# Patient Record
Sex: MALE | Race: BLACK OR AFRICAN AMERICAN | NOT HISPANIC OR LATINO | Employment: OTHER | URBAN - METROPOLITAN AREA
[De-identification: names, ages, dates, MRNs, and addresses within clinical notes are randomized per-mention and may not be internally consistent; named-entity substitution may affect disease eponyms.]

---

## 2018-01-23 ENCOUNTER — OFFICE VISIT CONVERTED (OUTPATIENT)
Dept: INTERNAL MEDICINE | Facility: CLINIC | Age: 63
End: 2018-01-23
Attending: NURSE PRACTITIONER

## 2018-01-23 ENCOUNTER — CONVERSION ENCOUNTER (OUTPATIENT)
Dept: INTERNAL MEDICINE | Facility: CLINIC | Age: 63
End: 2018-01-23

## 2018-02-05 ENCOUNTER — OFFICE VISIT CONVERTED (OUTPATIENT)
Dept: INTERNAL MEDICINE | Facility: CLINIC | Age: 63
End: 2018-02-05
Attending: NURSE PRACTITIONER

## 2018-02-05 ENCOUNTER — CONVERSION ENCOUNTER (OUTPATIENT)
Dept: OTHER | Facility: HOSPITAL | Age: 63
End: 2018-02-05

## 2018-02-13 ENCOUNTER — OFFICE VISIT CONVERTED (OUTPATIENT)
Dept: NEUROSURGERY | Facility: CLINIC | Age: 63
End: 2018-02-13
Attending: PHYSICIAN ASSISTANT

## 2018-02-13 ENCOUNTER — CONVERSION ENCOUNTER (OUTPATIENT)
Dept: NEUROLOGY | Facility: CLINIC | Age: 63
End: 2018-02-13

## 2018-03-06 ENCOUNTER — CONVERSION ENCOUNTER (OUTPATIENT)
Dept: CARDIOLOGY | Facility: CLINIC | Age: 63
End: 2018-03-06

## 2018-03-06 ENCOUNTER — OFFICE VISIT CONVERTED (OUTPATIENT)
Dept: CARDIOLOGY | Facility: CLINIC | Age: 63
End: 2018-03-06
Attending: INTERNAL MEDICINE

## 2018-03-09 ENCOUNTER — OFFICE VISIT CONVERTED (OUTPATIENT)
Dept: ORTHOPEDIC SURGERY | Facility: CLINIC | Age: 63
End: 2018-03-09
Attending: ORTHOPAEDIC SURGERY

## 2018-03-20 ENCOUNTER — OFFICE VISIT CONVERTED (OUTPATIENT)
Dept: INTERNAL MEDICINE | Facility: CLINIC | Age: 63
End: 2018-03-20
Attending: NURSE PRACTITIONER

## 2018-03-20 ENCOUNTER — CONVERSION ENCOUNTER (OUTPATIENT)
Dept: INTERNAL MEDICINE | Facility: CLINIC | Age: 63
End: 2018-03-20

## 2018-03-23 ENCOUNTER — CONVERSION ENCOUNTER (OUTPATIENT)
Dept: CARDIOLOGY | Facility: CLINIC | Age: 63
End: 2018-03-23
Attending: INTERNAL MEDICINE

## 2018-04-19 ENCOUNTER — OFFICE VISIT CONVERTED (OUTPATIENT)
Dept: CARDIOLOGY | Facility: CLINIC | Age: 63
End: 2018-04-19
Attending: INTERNAL MEDICINE

## 2018-04-19 ENCOUNTER — CONVERSION ENCOUNTER (OUTPATIENT)
Dept: CARDIOLOGY | Facility: CLINIC | Age: 63
End: 2018-04-19

## 2018-05-21 ENCOUNTER — OFFICE VISIT CONVERTED (OUTPATIENT)
Dept: INTERNAL MEDICINE | Facility: CLINIC | Age: 63
End: 2018-05-21
Attending: NURSE PRACTITIONER

## 2018-06-15 ENCOUNTER — OFFICE VISIT CONVERTED (OUTPATIENT)
Dept: CARDIOLOGY | Facility: CLINIC | Age: 63
End: 2018-06-15
Attending: INTERNAL MEDICINE

## 2018-06-27 ENCOUNTER — OFFICE VISIT CONVERTED (OUTPATIENT)
Dept: INTERNAL MEDICINE | Facility: CLINIC | Age: 63
End: 2018-06-27
Attending: INTERNAL MEDICINE

## 2018-07-02 ENCOUNTER — OFFICE VISIT CONVERTED (OUTPATIENT)
Dept: INTERNAL MEDICINE | Facility: CLINIC | Age: 63
End: 2018-07-02
Attending: NURSE PRACTITIONER

## 2018-08-27 ENCOUNTER — OFFICE VISIT CONVERTED (OUTPATIENT)
Dept: INTERNAL MEDICINE | Facility: CLINIC | Age: 63
End: 2018-08-27
Attending: NURSE PRACTITIONER

## 2018-09-04 ENCOUNTER — CONVERSION ENCOUNTER (OUTPATIENT)
Dept: OTHER | Facility: HOSPITAL | Age: 63
End: 2018-09-04

## 2018-09-04 ENCOUNTER — OFFICE VISIT CONVERTED (OUTPATIENT)
Dept: CARDIOLOGY | Facility: CLINIC | Age: 63
End: 2018-09-04
Attending: INTERNAL MEDICINE

## 2018-10-08 ENCOUNTER — OFFICE VISIT CONVERTED (OUTPATIENT)
Dept: INTERNAL MEDICINE | Facility: CLINIC | Age: 63
End: 2018-10-08
Attending: NURSE PRACTITIONER

## 2018-12-10 ENCOUNTER — OFFICE VISIT CONVERTED (OUTPATIENT)
Dept: INTERNAL MEDICINE | Facility: CLINIC | Age: 63
End: 2018-12-10
Attending: NURSE PRACTITIONER

## 2018-12-10 ENCOUNTER — CONVERSION ENCOUNTER (OUTPATIENT)
Dept: ORTHOPEDIC SURGERY | Facility: CLINIC | Age: 63
End: 2018-12-10

## 2018-12-10 ENCOUNTER — OFFICE VISIT CONVERTED (OUTPATIENT)
Dept: ORTHOPEDIC SURGERY | Facility: CLINIC | Age: 63
End: 2018-12-10
Attending: ORTHOPAEDIC SURGERY

## 2019-01-10 ENCOUNTER — OFFICE VISIT CONVERTED (OUTPATIENT)
Dept: ORTHOPEDIC SURGERY | Facility: CLINIC | Age: 64
End: 2019-01-10
Attending: ORTHOPAEDIC SURGERY

## 2019-01-15 ENCOUNTER — HOSPITAL ENCOUNTER (OUTPATIENT)
Dept: OTHER | Facility: HOSPITAL | Age: 64
Discharge: HOME | End: 2019-01-15
Attending: NURSE PRACTITIONER

## 2019-01-15 LAB
ANION GAP SERPL CALC-SCNC: 17 MMOL/L (ref 8–19)
BUN SERPL-MCNC: 43 MG/DL (ref 5–25)
BUN/CREAT SERPL: 22 {RATIO} (ref 6–20)
CALCIUM SERPL-MCNC: 9.1 MG/DL (ref 8.7–10.4)
CHLORIDE SERPL-SCNC: 98 MMOL/L (ref 99–111)
CONV CO2: 26 MMOL/L (ref 22–32)
CREAT UR-MCNC: 1.92 MG/DL (ref 0.7–1.2)
GFR SERPLBLD BASED ON 1.73 SQ M-ARVRAT: 42 ML/MIN/{1.73_M2}
GLUCOSE SERPL-MCNC: 158 MG/DL (ref 70–99)
OSMOLALITY SERPL CALC.SUM OF ELEC: 296 MOSM/KG (ref 273–304)
POTASSIUM SERPL-SCNC: 4.5 MMOL/L (ref 3.5–5.3)
SODIUM SERPL-SCNC: 136 MMOL/L (ref 135–147)

## 2019-01-17 ENCOUNTER — HOSPITAL ENCOUNTER (OUTPATIENT)
Dept: GENERAL RADIOLOGY | Facility: HOSPITAL | Age: 64
Discharge: HOME | End: 2019-01-17
Attending: ORTHOPAEDIC SURGERY

## 2019-01-21 ENCOUNTER — CONVERSION ENCOUNTER (OUTPATIENT)
Dept: ORTHOPEDIC SURGERY | Facility: CLINIC | Age: 64
End: 2019-01-21

## 2019-01-21 ENCOUNTER — OFFICE VISIT CONVERTED (OUTPATIENT)
Dept: ORTHOPEDIC SURGERY | Facility: CLINIC | Age: 64
End: 2019-01-21
Attending: ORTHOPAEDIC SURGERY

## 2019-01-22 ENCOUNTER — OFFICE VISIT CONVERTED (OUTPATIENT)
Dept: INTERNAL MEDICINE | Facility: CLINIC | Age: 64
End: 2019-01-22
Attending: NURSE PRACTITIONER

## 2019-03-19 ENCOUNTER — HOSPITAL ENCOUNTER (OUTPATIENT)
Dept: OTHER | Facility: HOSPITAL | Age: 64
Discharge: HOME | End: 2019-03-19
Attending: NURSE PRACTITIONER

## 2019-03-19 ENCOUNTER — OFFICE VISIT CONVERTED (OUTPATIENT)
Dept: INTERNAL MEDICINE | Facility: CLINIC | Age: 64
End: 2019-03-19
Attending: NURSE PRACTITIONER

## 2019-03-19 LAB
ALBUMIN SERPL-MCNC: 3.5 G/DL (ref 3.5–5)
ALBUMIN/GLOB SERPL: 0.9 {RATIO} (ref 1.4–2.6)
ALP SERPL-CCNC: 75 U/L (ref 56–155)
ALT SERPL-CCNC: 17 U/L (ref 10–40)
ANION GAP SERPL CALC-SCNC: 19 MMOL/L (ref 8–19)
AST SERPL-CCNC: 23 U/L (ref 15–50)
BASOPHILS # BLD AUTO: 0.03 10*3/UL (ref 0–0.2)
BASOPHILS NFR BLD AUTO: 0.3 % (ref 0–3)
BILIRUB SERPL-MCNC: 0.44 MG/DL (ref 0.2–1.3)
BUN SERPL-MCNC: 52 MG/DL (ref 5–25)
BUN/CREAT SERPL: 23 {RATIO} (ref 6–20)
CALCIUM SERPL-MCNC: 9 MG/DL (ref 8.7–10.4)
CHLORIDE SERPL-SCNC: 96 MMOL/L (ref 99–111)
CHOLEST SERPL-MCNC: 118 MG/DL (ref 107–200)
CHOLEST/HDLC SERPL: 2.8 {RATIO} (ref 3–6)
CONV ABS IMM GRAN: 0.04 10*3/UL (ref 0–0.2)
CONV CO2: 24 MMOL/L (ref 22–32)
CONV IMMATURE GRAN: 0.4 % (ref 0–1.8)
CONV TOTAL PROTEIN: 7.3 G/DL (ref 6.3–8.2)
CREAT UR-MCNC: 2.28 MG/DL (ref 0.7–1.2)
DEPRECATED RDW RBC AUTO: 46.7 FL (ref 35.1–43.9)
EOSINOPHIL # BLD AUTO: 0.08 10*3/UL (ref 0–0.7)
EOSINOPHIL # BLD AUTO: 0.8 % (ref 0–7)
ERYTHROCYTE [DISTWIDTH] IN BLOOD BY AUTOMATED COUNT: 14 % (ref 11.6–14.4)
EST. AVERAGE GLUCOSE BLD GHB EST-MCNC: 154 MG/DL
GFR SERPLBLD BASED ON 1.73 SQ M-ARVRAT: 34 ML/MIN/{1.73_M2}
GLOBULIN UR ELPH-MCNC: 3.8 G/DL (ref 2–3.5)
GLUCOSE SERPL-MCNC: 166 MG/DL (ref 70–99)
HBA1C MFR BLD: 11.6 G/DL (ref 14–18)
HBA1C MFR BLD: 7 % (ref 3.5–5.7)
HCT VFR BLD AUTO: 36.5 % (ref 42–52)
HDLC SERPL-MCNC: 42 MG/DL (ref 40–60)
LDLC SERPL CALC-MCNC: 43 MG/DL (ref 70–100)
LYMPHOCYTES # BLD AUTO: 1.71 10*3/UL (ref 1–5)
MCH RBC QN AUTO: 28.9 PG (ref 27–31)
MCHC RBC AUTO-ENTMCNC: 31.8 G/DL (ref 33–37)
MCV RBC AUTO: 90.8 FL (ref 80–96)
MONOCYTES # BLD AUTO: 0.78 10*3/UL (ref 0.2–1.2)
MONOCYTES NFR BLD AUTO: 7.6 % (ref 3–10)
NEUTROPHILS # BLD AUTO: 7.56 10*3/UL (ref 2–8)
NEUTROPHILS NFR BLD AUTO: 74.1 % (ref 30–85)
NRBC CBCN: 0 % (ref 0–0.7)
OSMOLALITY SERPL CALC.SUM OF ELEC: 296 MOSM/KG (ref 273–304)
PLATELET # BLD AUTO: 265 10*3/UL (ref 130–400)
PMV BLD AUTO: 10.5 FL (ref 9.4–12.4)
POTASSIUM SERPL-SCNC: 4.8 MMOL/L (ref 3.5–5.3)
RBC # BLD AUTO: 4.02 10*6/UL (ref 4.7–6.1)
SODIUM SERPL-SCNC: 134 MMOL/L (ref 135–147)
T4 FREE SERPL-MCNC: 1.1 NG/DL (ref 0.9–1.8)
TRIGL SERPL-MCNC: 167 MG/DL (ref 40–150)
TSH SERPL-ACNC: 1.19 M[IU]/L (ref 0.27–4.2)
VARIANT LYMPHS NFR BLD MANUAL: 16.8 % (ref 20–45)
VLDLC SERPL-MCNC: 33 MG/DL (ref 5–37)
WBC # BLD AUTO: 10.2 10*3/UL (ref 4.8–10.8)

## 2019-03-22 ENCOUNTER — CONVERSION ENCOUNTER (OUTPATIENT)
Dept: OTHER | Facility: HOSPITAL | Age: 64
End: 2019-03-22

## 2019-03-25 ENCOUNTER — HOSPITAL ENCOUNTER (OUTPATIENT)
Dept: CARDIOLOGY | Facility: HOSPITAL | Age: 64
Discharge: HOME | End: 2019-03-25
Attending: NURSE PRACTITIONER

## 2019-03-28 ENCOUNTER — OFFICE VISIT CONVERTED (OUTPATIENT)
Dept: INTERNAL MEDICINE | Facility: CLINIC | Age: 64
End: 2019-03-28
Attending: NURSE PRACTITIONER

## 2019-03-29 ENCOUNTER — OFFICE VISIT (OUTPATIENT)
Dept: CARDIOLOGY | Facility: CLINIC | Age: 64
End: 2019-03-29

## 2019-03-29 VITALS
DIASTOLIC BLOOD PRESSURE: 72 MMHG | HEART RATE: 85 BPM | BODY MASS INDEX: 30.13 KG/M2 | HEIGHT: 67 IN | SYSTOLIC BLOOD PRESSURE: 124 MMHG | WEIGHT: 192 LBS

## 2019-03-29 DIAGNOSIS — I25.10 CORONARY ARTERY DISEASE INVOLVING NATIVE CORONARY ARTERY WITHOUT ANGINA PECTORIS, UNSPECIFIED WHETHER NATIVE OR TRANSPLANTED HEART: Primary | ICD-10-CM

## 2019-03-29 PROCEDURE — 93000 ELECTROCARDIOGRAM COMPLETE: CPT | Performed by: INTERNAL MEDICINE

## 2019-03-29 PROCEDURE — 99204 OFFICE O/P NEW MOD 45 MIN: CPT | Performed by: INTERNAL MEDICINE

## 2019-03-29 RX ORDER — POTASSIUM CHLORIDE 1500 MG/1
TABLET, FILM COATED, EXTENDED RELEASE ORAL DAILY
Refills: 1 | COMMUNITY
Start: 2019-03-07 | End: 2021-09-23

## 2019-03-29 RX ORDER — ATORVASTATIN CALCIUM 40 MG/1
TABLET, FILM COATED ORAL
Refills: 1 | COMMUNITY
Start: 2019-03-13 | End: 2021-08-21

## 2019-03-29 RX ORDER — SPIRONOLACTONE 25 MG/1
TABLET ORAL DAILY
Refills: 2 | COMMUNITY
Start: 2019-03-13 | End: 2022-01-11 | Stop reason: SDUPTHER

## 2019-03-29 RX ORDER — DULAGLUTIDE 1.5 MG/.5ML
INJECTION, SOLUTION SUBCUTANEOUS
Refills: 5 | COMMUNITY
Start: 2019-03-17 | End: 2021-07-10 | Stop reason: ALTCHOICE

## 2019-03-29 RX ORDER — PREDNISONE 10 MG/1
TABLET ORAL TAKE AS DIRECTED
Refills: 0 | COMMUNITY
Start: 2019-03-25 | End: 2021-07-10 | Stop reason: ALTCHOICE

## 2019-03-29 RX ORDER — SACUBITRIL AND VALSARTAN 97; 103 MG/1; MG/1
1 TABLET, FILM COATED ORAL 2 TIMES DAILY
Refills: 1 | COMMUNITY
Start: 2019-01-14 | End: 2021-09-23 | Stop reason: SDUPTHER

## 2019-03-29 RX ORDER — TRAMADOL HYDROCHLORIDE 50 MG/1
TABLET ORAL 2 TIMES DAILY
Refills: 0 | COMMUNITY
Start: 2019-02-19 | End: 2021-07-10 | Stop reason: ALTCHOICE

## 2019-03-29 RX ORDER — FUROSEMIDE 40 MG/1
TABLET ORAL DAILY
Refills: 1 | COMMUNITY
Start: 2019-03-13 | End: 2021-08-16 | Stop reason: SDUPTHER

## 2019-03-29 NOTE — PROGRESS NOTES
Subjective:     Encounter Date:03/29/2019      Patient ID: Lyle Loznao is a 63 y.o. male.    Chief Complaint:  This is a 63-year-old man with a past medical history of cardiomyopathy, presumed ischemic etiology, depression, diabetes, hypertension, hyperlipidemia, gout/osteoarthritis.  He presents for evaluation of lower extremity edema.  He was in the [facility] ER 2 days ago with isolated left knee and lower extremity swelling and pain.  He apparently had an echocardiogram at that time.  He was started on steroids for gout.  Since starting the steroids he says his knee and swelling has improved substantially.  The pain is also improved substantially.  He was told that there was nothing wrong with his heart during that emergency room visit.  We are provided with the most recent note from Dr. Mcdonnell his primary cardiologist.  He has had a nuclear stress test which showed a moderate size inferior fixed defect.  For this reason has been presumed that this is an ischemic cardiomyopathy.  Dr. Mcdonnell has talked to the patient about having a left heart catheterization to evaluate for revascularizable or reversible causes of heart failure.  The patient has not chosen to do this up until this time.  He has had a diagnosis of heart failure for about the last year and his most recent EF that I know of is about 40%.  He quit smoking in 2017.  He is never been much of a drinker.  There is no family history of cardiomyopathy.  His blood pressure and his diabetes have been intermittently well controlled.  He walks with a walker due to neuropathy and gout.  He is not really limited by chest pain but does have occasional shortness of breath        The following portions of the patient's history were reviewed and updated as appropriate: allergies, current medications, past family history, past medical history, past social history, past surgical history and problem list.    Past Medical History:   Diagnosis Date   • Abnormal  kidney function    • Arthritis    • Bursitis    • CHF (congestive heart failure) (CMS/HCC)    • Depression    • Diabetes (CMS/HCC)    • Edema    • Forgetfulness    • Head injury    • Hernia cerebri (CMS/HCC)    • Hyperlipidemia    • Hypertension    • IFG (impaired fasting glucose)    • Low back pain    • Lumbago     `   • Pain of left hip    • Right shoulder pain    • SOB (shortness of breath)        Family History   Problem Relation Age of Onset   • Cancer Mother    • Diabetes Mother    • Diabetes Sister    • Stroke Sister        Social History     Socioeconomic History   • Marital status: Single     Spouse name: Not on file   • Number of children: Not on file   • Years of education: Not on file   • Highest education level: Not on file   Tobacco Use   • Smoking status: Former Smoker   • Smokeless tobacco: Never Used   Substance and Sexual Activity   • Alcohol use: Yes     Comment: rare       No Known Allergies    No past surgical history on file.    Review of Systems   Cardiovascular: Positive for dyspnea on exertion and leg swelling. Negative for chest pain, claudication, irregular heartbeat, near-syncope, orthopnea, palpitations, paroxysmal nocturnal dyspnea and syncope.   Respiratory: Positive for shortness of breath, sleep disturbances due to breathing and snoring.    Musculoskeletal: Positive for joint pain and joint swelling.         ECG 12 Lead  Date/Time: 3/29/2019 11:44 AM  Performed by: Chacha Santacruz MD  Authorized by: Chacha Santacruz MD   Previous ECG: no previous ECG available  Rhythm: sinus rhythm  Rate: normal  T inversion: I, aVL, V1, V2, V3, V4, V5 and V6  QRS axis: normal    Clinical impression: abnormal EKG               Objective:     Physical Exam  GEN: no distress, alert and oriented  Eyes: normal sclera, normal lids and lashes  HENT: moist mucus membranes,   Lungs CTAB, no rales or wheezes  Chest: no abnormalities  Neck: no JVD or carotid bruits  CV: RRR, no murmurs, , +2 DP and 2+ carotid  pulses b/l  Abdo: soft,  Nontender, nondistended  Extr: +1 LLE edema, RLE is normal. L knee is warm, swollen, tender to palp  Skin: no rash, warm, dry  Heme/Lymph: no bruising  Psych: organized thought, normal behavior and affect    Lab Review:         Assessment:          Diagnosis Plan   1. Coronary artery disease involving native coronary artery without angina pectoris, unspecified whether native or transplanted heart            Plan:         This is a 63-year-old man who is a history of cardiomyopathy, presumed to be ischemic in etiology based on nuclear stress test.  He presents for evaluation of unilateral lower extremity edema.  I believe his lower extremity edema is most likely related to a gout flareup in his left knee.  It is warm and swollen and tender to the touch.  His right lower extremity is normal and he has no JVD or rales on physical exam.  He is on excellent medical therapy including a beta-blocker, spironolactone 25 and Entresto maximum dose.  He has very mild, class II heart failure symptoms.  I do agree with Dr. Mcdonnell that he should have a ischemic evaluation with a left heart catheterization to look for reversible causes of heart failure.  The patient said he would consider this.  Of note, he did have an echocardiogram earlier this week at [facility].  We have called and asked for the results of this test.  I will give the patient a call with the results and let him know if any changes need to be made to his medical regimen based on that.  He says that he will think about pursuing left heart catheterization will get back with me on that.               Chacha Santacruz MD  03/29/19

## 2019-04-15 ENCOUNTER — CONVERSION ENCOUNTER (OUTPATIENT)
Dept: INTERNAL MEDICINE | Facility: CLINIC | Age: 64
End: 2019-04-15

## 2019-04-15 ENCOUNTER — OFFICE VISIT CONVERTED (OUTPATIENT)
Dept: INTERNAL MEDICINE | Facility: CLINIC | Age: 64
End: 2019-04-15
Attending: NURSE PRACTITIONER

## 2019-04-26 ENCOUNTER — CONVERSION ENCOUNTER (OUTPATIENT)
Dept: CARDIOLOGY | Facility: CLINIC | Age: 64
End: 2019-04-26

## 2019-04-26 ENCOUNTER — OFFICE VISIT CONVERTED (OUTPATIENT)
Dept: CARDIOLOGY | Facility: CLINIC | Age: 64
End: 2019-04-26
Attending: INTERNAL MEDICINE

## 2019-05-07 ENCOUNTER — HOSPITAL ENCOUNTER (OUTPATIENT)
Dept: OTHER | Facility: HOSPITAL | Age: 64
Discharge: HOME | End: 2019-05-07
Attending: INTERNAL MEDICINE

## 2019-05-07 LAB
25(OH)D3 SERPL-MCNC: 9.3 NG/ML (ref 30–100)
ALBUMIN SERPL-MCNC: 3.4 G/DL (ref 3.5–5)
ANION GAP SERPL CALC-SCNC: 16 MMOL/L (ref 8–19)
APPEARANCE UR: CLEAR
BASOPHILS # BLD AUTO: 0.05 10*3/UL (ref 0–0.2)
BASOPHILS NFR BLD AUTO: 0.8 % (ref 0–3)
BILIRUB UR QL: NEGATIVE
BUN SERPL-MCNC: 47 MG/DL (ref 5–25)
BUN/CREAT SERPL: 23 {RATIO} (ref 6–20)
CALCIUM SERPL-MCNC: 9.4 MG/DL (ref 8.7–10.4)
CHLORIDE SERPL-SCNC: 101 MMOL/L (ref 99–111)
COLOR UR: YELLOW
CONV ABS IMM GRAN: 0.03 10*3/UL (ref 0–0.2)
CONV BACTERIA: NEGATIVE
CONV CO2: 25 MMOL/L (ref 22–32)
CONV COLLECTION SOURCE (UA): ABNORMAL
CONV CREATININE URINE, RANDOM: 111.3 MG/DL (ref 10–300)
CONV IMMATURE GRAN: 0.5 % (ref 0–1.8)
CONV UROBILINOGEN IN URINE BY AUTOMATED TEST STRIP: 0.2 {EHRLICHU}/DL (ref 0.1–1)
CREAT UR-MCNC: 2.01 MG/DL (ref 0.7–1.2)
DEPRECATED RDW RBC AUTO: 43.5 FL (ref 35.1–43.9)
EOSINOPHIL # BLD AUTO: 0.26 10*3/UL (ref 0–0.7)
EOSINOPHIL # BLD AUTO: 4.1 % (ref 0–7)
ERYTHROCYTE [DISTWIDTH] IN BLOOD BY AUTOMATED COUNT: 14.2 % (ref 11.6–14.4)
GFR SERPLBLD BASED ON 1.73 SQ M-ARVRAT: 40 ML/MIN/{1.73_M2}
GLUCOSE SERPL-MCNC: 230 MG/DL (ref 70–99)
GLUCOSE UR QL: 100 MG/DL
HBA1C MFR BLD: 10.6 G/DL (ref 14–18)
HCT VFR BLD AUTO: 33.6 % (ref 42–52)
HGB UR QL STRIP: ABNORMAL
KETONES UR QL STRIP: NEGATIVE MG/DL
LEUKOCYTE ESTERASE UR QL STRIP: NEGATIVE
LYMPHOCYTES # BLD AUTO: 2.22 10*3/UL (ref 1–5)
MCH RBC QN AUTO: 26.8 PG (ref 27–31)
MCHC RBC AUTO-ENTMCNC: 31.5 G/DL (ref 33–37)
MCV RBC AUTO: 84.8 FL (ref 80–96)
MONOCYTES # BLD AUTO: 0.4 10*3/UL (ref 0.2–1.2)
MONOCYTES NFR BLD AUTO: 6.3 % (ref 3–10)
NEUTROPHILS # BLD AUTO: 3.39 10*3/UL (ref 2–8)
NEUTROPHILS NFR BLD AUTO: 53.3 % (ref 30–85)
NITRITE UR QL STRIP: NEGATIVE
NRBC CBCN: 0 % (ref 0–0.7)
PH UR STRIP.AUTO: 5.5 [PH] (ref 5–8)
PHOSPHATE SERPL-MCNC: 3 MG/DL (ref 2.4–4.5)
PLATELET # BLD AUTO: 382 10*3/UL (ref 130–400)
PMV BLD AUTO: 9.3 FL (ref 9.4–12.4)
POTASSIUM SERPL-SCNC: 4.6 MMOL/L (ref 3.5–5.3)
PROT UR QL: 100 MG/DL
PROT UR-MCNC: 229.9 MG/DL
PTH-INTACT SERPL-MCNC: 106.3 PG/ML (ref 11.1–79.5)
RBC # BLD AUTO: 3.96 10*6/UL (ref 4.7–6.1)
RBC #/AREA URNS HPF: ABNORMAL /[HPF]
SODIUM SERPL-SCNC: 137 MMOL/L (ref 135–147)
SP GR UR: 1.02 (ref 1–1.03)
VARIANT LYMPHS NFR BLD MANUAL: 35 % (ref 20–45)
WBC # BLD AUTO: 6.35 10*3/UL (ref 4.8–10.8)
WBC #/AREA URNS HPF: ABNORMAL /[HPF]

## 2019-05-21 ENCOUNTER — HOSPITAL ENCOUNTER (OUTPATIENT)
Dept: CT IMAGING | Facility: HOSPITAL | Age: 64
Discharge: HOME | End: 2019-05-21
Attending: INTERNAL MEDICINE

## 2019-05-23 ENCOUNTER — OFFICE VISIT CONVERTED (OUTPATIENT)
Dept: CARDIOLOGY | Facility: CLINIC | Age: 64
End: 2019-05-23
Attending: INTERNAL MEDICINE

## 2019-06-21 ENCOUNTER — OFFICE VISIT CONVERTED (OUTPATIENT)
Dept: INTERNAL MEDICINE | Facility: CLINIC | Age: 64
End: 2019-06-21
Attending: NURSE PRACTITIONER

## 2019-06-21 ENCOUNTER — HOSPITAL ENCOUNTER (OUTPATIENT)
Dept: OTHER | Facility: HOSPITAL | Age: 64
Discharge: HOME | End: 2019-06-21
Attending: NURSE PRACTITIONER

## 2019-06-21 LAB
ALBUMIN SERPL-MCNC: 4.2 G/DL (ref 3.5–5)
ALBUMIN/GLOB SERPL: 1.4 {RATIO} (ref 1.4–2.6)
ALP SERPL-CCNC: 86 U/L (ref 56–155)
ALT SERPL-CCNC: 20 U/L (ref 10–40)
ANION GAP SERPL CALC-SCNC: 17 MMOL/L (ref 8–19)
AST SERPL-CCNC: 17 U/L (ref 15–50)
BASOPHILS # BLD AUTO: 0.03 10*3/UL (ref 0–0.2)
BASOPHILS NFR BLD AUTO: 0.4 % (ref 0–3)
BILIRUB SERPL-MCNC: 0.3 MG/DL (ref 0.2–1.3)
BUN SERPL-MCNC: 42 MG/DL (ref 5–25)
BUN/CREAT SERPL: 27 {RATIO} (ref 6–20)
CALCIUM SERPL-MCNC: 9.2 MG/DL (ref 8.7–10.4)
CHLORIDE SERPL-SCNC: 100 MMOL/L (ref 99–111)
CHOLEST SERPL-MCNC: 198 MG/DL (ref 107–200)
CHOLEST/HDLC SERPL: 4.2 {RATIO} (ref 3–6)
CONV ABS IMM GRAN: 0.01 10*3/UL (ref 0–0.2)
CONV CO2: 22 MMOL/L (ref 22–32)
CONV IMMATURE GRAN: 0.1 % (ref 0–1.8)
CONV TOTAL PROTEIN: 7.2 G/DL (ref 6.3–8.2)
CREAT UR-MCNC: 1.57 MG/DL (ref 0.7–1.2)
DEPRECATED RDW RBC AUTO: 54 FL (ref 35.1–43.9)
EOSINOPHIL # BLD AUTO: 0.32 10*3/UL (ref 0–0.7)
EOSINOPHIL # BLD AUTO: 4.4 % (ref 0–7)
ERYTHROCYTE [DISTWIDTH] IN BLOOD BY AUTOMATED COUNT: 16.5 % (ref 11.6–14.4)
EST. AVERAGE GLUCOSE BLD GHB EST-MCNC: 186 MG/DL
GFR SERPLBLD BASED ON 1.73 SQ M-ARVRAT: 54 ML/MIN/{1.73_M2}
GLOBULIN UR ELPH-MCNC: 3 G/DL (ref 2–3.5)
GLUCOSE SERPL-MCNC: 264 MG/DL (ref 70–99)
HBA1C MFR BLD: 13 G/DL (ref 14–18)
HBA1C MFR BLD: 8.1 % (ref 3.5–5.7)
HCT VFR BLD AUTO: 41.8 % (ref 42–52)
HDLC SERPL-MCNC: 47 MG/DL (ref 40–60)
LDLC SERPL CALC-MCNC: 92 MG/DL (ref 70–100)
LYMPHOCYTES # BLD AUTO: 2.47 10*3/UL (ref 1–5)
MCH RBC QN AUTO: 27.7 PG (ref 27–31)
MCHC RBC AUTO-ENTMCNC: 31.1 G/DL (ref 33–37)
MCV RBC AUTO: 89.1 FL (ref 80–96)
MONOCYTES # BLD AUTO: 0.43 10*3/UL (ref 0.2–1.2)
MONOCYTES NFR BLD AUTO: 5.9 % (ref 3–10)
NEUTROPHILS # BLD AUTO: 4.06 10*3/UL (ref 2–8)
NEUTROPHILS NFR BLD AUTO: 55.5 % (ref 30–85)
NRBC CBCN: 0 % (ref 0–0.7)
OSMOLALITY SERPL CALC.SUM OF ELEC: 298 MOSM/KG (ref 273–304)
PLATELET # BLD AUTO: 218 10*3/UL (ref 130–400)
PMV BLD AUTO: 11.4 FL (ref 9.4–12.4)
POTASSIUM SERPL-SCNC: 5.3 MMOL/L (ref 3.5–5.3)
RBC # BLD AUTO: 4.69 10*6/UL (ref 4.7–6.1)
SODIUM SERPL-SCNC: 134 MMOL/L (ref 135–147)
T4 FREE SERPL-MCNC: 1.1 NG/DL (ref 0.9–1.8)
TRIGL SERPL-MCNC: 294 MG/DL (ref 40–150)
TSH SERPL-ACNC: 2.83 M[IU]/L (ref 0.27–4.2)
VARIANT LYMPHS NFR BLD MANUAL: 33.7 % (ref 20–45)
VLDLC SERPL-MCNC: 59 MG/DL (ref 5–37)
WBC # BLD AUTO: 7.32 10*3/UL (ref 4.8–10.8)

## 2019-07-03 ENCOUNTER — OFFICE VISIT CONVERTED (OUTPATIENT)
Dept: INTERNAL MEDICINE | Facility: CLINIC | Age: 64
End: 2019-07-03
Attending: NURSE PRACTITIONER

## 2019-09-25 ENCOUNTER — OFFICE VISIT CONVERTED (OUTPATIENT)
Dept: CARDIOLOGY | Facility: CLINIC | Age: 64
End: 2019-09-25
Attending: INTERNAL MEDICINE

## 2019-09-25 ENCOUNTER — CONVERSION ENCOUNTER (OUTPATIENT)
Dept: CARDIOLOGY | Facility: CLINIC | Age: 64
End: 2019-09-25

## 2019-11-13 ENCOUNTER — OFFICE VISIT CONVERTED (OUTPATIENT)
Dept: INTERNAL MEDICINE | Facility: CLINIC | Age: 64
End: 2019-11-13
Attending: PHYSICIAN ASSISTANT

## 2020-02-11 ENCOUNTER — HOSPITAL ENCOUNTER (OUTPATIENT)
Dept: OTHER | Facility: HOSPITAL | Age: 65
Discharge: HOME | End: 2020-02-11
Attending: NURSE PRACTITIONER

## 2020-02-11 ENCOUNTER — OFFICE VISIT CONVERTED (OUTPATIENT)
Dept: INTERNAL MEDICINE | Facility: CLINIC | Age: 65
End: 2020-02-11
Attending: NURSE PRACTITIONER

## 2020-02-11 LAB
ALBUMIN SERPL-MCNC: 4.2 G/DL (ref 3.5–5)
ALBUMIN/GLOB SERPL: 1.4 {RATIO} (ref 1.4–2.6)
ALP SERPL-CCNC: 71 U/L (ref 56–155)
ALT SERPL-CCNC: 16 U/L (ref 10–40)
ANION GAP SERPL CALC-SCNC: 18 MMOL/L (ref 8–19)
AST SERPL-CCNC: 18 U/L (ref 15–50)
BASOPHILS # BLD AUTO: 0.03 10*3/UL (ref 0–0.2)
BASOPHILS NFR BLD AUTO: 0.5 % (ref 0–3)
BILIRUB SERPL-MCNC: 0.27 MG/DL (ref 0.2–1.3)
BUN SERPL-MCNC: 49 MG/DL (ref 5–25)
BUN/CREAT SERPL: 25 {RATIO} (ref 6–20)
CALCIUM SERPL-MCNC: 9.9 MG/DL (ref 8.7–10.4)
CHLORIDE SERPL-SCNC: 99 MMOL/L (ref 99–111)
CHOLEST SERPL-MCNC: 166 MG/DL (ref 107–200)
CHOLEST/HDLC SERPL: 4 {RATIO} (ref 3–6)
CONV ABS IMM GRAN: 0.02 10*3/UL (ref 0–0.2)
CONV CO2: 22 MMOL/L (ref 22–32)
CONV IMMATURE GRAN: 0.3 % (ref 0–1.8)
CONV TOTAL PROTEIN: 7.3 G/DL (ref 6.3–8.2)
CREAT UR-MCNC: 1.93 MG/DL (ref 0.7–1.2)
DEPRECATED RDW RBC AUTO: 50.7 FL (ref 35.1–43.9)
EOSINOPHIL # BLD AUTO: 0.26 10*3/UL (ref 0–0.7)
EOSINOPHIL # BLD AUTO: 4.1 % (ref 0–7)
ERYTHROCYTE [DISTWIDTH] IN BLOOD BY AUTOMATED COUNT: 16.1 % (ref 11.6–14.4)
EST. AVERAGE GLUCOSE BLD GHB EST-MCNC: 186 MG/DL
GFR SERPLBLD BASED ON 1.73 SQ M-ARVRAT: 42 ML/MIN/{1.73_M2}
GLOBULIN UR ELPH-MCNC: 3.1 G/DL (ref 2–3.5)
GLUCOSE SERPL-MCNC: 196 MG/DL (ref 70–99)
HBA1C MFR BLD: 8.1 % (ref 3.5–5.7)
HCT VFR BLD AUTO: 40.6 % (ref 42–52)
HDLC SERPL-MCNC: 42 MG/DL (ref 40–60)
HGB BLD-MCNC: 13 G/DL (ref 14–18)
LDLC SERPL CALC-MCNC: 82 MG/DL (ref 70–100)
LYMPHOCYTES # BLD AUTO: 2.39 10*3/UL (ref 1–5)
LYMPHOCYTES NFR BLD AUTO: 37.4 % (ref 20–45)
MCH RBC QN AUTO: 27.8 PG (ref 27–31)
MCHC RBC AUTO-ENTMCNC: 32 G/DL (ref 33–37)
MCV RBC AUTO: 86.8 FL (ref 80–96)
MONOCYTES # BLD AUTO: 0.47 10*3/UL (ref 0.2–1.2)
MONOCYTES NFR BLD AUTO: 7.4 % (ref 3–10)
NEUTROPHILS # BLD AUTO: 3.22 10*3/UL (ref 2–8)
NEUTROPHILS NFR BLD AUTO: 50.3 % (ref 30–85)
NRBC CBCN: 0 % (ref 0–0.7)
OSMOLALITY SERPL CALC.SUM OF ELEC: 294 MOSM/KG (ref 273–304)
PLATELET # BLD AUTO: 199 10*3/UL (ref 130–400)
PMV BLD AUTO: 10.7 FL (ref 9.4–12.4)
POTASSIUM SERPL-SCNC: 5.5 MMOL/L (ref 3.5–5.3)
RBC # BLD AUTO: 4.68 10*6/UL (ref 4.7–6.1)
SODIUM SERPL-SCNC: 133 MMOL/L (ref 135–147)
T4 FREE SERPL-MCNC: 1.1 NG/DL (ref 0.9–1.8)
TRIGL SERPL-MCNC: 212 MG/DL (ref 40–150)
TSH SERPL-ACNC: 2.76 M[IU]/L (ref 0.27–4.2)
VLDLC SERPL-MCNC: 42 MG/DL (ref 5–37)
WBC # BLD AUTO: 6.39 10*3/UL (ref 4.8–10.8)

## 2020-04-15 ENCOUNTER — TELEMEDICINE CONVERTED (OUTPATIENT)
Dept: CARDIOLOGY | Facility: CLINIC | Age: 65
End: 2020-04-15
Attending: INTERNAL MEDICINE

## 2020-05-11 ENCOUNTER — TELEPHONE CONVERTED (OUTPATIENT)
Dept: INTERNAL MEDICINE | Facility: CLINIC | Age: 65
End: 2020-05-11
Attending: NURSE PRACTITIONER

## 2020-05-19 ENCOUNTER — HOSPITAL ENCOUNTER (OUTPATIENT)
Dept: URGENT CARE | Facility: CLINIC | Age: 65
Discharge: HOME | End: 2020-05-19

## 2020-06-01 ENCOUNTER — HOSPITAL ENCOUNTER (OUTPATIENT)
Dept: URGENT CARE | Facility: CLINIC | Age: 65
Discharge: HOME | End: 2020-06-01
Attending: NURSE PRACTITIONER

## 2020-06-09 ENCOUNTER — OFFICE VISIT CONVERTED (OUTPATIENT)
Dept: INTERNAL MEDICINE | Facility: CLINIC | Age: 65
End: 2020-06-09
Attending: PHYSICIAN ASSISTANT

## 2020-06-09 ENCOUNTER — HOSPITAL ENCOUNTER (OUTPATIENT)
Dept: OTHER | Facility: HOSPITAL | Age: 65
Discharge: HOME | End: 2020-06-09
Attending: PHYSICIAN ASSISTANT

## 2020-06-09 LAB
ALBUMIN SERPL-MCNC: 3.9 G/DL (ref 3.5–5)
ALBUMIN/GLOB SERPL: 1.2 {RATIO} (ref 1.4–2.6)
ALP SERPL-CCNC: 78 U/L (ref 56–155)
ALT SERPL-CCNC: 13 U/L (ref 10–40)
ANION GAP SERPL CALC-SCNC: 19 MMOL/L (ref 8–19)
AST SERPL-CCNC: 16 U/L (ref 15–50)
BASOPHILS # BLD AUTO: 0.03 10*3/UL (ref 0–0.2)
BASOPHILS NFR BLD AUTO: 0.4 % (ref 0–3)
BILIRUB SERPL-MCNC: 0.25 MG/DL (ref 0.2–1.3)
BUN SERPL-MCNC: 52 MG/DL (ref 5–25)
BUN/CREAT SERPL: 22 {RATIO} (ref 6–20)
CALCIUM SERPL-MCNC: 9 MG/DL (ref 8.7–10.4)
CHLORIDE SERPL-SCNC: 101 MMOL/L (ref 99–111)
CONV ABS IMM GRAN: 0.02 10*3/UL (ref 0–0.2)
CONV CO2: 20 MMOL/L (ref 22–32)
CONV IMMATURE GRAN: 0.3 % (ref 0–1.8)
CONV TOTAL PROTEIN: 7.1 G/DL (ref 6.3–8.2)
CREAT UR-MCNC: 2.33 MG/DL (ref 0.7–1.2)
DEPRECATED RDW RBC AUTO: 44.9 FL (ref 35.1–43.9)
EOSINOPHIL # BLD AUTO: 0.23 10*3/UL (ref 0–0.7)
EOSINOPHIL # BLD AUTO: 2.9 % (ref 0–7)
ERYTHROCYTE [DISTWIDTH] IN BLOOD BY AUTOMATED COUNT: 13.2 % (ref 11.6–14.4)
GFR SERPLBLD BASED ON 1.73 SQ M-ARVRAT: 33 ML/MIN/{1.73_M2}
GLOBULIN UR ELPH-MCNC: 3.2 G/DL (ref 2–3.5)
GLUCOSE SERPL-MCNC: 193 MG/DL (ref 70–99)
HCT VFR BLD AUTO: 36.1 % (ref 42–52)
HGB BLD-MCNC: 11.3 G/DL (ref 14–18)
LYMPHOCYTES # BLD AUTO: 2.24 10*3/UL (ref 1–5)
LYMPHOCYTES NFR BLD AUTO: 28.5 % (ref 20–45)
MCH RBC QN AUTO: 29.3 PG (ref 27–31)
MCHC RBC AUTO-ENTMCNC: 31.3 G/DL (ref 33–37)
MCV RBC AUTO: 93.5 FL (ref 80–96)
MONOCYTES # BLD AUTO: 0.48 10*3/UL (ref 0.2–1.2)
MONOCYTES NFR BLD AUTO: 6.1 % (ref 3–10)
NEUTROPHILS # BLD AUTO: 4.85 10*3/UL (ref 2–8)
NEUTROPHILS NFR BLD AUTO: 61.8 % (ref 30–85)
NRBC CBCN: 0 % (ref 0–0.7)
OSMOLALITY SERPL CALC.SUM OF ELEC: 299 MOSM/KG (ref 273–304)
PLATELET # BLD AUTO: 242 10*3/UL (ref 130–400)
PMV BLD AUTO: 10.1 FL (ref 9.4–12.4)
POTASSIUM SERPL-SCNC: 5.1 MMOL/L (ref 3.5–5.3)
RBC # BLD AUTO: 3.86 10*6/UL (ref 4.7–6.1)
SODIUM SERPL-SCNC: 135 MMOL/L (ref 135–147)
WBC # BLD AUTO: 7.85 10*3/UL (ref 4.8–10.8)

## 2020-06-10 LAB
ASO AB SERPL-ACNC: 98 [IU]/ML (ref 0–200)
CONV RHEUMATOID FACTOR IGM: 12.9 [IU]/ML (ref 0–14)
CRP SERPL-MCNC: 60.2 MG/L (ref 0–5)
DSDNA AB SER-ACNC: NEGATIVE [IU]/ML
ENA AB SER IA-ACNC: NEGATIVE {RATIO}
URATE SERPL-MCNC: 11.6 MG/DL (ref 3.5–8.5)

## 2020-06-26 ENCOUNTER — HOSPITAL ENCOUNTER (OUTPATIENT)
Dept: OTHER | Facility: HOSPITAL | Age: 65
Discharge: HOME | End: 2020-06-26
Attending: NURSE PRACTITIONER

## 2020-06-26 ENCOUNTER — OFFICE VISIT CONVERTED (OUTPATIENT)
Dept: INTERNAL MEDICINE | Facility: CLINIC | Age: 65
End: 2020-06-26
Attending: NURSE PRACTITIONER

## 2020-06-26 LAB
ALBUMIN SERPL-MCNC: 4.3 G/DL (ref 3.5–5)
ALBUMIN/GLOB SERPL: 1.4 {RATIO} (ref 1.4–2.6)
ALP SERPL-CCNC: 78 U/L (ref 56–155)
ALT SERPL-CCNC: 15 U/L (ref 10–40)
ANION GAP SERPL CALC-SCNC: 17 MMOL/L (ref 8–19)
AST SERPL-CCNC: 18 U/L (ref 15–50)
BASOPHILS # BLD AUTO: 0.05 10*3/UL (ref 0–0.2)
BASOPHILS NFR BLD AUTO: 0.7 % (ref 0–3)
BILIRUB SERPL-MCNC: 0.33 MG/DL (ref 0.2–1.3)
BUN SERPL-MCNC: 46 MG/DL (ref 5–25)
BUN/CREAT SERPL: 21 {RATIO} (ref 6–20)
CALCIUM SERPL-MCNC: 9.6 MG/DL (ref 8.7–10.4)
CHLORIDE SERPL-SCNC: 99 MMOL/L (ref 99–111)
CHOLEST SERPL-MCNC: 136 MG/DL (ref 107–200)
CHOLEST/HDLC SERPL: 3.7 {RATIO} (ref 3–6)
CONV ABS IMM GRAN: 0.02 10*3/UL (ref 0–0.2)
CONV CO2: 22 MMOL/L (ref 22–32)
CONV IMMATURE GRAN: 0.3 % (ref 0–1.8)
CONV TOTAL PROTEIN: 7.4 G/DL (ref 6.3–8.2)
CREAT UR-MCNC: 2.23 MG/DL (ref 0.7–1.2)
CRP SERPL-MCNC: 8 MG/L (ref 0–5)
DEPRECATED RDW RBC AUTO: 46.5 FL (ref 35.1–43.9)
EOSINOPHIL # BLD AUTO: 0.19 10*3/UL (ref 0–0.7)
EOSINOPHIL # BLD AUTO: 2.7 % (ref 0–7)
ERYTHROCYTE [DISTWIDTH] IN BLOOD BY AUTOMATED COUNT: 13.6 % (ref 11.6–14.4)
EST. AVERAGE GLUCOSE BLD GHB EST-MCNC: 192 MG/DL
GFR SERPLBLD BASED ON 1.73 SQ M-ARVRAT: 35 ML/MIN/{1.73_M2}
GLOBULIN UR ELPH-MCNC: 3.1 G/DL (ref 2–3.5)
GLUCOSE SERPL-MCNC: 129 MG/DL (ref 70–99)
HBA1C MFR BLD: 8.3 % (ref 3.5–5.7)
HCT VFR BLD AUTO: 37.3 % (ref 42–52)
HDLC SERPL-MCNC: 37 MG/DL (ref 40–60)
HGB BLD-MCNC: 11.6 G/DL (ref 14–18)
LDLC SERPL CALC-MCNC: 30 MG/DL (ref 70–100)
LYMPHOCYTES # BLD AUTO: 2.17 10*3/UL (ref 1–5)
LYMPHOCYTES NFR BLD AUTO: 30.3 % (ref 20–45)
MCH RBC QN AUTO: 29.2 PG (ref 27–31)
MCHC RBC AUTO-ENTMCNC: 31.1 G/DL (ref 33–37)
MCV RBC AUTO: 94 FL (ref 80–96)
MONOCYTES # BLD AUTO: 0.49 10*3/UL (ref 0.2–1.2)
MONOCYTES NFR BLD AUTO: 6.9 % (ref 3–10)
NEUTROPHILS # BLD AUTO: 4.23 10*3/UL (ref 2–8)
NEUTROPHILS NFR BLD AUTO: 59.1 % (ref 30–85)
NRBC CBCN: 0 % (ref 0–0.7)
OSMOLALITY SERPL CALC.SUM OF ELEC: 290 MOSM/KG (ref 273–304)
PLATELET # BLD AUTO: 240 10*3/UL (ref 130–400)
PMV BLD AUTO: 10.8 FL (ref 9.4–12.4)
POTASSIUM SERPL-SCNC: 4.8 MMOL/L (ref 3.5–5.3)
RBC # BLD AUTO: 3.97 10*6/UL (ref 4.7–6.1)
SODIUM SERPL-SCNC: 133 MMOL/L (ref 135–147)
T4 FREE SERPL-MCNC: 1.1 NG/DL (ref 0.9–1.8)
TRIGL SERPL-MCNC: 347 MG/DL (ref 40–150)
TSH SERPL-ACNC: 2.36 M[IU]/L (ref 0.27–4.2)
URATE SERPL-MCNC: 12.8 MG/DL (ref 3.5–8.5)
VLDLC SERPL-MCNC: 69 MG/DL (ref 5–37)
WBC # BLD AUTO: 7.15 10*3/UL (ref 4.8–10.8)

## 2020-06-27 LAB
IRON SATN MFR SERPL: 27 % (ref 20–55)
IRON SERPL-MCNC: 79 UG/DL (ref 70–180)
TIBC SERPL-MCNC: 292 UG/DL (ref 245–450)
TRANSFERRIN SERPL-MCNC: 204 MG/DL (ref 215–365)

## 2020-06-28 LAB
DSDNA AB SER-ACNC: NEGATIVE [IU]/ML
ENA AB SER IA-ACNC: NEGATIVE {RATIO}

## 2020-07-06 ENCOUNTER — HOSPITAL ENCOUNTER (OUTPATIENT)
Dept: URGENT CARE | Facility: CLINIC | Age: 65
Discharge: HOME | End: 2020-07-06
Attending: EMERGENCY MEDICINE

## 2020-07-15 ENCOUNTER — CONVERSION ENCOUNTER (OUTPATIENT)
Dept: CARDIOLOGY | Facility: CLINIC | Age: 65
End: 2020-07-15

## 2020-07-15 ENCOUNTER — OFFICE VISIT CONVERTED (OUTPATIENT)
Dept: CARDIOLOGY | Facility: CLINIC | Age: 65
End: 2020-07-15
Attending: INTERNAL MEDICINE

## 2020-07-24 ENCOUNTER — HOSPITAL ENCOUNTER (OUTPATIENT)
Dept: URGENT CARE | Facility: CLINIC | Age: 65
Discharge: HOME | End: 2020-07-24

## 2020-08-17 ENCOUNTER — HOSPITAL ENCOUNTER (OUTPATIENT)
Dept: URGENT CARE | Facility: CLINIC | Age: 65
Discharge: HOME | End: 2020-08-17
Attending: PHYSICIAN ASSISTANT

## 2021-01-01 ENCOUNTER — HOSPITAL ENCOUNTER (OUTPATIENT)
Dept: URGENT CARE | Facility: CLINIC | Age: 66
Discharge: HOME | End: 2021-01-01
Attending: EMERGENCY MEDICINE

## 2021-02-03 ENCOUNTER — OFFICE VISIT CONVERTED (OUTPATIENT)
Dept: CARDIOLOGY | Facility: CLINIC | Age: 66
End: 2021-02-03
Attending: INTERNAL MEDICINE

## 2021-02-21 ENCOUNTER — HOSPITAL ENCOUNTER (OUTPATIENT)
Dept: URGENT CARE | Facility: CLINIC | Age: 66
Discharge: HOME | End: 2021-02-21
Attending: PHYSICIAN ASSISTANT

## 2021-03-01 ENCOUNTER — HOSPITAL ENCOUNTER (OUTPATIENT)
Dept: URGENT CARE | Facility: CLINIC | Age: 66
Discharge: HOME | End: 2021-03-01
Attending: FAMILY MEDICINE

## 2021-03-09 ENCOUNTER — HOSPITAL ENCOUNTER (OUTPATIENT)
Dept: URGENT CARE | Facility: CLINIC | Age: 66
Discharge: HOME | End: 2021-03-09
Attending: EMERGENCY MEDICINE

## 2021-04-02 ENCOUNTER — OFFICE VISIT CONVERTED (OUTPATIENT)
Dept: INTERNAL MEDICINE | Facility: CLINIC | Age: 66
End: 2021-04-02
Attending: NURSE PRACTITIONER

## 2021-04-02 ENCOUNTER — HOSPITAL ENCOUNTER (OUTPATIENT)
Dept: OTHER | Facility: HOSPITAL | Age: 66
Discharge: HOME | End: 2021-04-02
Attending: NURSE PRACTITIONER

## 2021-04-02 LAB
ALBUMIN SERPL-MCNC: 3.1 G/DL (ref 3.5–5)
ALBUMIN/GLOB SERPL: 1.1 {RATIO} (ref 1.4–2.6)
ALP SERPL-CCNC: 96 U/L (ref 56–155)
ALT SERPL-CCNC: 16 U/L (ref 10–40)
ANION GAP SERPL CALC-SCNC: 15 MMOL/L (ref 8–19)
AST SERPL-CCNC: 22 U/L (ref 15–50)
BASOPHILS # BLD AUTO: 0.03 10*3/UL (ref 0–0.2)
BASOPHILS NFR BLD AUTO: 0.5 % (ref 0–3)
BILIRUB SERPL-MCNC: 0.27 MG/DL (ref 0.2–1.3)
BNP SERPL-MCNC: 2893 PG/ML (ref 0–900)
BUN SERPL-MCNC: 25 MG/DL (ref 5–25)
BUN/CREAT SERPL: 15 {RATIO} (ref 6–20)
CALCIUM SERPL-MCNC: 8.9 MG/DL (ref 8.7–10.4)
CHLORIDE SERPL-SCNC: 104 MMOL/L (ref 99–111)
CHOLEST SERPL-MCNC: 118 MG/DL (ref 107–200)
CHOLEST/HDLC SERPL: 2.6 {RATIO} (ref 3–6)
CONV ABS IMM GRAN: 0.02 10*3/UL (ref 0–0.2)
CONV CO2: 25 MMOL/L (ref 22–32)
CONV CREATININE URINE, RANDOM: 85.9 MG/DL (ref 10–300)
CONV IMMATURE GRAN: 0.3 % (ref 0–1.8)
CONV MICROALBUM.,U,RANDOM: 1598.7 MG/L (ref 0–20)
CONV TOTAL PROTEIN: 6 G/DL (ref 6.3–8.2)
CREAT UR-MCNC: 1.67 MG/DL (ref 0.7–1.2)
DEPRECATED RDW RBC AUTO: 48.3 FL (ref 35.1–43.9)
EOSINOPHIL # BLD AUTO: 0.24 10*3/UL (ref 0–0.7)
EOSINOPHIL # BLD AUTO: 4 % (ref 0–7)
ERYTHROCYTE [DISTWIDTH] IN BLOOD BY AUTOMATED COUNT: 15 % (ref 11.6–14.4)
EST. AVERAGE GLUCOSE BLD GHB EST-MCNC: 209 MG/DL
GFR SERPLBLD BASED ON 1.73 SQ M-ARVRAT: 49 ML/MIN/{1.73_M2}
GLOBULIN UR ELPH-MCNC: 2.9 G/DL (ref 2–3.5)
GLUCOSE SERPL-MCNC: 182 MG/DL (ref 70–99)
HBA1C MFR BLD: 8.9 % (ref 3.5–5.7)
HCT VFR BLD AUTO: 35.3 % (ref 42–52)
HDLC SERPL-MCNC: 45 MG/DL (ref 40–60)
HGB BLD-MCNC: 11 G/DL (ref 14–18)
LDLC SERPL CALC-MCNC: 45 MG/DL (ref 70–100)
LYMPHOCYTES # BLD AUTO: 1.35 10*3/UL (ref 1–5)
LYMPHOCYTES NFR BLD AUTO: 22.6 % (ref 20–45)
MCH RBC QN AUTO: 28.2 PG (ref 27–31)
MCHC RBC AUTO-ENTMCNC: 31.2 G/DL (ref 33–37)
MCV RBC AUTO: 90.5 FL (ref 80–96)
MICROALBUMIN/CREAT UR: 1861.1 MG/G{CRE} (ref 0–25)
MONOCYTES # BLD AUTO: 0.49 10*3/UL (ref 0.2–1.2)
MONOCYTES NFR BLD AUTO: 8.2 % (ref 3–10)
NEUTROPHILS # BLD AUTO: 3.85 10*3/UL (ref 2–8)
NEUTROPHILS NFR BLD AUTO: 64.4 % (ref 30–85)
NRBC CBCN: 0 % (ref 0–0.7)
OSMOLALITY SERPL CALC.SUM OF ELEC: 299 MOSM/KG (ref 273–304)
PLATELET # BLD AUTO: 256 10*3/UL (ref 130–400)
PMV BLD AUTO: 10.5 FL (ref 9.4–12.4)
POTASSIUM SERPL-SCNC: 3.8 MMOL/L (ref 3.5–5.3)
RBC # BLD AUTO: 3.9 10*6/UL (ref 4.7–6.1)
SODIUM SERPL-SCNC: 140 MMOL/L (ref 135–147)
T4 FREE SERPL-MCNC: 1.2 NG/DL (ref 0.9–1.8)
TRIGL SERPL-MCNC: 141 MG/DL (ref 40–150)
TSH SERPL-ACNC: 2.07 M[IU]/L (ref 0.27–4.2)
VLDLC SERPL-MCNC: 28 MG/DL (ref 5–37)
WBC # BLD AUTO: 5.98 10*3/UL (ref 4.8–10.8)

## 2021-04-05 LAB
FERRITIN SERPL-MCNC: 252 NG/ML (ref 30–300)
IRON SATN MFR SERPL: 22 % (ref 20–55)
IRON SERPL-MCNC: 58 UG/DL (ref 70–180)
TIBC SERPL-MCNC: 259 UG/DL (ref 245–450)
TRANSFERRIN SERPL-MCNC: 181 MG/DL (ref 215–365)

## 2021-04-13 ENCOUNTER — HOSPITAL ENCOUNTER (OUTPATIENT)
Dept: URGENT CARE | Facility: CLINIC | Age: 66
Discharge: HOME | End: 2021-04-13
Attending: FAMILY MEDICINE

## 2021-05-10 NOTE — PROCEDURES
"   Procedure Note      Patient Name: Lyle Lozano   Patient ID: [id number]   Sex: Male   YOB: 1955    Primary Care Provider: Heidi FINN   Referring Provider: Heidi FINN    Visit Date: July 15, 2020    Provider: Brigido Mcdonnell MD   Location: [facility]   Location Address: [address] [place] [address], [address]   [place], [place]  [id number]   Location Phone: [phone]          FINAL REPORT   TRANSTHORACIC ECHOCARDIOGRAM REPORT    Diagnosis: CHF (Congestive Heart Failure)   Height: 5'7\" Weight: 197 B/P: BLOOD PRESSURE BSA: 2.0   Tech: BNS   MEASUREMENTS:  RVID (Diastole) : RVID. (NORMAL: 0.7 to 2.4 cm max)   LVID (Systole): 2.8 cm (Diastole): 4.7 cm. (NORMAL: 3.7 - 5.4 cm)   Posterior Wall Thickness (Diastole): 1.1 cm. (NORMAL: 0.8 - 1.1 cm)   Septal Thickness (Diastole): 1.2 cm. (NORMAL: 0.7 - 1.2 cm)   LAID (Systole): 3.8 cm. (NORMAL: 1.9 - 3.8 cm)   Aortic Root Diameter (Diastole): 3.4 cm. (NORMAL: 2.0 - 3.7 cm)   MITRL.VLV.GARY.D.D.R: 80 mm/sec-150 mm/sec   DOPPLER: Continuous-wave, pulse-wave, and color-flow examination of the mitral, aortic, and tricuspid valves was performed. No significant stenosis or regurgitation was identified. Doppler flow velocities were normal. DT= 116 msec. IVRT is 106 msec. E/E' is 11.   COMMENTS:  The patient underwent 2-D, M-Mode, and Doppler examination, including pulse-wave, continuous-wave, and color-flow Doppler analysis; the study is technically difficult. The following was observed:   FINDINGS:  AORTIC VALVE: Appeared to be normal. Trileaflet with central closure point. No evidence of any obstruction. No regurgitation.   MITRAL VALVE: Appeared to be normal. No evidence of any obstruction. No regurgitation.   TRICUSPID VALVE: Appeared to be normal.   PULMONIC VALVE: Not well seen.   LEFT ATRIUM: Appeared to be normal. No intracavity masses or clots seen. LA volume index is 25 mL/m2.   AORTIC ROOT: Appeared to be normal in size.   LEFT " VENTRICLE: Appeared to have an overall normal left ventricular systolic function with a grossly normal EF of 55%. There was mild left ventricular hypertrophy. There were no focal wall motion abnormalities.   RIGHT ATRIUM: Appeared to be normal; no obvious evidence of intracavity masses or clots.   RIGHT VENTRICLE: Normal size and function.   PERICARDIUM: Unremarkable. No evidence of effusion.   INFERIOR VENA CAVA: Diameter is 1.4 cm.   Fax: 07/15/2020      CONCLUSION:  1.  Technically difficult images.   2.  Grossly normal ejection fraction of 55%.   3.  Mild left ventricular hypertrophy.       MD BELINDA Garrison/julito    This note was transcribed by Eunice Adan.  julito/belinda  The above service was transcribed by Eunice Adan, and I attest to the accuracy of the note.                   Electronically Signed by: Mariposa Adan-, Other -Author on July 15, 2020 01:38:09 PM  Electronically Co-signed by: Brigido Mcdonnell MD -Reviewer on July 27, 2020 11:04:37 AM

## 2021-05-12 NOTE — PROGRESS NOTES
Quick Note      Patient Name: Lyle Lozano   Patient ID: [id number]   Sex: Male   YOB: 1955    Primary Care Provider: Heidi FINN   Referring Provider: Heidi FINN    Visit Date: April 15, 2020    Provider: Brigido Mcdonnell MD   Location: [facility]   Location Address: [address] [place] [address], [place], [place]  [id number]   Location Phone: [phone]          History Of Present Illness  TELEHEALTH TELEPHONE VISIT  Chief Complaint: Congestive heart failure   Lyle Lozano is a 64 year old /Black male who is presenting for evaluation via telehealth telephone visit due to Covid-19. Verbal consent obtained before beginning visit. The patient has a history of systolic congestive heart failure with chronic renal failure, diabetes, and hypertension. No com plaints or problems. No new issues.   Provider spent 5 minutes with the patient during telehealth visit.   The following staff were present during this visit: Provider only.   Past Medical History/Overview of Patient Symptoms  PAST MEDICAL HISTORY: Systolic congestive heart failure; chronic renal failure; diabetes mellitus; hypertension.   FAMILY HISTORY: Positive for diabetes, hypertension, and heart disease.   PSYCHOSOCIAL HISTORY: No history of mood change or depression. He rarely drinks alcohol. He does not use tobacco.   CURRENT MEDICATIONS: include Atorvastatin 40 mg daily; Lasix 40 mg b.i.d.; Entresto 97/103 mg b.i.d.; Trulicity; Coreg 6.25 mg b.i.d.; Janumet 50/1000 mg b.i.d.; Spironolactone 25 mg daily; potassium 20 mEq daily. The dosage and frequency of the medications were reviewed with the patient.       Vitals     Per patient, at-home vitals:  Unable to obtain blood pressure readings at home.  Weight is 180.           Assessment     ASSESSMENT AND PLAN:  1.  Systolic congestive heart failure, probably ischemic in nature.  Patient previously had been set up for heart  catheterization, but had to cancel due to health problems with a family member and has been reluctant to proceed due to concerns over possible risks of the procedure.  The patient is doing well symptomatically.  Will plan on repeating his echocardiogram next visit to decide if possible prophylactic ICD is needed.  The patient had previously been given a handout as well.    2.  Coronary artery disease, presumptively from abnormal stress test, but no definitive heart catheterization done.  Patient with no anginal discomfort.  Recommend chronic aspirin 81 mg as preventative in nature.    3.  Hyperlipidemia, on statin.       MD BELINDA Garrison/jluito    This note was transcribed by Eunice Adan.  julito/belinda  The above service was transcribed by Eunice Adan, and I attest to the accuracy of the note.  BELINDA             Electronically Signed by: Mariposa Adan-, Other -Author on April 22, 2020 03:34:31 PM  Electronically Co-signed by: Brigido Mcdonnell MD -Reviewer on April 23, 2020 12:53:58 PM

## 2021-05-13 NOTE — PROGRESS NOTES
"   Progress Note      Patient Name: Lyle Lozano   Patient ID: [id number]   Sex: Male   YOB: 1955    Primary Care Provider: Heidi FINN   Referring Provider: Heidi FINN    Visit Date: June 9, 2020    Provider: Aysha Lacey PA-C   Location: [facility]   Location Address: [address] [place] [address], [address]  [id number]   Location Phone: [phone]          Chief Complaint  · \"left index finger hurts\"      History Of Present Illness  Lyle Lozano is a 64 year old /Black male who presents for evaluation and treatment of:      Left hand and finger swelling, no hx of known arthritis, nki.  Patient says its started a few days ago, been taking aleve with no relief, now unable to close left hand, painful.  Patient does admit to working with his hands daily as a .       Avoid baclofen due to adverse reaction in the past.       Past Medical History  Disease Name Date Onset Notes   Arthritis --  --    Bursitis: Right Knee 12/12/2018 --    Bursitis: Right Shoulder 12/12/2018 --    CAD (coronary artery disease) --  --    Congestive Heart Failure --  --    Depression --  --    Diabetes --  --    Diabetes mellitus type 2, uncontrolled --  --    Forgetfulness --  --    Head injury --  --    Hernia --  --    HTN (hypertension) --  --    Hyperlipidemia --  --    Impaired fasting glucose --  --    Limb Swelling --  --    Low back pain 03/09/2018 --    Lumbago/low back pain --  --    Migraine --  --    Neuropathy --  --    Pain of left hip joint 03/09/2018 --    Right Shoulder: Rotator Cuff Tear 12/12/2018 --    Shortness of Breath --  --          Past Surgical History  Procedure Name Date Notes   *No Past Surgical History --  --          Medication List  Name Date Started Instructions   atorvastatin 80 mg oral tablet 05/11/2020 take 1 tablet (80 mg) by oral route once daily for 90 days   Basaglar KwikPen U-100 Insulin 100 unit/mL (3 mL) " "subcutaneous insulin pen 06/21/2019 inject 15 units subcutaneously qday   BD Ultra-Fine Farnaz Pen Needle 32 gauge x 5/32\" miscellaneous needle 07/09/2019 use as directed   Blood Glucose Monitoring miscellaneous kit 03/27/2019 use as directed to test blood sugar daily. For Dx: E11.9   Blood Glucose Test miscellaneous strip 03/19/2019 use as directed to test blood sugar daily for dx:E11.9   carvedilol 6.25 mg oral tablet 05/28/2020 TAKE 1 TABLET(6.25 MG) BY MOUTH TWICE DAILY WITH FOOD   Cymbalta 30 mg oral capsule,delayed release(DR/EC) 06/21/2019 take 1 capsule (30 mg) by oral route once daily for 90 days   Entresto  mg oral tablet 05/19/2020 TAKE 1 TABLET BY MOUTH TWICE DAILY   furosemide 40 mg oral tablet 05/19/2020 TAKE 1 TABLET(40 MG) BY MOUTH TWICE DAILY   spironolactone 25 mg oral tablet 06/08/2020 TAKE 1 TABLET(25 MG) BY MOUTH EVERY DAY   Trulicity 1.5 mg/0.5 mL subcutaneous pen injector 06/08/2020 inject 0.5 milliliter (1.5 mg) by subcutaneous route once weekly in the abdomen, thigh,upper arm rotating sites.         Allergy List  Allergen Name Date Reaction Notes   NO KNOWN DRUG ALLERGIES --  --  --        Allergies Reconciled  Family Medical History  Disease Name Relative/Age Notes   Stroke Sister/   Sister   Cancer, Unspecified Mother/   Mother   Diabetes, unspecified type Mother/  Sister/   Mother; Sister   Diabetes mellitus, type II Mother/   --          Social History  Finding Status Start/Stop Quantity Notes   Alcohol Current some day --/-- --  --    Alcohol Use Current some day --/-- --  rarely drinks, less than 1 drink per day   . --  --/-- --  --    lives with other --  --/-- --  --    Recreational Drug Use Never --/-- --  no   Tobacco Former --/-- --  former smoker   Working --  --/-- --  --          Immunizations  NameDate Admin Mfg Trade Name Lot Number Route Inj VIS Given VIS Publication   [id number]/13/2019 [facility] Fluzone Quadrivalent [id number] IM  11/13/2019    Comments: Pt tolerated " "well. Left office in stable condition.   Etuphyjtt74/13/2017 [facility] Fluarix, quadrivalent, preservative free [id number] IM  10/13/2017 08/19/2014   Comments: Pt tolerated well. Left office in stable condition.         Review of Systems  · Constitutional  o Denies  o : fever, fatigue, weight loss, weight gain  · Cardiovascular  o Denies  o : lower extremity edema, claudication, chest pressure, palpitations  · Respiratory  o Denies  o : shortness of breath, wheezing, cough, hemoptysis, dyspnea on exertion  · Gastrointestinal  o Denies  o : nausea, vomiting, diarrhea, constipation, abdominal pain      Vitals  Date Time BP Position Site L\R Cuff Size HR RR TEMP (F) WT  HT  BMI kg/m2 BSA m2 O2 Sat HC       11/13/2019 10:55 /78 Sitting    98 - R 16 98.6 192lbs 7oz 5'  6\" 31.06 2.02 99 %    02/11/2020 08:56 /78 Sitting    86 - R 14 98.7 193lbs 16oz 5'  6\" 31.31 2.02 98 %    06/09/2020 09:59 /80 Sitting    80 - R 18 96.9 193lbs 8oz 5'  6\" 31.23 2.02 98 %          Physical Examination  · Constitutional  o Appearance  o : no acute distress, well-nourished  · Head and Face  o Head  o :   § Inspection  § : atraumatic, normocephalic  · Eyes  o Eyes  o : extraocular movements intact, no scleral icterus, no conjunctival injection  · Ears, Nose, Mouth and Throat  o Ears  o :   § External Ears  § : normal  o Nose  o :   § Intranasal Exam  § : nares patent  o Oral Cavity  o :   § Oral Mucosa  § : moist mucous membranes  · Respiratory  o Respiratory Effort  o : breathing comfortably, symmetric chest rise  o Auscultation of Lungs  o : clear to asculatation bilaterally, no wheezes, rales, or rhonchii  · Cardiovascular  o Heart  o :   § Auscultation of Heart  § : regular rate and rhythm, no murmurs, rubs, or gallops  o Peripheral Vascular System  o :   § Extremities  § : no edema  · Skin and Subcutaneous Tissue  o General Inspection  o : no lesions present, no areas of discoloration, skin turgor " normal  · Neurologic  o Mental Status Examination  o :   § Orientation  § : grossly oriented to person, place and time  o Gait and Station  o :   § Gait Screening  § : normal gait  · Psychiatric  o General  o : normal mood and affect     MSK- L index finger with swelling of PIP joint without warmth, erythema, pain with ROM. 5th DIP joint with medial curvature deformity               Assessment  · Hand swelling     729.81/M79.89  Most likely secondary to arthritis, with some suspicion of RA. Will get xray and labs and treat with a short course of oral steroids and voltaren gel. Discussed importance of monitoring blood sugar and blood pressure while on steroid, call for any questions or concerns.    Problems Reconciled  Plan  · Orders  o CBC with Auto Diff [facility] ([id number]) - 729.81/M79.89 - 06/09/2020  o CMP [facility] ([id number]) - 729.81/M79.89 - 06/09/2020  o ACO-39: Current medications updated and reviewed () - - 06/09/2020  o RA Profile 1 (Uric Acid, ASO, RF IgM, RANDELL, CRP) [facility] ([id number], [id number], [id number], [id number]) - 729.81/M79.89 - 06/09/2020  o Hand (Left) [facility] ([id number]) - 729.81/M79.89 - 06/09/2020   Done in office  · Medications  o Medrol (Zackary) 4 mg oral tablets,dose pack   SIG: take by oral route as directed per package instructions   DISP: (1) 21 ct dose-pack with 0 refills  Prescribed on 06/09/2020     o Voltaren 1 % topical gel   SIG: apply 2 grams to the affected area(s) by topical route 4 times per day for 7 days   DISP: (1) 100 gm tube with 0 refills  Prescribed on 06/09/2020     o Medications have been Reconciled  o Transition of Care or Provider Policy  · Instructions  o Take all medications as prescribed/directed.  o Patient was educated/instructed on their diagnosis, treatment and medications prior to discharge from the clinic today.  o Patient instructed to seek medical attention urgently for new or worsening symptoms.  o Call the office with any concerns or questions.  · Disposition  o Call or  Return if symptoms worsen or persist.  o follow up as needed  o Medications sent electronically to pharmacy  o Labs drawn in clinic            Electronically Signed by: Aysha Lacey PA-C -Author on June 9, 2020 10:44:54 AM

## 2021-05-13 NOTE — PROGRESS NOTES
Progress Note      Patient Name: Lyle Lozano   Patient ID: [id number]   Sex: Male   YOB: 1955    Primary Care Provider: Heidi FINN   Referring Provider: Heidi FINN    Visit Date: July 15, 2020    Provider: Brigido Mcdonnell MD   Location: [facility]   Location Address: [address] [place] [address], [facility]   [place], [place]  [id number]   Location Phone: [phone]          Chief Complaint  · Congestive heart failure       History Of Present Illness  REFERRING CARE PROVIDER: Heidi FINN   Lyle Lozano is a 64-year-old gentleman with a history of systolic congestive heart failure, chronic renal failure, diabetes, and hypertension who follows up today. He has been doing well. He does report some mild edema in his hands and feet, but no increased shortness of breath, no PND symptoms, and no chest pain.   PAST MEDICAL HISTORY: Systolic congestive heart failure; chronic renal failure; diabetes mellitus; hypertension.   FAMILY HISTORY: Positive for diabetes. Negative for hypertension and heart disease.   PSYCHOSOCIAL HISTORY: Negative for mood change or depression. He drinks a moderate amount of alcohol. He previously smoked but quit.   CURRENT MEDICATIONS: include Trulicity; Atorvastatin 80 mg daily; Lasix 40 mg b.i.d.; Janumet 50/1000 mg b.i.d. ; Coreg 6.25 mg b.i.d.; Entresto 97/103 mg b.i.d.; Spironolactone 25 mg daily. The dosage and frequency of the medications were reviewed with the patient.       Review of Systems  · Cardiovascular  o Admits  o : swelling (feet, ankles, hands)  o Denies  o : palpitations (fast, fluttering, or skipping beats), shortness of breath while walking or lying flat, chest pain or angina pectoris   · Respiratory  o Denies  o : chronic or frequent cough, asthma or wheezing      Vitals  Date Time BP Position Site L\R Cuff Size HR RR TEMP (F) WT  HT  BMI kg/m2 BSA m2 O2 Sat HC       07/15/2020 10:01 /86 Sitting    94 - R    "197lbs 0oz 5'  7\" 30.85 2.06           Physical Examination  · Constitutional  o Appearance  o : Awake, alert, in no acute distress.   · Eyes  o Conjunctivae  o : Normal.  · Ears, Nose, Mouth and Throat  o Oral Cavity  o :   § Oral Mucosa  § : Normal.  · Neck  o Inspection/Palpation  o : No JVD. Good carotid upstroke. No thyromegaly.  · Respiratory  o Respiratory  o : Good respiratory effort. Clear to percussion and auscultation.  · Cardiovascular  o Heart  o :   § Auscultation of Heart  § : S1, S2 normal. Regular rate and rhythm without murmurs, gallops, or rubs.  o Peripheral Vascular System  o :   § Extremities  § : Good femoral and pedal pulses. No pedal edema.  · Gastrointestinal  o Abdominal Examination  o : Soft. No tenderness or masses felt. No hepatosplenomegaly. Abdominal aorta is not palpable.  · EKG  o Indications  o : Congestive heart failure.  o Results  o : Normal sinus rhythm with nonspecific ST-T wave abnormalities as well as ventricular trigeminy.  o Comparison  o : No significant changes compared to prior EKGs.     Echocardiogram today showed a grossly ejection fraction of 55% with mild left ventricular hypertrophy.           Assessment     ASSESSMENT AND PLAN:  1.  Congestive heart failure, systolic, with normalization of his ejection fraction.  Suspect it may be nonischemic        in nature although patient did have a defect on his stress test showing  possible fixed prior infarct pattern.        Given patient's normalization of his ejection fraction and lack of any anginal symptoms and no ischemia,        would continue with just medical management for his congestive heart failure.  Do not feel heart        catheterization is indicated at this point.   2.  Coronary artery disease, presumptively by his fixed defect seen on stress test.  Did encourage patient to        resume his chronic aspirin 81 mg once a day.   3.  Hypertension, controlled but with ventricular ectopy seen on his EKG and " echocardiogram.  Will increase his        Coreg to 12.5 mg b.i.d.      MD BELINDA Garrison/julito    This note was transcribed by Eunice Adan.  julito/belinda  The above service was transcribed by Eunice Adan, and I attest to the accuracy of the note.  BELINDA             Electronically Signed by: Mariposa Adan-, Other -Author on July 15, 2020 01:51:44 PM  Electronically Co-signed by: Brigido Mcdonnell MD -Reviewer on July 27, 2020 11:03:29 AM

## 2021-05-13 NOTE — PROGRESS NOTES
Quick Note      Patient Name: Lyle Lozano   Patient ID: [id number]   Sex: Male   YOB: 1955    Primary Care Provider: Heidi FINN   Referring Provider: Heidi FINN    Visit Date: May 11, 2020    Provider: HUMA Arshad   Location: [facility]   Location Address: [address] [place] [address]  [id number]   Location Phone: [phone]          History Of Present Illness  TELEHEALTH TELEPHONE VISIT  Chief Complaint: 3mo f/u CHF, DM2, HTN, HLD   Lyle Lozano is a 64 year old /Black male who is presenting for evaluation via telehealth telephone visit. Verbal consent obtained before beginning visit.   Provider spent 7 minutes with the patient during telehealth visit.   The following staff were present during this visit: Heidi Villa NP   Past Medical History/Overview of Patient Symptoms     CHF-no leg swelling or breathing issues, denies weight gain    DM2- no lows, BS <200 when he has checked which he states is occasional    HTN- denies cp, dizziness, headache, not checking bp    HLD- no leg cramps  >           Assessment  · CHF (congestive heart failure)     428.0/I50.9  reportedly doing well  · Diabetes mellitus, type 2     250.00/E11.9  he will come into the office in the next few wks for labs  · Essential hypertension     401.9/I10  encouraged checking bp  · Hyperlipidemia     272.4/E78.5  labs      Plan  · Orders  o Diabetes 1 Panel (CMP, Lipid, A1c) [facility] (77232, 18866, 82262) - 250.00/E11.9 - 05/11/2020  o CBC with Auto Diff [facility] (61692) - 250.00/E11.9 - 05/11/2020  o Thyroid Profile (TSH, FT4) (50710, 49105, THYII) - 250.00/E11.9 - 05/11/2020  o Physican Telephone evaluation, 5-10 min (92449) - - 05/11/2020  · Medications  o atorvastatin 80 mg oral tablet   SIG: take 1 tablet (80 mg) by oral route once daily for 90 days   DISP: (90) tablets with 1 refills  Adjusted on 05/11/2020     o Janumet 50-1,000 mg oral tablet    SIG: TAKE 1 TABLET BY MOUTH TWICE DAILY WITH MEALS   DISP: (180) Tablet with 1 refills  Adjusted on 05/11/2020     o Medications have been Reconciled  o Transition of Care or Provider Policy  · Instructions  o Continue blood sugar monitoring daily and record. Bring your log to office visits. Call the office for readings below 70 and above 250 or any complications.  o Daily foot care. Avoid walking barefoot. Annual Dilated Eye Exam.  o Discussed with patient blood pressure monitoring, hemoglobin A1C levels need to be below 7.0, and LDL (Lipid) goals below 70.  o Patient advised to monitor blood pressure (B/P) at home and journal readings. Patient informed that a B/P reading at home of more than 130/80 is considered hypertension. For readings greater comj334/90 or higher patient is advised to follow up in the office with readings for management. Patient advised to limit sodium intake.  o Advised that cheeses and other sources of dairy fats, animal fats, fast food, and the extras (candy, pastries, pies, doughnuts and cookies) all contain LDL raising nutrients. Advised to increase fruits, vegetables, whole grains, and to monitor portion sizes.   o Plan Of Care:   o Call the office with any concerns or questions.  · Disposition  o Call or Return if symptoms worsen or persist.  o Follow up in 3 months  o Prescriptions sent electronically            Electronically Signed by: HUMA Arshad -Author on May 11, 2020 08:51:15 AM

## 2021-05-13 NOTE — PROGRESS NOTES
Progress Note      Patient Name: Lyle Lozano   Patient ID: [id number]   Sex: Male   YOB: 1955    Primary Care Provider: Heidi FINN   Referring Provider: Heidi FINN    Visit Date: June 26, 2020    Provider: HUMA Arshad   Location: [facility]   Location Address: [address] [place] [address], [address]  [id number]   Location Phone: [phone]          History Of Present Illness  Lyle Lozano is a 64 year old /Black male who presents for evaluation and treatment of:      Right hand inflammation.  Medrol dose pack completed, left hand pain significantly improved  right hand pain started about 3 days ago  swelling, pain in joints, wrist and thumb  labs reviewed with the patient  denies high purine diet  has had flares similar previously after eating seafood    HTN-not checking bp  DM-not checking bs         Past Medical History  Disease Name Date Onset Notes   Arthritis --  --    Bursitis: Right Knee 12/12/2018 --    Bursitis: Right Shoulder 12/12/2018 --    CAD (coronary artery disease) --  --    Congestive Heart Failure --  --    Depression --  --    Diabetes --  --    Diabetes mellitus type 2, uncontrolled --  --    Forgetfulness --  --    Head injury --  --    Hernia --  --    HTN (hypertension) --  --    Hyperlipidemia --  --    Impaired fasting glucose --  --    Limb Swelling --  --    Low back pain 03/09/2018 --    Lumbago/low back pain --  --    Migraine --  --    Neuropathy --  --    Pain of left hip joint 03/09/2018 --    Right Shoulder: Rotator Cuff Tear 12/12/2018 --    Shortness of Breath --  --          Past Surgical History  Procedure Name Date Notes   *No Past Surgical History --  --          Medication List  Name Date Started Instructions   atorvastatin 80 mg oral tablet 05/11/2020 take 1 tablet (80 mg) by oral route once daily for 90 days   Basaglar KwikPen U-100 Insulin 100 unit/mL (3 mL) subcutaneous insulin  "pen 06/21/2019 inject 15 units subcutaneously qday   BD Ultra-Fine Farnaz Pen Needle 32 gauge x 5/32\" miscellaneous needle 07/09/2019 use as directed   Blood Glucose Monitoring miscellaneous kit 03/27/2019 use as directed to test blood sugar daily. For Dx: E11.9   Blood Glucose Test miscellaneous strip 03/19/2019 use as directed to test blood sugar daily for dx:E11.9   carvedilol 6.25 mg oral tablet 05/28/2020 TAKE 1 TABLET(6.25 MG) BY MOUTH TWICE DAILY WITH FOOD   Cymbalta 30 mg oral capsule,delayed release(DR/EC) 06/21/2019 take 1 capsule (30 mg) by oral route once daily for 90 days   duloxetine 30 mg oral capsule,delayed release(DR/EC) 06/10/2020 TAKE 1 CAPSULE(30 MG) BY MOUTH EVERY DAY   Entresto  mg oral tablet 05/19/2020 TAKE 1 TABLET BY MOUTH TWICE DAILY   furosemide 40 mg oral tablet 05/19/2020 TAKE 1 TABLET(40 MG) BY MOUTH TWICE DAILY   Jardiance 10 mg oral tablet 06/17/2020 TAKE 1 TABLET(10 MG) BY MOUTH EVERY DAY IN THE MORNING   prednisone 5 mg oral tablet 06/26/2020 take 1 tablet (5 mg) by oral route 2 times per day   spironolactone 25 mg oral tablet 06/08/2020 TAKE 1 TABLET(25 MG) BY MOUTH EVERY DAY   Trulicity 1.5 mg/0.5 mL subcutaneous pen injector 06/08/2020 inject 0.5 milliliter (1.5 mg) by subcutaneous route once weekly in the abdomen, thigh,upper arm rotating sites.   Voltaren 1 % topical gel 06/09/2020 apply 2 grams to the affected area(s) by topical route 4 times per day for 7 days         Allergy List  Allergen Name Date Reaction Notes   NO KNOWN DRUG ALLERGIES --  --  --        Allergies Reconciled  Family Medical History  Disease Name Relative/Age Notes   Stroke Sister/   Sister   Cancer, Unspecified Mother/   Mother   Diabetes, unspecified type Mother/  Sister/   Mother; Sister   Diabetes Mellitus, Type II Mother/   --          Social History  Finding Status Start/Stop Quantity Notes   Alcohol Current some day --/-- --  --    Alcohol Use Current some day --/-- --  rarely drinks, less " "than 1 drink per day   . --  --/-- --  --    lives with other --  --/-- --  --    Recreational Drug Use Never --/-- --  no   Tobacco Former --/-- --  former smoker   Working --  --/-- --  --          Immunizations  NameDate Admin Mfg Trade Name Lot Number Route Inj VIS Given VIS Publication   Vluqhajkg00/13/2019 [facility] Fluzone Quadrivalent [id number] IM  11/13/2019    Comments: Pt tolerated well. Left office in stable condition.   Atxguajnm31/13/2017 [facility] Fluarix, quadrivalent, preservative free [id number] IM  10/13/2017 08/19/2014   Comments: Pt tolerated well. Left office in stable condition.         Review of Systems  · Constitutional  o Denies  o : fever, fatigue, weight loss, weight gain  · Cardiovascular  o Denies  o : lower extremity edema, claudication, chest pressure, palpitations  · Respiratory  o Denies  o : shortness of breath, wheezing, cough, hemoptysis, dyspnea on exertion  · Gastrointestinal  o Denies  o : nausea, vomiting, diarrhea, constipation, abdominal pain      Vitals  Date Time BP Position Site L\R Cuff Size HR RR TEMP (F) WT  HT  BMI kg/m2 BSA m2 O2 Sat HC       02/11/2020 08:56 /78 Sitting    86 - R 14 98.7 193lbs 16oz 5'  6\" 31.31 2.02 98 %    06/09/2020 09:59 /80 Sitting    80 - R 18 96.9 193lbs 8oz 5'  6\" 31.23 2.02 98 %    06/26/2020 08:04 /88 Sitting    88 - R 18 98 188lbs 4oz 5'  6\" 30.38 1.99 99 %          Physical Examination  · Constitutional  o Appearance  o : no acute distress, well-nourished  · Head and Face  o Head  o :   § Inspection  § : atraumatic, normocephalic  · Eyes  o Eyes  o : extraocular movements intact, no scleral icterus, no conjunctival injection  · Ears, Nose, Mouth and Throat  o Ears  o :   § External Ears  § : normal  o Nose  o :   § Intranasal Exam  § : nares patent  o Oral Cavity  o :   § Oral Mucosa  § : moist mucous membranes  · Respiratory  o Respiratory Effort  o : breathing comfortably, symmetric chest rise  o Auscultation of Lungs  o : " clear to asculatation bilaterally, no wheezes, rales, or rhonchii  · Cardiovascular  o Heart  o :   § Auscultation of Heart  § : regular rate and rhythm, no murmurs, rubs, or gallops  o Peripheral Vascular System  o :   § Extremities  § : no edema  · Neurologic  o Mental Status Examination  o :   § Orientation  § : grossly oriented to person, place and time  o Gait and Station  o :   § Gait Screening  § : normal gait  · Psychiatric  o General  o : normal mood and affect     MSK-right ankle, swelling of thumb at MCP and 2nd digit at MCP, normal ROM of hand, NV distally intact, no increased warmth or redness           Assessment  · Diabetes mellitus, type 2     250.00/E11.9  labs today, encouraged checking BS  · Essential hypertension     401.9/I10  encouraged checking BP  · Right hand pain     729.5/M79.641  concern for gout flare, repeating labs today for trending. will treat with colchicine and start on allopurinol in 1-2 wks, will need to trend uric acid levels 2-4 wks after starting allopurinol    Problems Reconciled  Plan  · Orders  o ACO-39: Current medications updated and reviewed () - - 06/26/2020  o Uric Acid Serum [facility] (99095) - 401.9/I10, 250.00/E11.9, 729.5/M79.641 - 06/26/2020  o RANDELL (antinuclear antibody profile) by enzyme immunoassay (10934) - 401.9/I10, 250.00/E11.9, 729.5/M79.641 - 06/26/2020  o CRP (Inflammation) [facility] (40067) - 401.9/I10, 250.00/E11.9, 729.5/M79.641 - 06/26/2020  o Xray hand right [facility] ([id number]) - 729.5/M79.641 - 06/26/2020   done in office  o Uric Acid Serum [facility] (93610) - 729.5/M79.641 - 07/26/2020  · Medications  o allopurinol 100 mg oral tablet   SIG: take 1 tablet (100 mg) by oral route once daily, start in 1 wk, after pain improves   DISP: (30) tablets with 1 refills  Prescribed on 06/26/2020     o colchicine 0.6 mg oral tablet   SIG: take 1 tablet (0.6 mg) by oral route tid x1 day, then 1 tab bid until 48 hrs after pain resolves   DISP: (20) tablets with 0  refills  Prescribed on 06/26/2020     o Medications have been Reconciled  o Transition of Care or Provider Policy  · Instructions  o Daily foot care. Avoid walking barefoot. Annual Dilated Eye Exam.  o Discussed with patient blood pressure monitoring, hemoglobin A1C levels need to be below 7.0, and LDL (Lipid) goals below 70.  o Patient advised to monitor blood pressure (B/P) at home and journal readings. Patient informed that a B/P reading at home of more than 130/80 is considered hypertension. For readings greater mttt579/90 or higher patient is advised to follow up in the office with readings for management. Patient advised to limit sodium intake.  o Patient was educated/instructed on their diagnosis, treatment and medications prior to discharge from the clinic today.  o Call the office with any concerns or questions.  o Chronic conditions reviewed and taken into consideration for today's treatment plan.  o Discussed Covid-19 precautions including, but not limited to, social distancing, avoid touching your face, and hand washing.   · Disposition  o Call or Return if symptoms worsen or persist.  o Follow up in 1 month  o labs drawn in house today  o Prescriptions sent electronically            Electronically Signed by: Heidi Villa APRN -Author on June 26, 2020 01:29:56 PM

## 2021-05-14 VITALS
HEART RATE: 98 BPM | SYSTOLIC BLOOD PRESSURE: 138 MMHG | DIASTOLIC BLOOD PRESSURE: 78 MMHG | HEIGHT: 67 IN | TEMPERATURE: 97.7 F | WEIGHT: 208.19 LBS | BODY MASS INDEX: 32.67 KG/M2 | OXYGEN SATURATION: 95 %

## 2021-05-14 VITALS
DIASTOLIC BLOOD PRESSURE: 88 MMHG | HEART RATE: 92 BPM | WEIGHT: 202 LBS | HEIGHT: 67 IN | BODY MASS INDEX: 31.71 KG/M2 | SYSTOLIC BLOOD PRESSURE: 146 MMHG

## 2021-05-14 NOTE — PROGRESS NOTES
Progress Note      Patient Name: Lyle Lozano   Patient ID: [id number]   Sex: Male   YOB: 1955    Primary Care Provider: Heidi FINN   Referring Provider: Heidi FINN    Visit Date: April 2, 2021    Provider: HUMA Arshad   Location: [facility]   Location Address: [address] [place] [address], [address]  [place], [place]  [id number]   Location Phone: [phone]          Chief Complaint  · leg swelling      History Of Present Illness  Lyle Lozano is a 65 year old /Black male who presents for evaluation and treatment of:      Patient comes in today for acute complaint of leg swelling in the last 2 weeks.  He reports that he is not having any pain but has noticed more warmth swelling in this time.  He states he is also had some shortness of breath with activity.  Denies orthopnea.  He reports that shortness of breath is not slowed him down and he is still working.  He reports that he does often miss doses of Lasix.  He had been taking this just once daily rather than twice daily    Eye exam--needs referral   HTN- no cp, headaches.   DM2--denies lexi  wants flu shot   covid vaccine- plans to get once available.            Past Medical History  Disease Name Date Onset Notes   Arthritis --  --    Bursitis: Right Knee 12/12/2018 --    Bursitis: Right Shoulder 12/12/2018 --    CAD (coronary artery disease) --  --    Congestive Heart Failure --  --    Depression --  --    Diabetes --  --    Diabetes mellitus type 2, uncontrolled --  --    Forgetfulness --  --    Head injury --  --    Hernia --  --    HTN (hypertension) --  --    Hyperlipidemia --  --    Impaired fasting glucose --  --    Limb Swelling --  --    Low back pain 03/09/2018 --    Lumbago/low back pain --  --    Migraine --  --    Neuropathy --  --    Pain of left hip joint 03/09/2018 --    Right Shoulder: Rotator Cuff Tear 12/12/2018 --    Shortness of Breath --  --          Past Surgical  "History  Procedure Name Date Notes   *No Past Surgical History --  --          Medication List  Name Date Started Instructions   allopurinol 100 mg oral tablet 09/22/2020 TAKE 1 TABLET(100 MG) BY MOUTH EVERY DAY. START IN 1 WEEK, AFTER PAIN IMPROVES   atorvastatin 80 mg oral tablet 11/20/2020 take 1 tablet (80 mg) by oral route once daily for 30 days. Please schedule appointment.   Basaglar KwikPen U-100 Insulin 100 unit/mL (3 mL) subcutaneous insulin pen 06/21/2019 inject 15 units subcutaneously qday   BD Ultra-Fine Farnaz Pen Needle 32 gauge x 5/32\" miscellaneous needle 07/09/2019 use as directed   Blood Glucose Monitoring miscellaneous kit 03/27/2019 use as directed to test blood sugar daily. For Dx: E11.9   Blood Glucose Test miscellaneous strip 03/19/2019 use as directed to test blood sugar daily for dx:E11.9   carvedilol 12.5 mg oral tablet 02/03/2021 Take 1 tablet by mouth twice daily   duloxetine 30 mg oral capsule,delayed release(DR/EC) 08/27/2020 TAKE 1 CAPSULE(30 MG) BY MOUTH EVERY DAY   Entresto  mg oral tablet 05/19/2020 TAKE 1 TABLET BY MOUTH TWICE DAILY   Farxiga 10 mg oral tablet 07/02/2020 take 1 tablet (10 mg) by oral route once daily in the morning   furosemide 40 mg oral tablet 07/31/2020 TAKE 1 TABLET(40 MG) BY MOUTH TWICE DAILY   Janumet 50-1,000 mg oral tablet 11/20/2020 take 1 tablet by oral route 2 times per day with meals   spironolactone 25 mg oral tablet 06/08/2020 TAKE 1 TABLET(25 MG) BY MOUTH EVERY DAY   Trulicity 1.5 mg/0.5 mL subcutaneous pen injector 06/08/2020 inject 0.5 milliliter (1.5 mg) by subcutaneous route once weekly in the abdomen, thigh,upper arm rotating sites.         Allergy List  Allergen Name Date Reaction Notes   NO KNOWN DRUG ALLERGIES --  --  --        Allergies Reconciled  Family Medical History  Disease Name Relative/Age Notes   Stroke Sister/   Sister   Cancer, Unspecified Mother/   Mother   Diabetes, unspecified type Mother/  Sister/   Mother; Sister " "  Diabetes Mellitus, Type II Mother/   --          Social History  Finding Status Start/Stop Quantity Notes   Alcohol Current some day --/-- --  --    Alcohol Use Current some day --/-- --  rarely drinks, less than 1 drink per day   . --  --/-- --  --    lives with other --  --/-- --  --    Recreational Drug Use Never --/-- --  no   Tobacco Former --/-- --  former smoker   Working --  --/-- --  --          Immunizations  NameDate Admin Mfg Trade Name Lot Number Route Inj VIS Given VIS Publication   Puwdhxyva17/13/2019 [facility] Fluzone Quadrivalent [id number] IM  11/13/2019    Comments: Pt tolerated well. Left office in stable condition.         Review of Systems  · Constitutional  o Denies  o : fever, fatigue, weight loss, weight gain  · Cardiovascular  o Admits  o : lower extremity edema  o Denies  o : claudication, chest pressure, palpitations  · Respiratory  o Admits  o : dyspnea on exertion  o Denies  o : shortness of breath, wheezing, cough, hemoptysis  · Gastrointestinal  o Denies  o : nausea, vomiting, diarrhea, constipation, abdominal pain      Vitals  Date Time BP Position Site L\R Cuff Size HR RR TEMP (F) WT  HT  BMI kg/m2 BSA m2 O2 Sat FR L/min FiO2 HC       07/15/2020 10:01 /86 Sitting    94 - R   197lbs 0oz 5'  7\" 30.85 2.06       02/03/2021 11:39 /88 Sitting    92 - R   202lbs 0oz 5'  7\" 31.64 2.08       04/02/2021 08:40 /78 Sitting    98 - R  97.7 208lbs 3oz 5'  7\" 32.61 2.11 95 %  21%          Physical Examination  · Constitutional  o Appearance  o : no acute distress, well-nourished  · Head and Face  o Head  o :   § Inspection  § : atraumatic, normocephalic  · Eyes  o Eyes  o : extraocular movements intact, no scleral icterus, no conjunctival injection  · Ears, Nose, Mouth and Throat  o Ears  o :   § External Ears  § : normal  § Otoscopic Examination  § : tympanic membrane appearance within normal limits bilaterally  o Nose  o :   § Intranasal Exam  § : nares patent  o Oral " Cavity  o :   § Oral Mucosa  § : moist mucous membranes  · Respiratory  o Respiratory Effort  o : breathing comfortably, symmetric chest rise  o Auscultation of Lungs  o : rales of bases bilaterally  · Cardiovascular  o Heart  o :   § Auscultation of Heart  § : regular rate and rhythm, no murmurs, rubs, or gallops  o Peripheral Vascular System  o :   § Extremities  § : 2+ pitting edema bilaterally   · Lymphatic  o Neck  o : no lymphadenopathy present  o Supraclavicular Nodes  o : no supraclavicular nodes  · Neurologic  o Mental Status Examination  o :   § Orientation  § : grossly oriented to person, place and time  o Gait and Station  o :   § Gait Screening  § : normal gait  · Psychiatric  o General  o : normal mood and affect          Assessment  · Hyperlipidemia     272.4/E78.5  We will check labs in the office today. Will review and adjust medications as needed  · Congestive Heart Failure     428.0/I50.9  Concern for CHF exacerbation given increasing shortness of breath and pedal edema. Chest x-ray in office good today. You were able to get instructed patient to take Lasix twice daily as directed rather than once daily as he had been doing. We will check labs in the office today. Will review and adjust medications as needed  · Diabetes mellitus, type 2     250.00/E11.9  We will check labs in the office today. Will review and adjust medications as needed  · Essential hypertension     401.9/I10  Improved breathing in the office today visit with Dr. Mcdonnell 2 months ago. Patient states that his blood pressures have been good but is unable to recall values.  · Screening for depression     V79.0/Z13.89  · Screening for colon cancer     V76.51/Z12.11  · Shortness of breath     786.05/R06.02  · Pedal edema     782.3/R60.0    Problems Reconciled  Plan  · Orders  o Diabetes 2 Panel (Urine Microalbumin, CMP, Lipid, A1c, ) [facility] (50150, 71847, 06359, 65071) - 250.00/E11.9 - 04/02/2021  o CBC with Auto Diff [facility] (50847) -  250.00/E11.9 - 04/02/2021  o Thyroid Profile (16606, THYII, 63660) - 250.00/E11.9 - 04/02/2021  o OPHTHALMOLOGY CONSULTATION (OPHTH) - 401.9/I10 - 04/02/2021   Jason Vaughn please. To est. care   o ACO-18: Negative screen for clinical depression using a standardized tool () - - 04/02/2021  o ACO-39: Current medications updated and reviewed (1159F, ) - - 04/02/2021  o NT-proBNP (98348) - 786.05/R06.02, 428.0/I50.9, 782.3/R60.0 - 04/02/2021  o Chest xray 2 views [facility] (72445) - 786.05/R06.02, 428.0/I50.9 - 04/02/2021  · Medications  o Medications have been Reconciled  o Transition of Care or Provider Policy  · Instructions  o Advised that cheeses and other sources of dairy fats, animal fats, fast food, and the extras (candy, pastries, pies, doughnuts and cookies) all contain LDL raising nutrients. Advised to increase fruits, vegetables, whole grains, and to monitor portion sizes.   o Depression Screen completed and scanned into the EMR under the designated folder within the patient's documents.  o Today's PHQ-9 result is 6  o Patient was educated/instructed on their diagnosis, treatment and medications prior to discharge from the clinic today.  o Call the office with any concerns or questions.  · Disposition  o Call or Return if symptoms worsen or persist.  o Follow up in 6 weeks  o labs drawn in house today  o Prescriptions sent electronically  o Xray performed in clinic            Electronically Signed by: HUMA Arshad -Author on April 2, 2021 11:57:02 AM

## 2021-05-14 NOTE — PROGRESS NOTES
"   Progress Note      Patient Name: Lyle Lozano   Patient ID: [id number]   Sex: Male   YOB: 1955    Primary Care Provider: Heidi FINN   Referring Provider: Heidi FINN    Visit Date: February 3, 2021    Provider: Brigido Mcdonnell MD   Location: [facility]   Location Address: [address] [place] [address], [facility]   [place], [place]  [id number]   Location Phone: [phone]          Chief Complaint     CHF.       History Of Present Illness  REFERRING CARE PROVIDER: Heidi FINN   Lyle Lozano is a 65 year old /Black male with previous systolic congestive heart failure with chronic renal insufficiency, diabetes, and hypertension. Symptom wise the patient reports feeling good with mild chronic lower extremity edema. He has had some left arm numbness intermittently, did last for 3-4 minutes but no associated pain or shortness of breath and was nonexertional in nature.   PAST MEDICAL HISTORY: Systolic congestive heart failure; chronic renal failure; diabetes mellitus; hypertension.   PSYCHOSOCIAL HISTORY: Rarely uses alcohol. Previous use of tobacco, but quit.   CURRENT MEDICATIONS: Medications have been reviewed and are as stated.      ALLERGIES: No known drug allergies.       Review of Systems  · Cardiovascular  o Admits  o : swelling (feet, ankles, hands)  o Denies  o : palpitations (fast, fluttering, or skipping beats), shortness of breath while walking or lying flat, chest pain or angina pectoris   · Respiratory  o Denies  o : chronic or frequent cough      Vitals  Date Time BP Position Site L\R Cuff Size HR RR TEMP (F) WT  HT  BMI kg/m2 BSA m2 O2 Sat FR L/min FiO2 HC       02/03/2021 11:39 /88 Sitting    92 - R   202lbs 0oz 5'  7\" 31.64 2.08             Physical Examination  · Constitutional  o Appearance  o : Awake, alert, in no acute distress.   · Eyes  o Conjunctivae  o : Normal.  · Ears, Nose, Mouth and Throat  o Oral Cavity  o :   § Oral Mucosa  § : " Normal.  · Neck  o Inspection/Palpation  o : No JVD. Good carotid upstroke. No thyromegaly.  · Respiratory  o Respiratory  o : Good respiratory effort. Clear to percussion and auscultation.  · Cardiovascular  o Heart  o :   § Auscultation of Heart  § : S1, S2 normal. Regular rate and rhythm without murmurs, gallops, or rubs.  o Peripheral Vascular System  o :   § Extremities  § : Good femoral and pedal pulses. Trace lower extremity edema.  · Gastrointestinal  o Abdominal Examination  o : Soft. No tenderness or masses felt. No hepatosplenomegaly. Abdominal aorta is not palpable.          Assessment     1.  Nonischemic cardiomyopathy with normalization of ejection fraction and stable symptoms. Blood pressure is mildly elevated today. I recommended that he keep a home blood pressure log at home and decide if further treatment is needed.   2.  Left arm tingling. Does not sound anginal in nature. More likely neurologic. Recommended that he discuss further with his primary care provider.   3.  Chronic renal failure, stage 3.  4.  Diabetes.  5.  Hypertension. In addition to keeping a blood pressure log I counseled the patient on exercise and weight loss and low sodium diet.       Brigido Mcdonnell MD  /               Electronically Signed by: Delilah Bowden-, OT -Author on February 18, 2021 06:29:39 AM  Electronically Co-signed by: Brigido Mcdonnell MD -Reviewer on February 18, 2021 04:54:00 PM

## 2021-05-15 ENCOUNTER — HOSPITAL ENCOUNTER (OUTPATIENT)
Dept: URGENT CARE | Facility: CLINIC | Age: 66
Discharge: HOME | End: 2021-05-15
Attending: FAMILY MEDICINE

## 2021-05-15 VITALS
DIASTOLIC BLOOD PRESSURE: 70 MMHG | WEIGHT: 184 LBS | BODY MASS INDEX: 29.57 KG/M2 | RESPIRATION RATE: 15 BRPM | TEMPERATURE: 97.6 F | HEART RATE: 102 BPM | OXYGEN SATURATION: 98 % | HEIGHT: 66 IN | SYSTOLIC BLOOD PRESSURE: 108 MMHG

## 2021-05-15 VITALS
HEIGHT: 67 IN | DIASTOLIC BLOOD PRESSURE: 86 MMHG | WEIGHT: 197 LBS | BODY MASS INDEX: 30.92 KG/M2 | SYSTOLIC BLOOD PRESSURE: 146 MMHG | HEART RATE: 94 BPM

## 2021-05-15 VITALS — WEIGHT: 199.12 LBS | HEART RATE: 101 BPM | BODY MASS INDEX: 32 KG/M2 | HEIGHT: 66 IN | OXYGEN SATURATION: 95 %

## 2021-05-15 VITALS
HEIGHT: 66 IN | DIASTOLIC BLOOD PRESSURE: 80 MMHG | TEMPERATURE: 96.9 F | OXYGEN SATURATION: 98 % | HEART RATE: 80 BPM | SYSTOLIC BLOOD PRESSURE: 122 MMHG | RESPIRATION RATE: 18 BRPM | WEIGHT: 193.5 LBS | BODY MASS INDEX: 31.1 KG/M2

## 2021-05-15 VITALS
TEMPERATURE: 98 F | SYSTOLIC BLOOD PRESSURE: 134 MMHG | OXYGEN SATURATION: 99 % | BODY MASS INDEX: 30.25 KG/M2 | HEIGHT: 66 IN | RESPIRATION RATE: 18 BRPM | HEART RATE: 88 BPM | WEIGHT: 188.25 LBS | DIASTOLIC BLOOD PRESSURE: 88 MMHG

## 2021-05-15 VITALS
TEMPERATURE: 98.6 F | WEIGHT: 192.44 LBS | DIASTOLIC BLOOD PRESSURE: 78 MMHG | HEIGHT: 66 IN | OXYGEN SATURATION: 99 % | RESPIRATION RATE: 16 BRPM | SYSTOLIC BLOOD PRESSURE: 128 MMHG | BODY MASS INDEX: 30.93 KG/M2 | HEART RATE: 98 BPM

## 2021-05-15 VITALS
DIASTOLIC BLOOD PRESSURE: 98 MMHG | OXYGEN SATURATION: 98 % | RESPIRATION RATE: 15 BRPM | HEART RATE: 99 BPM | TEMPERATURE: 97.2 F | BODY MASS INDEX: 31.42 KG/M2 | WEIGHT: 195.5 LBS | SYSTOLIC BLOOD PRESSURE: 164 MMHG | HEIGHT: 66 IN

## 2021-05-15 VITALS
SYSTOLIC BLOOD PRESSURE: 120 MMHG | RESPIRATION RATE: 12 BRPM | HEIGHT: 66 IN | HEART RATE: 111 BPM | TEMPERATURE: 100.8 F | WEIGHT: 187.5 LBS | OXYGEN SATURATION: 97 % | BODY MASS INDEX: 30.13 KG/M2 | DIASTOLIC BLOOD PRESSURE: 74 MMHG

## 2021-05-15 VITALS
BODY MASS INDEX: 31.18 KG/M2 | WEIGHT: 194 LBS | RESPIRATION RATE: 14 BRPM | DIASTOLIC BLOOD PRESSURE: 78 MMHG | HEART RATE: 86 BPM | HEIGHT: 66 IN | TEMPERATURE: 98.7 F | OXYGEN SATURATION: 98 % | SYSTOLIC BLOOD PRESSURE: 122 MMHG

## 2021-05-15 VITALS
TEMPERATURE: 96.8 F | SYSTOLIC BLOOD PRESSURE: 130 MMHG | WEIGHT: 190.25 LBS | HEART RATE: 75 BPM | OXYGEN SATURATION: 99 % | DIASTOLIC BLOOD PRESSURE: 90 MMHG | HEIGHT: 67 IN | BODY MASS INDEX: 29.86 KG/M2

## 2021-05-15 VITALS
HEART RATE: 88 BPM | WEIGHT: 198 LBS | SYSTOLIC BLOOD PRESSURE: 142 MMHG | DIASTOLIC BLOOD PRESSURE: 88 MMHG | BODY MASS INDEX: 31.08 KG/M2 | HEIGHT: 67 IN

## 2021-05-15 VITALS — HEART RATE: 105 BPM | HEIGHT: 66 IN | WEIGHT: 200.37 LBS | BODY MASS INDEX: 32.2 KG/M2 | OXYGEN SATURATION: 99 %

## 2021-05-15 VITALS
HEIGHT: 67 IN | RESPIRATION RATE: 15 BRPM | TEMPERATURE: 97.2 F | OXYGEN SATURATION: 98 % | HEART RATE: 80 BPM | BODY MASS INDEX: 30.37 KG/M2 | SYSTOLIC BLOOD PRESSURE: 138 MMHG | DIASTOLIC BLOOD PRESSURE: 82 MMHG | WEIGHT: 193.5 LBS

## 2021-05-15 VITALS
DIASTOLIC BLOOD PRESSURE: 68 MMHG | HEART RATE: 88 BPM | SYSTOLIC BLOOD PRESSURE: 122 MMHG | WEIGHT: 188 LBS | BODY MASS INDEX: 30.22 KG/M2 | HEIGHT: 66 IN

## 2021-05-15 VITALS — HEART RATE: 93 BPM | BODY MASS INDEX: 32.54 KG/M2 | WEIGHT: 202.5 LBS | HEIGHT: 66 IN | OXYGEN SATURATION: 98 %

## 2021-05-15 VITALS
WEIGHT: 186 LBS | SYSTOLIC BLOOD PRESSURE: 156 MMHG | DIASTOLIC BLOOD PRESSURE: 98 MMHG | HEART RATE: 100 BPM | BODY MASS INDEX: 29.89 KG/M2 | HEIGHT: 66 IN

## 2021-05-15 VITALS
WEIGHT: 193.25 LBS | HEART RATE: 83 BPM | DIASTOLIC BLOOD PRESSURE: 86 MMHG | TEMPERATURE: 97.2 F | OXYGEN SATURATION: 100 % | HEIGHT: 66 IN | SYSTOLIC BLOOD PRESSURE: 140 MMHG | RESPIRATION RATE: 16 BRPM | BODY MASS INDEX: 31.06 KG/M2

## 2021-05-15 VITALS
HEIGHT: 68 IN | RESPIRATION RATE: 16 BRPM | HEART RATE: 85 BPM | TEMPERATURE: 98.1 F | OXYGEN SATURATION: 99 % | SYSTOLIC BLOOD PRESSURE: 121 MMHG | DIASTOLIC BLOOD PRESSURE: 83 MMHG | WEIGHT: 200.37 LBS | BODY MASS INDEX: 30.37 KG/M2

## 2021-05-15 VITALS
SYSTOLIC BLOOD PRESSURE: 118 MMHG | HEART RATE: 84 BPM | DIASTOLIC BLOOD PRESSURE: 74 MMHG | RESPIRATION RATE: 18 BRPM | TEMPERATURE: 98 F | WEIGHT: 189.25 LBS

## 2021-05-16 VITALS
HEART RATE: 92 BPM | SYSTOLIC BLOOD PRESSURE: 130 MMHG | DIASTOLIC BLOOD PRESSURE: 98 MMHG | WEIGHT: 192 LBS | BODY MASS INDEX: 30.86 KG/M2 | HEIGHT: 66 IN

## 2021-05-16 VITALS
HEART RATE: 101 BPM | TEMPERATURE: 97.7 F | WEIGHT: 197 LBS | SYSTOLIC BLOOD PRESSURE: 132 MMHG | OXYGEN SATURATION: 99 % | HEIGHT: 66 IN | BODY MASS INDEX: 31.66 KG/M2 | RESPIRATION RATE: 15 BRPM | DIASTOLIC BLOOD PRESSURE: 80 MMHG

## 2021-05-16 VITALS
HEART RATE: 94 BPM | SYSTOLIC BLOOD PRESSURE: 134 MMHG | DIASTOLIC BLOOD PRESSURE: 92 MMHG | HEIGHT: 66 IN | WEIGHT: 193 LBS | BODY MASS INDEX: 31.02 KG/M2

## 2021-05-16 VITALS
SYSTOLIC BLOOD PRESSURE: 136 MMHG | TEMPERATURE: 97.8 F | DIASTOLIC BLOOD PRESSURE: 82 MMHG | HEART RATE: 101 BPM | BODY MASS INDEX: 31.2 KG/M2 | RESPIRATION RATE: 15 BRPM | HEIGHT: 66 IN | OXYGEN SATURATION: 96 % | WEIGHT: 194.12 LBS

## 2021-05-16 VITALS
BODY MASS INDEX: 30.7 KG/M2 | HEART RATE: 71 BPM | WEIGHT: 191 LBS | TEMPERATURE: 97.6 F | DIASTOLIC BLOOD PRESSURE: 60 MMHG | RESPIRATION RATE: 15 BRPM | SYSTOLIC BLOOD PRESSURE: 102 MMHG | OXYGEN SATURATION: 96 % | HEIGHT: 66 IN

## 2021-05-16 VITALS
OXYGEN SATURATION: 98 % | TEMPERATURE: 97.7 F | HEART RATE: 92 BPM | DIASTOLIC BLOOD PRESSURE: 78 MMHG | SYSTOLIC BLOOD PRESSURE: 122 MMHG | WEIGHT: 196.37 LBS

## 2021-05-16 VITALS
SYSTOLIC BLOOD PRESSURE: 134 MMHG | HEART RATE: 102 BPM | HEIGHT: 66 IN | TEMPERATURE: 97.8 F | DIASTOLIC BLOOD PRESSURE: 88 MMHG | RESPIRATION RATE: 16 BRPM | OXYGEN SATURATION: 97 % | BODY MASS INDEX: 31.9 KG/M2 | WEIGHT: 198.5 LBS

## 2021-05-16 VITALS
TEMPERATURE: 97.8 F | HEART RATE: 113 BPM | RESPIRATION RATE: 18 BRPM | SYSTOLIC BLOOD PRESSURE: 114 MMHG | WEIGHT: 193.25 LBS | OXYGEN SATURATION: 98 % | HEIGHT: 66 IN | DIASTOLIC BLOOD PRESSURE: 82 MMHG | BODY MASS INDEX: 31.06 KG/M2

## 2021-05-16 VITALS
SYSTOLIC BLOOD PRESSURE: 122 MMHG | HEIGHT: 66 IN | DIASTOLIC BLOOD PRESSURE: 76 MMHG | BODY MASS INDEX: 31.34 KG/M2 | WEIGHT: 195 LBS | HEART RATE: 90 BPM

## 2021-05-16 VITALS
DIASTOLIC BLOOD PRESSURE: 102 MMHG | SYSTOLIC BLOOD PRESSURE: 146 MMHG | WEIGHT: 199 LBS | BODY MASS INDEX: 31.98 KG/M2 | HEIGHT: 66 IN | HEART RATE: 88 BPM

## 2021-05-16 VITALS
OXYGEN SATURATION: 98 % | WEIGHT: 194.5 LBS | BODY MASS INDEX: 31.26 KG/M2 | SYSTOLIC BLOOD PRESSURE: 128 MMHG | RESPIRATION RATE: 15 BRPM | HEIGHT: 66 IN | DIASTOLIC BLOOD PRESSURE: 90 MMHG | HEART RATE: 78 BPM | TEMPERATURE: 97.4 F

## 2021-05-16 VITALS
WEIGHT: 198 LBS | DIASTOLIC BLOOD PRESSURE: 81 MMHG | SYSTOLIC BLOOD PRESSURE: 143 MMHG | HEIGHT: 66 IN | BODY MASS INDEX: 31.82 KG/M2 | HEART RATE: 82 BPM

## 2021-05-16 VITALS — BODY MASS INDEX: 31.22 KG/M2 | OXYGEN SATURATION: 99 % | WEIGHT: 194.25 LBS | HEIGHT: 66 IN | HEART RATE: 98 BPM

## 2021-05-16 VITALS
DIASTOLIC BLOOD PRESSURE: 62 MMHG | TEMPERATURE: 97.2 F | SYSTOLIC BLOOD PRESSURE: 114 MMHG | OXYGEN SATURATION: 94 % | HEART RATE: 96 BPM | WEIGHT: 196 LBS | RESPIRATION RATE: 12 BRPM

## 2021-05-17 LAB — BACTERIA SPEC AEROBE CULT: ABNORMAL

## 2021-08-16 RX ORDER — FUROSEMIDE 40 MG/1
40 TABLET ORAL DAILY
Qty: 90 TABLET | Refills: 0 | Status: SHIPPED | OUTPATIENT
Start: 2021-08-16 | End: 2021-12-21 | Stop reason: SDUPTHER

## 2021-08-19 RX ORDER — FUROSEMIDE 40 MG/1
40 TABLET ORAL DAILY
Qty: 90 TABLET | Refills: 0 | OUTPATIENT
Start: 2021-08-19

## 2021-08-19 NOTE — TELEPHONE ENCOUNTER
Caller: Lyle Lozano    Relationship: Self    Best call back number: [phone]    Medication needed:   Requested Prescriptions     Pending Prescriptions Disp Refills   • furosemide (LASIX) 40 MG tablet 90 tablet 0     Sig: Take 1 tablet by mouth Daily.       When do you need the refill by: ASAP- OUT FOR 5 DAYS    What additional details did the patient provide when requesting the medication: PHARMACY STATES REFILL REQUEST NOT RECIEVED    Does the patient have less than a 3 day supply:  [x] Yes  [] No    What is the patient's preferred pharmacy: [id number] - [place], [place] - [address] [facility] AT [facility] OF [address]/[facility] & [place] - [phone] [facility] [phone] FX

## 2021-09-23 ENCOUNTER — OFFICE VISIT (OUTPATIENT)
Dept: CARDIOLOGY | Facility: CLINIC | Age: 66
End: 2021-09-23

## 2021-09-23 VITALS
SYSTOLIC BLOOD PRESSURE: 166 MMHG | DIASTOLIC BLOOD PRESSURE: 91 MMHG | BODY MASS INDEX: 31.71 KG/M2 | WEIGHT: 202 LBS | HEIGHT: 67 IN | HEART RATE: 96 BPM

## 2021-09-23 DIAGNOSIS — I42.8 NICM (NONISCHEMIC CARDIOMYOPATHY) (HCC): Primary | ICD-10-CM

## 2021-09-23 DIAGNOSIS — I10 ESSENTIAL HYPERTENSION: ICD-10-CM

## 2021-09-23 PROBLEM — E78.5 DYSLIPIDEMIA: Status: ACTIVE | Noted: 2021-09-23

## 2021-09-23 PROCEDURE — 99214 OFFICE O/P EST MOD 30 MIN: CPT | Performed by: INTERNAL MEDICINE

## 2021-09-23 RX ORDER — CARVEDILOL 12.5 MG/1
12.5 TABLET ORAL 2 TIMES DAILY WITH MEALS
Qty: 180 TABLET | Refills: 3 | Status: SHIPPED | OUTPATIENT
Start: 2021-09-23 | End: 2022-01-11 | Stop reason: SDUPTHER

## 2021-09-23 RX ORDER — AMLODIPINE BESYLATE 5 MG/1
5 TABLET ORAL DAILY
Qty: 90 TABLET | Refills: 3 | Status: SHIPPED | OUTPATIENT
Start: 2021-09-23 | End: 2022-01-11 | Stop reason: SDUPTHER

## 2021-09-23 RX ORDER — SITAGLIPTIN AND METFORMIN HYDROCHLORIDE 1000; 50 MG/1; MG/1
1 TABLET, FILM COATED ORAL 2 TIMES DAILY WITH MEALS
COMMUNITY
End: 2022-03-07 | Stop reason: SDUPTHER

## 2021-09-23 RX ORDER — SACUBITRIL AND VALSARTAN 97; 103 MG/1; MG/1
1 TABLET, FILM COATED ORAL 2 TIMES DAILY
Qty: 180 TABLET | Refills: 3 | Status: SHIPPED | OUTPATIENT
Start: 2021-09-23 | End: 2022-01-11 | Stop reason: SDUPTHER

## 2021-09-23 NOTE — PATIENT INSTRUCTIONS
"Low-Sodium Eating Plan  Sodium, which is an element that makes up salt, helps you maintain a healthy balance of fluids in your body. Too much sodium can increase your blood pressure and cause fluid and waste to be held in your body.  Your health care provider or dietitian may recommend following this plan if you have high blood pressure (hypertension), kidney disease, liver disease, or heart failure. Eating less sodium can help lower your blood pressure, reduce swelling, and protect your heart, liver, and kidneys.  What are tips for following this plan?  Reading food labels  · The Nutrition Facts label lists the amount of sodium in one serving of the food. If you eat more than one serving, you must multiply the listed amount of sodium by the number of servings.  · Choose foods with less than 140 mg of sodium per serving.  · Avoid foods with 300 mg of sodium or more per serving.  Shopping    · Look for lower-sodium products, often labeled as \"low-sodium\" or \"no salt added.\"  · Always check the sodium content, even if foods are labeled as \"unsalted\" or \"no salt added.\"  · Buy fresh foods.  ? Avoid canned foods and pre-made or frozen meals.  ? Avoid canned, cured, or processed meats.  · Buy breads that have less than 80 mg of sodium per slice.    Cooking    · Eat more home-cooked food and less restaurant, buffet, and fast food.  · Avoid adding salt when cooking. Use salt-free seasonings or herbs instead of table salt or sea salt. Check with your health care provider or pharmacist before using salt substitutes.  · Cook with plant-based oils, such as canola, sunflower, or olive oil.    Meal planning  · When eating at a restaurant, ask that your food be prepared with less salt or no salt, if possible. Avoid dishes labeled as brined, pickled, cured, smoked, or made with soy sauce, miso, or teriyaki sauce.  · Avoid foods that contain MSG (monosodium glutamate). MSG is sometimes added to Chinese food, bouillon, and some " "canned foods.  · Make meals that can be grilled, baked, poached, roasted, or steamed. These are generally made with less sodium.  General information  Most people on this plan should limit their sodium intake to 1,500-2,000 mg (milligrams) of sodium each day.  What foods should I eat?  Fruits  Fresh, frozen, or canned fruit. Fruit juice.  Vegetables  Fresh or frozen vegetables. \"No salt added\" canned vegetables. \"No salt added\" tomato sauce and paste. Low-sodium or reduced-sodium tomato and vegetable juice.  Grains  Low-sodium cereals, including oats, puffed wheat and rice, and shredded wheat. Low-sodium crackers. Unsalted rice. Unsalted pasta. Low-sodium bread. Whole-grain breads and whole-grain pasta.  Meats and other proteins  Fresh or frozen (no salt added) meat, poultry, seafood, and fish. Low-sodium canned tuna and salmon. Unsalted nuts. Dried peas, beans, and lentils without added salt. Unsalted canned beans. Eggs. Unsalted nut butters.  Dairy  Milk. Soy milk. Cheese that is naturally low in sodium, such as ricotta cheese, fresh mozzarella, or Swiss cheese. Low-sodium or reduced-sodium cheese. Cream cheese. Yogurt.  Seasonings and condiments  Fresh and dried herbs and spices. Salt-free seasonings. Low-sodium mustard and ketchup. Sodium-free salad dressing. Sodium-free light mayonnaise. Fresh or refrigerated horseradish. Lemon juice. Vinegar.  Other foods  Homemade, reduced-sodium, or low-sodium soups. Unsalted popcorn and pretzels. Low-salt or salt-free chips.  The items listed above may not be a complete list of foods and beverages you can eat. Contact a dietitian for more information.  What foods should I avoid?  Vegetables  Sauerkraut, pickled vegetables, and relishes. Olives. French fries. Onion rings. Regular canned vegetables (not low-sodium or reduced-sodium). Regular canned tomato sauce and paste (not low-sodium or reduced-sodium). Regular tomato and vegetable juice (not low-sodium or reduced-sodium). " Frozen vegetables in sauces.  Grains  Instant hot cereals. Bread stuffing, pancake, and biscuit mixes. Croutons. Seasoned rice or pasta mixes. Noodle soup cups. Boxed or frozen macaroni and cheese. Regular salted crackers. Self-rising flour.  Meats and other proteins  Meat or fish that is salted, canned, smoked, spiced, or pickled. Precooked or cured meat, such as sausages or meat loaves. Song. Ham. Pepperoni. Hot dogs. Corned beef. Chipped beef. Salt pork. Jerky. Pickled herring. Anchovies and sardines. Regular canned tuna. Salted nuts.  Dairy  Processed cheese and cheese spreads. Hard cheeses. Cheese curds. Blue cheese. Feta cheese. String cheese. Regular cottage cheese. Buttermilk. Canned milk.  Fats and oils  Salted butter. Regular margarine. Ghee. Song fat.  Seasonings and condiments  Onion salt, garlic salt, seasoned salt, table salt, and sea salt. Canned and packaged gravies. Worcestershire sauce. Tartar sauce. Barbecue sauce. Teriyaki sauce. Soy sauce, including reduced-sodium. Steak sauce. Fish sauce. Oyster sauce. Cocktail sauce. Horseradish that you find on the shelf. Regular ketchup and mustard. Meat flavorings and tenderizers. Bouillon cubes. Hot sauce. Pre-made or packaged marinades. Pre-made or packaged taco seasonings. Relishes. Regular salad dressings. Salsa.  Other foods  Salted popcorn and pretzels. Corn chips and puffs. Potato and tortilla chips. Canned or dried soups. Pizza. Frozen entrees and pot pies.  The items listed above may not be a complete list of foods and beverages you should avoid. Contact a dietitian for more information.  Summary  · Eating less sodium can help lower your blood pressure, reduce swelling, and protect your heart, liver, and kidneys.  · Most people on this plan should limit their sodium intake to 1,500-2,000 mg (milligrams) of sodium each day.  · Canned, boxed, and frozen foods are high in sodium. Restaurant foods, fast foods, and pizza are also very high in sodium.  You also get sodium by adding salt to food.  · Try to cook at home, eat more fresh fruits and vegetables, and eat less fast food and canned, processed, or prepared foods.  This information is not intended to replace advice given to you by your health care provider. Make sure you discuss any questions you have with your health care provider.  Document Revised: 01/22/2021 Document Reviewed: 11/18/2020  [employer] Patient Education © 2021 [employer]

## 2021-09-23 NOTE — PROGRESS NOTES
Chief Complaint  Cardiomyopathy and Hypertension    Subjective    Patient has been doing well denies any ongoing chest pain or shortness of breath issues he has noticed that his home slight blood pressure has been elevated.  He also has mild chronic lower extremity edema  Past Medical History:   Diagnosis Date   • Abnormal kidney function    • Arthritis    • Bursitis    • CHF (congestive heart failure) (CMS/[facility])    • Depression    • Diabetes (CMS/[facility])    • Edema    • Essential hypertension 9/23/2021   • Forgetfulness    • Head injury    • Hernia cerebri (CMS/[facility])    • Hyperlipidemia    • Hypertension    • IFG (impaired fasting glucose)    • Low back pain    • Lumbago     `   • Pain of left hip    • Right shoulder pain    • Sleep apnea    • SOB (shortness of breath)          Current Outpatient Medications:   •  atorvastatin (LIPITOR) 80 MG tablet, Take 80 mg by mouth Every Night., Disp: , Rfl:   •  carBAMazepine XR (TEGretol  XR) 200 MG 12 hr tablet, carbamazepine 200 mg oral tablet extended release 12 hr take 1 tablet (200 mg) by oral route every 12 hours   Suspended, Disp: , Rfl:   •  carvedilol (COREG) 12.5 MG tablet, Take 1 tablet by mouth 2 (Two) Times a Day With Meals., Disp: 180 tablet, Rfl: 3  •  cholecalciferol (VITAMIN D3) 25 MCG (1000 UT) tablet, Daily., Disp: , Rfl:   •  furosemide (LASIX) 40 MG tablet, Take 1 tablet by mouth Daily., Disp: 90 tablet, Rfl: 0  •  sacubitril-valsartan (Entresto)  MG tablet, Take 1 tablet by mouth 2 (Two) Times a Day., Disp: 180 tablet, Rfl: 3  •  sitaGLIPtin-metFORMIN (Janumet)  MG per tablet, Take 1 tablet by mouth 2 (Two) Times a Day With Meals., Disp: , Rfl:   •  spironolactone (ALDACTONE) 25 MG tablet, Take  by mouth Daily., Disp: , Rfl: 2  •  amLODIPine (NORVASC) 5 MG tablet, Take 1 tablet by mouth Daily., Disp: 90 tablet, Rfl: 3    Medications Discontinued During This Encounter   Medication Reason   • methylPREDNISolone (MEDROL) 4 MG dose pack *Therapy  "completed   • ferrous sulfate 325 (65 FE) MG tablet    • potassium chloride ER (K-TAB) 20 MEQ tablet controlled-release ER tablet    • ENTRESTO  MG tablet Reorder   • carvedilol (COREG) 12.5 MG tablet Reorder     No Known Allergies     Social History     Tobacco Use   • Smoking status: Former Smoker     Packs/day: 0.25     Types: Cigarettes     Start date:      Quit date: 2012     Years since quittin.1   • Smokeless tobacco: Never Used   Vaping Use   • Vaping Use: Never used   Substance Use Topics   • Alcohol use: Yes     Comment: rare   • Drug use: Never       Family History   Problem Relation Age of Onset   • Cancer Mother    • Diabetes Mother    • Diabetes Sister    • Stroke Sister    • No Known Problems Father         Objective     /91   Pulse 96   Ht 170.2 cm (67\")   Wt 91.6 kg (202 lb)   BMI 31.64 kg/m²       Physical Exam    General Appearance:   · no acute distress  · Alert and oriented x3  HENT:   · lips not cyanotic  · Atraumatic  Neck:  · No jvd   · supple  Respiratory:  · no respiratory distress  · normal breath sounds  · no rales  Cardiovascular:  · Regular rate and rhythm  · no S3, no S4   · no murmur  · no rub  Extremities  · No cyanosis  · lower extremity edema: +1  Skin:   · warm, dry  · No rashes      Result Review :     No results found for: PROBNP  CMP    CMP 21   Glucose 182 (A)   BUN 25   Creatinine 1.67 (A)   Sodium 140   Potassium 3.8   Chloride 104   Calcium 8.9   Albumin 3.1 (A)   Total Bilirubin 0.27   Alkaline Phosphatase 96   AST (SGOT) 22   ALT (SGPT) 16   (A) Abnormal value       Comments are available for some flowsheets but are not being displayed.           CBC w/diff    CBC w/Diff 21   WBC 5.98   RBC 3.90 (A)   Hemoglobin 11.0 (A)   Hematocrit 35.3 (A)   MCV 90.5   MCH 28.2   MCHC 31.2 (A)   RDW 15.0 (A)   Platelets 256   Neutrophil Rel % 64.4   Lymphocyte Rel % 22.6   Monocyte Rel % 8.2   Eosinophil Rel % 4.0   Basophil Rel % 0.5   (A) Abnormal " value             Lab Results   Component Value Date    TSH 2.070 04/02/2021      Lab Results   Component Value Date    FREET4 1.2 04/02/2021      No results found for: DDIMERQUANT  No results found for: MG   No results found for: DIGOXIN   Lab Results   Component Value Date    TROPONINT 0.04 01/28/2019           Lipid Panel    Lipid Panel 4/2/21   Total Cholesterol 118   Triglycerides 141   HDL Cholesterol 45   VLDL Cholesterol 28   LDL Cholesterol  45 (A)   (A) Abnormal value       Comments are available for some flowsheets but are not being displayed.           No results found for: POCTROP    Results for orders placed in visit on 03/25/19    SCANNED - ECHOCARDIOGRAM                 Diagnoses and all orders for this visit:    1. NICM (nonischemic cardiomyopathy) (HCC) (Primary)  Assessment & Plan:  Stable breathing wise no weight gain will repeat EKG on next visit    Orders:  -     BNP; Future  -     Basic Metabolic Panel; Future    2. Essential hypertension  Assessment & Plan:  Blood pressure not ideally controlled will add Norvasc 5 mg once a day.  Amended that he keep a log after a month after increasing Norvasc  Counseled patient on  • low-sodium diet of less than 2 g  • Aerobic activity 30 minutes a day 5 times a week  • Weight loss          Other orders  -     carvedilol (COREG) 12.5 MG tablet; Take 1 tablet by mouth 2 (Two) Times a Day With Meals.  Dispense: 180 tablet; Refill: 3  -     sacubitril-valsartan (Entresto)  MG tablet; Take 1 tablet by mouth 2 (Two) Times a Day.  Dispense: 180 tablet; Refill: 3  -     amLODIPine (NORVASC) 5 MG tablet; Take 1 tablet by mouth Daily.  Dispense: 90 tablet; Refill: 3          Follow Up     Return in about 3 months (around 12/23/2021) for EKG with F/U.          Patient was given instructions and counseling regarding his condition or for health maintenance advice. Please see specific information pulled into the AVS if appropriate.

## 2021-09-23 NOTE — ASSESSMENT & PLAN NOTE
Blood pressure not ideally controlled will add Norvasc 5 mg once a day.  Amended that he keep a log after a month after increasing Norvasc  Counseled patient on  • low-sodium diet of less than 2 g  • Aerobic activity 30 minutes a day 5 times a week  • Weight loss

## 2021-10-04 ENCOUNTER — TELEPHONE (OUTPATIENT)
Dept: CARDIOLOGY | Facility: CLINIC | Age: 66
End: 2021-10-04

## 2021-10-11 ENCOUNTER — OFFICE VISIT (OUTPATIENT)
Dept: INTERNAL MEDICINE | Facility: CLINIC | Age: 66
End: 2021-10-11

## 2021-10-11 VITALS
RESPIRATION RATE: 18 BRPM | OXYGEN SATURATION: 98 % | TEMPERATURE: 96.7 F | SYSTOLIC BLOOD PRESSURE: 108 MMHG | BODY MASS INDEX: 32.33 KG/M2 | HEIGHT: 67 IN | DIASTOLIC BLOOD PRESSURE: 64 MMHG | WEIGHT: 206 LBS | HEART RATE: 61 BPM

## 2021-10-11 DIAGNOSIS — E78.2 MIXED HYPERLIPIDEMIA: ICD-10-CM

## 2021-10-11 DIAGNOSIS — Z23 NEED FOR INFLUENZA VACCINATION: ICD-10-CM

## 2021-10-11 DIAGNOSIS — I50.9 CONGESTIVE HEART FAILURE, UNSPECIFIED HF CHRONICITY, UNSPECIFIED HEART FAILURE TYPE (HCC): ICD-10-CM

## 2021-10-11 DIAGNOSIS — E11.65 UNCONTROLLED TYPE 2 DIABETES MELLITUS WITH HYPERGLYCEMIA (HCC): Primary | ICD-10-CM

## 2021-10-11 DIAGNOSIS — I10 ESSENTIAL HYPERTENSION: ICD-10-CM

## 2021-10-11 PROBLEM — IMO0002 DIABETES MELLITUS TYPE 2, UNCONTROLLED: Chronic | Status: ACTIVE | Noted: 2021-10-11

## 2021-10-11 PROBLEM — E78.5 HYPERLIPIDEMIA: Chronic | Status: ACTIVE | Noted: 2021-10-11

## 2021-10-11 PROBLEM — E78.5 HYPERLIPIDEMIA: Status: ACTIVE | Noted: 2021-10-11

## 2021-10-11 PROBLEM — I25.10 ATHEROSCLEROSIS OF CORONARY ARTERY: Status: ACTIVE | Noted: 2021-10-11

## 2021-10-11 PROBLEM — IMO0002 DIABETES MELLITUS TYPE 2, UNCONTROLLED: Status: ACTIVE | Noted: 2021-10-11

## 2021-10-11 PROBLEM — G62.9 NEUROPATHY: Status: ACTIVE | Noted: 2021-10-11

## 2021-10-11 LAB
ALBUMIN SERPL-MCNC: 3.5 G/DL (ref 3.5–5.2)
ALBUMIN/GLOB SERPL: 1.2 G/DL
ALP SERPL-CCNC: 90 U/L (ref 39–117)
ALT SERPL W P-5'-P-CCNC: 7 U/L (ref 1–41)
ANION GAP SERPL CALCULATED.3IONS-SCNC: 8.4 MMOL/L (ref 5–15)
AST SERPL-CCNC: 14 U/L (ref 1–40)
BASOPHILS # BLD AUTO: 0.05 10*3/MM3 (ref 0–0.2)
BASOPHILS NFR BLD AUTO: 0.7 % (ref 0–1.5)
BILIRUB SERPL-MCNC: 0.3 MG/DL (ref 0–1.2)
BUN SERPL-MCNC: 29 MG/DL (ref 8–23)
BUN/CREAT SERPL: 18.5 (ref 7–25)
CALCIUM SPEC-SCNC: 8.7 MG/DL (ref 8.6–10.5)
CHLORIDE SERPL-SCNC: 104 MMOL/L (ref 98–107)
CHOLEST SERPL-MCNC: 131 MG/DL (ref 0–200)
CO2 SERPL-SCNC: 23.6 MMOL/L (ref 22–29)
CREAT SERPL-MCNC: 1.57 MG/DL (ref 0.76–1.27)
DEPRECATED RDW RBC AUTO: 55.2 FL (ref 37–54)
EOSINOPHIL # BLD AUTO: 0.32 10*3/MM3 (ref 0–0.4)
EOSINOPHIL NFR BLD AUTO: 4.2 % (ref 0.3–6.2)
ERYTHROCYTE [DISTWIDTH] IN BLOOD BY AUTOMATED COUNT: 16.3 % (ref 12.3–15.4)
GFR SERPL CREATININE-BSD FRML MDRD: 54 ML/MIN/1.73
GLOBULIN UR ELPH-MCNC: 2.9 GM/DL
GLUCOSE SERPL-MCNC: 109 MG/DL (ref 65–99)
HBA1C MFR BLD: 5.9 % (ref 4.8–5.6)
HCT VFR BLD AUTO: 39.6 % (ref 37.5–51)
HDLC SERPL-MCNC: 48 MG/DL (ref 40–60)
HGB BLD-MCNC: 12.9 G/DL (ref 13–17.7)
IMM GRANULOCYTES # BLD AUTO: 0.01 10*3/MM3 (ref 0–0.05)
IMM GRANULOCYTES NFR BLD AUTO: 0.1 % (ref 0–0.5)
LDLC SERPL CALC-MCNC: 61 MG/DL (ref 0–100)
LDLC/HDLC SERPL: 1.21 {RATIO}
LYMPHOCYTES # BLD AUTO: 2 10*3/MM3 (ref 0.7–3.1)
LYMPHOCYTES NFR BLD AUTO: 26.6 % (ref 19.6–45.3)
MCH RBC QN AUTO: 30.4 PG (ref 26.6–33)
MCHC RBC AUTO-ENTMCNC: 32.6 G/DL (ref 31.5–35.7)
MCV RBC AUTO: 93.2 FL (ref 79–97)
MONOCYTES # BLD AUTO: 0.56 10*3/MM3 (ref 0.1–0.9)
MONOCYTES NFR BLD AUTO: 7.4 % (ref 5–12)
NEUTROPHILS NFR BLD AUTO: 4.59 10*3/MM3 (ref 1.7–7)
NEUTROPHILS NFR BLD AUTO: 61 % (ref 42.7–76)
NRBC BLD AUTO-RTO: 0 /100 WBC (ref 0–0.2)
PLATELET # BLD AUTO: 276 10*3/MM3 (ref 140–450)
PMV BLD AUTO: 10.6 FL (ref 6–12)
POTASSIUM SERPL-SCNC: 4.5 MMOL/L (ref 3.5–5.2)
PROT SERPL-MCNC: 6.4 G/DL (ref 6–8.5)
RBC # BLD AUTO: 4.25 10*6/MM3 (ref 4.14–5.8)
SODIUM SERPL-SCNC: 136 MMOL/L (ref 136–145)
TRIGL SERPL-MCNC: 124 MG/DL (ref 0–150)
TSH SERPL DL<=0.05 MIU/L-ACNC: 2.47 UIU/ML (ref 0.27–4.2)
VLDLC SERPL-MCNC: 22 MG/DL (ref 5–40)
WBC # BLD AUTO: 7.53 10*3/MM3 (ref 3.4–10.8)

## 2021-10-11 PROCEDURE — 84443 ASSAY THYROID STIM HORMONE: CPT | Performed by: NURSE PRACTITIONER

## 2021-10-11 PROCEDURE — 80061 LIPID PANEL: CPT | Performed by: NURSE PRACTITIONER

## 2021-10-11 PROCEDURE — 99214 OFFICE O/P EST MOD 30 MIN: CPT | Performed by: NURSE PRACTITIONER

## 2021-10-11 PROCEDURE — 83036 HEMOGLOBIN GLYCOSYLATED A1C: CPT | Performed by: NURSE PRACTITIONER

## 2021-10-11 PROCEDURE — 90471 IMMUNIZATION ADMIN: CPT | Performed by: NURSE PRACTITIONER

## 2021-10-11 PROCEDURE — 85025 COMPLETE CBC W/AUTO DIFF WBC: CPT | Performed by: NURSE PRACTITIONER

## 2021-10-11 PROCEDURE — 82043 UR ALBUMIN QUANTITATIVE: CPT | Performed by: NURSE PRACTITIONER

## 2021-10-11 PROCEDURE — 90686 IIV4 VACC NO PRSV 0.5 ML IM: CPT | Performed by: NURSE PRACTITIONER

## 2021-10-11 PROCEDURE — 80053 COMPREHEN METABOLIC PANEL: CPT | Performed by: NURSE PRACTITIONER

## 2021-10-11 RX ORDER — ASPIRIN 81 MG/1
81 TABLET ORAL DAILY
Qty: 90 TABLET | Refills: 1 | Status: SHIPPED | OUTPATIENT
Start: 2021-10-11 | End: 2022-02-03 | Stop reason: SDUPTHER

## 2021-10-11 RX ORDER — DULAGLUTIDE 1.5 MG/.5ML
1.5 INJECTION, SOLUTION SUBCUTANEOUS WEEKLY
Qty: 13 PEN | Refills: 1 | Status: SHIPPED | OUTPATIENT
Start: 2021-10-11 | End: 2022-01-12 | Stop reason: SDUPTHER

## 2021-10-11 NOTE — PROGRESS NOTES
"Chief Complaint  Shortness of Breath (Onhly at night has been feeling fine and no other issues) and Med Refill (Trulicity)    Subjective     {Problem List  Visit Diagnosis   Encounters  Notes  Medications  Labs  Result Review Imaging  Media :23}     Lyle Lozano presents to [facility]  History of Present Illness  HTN-well controlled  DM-no low bs, he reports that he was not able to get trulicity filled.   Joints aches--hands and knees, improved with [facility] given by [facility] several months ago.  HLD-no leg cramps  Objective   Vital Signs:   /64 (BP Location: Left arm, Patient Position: Sitting, Cuff Size: Adult)   Pulse 61   Temp 96.7 °F (35.9 °C) (Temporal)   Resp 18   Ht 170.2 cm (67\")   Wt 93.4 kg (206 lb)   SpO2 98%   BMI 32.26 kg/m²     Physical Exam  Vitals and nursing note reviewed.   Constitutional:       General: He is not in acute distress.     Appearance: Normal appearance.   HENT:      Head: Normocephalic and atraumatic.      Right Ear: External ear normal.      Left Ear: External ear normal.      Nose: Nose normal.      Mouth/Throat:      Mouth: Mucous membranes are moist.   Eyes:      Conjunctiva/sclera: Conjunctivae normal.   Cardiovascular:      Rate and Rhythm: Normal rate and regular rhythm.      Pulses: Normal pulses.      Heart sounds: Normal heart sounds. No murmur heard.  No friction rub. No gallop.    Pulmonary:      Effort: Pulmonary effort is normal. No respiratory distress.      Breath sounds: No wheezing, rhonchi or rales.   Musculoskeletal:      Cervical back: Neck supple.   Skin:     General: Skin is warm and dry.   Neurological:      General: No focal deficit present.      Mental Status: He is alert and oriented to person, place, and time.   Psychiatric:         Mood and Affect: Mood normal.         Behavior: Behavior normal.        Result Review :          Procedures      Assessment and Plan    Diagnoses and all " orders for this visit:    1. Uncontrolled type 2 diabetes mellitus with hyperglycemia (HCC) (Primary)  Assessment & Plan:  Likely uncontrolled, last A1c near 9 and he has been off Trulicity for 2 to 3 months.  Checking A1c today.  Sending both Trulicity and Jardiance to pharmacy.  Reviewed side effects and risks associated with both medications.    Orders:  -     CBC & Differential  -     Comprehensive Metabolic Panel  -     Lipid Panel  -     TSH  -     MicroAlbumin, Urine, Random - Urine, Clean Catch  -     Hemoglobin A1c    2. Congestive heart failure, unspecified HF chronicity, unspecified heart failure type (HCC)  Assessment & Plan:  No sign of acute exacerbation    Orders:  -     CBC & Differential  -     Comprehensive Metabolic Panel  -     Lipid Panel  -     TSH  -     MicroAlbumin, Urine, Random - Urine, Clean Catch  -     Hemoglobin A1c    3. Mixed hyperlipidemia  Assessment & Plan:  We will check labs in the office today.  Will review and adjust medications as needed    Orders:  -     CBC & Differential  -     Comprehensive Metabolic Panel  -     Lipid Panel  -     TSH  -     MicroAlbumin, Urine, Random - Urine, Clean Catch  -     Hemoglobin A1c    4. Essential hypertension  Assessment & Plan:  Well controlled    Orders:  -     CBC & Differential  -     Comprehensive Metabolic Panel  -     Lipid Panel  -     TSH  -     MicroAlbumin, Urine, Random - Urine, Clean Catch  -     Hemoglobin A1c    5. Need for influenza vaccination  -     FluLaval/Fluarix (VFC) >6 Months    Other orders  -     Dulaglutide (Trulicity) 1.5 MG/0.5ML solution pen-injector; Inject 1.5 mg under the skin into the appropriate area as directed 1 (One) Time Per Week.  Dispense: 13 pen; Refill: 1  -     empagliflozin (Jardiance) 25 MG tablet tablet; Take 1 tablet by mouth Daily.  Dispense: 90 tablet; Refill: 1  -     aspirin (aspirin) 81 MG EC tablet; Take 1 tablet by mouth Daily.  Dispense: 90 tablet; Refill: 1            Follow Up  {Instructions Charge Capture  Follow-up Communications :23}  Return in about 3 months (around 1/11/2022).  Patient was given instructions and counseling regarding his condition or for health maintenance advice. Please see specific information pulled into the AVS if appropriate.

## 2021-10-11 NOTE — ASSESSMENT & PLAN NOTE
Likely uncontrolled, last A1c near 9 and he has been off Trulicity for 2 to 3 months.  Checking A1c today.  Sending both Trulicity and Jardiance to pharmacy.  Reviewed side effects and risks associated with both medications.

## 2021-10-12 LAB — ALBUMIN UR-MCNC: 64.1 MG/DL

## 2021-10-15 ENCOUNTER — TELEPHONE (OUTPATIENT)
Dept: CARDIOLOGY | Facility: CLINIC | Age: 66
End: 2021-10-15

## 2021-10-15 NOTE — TELEPHONE ENCOUNTER
Spoke with patient regarding overdue BNP.  We ordered a BMP and BNP.  He had a CMP drawn and that was sent to [facility]. He will have BNP done this week

## 2021-12-21 RX ORDER — FUROSEMIDE 40 MG/1
40 TABLET ORAL DAILY
Qty: 90 TABLET | Refills: 0 | Status: SHIPPED | OUTPATIENT
Start: 2021-12-21 | End: 2022-01-11 | Stop reason: SDUPTHER

## 2021-12-21 NOTE — TELEPHONE ENCOUNTER
Caller: Lyle Lozano    Relationship: Self    Best call back number: [phone]     Requested Prescriptions:   Requested Prescriptions     Pending Prescriptions Disp Refills   • furosemide (LASIX) 40 MG tablet 90 tablet 0     Sig: Take 1 tablet by mouth Daily.        Pharmacy where request should be sent: [id number] - [place], [place] - [address] [facility] AT [facility] OF [address] & [place] - [phone] [facility] [phone] FX     Additional details provided by patient: PATIENT    Does the patient have less than a 3 day supply:  [x] Yes  [] No    Amparo Alexandre Rep   12/21/21 09:19 EST

## 2022-01-03 ENCOUNTER — APPOINTMENT (OUTPATIENT)
Dept: GENERAL RADIOLOGY | Facility: HOSPITAL | Age: 67
End: 2022-01-03

## 2022-01-03 ENCOUNTER — HOSPITAL ENCOUNTER (EMERGENCY)
Facility: HOSPITAL | Age: 67
Discharge: HOME | End: 2022-01-03
Attending: EMERGENCY MEDICINE | Admitting: EMERGENCY MEDICINE

## 2022-01-03 VITALS
HEIGHT: 67 IN | RESPIRATION RATE: 12 BRPM | TEMPERATURE: 98.4 F | HEART RATE: 84 BPM | BODY MASS INDEX: 31.18 KG/M2 | OXYGEN SATURATION: 100 % | DIASTOLIC BLOOD PRESSURE: 87 MMHG | SYSTOLIC BLOOD PRESSURE: 146 MMHG | WEIGHT: 198.63 LBS

## 2022-01-03 DIAGNOSIS — S62.630A CLOSED DISPLACED FRACTURE OF DISTAL PHALANX OF RIGHT INDEX FINGER, INITIAL ENCOUNTER: ICD-10-CM

## 2022-01-03 DIAGNOSIS — M19.071 OSTEOARTHRITIS OF RIGHT ANKLE, UNSPECIFIED OSTEOARTHRITIS TYPE: ICD-10-CM

## 2022-01-03 DIAGNOSIS — M19.041 PRIMARY OSTEOARTHRITIS OF RIGHT HAND: Primary | ICD-10-CM

## 2022-01-03 PROCEDURE — 73130 X-RAY EXAM OF HAND: CPT

## 2022-01-03 PROCEDURE — 99283 EMERGENCY DEPT VISIT LOW MDM: CPT

## 2022-01-03 RX ORDER — DICLOFENAC SODIUM 75 MG/1
75 TABLET, DELAYED RELEASE ORAL 2 TIMES DAILY PRN
Qty: 20 TABLET | Refills: 0 | OUTPATIENT
Start: 2022-01-03 | End: 2022-12-20

## 2022-01-03 NOTE — ED PROVIDER NOTES
Subjective   Patient complaining of right hand swelling and pain starting today after he woke up.  Patient denies any known injury.  Patient is he does have a long history with osteoarthritis.  Patient states he is an  and does strenuous work with his hand quite often.  Patient denies any additional symptoms and/or concerns at this time.      History provided by:  Patient   used: No    Hand Injury  Location:  Hand  Hand location:  R hand  Injury: no    Pain details:     Quality:  Aching    Radiates to:  Does not radiate    Severity:  Mild    Onset quality:  Sudden    Duration: Patient states woke up with hand pain and swelling today.    Timing:  Constant    Progression:  Waxing and waning  Handedness:  Right-handed  Dislocation: no    Foreign body present:  No foreign bodies  Relieved by:  Nothing  Worsened by:  Nothing  Associated symptoms: stiffness and swelling    Associated symptoms: no back pain, no decreased range of motion, no fatigue, no fever, no muscle weakness and no neck pain        Review of Systems   Constitutional: Negative for chills, fatigue and fever.   HENT: Negative for congestion, ear pain and sore throat.    Eyes: Negative for pain.   Respiratory: Negative for cough, chest tightness and shortness of breath.    Cardiovascular: Negative for chest pain.   Gastrointestinal: Negative for abdominal pain, diarrhea, nausea and vomiting.   Genitourinary: Negative for flank pain and hematuria.   Musculoskeletal: Positive for stiffness. Negative for back pain, joint swelling and neck pain.        Patient complaining of right hand pain and swelling starting today.  Patient she does have a history of osteoarthritis in his hand.  Patient states he is unaware of any known injury.   Skin: Negative for pallor.   Neurological: Negative for seizures and headaches.   All other systems reviewed and are negative.      Past Medical History:   Diagnosis Date   • Abnormal kidney  function    • Arthritis    • Bursitis    • CHF (congestive heart failure) ([facility])    • Depression    • Diabetes ([facility])    • Edema    • Essential hypertension 2021   • Forgetfulness    • Head injury    • Hernia cerebri (HCC)    • Hyperlipidemia    • Hypertension    • IFG (impaired fasting glucose)    • Low back pain    • Lumbago     `   • Pain of left hip    • Right shoulder pain    • Sleep apnea    • SOB (shortness of breath)        No Known Allergies    Past Surgical History:   Procedure Laterality Date   • CYST REMOVAL Right     leg       Family History   Problem Relation Age of Onset   • Cancer Mother    • Diabetes Mother    • Diabetes Sister    • Stroke Sister    • No Known Problems Father        Social History     Socioeconomic History   • Marital status: Single   Tobacco Use   • Smoking status: Former Smoker     Packs/day: 0.25     Types: Cigarettes     Start date:      Quit date: 2012     Years since quittin.3   • Smokeless tobacco: Never Used   Vaping Use   • Vaping Use: Never used   Substance and Sexual Activity   • Alcohol use: Yes     Comment: rare   • Drug use: Never   • Sexual activity: Defer           Objective   Physical Exam  Vitals and nursing note reviewed.   Constitutional:       General: He is not in acute distress.     Appearance: Normal appearance. He is not toxic-appearing.   HENT:      Head: Normocephalic and atraumatic.      Mouth/Throat:      Mouth: Mucous membranes are moist.   Eyes:      General: No scleral icterus.  Cardiovascular:      Rate and Rhythm: Normal rate and regular rhythm.      Pulses: Normal pulses.      Heart sounds: Normal heart sounds.   Pulmonary:      Effort: Pulmonary effort is normal. No respiratory distress.      Breath sounds: Normal breath sounds.   Abdominal:      General: Abdomen is flat.      Palpations: Abdomen is soft.      Tenderness: There is no abdominal tenderness.   Musculoskeletal:         General: Normal range of motion.        Arms:       " Cervical back: Normal range of motion and neck supple.   Skin:     General: Skin is warm and dry.   Neurological:      General: No focal deficit present.      Mental Status: He is alert and oriented to person, place, and time. Mental status is at baseline.   Psychiatric:         Mood and Affect: Mood normal.         Behavior: Behavior normal.         Thought Content: Thought content normal.         Judgment: Judgment normal.         Procedures           ED Course                                             Discussed with patient x-ray findings to include comminuted fracture of the distal aspect of the second phalanges in his right index finger and also the erosive osteoarthritis.  Patient states that he did injure that finger \"a long time ago\" [facility].  Patient denies any acute injury or pain and his right index finger.  Discussed treatment plan with patient and follow-up with hand specialist.  Patient is agreeable with treatment plan.  Patient states he is no longer on meloxicam pain medicine at home.  Will return the ER if conditions worsen.    MDM  Number of Diagnoses or Management Options  Closed displaced fracture of distal phalanx of right index finger, initial encounter  Primary osteoarthritis of right hand  Diagnosis management comments: I have spoken with patient. I have explained the patient´s condition, diagnoses and treatment plan based on the information available to me at this time. I have answered the patient's questions and addressed any concerns. The patient has a good  understanding of the patient´s diagnosis, condition, and treatment plan as can be expected at this point. The vital signs have been stable. The patient´s condition is stable and appropriate for discharge from the emergency department.      The patient will pursue further outpatient evaluation with the primary care physician or other designated or consulting physician as outlined in the discharge " instructions. They are agreeable to this plan of care and follow-up instructions have been explained in detail. The patient has received these instructions in written format and have expressed an understanding of the discharge instructions. The patient is aware that any significant change in condition or worsening of symptoms should prompt an immediate return to this or the closest emergency department or call to 911.       Amount and/or Complexity of Data Reviewed  Tests in the radiology section of CPT®: reviewed and ordered    Risk of Complications, Morbidity, and/or Mortality  Presenting problems: moderate  Diagnostic procedures: low  Management options: low    Patient Progress  Patient progress: stable      Final diagnoses:   Primary osteoarthritis of right hand   Closed displaced fracture of distal phalanx of right index finger, initial encounter       ED Disposition  ED Disposition     ED Disposition Condition Comment    Discharge Stable           Heidi Villa, APRN [address]  [address]  [facility] [address] [phone]    In 3 days      [facility]  [address]  [address]  [facility] [address] [phone]               Medication List      New Prescriptions    diclofenac 75 MG EC tablet  Commonly known as: VOLTAREN  Take 1 tablet by mouth 2 (Two) Times a Day As Needed (Pain).        Stop    meloxicam 15 MG tablet  Commonly known as: MOBIC           Where to Get Your Medications      These medications were sent to [id number] - [place], [place] - [address] AT [facility] OF [address] & [place] - [phone]  - [phone] FX  [address] [place] [address]    Phone: [phone]   · diclofenac 75 MG EC tablet          Mitchell Wisdom APRPAWAN  01/03/22 1536       Mitchell Wisdom, APRPAWAN  01/03/22 1542       Mitchell Wisdom APRPAWAN  01/03/22 1540

## 2022-01-11 ENCOUNTER — RESEARCH ENCOUNTER (OUTPATIENT)
Dept: CARDIOLOGY | Facility: CLINIC | Age: 67
End: 2022-01-11

## 2022-01-11 ENCOUNTER — OFFICE VISIT (OUTPATIENT)
Dept: CARDIOLOGY | Facility: CLINIC | Age: 67
End: 2022-01-11

## 2022-01-11 VITALS
DIASTOLIC BLOOD PRESSURE: 94 MMHG | WEIGHT: 207 LBS | BODY MASS INDEX: 32.49 KG/M2 | HEIGHT: 67 IN | HEART RATE: 87 BPM | SYSTOLIC BLOOD PRESSURE: 144 MMHG

## 2022-01-11 DIAGNOSIS — I25.10 CORONARY ARTERY DISEASE INVOLVING NATIVE CORONARY ARTERY OF NATIVE HEART WITHOUT ANGINA PECTORIS: ICD-10-CM

## 2022-01-11 DIAGNOSIS — I10 ESSENTIAL HYPERTENSION: Chronic | ICD-10-CM

## 2022-01-11 DIAGNOSIS — E78.5 DYSLIPIDEMIA: ICD-10-CM

## 2022-01-11 DIAGNOSIS — I42.8 NICM (NONISCHEMIC CARDIOMYOPATHY): Primary | ICD-10-CM

## 2022-01-11 PROCEDURE — 93000 ELECTROCARDIOGRAM COMPLETE: CPT | Performed by: INTERNAL MEDICINE

## 2022-01-11 PROCEDURE — 99214 OFFICE O/P EST MOD 30 MIN: CPT | Performed by: INTERNAL MEDICINE

## 2022-01-11 RX ORDER — SPIRONOLACTONE 25 MG/1
25 TABLET ORAL DAILY
Qty: 90 TABLET | Refills: 3 | Status: SHIPPED | OUTPATIENT
Start: 2022-01-11 | End: 2022-03-04 | Stop reason: SDUPTHER

## 2022-01-11 RX ORDER — CARVEDILOL 12.5 MG/1
12.5 TABLET ORAL 2 TIMES DAILY WITH MEALS
Qty: 180 TABLET | Refills: 3 | Status: SHIPPED | OUTPATIENT
Start: 2022-01-11

## 2022-01-11 RX ORDER — MELOXICAM 15 MG/1
15 TABLET ORAL DAILY
COMMUNITY
End: 2022-08-26 | Stop reason: SDUPTHER

## 2022-01-11 RX ORDER — SACUBITRIL AND VALSARTAN 97; 103 MG/1; MG/1
1 TABLET, FILM COATED ORAL 2 TIMES DAILY
Qty: 180 TABLET | Refills: 3 | Status: SHIPPED | OUTPATIENT
Start: 2022-01-11

## 2022-01-11 RX ORDER — AMLODIPINE BESYLATE 5 MG/1
5 TABLET ORAL DAILY
Qty: 90 TABLET | Refills: 3 | Status: SHIPPED | OUTPATIENT
Start: 2022-01-11 | End: 2023-02-01 | Stop reason: HOSPADM

## 2022-01-11 RX ORDER — FUROSEMIDE 40 MG/1
40 TABLET ORAL DAILY
Qty: 90 TABLET | Refills: 0 | Status: SHIPPED | OUTPATIENT
Start: 2022-01-11 | End: 2022-03-07 | Stop reason: SDUPTHER

## 2022-01-11 NOTE — ASSESSMENT & PLAN NOTE
Mild blood pressure elevation today has been out of Norvasc will refill prescription of 5 mg once a day.  Continue also with his other intake consists of Entresto, amlodipine and Coreg  Counseled patient on  • low-sodium diet of less than 2 g  • Aerobic activity 30 minutes a day 5 times a week  • Weight loss

## 2022-01-11 NOTE — ASSESSMENT & PLAN NOTE
Change in symptoms recent echo with normalization of EF continue with Coreg, Entresto, antiviral at a dose of

## 2022-01-11 NOTE — PROGRESS NOTES
Chief Complaint  NICM, Hypertension, and Coronary Artery Disease    Subjective    Patient is doing well symptomatically no change in shortness of breath or exertional capacity    Past Medical History:   Diagnosis Date   • Abnormal kidney function    • Arthritis    • Bursitis    • CHF (congestive heart failure) (HCC)    • Depression    • Diabetes (HCC)    • Edema    • Essential hypertension 9/23/2021   • Forgetfulness    • Head injury    • Hernia cerebri (HCC)    • Hyperlipidemia    • Hypertension    • IFG (impaired fasting glucose)    • Low back pain    • Lumbago     `   • Pain of left hip    • Right shoulder pain    • Sleep apnea    • SOB (shortness of breath)          Current Outpatient Medications:   •  amLODIPine (NORVASC) 5 MG tablet, Take 1 tablet by mouth Daily., Disp: 90 tablet, Rfl: 3  •  aspirin (aspirin) 81 MG EC tablet, Take 1 tablet by mouth Daily., Disp: 90 tablet, Rfl: 1  •  atorvastatin (LIPITOR) 80 MG tablet, Take 80 mg by mouth Every Night., Disp: , Rfl:   •  carvedilol (COREG) 12.5 MG tablet, Take 1 tablet by mouth 2 (Two) Times a Day With Meals., Disp: 180 tablet, Rfl: 3  •  cholecalciferol (VITAMIN D3) 25 MCG (1000 UT) tablet, Daily., Disp: , Rfl:   •  diclofenac (VOLTAREN) 75 MG EC tablet, Take 1 tablet by mouth 2 (Two) Times a Day As Needed (Pain)., Disp: 20 tablet, Rfl: 0  •  Dulaglutide (Trulicity) 1.5 MG/0.5ML solution pen-injector, Inject 1.5 mg under the skin into the appropriate area as directed 1 (One) Time Per Week., Disp: 13 pen, Rfl: 1  •  empagliflozin (Jardiance) 25 MG tablet tablet, Take 1 tablet by mouth Daily., Disp: 90 tablet, Rfl: 1  •  furosemide (LASIX) 40 MG tablet, Take 1 tablet by mouth Daily., Disp: 90 tablet, Rfl: 0  •  meloxicam (MOBIC) 15 MG tablet, Take 15 mg by mouth Daily., Disp: , Rfl:   •  methylPREDNISolone (MEDROL) 4 MG dose pack, Take as directed on package instructions., Disp: 21 tablet, Rfl: 0  •  sacubitril-valsartan (Entresto)  MG tablet, Take 1  "tablet by mouth 2 (Two) Times a Day., Disp: 180 tablet, Rfl: 3  •  sitaGLIPtin-metFORMIN (Janumet)  MG per tablet, Take 1 tablet by mouth 2 (Two) Times a Day With Meals., Disp: , Rfl:   •  spironolactone (ALDACTONE) 25 MG tablet, Take 1 tablet by mouth Daily., Disp: 90 tablet, Rfl: 3    Medications Discontinued During This Encounter   Medication Reason   • spironolactone (ALDACTONE) 25 MG tablet Reorder   • carvedilol (COREG) 12.5 MG tablet Reorder   • sacubitril-valsartan (Entresto)  MG tablet Reorder   • amLODIPine (NORVASC) 5 MG tablet Reorder   • furosemide (LASIX) 40 MG tablet Reorder   • methylPREDNISolone (MEDROL) 4 MG dose pack *Therapy completed   • carBAMazepine XR (TEGretol  XR) 200 MG 12 hr tablet *Therapy completed     No Known Allergies     Social History     Tobacco Use   • Smoking status: Former Smoker     Packs/day: 0.25     Types: Cigarettes     Start date:      Quit date: 2012     Years since quittin.4   • Smokeless tobacco: Never Used   Vaping Use   • Vaping Use: Never used   Substance Use Topics   • Alcohol use: Yes     Comment: rare   • Drug use: Never       Family History   Problem Relation Age of Onset   • Cancer Mother    • Diabetes Mother    • Diabetes Sister    • Stroke Sister    • No Known Problems Father         Objective     /94   Pulse 87   Ht 170.2 cm (67\")   Wt 93.9 kg (207 lb)   BMI 32.42 kg/m²       Physical Exam    General Appearance:   · no acute distress  · Alert and oriented x3  HENT:   · lips not cyanotic  · Atraumatic  Neck:  · No jvd   · supple  Respiratory:  · no respiratory distress  · normal breath sounds  · no rales  Cardiovascular:  · Rate and rhythm  · no S3, no S4   · no murmur  · no rub  Extremities  · No cyanosis  · lower extremity edema: none    Skin:   · warm, dry  · No rashes      Result Review :     No results found for: PROBNP  CMP    CMP 4/2/21 10/11/21   Glucose 182 (A) 109 (A)   BUN 25 29 (A)   Creatinine 1.67 (A) 1.57 (A) "   eGFR African Am  54 (A)   Sodium 140 136   Potassium 3.8 4.5   Chloride 104 104   Calcium 8.9 8.7   Albumin 3.1 (A) 3.50   Total Bilirubin 0.27 0.3   Alkaline Phosphatase 96 90   AST (SGOT) 22 14   ALT (SGPT) 16 7   (A) Abnormal value       Comments are available for some flowsheets but are not being displayed.           CBC w/diff    CBC w/Diff 4/2/21 10/11/21   WBC 5.98 7.53   RBC 3.90 (A) 4.25   Hemoglobin 11.0 (A) 12.9 (A)   Hematocrit 35.3 (A) 39.6   MCV 90.5 93.2   MCH 28.2 30.4   MCHC 31.2 (A) 32.6   RDW 15.0 (A) 16.3 (A)   Platelets 256 276   Neutrophil Rel % 64.4 61.0   Immature Granulocyte Rel %  0.1   Lymphocyte Rel % 22.6 26.6   Monocyte Rel % 8.2 7.4   Eosinophil Rel % 4.0 4.2   Basophil Rel % 0.5 0.7   (A) Abnormal value             Lab Results   Component Value Date    TSH 2.470 10/11/2021      Lab Results   Component Value Date    FREET4 1.2 04/02/2021      No results found for: DDIMERQUANT  No results found for: MG   No results found for: DIGOXIN   Lab Results   Component Value Date    TROPONINT 0.04 01/28/2019           Lipid Panel    Lipid Panel 4/2/21 10/11/21   Total Cholesterol  131   Total Cholesterol 118    Triglycerides 141 124   HDL Cholesterol 45 48   VLDL Cholesterol 28 22   LDL Cholesterol  45 (A) 61   LDL/HDL Ratio  1.21   (A) Abnormal value       Comments are available for some flowsheets but are not being displayed.           No results found for: POCTROP    Results for orders placed in visit on 03/25/19    SCANNED - ECHOCARDIOGRAM       ECG 12 Lead    Date/Time: 1/11/2022 4:40 PM  Performed by: Brigido Mcdonnell MD  Authorized by: Brigido Mcdonnell MD   Comparison: compared with previous ECG   Similar to previous ECG  Rhythm: sinus rhythm  Comments: Abnormal R wave progression  T wave inversions in anterior leads concerning for ischemia no change from prior                   Diagnoses and all orders for this visit:    1. NICM (nonischemic cardiomyopathy) (HCC) (Primary)  Assessment &  Plan:  Change in symptoms recent echo with normalization of EF continue with Coreg, Entresto, antiviral at a dose of    Orders:  -     Basic Metabolic Panel; Future  -     Lipid Panel; Future  -     Hepatic Function Panel; Future    2. Essential hypertension  Assessment & Plan:  Mild blood pressure elevation today has been out of Norvasc will refill prescription of 5 mg once a day.  Continue also with his other intake consists of Entresto, amlodipine and Coreg  Counseled patient on  • low-sodium diet of less than 2 g  • Aerobic activity 30 minutes a day 5 times a week  • Weight loss          3. Coronary artery disease involving native coronary artery of native heart without angina pectoris  Assessment & Plan:  Patient is doing well no ongoing angina continue with chronic aspirin 81 mg daily        4. Dyslipidemia    Other orders  -     amLODIPine (NORVASC) 5 MG tablet; Take 1 tablet by mouth Daily.  Dispense: 90 tablet; Refill: 3  -     carvedilol (COREG) 12.5 MG tablet; Take 1 tablet by mouth 2 (Two) Times a Day With Meals.  Dispense: 180 tablet; Refill: 3  -     furosemide (LASIX) 40 MG tablet; Take 1 tablet by mouth Daily.  Dispense: 90 tablet; Refill: 0  -     sacubitril-valsartan (Entresto)  MG tablet; Take 1 tablet by mouth 2 (Two) Times a Day.  Dispense: 180 tablet; Refill: 3  -     spironolactone (ALDACTONE) 25 MG tablet; Take 1 tablet by mouth Daily.  Dispense: 90 tablet; Refill: 3          Follow Up     Return in about 6 months (around 7/11/2022).          Patient was given instructions and counseling regarding his condition or for health maintenance advice. Please see specific information pulled into the AVS if appropriate.

## 2022-01-11 NOTE — RESEARCH
Informed consent worksheet for the protocol: [facility]    Consent version 1 with the approval dated 12/04/2020    Subject       The patient was seen in the office today by Dr Brigido Mcdonnell and identified as a candidate for the [facility] Study.     The following people were present at the consent conference:   Patient: Lyle Lozano   : Mariel Rubio   Other: na      The following was discussed with the patient prior to signing the informed consent.    • The study purposes   • Procedures   • Duration of study participation  • New findings   • Risks   • Discomforts  • Any known side effects when applicable    • The alternative treatments   • Benefits   • Patient and insurance costs   • Payments if applicable     • Patient's accountability and responsibilities    • The voluntary nature of research participants     • Right to withdraw consent    • The withdrawal process    • Confidentiality   • Authorization to use and disclose information for research purposes - Including who can view information and the types of information shared.    The patient was informed of what to do in case of an illness or injury resulting from the study and who to contact for more trial information, or information on rights and welfare as a research volunteer. The patient was given the  contact Information and clinical trial contact information.     The patient was given opportunity for questions and opportunity to discuss this with family and friends. The  and or sub investigators were also available for any questions. The patient verbalizes understanding and is willing to sign the informed consent.     After all questions were satisfied the patient signed the informed consent and a copy of the signed main informed consent form & any sub-study informed consent forms were given to the patient.    No study-specific procedures were completed prior to patient signing study  informed consent form(s)    The  and or sub investigator is aware of the subject's participation status.

## 2022-01-12 RX ORDER — DULAGLUTIDE 1.5 MG/.5ML
1.5 INJECTION, SOLUTION SUBCUTANEOUS WEEKLY
Qty: 13 PEN | Refills: 1 | Status: SHIPPED | OUTPATIENT
Start: 2022-01-12 | End: 2022-05-23 | Stop reason: SDUPTHER

## 2022-02-03 ENCOUNTER — OFFICE VISIT (OUTPATIENT)
Dept: INTERNAL MEDICINE | Facility: CLINIC | Age: 67
End: 2022-02-03

## 2022-02-03 VITALS
HEART RATE: 89 BPM | TEMPERATURE: 96.7 F | OXYGEN SATURATION: 95 % | WEIGHT: 198.8 LBS | SYSTOLIC BLOOD PRESSURE: 124 MMHG | BODY MASS INDEX: 31.2 KG/M2 | RESPIRATION RATE: 18 BRPM | DIASTOLIC BLOOD PRESSURE: 78 MMHG | HEIGHT: 67 IN

## 2022-02-03 DIAGNOSIS — R60.0 PEDAL EDEMA: Primary | ICD-10-CM

## 2022-02-03 DIAGNOSIS — E78.2 MIXED HYPERLIPIDEMIA: ICD-10-CM

## 2022-02-03 DIAGNOSIS — E11.65 UNCONTROLLED TYPE 2 DIABETES MELLITUS WITH HYPERGLYCEMIA: Chronic | ICD-10-CM

## 2022-02-03 DIAGNOSIS — I25.10 CORONARY ARTERY DISEASE, UNSPECIFIED VESSEL OR LESION TYPE, UNSPECIFIED WHETHER ANGINA PRESENT, UNSPECIFIED WHETHER NATIVE OR TRANSPLANTED HEART: ICD-10-CM

## 2022-02-03 DIAGNOSIS — R06.02 SHORTNESS OF BREATH: ICD-10-CM

## 2022-02-03 LAB
ALBUMIN SERPL-MCNC: 3.5 G/DL (ref 3.5–5.2)
ALBUMIN/GLOB SERPL: 1.1 G/DL
ALP SERPL-CCNC: 75 U/L (ref 39–117)
ALT SERPL W P-5'-P-CCNC: 10 U/L (ref 1–41)
ANION GAP SERPL CALCULATED.3IONS-SCNC: 9.5 MMOL/L (ref 5–15)
AST SERPL-CCNC: 17 U/L (ref 1–40)
BASOPHILS # BLD AUTO: 0.05 10*3/MM3 (ref 0–0.2)
BASOPHILS NFR BLD AUTO: 0.8 % (ref 0–1.5)
BILIRUB SERPL-MCNC: 0.4 MG/DL (ref 0–1.2)
BUN SERPL-MCNC: 21 MG/DL (ref 8–23)
BUN/CREAT SERPL: 13 (ref 7–25)
CALCIUM SPEC-SCNC: 8.7 MG/DL (ref 8.6–10.5)
CHLORIDE SERPL-SCNC: 104 MMOL/L (ref 98–107)
CHOLEST SERPL-MCNC: 99 MG/DL (ref 0–200)
CO2 SERPL-SCNC: 24.5 MMOL/L (ref 22–29)
CREAT SERPL-MCNC: 1.61 MG/DL (ref 0.76–1.27)
DEPRECATED RDW RBC AUTO: 46.3 FL (ref 37–54)
EOSINOPHIL # BLD AUTO: 0.38 10*3/MM3 (ref 0–0.4)
EOSINOPHIL NFR BLD AUTO: 5.7 % (ref 0.3–6.2)
ERYTHROCYTE [DISTWIDTH] IN BLOOD BY AUTOMATED COUNT: 14 % (ref 12.3–15.4)
GFR SERPL CREATININE-BSD FRML MDRD: 53 ML/MIN/1.73
GLOBULIN UR ELPH-MCNC: 3.1 GM/DL
GLUCOSE SERPL-MCNC: 115 MG/DL (ref 65–99)
HCT VFR BLD AUTO: 34.3 % (ref 37.5–51)
HDLC SERPL-MCNC: 31 MG/DL (ref 40–60)
HGB BLD-MCNC: 11.5 G/DL (ref 13–17.7)
IMM GRANULOCYTES # BLD AUTO: 0.01 10*3/MM3 (ref 0–0.05)
IMM GRANULOCYTES NFR BLD AUTO: 0.2 % (ref 0–0.5)
LDLC SERPL CALC-MCNC: 46 MG/DL (ref 0–100)
LDLC/HDLC SERPL: 1.39 {RATIO}
LYMPHOCYTES # BLD AUTO: 1.45 10*3/MM3 (ref 0.7–3.1)
LYMPHOCYTES NFR BLD AUTO: 21.9 % (ref 19.6–45.3)
MCH RBC QN AUTO: 30.3 PG (ref 26.6–33)
MCHC RBC AUTO-ENTMCNC: 33.5 G/DL (ref 31.5–35.7)
MCV RBC AUTO: 90.5 FL (ref 79–97)
MONOCYTES # BLD AUTO: 0.5 10*3/MM3 (ref 0.1–0.9)
MONOCYTES NFR BLD AUTO: 7.6 % (ref 5–12)
NEUTROPHILS NFR BLD AUTO: 4.22 10*3/MM3 (ref 1.7–7)
NEUTROPHILS NFR BLD AUTO: 63.8 % (ref 42.7–76)
NRBC BLD AUTO-RTO: 0 /100 WBC (ref 0–0.2)
NT-PROBNP SERPL-MCNC: 4617 PG/ML (ref 0–900)
PLATELET # BLD AUTO: 282 10*3/MM3 (ref 140–450)
PMV BLD AUTO: 9.7 FL (ref 6–12)
POTASSIUM SERPL-SCNC: 3.7 MMOL/L (ref 3.5–5.2)
PROT SERPL-MCNC: 6.6 G/DL (ref 6–8.5)
RBC # BLD AUTO: 3.79 10*6/MM3 (ref 4.14–5.8)
SODIUM SERPL-SCNC: 138 MMOL/L (ref 136–145)
TRIGL SERPL-MCNC: 124 MG/DL (ref 0–150)
TSH SERPL DL<=0.05 MIU/L-ACNC: 2.85 UIU/ML (ref 0.27–4.2)
VLDLC SERPL-MCNC: 22 MG/DL (ref 5–40)
WBC NRBC COR # BLD: 6.61 10*3/MM3 (ref 3.4–10.8)

## 2022-02-03 PROCEDURE — 83880 ASSAY OF NATRIURETIC PEPTIDE: CPT | Performed by: NURSE PRACTITIONER

## 2022-02-03 PROCEDURE — 85025 COMPLETE CBC W/AUTO DIFF WBC: CPT | Performed by: NURSE PRACTITIONER

## 2022-02-03 PROCEDURE — 84443 ASSAY THYROID STIM HORMONE: CPT | Performed by: NURSE PRACTITIONER

## 2022-02-03 PROCEDURE — 80061 LIPID PANEL: CPT | Performed by: NURSE PRACTITIONER

## 2022-02-03 PROCEDURE — 83036 HEMOGLOBIN GLYCOSYLATED A1C: CPT | Performed by: NURSE PRACTITIONER

## 2022-02-03 PROCEDURE — 80053 COMPREHEN METABOLIC PANEL: CPT | Performed by: NURSE PRACTITIONER

## 2022-02-03 PROCEDURE — 99214 OFFICE O/P EST MOD 30 MIN: CPT | Performed by: NURSE PRACTITIONER

## 2022-02-03 RX ORDER — ASPIRIN 81 MG/1
81 TABLET ORAL DAILY
Qty: 90 TABLET | Refills: 1 | Status: SHIPPED | OUTPATIENT
Start: 2022-02-03 | End: 2022-06-02 | Stop reason: SDUPTHER

## 2022-02-03 RX ORDER — MELATONIN
1000 DAILY
Qty: 90 TABLET | Refills: 1 | Status: SHIPPED | OUTPATIENT
Start: 2022-02-03 | End: 2022-08-26 | Stop reason: SDUPTHER

## 2022-02-03 NOTE — PROGRESS NOTES
"Chief Complaint  Foot Swelling (Ankle- 1-2 weeks), Foot Pain (Bilateral - 1-2 weeks), and Shortness of Breath (COPD - 1 Week)    Subjective          Lyle Lozano presents to [facility]  History of Present Illness     Mr. Lozano presents to the office for evaluation of feet and ankle swelling and pain for 1 to 2 weeks. He has also been experiencing some shortness of breath.    Bilateral ankle swelling and pain  Mr. Lozano reports that he has been experiencing bilateral ankle swelling and pain for 1 to 2 weeks. Left ankle worse than the right ankle. He states that his feet normally swell up every night.     Shortness of breath  The patient presented that when he is working on anything, it feels like he is out of breath. That this has been going on for a couple of weeks. He states that he feels like he is having a lot of congestion, whether it is sinus drainage or upper respiratory symptoms. The patient denies any fever or chills. No known exposure to COVID-19 or other viral illnesses. He states that his shortness of breath started with the swelling of his ankles, but it has worsened.  He affirms to some difficulty breathing when lying down and has to sit up to catch his breath.     Denies weight gain   Objective   Vital Signs:   /78 (BP Location: Left arm, Patient Position: Sitting, Cuff Size: Adult)   Pulse 89   Temp 96.7 °F (35.9 °C)   Resp 18   Ht 170.2 cm (67\")   Wt 90.2 kg (198 lb 12.8 oz)   SpO2 95%   BMI 31.14 kg/m²     Physical Exam  Vitals and nursing note reviewed.   Constitutional:       General: He is not in acute distress.     Appearance: Normal appearance. He is well-developed and well-groomed.   HENT:      Head: Normocephalic and atraumatic.      Right Ear: External ear normal.      Left Ear: External ear normal.      Nose: Nose normal.      Mouth/Throat:      Mouth: Mucous membranes are moist.   Eyes:      Conjunctiva/sclera: " Conjunctivae normal.      Pupils: Pupils are equal, round, and reactive to light.      Visual Fields: Right eye visual fields normal and left eye visual fields normal.   Cardiovascular:      Rate and Rhythm: Normal rate and regular rhythm.      Pulses: Normal pulses.      Heart sounds: Normal heart sounds. No murmur heard.  No friction rub. No gallop.    Pulmonary:      Effort: Pulmonary effort is normal. No respiratory distress.      Breath sounds: No wheezing, rhonchi or rales.   Abdominal:      General: Bowel sounds are normal.      Palpations: Abdomen is soft.   Genitourinary:     Comments: Normal genitalia, anus patent.   Musculoskeletal:         General: Normal range of motion.      Cervical back: Neck supple.      Right lower leg: 3+ Pitting Edema present.      Left lower leg: 3+ Pitting Edema present.      Comments: Normal leg alignment.    Skin:     General: Skin is warm and dry.   Neurological:      General: No focal deficit present.      Mental Status: He is alert and oriented to person, place, and time.   Psychiatric:         Mood and Affect: Mood normal.         Behavior: Behavior normal.          Result Review :            Procedures      Assessment and Plan    Diagnoses and all orders for this visit:    1. Pedal edema (Primary)  -     XR Chest PA & Lateral  -     Comprehensive Metabolic Panel  -     CBC Auto Differential  -     TSH  -     proBNP    2. Shortness of breath  Assessment & Plan:  And concerned that this may be secondary to CHF.  Chest x-ray in the office was normal although physical exam findings consistent with some presence of fluid in the lungs.  Instructed the patient to take Lasix twice daily for the next 3 days.  Patient will follow up in the office Monday for reevaluation    Orders:  -     XR Chest PA & Lateral  -     Comprehensive Metabolic Panel  -     CBC Auto Differential  -     TSH  -     proBNP    3. Coronary artery disease, unspecified vessel or lesion type, unspecified  whether angina present, unspecified whether native or transplanted heart    4. Mixed hyperlipidemia  -     Cancel: Lipid panel; Future  -     Lipid panel    5. Uncontrolled type 2 diabetes mellitus with hyperglycemia (HCC)  -     Hemoglobin A1c    Other orders  -     aspirin (aspirin) 81 MG EC tablet; Take 1 tablet by mouth Daily.  Dispense: 90 tablet; Refill: 1  -     cholecalciferol (VITAMIN D3) 25 MCG (1000 UT) tablet; Take 1 tablet by mouth Daily.  Dispense: 90 tablet; Refill: 1            Follow Up   No follow-ups on file.  Patient was given instructions and counseling regarding his condition or for health maintenance advice. Please see specific information pulled into the AVS if appropriate.       Transcribed from ambient dictation for HUMA Dixon by Emanuel De Luna.   02/03/22   16:38 EST    Patient verbalized consent to the visit recording.'

## 2022-02-04 LAB — HBA1C MFR BLD: 5.8 % (ref 4.8–5.6)

## 2022-02-07 PROBLEM — R06.02 SHORTNESS OF BREATH: Chronic | Status: ACTIVE | Noted: 2022-02-07

## 2022-02-07 PROBLEM — R06.02 SHORTNESS OF BREATH: Status: ACTIVE | Noted: 2022-02-07

## 2022-02-07 PROBLEM — R60.0 PEDAL EDEMA: Status: ACTIVE | Noted: 2022-02-07

## 2022-02-07 NOTE — ASSESSMENT & PLAN NOTE
And concerned that this may be secondary to CHF.  Chest x-ray in the office was normal although physical exam findings consistent with some presence of fluid in the lungs.  Instructed the patient to take Lasix twice daily for the next 3 days.  Patient will follow up in the office Monday for reevaluation

## 2022-02-08 ENCOUNTER — OFFICE VISIT (OUTPATIENT)
Dept: INTERNAL MEDICINE | Facility: CLINIC | Age: 67
End: 2022-02-08

## 2022-02-08 VITALS
RESPIRATION RATE: 18 BRPM | SYSTOLIC BLOOD PRESSURE: 132 MMHG | TEMPERATURE: 96.3 F | OXYGEN SATURATION: 96 % | WEIGHT: 204 LBS | DIASTOLIC BLOOD PRESSURE: 72 MMHG | HEART RATE: 90 BPM | BODY MASS INDEX: 32.02 KG/M2 | HEIGHT: 67 IN

## 2022-02-08 DIAGNOSIS — E78.2 MIXED HYPERLIPIDEMIA: Primary | Chronic | ICD-10-CM

## 2022-02-08 DIAGNOSIS — I10 ESSENTIAL HYPERTENSION: Chronic | ICD-10-CM

## 2022-02-08 DIAGNOSIS — E11.65 UNCONTROLLED TYPE 2 DIABETES MELLITUS WITH HYPERGLYCEMIA: Chronic | ICD-10-CM

## 2022-02-08 DIAGNOSIS — M10.9 ACUTE GOUT OF LEFT FOOT, UNSPECIFIED CAUSE: ICD-10-CM

## 2022-02-08 DIAGNOSIS — R60.0 PEDAL EDEMA: ICD-10-CM

## 2022-02-08 PROCEDURE — 99214 OFFICE O/P EST MOD 30 MIN: CPT | Performed by: NURSE PRACTITIONER

## 2022-02-08 RX ORDER — PREDNISONE 20 MG/1
40 TABLET ORAL DAILY
Qty: 10 TABLET | Refills: 0 | Status: SHIPPED | OUTPATIENT
Start: 2022-02-08 | End: 2022-02-13

## 2022-02-08 NOTE — PROGRESS NOTES
"Chief Complaint  Follow-up (3 Month), Diabetes, Hypertension, and Hyperlipidemia    Subjective          Lyle Lozano presents to [facility]  History of Present Illness    Mr. Lozano presents to the office for a follow-up on pedal edema and shortness of breath. He also had recent labs for diabetes and hyperlipidemia.    Mr. Lozano reports that his breathing has remained the same. He states he has been using Mucinex at night due to chest congestion, which has been the same since last week. He continues to take Lasix 40 mg once daily for ankle swelling, Entresto 2 times daily, spironolactone daily, and carvedilol 2 times daily. He denies any fever, chills, but reports experiencing body aches all the time. The patient continues to experience shortness of breath, which has remained unchanged since his last visit.  He denies any worsening of his symptoms since his last visit. The patient also complains of foot pain flare-up gout/arthritis. He did not take the lasix bid x3 days after the last visit as instructed. He was somewhat confused about which medications he takes when.    Objective   Vital Signs:   /72 (BP Location: Right arm, Patient Position: Sitting, Cuff Size: Adult)   Pulse 90   Temp 96.3 °F (35.7 °C)   Resp 18   Ht 170.2 cm (67\")   Wt 92.5 kg (204 lb)   SpO2 96%   BMI 31.95 kg/m²     Physical Exam  Vitals and nursing note reviewed.   Constitutional:       General: He is not in acute distress.     Appearance: Normal appearance. He is well-developed. He is not ill-appearing, toxic-appearing or diaphoretic.   HENT:      Head: Normocephalic and atraumatic.      Right Ear: External ear normal.      Left Ear: External ear normal.   Eyes:      Conjunctiva/sclera: Conjunctivae normal.      Pupils: Pupils are equal, round, and reactive to light.   Neck:      Thyroid: No thyromegaly.      Vascular: No carotid bruit.   Cardiovascular:      Rate and " Rhythm: Normal rate and regular rhythm.      Pulses: Normal pulses.      Heart sounds: Normal heart sounds. No murmur heard.      Pulmonary:      Effort: Pulmonary effort is normal. No respiratory distress.      Breath sounds: Normal breath sounds.   Abdominal:      General: Bowel sounds are normal.      Palpations: Abdomen is soft. There is no mass.      Tenderness: There is no abdominal tenderness.   Musculoskeletal:         General: No swelling. Normal range of motion.      Cervical back: Normal range of motion and neck supple.   Lymphadenopathy:      Cervical: No cervical adenopathy.   Skin:     General: Skin is warm and dry.      Findings: No lesion or rash.   Neurological:      Mental Status: He is alert and oriented to person, place, and time.      Cranial Nerves: No cranial nerve deficit.      Sensory: No sensory deficit.      Motor: No weakness.      Coordination: Coordination normal.      Gait: Gait normal.      Deep Tendon Reflexes: Reflexes are normal and symmetric.   Psychiatric:         Mood and Affect: Mood normal.         Behavior: Behavior normal.         Thought Content: Thought content normal.         Judgment: Judgment normal.          Result Review :          Labs and cxr reviewed from last visit  Procedures      Assessment and Plan    Diagnoses and all orders for this visit:    1. Mixed hyperlipidemia (Primary)    2. Essential hypertension    3. Uncontrolled type 2 diabetes mellitus with hyperglycemia (HCC)    4. Pedal edema  The patient's prescribed Lasix dosage will be increased from once a day to 2 times daily for the next 3 days. We will follow-up in 3 days for further assessment and to recheck lab work.     5. Acute gout of left foot, unspecified cause  The patient will be prescribed prednisone, to be taken for 5 days.    Other orders  -     predniSONE (DELTASONE) 20 MG tablet; Take 2 tablets by mouth Daily for 5 days.  Dispense: 10 tablet; Refill: 0              Follow Up   Return if  symptoms worsen or fail to improve, for follow up Friday.  Patient was given instructions and counseling regarding his condition or for health maintenance advice. Please see specific information pulled into the AVS if appropriate.       Patient verbalized consent to the visit recording. I have personally performed the services described in this document as transcribed by the above individual, and it is both accurate and complete.  Transcribed from ambient dictation for HUMA Dixon by Suni Kennedy, [employer] Rep.  02/08/22   17:14 EST

## 2022-02-11 ENCOUNTER — OFFICE VISIT (OUTPATIENT)
Dept: INTERNAL MEDICINE | Facility: CLINIC | Age: 67
End: 2022-02-11

## 2022-02-11 VITALS
DIASTOLIC BLOOD PRESSURE: 72 MMHG | BODY MASS INDEX: 32.33 KG/M2 | TEMPERATURE: 96.5 F | WEIGHT: 206 LBS | HEART RATE: 90 BPM | RESPIRATION RATE: 18 BRPM | SYSTOLIC BLOOD PRESSURE: 124 MMHG | OXYGEN SATURATION: 96 % | HEIGHT: 67 IN

## 2022-02-11 DIAGNOSIS — R60.0 PEDAL EDEMA: ICD-10-CM

## 2022-02-11 DIAGNOSIS — R06.02 SHORTNESS OF BREATH: Primary | Chronic | ICD-10-CM

## 2022-02-11 LAB
ANION GAP SERPL CALCULATED.3IONS-SCNC: 10 MMOL/L (ref 5–15)
BUN SERPL-MCNC: 31 MG/DL (ref 8–23)
BUN/CREAT SERPL: 17.3 (ref 7–25)
CALCIUM SPEC-SCNC: 8.3 MG/DL (ref 8.6–10.5)
CHLORIDE SERPL-SCNC: 104 MMOL/L (ref 98–107)
CO2 SERPL-SCNC: 25 MMOL/L (ref 22–29)
CREAT SERPL-MCNC: 1.79 MG/DL (ref 0.76–1.27)
GFR SERPL CREATININE-BSD FRML MDRD: 47 ML/MIN/1.73
GLUCOSE SERPL-MCNC: 151 MG/DL (ref 65–99)
NT-PROBNP SERPL-MCNC: 8065 PG/ML (ref 0–900)
POTASSIUM SERPL-SCNC: 3.7 MMOL/L (ref 3.5–5.2)
SODIUM SERPL-SCNC: 139 MMOL/L (ref 136–145)

## 2022-02-11 PROCEDURE — 80048 BASIC METABOLIC PNL TOTAL CA: CPT | Performed by: NURSE PRACTITIONER

## 2022-02-11 PROCEDURE — 83880 ASSAY OF NATRIURETIC PEPTIDE: CPT | Performed by: NURSE PRACTITIONER

## 2022-02-11 PROCEDURE — 99213 OFFICE O/P EST LOW 20 MIN: CPT | Performed by: NURSE PRACTITIONER

## 2022-02-11 NOTE — PROGRESS NOTES
"Chief Complaint  Follow-up, Hypertension, and Edema    Subjective          Lyle oLzano presents to [facility]  History of Present Illness  Mr. Lozano presents to the office for a follow-up on swelling and shortness of breath.    Swelling and shortness of breath  Mr. Lozano reports that he has been taking Lasix twice daily for 3 days and he is feeling better. He mentions being out in the cold doing his work on the first day he took the medication and he feels like doing that \"kind of toughened him up\". He reports that he has been taking Tylenol Sinus and has since been feeling \"pretty good\". The patient states that he has been urinating more in the last few days with the second dose of Lasix. He states that he does not weigh himself daily. The patient states he is inconsistent with his meals.  He mentions that he is headed into work today.     Objective   Vital Signs:   /72 (BP Location: Left arm, Patient Position: Sitting, Cuff Size: Adult)   Pulse 90   Temp 96.5 °F (35.8 °C)   Resp 18   Ht 170.2 cm (67\")   Wt 93.4 kg (206 lb)   SpO2 96%   BMI 32.26 kg/m²     Physical Exam  Vitals and nursing note reviewed.   Constitutional:       General: He is not in acute distress.     Appearance: Normal appearance.   HENT:      Head: Normocephalic and atraumatic.      Right Ear: External ear normal.      Left Ear: External ear normal.      Nose: Nose normal.      Mouth/Throat:      Mouth: Mucous membranes are moist.   Eyes:      Conjunctiva/sclera: Conjunctivae normal.   Cardiovascular:      Rate and Rhythm: Normal rate and regular rhythm.      Pulses: Normal pulses.      Heart sounds: Normal heart sounds. No murmur heard.  No friction rub. No gallop.    Pulmonary:      Effort: Pulmonary effort is normal. No respiratory distress.      Breath sounds: No wheezing, rhonchi or rales.   Musculoskeletal:      Cervical back: Neck supple.      Left lower le+ " Edema present.   Skin:     General: Skin is warm and dry.   Neurological:      General: No focal deficit present.      Mental Status: He is alert and oriented to person, place, and time.   Psychiatric:         Mood and Affect: Mood normal.         Behavior: Behavior normal.          Result Review :            Procedures      Assessment and Plan    Diagnoses and all orders for this visit:    1. Shortness of breath (Primary)  -     Basic Metabolic Panel  -     proBNP    2. Pedal edema  -     Basic Metabolic Panel  -     proBNP    The patient is to continue Lasix once a day. Also advised him to keep track of his weight and if he is gaining more than a few pounds in a couple of days, he is to let us know.   He will get labs done today as well.           Follow Up   Return in about 3 months (around 5/11/2022).  Patient was given instructions and counseling regarding his condition or for health maintenance advice. Please see specific information pulled into the AVS if appropriate.       Transcribed from ambient dictation for HUMA Dixon by Emanuel De Luna..  02/11/22   16:53 EST    Patient verbalized consent to the visit recording.

## 2022-02-21 RX ORDER — ATORVASTATIN CALCIUM 80 MG/1
TABLET, FILM COATED ORAL
Qty: 90 TABLET | Refills: 3 | Status: SHIPPED | OUTPATIENT
Start: 2022-02-21 | End: 2023-01-06

## 2022-02-24 ENCOUNTER — HOSPITAL ENCOUNTER (OUTPATIENT)
Dept: GENERAL RADIOLOGY | Facility: HOSPITAL | Age: 67
Discharge: HOME | End: 2022-02-24
Admitting: NURSE PRACTITIONER

## 2022-02-24 ENCOUNTER — OFFICE VISIT (OUTPATIENT)
Dept: INTERNAL MEDICINE | Facility: CLINIC | Age: 67
End: 2022-02-24

## 2022-02-24 VITALS
BODY MASS INDEX: 32.26 KG/M2 | SYSTOLIC BLOOD PRESSURE: 138 MMHG | DIASTOLIC BLOOD PRESSURE: 83 MMHG | HEART RATE: 92 BPM | TEMPERATURE: 97.6 F | OXYGEN SATURATION: 95 % | HEIGHT: 67 IN | RESPIRATION RATE: 14 BRPM

## 2022-02-24 DIAGNOSIS — R06.02 SHORTNESS OF BREATH: ICD-10-CM

## 2022-02-24 DIAGNOSIS — J18.9 ATYPICAL PNEUMONIA: Primary | ICD-10-CM

## 2022-02-24 PROCEDURE — 99213 OFFICE O/P EST LOW 20 MIN: CPT | Performed by: NURSE PRACTITIONER

## 2022-02-24 PROCEDURE — 71046 X-RAY EXAM CHEST 2 VIEWS: CPT

## 2022-02-24 RX ORDER — AZITHROMYCIN 250 MG/1
TABLET, FILM COATED ORAL
Qty: 6 TABLET | Refills: 0 | Status: SHIPPED | OUTPATIENT
Start: 2022-02-24 | End: 2022-03-01

## 2022-02-24 NOTE — PROGRESS NOTES
"Chief Complaint  Shortness of Breath    Subjective         Lyle Lozano presents to [facility] for Follow-up for shortness of breath.  Patient was originally seen on 2/8/2022 by Heidi Villa form swelling and shortness of breath.  Lab work revealed a BNP of over 4000, chest x-ray was performed, it was negative for any acute findings.  Patient's Lasix dose was increased to twice a day for 3 days, patient returned on 2/8/2022 for follow-up, Lasix increased again for 3 days and follow back up on 2/11/2022 when he was doing somewhat better.  His lab work was rechecked, his BNP was over 8000 at that time.  He was advised patient return to his normal once daily dosing on his Lasix.  He was advised to monitor his weight, if he is gaining more than a few pounds he was advised to follow-up.  Patient reports today that his weight has slowly been declining, he admits he is continuing to take his Lasix twice daily.  He has had worsening shortness of breath, productive cough.  He states he gets quite fatigued very easily.  He denies any other significant symptoms at this time         Review of Systems   Constitutional: Positive for fatigue. Negative for chills and fever.   HENT: Positive for congestion. Negative for rhinorrhea, sinus pressure, sneezing and sore throat.    Respiratory: Positive for cough, shortness of breath and wheezing.    Cardiovascular: Positive for leg swelling. Negative for chest pain and palpitations.   Gastrointestinal: Negative for diarrhea, nausea and vomiting.   Musculoskeletal: Negative for myalgias.   Skin: Negative for rash.   Neurological: Negative for headaches.       Objective   Vital Signs:   /83   Pulse 92   Temp 97.6 °F (36.4 °C) (Temporal)   Resp 14   Ht 170.2 cm (67\")   SpO2 95%   BMI 32.26 kg/m²     Physical Exam  Vitals and nursing note reviewed.   Constitutional:       Appearance: Normal appearance. He is obese.   HENT:      Head: " Normocephalic and atraumatic.      Nose: Nose normal.      Mouth/Throat:      Mouth: Mucous membranes are moist.      Pharynx: Oropharynx is clear.   Eyes:      Conjunctiva/sclera: Conjunctivae normal.      Pupils: Pupils are equal, round, and reactive to light.   Cardiovascular:      Rate and Rhythm: Normal rate and regular rhythm.      Heart sounds: Normal heart sounds.   Pulmonary:      Effort: Pulmonary effort is normal.      Breath sounds: Wheezing and rales present.   Chest:      Chest wall: No tenderness.   Musculoskeletal:      Right lower le+ Edema present.      Left lower le+ Edema present.   Neurological:      Mental Status: He is alert.   Psychiatric:         Mood and Affect: Mood normal.         Thought Content: Thought content normal.        Result Review :                   Diagnoses and all orders for this visit:    1. Atypical pneumonia (Primary)  Assessment & Plan:  We will go ahead and start patient on azithromycin. Patient advised to follow-up if symptoms worsen. Did advise to go ahead and start taking his Lasix as normal. Keep regular follow-up appointment.      2. Shortness of breath  -     XR Chest PA & Lateral        Follow Up   Return if symptoms worsen or fail to improve.  Patient was given instructions and counseling regarding his condition or for health maintenance advice. Please see specific information pulled into the AVS if appropriate.     HUMA Reddy  2022  This note was electronically signed.

## 2022-02-24 NOTE — ASSESSMENT & PLAN NOTE
We will go ahead and start patient on azithromycin. Patient advised to follow-up if symptoms worsen. Did advise to go ahead and start taking his Lasix as normal. Keep regular follow-up appointment.

## 2022-03-02 ENCOUNTER — OFFICE VISIT (OUTPATIENT)
Dept: INTERNAL MEDICINE | Facility: CLINIC | Age: 67
End: 2022-03-02

## 2022-03-02 VITALS
HEIGHT: 67 IN | OXYGEN SATURATION: 94 % | HEART RATE: 81 BPM | SYSTOLIC BLOOD PRESSURE: 116 MMHG | DIASTOLIC BLOOD PRESSURE: 73 MMHG | BODY MASS INDEX: 31.48 KG/M2 | RESPIRATION RATE: 16 BRPM | TEMPERATURE: 97.1 F | WEIGHT: 200.6 LBS

## 2022-03-02 DIAGNOSIS — R06.02 SHORTNESS OF BREATH: Chronic | ICD-10-CM

## 2022-03-02 DIAGNOSIS — R05.9 COUGH: Primary | ICD-10-CM

## 2022-03-02 PROCEDURE — 99213 OFFICE O/P EST LOW 20 MIN: CPT | Performed by: NURSE PRACTITIONER

## 2022-03-02 NOTE — PROGRESS NOTES
"Chief Complaint  Shortness of Breath (Says there's fluid on lungs.)    Subjective         Lyle Lozano [facility] to [facility] for Shortness of breath.  Patient was seen by me 1 week ago, he was having same symptoms at that time.  He had been seen about 2 weeks prior to that for similar symptoms, his BNP was significantly elevated.  His Lasix was adjusted over a couple of weeks and then patient came here with shortness of breath and cough.  X-ray was performed, it did show atypical pneumonia, we treated with azithromycin at that time.  Patient has completed that, he states that he was feeling better the day he completed it but last night he was outside working and states he started having cough again and shortness of breath.  He states his swelling in his lower extremities is about the same if not a little bit better.  I think he is taking his Lasix more than once a day, but he is telling me at this time that he is taking it once daily.         Review of Systems   Constitutional: Negative for chills, fatigue and fever.   HENT: Negative for rhinorrhea, sinus pain, sneezing and sore throat.    Respiratory: Positive for cough and shortness of breath. Negative for wheezing.    Cardiovascular: Positive for leg swelling. Negative for chest pain and palpitations.   Gastrointestinal: Negative for diarrhea, nausea and vomiting.   Skin: Negative for rash.   Neurological: Negative for headaches.       Objective   Vital Signs:   /73   Pulse 81   Temp 97.1 °F (36.2 °C) (Temporal)   Resp 16   Ht 170.2 cm (67\")   Wt 91 kg (200 lb 9.6 oz)   SpO2 94%   BMI 31.42 kg/m²     Physical Exam  Vitals and nursing note reviewed.   Constitutional:       Appearance: Normal appearance. He is normal weight.   HENT:      Head: Normocephalic and atraumatic.      Nose: Nose normal.      Mouth/Throat:      Mouth: Mucous membranes are moist.      Pharynx: Oropharynx is clear.   Eyes:      " Conjunctiva/sclera: Conjunctivae normal.      Pupils: Pupils are equal, round, and reactive to light.   Cardiovascular:      Rate and Rhythm: Normal rate and regular rhythm.   Pulmonary:      Breath sounds: Examination of the left-lower field reveals rales. Rales present. No decreased breath sounds, wheezing or rhonchi.   Neurological:      Mental Status: He is alert.   Psychiatric:         Mood and Affect: Mood normal.        Result Review :                   Diagnoses and all orders for this visit:    1. Cough (Primary)  Assessment & Plan:  Patient with cough and shortness of breath, he does have some abnormal sounds in the left lower lobe otherwise lungs are clear.  I advised patient we needed to obtain chest x-ray, we were behind at the time and patient was not willing to wait 30 minutes to have this test performed here.  We offered for him to go to [facility] in [place], he declined to do that right now.  He states he will come back in the morning and have chest x-ray completed at that time.  I did not recommend he do this however patient states that he will be okay.  Did advise him to go to ER for worsening shortness of breath.    Orders:  -     Cancel: XR Chest PA & Lateral  -     XR Chest PA & Lateral    2. Shortness of breath        Follow Up   Return if symptoms worsen or fail to improve.  Patient was given instructions and counseling regarding his condition or for health maintenance advice. Please see specific information pulled into the AVS if appropriate.     HUMA Reddy  3/2/2022  This note was electronically signed.

## 2022-03-02 NOTE — ASSESSMENT & PLAN NOTE
Patient with cough and shortness of breath, he does have some abnormal sounds in the left lower lobe otherwise lungs are clear.  I advised patient we needed to obtain chest x-ray, we were behind at the time and patient was not willing to wait 30 minutes to have this test performed here.  We offered for him to go to [facility] in [place], he declined to do that right now.  He states he will come back in the morning and have chest x-ray completed at that time.  I did not recommend he do this however patient states that he will be okay.  Did advise him to go to ER for worsening shortness of breath.

## 2022-03-03 ENCOUNTER — HOSPITAL ENCOUNTER (OUTPATIENT)
Dept: GENERAL RADIOLOGY | Facility: HOSPITAL | Age: 67
Discharge: HOME | End: 2022-03-03
Admitting: NURSE PRACTITIONER

## 2022-03-03 PROCEDURE — 71046 X-RAY EXAM CHEST 2 VIEWS: CPT

## 2022-03-04 ENCOUNTER — OFFICE VISIT (OUTPATIENT)
Dept: CARDIOLOGY | Facility: CLINIC | Age: 67
End: 2022-03-04

## 2022-03-04 VITALS
SYSTOLIC BLOOD PRESSURE: 144 MMHG | DIASTOLIC BLOOD PRESSURE: 82 MMHG | WEIGHT: 204 LBS | HEIGHT: 67 IN | HEART RATE: 90 BPM | BODY MASS INDEX: 32.02 KG/M2

## 2022-03-04 DIAGNOSIS — R06.02 SHORTNESS OF BREATH: Primary | Chronic | ICD-10-CM

## 2022-03-04 DIAGNOSIS — I42.8 NICM (NONISCHEMIC CARDIOMYOPATHY): ICD-10-CM

## 2022-03-04 PROBLEM — R05.9 COUGH: Status: RESOLVED | Noted: 2022-03-02 | Resolved: 2022-03-04

## 2022-03-04 PROBLEM — R60.0 PEDAL EDEMA: Status: RESOLVED | Noted: 2022-02-07 | Resolved: 2022-03-04

## 2022-03-04 PROBLEM — F32.A DEPRESSION: Status: ACTIVE | Noted: 2022-03-04

## 2022-03-04 PROBLEM — G43.909 MIGRAINE: Status: ACTIVE | Noted: 2022-03-04

## 2022-03-04 PROCEDURE — 99213 OFFICE O/P EST LOW 20 MIN: CPT | Performed by: NURSE PRACTITIONER

## 2022-03-04 RX ORDER — SPIRONOLACTONE 50 MG/1
50 TABLET, FILM COATED ORAL DAILY
Qty: 90 TABLET | Refills: 1 | Status: SHIPPED | OUTPATIENT
Start: 2022-03-04 | End: 2022-10-26

## 2022-03-04 NOTE — PROGRESS NOTES
Chief Complaint  Shortness of Breath and Leg Swelling    Subjective            History of Present Illness  Lyle Lozano is a 66-year-old -American male patient who presents to the office today with complaints of worsening shortness of breath and bilateral lower extremity edema over the last couple of weeks.  He reports compliance with taking his Lasix 40 mg daily and spironolactone 25 mg daily.  His PCP ordered serial chest x-ray, BMP and proBNP over the last 2 weeks.  First chest x-ray on 2022 showed pneumonia, he was prescribed antibiotic for this.  Repeat chest x-ray on 3/2/2022 shows improving pneumonia.  On 2022 his proBNP was significantly elevated at 8065.  He was advised to follow-up with cardiology at that time.  He reports compliance with all of his medications.  He denies any chest pain, lightheadedness, dizziness, or palpitations.    PMH  Past Medical History:   Diagnosis Date   • Abnormal kidney function    • Arthritis    • Bursitis    • CHF (congestive heart failure) ([facility])    • Depression    • Diabetes (HCC)    • Edema    • Essential hypertension 2021   • Forgetfulness    • Head injury    • Hernia cerebri (HCC)    • Hyperlipidemia    • Hypertension    • IFG (impaired fasting glucose)    • Low back pain    • Lumbago     `   • Pain of left hip    • Right shoulder pain    • Sleep apnea    • SOB (shortness of breath)          ALLERGY  No Known Allergies       SURGICALHX  Past Surgical History:   Procedure Laterality Date   • CYST REMOVAL Right     leg          SOC  Social History     Socioeconomic History   • Marital status: Single   Tobacco Use   • Smoking status: Former Smoker     Packs/day: 0.25     Types: Cigarettes     Start date:      Quit date: 2012     Years since quittin.5   • Smokeless tobacco: Never Used   Vaping Use   • Vaping Use: Never used   Substance and Sexual Activity   • Alcohol use: Yes     Comment: rare   • Drug use: Never   • Sexual  "activity: Defer         FAMHX  Family History   Problem Relation Age of Onset   • Cancer Mother    • Diabetes Mother    • Diabetes Sister    • Stroke Sister    • No Known Problems Father           MEDSIGONLY  Current Outpatient Medications on File Prior to Visit   Medication Sig   • amLODIPine (NORVASC) 5 MG tablet Take 1 tablet by mouth Daily.   • aspirin (aspirin) 81 MG EC tablet Take 1 tablet by mouth Daily.   • atorvastatin (LIPITOR) 80 MG tablet TAKE 1 TABLET BY MOUTH EVERY DAY   • carvedilol (COREG) 12.5 MG tablet Take 1 tablet by mouth 2 (Two) Times a Day With Meals.   • cholecalciferol (VITAMIN D3) 25 MCG (1000 UT) tablet Take 1 tablet by mouth Daily.   • diclofenac (VOLTAREN) 75 MG EC tablet Take 1 tablet by mouth 2 (Two) Times a Day As Needed (Pain).   • Dulaglutide (Trulicity) 1.5 MG/0.5ML solution pen-injector Inject 1.5 mg under the skin into the appropriate area as directed 1 (One) Time Per Week.   • empagliflozin (Jardiance) 25 MG tablet tablet Take 1 tablet by mouth Daily.   • furosemide (LASIX) 40 MG tablet Take 1 tablet by mouth Daily.   • meloxicam (MOBIC) 15 MG tablet Take 15 mg by mouth Daily.   • sacubitril-valsartan (Entresto)  MG tablet Take 1 tablet by mouth 2 (Two) Times a Day.   • sitaGLIPtin-metFORMIN (Janumet)  MG per tablet Take 1 tablet by mouth 2 (Two) Times a Day With Meals.   • spironolactone (ALDACTONE) 25 MG tablet Take 1 tablet by mouth Daily.     No current facility-administered medications on file prior to visit.         Objective   /82   Pulse 90   Ht 170.2 cm (67\")   Wt 92.5 kg (204 lb)   BMI 31.95 kg/m²       Physical Exam  Constitutional:       Appearance: He is obese.   HENT:      Head: Normocephalic.   Neck:      Vascular: No carotid bruit.   Cardiovascular:      Rate and Rhythm: Normal rate and regular rhythm.      Pulses: Normal pulses.      Heart sounds: Normal heart sounds. No murmur heard.      Pulmonary:      Effort: Pulmonary effort is " normal.      Breath sounds: Normal breath sounds.   Musculoskeletal:      Cervical back: Neck supple.      Right lower le+ Pitting Edema present.      Left lower le+ Pitting Edema present.   Skin:     General: Skin is dry.      Capillary Refill: Capillary refill takes less than 2 seconds.   Neurological:      Mental Status: He is alert and oriented to person, place, and time.   Psychiatric:         Behavior: Behavior normal.       Result Review :   The following data was reviewed by: HUMA Davenport on 2022:  proBNP   Date Value Ref Range Status   2022 8,065.0 (H) 0.0 - 900.0 pg/mL Final     CMP    CMP 22   Glucose 151 (A)   BUN 31 (A)   Creatinine 1.79 (A)   eGFR African Am 47 (A)   Sodium 139   Potassium 3.7   Chloride 104   Calcium 8.3 (A)   Albumin    Total Bilirubin    Alkaline Phosphatase    AST (SGOT)    ALT (SGPT)    (A) Abnormal value            CBC w/diff    CBC w/Diff 2/3/22   WBC 6.61   RBC 3.79 (A)   Hemoglobin 11.5 (A)   Hematocrit 34.3 (A)   MCV 90.5   MCH 30.3   MCHC 33.5   RDW 14.0   Platelets 282   Neutrophil Rel % 63.8   Immature Granulocyte Rel % 0.2   Lymphocyte Rel % 21.9   Monocyte Rel % 7.6   Eosinophil Rel % 5.7   Basophil Rel % 0.8   (A) Abnormal value             Lab Results   Component Value Date    TSH 2.850 2022      Lab Results   Component Value Date    FREET4 1.2 2021      No results found for: DDIMERQUANT  No results found for: MG   No results found for: DIGOXIN   Lab Results   Component Value Date    TROPONINT 0.04 2019           Lipid Panel    Lipid Panel 2/3/22   Total Cholesterol 99   Triglycerides 124   HDL Cholesterol 31 (A)   VLDL Cholesterol 22   LDL Cholesterol  46   LDL/HDL Ratio 1.39   (A) Abnormal value       Comments are available for some flowsheets but are not being displayed.           7/15/2020 echo  1.  Technically difficult images.   2.  Grossly normal ejection fraction of 55%.   3.  Mild left ventricular  hypertrophy.        Assessment and Plan    Diagnoses and all orders for this visit:    1. Shortness of breath (Primary)  Increase spironolactone dose to 50 mg daily.  Take 80 mg of Lasix for the next 5 days and then go back to 40 mg a day.  Check BMP and proBNP in 1 week.    -     Basic Metabolic Panel; Future  -     proBNP; Future    2. NICM (nonischemic cardiomyopathy) (HCC)  Patient with progressively worsening symptoms, check echocardiogram to evaluate left ventricular systolic function.  Compression socks advised.  Low-sodium diet discussed.  Fluid restriction no more than 2 L a day discussed.  -     Adult Transthoracic Echo Complete W/ Cont if Necessary Per Protocol; Future    Other orders  -     spironolactone (ALDACTONE) 50 MG tablet; Take 1 tablet by mouth Daily.  Dispense: 90 tablet; Refill: 1        Follow Up   Return for Keep July follow up with Dr Mcdonnell.    Patient was given instructions and counseling regarding his condition or for health maintenance advice. Please see specific information pulled into the AVS if appropriate.     Lyle Cisneroswell  reports that he quit smoking about 9 years ago. His smoking use included cigarettes. He started smoking about 40 years ago. He smoked 0.25 packs per day. He has never used smokeless tobacco.         Mana Giang, APRN  03/04/22  10:24 EST    Dictated Utilizing Dragon Dictation

## 2022-03-04 NOTE — PATIENT INSTRUCTIONS
"Low-Sodium Eating Plan  Sodium, which is an element that makes up salt, helps you maintain a healthy balance of fluids in your body. Too much sodium can increase your blood pressure and cause fluid and waste to be held in your body.  Your health care provider or dietitian may recommend following this plan if you have high blood pressure (hypertension), kidney disease, liver disease, or heart failure. Eating less sodium can help lower your blood pressure, reduce swelling, and protect your heart, liver, and kidneys.  What are tips for following this plan?  Reading food labels  · The Nutrition Facts label lists the amount of sodium in one serving of the food. If you eat more than one serving, you must multiply the listed amount of sodium by the number of servings.  · Choose foods with less than 140 mg of sodium per serving.  · Avoid foods with 300 mg of sodium or more per serving.  Shopping    · Look for lower-sodium products, often labeled as \"low-sodium\" or \"no salt added.\"  · Always check the sodium content, even if foods are labeled as \"unsalted\" or \"no salt added.\"  · Buy fresh foods.  ? Avoid canned foods and pre-made or frozen meals.  ? Avoid canned, cured, or processed meats.  · Buy breads that have less than 80 mg of sodium per slice.    Cooking    · Eat more home-cooked food and less restaurant, buffet, and fast food.  · Avoid adding salt when cooking. Use salt-free seasonings or herbs instead of table salt or sea salt. Check with your health care provider or pharmacist before using salt substitutes.  · Cook with plant-based oils, such as canola, sunflower, or olive oil.    Meal planning  · When eating at a restaurant, ask that your food be prepared with less salt or no salt, if possible. Avoid dishes labeled as brined, pickled, cured, smoked, or made with soy sauce, miso, or teriyaki sauce.  · Avoid foods that contain MSG (monosodium glutamate). MSG is sometimes added to Chinese food, bouillon, and some " "canned foods.  · Make meals that can be grilled, baked, poached, roasted, or steamed. These are generally made with less sodium.  General information  Most people on this plan should limit their sodium intake to 1,500-2,000 mg (milligrams) of sodium each day.  What foods should I eat?  Fruits  Fresh, frozen, or canned fruit. Fruit juice.  Vegetables  Fresh or frozen vegetables. \"No salt added\" canned vegetables. \"No salt added\" tomato sauce and paste. Low-sodium or reduced-sodium tomato and vegetable juice.  Grains  Low-sodium cereals, including oats, puffed wheat and rice, and shredded wheat. Low-sodium crackers. Unsalted rice. Unsalted pasta. Low-sodium bread. Whole-grain breads and whole-grain pasta.  Meats and other proteins  Fresh or frozen (no salt added) meat, poultry, seafood, and fish. Low-sodium canned tuna and salmon. Unsalted nuts. Dried peas, beans, and lentils without added salt. Unsalted canned beans. Eggs. Unsalted nut butters.  Dairy  Milk. Soy milk. Cheese that is naturally low in sodium, such as ricotta cheese, fresh mozzarella, or Swiss cheese. Low-sodium or reduced-sodium cheese. Cream cheese. Yogurt.  Seasonings and condiments  Fresh and dried herbs and spices. Salt-free seasonings. Low-sodium mustard and ketchup. Sodium-free salad dressing. Sodium-free light mayonnaise. Fresh or refrigerated horseradish. Lemon juice. Vinegar.  Other foods  Homemade, reduced-sodium, or low-sodium soups. Unsalted popcorn and pretzels. Low-salt or salt-free chips.  The items listed above may not be a complete list of foods and beverages you can eat. Contact a dietitian for more information.  What foods should I avoid?  Vegetables  Sauerkraut, pickled vegetables, and relishes. Olives. French fries. Onion rings. Regular canned vegetables (not low-sodium or reduced-sodium). Regular canned tomato sauce and paste (not low-sodium or reduced-sodium). Regular tomato and vegetable juice (not low-sodium or reduced-sodium). " Frozen vegetables in sauces.  Grains  Instant hot cereals. Bread stuffing, pancake, and biscuit mixes. Croutons. Seasoned rice or pasta mixes. Noodle soup cups. Boxed or frozen macaroni and cheese. Regular salted crackers. Self-rising flour.  Meats and other proteins  Meat or fish that is salted, canned, smoked, spiced, or pickled. Precooked or cured meat, such as sausages or meat loaves. Song. Ham. Pepperoni. Hot dogs. Corned beef. Chipped beef. Salt pork. Jerky. Pickled herring. Anchovies and sardines. Regular canned tuna. Salted nuts.  Dairy  Processed cheese and cheese spreads. Hard cheeses. Cheese curds. Blue cheese. Feta cheese. String cheese. Regular cottage cheese. Buttermilk. Canned milk.  Fats and oils  Salted butter. Regular margarine. Ghee. Song fat.  Seasonings and condiments  Onion salt, garlic salt, seasoned salt, table salt, and sea salt. Canned and packaged gravies. Worcestershire sauce. Tartar sauce. Barbecue sauce. Teriyaki sauce. Soy sauce, including reduced-sodium. Steak sauce. Fish sauce. Oyster sauce. Cocktail sauce. Horseradish that you find on the shelf. Regular ketchup and mustard. Meat flavorings and tenderizers. Bouillon cubes. Hot sauce. Pre-made or packaged marinades. Pre-made or packaged taco seasonings. Relishes. Regular salad dressings. Salsa.  Other foods  Salted popcorn and pretzels. Corn chips and puffs. Potato and tortilla chips. Canned or dried soups. Pizza. Frozen entrees and pot pies.  The items listed above may not be a complete list of foods and beverages you should avoid. Contact a dietitian for more information.  Summary  · Eating less sodium can help lower your blood pressure, reduce swelling, and protect your heart, liver, and kidneys.  · Most people on this plan should limit their sodium intake to 1,500-2,000 mg (milligrams) of sodium each day.  · Canned, boxed, and frozen foods are high in sodium. Restaurant foods, fast foods, and pizza are also very high in sodium.  You also get sodium by adding salt to food.  · Try to cook at home, eat more fresh fruits and vegetables, and eat less fast food and canned, processed, or prepared foods.  This information is not intended to replace advice given to you by your health care provider. Make sure you discuss any questions you have with your health care provider.  Document Revised: 01/22/2021 Document Reviewed: 11/18/2020  [employer] Patient Education © 2021 [employer]

## 2022-03-07 NOTE — TELEPHONE ENCOUNTER
Caller: Lyle Lozano Abdelrahman    Relationship: Self    Best call back number:[phone]     Requested Prescriptions:   Requested Prescriptions     Pending Prescriptions Disp Refills   • furosemide (LASIX) 40 MG tablet 90 tablet 0     Sig: Take 1 tablet by mouth Daily.   • sitaGLIPtin-metFORMIN (Janumet)  MG per tablet       Sig: Take 1 tablet by mouth 2 (Two) Times a Day With Meals.        Pharmacy where request should be sent: [id number] - [place], [place] - [address] [address] [facility] AT [facility] OF [address] & [place] - [phone] [facility] [phone] FX     Additional details provided by patient: PATIENT STATES HE IS TOTALLY OUT OF THESE MEDICATIONS    Does the patient have less than a 3 day supply:  [x] Yes  [] No    Amparo MELÉNDEZ Rep   03/07/22 09:06 EST

## 2022-03-08 RX ORDER — SITAGLIPTIN AND METFORMIN HYDROCHLORIDE 1000; 50 MG/1; MG/1
1 TABLET, FILM COATED ORAL 2 TIMES DAILY WITH MEALS
Qty: 120 TABLET | Refills: 0 | Status: SHIPPED | OUTPATIENT
Start: 2022-03-08 | End: 2022-09-27 | Stop reason: SDUPTHER

## 2022-03-08 RX ORDER — FUROSEMIDE 40 MG/1
40 TABLET ORAL DAILY
Qty: 90 TABLET | Refills: 1 | Status: SHIPPED | OUTPATIENT
Start: 2022-03-08 | End: 2022-05-23 | Stop reason: SDUPTHER

## 2022-03-08 NOTE — TELEPHONE ENCOUNTER
I sent the Janumet and for a 2-month supply.  At her next appointment I would like to discuss changing him from Janumet to Metformin since he is doing well with diabetes control.

## 2022-03-15 ENCOUNTER — LAB (OUTPATIENT)
Dept: LAB | Facility: HOSPITAL | Age: 67
End: 2022-03-15

## 2022-03-15 DIAGNOSIS — R06.02 SHORTNESS OF BREATH: Chronic | ICD-10-CM

## 2022-03-15 LAB
ANION GAP SERPL CALCULATED.3IONS-SCNC: 11 MMOL/L (ref 5–15)
BUN SERPL-MCNC: 27 MG/DL (ref 8–23)
BUN/CREAT SERPL: 14.5 (ref 7–25)
CALCIUM SPEC-SCNC: 8.7 MG/DL (ref 8.6–10.5)
CHLORIDE SERPL-SCNC: 102 MMOL/L (ref 98–107)
CO2 SERPL-SCNC: 26 MMOL/L (ref 22–29)
CREAT SERPL-MCNC: 1.86 MG/DL (ref 0.76–1.27)
EGFRCR SERPLBLD CKD-EPI 2021: 39.9 ML/MIN/1.73
GLUCOSE SERPL-MCNC: 94 MG/DL (ref 65–99)
NT-PROBNP SERPL-MCNC: 2625 PG/ML (ref 0–900)
POTASSIUM SERPL-SCNC: 3.8 MMOL/L (ref 3.5–5.2)
SODIUM SERPL-SCNC: 139 MMOL/L (ref 136–145)

## 2022-03-15 PROCEDURE — 80048 BASIC METABOLIC PNL TOTAL CA: CPT

## 2022-03-15 PROCEDURE — 36415 COLL VENOUS BLD VENIPUNCTURE: CPT

## 2022-03-15 PROCEDURE — 83880 ASSAY OF NATRIURETIC PEPTIDE: CPT

## 2022-03-17 ENCOUNTER — TELEPHONE (OUTPATIENT)
Dept: CARDIOLOGY | Facility: CLINIC | Age: 67
End: 2022-03-17

## 2022-03-17 NOTE — TELEPHONE ENCOUNTER
----- Message from HUMA Christianson sent at 3/15/2022  7:52 PM EDT -----  BNP improved, renal function stable, continue current meds

## 2022-04-12 ENCOUNTER — DOCUMENTATION (OUTPATIENT)
Dept: CARDIOLOGY | Facility: CLINIC | Age: 67
End: 2022-04-12

## 2022-04-12 NOTE — PROGRESS NOTES
"I have made several phone calls to the patient regarding whether or not he has made a decision regarding cardiac catheterization procedure due to the fact that his ejection fraction is 21-25%. I have explained the importance of the procedure each time. The patient refuses to make a decision regarding the procedure and keeps telling me \"to call back next week\". I have made 6 attempts to talk to him for him to make a decision. He told me that he would call me once his \"mind is made up\". He verbalizes understanding as to why the procedure is being recommended. All questions answered at this time.   "

## 2022-05-13 ENCOUNTER — TELEPHONE (OUTPATIENT)
Dept: INTERNAL MEDICINE | Facility: CLINIC | Age: 67
End: 2022-05-13

## 2022-05-23 RX ORDER — FUROSEMIDE 40 MG/1
40 TABLET ORAL DAILY
Qty: 90 TABLET | Refills: 1 | Status: SHIPPED | OUTPATIENT
Start: 2022-05-23 | End: 2023-01-06

## 2022-05-23 RX ORDER — DULAGLUTIDE 1.5 MG/.5ML
1.5 INJECTION, SOLUTION SUBCUTANEOUS WEEKLY
Qty: 13 PEN | Refills: 1 | Status: SHIPPED | OUTPATIENT
Start: 2022-05-23 | End: 2022-05-26

## 2022-05-23 NOTE — TELEPHONE ENCOUNTER
Caller: Lyle Lozano Abdelrahman    Relationship: Self    Best call back number: [phone]    Requested Prescriptions:   Requested Prescriptions     Pending Prescriptions Disp Refills   • Dulaglutide (Trulicity) 1.5 MG/0.5ML solution pen-injector 13 pen 1     Sig: Inject 1.5 mg under the skin into the appropriate area as directed 1 (One) Time Per Week.   • furosemide (LASIX) 40 MG tablet 90 tablet 1     Sig: Take 1 tablet by mouth Daily.        Pharmacy where request should be sent: [id number] - [place], [place] - [address] AT [facility] [address] & [place] - [phone] [facility] [phone] FX     Additional details provided by patient: PATIENT IS CURRENTLY OUT OF BOTH MEDICATIONS. HE IS SCHEDULED TO SEE GHADA LEONARD ON 07/22/2022.     Does the patient have less than a 3 day supply:  [x] Yes  [] No    Amparo Peña Rep   05/23/22 13:07 EDT

## 2022-05-24 ENCOUNTER — PRIOR AUTHORIZATION (OUTPATIENT)
Dept: INTERNAL MEDICINE | Facility: CLINIC | Age: 67
End: 2022-05-24

## 2022-05-25 NOTE — TELEPHONE ENCOUNTER
Pa denied    The following rules must be med for approval:  A. The member has had at least a 3 month trial and failure, allergy, contraindication, or intolerance to a preferred GLP-1 agonist:  bydureon pen  byetta  ozempic  victoza      Member must try ONE preferred agent for at least 3 months and fail that agent before trulicity will be covered

## 2022-05-26 ENCOUNTER — TELEPHONE (OUTPATIENT)
Dept: INTERNAL MEDICINE | Facility: CLINIC | Age: 67
End: 2022-05-26

## 2022-05-26 RX ORDER — SEMAGLUTIDE 1.34 MG/ML
0.25 INJECTION, SOLUTION SUBCUTANEOUS WEEKLY
Qty: 1.5 ML | Refills: 0 | Status: SHIPPED | OUTPATIENT
Start: 2022-05-26 | End: 2022-08-01

## 2022-05-26 NOTE — TELEPHONE ENCOUNTER
Caller: Lyle Lozano    Relationship: Self    Best call back number: [phone]     What was the call regarding: PATIENT WOULD LIKE AN UPDATE ON HIS PA FOR HIS TRULICITY REFILL    Do you require a callback: YES

## 2022-05-27 NOTE — TELEPHONE ENCOUNTER
Red rule verified and correct.    Relayed message from Stacy Duff MA     Patient verbalized understanding.    Pt asked if there were SE to Ozempic; advised all medications have potential SE; the pharmacist could go over it with him.    Patient verbalized understanding.

## 2022-06-02 RX ORDER — ASPIRIN 81 MG/1
81 TABLET ORAL DAILY
Qty: 90 TABLET | Refills: 1 | Status: SHIPPED | OUTPATIENT
Start: 2022-06-02

## 2022-06-02 NOTE — TELEPHONE ENCOUNTER
Caller: Lyle Lozano     Relationship: Self    Best call back number: [phone]    Requested Prescriptions:   Requested Prescriptions     Pending Prescriptions Disp Refills   • aspirin (aspirin) 81 MG EC tablet 90 tablet 1     Sig: Take 1 tablet by mouth Daily.      - VITAMIN MEDICATION (PATIENT UNSURE WHICH ONE IT IS)    Pharmacy where request should be sent: [facility] #[id number] - [place], [place] - [address] [facility] AT [facility] OF [address] & [place] - [phone] [facility] [phone] FX     Additional details provided by patient: PATIENT IS CURRENTLY OUT OF BOTH MEDICATIONS.    Does the patient have less than a 3 day supply:  [x] Yes  [] No    Amparo Peña Rep   06/02/22 13:31 EDT

## 2022-07-11 ENCOUNTER — RESEARCH ENCOUNTER (OUTPATIENT)
Dept: CARDIOLOGY | Facility: CLINIC | Age: 67
End: 2022-07-11

## 2022-08-01 RX ORDER — SEMAGLUTIDE 1.34 MG/ML
0.5 INJECTION, SOLUTION SUBCUTANEOUS WEEKLY
Qty: 5 PEN | Refills: 1 | Status: SHIPPED | OUTPATIENT
Start: 2022-08-01 | End: 2022-08-26 | Stop reason: SDUPTHER

## 2022-08-04 ENCOUNTER — TELEPHONE (OUTPATIENT)
Dept: CARDIOLOGY | Facility: CLINIC | Age: 67
End: 2022-08-04

## 2022-08-04 NOTE — TELEPHONE ENCOUNTER
I tried to call patient regarding overdue labs but he was unavailable. I left a message with call back number.

## 2022-08-21 ENCOUNTER — APPOINTMENT (OUTPATIENT)
Dept: GENERAL RADIOLOGY | Facility: HOSPITAL | Age: 67
End: 2022-08-21

## 2022-08-21 ENCOUNTER — HOSPITAL ENCOUNTER (EMERGENCY)
Facility: HOSPITAL | Age: 67
Discharge: HOME | End: 2022-08-21
Attending: EMERGENCY MEDICINE | Admitting: EMERGENCY MEDICINE

## 2022-08-21 VITALS
RESPIRATION RATE: 16 BRPM | HEART RATE: 77 BPM | BODY MASS INDEX: 30.75 KG/M2 | TEMPERATURE: 98 F | SYSTOLIC BLOOD PRESSURE: 140 MMHG | OXYGEN SATURATION: 97 % | WEIGHT: 191.36 LBS | DIASTOLIC BLOOD PRESSURE: 67 MMHG | HEIGHT: 66 IN

## 2022-08-21 DIAGNOSIS — M19.90 ARTHRITIS: Primary | ICD-10-CM

## 2022-08-21 PROCEDURE — 73130 X-RAY EXAM OF HAND: CPT

## 2022-08-21 PROCEDURE — 99282 EMERGENCY DEPT VISIT SF MDM: CPT

## 2022-08-21 RX ORDER — METHYLPREDNISOLONE 4 MG/1
TABLET ORAL
Qty: 21 TABLET | Refills: 0 | Status: SHIPPED | OUTPATIENT
Start: 2022-08-21 | End: 2022-08-26

## 2022-08-21 NOTE — ED PROVIDER NOTES
Time: 9:37 AM EDT  Arrived by: private car  Chief Complaint: Hand pain  History provided by: Patient  History is limited by: N/A     History of Present Illness:  Patient is a 67 y.o.  male that presents to the emergency department with hand pain.    Pt has h/o arthritis.  Pt reports he has pain in both hands, but his R hand is worse than his L.  Pt notes his symptoms started about 1 week ago, and he normally goes to  to get a steroid shot for flair ups, but it is not open early enough today. Pt mentions his whole hand hurts, but primarily in the joints.  Pt notes his PCP is HUMA Wan.  Pt denies taking prescribed medication for his arthritis.  Pt mentions he hasn't been to a PCP for a while.      History provided by:  Patient   used: No        Patient Care Team  Primary Care Provider: Heidi Villa APRN    Past Medical History:     No Known Allergies  Past Medical History:   Diagnosis Date   • Abnormal kidney function    • Arthritis    • Bursitis    • CHF (congestive heart failure) ([facility])    • Depression    • Diabetes (HCC)    • Edema    • Essential hypertension 9/23/2021   • Forgetfulness    • Head injury    • Hernia cerebri (HCC)    • Hyperlipidemia    • Hypertension    • IFG (impaired fasting glucose)    • Low back pain    • Lumbago     `   • Pain of left hip    • Right shoulder pain    • Sleep apnea    • SOB (shortness of breath)      Past Surgical History:   Procedure Laterality Date   • CYST REMOVAL Right     leg     Family History   Problem Relation Age of Onset   • Cancer Mother    • Diabetes Mother    • Diabetes Sister    • Stroke Sister    • No Known Problems Father        Home Medications:  Prior to Admission medications    Medication Sig Start Date End Date Taking? Authorizing Provider   amLODIPine (NORVASC) 5 MG tablet Take 1 tablet by mouth Daily. 1/11/22   Brigido Mcdonnell MD   aspirin (aspirin) 81 MG EC tablet Take 1 tablet by mouth Daily. 6/2/22   Heidi Villa APRN    atorvastatin (LIPITOR) 80 MG tablet TAKE 1 TABLET BY MOUTH EVERY DAY 2/21/22   Brigido Mcdonnell MD   carvedilol (COREG) 12.5 MG tablet Take 1 tablet by mouth 2 (Two) Times a Day With Meals. 1/11/22   Brigido Mcdonnell MD   cholecalciferol (VITAMIN D3) 25 MCG (1000 UT) tablet Take 1 tablet by mouth Daily. 2/3/22   Heidi Villa APRN   diclofenac (VOLTAREN) 75 MG EC tablet Take 1 tablet by mouth 2 (Two) Times a Day As Needed (Pain). 1/3/22   Mitchell Wisdom APRN   furosemide (LASIX) 40 MG tablet Take 1 tablet by mouth Daily. 5/23/22   eHidi Villa APRN   meloxicam (MOBIC) 15 MG tablet Take 15 mg by mouth Daily.    Provider, MD Quentin   sacubitril-valsartan (Entresto)  MG tablet Take 1 tablet by mouth 2 (Two) Times a Day. 1/11/22   Brigido Mcdonnell MD   Semaglutide,0.25 or 0.5MG/DOS, (Ozempic, 0.25 or 0.5 MG/DOSE,) 2 MG/1.5ML solution pen-injector Inject 0.5 mg under the skin into the appropriate area as directed 1 (One) Time Per Week. 8/1/22   Heidi Villa APRN   sitaGLIPtin-metFORMIN (Janumet)  MG per tablet Take 1 tablet by mouth 2 (Two) Times a Day With Meals. 3/8/22   Heidi Villa APRN   spironolactone (ALDACTONE) 50 MG tablet Take 1 tablet by mouth Daily. 3/4/22   Mana Giang APRN        Social History:   Social History     Tobacco Use   • Smoking status: Former Smoker     Packs/day: 0.25     Types: Cigarettes     Start date: 1982     Quit date: 8/14/2012     Years since quitting: 10.0   • Smokeless tobacco: Never Used   Vaping Use   • Vaping Use: Never used   Substance Use Topics   • Alcohol use: Yes     Comment: rare   • Drug use: Never       Review of Systems:  Review of Systems   Constitutional: Negative for chills and fever.   HENT: Negative for congestion, ear pain and sore throat.    Eyes: Negative for pain.   Respiratory: Negative for cough, chest tightness and shortness of breath.    Cardiovascular: Negative for chest pain.   Gastrointestinal: Negative for abdominal pain,  "diarrhea, nausea and vomiting.   Genitourinary: Negative for flank pain and hematuria.   Musculoskeletal: Positive for myalgias (Hand pain). Negative for joint swelling.   Skin: Negative for pallor.   Neurological: Negative for seizures and headaches.   All other systems reviewed and are negative.         Physical Exam:  /67 (BP Location: Left arm, Patient Position: Sitting)   Pulse 77   Temp 98 °F (36.7 °C) (Oral)   Resp 16   Ht 167.6 cm (66\")   Wt 86.8 kg (191 lb 5.8 oz)   SpO2 97%   BMI 30.89 kg/m²     Physical Exam  Vitals and nursing note reviewed.   Constitutional:       General: He is not in acute distress.     Appearance: Normal appearance. He is not toxic-appearing.   HENT:      Head: Normocephalic and atraumatic.      Mouth/Throat:      Mouth: Mucous membranes are moist.   Eyes:      General: No scleral icterus.  Cardiovascular:      Rate and Rhythm: Normal rate and regular rhythm.      Pulses: Normal pulses.      Heart sounds: Normal heart sounds.   Pulmonary:      Effort: Pulmonary effort is normal. No respiratory distress.      Breath sounds: Normal breath sounds.   Abdominal:      General: Abdomen is flat.      Palpations: Abdomen is soft.      Tenderness: There is no abdominal tenderness.   Musculoskeletal:         General: Normal range of motion.      Right hand: Swelling (Diffuse, over joints) and deformity (Arthritic, to DIP joints) present.      Left hand: Swelling (Diffuse, over joints) and deformity (Arthritic, to DIP joints) present.      Cervical back: Normal range of motion and neck supple.   Skin:     General: Skin is warm and dry.      Comments: No sign of cellulitis.     Neurological:      Mental Status: He is alert and oriented to person, place, and time. Mental status is at baseline.                Medications in the Emergency Department:  Medications - No data to display     Labs  Lab Results (last 24 hours)     ** No results found for the last 24 hours. **       "     Imaging:  XR Hand 3+ View Right    Result Date: 8/21/2022  PROCEDURE: XR HAND 3+ VW RIGHT  COMPARISON: [facility], CR, HAND >OR= 3V LT, 7/06/2020, 12:33.  [facility], CR, HAND >OR= 3V RT, 6/26/2020, 8:46.  [facility], CR, XR HAND 3+ VW RIGHT, 1/03/2022, 15:11.  INDICATIONS: swelling/RIGHT HAND PAIN  FINDINGS:   There are worsening degenerative changes in the IP and MCP joints.  Erosive changes are present.  Ventral hook osteophytes are present in the 2nd and 3rd metacarpal heads.  Soft tissue swelling is present.  No fractures are identified.  IMPRESSION: Soft tissue swelling.  Worsening degenerative change in the IP and MCP joints with erosions suspicious for an inflammatory arthritis.  Ventral hook osteophytes can be seen with CPPD arthropathy and hemochromatosis.   SHIRLEY RESENDEZ MD       Electronically Signed and Approved By: SHIRLEY RESENDEZ MD on 8/21/2022 at 9:20                EKG:      Procedures:  Procedures    Progress                      The patient was seen and evaluated the ED by me.  The above history and physical examination was performed as document.  The diagnostic data was obtained peer results reviewed.  Patient's clinical findings are consistent with that of advanced arthritis of the hands.  Patient be placed on a round of steroids.  Patient is currently on Voltaren at home.  I did advise him to follow-up with his PCP to discuss any other outpatient treatment for his arthritis or even a referral to a rheumatologist.  Patient verbalizes understanding of this.      Medical Decision Making:  [facility]     Final diagnoses:   Arthritis        Disposition:  ED Disposition     ED Disposition   Discharge    Condition   Stable    Comment   --           Documentation assistance provided by Coni Collier acting as scribe for Jose Nolasco DO. Information recorded by the scribe was done at my direction and has been verified and validated by me.           Coni Collier  08/21/22 0956       Jose Nolasco DO  08/21/22 1022

## 2022-08-21 NOTE — ED TRIAGE NOTES
Patient reports he has a history of arthritis and that his hand has been swollen for a week. Patient states he normally goes to [facility] for a prednisone shot

## 2022-08-21 NOTE — DISCHARGE INSTRUCTIONS
Follow-up with your primary care provider for long-term treatment options of your arthritis.  You may also benefit from a referral to a rheumatologist for definitive diagnosis of the type of arthritis that she have and thus more directed treatment.  Return to the ER for any concerns issues that may arise.

## 2022-08-26 ENCOUNTER — OFFICE VISIT (OUTPATIENT)
Dept: INTERNAL MEDICINE | Facility: CLINIC | Age: 67
End: 2022-08-26

## 2022-08-26 VITALS
WEIGHT: 194.8 LBS | SYSTOLIC BLOOD PRESSURE: 130 MMHG | OXYGEN SATURATION: 98 % | TEMPERATURE: 97.4 F | HEIGHT: 67 IN | HEART RATE: 73 BPM | DIASTOLIC BLOOD PRESSURE: 72 MMHG | BODY MASS INDEX: 30.57 KG/M2

## 2022-08-26 DIAGNOSIS — M19.049 HAND ARTHRITIS: Primary | ICD-10-CM

## 2022-08-26 PROBLEM — M10.9 GOUT: Status: ACTIVE | Noted: 2022-03-05

## 2022-08-26 PROBLEM — N18.30 STAGE 3 CHRONIC KIDNEY DISEASE: Status: ACTIVE | Noted: 2022-03-05

## 2022-08-26 PROBLEM — M1A.0790: Status: ACTIVE | Noted: 2022-03-05

## 2022-08-26 PROBLEM — I11.0 HYPERTENSIVE HEART DISEASE WITH HEART FAILURE: Status: ACTIVE | Noted: 2022-03-05

## 2022-08-26 PROCEDURE — 99214 OFFICE O/P EST MOD 30 MIN: CPT | Performed by: INTERNAL MEDICINE

## 2022-08-26 RX ORDER — MELOXICAM 15 MG/1
15 TABLET ORAL DAILY
Qty: 30 TABLET | Refills: 0 | Status: SHIPPED | OUTPATIENT
Start: 2022-08-26 | End: 2022-10-26

## 2022-08-26 RX ORDER — METHYLPREDNISOLONE 4 MG/1
TABLET ORAL
Qty: 21 TABLET | Refills: 0 | Status: SHIPPED | OUTPATIENT
Start: 2022-08-26 | End: 2022-09-27

## 2022-08-26 RX ORDER — MELATONIN
1000 DAILY
Qty: 90 TABLET | Refills: 1 | Status: SHIPPED | OUTPATIENT
Start: 2022-08-26

## 2022-08-26 RX ORDER — SEMAGLUTIDE 1.34 MG/ML
0.5 INJECTION, SOLUTION SUBCUTANEOUS WEEKLY
Qty: 5 PEN | Refills: 1 | Status: SHIPPED | OUTPATIENT
Start: 2022-08-26 | End: 2022-09-27 | Stop reason: SDUPTHER

## 2022-08-26 NOTE — PROGRESS NOTES
"Chief Complaint  Hand Pain (X 1 week.  Pain and swelling from wrists down.  Right worse than left. )    Subjective       Lyle Lozano presents to [facility]    HPI   Presenting for follow-up of bilateral hand swelling.  Patient was seen in the ER on 8/21/2022 for the same.  Was treated with Medrol Dosepak.  Patient states that the swelling improved while he was taking the steroids but once he stopped the swelling resumed.  He states that he periodically has flares like this of his significant hand arthritis.  He is not currently taking any medications regularly for arthritis symptoms.    Objective     Vitals:    08/26/22 0833   BP: 130/72   BP Location: Right arm   Patient Position: Sitting   Cuff Size: Adult   Pulse: 73   Temp: 97.4 °F (36.3 °C)   TempSrc: Temporal   SpO2: 98%   Weight: 88.4 kg (194 lb 12.8 oz)   Height: 168.9 cm (66.5\")      Wt Readings from Last 3 Encounters:   08/26/22 88.4 kg (194 lb 12.8 oz)   08/21/22 86.8 kg (191 lb 5.8 oz)   03/18/22 92.5 kg (204 lb)      BP Readings from Last 3 Encounters:   08/26/22 130/72   08/21/22 140/67   03/04/22 144/82        Body mass index is 30.97 kg/m².           Physical Exam  Vitals reviewed.   Constitutional:       Appearance: Normal appearance. He is well-developed.   HENT:      Head: Normocephalic and atraumatic.      Mouth/Throat:      Pharynx: No oropharyngeal exudate.   Eyes:      Conjunctiva/sclera: Conjunctivae normal.      Pupils: Pupils are equal, round, and reactive to light.   Cardiovascular:      Rate and Rhythm: Normal rate and regular rhythm.      Heart sounds: No murmur heard.    No friction rub. No gallop.   Pulmonary:      Effort: Pulmonary effort is normal.      Breath sounds: Normal breath sounds. No wheezing or rhonchi.   Musculoskeletal:      Right hand: Swelling present. Decreased range of motion.      Left hand: Swelling present. Decreased range of motion.   Skin:     General: " Skin is warm and dry.   Neurological:      Mental Status: He is alert and oriented to person, place, and time.   Psychiatric:         Mood and Affect: Affect normal.          Result Review :   The following data was reviewed by: Nehal Daniels MD on 08/26/2022:    ER records from 8/21/2022    Procedures    Assessment and Plan   Diagnoses and all orders for this visit:    1. Hand arthritis (Primary)  Assessment & Plan:  Currently having a flare with significant swelling of entire hand bilaterally.  Will repeat Medrol Dosepak.  Will refill his meloxicam.  Patient encouraged to take this daily to help with inflammation.  Patient is to schedule follow-up with his PCP, HUMA Jett to discuss further management of his chronic arthritis.    Orders:  -     methylPREDNISolone (MEDROL) 4 MG dose pack; Take as directed on package instructions.  Dispense: 21 tablet; Refill: 0  -     meloxicam (MOBIC) 15 MG tablet; Take 1 tablet by mouth Daily.  Dispense: 30 tablet; Refill: 0    Other orders  -     cholecalciferol (VITAMIN D3) 25 MCG (1000 UT) tablet; Take 1 tablet by mouth Daily.  Dispense: 90 tablet; Refill: 1  -     Semaglutide,0.25 or 0.5MG/DOS, (Ozempic, 0.25 or 0.5 MG/DOSE,) 2 MG/1.5ML solution pen-injector; Inject 0.5 mg under the skin into the appropriate area as directed 1 (One) Time Per Week.  Dispense: 5 pen; Refill: 1        Follow Up   Return in about 1 week (around 9/2/2022) for with Heidi Villa.  Patient was given instructions and counseling regarding his condition or for health maintenance advice. Please see specific information pulled into the AVS if appropriate.

## 2022-08-26 NOTE — ASSESSMENT & PLAN NOTE
Currently having a flare with significant swelling of entire hand bilaterally.  Will repeat Medrol Dosepak.  Will refill his meloxicam.  Patient encouraged to take this daily to help with inflammation.  Patient is to schedule follow-up with his PCP, HUMA Jett to discuss further management of his chronic arthritis.

## 2022-08-29 RX ORDER — FUROSEMIDE 40 MG/1
40 TABLET ORAL DAILY
Qty: 90 TABLET | Refills: 1 | OUTPATIENT
Start: 2022-08-29

## 2022-09-27 ENCOUNTER — OFFICE VISIT (OUTPATIENT)
Dept: INTERNAL MEDICINE | Facility: CLINIC | Age: 67
End: 2022-09-27

## 2022-09-27 VITALS
TEMPERATURE: 96.5 F | SYSTOLIC BLOOD PRESSURE: 110 MMHG | HEIGHT: 67 IN | BODY MASS INDEX: 30.2 KG/M2 | DIASTOLIC BLOOD PRESSURE: 62 MMHG | OXYGEN SATURATION: 98 % | WEIGHT: 192.4 LBS | HEART RATE: 87 BPM

## 2022-09-27 DIAGNOSIS — E11.65 TYPE 2 DIABETES MELLITUS WITH HYPERGLYCEMIA, WITHOUT LONG-TERM CURRENT USE OF INSULIN: ICD-10-CM

## 2022-09-27 DIAGNOSIS — I10 ESSENTIAL HYPERTENSION: ICD-10-CM

## 2022-09-27 DIAGNOSIS — M25.562 ACUTE PAIN OF LEFT KNEE: Primary | ICD-10-CM

## 2022-09-27 LAB
ALBUMIN SERPL-MCNC: 3.1 G/DL (ref 3.5–5.2)
ALBUMIN/GLOB SERPL: 0.9 G/DL
ALP SERPL-CCNC: 76 U/L (ref 39–117)
ALT SERPL W P-5'-P-CCNC: 9 U/L (ref 1–41)
ANION GAP SERPL CALCULATED.3IONS-SCNC: 9.9 MMOL/L (ref 5–15)
AST SERPL-CCNC: 17 U/L (ref 1–40)
BASOPHILS # BLD AUTO: 0.03 10*3/MM3 (ref 0–0.2)
BASOPHILS NFR BLD AUTO: 0.4 % (ref 0–1.5)
BILIRUB SERPL-MCNC: 0.2 MG/DL (ref 0–1.2)
BUN SERPL-MCNC: 28 MG/DL (ref 8–23)
BUN/CREAT SERPL: 12.8 (ref 7–25)
CALCIUM SPEC-SCNC: 8.5 MG/DL (ref 8.6–10.5)
CHLORIDE SERPL-SCNC: 103 MMOL/L (ref 98–107)
CHOLEST SERPL-MCNC: 184 MG/DL (ref 0–200)
CO2 SERPL-SCNC: 23.1 MMOL/L (ref 22–29)
CREAT SERPL-MCNC: 2.19 MG/DL (ref 0.76–1.27)
DEPRECATED RDW RBC AUTO: 42.1 FL (ref 37–54)
EGFRCR SERPLBLD CKD-EPI 2021: 32.6 ML/MIN/1.73
EOSINOPHIL # BLD AUTO: 0.23 10*3/MM3 (ref 0–0.4)
EOSINOPHIL NFR BLD AUTO: 3.1 % (ref 0.3–6.2)
ERYTHROCYTE [DISTWIDTH] IN BLOOD BY AUTOMATED COUNT: 14 % (ref 12.3–15.4)
GLOBULIN UR ELPH-MCNC: 3.4 GM/DL
GLUCOSE SERPL-MCNC: 234 MG/DL (ref 65–99)
HBA1C MFR BLD: 6.9 % (ref 4.8–5.6)
HCT VFR BLD AUTO: 33.7 % (ref 37.5–51)
HDLC SERPL-MCNC: 31 MG/DL (ref 40–60)
HGB BLD-MCNC: 11.1 G/DL (ref 13–17.7)
IMM GRANULOCYTES # BLD AUTO: 0.03 10*3/MM3 (ref 0–0.05)
IMM GRANULOCYTES NFR BLD AUTO: 0.4 % (ref 0–0.5)
LDLC SERPL CALC-MCNC: 114 MG/DL (ref 0–100)
LDLC/HDLC SERPL: 3.51 {RATIO}
LYMPHOCYTES # BLD AUTO: 1.31 10*3/MM3 (ref 0.7–3.1)
LYMPHOCYTES NFR BLD AUTO: 17.5 % (ref 19.6–45.3)
MCH RBC QN AUTO: 27.3 PG (ref 26.6–33)
MCHC RBC AUTO-ENTMCNC: 32.9 G/DL (ref 31.5–35.7)
MCV RBC AUTO: 83 FL (ref 79–97)
MONOCYTES # BLD AUTO: 0.41 10*3/MM3 (ref 0.1–0.9)
MONOCYTES NFR BLD AUTO: 5.5 % (ref 5–12)
NEUTROPHILS NFR BLD AUTO: 5.46 10*3/MM3 (ref 1.7–7)
NEUTROPHILS NFR BLD AUTO: 73.1 % (ref 42.7–76)
NRBC BLD AUTO-RTO: 0 /100 WBC (ref 0–0.2)
PLATELET # BLD AUTO: 363 10*3/MM3 (ref 140–450)
PMV BLD AUTO: 9.6 FL (ref 6–12)
POTASSIUM SERPL-SCNC: 4.5 MMOL/L (ref 3.5–5.2)
PROT SERPL-MCNC: 6.5 G/DL (ref 6–8.5)
RBC # BLD AUTO: 4.06 10*6/MM3 (ref 4.14–5.8)
SODIUM SERPL-SCNC: 136 MMOL/L (ref 136–145)
TRIGL SERPL-MCNC: 221 MG/DL (ref 0–150)
VLDLC SERPL-MCNC: 39 MG/DL (ref 5–40)
WBC NRBC COR # BLD: 7.47 10*3/MM3 (ref 3.4–10.8)

## 2022-09-27 PROCEDURE — 85025 COMPLETE CBC W/AUTO DIFF WBC: CPT | Performed by: PHYSICIAN ASSISTANT

## 2022-09-27 PROCEDURE — 80053 COMPREHEN METABOLIC PANEL: CPT | Performed by: PHYSICIAN ASSISTANT

## 2022-09-27 PROCEDURE — 83036 HEMOGLOBIN GLYCOSYLATED A1C: CPT | Performed by: PHYSICIAN ASSISTANT

## 2022-09-27 PROCEDURE — 99214 OFFICE O/P EST MOD 30 MIN: CPT | Performed by: PHYSICIAN ASSISTANT

## 2022-09-27 PROCEDURE — 80061 LIPID PANEL: CPT | Performed by: PHYSICIAN ASSISTANT

## 2022-09-27 RX ORDER — SITAGLIPTIN AND METFORMIN HYDROCHLORIDE 1000; 50 MG/1; MG/1
1 TABLET, FILM COATED ORAL 2 TIMES DAILY WITH MEALS
Qty: 120 TABLET | Refills: 0 | Status: SHIPPED | OUTPATIENT
Start: 2022-09-27

## 2022-09-27 RX ORDER — SEMAGLUTIDE 1.34 MG/ML
0.5 INJECTION, SOLUTION SUBCUTANEOUS WEEKLY
Qty: 7.5 ML | Refills: 1 | Status: ON HOLD | OUTPATIENT
Start: 2022-09-27 | End: 2023-01-25

## 2022-09-27 RX ORDER — METHYLPREDNISOLONE 4 MG/1
TABLET ORAL
Qty: 21 TABLET | Refills: 0 | Status: SHIPPED | OUTPATIENT
Start: 2022-09-27 | End: 2022-10-26

## 2022-09-27 NOTE — ASSESSMENT & PLAN NOTE
We will check basic labs today and have patient follow-up with Heidi for his annual wellness.  Continue to monitor blood pressure.

## 2022-09-27 NOTE — PROGRESS NOTES
"Chief Complaint  Knee Pain (X 1 week.  Tried hot/cold therapy.  Happens about once a year.  )    Subjective          Lyle Lozano presents to [facility]  History of Present Illness  Left knee pain- symptoms started about a week ago.  Patient states that it has worsened since the weather is changing to cooler temperatures.  This happens typically once a year.  He admits to pain but no injury or trauma.  Patient does have a history of gout.  Patient states that steroids typically help the pain.  It is more painful when he walks.  He has noticed some swelling as well.       Objective   Vital Signs:   /62 (BP Location: Right arm, Patient Position: Sitting, Cuff Size: Large Adult)   Pulse 87   Temp 96.5 °F (35.8 °C) (Temporal)   Ht 168.9 cm (66.5\")   Wt 87.3 kg (192 lb 6.4 oz)   SpO2 98%   BMI 30.59 kg/m²     Physical Exam  Constitutional:       Appearance: Normal appearance.   HENT:      Head: Normocephalic and atraumatic.   Eyes:      Extraocular Movements: Extraocular movements intact.      Conjunctiva/sclera: Conjunctivae normal.      Pupils: Pupils are equal, round, and reactive to light.   Cardiovascular:      Rate and Rhythm: Normal rate and regular rhythm.      Pulses: Normal pulses.      Heart sounds: Normal heart sounds.   Pulmonary:      Effort: Pulmonary effort is normal. No respiratory distress.      Breath sounds: Normal breath sounds.   Musculoskeletal:         General: No swelling or tenderness. Normal range of motion.      Cervical back: Normal range of motion and neck supple. No rigidity.      Comments: TTP along L jointline, edema of joint without erythema or warmth   Skin:     General: Skin is warm and dry.      Coloration: Skin is not pale.   Neurological:      General: No focal deficit present.      Mental Status: He is alert and oriented to person, place, and time. Mental status is at baseline.      Gait: Gait normal. "   Psychiatric:         Mood and Affect: Mood normal.         Behavior: Behavior normal.        Result Review :          Procedures      Assessment and Plan    Diagnoses and all orders for this visit:    1. Acute pain of left knee (Primary)  Assessment & Plan:  Acute on chronic left knee pain likely due to arthritic changes.  Will do a short course of medrol louisa.  We will go ahead and send patient to Ortho for further evaluation and alternative treatments.    Orders:  -     methylPREDNISolone (MEDROL) 4 MG dose pack; Take as directed on package instructions.  Dispense: 21 tablet; Refill: 0  -     Ambulatory Referral to Orthopedic Surgery    2. Essential hypertension  Assessment & Plan:  We will check basic labs today and have patient follow-up with Heidi for his annual wellness.  Continue to monitor blood pressure.      3. Type 2 diabetes mellitus with hyperglycemia, without long-term current use of insulin (HCC)  -     Semaglutide,0.25 or 0.5MG/DOS, (Ozempic, 0.25 or 0.5 MG/DOSE,) 2 MG/1.5ML solution pen-injector; Inject 0.5 mg under the skin into the appropriate area as directed 1 (One) Time Per Week.  Dispense: 7.5 mL; Refill: 1  -     sitaGLIPtin-metFORMIN (Janumet)  MG per tablet; Take 1 tablet by mouth 2 (Two) Times a Day With Meals.  Dispense: 120 tablet; Refill: 0  -     CBC & Differential  -     Comprehensive Metabolic Panel  -     Lipid Panel  -     Hemoglobin A1c            Follow Up   No follow-ups on file.  Patient was given instructions and counseling regarding his condition or for health maintenance advice. Please see specific information pulled into the AVS if appropriate.

## 2022-09-27 NOTE — ASSESSMENT & PLAN NOTE
Acute on chronic left knee pain likely due to arthritic changes.  Will do a short course of medrol louisa.  We will go ahead and send patient to Ortho for further evaluation and alternative treatments.

## 2022-10-06 ENCOUNTER — TELEPHONE (OUTPATIENT)
Dept: INTERNAL MEDICINE | Facility: CLINIC | Age: 67
End: 2022-10-06

## 2022-10-06 NOTE — TELEPHONE ENCOUNTER
Called the patient and informed him of Aysha Retana message concerning his lab results. He understood and said he would be at his appointment on 10/19/22 with Heidi Villa.

## 2022-10-06 NOTE — TELEPHONE ENCOUNTER
----- Message from Aysha Lacey PA-C sent at 10/3/2022  3:02 PM EDT -----  Labs have worsened.  Glucose elevated as well as A1c.  Creatinine which shows kidney function has also worsened.  Anemia slightly worse as well.  Cholesterol levels have worsened.  Please make sure patient keeps his appointment with PCP Heidi Villa in 2 weeks.  If he has any other issues or concerns before then he needs to be seen in the office prior to that visit.

## 2022-12-20 ENCOUNTER — HOSPITAL ENCOUNTER (EMERGENCY)
Facility: HOSPITAL | Age: 67
Discharge: HOME | End: 2022-12-20
Attending: EMERGENCY MEDICINE | Admitting: EMERGENCY MEDICINE

## 2022-12-20 ENCOUNTER — APPOINTMENT (OUTPATIENT)
Dept: GENERAL RADIOLOGY | Facility: HOSPITAL | Age: 67
End: 2022-12-20

## 2022-12-20 VITALS
TEMPERATURE: 97.3 F | HEIGHT: 66 IN | WEIGHT: 205.91 LBS | OXYGEN SATURATION: 97 % | BODY MASS INDEX: 33.09 KG/M2 | SYSTOLIC BLOOD PRESSURE: 156 MMHG | RESPIRATION RATE: 16 BRPM | HEART RATE: 93 BPM | DIASTOLIC BLOOD PRESSURE: 78 MMHG

## 2022-12-20 DIAGNOSIS — M79.641 RIGHT HAND PAIN: Primary | ICD-10-CM

## 2022-12-20 LAB
ANION GAP SERPL CALCULATED.3IONS-SCNC: 11 MMOL/L (ref 5–15)
BASOPHILS # BLD AUTO: 0.03 10*3/MM3 (ref 0–0.2)
BASOPHILS NFR BLD AUTO: 0.4 % (ref 0–1.5)
BUN SERPL-MCNC: 28 MG/DL (ref 8–23)
BUN/CREAT SERPL: 15.2 (ref 7–25)
CALCIUM SPEC-SCNC: 7.9 MG/DL (ref 8.6–10.5)
CHLORIDE SERPL-SCNC: 104 MMOL/L (ref 98–107)
CO2 SERPL-SCNC: 21 MMOL/L (ref 22–29)
CREAT SERPL-MCNC: 1.84 MG/DL (ref 0.76–1.27)
DEPRECATED RDW RBC AUTO: 49.3 FL (ref 37–54)
EGFRCR SERPLBLD CKD-EPI 2021: 40.2 ML/MIN/1.73
EOSINOPHIL # BLD AUTO: 0.16 10*3/MM3 (ref 0–0.4)
EOSINOPHIL NFR BLD AUTO: 2 % (ref 0.3–6.2)
ERYTHROCYTE [DISTWIDTH] IN BLOOD BY AUTOMATED COUNT: 15.7 % (ref 12.3–15.4)
GLUCOSE SERPL-MCNC: 211 MG/DL (ref 65–99)
HCT VFR BLD AUTO: 29.9 % (ref 37.5–51)
HGB BLD-MCNC: 9.6 G/DL (ref 13–17.7)
IMM GRANULOCYTES # BLD AUTO: 0.02 10*3/MM3 (ref 0–0.05)
IMM GRANULOCYTES NFR BLD AUTO: 0.3 % (ref 0–0.5)
LYMPHOCYTES # BLD AUTO: 1.28 10*3/MM3 (ref 0.7–3.1)
LYMPHOCYTES NFR BLD AUTO: 16.3 % (ref 19.6–45.3)
MCH RBC QN AUTO: 27.4 PG (ref 26.6–33)
MCHC RBC AUTO-ENTMCNC: 32.1 G/DL (ref 31.5–35.7)
MCV RBC AUTO: 85.2 FL (ref 79–97)
MONOCYTES # BLD AUTO: 0.4 10*3/MM3 (ref 0.1–0.9)
MONOCYTES NFR BLD AUTO: 5.1 % (ref 5–12)
NEUTROPHILS NFR BLD AUTO: 5.94 10*3/MM3 (ref 1.7–7)
NEUTROPHILS NFR BLD AUTO: 75.9 % (ref 42.7–76)
NRBC BLD AUTO-RTO: 0 /100 WBC (ref 0–0.2)
PLATELET # BLD AUTO: 309 10*3/MM3 (ref 140–450)
PMV BLD AUTO: 8.9 FL (ref 6–12)
POTASSIUM SERPL-SCNC: 3.8 MMOL/L (ref 3.5–5.2)
RBC # BLD AUTO: 3.51 10*6/MM3 (ref 4.14–5.8)
SODIUM SERPL-SCNC: 136 MMOL/L (ref 136–145)
WBC NRBC COR # BLD: 7.83 10*3/MM3 (ref 3.4–10.8)

## 2022-12-20 PROCEDURE — 85025 COMPLETE CBC W/AUTO DIFF WBC: CPT | Performed by: EMERGENCY MEDICINE

## 2022-12-20 PROCEDURE — 97161 PT EVAL LOW COMPLEX 20 MIN: CPT | Performed by: PHYSICAL THERAPIST

## 2022-12-20 PROCEDURE — 36415 COLL VENOUS BLD VENIPUNCTURE: CPT | Performed by: EMERGENCY MEDICINE

## 2022-12-20 PROCEDURE — 73130 X-RAY EXAM OF HAND: CPT

## 2022-12-20 PROCEDURE — 80048 BASIC METABOLIC PNL TOTAL CA: CPT | Performed by: EMERGENCY MEDICINE

## 2022-12-20 PROCEDURE — 99282 EMERGENCY DEPT VISIT SF MDM: CPT

## 2022-12-20 PROCEDURE — 97110 THERAPEUTIC EXERCISES: CPT | Performed by: PHYSICAL THERAPIST

## 2022-12-20 RX ORDER — NAPROXEN 500 MG/1
500 TABLET ORAL 2 TIMES DAILY WITH MEALS
Qty: 60 TABLET | Refills: 0 | Status: SHIPPED | OUTPATIENT
Start: 2022-12-20 | End: 2023-01-06

## 2022-12-20 NOTE — ED PROVIDER NOTES
Subjective   History of Present Illness  Pt is 66 yo right handed male  presenting to ED with complaint of right hand pain and swelling x 2 days. Denies any injuries, but does use his hands a lot for work.         Review of Systems   Constitutional: Negative for chills and fever.   Musculoskeletal: Positive for arthralgias (right hand).   Skin: Negative for color change, pallor, rash and wound.   Neurological: Negative for weakness and numbness.   Hematological: Negative for adenopathy. Does not bruise/bleed easily.       Past Medical History:   Diagnosis Date   • Abnormal kidney function    • Arthritis    • Bursitis    • CHF (congestive heart failure) ([facility])    • Depression    • Diabetes ([facility])    • Edema    • Essential hypertension 9/23/2021   • Forgetfulness    • Head injury    • Hernia cerebri ([facility])    • Hyperlipidemia    • Hypertension    • IFG (impaired fasting glucose)    • Low back pain    • Lumbago     `   • Pain of left hip    • Right shoulder pain    • Sleep apnea    • SOB (shortness of breath)        No Known Allergies    Past Surgical History:   Procedure Laterality Date   • CYST REMOVAL Right     leg       Family History   Problem Relation Age of Onset   • No Known Problems Father    • Diabetes Sister    • Stroke Sister        Social History     Socioeconomic History   • Marital status: Single   Tobacco Use   • Smoking status: Former     Packs/day: 0.25     Types: Cigarettes     Start date: 1982     Quit date: 8/14/2012     Years since quitting: 10.3   • Smokeless tobacco: Never   Vaping Use   • Vaping Use: Never used   Substance and Sexual Activity   • Alcohol use: Yes     Comment: rare   • Drug use: Never   • Sexual activity: Defer           Objective   Physical Exam  Vitals and nursing note reviewed.   Constitutional:       Appearance: Normal appearance.   HENT:      Head: Normocephalic and atraumatic.   Eyes:      Extraocular Movements: Extraocular movements intact.       Conjunctiva/sclera: Conjunctivae normal.      Pupils: Pupils are equal, round, and reactive to light.   Pulmonary:      Effort: Pulmonary effort is normal.   Musculoskeletal:      Right hand: Swelling present. No deformity, lacerations, tenderness or bony tenderness. Decreased range of motion. Normal capillary refill. Normal pulse.      Cervical back: Normal range of motion and neck supple.   Skin:     General: Skin is warm and dry.   Neurological:      General: No focal deficit present.      Mental Status: He is alert and oriented to person, place, and time.         Procedures           ED Course      1140: Discussed ED findings with pt and plan for discharge. Pt verbalized understanding and agrees with plan.                                      MDM  Number of Diagnoses or Management Options  Right hand pain: minor     Amount and/or Complexity of Data Reviewed  Clinical lab tests: reviewed  Tests in the radiology section of CPT®: reviewed  Decide to obtain previous medical records or to obtain history from someone other than the patient: yes    Risk of Complications, Morbidity, and/or Mortality  Presenting problems: low  Diagnostic procedures: low  Management options: low    Patient Progress  Patient progress: stable        Final diagnoses:   Right hand pain       ED Disposition  ED Disposition     ED Disposition   Discharge    Condition   Stable    Comment   --             Heidi Villa, APRN  [address] [address]  [phone]          [facility]  [address] [facility] [address]  Psychiatric [id number] [phone]             Medication List      New Prescriptions    naproxen 500 MG EC tablet  Commonly known as: EC NAPROSYN  Take 1 tablet by mouth 2 (Two) Times a Day With Meals.        Stop    diclofenac 75 MG EC tablet  Commonly known as: VOLTAREN           Where to Get Your Medications      These medications were sent to [id number] - [place],  [place] - [address] AT [facility] OF [address] [facility] & [place] - [phone] PH - [phone] FX  [address], [facility] [address]    Phone: [phone]   · naproxen 500 MG EC tablet          Mariel Baldwin, APRN  12/20/22 0917

## 2022-12-20 NOTE — ED PROVIDER NOTES
"Patient is 67 y.o. year old male that presents to the ED for evaluation of left hand pain and swelling    Physical Exam     Left hand tenderness and edema.  No signs of erythema  ED Course:    /78 (BP Location: Left arm, Patient Position: Sitting)   Pulse 93   Temp 97.3 °F (36.3 °C) (Oral)   Resp 16   Ht 167.6 cm (66\")   Wt 93.4 kg (205 lb 14.6 oz)   SpO2 97%   BMI 33.23 kg/m²   Results for orders placed or performed during the hospital encounter of 12/20/22   Basic Metabolic Panel    Specimen: Blood   Result Value Ref Range    Glucose 211 (H) 65 - 99 mg/dL    BUN 28 (H) 8 - 23 mg/dL    Creatinine 1.84 (H) 0.76 - 1.27 mg/dL    Sodium 136 136 - 145 mmol/L    Potassium 3.8 3.5 - 5.2 mmol/L    Chloride 104 98 - 107 mmol/L    CO2 21.0 (L) 22.0 - 29.0 mmol/L    Calcium 7.9 (L) 8.6 - 10.5 mg/dL    BUN/Creatinine Ratio 15.2 7.0 - 25.0    Anion Gap 11.0 5.0 - 15.0 mmol/L    eGFR 40.2 (L) >60.0 mL/min/1.73   CBC Auto Differential    Specimen: Blood   Result Value Ref Range    WBC 7.83 3.40 - 10.80 10*3/mm3    RBC 3.51 (L) 4.14 - 5.80 10*6/mm3    Hemoglobin 9.6 (L) 13.0 - 17.7 g/dL    Hematocrit 29.9 (L) 37.5 - 51.0 %    MCV 85.2 79.0 - 97.0 fL    MCH 27.4 26.6 - 33.0 pg    MCHC 32.1 31.5 - 35.7 g/dL    RDW 15.7 (H) 12.3 - 15.4 %    RDW-SD 49.3 37.0 - 54.0 fl    MPV 8.9 6.0 - 12.0 fL    Platelets 309 140 - 450 10*3/mm3    Neutrophil % 75.9 42.7 - 76.0 %    Lymphocyte % 16.3 (L) 19.6 - 45.3 %    Monocyte % 5.1 5.0 - 12.0 %    Eosinophil % 2.0 0.3 - 6.2 %    Basophil % 0.4 0.0 - 1.5 %    Immature Grans % 0.3 0.0 - 0.5 %    Neutrophils, Absolute 5.94 1.70 - 7.00 10*3/mm3    Lymphocytes, Absolute 1.28 0.70 - 3.10 10*3/mm3    Monocytes, Absolute 0.40 0.10 - 0.90 10*3/mm3    Eosinophils, Absolute 0.16 0.00 - 0.40 10*3/mm3    Basophils, Absolute 0.03 0.00 - 0.20 10*3/mm3    Immature Grans, Absolute 0.02 0.00 - 0.05 10*3/mm3    nRBC 0.0 0.0 - 0.2 /100 WBC     Medications - No data to display  XR Hand 3+ View " Right    Result Date: 12/20/2022  Narrative: PROCEDURE: XR HAND 3+ VW RIGHT  COMPARISON: [facility], CR, XR HAND 3+ VW RIGHT, 8/21/2022, 8:56.  INDICATIONS: RIGHT HAND PAIN/SWELLING/NO KNOWN INJURY  FINDINGS:  There is no significant interval change from the recent study.  The digits are flexed which makes evaluation of the joint spaces somewhat difficult.  There is joint space loss with marginal osteophytic spurs and erosive changes involving the interphalangeal joints of the digits and the MCP joints.  This appears to represent a combination of degenerative arthritis and underlying inflammatory arthritis.  There is no acute fracture or dislocation.  There is soft tissue swelling involving the right hand.      Impression:   1. No interval change in what appears to be a combination of degenerative arthritis and inflammatory arthritis. 2. There is persistent soft tissue swelling. 3. There is no acute fracture or dislocation.      TAJ JAFFE MD       Electronically Signed and Approved By: TAJ JAFFE MD on 12/20/2022 at 11:45               MDM:    Procedures      The case was discussed between the DANIEL and myself. Patient  care including, but not limited to ordered imaging, medications, and lab results were reviewed. I then performed the substantive portion of the visit including all aspects of the medical decision making.        Niall Valdez DO  22:32 EST  12/20/22       Niall Valdez DO  12/20/22 2232

## 2022-12-20 NOTE — THERAPY EVALUATION
Patient Name: Lyle Lozano  : 1955    MRN: [id number]                              Today's Date: 2022       Admit Date: 2022    Visit Dx:     ICD-10-CM ICD-9-CM   1. 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   • Hypertensive heart disease with heart failure ([facility])   • Gout   • Stage 3 chronic kidney disease ([facility])   • Hand arthritis   • Acute pain of left knee     Past Medical History:   Diagnosis Date   • Abnormal kidney function    • Arthritis    • Bursitis    • CHF (congestive heart failure) ([facility])    • Depression    • Diabetes ([facility])    • Edema    • Essential hypertension 2021   • Forgetfulness    • Head injury    • Hernia cerebri ([facility])    • Hyperlipidemia    • Hypertension    • IFG (impaired fasting glucose)    • Low back pain    • Lumbago     `   • Pain of left hip    • Right shoulder pain    • Sleep apnea    • SOB (shortness of breath)      Past Surgical History:   Procedure Laterality Date   • CYST REMOVAL Right     leg      General Information     Row Name 22 1141          Physical Therapy Time and Intention    Document Type evaluation  -LR     Mode of Treatment individual therapy  -LR     Row Name 22 1141          General Information    Patient Profile Reviewed yes  -LR     Prior Level of Function independent:  -LR           User Key  (r) = Recorded By, (t) = Taken By, (c) = Cosigned By    Initials Name Provider Type    LR Vania Crocker, GILSON Physical Therapist              Subjective: Pt reports his R  hand started hurting a few days ago.  He reports no JAKE.  He states a previous history of R hand pain and arthritis.  He states pain is worse with use and keeping it still and resting relieves pain.  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    Objective:  Posture: R wrist and fingers held in slight flexion    Palpation: Tender to palpation at R metacarpals and digits and at wrist joint line    ROM:  Active Wrist ROM:  L Shoulder ROM:  R Wrist ROM:  Flexion: NT   Flexion: 15°  Abduction: NT   Extension: 20°  External Rotation: NT  Radial deviation: 5°  Internal Rotation: NT  Ulnar deviation: 6°    Decreased R wrist PROM  R finger and thumb flexion and extension are limited; R thumb opposition is also limited  R hand MCP, PIP, and DIP ROM are limited     Strength:  R wrist strength: 3-/5  Decreased R  strength    Sensation: R UE sensation intct    Assessment/Plan:   Pt presents with a diagnosis of R hand pain and has significantly limited wrist and hand ROM, wrist and hand weakness, and hand swelling that are limiting his ability to grasp and lift objects.  Gentle PROM to wrist and hand was performed.  Pt was educated in and performed ROM exercises for his wrist and hand and was provided with a handout.     Goals:   LTG 1: The patient will be independent in HEP in order to decrease pain and improve tolerance to functional activities.  STATUS: Met    Interventions:   Manual Therapy: PROM to R wrist (flexion/extension/ulnar and radial deviation), R hand finger extension    Therapeutic Exercises: wrist flexion/extension/radial deviation/ulnar deviation (10x each), thumb opposition (5x each), finger abduction, MCP flexion (10x), PIP flexion (10x), DIP flexion (10x)    Modalities: not performed   Outcome Measures     Row Name 12/20/22 1141          Optimal Instrument    Optimal Instrument Optimal - 3  -LR     Pushing 4  -LR     Grasping 4  -LR     Carrying 4  -LR     From the list, choose the 3 activities you  would most like to be able to do without any difficulty Grasping;Pushing;Carrying  -LR     Total Score Optimal - 3 12  -LR     Row Name 12/20/22 1141          Functional Assessment    Outcome Measure Options Optimal Instrument  -LR           User Key  (r) = Recorded By, (t) = Taken By, (c) = Cosigned By    Initials Name Provider Type    LR Vania Crocker, PT Physical Therapist               12/20/22 1142   PT Evaluation Complexity   History, PT Evaluation Complexity 1-2 personal factors and/or comorbidities   Examination of Body Systems (PT Eval Complexity) 1-2 elements   Clinical Presentation (PT Evaluation Complexity) stable   Clinical Decision Making (PT Evaluation Complexity) low complexity   Overall Complexity (PT Evaluation Complexity) low complexity      Time Calculation:    PT Charges     Row Name 12/20/22 1142             Time Calculation    PT Received On 12/20/22  -LR         Time Calculation- PT    Total Timed Code Minutes- PT 32 minute(s)  -LR         Timed Charges    96030 - PT Therapeutic Exercise Minutes 13  -LR      15518 - PT Manual Therapy Minutes 8  -LR         Untimed Charges    PT Eval/Re-eval Minutes 11  -LR         Total Minutes    Timed Charges Total Minutes 21  -LR      Untimed Charges Total Minutes 11  -LR       Total Minutes 32  -LR            User Key  (r) = Recorded By, (t) = Taken By, (c) = Cosigned By    Initials Name Provider Type    LR Vania Crocker, PT Physical Therapist              Therapy Charges for Today     Code Description Service Date Service Provider Modifiers Qty    81384493046 HC PT THER PROC EA 15 MIN 12/20/2022 Vania Crocker, PT GP 1    05493728166 HC PT EVAL LOW COMPLEXITY 1 12/20/2022 Vania Crocker, PT GP 1          PT G-Codes  Outcome Measure Options: Optimal Instrument       Vania Crocker, PT  12/20/2022

## 2023-01-06 ENCOUNTER — RESEARCH ENCOUNTER (OUTPATIENT)
Dept: OTHER | Facility: OTHER | Age: 68
End: 2023-01-06
Payer: MEDICARE

## 2023-01-25 ENCOUNTER — APPOINTMENT (OUTPATIENT)
Dept: GENERAL RADIOLOGY | Facility: HOSPITAL | Age: 68
DRG: 281 | End: 2023-01-25
Payer: MEDICARE

## 2023-01-25 ENCOUNTER — APPOINTMENT (OUTPATIENT)
Dept: CARDIOLOGY | Facility: HOSPITAL | Age: 68
DRG: 281 | End: 2023-01-25
Payer: MEDICARE

## 2023-01-25 ENCOUNTER — HOSPITAL ENCOUNTER (INPATIENT)
Facility: HOSPITAL | Age: 68
LOS: 7 days | Discharge: HOME | DRG: 281 | End: 2023-02-01
Attending: EMERGENCY MEDICINE | Admitting: STUDENT IN AN ORGANIZED HEALTH CARE EDUCATION/TRAINING PROGRAM
Payer: MEDICARE

## 2023-01-25 DIAGNOSIS — I50.23 ACUTE ON CHRONIC SYSTOLIC HEART FAILURE: ICD-10-CM

## 2023-01-25 DIAGNOSIS — J81.0 ACUTE PULMONARY EDEMA: ICD-10-CM

## 2023-01-25 DIAGNOSIS — I50.33 ACUTE ON CHRONIC HEART FAILURE WITH PRESERVED EJECTION FRACTION (HFPEF): Primary | ICD-10-CM

## 2023-01-25 LAB
ALBUMIN SERPL-MCNC: 2.7 G/DL (ref 3.5–5.2)
ALBUMIN/GLOB SERPL: 0.8 G/DL
ALP SERPL-CCNC: 182 U/L (ref 39–117)
ALT SERPL W P-5'-P-CCNC: 19 U/L (ref 1–41)
ANION GAP SERPL CALCULATED.3IONS-SCNC: 9.4 MMOL/L (ref 5–15)
AST SERPL-CCNC: 18 U/L (ref 1–40)
BASOPHILS # BLD AUTO: 0.04 10*3/MM3 (ref 0–0.2)
BASOPHILS NFR BLD AUTO: 0.5 % (ref 0–1.5)
BILIRUB SERPL-MCNC: 0.4 MG/DL (ref 0–1.2)
BUN SERPL-MCNC: 34 MG/DL (ref 8–23)
BUN/CREAT SERPL: 16.8 (ref 7–25)
CALCIUM SPEC-SCNC: 8.4 MG/DL (ref 8.6–10.5)
CHLORIDE SERPL-SCNC: 102 MMOL/L (ref 98–107)
CO2 SERPL-SCNC: 24.6 MMOL/L (ref 22–29)
CREAT SERPL-MCNC: 2.02 MG/DL (ref 0.76–1.27)
DEPRECATED RDW RBC AUTO: 52.6 FL (ref 37–54)
EGFRCR SERPLBLD CKD-EPI 2021: 35.9 ML/MIN/1.73
EOSINOPHIL # BLD AUTO: 0.18 10*3/MM3 (ref 0–0.4)
EOSINOPHIL NFR BLD AUTO: 2.3 % (ref 0.3–6.2)
ERYTHROCYTE [DISTWIDTH] IN BLOOD BY AUTOMATED COUNT: 17.2 % (ref 12.3–15.4)
GLOBULIN UR ELPH-MCNC: 3.6 GM/DL
GLUCOSE BLDC GLUCOMTR-MCNC: 128 MG/DL (ref 70–99)
GLUCOSE SERPL-MCNC: 189 MG/DL (ref 65–99)
HCT VFR BLD AUTO: 31.1 % (ref 37.5–51)
HGB BLD-MCNC: 9.6 G/DL (ref 13–17.7)
HOLD SPECIMEN: NORMAL
HOLD SPECIMEN: NORMAL
IMM GRANULOCYTES # BLD AUTO: 0.01 10*3/MM3 (ref 0–0.05)
IMM GRANULOCYTES NFR BLD AUTO: 0.1 % (ref 0–0.5)
LYMPHOCYTES # BLD AUTO: 1.54 10*3/MM3 (ref 0.7–3.1)
LYMPHOCYTES NFR BLD AUTO: 19.4 % (ref 19.6–45.3)
MAGNESIUM SERPL-MCNC: 1.8 MG/DL (ref 1.6–2.4)
MCH RBC QN AUTO: 25.9 PG (ref 26.6–33)
MCHC RBC AUTO-ENTMCNC: 30.9 G/DL (ref 31.5–35.7)
MCV RBC AUTO: 84.1 FL (ref 79–97)
MONOCYTES # BLD AUTO: 0.49 10*3/MM3 (ref 0.1–0.9)
MONOCYTES NFR BLD AUTO: 6.2 % (ref 5–12)
NEUTROPHILS NFR BLD AUTO: 5.68 10*3/MM3 (ref 1.7–7)
NEUTROPHILS NFR BLD AUTO: 71.5 % (ref 42.7–76)
NRBC BLD AUTO-RTO: 0 /100 WBC (ref 0–0.2)
NT-PROBNP SERPL-MCNC: ABNORMAL PG/ML (ref 0–900)
PHOSPHATE SERPL-MCNC: 3.1 MG/DL (ref 2.5–4.5)
PLATELET # BLD AUTO: 331 10*3/MM3 (ref 140–450)
PMV BLD AUTO: 9.3 FL (ref 6–12)
POTASSIUM SERPL-SCNC: 4.3 MMOL/L (ref 3.5–5.2)
PROT SERPL-MCNC: 6.3 G/DL (ref 6–8.5)
RBC # BLD AUTO: 3.7 10*6/MM3 (ref 4.14–5.8)
SODIUM SERPL-SCNC: 136 MMOL/L (ref 136–145)
TROPONIN T SERPL-MCNC: 0.08 NG/ML (ref 0–0.03)
TROPONIN T SERPL-MCNC: 0.09 NG/ML (ref 0–0.03)
WBC NRBC COR # BLD: 7.94 10*3/MM3 (ref 3.4–10.8)
WHOLE BLOOD HOLD COAG: NORMAL
WHOLE BLOOD HOLD SPECIMEN: NORMAL

## 2023-01-25 PROCEDURE — 93971 EXTREMITY STUDY: CPT

## 2023-01-25 PROCEDURE — 84484 ASSAY OF TROPONIN QUANT: CPT | Performed by: STUDENT IN AN ORGANIZED HEALTH CARE EDUCATION/TRAINING PROGRAM

## 2023-01-25 PROCEDURE — 93971 EXTREMITY STUDY: CPT | Performed by: SURGERY

## 2023-01-25 PROCEDURE — 82962 GLUCOSE BLOOD TEST: CPT

## 2023-01-25 PROCEDURE — 25010000002 FUROSEMIDE PER 20 MG: Performed by: EMERGENCY MEDICINE

## 2023-01-25 PROCEDURE — 71045 X-RAY EXAM CHEST 1 VIEW: CPT

## 2023-01-25 PROCEDURE — 93005 ELECTROCARDIOGRAM TRACING: CPT

## 2023-01-25 PROCEDURE — 84484 ASSAY OF TROPONIN QUANT: CPT

## 2023-01-25 PROCEDURE — 83735 ASSAY OF MAGNESIUM: CPT | Performed by: STUDENT IN AN ORGANIZED HEALTH CARE EDUCATION/TRAINING PROGRAM

## 2023-01-25 PROCEDURE — 80053 COMPREHEN METABOLIC PANEL: CPT

## 2023-01-25 PROCEDURE — 83880 ASSAY OF NATRIURETIC PEPTIDE: CPT

## 2023-01-25 PROCEDURE — 36415 COLL VENOUS BLD VENIPUNCTURE: CPT

## 2023-01-25 PROCEDURE — 85025 COMPLETE CBC W/AUTO DIFF WBC: CPT

## 2023-01-25 PROCEDURE — 93010 ELECTROCARDIOGRAM REPORT: CPT | Performed by: SPECIALIST

## 2023-01-25 PROCEDURE — 93005 ELECTROCARDIOGRAM TRACING: CPT | Performed by: EMERGENCY MEDICINE

## 2023-01-25 PROCEDURE — 99223 1ST HOSP IP/OBS HIGH 75: CPT | Performed by: STUDENT IN AN ORGANIZED HEALTH CARE EDUCATION/TRAINING PROGRAM

## 2023-01-25 PROCEDURE — 99284 EMERGENCY DEPT VISIT MOD MDM: CPT

## 2023-01-25 PROCEDURE — 84100 ASSAY OF PHOSPHORUS: CPT | Performed by: STUDENT IN AN ORGANIZED HEALTH CARE EDUCATION/TRAINING PROGRAM

## 2023-01-25 RX ORDER — HEPARIN SODIUM 5000 [USP'U]/ML
5000 INJECTION, SOLUTION INTRAVENOUS; SUBCUTANEOUS EVERY 8 HOURS SCHEDULED
Status: DISCONTINUED | OUTPATIENT
Start: 2023-01-25 | End: 2023-02-01 | Stop reason: HOSPADM

## 2023-01-25 RX ORDER — DEXTROSE MONOHYDRATE 25 G/50ML
25 INJECTION, SOLUTION INTRAVENOUS
Status: DISCONTINUED | OUTPATIENT
Start: 2023-01-25 | End: 2023-02-01 | Stop reason: HOSPADM

## 2023-01-25 RX ORDER — SODIUM CHLORIDE 0.9 % (FLUSH) 0.9 %
10 SYRINGE (ML) INJECTION AS NEEDED
Status: DISCONTINUED | OUTPATIENT
Start: 2023-01-25 | End: 2023-02-01 | Stop reason: HOSPADM

## 2023-01-25 RX ORDER — FUROSEMIDE 10 MG/ML
80 INJECTION INTRAMUSCULAR; INTRAVENOUS ONCE
Status: COMPLETED | OUTPATIENT
Start: 2023-01-26 | End: 2023-01-26

## 2023-01-25 RX ORDER — INSULIN LISPRO 100 [IU]/ML
2-7 INJECTION, SOLUTION INTRAVENOUS; SUBCUTANEOUS
Status: DISCONTINUED | OUTPATIENT
Start: 2023-01-26 | End: 2023-02-01 | Stop reason: HOSPADM

## 2023-01-25 RX ORDER — CARVEDILOL 12.5 MG/1
12.5 TABLET ORAL 2 TIMES DAILY WITH MEALS
Status: DISCONTINUED | OUTPATIENT
Start: 2023-01-25 | End: 2023-02-01 | Stop reason: HOSPADM

## 2023-01-25 RX ORDER — FUROSEMIDE 10 MG/ML
80 INJECTION INTRAMUSCULAR; INTRAVENOUS ONCE
Status: COMPLETED | OUTPATIENT
Start: 2023-01-25 | End: 2023-01-25

## 2023-01-25 RX ORDER — NICOTINE POLACRILEX 4 MG
15 LOZENGE BUCCAL
Status: DISCONTINUED | OUTPATIENT
Start: 2023-01-25 | End: 2023-02-01 | Stop reason: HOSPADM

## 2023-01-25 RX ADMIN — FUROSEMIDE 80 MG: 10 INJECTION, SOLUTION INTRAMUSCULAR; INTRAVENOUS at 20:04

## 2023-01-26 ENCOUNTER — APPOINTMENT (OUTPATIENT)
Dept: CARDIOLOGY | Facility: HOSPITAL | Age: 68
DRG: 281 | End: 2023-01-26
Payer: MEDICARE

## 2023-01-26 PROBLEM — R77.8 ELEVATED TROPONIN LEVEL NOT DUE MYOCARDIAL INFARCTION: Status: ACTIVE | Noted: 2023-01-26

## 2023-01-26 PROBLEM — R79.89 ELEVATED TROPONIN LEVEL NOT DUE MYOCARDIAL INFARCTION: Status: ACTIVE | Noted: 2023-01-26

## 2023-01-26 PROBLEM — I50.33 ACUTE ON CHRONIC HEART FAILURE WITH PRESERVED EJECTION FRACTION (HFPEF): Status: ACTIVE | Noted: 2023-01-26

## 2023-01-26 LAB
ANION GAP SERPL CALCULATED.3IONS-SCNC: 6.3 MMOL/L (ref 5–15)
BH CV ECHO MEAS - AO ROOT DIAM: 2.5 CM
BH CV ECHO MEAS - EF(MOD-BP): 25 %
BH CV ECHO MEAS - IVSD: 1.2 CM
BH CV ECHO MEAS - LA DIMENSION: 4.2 CM
BH CV ECHO MEAS - LAT PEAK E' VEL: 4 CM/SEC
BH CV ECHO MEAS - LVIDD: 5.4 CM
BH CV ECHO MEAS - LVIDS: 4.8 CM
BH CV ECHO MEAS - LVPWD: 1.3 CM
BH CV ECHO MEAS - MED PEAK E' VEL: 5 CM/SEC
BH CV ECHO MEAS - MV A MAX VEL: 59 CM/SEC
BH CV ECHO MEAS - MV DEC TIME: 126 MSEC
BH CV ECHO MEAS - MV E MAX VEL: 105 CM/SEC
BH CV ECHO MEAS - MV E/A: 1.8
BH CV ECHO MEASUREMENTS AVERAGE E/E' RATIO: 23.33
BH CV LOWER VASCULAR LEFT COMMON FEMORAL AUGMENT: NORMAL
BH CV LOWER VASCULAR LEFT COMMON FEMORAL COMPETENT: NORMAL
BH CV LOWER VASCULAR LEFT COMMON FEMORAL COMPRESS: NORMAL
BH CV LOWER VASCULAR LEFT COMMON FEMORAL PHASIC: NORMAL
BH CV LOWER VASCULAR LEFT COMMON FEMORAL SPONT: NORMAL
BH CV LOWER VASCULAR RIGHT ANTERIOR SAPH COMPRESS: NORMAL
BH CV LOWER VASCULAR RIGHT COMMON FEMORAL AUGMENT: NORMAL
BH CV LOWER VASCULAR RIGHT COMMON FEMORAL COMPETENT: NORMAL
BH CV LOWER VASCULAR RIGHT COMMON FEMORAL COMPRESS: NORMAL
BH CV LOWER VASCULAR RIGHT COMMON FEMORAL PHASIC: NORMAL
BH CV LOWER VASCULAR RIGHT COMMON FEMORAL SPONT: NORMAL
BH CV LOWER VASCULAR RIGHT DISTAL FEMORAL AUGMENT: NORMAL
BH CV LOWER VASCULAR RIGHT DISTAL FEMORAL COMPETENT: NORMAL
BH CV LOWER VASCULAR RIGHT DISTAL FEMORAL COMPRESS: NORMAL
BH CV LOWER VASCULAR RIGHT DISTAL FEMORAL PHASIC: NORMAL
BH CV LOWER VASCULAR RIGHT DISTAL FEMORAL SPONT: NORMAL
BH CV LOWER VASCULAR RIGHT GASTRONEMIUS COMPRESS: NORMAL
BH CV LOWER VASCULAR RIGHT GREATER SAPH AK COMPRESS: NORMAL
BH CV LOWER VASCULAR RIGHT GREATER SAPH BK COMPRESS: NORMAL
BH CV LOWER VASCULAR RIGHT LESSER SAPH COMPRESS: NORMAL
BH CV LOWER VASCULAR RIGHT MID FEMORAL COMPRESS: NORMAL
BH CV LOWER VASCULAR RIGHT PERONEAL COMPRESS: NORMAL
BH CV LOWER VASCULAR RIGHT POPLITEAL AUGMENT: NORMAL
BH CV LOWER VASCULAR RIGHT POPLITEAL COMPETENT: NORMAL
BH CV LOWER VASCULAR RIGHT POPLITEAL COMPRESS: NORMAL
BH CV LOWER VASCULAR RIGHT POPLITEAL PHASIC: NORMAL
BH CV LOWER VASCULAR RIGHT POPLITEAL SPONT: NORMAL
BH CV LOWER VASCULAR RIGHT POSTERIOR TIBIAL AUGMENT: NORMAL
BH CV LOWER VASCULAR RIGHT POSTERIOR TIBIAL COMPRESS: NORMAL
BH CV LOWER VASCULAR RIGHT PROXIMAL FEMORAL COMPRESS: NORMAL
BUN SERPL-MCNC: 32 MG/DL (ref 8–23)
BUN/CREAT SERPL: 15.2 (ref 7–25)
CALCIUM SPEC-SCNC: 8.2 MG/DL (ref 8.6–10.5)
CHLORIDE SERPL-SCNC: 107 MMOL/L (ref 98–107)
CO2 SERPL-SCNC: 24.7 MMOL/L (ref 22–29)
CREAT SERPL-MCNC: 2.1 MG/DL (ref 0.76–1.27)
DEPRECATED RDW RBC AUTO: 51.1 FL (ref 37–54)
EGFRCR SERPLBLD CKD-EPI 2021: 34.3 ML/MIN/1.73
ERYTHROCYTE [DISTWIDTH] IN BLOOD BY AUTOMATED COUNT: 17.2 % (ref 12.3–15.4)
GLUCOSE BLDC GLUCOMTR-MCNC: 128 MG/DL (ref 70–99)
GLUCOSE BLDC GLUCOMTR-MCNC: 133 MG/DL (ref 70–99)
GLUCOSE BLDC GLUCOMTR-MCNC: 175 MG/DL (ref 70–99)
GLUCOSE SERPL-MCNC: 134 MG/DL (ref 65–99)
HCT VFR BLD AUTO: 27.4 % (ref 37.5–51)
HGB BLD-MCNC: 8.8 G/DL (ref 13–17.7)
IVRT: 28 MSEC
LEFT ATRIUM VOLUME INDEX: 40 ML/M2
MAGNESIUM SERPL-MCNC: 1.8 MG/DL (ref 1.6–2.4)
MAXIMAL PREDICTED HEART RATE: 155 BPM
MAXIMAL PREDICTED HEART RATE: 155 BPM
MCH RBC QN AUTO: 26.6 PG (ref 26.6–33)
MCHC RBC AUTO-ENTMCNC: 32.1 G/DL (ref 31.5–35.7)
MCV RBC AUTO: 82.8 FL (ref 79–97)
PHOSPHATE SERPL-MCNC: 3.1 MG/DL (ref 2.5–4.5)
PLATELET # BLD AUTO: 273 10*3/MM3 (ref 140–450)
PMV BLD AUTO: 9.1 FL (ref 6–12)
POTASSIUM SERPL-SCNC: 4.1 MMOL/L (ref 3.5–5.2)
RBC # BLD AUTO: 3.31 10*6/MM3 (ref 4.14–5.8)
SODIUM SERPL-SCNC: 138 MMOL/L (ref 136–145)
STRESS TARGET HR: 132 BPM
STRESS TARGET HR: 132 BPM
TROPONIN T SERPL-MCNC: 0.07 NG/ML (ref 0–0.03)
WBC NRBC COR # BLD: 5.37 10*3/MM3 (ref 3.4–10.8)

## 2023-01-26 PROCEDURE — 63710000001 INSULIN LISPRO (HUMAN) PER 5 UNITS: Performed by: STUDENT IN AN ORGANIZED HEALTH CARE EDUCATION/TRAINING PROGRAM

## 2023-01-26 PROCEDURE — 83735 ASSAY OF MAGNESIUM: CPT | Performed by: STUDENT IN AN ORGANIZED HEALTH CARE EDUCATION/TRAINING PROGRAM

## 2023-01-26 PROCEDURE — 99222 1ST HOSP IP/OBS MODERATE 55: CPT | Performed by: INTERNAL MEDICINE

## 2023-01-26 PROCEDURE — 84484 ASSAY OF TROPONIN QUANT: CPT | Performed by: STUDENT IN AN ORGANIZED HEALTH CARE EDUCATION/TRAINING PROGRAM

## 2023-01-26 PROCEDURE — 93306 TTE W/DOPPLER COMPLETE: CPT

## 2023-01-26 PROCEDURE — 99233 SBSQ HOSP IP/OBS HIGH 50: CPT | Performed by: INTERNAL MEDICINE

## 2023-01-26 PROCEDURE — 82962 GLUCOSE BLOOD TEST: CPT

## 2023-01-26 PROCEDURE — 25010000002 FUROSEMIDE PER 20 MG: Performed by: STUDENT IN AN ORGANIZED HEALTH CARE EDUCATION/TRAINING PROGRAM

## 2023-01-26 PROCEDURE — 84100 ASSAY OF PHOSPHORUS: CPT | Performed by: STUDENT IN AN ORGANIZED HEALTH CARE EDUCATION/TRAINING PROGRAM

## 2023-01-26 PROCEDURE — 93306 TTE W/DOPPLER COMPLETE: CPT | Performed by: INTERNAL MEDICINE

## 2023-01-26 PROCEDURE — 85027 COMPLETE CBC AUTOMATED: CPT | Performed by: STUDENT IN AN ORGANIZED HEALTH CARE EDUCATION/TRAINING PROGRAM

## 2023-01-26 PROCEDURE — 80048 BASIC METABOLIC PNL TOTAL CA: CPT | Performed by: STUDENT IN AN ORGANIZED HEALTH CARE EDUCATION/TRAINING PROGRAM

## 2023-01-26 PROCEDURE — 25010000002 HEPARIN (PORCINE) PER 1000 UNITS: Performed by: STUDENT IN AN ORGANIZED HEALTH CARE EDUCATION/TRAINING PROGRAM

## 2023-01-26 RX ORDER — METOLAZONE 5 MG/1
5 TABLET ORAL ONCE
Status: COMPLETED | OUTPATIENT
Start: 2023-01-26 | End: 2023-01-26

## 2023-01-26 RX ORDER — SPIRONOLACTONE 25 MG/1
25 TABLET ORAL DAILY
Status: DISCONTINUED | OUTPATIENT
Start: 2023-01-26 | End: 2023-02-01 | Stop reason: HOSPADM

## 2023-01-26 RX ORDER — ACETAMINOPHEN 325 MG/1
650 TABLET ORAL EVERY 6 HOURS PRN
Status: DISCONTINUED | OUTPATIENT
Start: 2023-01-26 | End: 2023-02-01 | Stop reason: HOSPADM

## 2023-01-26 RX ORDER — TRAMADOL HYDROCHLORIDE 50 MG/1
25 TABLET ORAL ONCE AS NEEDED
Status: COMPLETED | OUTPATIENT
Start: 2023-01-26 | End: 2023-01-26

## 2023-01-26 RX ADMIN — SPIRONOLACTONE 25 MG: 25 TABLET ORAL at 16:38

## 2023-01-26 RX ADMIN — HEPARIN SODIUM 5000 UNITS: 5000 INJECTION INTRAVENOUS; SUBCUTANEOUS at 13:24

## 2023-01-26 RX ADMIN — NITROGLYCERIN 1 INCH: 20 OINTMENT TOPICAL at 12:16

## 2023-01-26 RX ADMIN — NITROGLYCERIN 1 INCH: 20 OINTMENT TOPICAL at 23:03

## 2023-01-26 RX ADMIN — SACUBITRIL AND VALSARTAN 1 TABLET: 49; 51 TABLET, FILM COATED ORAL at 10:13

## 2023-01-26 RX ADMIN — Medication 10 ML: at 08:09

## 2023-01-26 RX ADMIN — NITROGLYCERIN 1 INCH: 20 OINTMENT TOPICAL at 17:11

## 2023-01-26 RX ADMIN — TRAMADOL HYDROCHLORIDE 25 MG: 50 TABLET ORAL at 21:08

## 2023-01-26 RX ADMIN — HEPARIN SODIUM 5000 UNITS: 5000 INJECTION INTRAVENOUS; SUBCUTANEOUS at 02:22

## 2023-01-26 RX ADMIN — CARVEDILOL 12.5 MG: 12.5 TABLET, FILM COATED ORAL at 17:11

## 2023-01-26 RX ADMIN — METOLAZONE 5 MG: 5 TABLET ORAL at 10:13

## 2023-01-26 RX ADMIN — MAGNESIUM OXIDE TAB 400 MG (241.3 MG ELEMENTAL MG) 400 MG: 400 (241.3 MG) TAB at 02:22

## 2023-01-26 RX ADMIN — CARVEDILOL 12.5 MG: 12.5 TABLET, FILM COATED ORAL at 02:22

## 2023-01-26 RX ADMIN — CARVEDILOL 12.5 MG: 12.5 TABLET, FILM COATED ORAL at 08:08

## 2023-01-26 RX ADMIN — SACUBITRIL AND VALSARTAN 1 TABLET: 49; 51 TABLET, FILM COATED ORAL at 21:09

## 2023-01-26 RX ADMIN — HEPARIN SODIUM 5000 UNITS: 5000 INJECTION INTRAVENOUS; SUBCUTANEOUS at 21:08

## 2023-01-26 RX ADMIN — INSULIN LISPRO 2 UNITS: 100 INJECTION, SOLUTION INTRAVENOUS; SUBCUTANEOUS at 17:11

## 2023-01-26 RX ADMIN — FUROSEMIDE 80 MG: 10 INJECTION, SOLUTION INTRAMUSCULAR; INTRAVENOUS at 08:08

## 2023-01-26 RX ADMIN — ACETAMINOPHEN 650 MG: 325 TABLET ORAL at 17:11

## 2023-01-27 LAB
ALBUMIN SERPL-MCNC: 2.3 G/DL (ref 3.5–5.2)
ALBUMIN/GLOB SERPL: 0.6 G/DL
ALP SERPL-CCNC: 141 U/L (ref 39–117)
ALT SERPL W P-5'-P-CCNC: 14 U/L (ref 1–41)
ANION GAP SERPL CALCULATED.3IONS-SCNC: 8.7 MMOL/L (ref 5–15)
AST SERPL-CCNC: 13 U/L (ref 1–40)
BASOPHILS # BLD AUTO: 0.03 10*3/MM3 (ref 0–0.2)
BASOPHILS NFR BLD AUTO: 0.4 % (ref 0–1.5)
BILIRUB SERPL-MCNC: 0.2 MG/DL (ref 0–1.2)
BUN SERPL-MCNC: 35 MG/DL (ref 8–23)
BUN/CREAT SERPL: 17.1 (ref 7–25)
CALCIUM SPEC-SCNC: 8.1 MG/DL (ref 8.6–10.5)
CHLORIDE SERPL-SCNC: 105 MMOL/L (ref 98–107)
CO2 SERPL-SCNC: 23.3 MMOL/L (ref 22–29)
CREAT SERPL-MCNC: 2.05 MG/DL (ref 0.76–1.27)
DEPRECATED RDW RBC AUTO: 52.8 FL (ref 37–54)
EGFRCR SERPLBLD CKD-EPI 2021: 35.3 ML/MIN/1.73
EOSINOPHIL # BLD AUTO: 0.19 10*3/MM3 (ref 0–0.4)
EOSINOPHIL NFR BLD AUTO: 2.7 % (ref 0.3–6.2)
ERYTHROCYTE [DISTWIDTH] IN BLOOD BY AUTOMATED COUNT: 17.2 % (ref 12.3–15.4)
GLOBULIN UR ELPH-MCNC: 3.6 GM/DL
GLUCOSE BLDC GLUCOMTR-MCNC: 123 MG/DL (ref 70–99)
GLUCOSE BLDC GLUCOMTR-MCNC: 151 MG/DL (ref 70–99)
GLUCOSE BLDC GLUCOMTR-MCNC: 174 MG/DL (ref 70–99)
GLUCOSE SERPL-MCNC: 153 MG/DL (ref 65–99)
HCT VFR BLD AUTO: 27.8 % (ref 37.5–51)
HGB BLD-MCNC: 8.7 G/DL (ref 13–17.7)
IMM GRANULOCYTES # BLD AUTO: 0.03 10*3/MM3 (ref 0–0.05)
IMM GRANULOCYTES NFR BLD AUTO: 0.4 % (ref 0–0.5)
LYMPHOCYTES # BLD AUTO: 1.33 10*3/MM3 (ref 0.7–3.1)
LYMPHOCYTES NFR BLD AUTO: 18.6 % (ref 19.6–45.3)
MAGNESIUM SERPL-MCNC: 1.7 MG/DL (ref 1.6–2.4)
MCH RBC QN AUTO: 26.1 PG (ref 26.6–33)
MCHC RBC AUTO-ENTMCNC: 31.3 G/DL (ref 31.5–35.7)
MCV RBC AUTO: 83.5 FL (ref 79–97)
MONOCYTES # BLD AUTO: 0.45 10*3/MM3 (ref 0.1–0.9)
MONOCYTES NFR BLD AUTO: 6.3 % (ref 5–12)
NEUTROPHILS NFR BLD AUTO: 5.13 10*3/MM3 (ref 1.7–7)
NEUTROPHILS NFR BLD AUTO: 71.6 % (ref 42.7–76)
NRBC BLD AUTO-RTO: 0 /100 WBC (ref 0–0.2)
PHOSPHATE SERPL-MCNC: 3.2 MG/DL (ref 2.5–4.5)
PLATELET # BLD AUTO: 278 10*3/MM3 (ref 140–450)
PMV BLD AUTO: 9 FL (ref 6–12)
POTASSIUM SERPL-SCNC: 3.8 MMOL/L (ref 3.5–5.2)
PROT SERPL-MCNC: 5.9 G/DL (ref 6–8.5)
RBC # BLD AUTO: 3.33 10*6/MM3 (ref 4.14–5.8)
SODIUM SERPL-SCNC: 137 MMOL/L (ref 136–145)
WBC NRBC COR # BLD: 7.16 10*3/MM3 (ref 3.4–10.8)

## 2023-01-27 PROCEDURE — 25010000002 MAGNESIUM SULFATE IN D5W 1G/100ML (PREMIX) 1-5 GM/100ML-% SOLUTION: Performed by: INTERNAL MEDICINE

## 2023-01-27 PROCEDURE — 99232 SBSQ HOSP IP/OBS MODERATE 35: CPT | Performed by: INTERNAL MEDICINE

## 2023-01-27 PROCEDURE — 63710000001 PREDNISONE PER 1 MG: Performed by: INTERNAL MEDICINE

## 2023-01-27 PROCEDURE — 82962 GLUCOSE BLOOD TEST: CPT

## 2023-01-27 PROCEDURE — 99233 SBSQ HOSP IP/OBS HIGH 50: CPT | Performed by: INTERNAL MEDICINE

## 2023-01-27 PROCEDURE — 84100 ASSAY OF PHOSPHORUS: CPT | Performed by: INTERNAL MEDICINE

## 2023-01-27 PROCEDURE — 80053 COMPREHEN METABOLIC PANEL: CPT | Performed by: INTERNAL MEDICINE

## 2023-01-27 PROCEDURE — 85025 COMPLETE CBC W/AUTO DIFF WBC: CPT | Performed by: INTERNAL MEDICINE

## 2023-01-27 PROCEDURE — 63710000001 INSULIN LISPRO (HUMAN) PER 5 UNITS: Performed by: STUDENT IN AN ORGANIZED HEALTH CARE EDUCATION/TRAINING PROGRAM

## 2023-01-27 PROCEDURE — 25010000002 HEPARIN (PORCINE) PER 1000 UNITS: Performed by: STUDENT IN AN ORGANIZED HEALTH CARE EDUCATION/TRAINING PROGRAM

## 2023-01-27 PROCEDURE — 83735 ASSAY OF MAGNESIUM: CPT | Performed by: INTERNAL MEDICINE

## 2023-01-27 RX ORDER — MAGNESIUM SULFATE 1 G/100ML
1 INJECTION INTRAVENOUS ONCE
Status: COMPLETED | OUTPATIENT
Start: 2023-01-27 | End: 2023-01-27

## 2023-01-27 RX ORDER — POTASSIUM CHLORIDE 750 MG/1
40 CAPSULE, EXTENDED RELEASE ORAL ONCE
Status: COMPLETED | OUTPATIENT
Start: 2023-01-27 | End: 2023-01-27

## 2023-01-27 RX ORDER — METOLAZONE 5 MG/1
5 TABLET ORAL ONCE
Status: COMPLETED | OUTPATIENT
Start: 2023-01-27 | End: 2023-01-27

## 2023-01-27 RX ORDER — PANTOPRAZOLE SODIUM 40 MG/1
40 TABLET, DELAYED RELEASE ORAL
Status: DISCONTINUED | OUTPATIENT
Start: 2023-01-27 | End: 2023-02-01 | Stop reason: HOSPADM

## 2023-01-27 RX ORDER — ASPIRIN 81 MG/1
81 TABLET ORAL DAILY
Status: DISCONTINUED | OUTPATIENT
Start: 2023-01-27 | End: 2023-02-01 | Stop reason: HOSPADM

## 2023-01-27 RX ORDER — HYDROCODONE BITARTRATE AND ACETAMINOPHEN 5; 325 MG/1; MG/1
1 TABLET ORAL ONCE AS NEEDED
Status: COMPLETED | OUTPATIENT
Start: 2023-01-27 | End: 2023-01-27

## 2023-01-27 RX ORDER — BUMETANIDE 0.25 MG/ML
2 INJECTION INTRAMUSCULAR; INTRAVENOUS
Status: DISCONTINUED | OUTPATIENT
Start: 2023-01-27 | End: 2023-02-01 | Stop reason: HOSPADM

## 2023-01-27 RX ORDER — ALLOPURINOL 100 MG/1
100 TABLET ORAL DAILY
Status: DISCONTINUED | OUTPATIENT
Start: 2023-01-27 | End: 2023-02-01 | Stop reason: HOSPADM

## 2023-01-27 RX ORDER — HYDROCODONE BITARTRATE AND ACETAMINOPHEN 5; 325 MG/1; MG/1
1 TABLET ORAL EVERY 6 HOURS PRN
Status: DISCONTINUED | OUTPATIENT
Start: 2023-01-27 | End: 2023-02-01 | Stop reason: HOSPADM

## 2023-01-27 RX ORDER — PREDNISONE 20 MG/1
20 TABLET ORAL
Status: COMPLETED | OUTPATIENT
Start: 2023-01-27 | End: 2023-01-31

## 2023-01-27 RX ADMIN — NITROGLYCERIN 1 INCH: 20 OINTMENT TOPICAL at 05:19

## 2023-01-27 RX ADMIN — METOLAZONE 5 MG: 5 TABLET ORAL at 11:08

## 2023-01-27 RX ADMIN — ACETAMINOPHEN 650 MG: 325 TABLET ORAL at 13:49

## 2023-01-27 RX ADMIN — BUMETANIDE 2 MG: 0.25 INJECTION INTRAMUSCULAR; INTRAVENOUS at 17:33

## 2023-01-27 RX ADMIN — PREDNISONE 20 MG: 20 TABLET ORAL at 11:08

## 2023-01-27 RX ADMIN — POTASSIUM CHLORIDE 40 MEQ: 10 CAPSULE, COATED, EXTENDED RELEASE ORAL at 11:07

## 2023-01-27 RX ADMIN — HYDROCODONE BITARTRATE AND ACETAMINOPHEN 1 TABLET: 5; 325 TABLET ORAL at 05:19

## 2023-01-27 RX ADMIN — SACUBITRIL AND VALSARTAN 1 TABLET: 49; 51 TABLET, FILM COATED ORAL at 08:02

## 2023-01-27 RX ADMIN — MAGNESIUM SULFATE 1 G: 1 INJECTION INTRAVENOUS at 11:07

## 2023-01-27 RX ADMIN — HEPARIN SODIUM 5000 UNITS: 5000 INJECTION INTRAVENOUS; SUBCUTANEOUS at 13:46

## 2023-01-27 RX ADMIN — BUMETANIDE 2 MG: 0.25 INJECTION INTRAMUSCULAR; INTRAVENOUS at 11:06

## 2023-01-27 RX ADMIN — HYDROCODONE BITARTRATE AND ACETAMINOPHEN 1 TABLET: 5; 325 TABLET ORAL at 18:25

## 2023-01-27 RX ADMIN — SACUBITRIL AND VALSARTAN 1 TABLET: 97; 103 TABLET, FILM COATED ORAL at 11:08

## 2023-01-27 RX ADMIN — INSULIN LISPRO 2 UNITS: 100 INJECTION, SOLUTION INTRAVENOUS; SUBCUTANEOUS at 17:34

## 2023-01-27 RX ADMIN — ALLOPURINOL 100 MG: 100 TABLET ORAL at 11:09

## 2023-01-27 RX ADMIN — SPIRONOLACTONE 25 MG: 25 TABLET ORAL at 08:02

## 2023-01-27 RX ADMIN — ASPIRIN 81 MG: 81 TABLET, COATED ORAL at 11:09

## 2023-01-27 RX ADMIN — CARVEDILOL 12.5 MG: 12.5 TABLET, FILM COATED ORAL at 17:34

## 2023-01-27 RX ADMIN — HEPARIN SODIUM 5000 UNITS: 5000 INJECTION INTRAVENOUS; SUBCUTANEOUS at 05:19

## 2023-01-27 RX ADMIN — INSULIN LISPRO 2 UNITS: 100 INJECTION, SOLUTION INTRAVENOUS; SUBCUTANEOUS at 08:02

## 2023-01-27 RX ADMIN — PANTOPRAZOLE SODIUM 40 MG: 40 TABLET, DELAYED RELEASE ORAL at 11:09

## 2023-01-27 RX ADMIN — HEPARIN SODIUM 5000 UNITS: 5000 INJECTION INTRAVENOUS; SUBCUTANEOUS at 21:53

## 2023-01-27 RX ADMIN — CARVEDILOL 12.5 MG: 12.5 TABLET, FILM COATED ORAL at 08:02

## 2023-01-28 LAB
ANION GAP SERPL CALCULATED.3IONS-SCNC: 7.7 MMOL/L (ref 5–15)
BUN SERPL-MCNC: 36 MG/DL (ref 8–23)
BUN/CREAT SERPL: 15.9 (ref 7–25)
CALCIUM SPEC-SCNC: 8.2 MG/DL (ref 8.6–10.5)
CHLORIDE SERPL-SCNC: 100 MMOL/L (ref 98–107)
CO2 SERPL-SCNC: 25.3 MMOL/L (ref 22–29)
CREAT SERPL-MCNC: 2.26 MG/DL (ref 0.76–1.27)
EGFRCR SERPLBLD CKD-EPI 2021: 31.4 ML/MIN/1.73
GLUCOSE BLDC GLUCOMTR-MCNC: 128 MG/DL (ref 70–99)
GLUCOSE BLDC GLUCOMTR-MCNC: 192 MG/DL (ref 70–99)
GLUCOSE BLDC GLUCOMTR-MCNC: 293 MG/DL (ref 70–99)
GLUCOSE SERPL-MCNC: 152 MG/DL (ref 65–99)
POTASSIUM SERPL-SCNC: 4 MMOL/L (ref 3.5–5.2)
SODIUM SERPL-SCNC: 133 MMOL/L (ref 136–145)

## 2023-01-28 PROCEDURE — 63710000001 INSULIN LISPRO (HUMAN) PER 5 UNITS: Performed by: STUDENT IN AN ORGANIZED HEALTH CARE EDUCATION/TRAINING PROGRAM

## 2023-01-28 PROCEDURE — 99232 SBSQ HOSP IP/OBS MODERATE 35: CPT | Performed by: INTERNAL MEDICINE

## 2023-01-28 PROCEDURE — 82962 GLUCOSE BLOOD TEST: CPT

## 2023-01-28 PROCEDURE — 25010000002 HEPARIN (PORCINE) PER 1000 UNITS: Performed by: STUDENT IN AN ORGANIZED HEALTH CARE EDUCATION/TRAINING PROGRAM

## 2023-01-28 PROCEDURE — 99233 SBSQ HOSP IP/OBS HIGH 50: CPT | Performed by: INTERNAL MEDICINE

## 2023-01-28 PROCEDURE — 80048 BASIC METABOLIC PNL TOTAL CA: CPT | Performed by: INTERNAL MEDICINE

## 2023-01-28 PROCEDURE — 63710000001 PREDNISONE PER 1 MG: Performed by: INTERNAL MEDICINE

## 2023-01-28 RX ADMIN — SACUBITRIL AND VALSARTAN 1 TABLET: 97; 103 TABLET, FILM COATED ORAL at 08:44

## 2023-01-28 RX ADMIN — BUMETANIDE 2 MG: 0.25 INJECTION INTRAMUSCULAR; INTRAVENOUS at 17:20

## 2023-01-28 RX ADMIN — ALLOPURINOL 100 MG: 100 TABLET ORAL at 08:44

## 2023-01-28 RX ADMIN — CARVEDILOL 12.5 MG: 12.5 TABLET, FILM COATED ORAL at 08:44

## 2023-01-28 RX ADMIN — SPIRONOLACTONE 25 MG: 25 TABLET ORAL at 08:45

## 2023-01-28 RX ADMIN — INSULIN LISPRO 2 UNITS: 100 INJECTION, SOLUTION INTRAVENOUS; SUBCUTANEOUS at 12:02

## 2023-01-28 RX ADMIN — INSULIN LISPRO 3 UNITS: 100 INJECTION, SOLUTION INTRAVENOUS; SUBCUTANEOUS at 17:20

## 2023-01-28 RX ADMIN — HYDROCODONE BITARTRATE AND ACETAMINOPHEN 1 TABLET: 5; 325 TABLET ORAL at 21:12

## 2023-01-28 RX ADMIN — SACUBITRIL AND VALSARTAN 1 TABLET: 97; 103 TABLET, FILM COATED ORAL at 21:09

## 2023-01-28 RX ADMIN — PANTOPRAZOLE SODIUM 40 MG: 40 TABLET, DELAYED RELEASE ORAL at 06:18

## 2023-01-28 RX ADMIN — ASPIRIN 81 MG: 81 TABLET, COATED ORAL at 08:44

## 2023-01-28 RX ADMIN — CARVEDILOL 12.5 MG: 12.5 TABLET, FILM COATED ORAL at 17:20

## 2023-01-28 RX ADMIN — HEPARIN SODIUM 5000 UNITS: 5000 INJECTION INTRAVENOUS; SUBCUTANEOUS at 14:52

## 2023-01-28 RX ADMIN — HYDROCODONE BITARTRATE AND ACETAMINOPHEN 1 TABLET: 5; 325 TABLET ORAL at 00:27

## 2023-01-28 RX ADMIN — HYDROCODONE BITARTRATE AND ACETAMINOPHEN 1 TABLET: 5; 325 TABLET ORAL at 07:31

## 2023-01-28 RX ADMIN — PREDNISONE 20 MG: 20 TABLET ORAL at 08:44

## 2023-01-28 RX ADMIN — HYDROCODONE BITARTRATE AND ACETAMINOPHEN 1 TABLET: 5; 325 TABLET ORAL at 14:53

## 2023-01-28 RX ADMIN — BUMETANIDE 2 MG: 0.25 INJECTION INTRAMUSCULAR; INTRAVENOUS at 08:47

## 2023-01-29 LAB
ALBUMIN SERPL-MCNC: 2.4 G/DL (ref 3.5–5.2)
ALBUMIN/GLOB SERPL: 0.7 G/DL
ALP SERPL-CCNC: 126 U/L (ref 39–117)
ALT SERPL W P-5'-P-CCNC: 13 U/L (ref 1–41)
ANION GAP SERPL CALCULATED.3IONS-SCNC: 6.3 MMOL/L (ref 5–15)
AST SERPL-CCNC: 16 U/L (ref 1–40)
BASOPHILS # BLD AUTO: 0.03 10*3/MM3 (ref 0–0.2)
BASOPHILS NFR BLD AUTO: 0.5 % (ref 0–1.5)
BILIRUB SERPL-MCNC: 0.2 MG/DL (ref 0–1.2)
BUN SERPL-MCNC: 45 MG/DL (ref 8–23)
BUN/CREAT SERPL: 21.1 (ref 7–25)
CALCIUM SPEC-SCNC: 8.1 MG/DL (ref 8.6–10.5)
CHLORIDE SERPL-SCNC: 100 MMOL/L (ref 98–107)
CO2 SERPL-SCNC: 25.7 MMOL/L (ref 22–29)
CREAT SERPL-MCNC: 2.13 MG/DL (ref 0.76–1.27)
DEPRECATED RDW RBC AUTO: 52.3 FL (ref 37–54)
EGFRCR SERPLBLD CKD-EPI 2021: 33.7 ML/MIN/1.73
EOSINOPHIL # BLD AUTO: 0.23 10*3/MM3 (ref 0–0.4)
EOSINOPHIL NFR BLD AUTO: 3.8 % (ref 0.3–6.2)
ERYTHROCYTE [DISTWIDTH] IN BLOOD BY AUTOMATED COUNT: 17.2 % (ref 12.3–15.4)
GLOBULIN UR ELPH-MCNC: 3.5 GM/DL
GLUCOSE BLDC GLUCOMTR-MCNC: 187 MG/DL (ref 70–99)
GLUCOSE BLDC GLUCOMTR-MCNC: 220 MG/DL (ref 70–99)
GLUCOSE BLDC GLUCOMTR-MCNC: 302 MG/DL (ref 70–99)
GLUCOSE SERPL-MCNC: 235 MG/DL (ref 65–99)
HCT VFR BLD AUTO: 27.8 % (ref 37.5–51)
HGB BLD-MCNC: 8.9 G/DL (ref 13–17.7)
IMM GRANULOCYTES # BLD AUTO: 0.01 10*3/MM3 (ref 0–0.05)
IMM GRANULOCYTES NFR BLD AUTO: 0.2 % (ref 0–0.5)
LYMPHOCYTES # BLD AUTO: 1.6 10*3/MM3 (ref 0.7–3.1)
LYMPHOCYTES NFR BLD AUTO: 26.1 % (ref 19.6–45.3)
MAGNESIUM SERPL-MCNC: 1.8 MG/DL (ref 1.6–2.4)
MCH RBC QN AUTO: 26.6 PG (ref 26.6–33)
MCHC RBC AUTO-ENTMCNC: 32 G/DL (ref 31.5–35.7)
MCV RBC AUTO: 83.2 FL (ref 79–97)
MONOCYTES # BLD AUTO: 0.4 10*3/MM3 (ref 0.1–0.9)
MONOCYTES NFR BLD AUTO: 6.5 % (ref 5–12)
NEUTROPHILS NFR BLD AUTO: 3.85 10*3/MM3 (ref 1.7–7)
NEUTROPHILS NFR BLD AUTO: 62.9 % (ref 42.7–76)
NRBC BLD AUTO-RTO: 0 /100 WBC (ref 0–0.2)
PHOSPHATE SERPL-MCNC: 3 MG/DL (ref 2.5–4.5)
PLATELET # BLD AUTO: 275 10*3/MM3 (ref 140–450)
PMV BLD AUTO: 9.1 FL (ref 6–12)
POTASSIUM SERPL-SCNC: 4 MMOL/L (ref 3.5–5.2)
PROT SERPL-MCNC: 5.9 G/DL (ref 6–8.5)
RBC # BLD AUTO: 3.34 10*6/MM3 (ref 4.14–5.8)
SODIUM SERPL-SCNC: 132 MMOL/L (ref 136–145)
WBC NRBC COR # BLD: 6.12 10*3/MM3 (ref 3.4–10.8)

## 2023-01-29 PROCEDURE — 83735 ASSAY OF MAGNESIUM: CPT | Performed by: INTERNAL MEDICINE

## 2023-01-29 PROCEDURE — 80053 COMPREHEN METABOLIC PANEL: CPT | Performed by: INTERNAL MEDICINE

## 2023-01-29 PROCEDURE — 63710000001 PREDNISONE PER 1 MG: Performed by: INTERNAL MEDICINE

## 2023-01-29 PROCEDURE — 85025 COMPLETE CBC W/AUTO DIFF WBC: CPT | Performed by: INTERNAL MEDICINE

## 2023-01-29 PROCEDURE — 63710000001 INSULIN LISPRO (HUMAN) PER 5 UNITS: Performed by: STUDENT IN AN ORGANIZED HEALTH CARE EDUCATION/TRAINING PROGRAM

## 2023-01-29 PROCEDURE — 99233 SBSQ HOSP IP/OBS HIGH 50: CPT | Performed by: INTERNAL MEDICINE

## 2023-01-29 PROCEDURE — 82962 GLUCOSE BLOOD TEST: CPT

## 2023-01-29 PROCEDURE — 84100 ASSAY OF PHOSPHORUS: CPT | Performed by: INTERNAL MEDICINE

## 2023-01-29 PROCEDURE — 99232 SBSQ HOSP IP/OBS MODERATE 35: CPT | Performed by: INTERNAL MEDICINE

## 2023-01-29 RX ORDER — ISOSORBIDE DINITRATE 10 MG/1
10 TABLET ORAL
Status: DISCONTINUED | OUTPATIENT
Start: 2023-01-29 | End: 2023-02-01

## 2023-01-29 RX ORDER — HYDRALAZINE HYDROCHLORIDE 25 MG/1
25 TABLET, FILM COATED ORAL EVERY 12 HOURS SCHEDULED
Status: DISCONTINUED | OUTPATIENT
Start: 2023-01-29 | End: 2023-01-30

## 2023-01-29 RX ADMIN — ISOSORBIDE DINITRATE 10 MG: 10 TABLET ORAL at 17:28

## 2023-01-29 RX ADMIN — ISOSORBIDE DINITRATE 10 MG: 10 TABLET ORAL at 12:13

## 2023-01-29 RX ADMIN — BUMETANIDE 2 MG: 0.25 INJECTION INTRAMUSCULAR; INTRAVENOUS at 08:32

## 2023-01-29 RX ADMIN — INSULIN LISPRO 3 UNITS: 100 INJECTION, SOLUTION INTRAVENOUS; SUBCUTANEOUS at 12:13

## 2023-01-29 RX ADMIN — Medication 10 ML: at 08:33

## 2023-01-29 RX ADMIN — INSULIN LISPRO 3 UNITS: 100 INJECTION, SOLUTION INTRAVENOUS; SUBCUTANEOUS at 08:32

## 2023-01-29 RX ADMIN — SPIRONOLACTONE 25 MG: 25 TABLET ORAL at 08:32

## 2023-01-29 RX ADMIN — BUMETANIDE 2 MG: 0.25 INJECTION INTRAMUSCULAR; INTRAVENOUS at 17:28

## 2023-01-29 RX ADMIN — SACUBITRIL AND VALSARTAN 1 TABLET: 97; 103 TABLET, FILM COATED ORAL at 20:54

## 2023-01-29 RX ADMIN — HYDRALAZINE HYDROCHLORIDE 25 MG: 25 TABLET, FILM COATED ORAL at 12:13

## 2023-01-29 RX ADMIN — PREDNISONE 20 MG: 20 TABLET ORAL at 08:32

## 2023-01-29 RX ADMIN — CARVEDILOL 12.5 MG: 12.5 TABLET, FILM COATED ORAL at 17:28

## 2023-01-29 RX ADMIN — ALLOPURINOL 100 MG: 100 TABLET ORAL at 08:32

## 2023-01-29 RX ADMIN — ASPIRIN 81 MG: 81 TABLET, COATED ORAL at 08:32

## 2023-01-29 RX ADMIN — INSULIN LISPRO 5 UNITS: 100 INJECTION, SOLUTION INTRAVENOUS; SUBCUTANEOUS at 17:28

## 2023-01-29 RX ADMIN — HYDROCODONE BITARTRATE AND ACETAMINOPHEN 1 TABLET: 5; 325 TABLET ORAL at 18:04

## 2023-01-29 RX ADMIN — PANTOPRAZOLE SODIUM 40 MG: 40 TABLET, DELAYED RELEASE ORAL at 05:43

## 2023-01-29 RX ADMIN — ACETAMINOPHEN 650 MG: 325 TABLET ORAL at 20:55

## 2023-01-29 RX ADMIN — SACUBITRIL AND VALSARTAN 1 TABLET: 97; 103 TABLET, FILM COATED ORAL at 08:32

## 2023-01-29 RX ADMIN — HYDROCODONE BITARTRATE AND ACETAMINOPHEN 1 TABLET: 5; 325 TABLET ORAL at 08:32

## 2023-01-29 RX ADMIN — CARVEDILOL 12.5 MG: 12.5 TABLET, FILM COATED ORAL at 08:32

## 2023-01-29 RX ADMIN — HYDRALAZINE HYDROCHLORIDE 25 MG: 25 TABLET, FILM COATED ORAL at 20:55

## 2023-01-30 LAB
ALBUMIN SERPL-MCNC: 2.5 G/DL (ref 3.5–5.2)
ALBUMIN/GLOB SERPL: 0.7 G/DL
ALP SERPL-CCNC: 118 U/L (ref 39–117)
ALT SERPL W P-5'-P-CCNC: 14 U/L (ref 1–41)
ANION GAP SERPL CALCULATED.3IONS-SCNC: 7.2 MMOL/L (ref 5–15)
AST SERPL-CCNC: 14 U/L (ref 1–40)
BASOPHILS # BLD AUTO: 0.03 10*3/MM3 (ref 0–0.2)
BASOPHILS NFR BLD AUTO: 0.5 % (ref 0–1.5)
BILIRUB SERPL-MCNC: 0.2 MG/DL (ref 0–1.2)
BUN SERPL-MCNC: 43 MG/DL (ref 8–23)
BUN/CREAT SERPL: 23 (ref 7–25)
CALCIUM SPEC-SCNC: 8.4 MG/DL (ref 8.6–10.5)
CHLORIDE SERPL-SCNC: 99 MMOL/L (ref 98–107)
CO2 SERPL-SCNC: 27.8 MMOL/L (ref 22–29)
CREAT SERPL-MCNC: 1.87 MG/DL (ref 0.76–1.27)
DEPRECATED RDW RBC AUTO: 51.8 FL (ref 37–54)
EGFRCR SERPLBLD CKD-EPI 2021: 39.4 ML/MIN/1.73
EOSINOPHIL # BLD AUTO: 0.28 10*3/MM3 (ref 0–0.4)
EOSINOPHIL NFR BLD AUTO: 4.3 % (ref 0.3–6.2)
ERYTHROCYTE [DISTWIDTH] IN BLOOD BY AUTOMATED COUNT: 17.1 % (ref 12.3–15.4)
GLOBULIN UR ELPH-MCNC: 3.6 GM/DL
GLUCOSE BLDC GLUCOMTR-MCNC: 162 MG/DL (ref 70–99)
GLUCOSE BLDC GLUCOMTR-MCNC: 217 MG/DL (ref 70–99)
GLUCOSE BLDC GLUCOMTR-MCNC: 290 MG/DL (ref 70–99)
GLUCOSE SERPL-MCNC: 160 MG/DL (ref 65–99)
HCT VFR BLD AUTO: 29.8 % (ref 37.5–51)
HGB BLD-MCNC: 9.4 G/DL (ref 13–17.7)
IMM GRANULOCYTES # BLD AUTO: 0.02 10*3/MM3 (ref 0–0.05)
IMM GRANULOCYTES NFR BLD AUTO: 0.3 % (ref 0–0.5)
LYMPHOCYTES # BLD AUTO: 1.74 10*3/MM3 (ref 0.7–3.1)
LYMPHOCYTES NFR BLD AUTO: 26.6 % (ref 19.6–45.3)
MAGNESIUM SERPL-MCNC: 1.7 MG/DL (ref 1.6–2.4)
MCH RBC QN AUTO: 26.2 PG (ref 26.6–33)
MCHC RBC AUTO-ENTMCNC: 31.5 G/DL (ref 31.5–35.7)
MCV RBC AUTO: 83 FL (ref 79–97)
MONOCYTES # BLD AUTO: 0.54 10*3/MM3 (ref 0.1–0.9)
MONOCYTES NFR BLD AUTO: 8.3 % (ref 5–12)
NEUTROPHILS NFR BLD AUTO: 3.92 10*3/MM3 (ref 1.7–7)
NEUTROPHILS NFR BLD AUTO: 60 % (ref 42.7–76)
NRBC BLD AUTO-RTO: 0 /100 WBC (ref 0–0.2)
NT-PROBNP SERPL-MCNC: ABNORMAL PG/ML (ref 0–900)
PHOSPHATE SERPL-MCNC: 2.6 MG/DL (ref 2.5–4.5)
PLATELET # BLD AUTO: 331 10*3/MM3 (ref 140–450)
PMV BLD AUTO: 9.2 FL (ref 6–12)
POTASSIUM SERPL-SCNC: 3.6 MMOL/L (ref 3.5–5.2)
PROT SERPL-MCNC: 6.1 G/DL (ref 6–8.5)
RBC # BLD AUTO: 3.59 10*6/MM3 (ref 4.14–5.8)
SODIUM SERPL-SCNC: 134 MMOL/L (ref 136–145)
WBC NRBC COR # BLD: 6.53 10*3/MM3 (ref 3.4–10.8)

## 2023-01-30 PROCEDURE — 83880 ASSAY OF NATRIURETIC PEPTIDE: CPT | Performed by: INTERNAL MEDICINE

## 2023-01-30 PROCEDURE — 85025 COMPLETE CBC W/AUTO DIFF WBC: CPT | Performed by: INTERNAL MEDICINE

## 2023-01-30 PROCEDURE — 25010000002 HEPARIN (PORCINE) PER 1000 UNITS: Performed by: STUDENT IN AN ORGANIZED HEALTH CARE EDUCATION/TRAINING PROGRAM

## 2023-01-30 PROCEDURE — 82962 GLUCOSE BLOOD TEST: CPT

## 2023-01-30 PROCEDURE — 63710000001 INSULIN LISPRO (HUMAN) PER 5 UNITS: Performed by: STUDENT IN AN ORGANIZED HEALTH CARE EDUCATION/TRAINING PROGRAM

## 2023-01-30 PROCEDURE — 80053 COMPREHEN METABOLIC PANEL: CPT | Performed by: INTERNAL MEDICINE

## 2023-01-30 PROCEDURE — 83735 ASSAY OF MAGNESIUM: CPT | Performed by: INTERNAL MEDICINE

## 2023-01-30 PROCEDURE — 99232 SBSQ HOSP IP/OBS MODERATE 35: CPT | Performed by: INTERNAL MEDICINE

## 2023-01-30 PROCEDURE — 63710000001 PREDNISONE PER 1 MG: Performed by: INTERNAL MEDICINE

## 2023-01-30 PROCEDURE — 99233 SBSQ HOSP IP/OBS HIGH 50: CPT | Performed by: INTERNAL MEDICINE

## 2023-01-30 PROCEDURE — 84100 ASSAY OF PHOSPHORUS: CPT | Performed by: INTERNAL MEDICINE

## 2023-01-30 RX ORDER — BUMETANIDE 0.25 MG/ML
2 INJECTION INTRAMUSCULAR; INTRAVENOUS ONCE
Status: COMPLETED | OUTPATIENT
Start: 2023-01-30 | End: 2023-01-30

## 2023-01-30 RX ORDER — HYDRALAZINE HYDROCHLORIDE 50 MG/1
50 TABLET, FILM COATED ORAL EVERY 12 HOURS SCHEDULED
Status: DISCONTINUED | OUTPATIENT
Start: 2023-01-30 | End: 2023-01-31

## 2023-01-30 RX ORDER — METOLAZONE 5 MG/1
5 TABLET ORAL ONCE
Status: COMPLETED | OUTPATIENT
Start: 2023-01-30 | End: 2023-01-30

## 2023-01-30 RX ADMIN — PANTOPRAZOLE SODIUM 40 MG: 40 TABLET, DELAYED RELEASE ORAL at 06:26

## 2023-01-30 RX ADMIN — BUMETANIDE 2 MG: 0.25 INJECTION INTRAMUSCULAR; INTRAVENOUS at 08:37

## 2023-01-30 RX ADMIN — CARVEDILOL 12.5 MG: 12.5 TABLET, FILM COATED ORAL at 17:42

## 2023-01-30 RX ADMIN — HEPARIN SODIUM 5000 UNITS: 5000 INJECTION INTRAVENOUS; SUBCUTANEOUS at 22:17

## 2023-01-30 RX ADMIN — SPIRONOLACTONE 25 MG: 25 TABLET ORAL at 08:35

## 2023-01-30 RX ADMIN — BUMETANIDE 2 MG: 0.25 INJECTION INTRAMUSCULAR; INTRAVENOUS at 17:42

## 2023-01-30 RX ADMIN — ASPIRIN 81 MG: 81 TABLET, COATED ORAL at 08:35

## 2023-01-30 RX ADMIN — ISOSORBIDE DINITRATE 10 MG: 10 TABLET ORAL at 17:42

## 2023-01-30 RX ADMIN — HYDROCODONE BITARTRATE AND ACETAMINOPHEN 1 TABLET: 5; 325 TABLET ORAL at 01:56

## 2023-01-30 RX ADMIN — INSULIN LISPRO 2 UNITS: 100 INJECTION, SOLUTION INTRAVENOUS; SUBCUTANEOUS at 08:35

## 2023-01-30 RX ADMIN — SACUBITRIL AND VALSARTAN 1 TABLET: 97; 103 TABLET, FILM COATED ORAL at 20:48

## 2023-01-30 RX ADMIN — INSULIN LISPRO 3 UNITS: 100 INJECTION, SOLUTION INTRAVENOUS; SUBCUTANEOUS at 12:03

## 2023-01-30 RX ADMIN — HYDRALAZINE HYDROCHLORIDE 25 MG: 25 TABLET, FILM COATED ORAL at 08:37

## 2023-01-30 RX ADMIN — BUMETANIDE 2 MG: 0.25 INJECTION INTRAMUSCULAR; INTRAVENOUS at 14:29

## 2023-01-30 RX ADMIN — SACUBITRIL AND VALSARTAN 1 TABLET: 97; 103 TABLET, FILM COATED ORAL at 08:37

## 2023-01-30 RX ADMIN — INSULIN LISPRO 4 UNITS: 100 INJECTION, SOLUTION INTRAVENOUS; SUBCUTANEOUS at 17:44

## 2023-01-30 RX ADMIN — HYDROCODONE BITARTRATE AND ACETAMINOPHEN 1 TABLET: 5; 325 TABLET ORAL at 20:53

## 2023-01-30 RX ADMIN — ISOSORBIDE DINITRATE 10 MG: 10 TABLET ORAL at 08:37

## 2023-01-30 RX ADMIN — METOLAZONE 5 MG: 5 TABLET ORAL at 14:30

## 2023-01-30 RX ADMIN — HYDRALAZINE HYDROCHLORIDE 50 MG: 50 TABLET, FILM COATED ORAL at 20:48

## 2023-01-30 RX ADMIN — PREDNISONE 20 MG: 20 TABLET ORAL at 08:42

## 2023-01-30 RX ADMIN — ALLOPURINOL 100 MG: 100 TABLET ORAL at 08:36

## 2023-01-30 RX ADMIN — CARVEDILOL 12.5 MG: 12.5 TABLET, FILM COATED ORAL at 08:35

## 2023-01-31 LAB
ANION GAP SERPL CALCULATED.3IONS-SCNC: 7.3 MMOL/L (ref 5–15)
BUN SERPL-MCNC: 44 MG/DL (ref 8–23)
BUN/CREAT SERPL: 20.8 (ref 7–25)
CALCIUM SPEC-SCNC: 8.3 MG/DL (ref 8.6–10.5)
CHLORIDE SERPL-SCNC: 97 MMOL/L (ref 98–107)
CO2 SERPL-SCNC: 31.7 MMOL/L (ref 22–29)
CREAT SERPL-MCNC: 2.12 MG/DL (ref 0.76–1.27)
EGFRCR SERPLBLD CKD-EPI 2021: 33.9 ML/MIN/1.73
GLUCOSE BLDC GLUCOMTR-MCNC: 169 MG/DL (ref 70–99)
GLUCOSE BLDC GLUCOMTR-MCNC: 193 MG/DL (ref 70–99)
GLUCOSE BLDC GLUCOMTR-MCNC: 231 MG/DL (ref 70–99)
GLUCOSE BLDC GLUCOMTR-MCNC: 246 MG/DL (ref 70–99)
GLUCOSE SERPL-MCNC: 177 MG/DL (ref 65–99)
POTASSIUM SERPL-SCNC: 3.5 MMOL/L (ref 3.5–5.2)
SODIUM SERPL-SCNC: 136 MMOL/L (ref 136–145)

## 2023-01-31 PROCEDURE — 63710000001 INSULIN LISPRO (HUMAN) PER 5 UNITS: Performed by: STUDENT IN AN ORGANIZED HEALTH CARE EDUCATION/TRAINING PROGRAM

## 2023-01-31 PROCEDURE — 82962 GLUCOSE BLOOD TEST: CPT

## 2023-01-31 PROCEDURE — 80048 BASIC METABOLIC PNL TOTAL CA: CPT | Performed by: INTERNAL MEDICINE

## 2023-01-31 PROCEDURE — 63710000001 PREDNISONE PER 1 MG: Performed by: INTERNAL MEDICINE

## 2023-01-31 PROCEDURE — 25010000002 HEPARIN (PORCINE) PER 1000 UNITS: Performed by: STUDENT IN AN ORGANIZED HEALTH CARE EDUCATION/TRAINING PROGRAM

## 2023-01-31 PROCEDURE — 99233 SBSQ HOSP IP/OBS HIGH 50: CPT | Performed by: INTERNAL MEDICINE

## 2023-01-31 PROCEDURE — 99232 SBSQ HOSP IP/OBS MODERATE 35: CPT | Performed by: INTERNAL MEDICINE

## 2023-01-31 RX ORDER — METOLAZONE 5 MG/1
5 TABLET ORAL ONCE
Status: COMPLETED | OUTPATIENT
Start: 2023-01-31 | End: 2023-01-31

## 2023-01-31 RX ORDER — POTASSIUM CHLORIDE 750 MG/1
40 CAPSULE, EXTENDED RELEASE ORAL ONCE
Status: COMPLETED | OUTPATIENT
Start: 2023-01-31 | End: 2023-01-31

## 2023-01-31 RX ORDER — HYDRALAZINE HYDROCHLORIDE 50 MG/1
100 TABLET, FILM COATED ORAL EVERY 12 HOURS SCHEDULED
Status: DISCONTINUED | OUTPATIENT
Start: 2023-01-31 | End: 2023-02-01

## 2023-01-31 RX ADMIN — Medication 10 ML: at 08:24

## 2023-01-31 RX ADMIN — INSULIN LISPRO 2 UNITS: 100 INJECTION, SOLUTION INTRAVENOUS; SUBCUTANEOUS at 08:23

## 2023-01-31 RX ADMIN — ACETAMINOPHEN 650 MG: 325 TABLET ORAL at 21:09

## 2023-01-31 RX ADMIN — SPIRONOLACTONE 25 MG: 25 TABLET ORAL at 08:23

## 2023-01-31 RX ADMIN — BUMETANIDE 2 MG: 0.25 INJECTION INTRAMUSCULAR; INTRAVENOUS at 08:22

## 2023-01-31 RX ADMIN — ASPIRIN 81 MG: 81 TABLET, COATED ORAL at 08:23

## 2023-01-31 RX ADMIN — METOLAZONE 5 MG: 5 TABLET ORAL at 17:35

## 2023-01-31 RX ADMIN — HYDRALAZINE HYDROCHLORIDE 100 MG: 50 TABLET, FILM COATED ORAL at 21:09

## 2023-01-31 RX ADMIN — ISOSORBIDE DINITRATE 10 MG: 10 TABLET ORAL at 17:35

## 2023-01-31 RX ADMIN — PREDNISONE 20 MG: 20 TABLET ORAL at 08:22

## 2023-01-31 RX ADMIN — INSULIN LISPRO 3 UNITS: 100 INJECTION, SOLUTION INTRAVENOUS; SUBCUTANEOUS at 11:15

## 2023-01-31 RX ADMIN — ALLOPURINOL 100 MG: 100 TABLET ORAL at 08:23

## 2023-01-31 RX ADMIN — HYDROCODONE BITARTRATE AND ACETAMINOPHEN 1 TABLET: 5; 325 TABLET ORAL at 08:22

## 2023-01-31 RX ADMIN — SACUBITRIL AND VALSARTAN 1 TABLET: 97; 103 TABLET, FILM COATED ORAL at 08:22

## 2023-01-31 RX ADMIN — PANTOPRAZOLE SODIUM 40 MG: 40 TABLET, DELAYED RELEASE ORAL at 06:36

## 2023-01-31 RX ADMIN — INSULIN LISPRO 2 UNITS: 100 INJECTION, SOLUTION INTRAVENOUS; SUBCUTANEOUS at 17:36

## 2023-01-31 RX ADMIN — POTASSIUM CHLORIDE 40 MEQ: 10 CAPSULE, COATED, EXTENDED RELEASE ORAL at 11:13

## 2023-01-31 RX ADMIN — ISOSORBIDE DINITRATE 10 MG: 10 TABLET ORAL at 08:23

## 2023-01-31 RX ADMIN — HEPARIN SODIUM 5000 UNITS: 5000 INJECTION INTRAVENOUS; SUBCUTANEOUS at 17:35

## 2023-01-31 RX ADMIN — HEPARIN SODIUM 5000 UNITS: 5000 INJECTION INTRAVENOUS; SUBCUTANEOUS at 21:10

## 2023-01-31 RX ADMIN — CARVEDILOL 12.5 MG: 12.5 TABLET, FILM COATED ORAL at 17:35

## 2023-01-31 RX ADMIN — EMPAGLIFLOZIN 10 MG: 10 TABLET, FILM COATED ORAL at 11:13

## 2023-01-31 RX ADMIN — HYDRALAZINE HYDROCHLORIDE 100 MG: 50 TABLET, FILM COATED ORAL at 11:13

## 2023-01-31 RX ADMIN — HEPARIN SODIUM 5000 UNITS: 5000 INJECTION INTRAVENOUS; SUBCUTANEOUS at 06:36

## 2023-01-31 RX ADMIN — CARVEDILOL 12.5 MG: 12.5 TABLET, FILM COATED ORAL at 08:23

## 2023-01-31 RX ADMIN — SACUBITRIL AND VALSARTAN 1 TABLET: 97; 103 TABLET, FILM COATED ORAL at 21:09

## 2023-01-31 RX ADMIN — POTASSIUM CHLORIDE 40 MEQ: 10 CAPSULE, COATED, EXTENDED RELEASE ORAL at 17:35

## 2023-01-31 RX ADMIN — BUMETANIDE 2 MG: 0.25 INJECTION INTRAMUSCULAR; INTRAVENOUS at 17:35

## 2023-02-01 ENCOUNTER — READMISSION MANAGEMENT (OUTPATIENT)
Dept: CALL CENTER | Facility: HOSPITAL | Age: 68
End: 2023-02-01
Payer: MEDICARE

## 2023-02-01 VITALS
TEMPERATURE: 97.5 F | HEART RATE: 82 BPM | RESPIRATION RATE: 18 BRPM | DIASTOLIC BLOOD PRESSURE: 68 MMHG | SYSTOLIC BLOOD PRESSURE: 111 MMHG | WEIGHT: 214.51 LBS | OXYGEN SATURATION: 94 % | HEIGHT: 66 IN | BODY MASS INDEX: 34.47 KG/M2

## 2023-02-01 PROBLEM — I50.33 ACUTE ON CHRONIC HEART FAILURE WITH PRESERVED EJECTION FRACTION (HFPEF) (HCC): Status: RESOLVED | Noted: 2023-01-26 | Resolved: 2023-02-01

## 2023-02-01 PROBLEM — I50.23 ACUTE ON CHRONIC SYSTOLIC HEART FAILURE: Status: ACTIVE | Noted: 2023-02-01

## 2023-02-01 PROBLEM — I50.23 ACUTE ON CHRONIC SYSTOLIC HEART FAILURE: Status: RESOLVED | Noted: 2023-02-01 | Resolved: 2023-02-01

## 2023-02-01 PROBLEM — J81.0 ACUTE PULMONARY EDEMA: Status: RESOLVED | Noted: 2023-01-25 | Resolved: 2023-02-01

## 2023-02-01 LAB
ALBUMIN SERPL-MCNC: 2.6 G/DL (ref 3.5–5.2)
ANION GAP SERPL CALCULATED.3IONS-SCNC: 9.6 MMOL/L (ref 5–15)
BUN SERPL-MCNC: 43 MG/DL (ref 8–23)
BUN/CREAT SERPL: 19.7 (ref 7–25)
CALCIUM SPEC-SCNC: 8.7 MG/DL (ref 8.6–10.5)
CHLORIDE SERPL-SCNC: 97 MMOL/L (ref 98–107)
CO2 SERPL-SCNC: 29.4 MMOL/L (ref 22–29)
CREAT SERPL-MCNC: 2.18 MG/DL (ref 0.76–1.27)
EGFRCR SERPLBLD CKD-EPI 2021: 32.8 ML/MIN/1.73
GLUCOSE BLDC GLUCOMTR-MCNC: 156 MG/DL (ref 70–99)
GLUCOSE BLDC GLUCOMTR-MCNC: 179 MG/DL (ref 70–99)
GLUCOSE SERPL-MCNC: 209 MG/DL (ref 65–99)
MAGNESIUM SERPL-MCNC: 1.6 MG/DL (ref 1.6–2.4)
PHOSPHATE SERPL-MCNC: 2.9 MG/DL (ref 2.5–4.5)
POTASSIUM SERPL-SCNC: 3.8 MMOL/L (ref 3.5–5.2)
SODIUM SERPL-SCNC: 136 MMOL/L (ref 136–145)
WHOLE BLOOD HOLD SPECIMEN: NORMAL

## 2023-02-01 PROCEDURE — 80069 RENAL FUNCTION PANEL: CPT | Performed by: INTERNAL MEDICINE

## 2023-02-01 PROCEDURE — 25010000002 HEPARIN (PORCINE) PER 1000 UNITS: Performed by: STUDENT IN AN ORGANIZED HEALTH CARE EDUCATION/TRAINING PROGRAM

## 2023-02-01 PROCEDURE — 63710000001 INSULIN LISPRO (HUMAN) PER 5 UNITS: Performed by: STUDENT IN AN ORGANIZED HEALTH CARE EDUCATION/TRAINING PROGRAM

## 2023-02-01 PROCEDURE — 83735 ASSAY OF MAGNESIUM: CPT | Performed by: INTERNAL MEDICINE

## 2023-02-01 PROCEDURE — 99232 SBSQ HOSP IP/OBS MODERATE 35: CPT | Performed by: INTERNAL MEDICINE

## 2023-02-01 PROCEDURE — 82962 GLUCOSE BLOOD TEST: CPT

## 2023-02-01 PROCEDURE — 25010000002 MAGNESIUM SULFATE IN D5W 1G/100ML (PREMIX) 1-5 GM/100ML-% SOLUTION: Performed by: INTERNAL MEDICINE

## 2023-02-01 PROCEDURE — 99239 HOSP IP/OBS DSCHRG MGMT >30: CPT | Performed by: INTERNAL MEDICINE

## 2023-02-01 RX ORDER — METOLAZONE 2.5 MG/1
2.5 TABLET ORAL 3 TIMES WEEKLY
Qty: 12 TABLET | Refills: 0 | Status: SHIPPED | OUTPATIENT
Start: 2023-02-01 | End: 2023-03-09

## 2023-02-01 RX ORDER — HYDRALAZINE HYDROCHLORIDE 50 MG/1
100 TABLET, FILM COATED ORAL EVERY 8 HOURS SCHEDULED
Status: DISCONTINUED | OUTPATIENT
Start: 2023-02-01 | End: 2023-02-01 | Stop reason: HOSPADM

## 2023-02-01 RX ORDER — POTASSIUM CHLORIDE 750 MG/1
40 CAPSULE, EXTENDED RELEASE ORAL ONCE
Status: COMPLETED | OUTPATIENT
Start: 2023-02-01 | End: 2023-02-01

## 2023-02-01 RX ORDER — ISOSORBIDE DINITRATE 10 MG/1
10 TABLET ORAL
Status: DISCONTINUED | OUTPATIENT
Start: 2023-02-01 | End: 2023-02-01 | Stop reason: HOSPADM

## 2023-02-01 RX ORDER — HYDRALAZINE HYDROCHLORIDE 100 MG/1
100 TABLET, FILM COATED ORAL 3 TIMES DAILY
Qty: 90 TABLET | Refills: 0 | Status: SHIPPED | OUTPATIENT
Start: 2023-02-01 | End: 2023-03-09

## 2023-02-01 RX ORDER — BUMETANIDE 0.5 MG/1
2 TABLET ORAL DAILY
Qty: 120 TABLET | Refills: 0 | Status: SHIPPED | OUTPATIENT
Start: 2023-02-01 | End: 2023-03-09

## 2023-02-01 RX ORDER — MAGNESIUM SULFATE 1 G/100ML
1 INJECTION INTRAVENOUS ONCE
Status: COMPLETED | OUTPATIENT
Start: 2023-02-01 | End: 2023-02-01

## 2023-02-01 RX ORDER — ISOSORBIDE DINITRATE 10 MG/1
10 TABLET ORAL
Qty: 90 TABLET | Refills: 0 | Status: SHIPPED | OUTPATIENT
Start: 2023-02-01 | End: 2023-03-09

## 2023-02-01 RX ADMIN — CARVEDILOL 12.5 MG: 12.5 TABLET, FILM COATED ORAL at 08:06

## 2023-02-01 RX ADMIN — HEPARIN SODIUM 5000 UNITS: 5000 INJECTION INTRAVENOUS; SUBCUTANEOUS at 06:04

## 2023-02-01 RX ADMIN — SACUBITRIL AND VALSARTAN 1 TABLET: 97; 103 TABLET, FILM COATED ORAL at 08:06

## 2023-02-01 RX ADMIN — ASPIRIN 81 MG: 81 TABLET, COATED ORAL at 08:06

## 2023-02-01 RX ADMIN — BUMETANIDE 2 MG: 0.25 INJECTION INTRAMUSCULAR; INTRAVENOUS at 08:06

## 2023-02-01 RX ADMIN — ISOSORBIDE DINITRATE 10 MG: 10 TABLET ORAL at 08:06

## 2023-02-01 RX ADMIN — ALLOPURINOL 100 MG: 100 TABLET ORAL at 08:06

## 2023-02-01 RX ADMIN — INSULIN LISPRO 2 UNITS: 100 INJECTION, SOLUTION INTRAVENOUS; SUBCUTANEOUS at 11:58

## 2023-02-01 RX ADMIN — PANTOPRAZOLE SODIUM 40 MG: 40 TABLET, DELAYED RELEASE ORAL at 06:04

## 2023-02-01 RX ADMIN — MAGNESIUM SULFATE 1 G: 1 INJECTION INTRAVENOUS at 13:00

## 2023-02-01 RX ADMIN — ISOSORBIDE DINITRATE 10 MG: 10 TABLET ORAL at 13:00

## 2023-02-01 RX ADMIN — HYDRALAZINE HYDROCHLORIDE 100 MG: 50 TABLET, FILM COATED ORAL at 08:06

## 2023-02-01 RX ADMIN — INSULIN LISPRO 2 UNITS: 100 INJECTION, SOLUTION INTRAVENOUS; SUBCUTANEOUS at 08:06

## 2023-02-01 RX ADMIN — SPIRONOLACTONE 25 MG: 25 TABLET ORAL at 08:06

## 2023-02-01 RX ADMIN — POTASSIUM CHLORIDE 40 MEQ: 10 CAPSULE, COATED, EXTENDED RELEASE ORAL at 11:58

## 2023-02-01 NOTE — CASE MANAGEMENT/SOCIAL WORK
Hf cm reviewed chart.     Pt discharging home today.     New discharge medications include bumex, apresoline, isordil. Norvasc discontinued. Jardiance not continue due to renal function.     Pt referred to the heart failure clinic to follow up with Dr Herrmann after dc. Attempted to make appt. Left message for office to call pt to schedule appt.     Appt made with HUMA Haines for one month follow up per order on 3/23 @ 10:00.

## 2023-02-01 NOTE — PLAN OF CARE
Goal Outcome Evaluation:  Plan of Care Reviewed With: patient           Outcome Evaluation: Pt instructed in importance of accurate intake and output. reports decrease in edema to legs. medicated for c/o headache with relief

## 2023-02-01 NOTE — CONSULTS
"Nutrition Services    Patient Name: Lyle Lozano  YOB: 1955  MRN: [id number]  Admission date: 1/25/2023      CLINICAL NUTRITION ASSESSMENT      Reason for Assessment  LOS     H&P:    Past Medical History:   Diagnosis Date   • Abnormal kidney function    • Arthritis    • Bursitis    • CHF (congestive heart failure) (HCC)    • Depression    • Diabetes (HCC)    • Edema    • Essential hypertension 9/23/2021   • Forgetfulness    • Head injury    • Hernia cerebri (HCC)    • Hyperlipidemia    • Hypertension    • IFG (impaired fasting glucose)    • Low back pain    • Lumbago     `   • Pain of left hip    • Right shoulder pain    • Sleep apnea    • SOB (shortness of breath)         Current Problems:   Active Hospital Problems    Diagnosis    • **Acute pulmonary edema (HCC)    • Acute on chronic systolic heart failure (CMS/HCC)    • Acute on chronic heart failure with preserved ejection fraction (HFpEF) (HCC)    • Elevated troponin level not due myocardial infarction    • Stage 3 chronic kidney disease (HCC)         Nutrition/Diet History         Narrative     Patient followed by RD for LOS. Patient is at low risk per nutrition risk screening. No acute nutrition concerns or nutrition intervention at this time. RD will continue to follow and monitor per protocol.      Anthropometrics        Current Height, Weight Height: 167.6 cm (66\")  Weight: 97.3 kg (214 lb 8.1 oz)   Current BMI Body mass index is 34.62 kg/m².       Weight Hx  Wt Readings from Last 30 Encounters:   02/01/23 0551 97.3 kg (214 lb 8.1 oz)   01/29/23 0624 97.1 kg (214 lb 1.1 oz)   01/27/23 0500 96.5 kg (212 lb 11.9 oz)   01/26/23 0000 96.5 kg (212 lb 11.9 oz)   01/25/23 1602 96.5 kg (212 lb 11.9 oz)   01/25/23 1309 97.1 kg (214 lb)   01/06/23 1100 93 kg (205 lb)   12/20/22 1046 93.4 kg (205 lb 14.6 oz)   10/26/22 1022 87.1 kg (192 lb)   09/27/22 1050 87.3 kg (192 lb 6.4 oz)   08/26/22 0833 88.4 kg (194 lb 12.8 oz)   08/21/22 0839 86.8 " kg (191 lb 5.8 oz)   03/18/22 0938 92.5 kg (204 lb)   03/04/22 0952 92.5 kg (204 lb)   03/02/22 1413 91 kg (200 lb 9.6 oz)   02/11/22 0907 93.4 kg (206 lb)   02/08/22 0903 92.5 kg (204 lb)   02/03/22 0806 90.2 kg (198 lb 12.8 oz)   01/11/22 1501 93.9 kg (207 lb)   01/04/22 1548 91.6 kg (202 lb)   01/03/22 1438 90.1 kg (198 lb 10.2 oz)   10/28/21 1218 89.8 kg (198 lb)   10/11/21 0941 93.4 kg (206 lb)   09/23/21 1023 91.6 kg (202 lb)   08/14/21 1537 82.6 kg (182 lb)   07/10/21 1456 83.9 kg (185 lb)   04/02/21 0000 94.4 kg (208 lb 3 oz)   02/03/21 0000 91.6 kg (202 lb)   07/15/20 0000 89.4 kg (197 lb)   06/26/20 0000 85.4 kg (188 lb 4 oz)   06/09/20 0000 87.8 kg (193 lb 8 oz)   02/11/20 0000 88 kg (194 lb)   11/13/19 0000 87.3 kg (192 lb 7 oz)   09/25/19 0000 85.3 kg (188 lb)            Wt Change Observation Trending up     Estimated/Assessed Needs       Energy Requirements 30 kcal/kg IBW   EST Needs (kcal/day) 1914       Protein Requirements 1 g/kg IBW   EST Daily Needs (g/day) 64       Fluid Requirements 30 ml/kg IBW    Estimated Needs (mL/day) 1914 ml      Labs/Medications         Pertinent Labs Reviewed.   Results from last 7 days   Lab Units 02/01/23  0440 01/31/23  0429 01/30/23  0504 01/29/23  0615 01/28/23  0540 01/27/23  0635   SODIUM mmol/L 136 136 134* 132*   < > 137   POTASSIUM mmol/L 3.8 3.5 3.6 4.0   < > 3.8   CHLORIDE mmol/L 97* 97* 99 100   < > 105   CO2 mmol/L 29.4* 31.7* 27.8 25.7   < > 23.3   BUN mg/dL 43* 44* 43* 45*   < > 35*   CREATININE mg/dL 2.18* 2.12* 1.87* 2.13*   < > 2.05*   CALCIUM mg/dL 8.7 8.3* 8.4* 8.1*   < > 8.1*   BILIRUBIN mg/dL  --   --  0.2 0.2  --  0.2   ALK PHOS U/L  --   --  118* 126*  --  141*   ALT (SGPT) U/L  --   --  14 13  --  14   AST (SGOT) U/L  --   --  14 16  --  13   GLUCOSE mg/dL 209* 177* 160* 235*   < > 153*    < > = values in this interval not displayed.     Results from last 7 days   Lab Units 02/01/23  0440 01/30/23  0504 01/29/23  0615   MAGNESIUM mg/dL 1.6 1.7  1.8   PHOSPHORUS mg/dL 2.9 2.6 3.0   HEMOGLOBIN g/dL  --  9.4* 8.9*   HEMATOCRIT %  --  29.8* 27.8*     No results found for: COVID19  Lab Results   Component Value Date    HGBA1C 6.90 (H) 09/27/2022         Pertinent Medications Reviewed.     Current Nutrition Orders & Evaluation of Intake       Oral Nutrition     Current PO Diet Diet: Cardiac Diets, Diabetic Diets, Renal Diets, Fluid Restriction (240 mL/tray) Diets; Healthy Heart (2-3 Na+); Consistent Carbohydrate; Low Sodium (2-3g); 1500 mL/day; Texture: Regular Texture (IDDSI 7); Fluid Consistency: Thin (IDDSI 0)   Supplement No active supplement orders       Malnutrition Severity Assessment                Nutrition Diagnosis         Nutrition Dx Problem 1 No nutrition diagnosis at this time      Nutrition Intervention         No nutrition intervention indicated.       Medical Nutrition Therapy/Nutrition Education          Learner     Readiness N/A  N/A     Method     Response N/A  N/A     Monitor/Evaluation        Monitor Per protocol, PO intake, Pertinent labs, Weight       Nutrition Discharge Plan         No nutrition discharge needs identified at this time       Electronically signed by:  Liv Butts RD  02/01/23 13:11 EST

## 2023-02-01 NOTE — DISCHARGE SUMMARY
[facility]        HOSPITALIST  DISCHARGE SUMMARY    Patient Name: Lyle Lozano  : 1955  MRN: [id number]    Date of Admission: 2023  Date of Discharge:  2023  Primary Care Physician: Heidi Villa APRN    Consults     Date and Time Order Name Status Description    2023  9:08 PM Inpatient Cardiology Consult      2023  7:31 PM Hospitalist (on-call MD unless specified)            Active and Resolved Hospital Problems:  Active Hospital Problems    Diagnosis POA   • Acute on chronic systolic heart failure (CMS/HCC) [I50.23] Unknown   • Elevated troponin level not due myocardial infarction [R77.8] Yes   • Stage 3 chronic kidney disease (HCC) [N18.30] Yes      Resolved Hospital Problems    Diagnosis POA   • **Acute pulmonary edema (HCC) [J81.0] Yes   • Acute on chronic systolic heart failure (CMS/HCC) [I50.23] Yes   • Acute on chronic heart failure with preserved ejection fraction (HFpEF) (HCC) [I50.33] Yes       Hospital Course     Hospital Course:  Lyle Lozano is a 67 y.o. male with a past medical history of nonischemic cardiomyopathy, HFrEF, diabetes, essential hypertension, hyperlipidemia presented to the ED with complaints of worsening bilateral lower extremity edema and associated with worsening dyspnea on exertion for the last 1 week.  Denies any orthopnea, shortness of breath at rest.  Denies any chest pain, nausea, vomiting, recent travel, abdominal pain, focal weakness, numbness, tingling.  Patient states that he has been taking all his medications as prescribed.  Follows Dr. Mcdonnell for outpatient cardiology.  Last cardiac echo on 3/4/2022 showed left ventricular ejection fraction 21 to 25% moderate to severely reduced, left ventricular cavity mildly dilated, left ventricular wall thickness consistent with mild concentric hypertrophy.  Denies smoking, occasionally uses alcohol.     In the ED, vital signs temperature 99.4, pulse 88, respiratory 20,  blood pressure 164/112, on room air saturating around 100%.  Labs show troponin 0.087, proBNP 04202, creatinine 2.02, unclear baseline, creatinine was around 1.8 in the last 1 year, magnesium 1.8, WBC 7.94, hemoglobin 9.6, EKG showed ST depression in the leads V2 V3 with T wave inversions in leads V2 to V6.  Similar EKG findings were noted in the previous EKG taken on 1/2022 but the ST depression V3 is more pronounced in the current EKG. chest x-ray showed mild cardiomegaly with central bronchovascular crowding.  Received IV Lasix 80 mg in the ED.  Patient has been admitted to inpatient for further evaluation and management of acute CHF exacerbation.  Patient was given prednisone for right knee gouty arthritis     Interval Followup:   No acute events overnight, breathing is improved but still short of breath.  Still he has dyspnea exertion with PND and orthopnea but better.  Swelling in legs going down.  Right knee hurts  -2.5 L fluid balance but no drop in weight since in the hospital.  SGLT DC'd because of renal insufficiency as per cardiology  Patient cleared by cardiology.  Patient does not want ICD placement at this time        DISCHARGE Follow Up Recommendations for labs and diagnostics: PCP, heart failure clinic and cardiologist.  Have PCP check BMP in 3 days.      Day of Discharge     Vital Signs:  Temp:  [97.5 °F (36.4 °C)-98.1 °F (36.7 °C)] 97.5 °F (36.4 °C)  Heart Rate:  [71-95] 82  Resp:  [18-20] 18  BP: (111-173)/() 111/68    Physical Exam:     General appearance: NAD, conversant   Eyes: anicteric sclerae, moist conjunctivae; no lid-lag; PERRLA  HENT: Atraumatic; oropharynx clear with moist mucous membranes and no mucosal ulcerations; normal hard and soft palate  Neck: Trachea midline; FROM, supple, no thyromegaly or lymphadenopathy  Lungs: Bilateral crackles, with normal respiratory effort and no intercostal retractions  CV: Regular Rate and Rhythm, no Murmurs, Rubs, or Gallops   Abdomen: Soft,  non-tender; no masses or hepatosplenomegaly  Extremities: 2+ pitting edema right more than left leg, no lymphadenopathy  Skin: Normal temperature, turgor and texture; no rash, ulcers or subcutaneous nodules  Psych: Appropriate affect, alert and oriented to person, place and time  Neuro: CN II - XII intact no motor deficits, no sensory deficits     Discharge Details        Discharge Medications      New Medications      Instructions Start Date   bumetanide 0.5 MG tablet  Commonly known as: BUMEX   2 mg, Oral, Daily      hydrALAZINE 100 MG tablet  Commonly known as: APRESOLINE   100 mg, Oral, 3 Times Daily      isosorbide dinitrate 10 MG tablet  Commonly known as: ISORDIL   10 mg, Oral, 3 Times Daily (Nitrates)      metOLazone 2.5 MG tablet  Commonly known as: ZAROXOLYN   2.5 mg, Oral, 3 Times Weekly         Continue These Medications      Instructions Start Date   aspirin 81 MG EC tablet   81 mg, Oral, Daily      carvedilol 12.5 MG tablet  Commonly known as: COREG   12.5 mg, Oral, 2 Times Daily With Meals      celecoxib 50 MG capsule  Commonly known as: CeleBREX   50 mg, Oral, 2 Times Daily      cholecalciferol 25 MCG (1000 UT) tablet  Commonly known as: VITAMIN D3   1,000 Units, Oral, Daily      Entresto  MG tablet  Generic drug: sacubitril-valsartan   1 tablet, Oral, 2 Times Daily      Janumet  MG per tablet  Generic drug: sitaGLIPtin-metFORMIN   1 tablet, Oral, 2 Times Daily With Meals      spironolactone 25 MG tablet  Commonly known as: ALDACTONE   25 mg, Oral, Daily         Stop These Medications    amLODIPine 5 MG tablet  Commonly known as: NORVASC            No Known Allergies    Discharge Disposition:  Home or Self Care    Diet:  Diet Instructions     Diet: Consistent Carbohydrate, Cardiac, Specialty Diet; Thin Liquids, No Restrictions; Low Sodium; 2,000 mg Na      Discharge Diet:  Consistent Carbohydrate  Cardiac  Specialty Diet       Fluid Consistency: Thin Liquids, No Restrictions    Specialty  Diets: Low Sodium    Low Sodium Restriction: 2,000 mg Na    Fluid Restriction per day: 1500 mL Fluid          Discharge Activity:   Activity Instructions     Activity as Tolerated            CODE STATUS:  Code Status and Medical Interventions:   Ordered at: 01/25/23 2228     Level Of Support Discussed With:    Patient     Code Status (Patient has no pulse and is not breathing):    CPR (Attempt to Resuscitate)     Medical Interventions (Patient has pulse or is breathing):    Full Support         Future Appointments   Date Time Provider Department Center   2/7/2023  1:00 PM Heidi Villa APRN [facility]   3/23/2023 10:00 AM Mana Giang APRN [facility]       Additional Instructions for the Follow-ups that You Need to Schedule     Discharge Follow-up with PCP   As directed       Currently Documented PCP:    Heidi Vlila APRN    PCP Phone Number:    [phone]     Follow Up Details: 10 days         Discharge Follow-up with Specified Provider: Dr. Mcdonnell; 1 Month   As directed      To: Dr. Mcdonnell    Follow Up: 1 Month         Discharge Follow-up with Specified Provider: Dr. Herrmann; 1 Week   As directed      To: Dr. Herrmann    Follow Up: 1 Week    Follow Up Details: CHF               Pertinent  and/or Most Recent Results     PROCEDURES:   * Cannot find OR case *     LAB RESULTS:      Lab 01/30/23  0504 01/29/23  0615 01/27/23  0635 01/26/23  0509 01/25/23  1607   WBC 6.53 6.12 7.16 5.37 7.94   HEMOGLOBIN 9.4* 8.9* 8.7* 8.8* 9.6*   HEMATOCRIT 29.8* 27.8* 27.8* 27.4* 31.1*   PLATELETS 331 275 278 273 331   NEUTROS ABS 3.92 3.85 5.13  --  5.68   IMMATURE GRANS (ABS) 0.02 0.01 0.03  --  0.01   LYMPHS ABS 1.74 1.60 1.33  --  1.54   MONOS ABS 0.54 0.40 0.45  --  0.49   EOS ABS 0.28 0.23 0.19  --  0.18   MCV 83.0 83.2 83.5 82.8 84.1         Lab 02/01/23  0440 01/31/23  0429 01/30/23  0504 01/29/23  0615 01/28/23  0540 01/27/23  0635 01/26/23  0509   SODIUM 136 136 134* 132* 133* 137 138   POTASSIUM 3.8 3.5  3.6 4.0 4.0 3.8 4.1   CHLORIDE 97* 97* 99 100 100 105 107   CO2 29.4* 31.7* 27.8 25.7 25.3 23.3 24.7   ANION GAP 9.6 7.3 7.2 6.3 7.7 8.7 6.3   BUN 43* 44* 43* 45* 36* 35* 32*   CREATININE 2.18* 2.12* 1.87* 2.13* 2.26* 2.05* 2.10*   EGFR 32.8* 33.9* 39.4* 33.7* 31.4* 35.3* 34.3*   GLUCOSE 209* 177* 160* 235* 152* 153* 134*   CALCIUM 8.7 8.3* 8.4* 8.1* 8.2* 8.1* 8.2*   MAGNESIUM 1.6  --  1.7 1.8  --  1.7 1.8   PHOSPHORUS 2.9  --  2.6 3.0  --  3.2 3.1         Lab 02/01/23  0440 01/30/23  0504 01/29/23  0615 01/27/23  0635 01/25/23  1607   TOTAL PROTEIN  --  6.1 5.9* 5.9* 6.3   ALBUMIN 2.6* 2.5* 2.4* 2.3* 2.7*   GLOBULIN  --  3.6 3.5 3.6 3.6   ALT (SGPT)  --  14 13 14 19   AST (SGOT)  --  14 16 13 18   BILIRUBIN  --  0.2 0.2 0.2 0.4   ALK PHOS  --  118* 126* 141* 182*         Lab 01/30/23  0504 01/26/23  0124 01/25/23  2236 01/25/23  1607   PROBNP 24,168.0*  --   --  21,655.0*   TROPONIN T  --  0.073* 0.080* 0.087*                 Brief Urine Lab Results     None        Microbiology Results (last 10 days)     ** No results found for the last 240 hours. **          XR Chest 1 View    Result Date: 1/25/2023  Impression:   1. Mild cardiomegaly with central bronchovascular crowding.  No acute consolidation.        NICOLE DUBON MD       Electronically Signed and Approved By: NICOLE DUBON MD on 1/25/2023 at 16:29               Results for orders placed during the hospital encounter of 01/25/23    Duplex Venous Lower Extremity - Right CV-READ    Interpretation Summary  •  Normal right lower extremity venous duplex scan.      Results for orders placed during the hospital encounter of 01/25/23    Duplex Venous Lower Extremity - Right CV-READ    Interpretation Summary  •  Normal right lower extremity venous duplex scan.      Results for orders placed during the hospital encounter of 01/25/23    Adult Transthoracic Echo Complete With Contrast if Necessary Per Protocol    Interpretation Summary  •  Left ventricular ejection  fraction appears to be 36 - 40% cannot exclude wall motion abnormalities  •  Left ventricular diastolic function is consistent with (grade II w/high LAP) pseudonormalization.  •  The right ventricular cavity is mildly dilated.  •  The right atrial cavity is mildly  dilated.  •  The left atrial cavity is mild to moderately dilated  •  Elevated left atrial pressure.      Imaging Results (All)     Procedure Component Value Units Date/Time    XR Chest 1 View [[id number]] Collected: 01/25/23 1630     Updated: 01/25/23 1633    Narrative:      PROCEDURE: XR CHEST 1 VW     COMPARISON: [facility], CR, XR CHEST PA AND LATERAL, 3/03/2022, 13:21.     INDICATIONS: SOA Triage Protocol     FINDINGS:   There is mild cardiomegaly.  There is central bronchovascular crowding.  There is no pleural   effusion or pneumothorax.  No evidence of overt pulmonary edema.  There are degenerative changes of   the thoracic spine.       Impression:         1. Mild cardiomegaly with central bronchovascular crowding.  No acute consolidation.                    NICOLE DUBON MD         Electronically Signed and Approved By: NICOLE DUBON MD on 1/25/2023 at 16:29                            Labs Pending at Discharge:          Time spent on Discharge including face to face service: More than 30 minutes  Part of this note may be an electronic transcription/translation of spoken language to printed text using the Dragon Dictation System.     TElectronically signed by Sekou Garcia MD, 02/01/23, 3:34 PM EST.

## 2023-02-01 NOTE — CASE MANAGEMENT/SOCIAL WORK
HF cm reviewed chart.     Jardiance stopped due to renal function.     Will make appt for the heart failure clinic at [facility].     ICD recommend but pt denies at this time.     Will continue to follow for heart failure needs.

## 2023-02-01 NOTE — OUTREACH NOTE
Prep Survey    Flowsheet Row Responses   [facility] patient discharged from? [facility]   Is LACE score < 7 ? No   Eligibility [facility]   Date of Admission 01/25/23   Date of Discharge 02/01/23   Discharge Disposition Home or Self Care   Discharge diagnosis Acute pulmonary edema,   Acute on chronic systolic heart failure    Does the patient have one of the following disease processes/diagnoses(primary or secondary)? CHF   Does the patient have Home health ordered? No   Is there a DME ordered? No   Prep survey completed? Yes          MATY KENDRICK - Registered Nurse

## 2023-02-01 NOTE — PROGRESS NOTES
CARDIOLOGY  INPATIENT PROGRESS NOTE                [address] AMBULATORY SERVICES    2/1/2023      PATIENT IDENTIFICATION:   Name:  Lyle Lozano      MRN:  [id number]     67 y.o.  male                 SUBJECTIVE:   Patient is doing better symptomatically this morning less short of breath    OBJECTIVE:  Vitals:    01/31/23 2308 02/01/23 0336 02/01/23 0551 02/01/23 0700   BP: 125/68 141/78  138/78   BP Location: Right arm Left arm  Right arm   Patient Position: Lying Lying  Lying   Pulse: 74 71  95   Resp: 18 18  18   Temp: 97.9 °F (36.6 °C) 97.5 °F (36.4 °C)     TempSrc: Oral Oral     SpO2: 98% 97%  99%   Weight:   97.3 kg (214 lb 8.1 oz)    Height:               Body mass index is 34.62 kg/m².    Intake/Output Summary (Last 24 hours) at 2/1/2023 0917  Last data filed at 2/1/2023 0800  Gross per 24 hour   Intake 960 ml   Output 3550 ml   Net -2590 ml           Physical Exam  General Appearance:   · no acute distress  · Alert and oriented x3  HENT:   · lips not cyanotic  · Atraumatic  Neck:  · No jvd   · supple  Respiratory:  · no respiratory distress  · normal breath sounds  · no rales  Cardiovascular:    · regular rhythm  · no S3, no S4   · no murmur  · no rub  lower extremity edema: +1 skin:   · warm, dry  · No rashes      No Known Allergies  Scheduled meds:  allopurinol, 100 mg, Oral, Daily  aspirin, 81 mg, Oral, Daily  bumetanide, 2 mg, Intravenous, BID  carvedilol, 12.5 mg, Oral, BID With Meals  empagliflozin, 10 mg, Oral, Daily  heparin (porcine), 5,000 Units, Subcutaneous, Q8H  hydrALAZINE, 100 mg, Oral, Q12H  insulin lispro, 2-7 Units, Subcutaneous, TID With Meals  isosorbide dinitrate, 10 mg, Oral, BID - Nitrates  pantoprazole, 40 mg, Oral, Q AM  sacubitril-valsartan, 1 tablet, Oral, Q12H  spironolactone, 25 mg, Oral, Daily      IV meds:                         Data Review:  CBC    CBC 1/27/23 1/29/23 1/30/23   WBC 7.16 6.12 6.53   RBC 3.33 (A) 3.34 (A) 3.59 (A)   Hemoglobin 8.7  (A) 8.9 (A) 9.4 (A)   Hematocrit 27.8 (A) 27.8 (A) 29.8 (A)   MCV 83.5 83.2 83.0   MCH 26.1 (A) 26.6 26.2 (A)   MCHC 31.3 (A) 32.0 31.5   RDW 17.2 (A) 17.2 (A) 17.1 (A)   Platelets 278 275 331   (A) Abnormal value            CMP    CMP 1/30/23 1/31/23 2/1/23   Glucose 160 (A) 177 (A) 209 (A)   BUN 43 (A) 44 (A) 43 (A)   Creatinine 1.87 (A) 2.12 (A) 2.18 (A)   eGFR 39.4 (A) 33.9 (A) 32.8 (A)   Sodium 134 (A) 136 136   Potassium 3.6 3.5 3.8   Chloride 99 97 (A) 97 (A)   Calcium 8.4 (A) 8.3 (A) 8.7   Total Protein 6.1     Albumin 2.5 (A)  2.6 (A)   Globulin 3.6     Total Bilirubin 0.2     Alkaline Phosphatase 118 (A)     AST (SGOT) 14     ALT (SGPT) 14     Albumin/Globulin Ratio 0.7     BUN/Creatinine Ratio 23.0 20.8 19.7   Anion Gap 7.2 7.3 9.6   (A) Abnormal value             CARDIAC LABS:      Lab 01/30/23  0504 01/26/23  0124 01/25/23  2236 01/25/23  1607   PROBNP 24,168.0*  --   --  21,655.0*   TROPONIN T  --  0.073* 0.080* 0.087*        No results found for: DIGOXIN   Lab Results   Component Value Date    TSH 2.850 02/03/2022           Invalid input(s): LDLCALC  No results found for: POCTROP  Lab Results   Component Value Date    TROPONINT 0.073 (C) 01/26/2023   (  Lab Results   Component Value Date    MG 1.6 02/01/2023     Results for orders placed during the hospital encounter of 01/25/23    Adult Transthoracic Echo Complete With Contrast if Necessary Per Protocol    Interpretation Summary  •  Left ventricular ejection fraction appears to be 36 - 40% cannot exclude wall motion abnormalities  •  Left ventricular diastolic function is consistent with (grade II w/high LAP) pseudonormalization.  •  The right ventricular cavity is mildly dilated.  •  The right atrial cavity is mildly  dilated.  •  The left atrial cavity is mild to moderately dilated  •  Elevated left atrial pressure.           ASSESSMENT:    Acute pulmonary edema (HCC)    Stage 3 chronic kidney disease (HCC)    Acute on chronic heart failure with  preserved ejection fraction (HFpEF) (HCC)    Elevated troponin level not due myocardial infarction        PLAN:  1.  Continue with Coreg 12.5 mg twice daily  2.  Bumex 2 mg twice daily  3.  Change Zaroxolyn to 2.5 mg 3 times a week  4.Hydralazine 100 3 times daily with isosorbide dinitrate 10 3 times daily  5.  Entresto 97/103 twice daily  6.  Spironolactone 25 mg once a day  7.  Refer an outpatient to heart failure clinic with Dr. Herrmann for repeat assessment and renal function testing can see me in a month.    8.  We will set up for repeat stress testing given decrease in ejection fraction now and current renal function  9.  Given his current renal function situation would recommend holding off on Jardiance for the time being  10.  Also discussed patient ICD possible in the future he had previously been directed on this and wishes to hold off and consider further          Brigido Mcdonnell MD  2/1/2023    09:17 EST

## 2023-02-02 ENCOUNTER — TRANSITIONAL CARE MANAGEMENT TELEPHONE ENCOUNTER (OUTPATIENT)
Dept: CALL CENTER | Facility: HOSPITAL | Age: 68
End: 2023-02-02
Payer: MEDICARE

## 2023-02-02 LAB — QT INTERVAL: 348 MS

## 2023-02-02 NOTE — OUTREACH NOTE
Call Center TCM Note    Flowsheet Row Responses   [facility] patient discharged from? [facility]   Does the patient have one of the following disease processes/diagnoses(primary or secondary)? CHF   TCM attempt successful? Yes  [Fabiana-SO]   Call start time 0904   Call end time 0906   Discharge diagnosis Acute pulmonary edema,   Acute on chronic systolic heart failure    Meds reviewed with patient/caregiver? Yes   Is the patient having any side effects they believe may be caused by any medication additions or changes? No   Does the patient have all medications ordered at discharge? Yes   Is the patient taking all medications as directed (includes completed medication regime)? No   What is preventing the patient from taking all medications as directed? Other  [Not picked up yet]   Nursing Interventions Nurse provided patient education   Comments Hospital d/c f/u appt is on 2/7/23 at 1:00 PM    Does the patient have an appointment with their PCP within 7 days of discharge? Yes   Pulse Ox monitoring None   Psychosocial issues? No   Did the patient receive a copy of their discharge instructions? Yes   Nursing interventions Reviewed instructions with patient   What is the patient's perception of their health status since discharge? Improving   Is the patient able to teach back signs and symptoms of worsening condition? (i.e. weight gain, shortness of air, etc.) Yes   If the patient is a current smoker, are they able to teach back resources for cessation? Not a smoker   Is the patient/caregiver able to teach back the hierarchy of who to call/visit for symptoms/problems? PCP, Specialist, Home health nurse, Urgent Care, ED, 911 Yes   TCM call completed? Yes   Call end time 0906   Would this patient benefit from a Referral to Amb Social Work? No   Is the patient interested in additional calls from an ambulatory ?  NOTE:  applies to high risk patients requiring additional follow-up. Sri ADRIAN  Shannan RN    2/2/2023, 09:06 EST

## 2023-02-09 ENCOUNTER — READMISSION MANAGEMENT (OUTPATIENT)
Dept: CALL CENTER | Facility: HOSPITAL | Age: 68
End: 2023-02-09
Payer: MEDICARE

## 2023-02-09 NOTE — OUTREACH NOTE
CHF Week 2 Survey    Flowsheet Row Responses   [facility] patient discharged from? [facility]   Does the patient have one of the following disease processes/diagnoses(primary or secondary)? CHF   Week 2 attempt successful? Yes   Call start time 1209   Call end time 1215   Discharge diagnosis Acute pulmonary edema,   Acute on chronic systolic heart failure    Person spoke with today (if not patient) and relationship pt   Meds reviewed with patient/caregiver? Yes   Is the patient having any side effects they believe may be caused by any medication additions or changes? No   Does the patient have all medications ordered at discharge? Yes   Is the patient taking all medications as directed (includes completed medication regime)? Yes   Does the patient have a primary care provider?  Yes   Does the patient have an appointment with their PCP within 7 days of discharge? No   Comments regarding PCP No show,  advised to make a PCP fu appt   Nursing Interventions Educated patient on importance of making appointment, Advised patient to make appointment   Has the patient kept scheduled appointments due by today? N/A   Comments Cardiology fu appt on 2/14/23   Pulse Ox monitoring None   Psychosocial issues? No   What is the patient's perception of their health status since discharge? Improving   Nursing interventions Nurse provided patient education   Is the patient able to teach back signs and symptoms of worsening condition? (i.e. weight gain, shortness of air, etc.) Yes   If the patient is a current smoker, are they able to teach back resources for cessation? Not a smoker   Is the patient/caregiver able to teach back the hierarchy of who to call/visit for symptoms/problems? PCP, Specialist, Home health nurse, Urgent Care, ED, 911 Yes   Is the patient able to teach back Heart Failure Zones? Yes   CHF Nursing Interventions Education provided on various zones   CHF Zone this Call Green Zone   Green Zone Patient reports doing  well, No new or worsening shortness of breath, Physical activity level is normal for you, No new swelling -  feet, ankles and legs look normal for you, Weight check stable, No chest pain  [Pt reports swelling in knee]   Green Zone Interventions Low sodium diet, Follow up visits planned, Meds as directed, Daily weight check   CHF Week 2 call completed? Yes   Is the patient interested in additional calls from an ambulatory ?  NOTE:  applies to high risk patients requiring additional follow-up. No   Would this patient benefit from a Referral to [facility] Social Work? No   Wrap up additional comments Pt states he is doing ok, and denies SOB, chest pain, and new swelling in LEs. Pt does report swelling in knee. Pt shares he ran out of entresto today, and advised to get refilled. Pt verbalized understanding. Pt had missed PCP fu appt on 2/7/23, and verified cardiology fu appt on 2/14/23.   Call end time 1215          Rox ALDRIDGE - Registered Nurse

## 2023-02-13 ENCOUNTER — TELEPHONE (OUTPATIENT)
Dept: CARDIOLOGY | Facility: CLINIC | Age: 68
End: 2023-02-13
Payer: MEDICARE

## 2023-02-13 NOTE — TELEPHONE ENCOUNTER
Called patient to remind him of his appointment tomorrow also reminded patient to bring all medication bottles with him to his appointment.

## 2023-02-14 NOTE — PROGRESS NOTES
"Enter Query Response Below      Query Response:   acute on chronic systolic congestive heart failure           If applicable, please update the problem list.       Patient: Lyle Lozano        : 1955  Account: [id number]           Admit Date: 2023        How to Respond to this query:       a. Click New Note     b. Answer query within the yellow box.                c. Update the Problem List, if applicable.      If you have any questions about this query contact me at:  [email or web address]    Dr. Mcdonnell,     There is documentation of both acute on chronic heart failure with preserved ejection fraction and acute on chronic systolic heart failure exacerbation.  Treatment included aldactone, entresto, Isordil, hydralazine, Jardiance, Coreg, Bumex IV, aspirin, Zaroxolyn, and IV Lasix.    23 Echo:  \"Left ventricular ejection fraction appears to be 36 - 40% cannot exclude wall motion abnormalities.   Left ventricular diastolic function is consistent with (grade II w/high LAP) pseudonormalization.\"    Please clarify the type of congestive heart failure exacerbation such as:    -acute on chronic systolic congestive heart failure  -acute on chronic combined diastolic and systolic congestive heart failure  -Other ______________________  -Unable to further clarify     By submitting this query, we are merely seeking further clarification of documentation to accurately reflect all conditions that you are monitoring, evaluating, treating or that extend the hospitalization or utilize additional resources of care. Please utilize your independent clinical judgment when addressing the question(s) above.     This query and your response, once completed, will be entered into the legal medical record.    Sincerely,  Pilar KINGSLEY, RN, CDIS  Clinical Documentation Integrity Program   [email or web address]  "

## 2023-02-24 ENCOUNTER — READMISSION MANAGEMENT (OUTPATIENT)
Dept: CALL CENTER | Facility: HOSPITAL | Age: 68
End: 2023-02-24
Payer: MEDICARE

## 2023-02-24 NOTE — OUTREACH NOTE
CHF Week 4 Survey    Flowsheet Row Responses   [facility] patient discharged from? [facility]   Does the patient have one of the following disease processes/diagnoses(primary or secondary)? CHF   Week 4 attempt successful? No          Karen SMITH - Registered Nurse

## 2023-03-16 ENCOUNTER — APPOINTMENT (OUTPATIENT)
Dept: GENERAL RADIOLOGY | Facility: HOSPITAL | Age: 68
DRG: 291 | End: 2023-03-16
Payer: MEDICARE

## 2023-03-16 ENCOUNTER — APPOINTMENT (OUTPATIENT)
Dept: CARDIOLOGY | Facility: HOSPITAL | Age: 68
DRG: 291 | End: 2023-03-16
Payer: MEDICARE

## 2023-03-16 ENCOUNTER — HOSPITAL ENCOUNTER (INPATIENT)
Facility: HOSPITAL | Age: 68
LOS: 5 days | Discharge: HOME | DRG: 291 | End: 2023-03-23
Attending: EMERGENCY MEDICINE | Admitting: FAMILY MEDICINE
Payer: MEDICARE

## 2023-03-16 DIAGNOSIS — I50.9 ACUTE ON CHRONIC CONGESTIVE HEART FAILURE, UNSPECIFIED HEART FAILURE TYPE: Primary | ICD-10-CM

## 2023-03-16 DIAGNOSIS — I50.23 ACUTE ON CHRONIC SYSTOLIC HEART FAILURE: ICD-10-CM

## 2023-03-16 DIAGNOSIS — Z78.9 DECREASED ACTIVITIES OF DAILY LIVING (ADL): ICD-10-CM

## 2023-03-16 DIAGNOSIS — I82.462 ACUTE DEEP VEIN THROMBOSIS (DVT) OF CALF MUSCLE VEIN OF LEFT LOWER EXTREMITY: ICD-10-CM

## 2023-03-16 DIAGNOSIS — R77.8 ELEVATED TROPONIN LEVEL NOT DUE MYOCARDIAL INFARCTION: ICD-10-CM

## 2023-03-16 DIAGNOSIS — R06.09 DYSPNEA ON EXERTION: ICD-10-CM

## 2023-03-16 DIAGNOSIS — Z91.14 NONCOMPLIANCE WITH MEDICATION REGIMEN: ICD-10-CM

## 2023-03-16 DIAGNOSIS — R26.2 DIFFICULTY WALKING: ICD-10-CM

## 2023-03-16 DIAGNOSIS — M10.032 ACUTE IDIOPATHIC GOUT OF LEFT WRIST: ICD-10-CM

## 2023-03-16 LAB
ALBUMIN SERPL-MCNC: 2.6 G/DL (ref 3.5–5.2)
ALBUMIN/GLOB SERPL: 0.8 G/DL
ALP SERPL-CCNC: 164 U/L (ref 39–117)
ALT SERPL W P-5'-P-CCNC: 22 U/L (ref 1–41)
ANION GAP SERPL CALCULATED.3IONS-SCNC: 7.7 MMOL/L (ref 5–15)
AST SERPL-CCNC: 10 U/L (ref 1–40)
BASOPHILS # BLD AUTO: 0.04 10*3/MM3 (ref 0–0.2)
BASOPHILS NFR BLD AUTO: 0.4 % (ref 0–1.5)
BH CV LOW VAS LEFT GASTRONEMIUS VESSEL: 1
BH CV LOWER VASCULAR LEFT COMMON FEMORAL AUGMENT: NORMAL
BH CV LOWER VASCULAR LEFT COMMON FEMORAL COMPETENT: NORMAL
BH CV LOWER VASCULAR LEFT COMMON FEMORAL COMPRESS: NORMAL
BH CV LOWER VASCULAR LEFT COMMON FEMORAL PHASIC: NORMAL
BH CV LOWER VASCULAR LEFT COMMON FEMORAL SPONT: NORMAL
BH CV LOWER VASCULAR LEFT DISTAL FEMORAL AUGMENT: NORMAL
BH CV LOWER VASCULAR LEFT DISTAL FEMORAL COMPETENT: NORMAL
BH CV LOWER VASCULAR LEFT DISTAL FEMORAL COMPRESS: NORMAL
BH CV LOWER VASCULAR LEFT DISTAL FEMORAL PHASIC: NORMAL
BH CV LOWER VASCULAR LEFT DISTAL FEMORAL SPONT: NORMAL
BH CV LOWER VASCULAR LEFT GASTRONEMIUS COMPRESS: NORMAL
BH CV LOWER VASCULAR LEFT GASTRONEMIUS THROMBUS: NORMAL
BH CV LOWER VASCULAR LEFT GREATER SAPH AK COMPRESS: NORMAL
BH CV LOWER VASCULAR LEFT GREATER SAPH BK COMPRESS: NORMAL
BH CV LOWER VASCULAR LEFT LESSER SAPH COMPRESS: NORMAL
BH CV LOWER VASCULAR LEFT MID FEMORAL COMPRESS: NORMAL
BH CV LOWER VASCULAR LEFT PERONEAL COMPRESS: NORMAL
BH CV LOWER VASCULAR LEFT POPLITEAL AUGMENT: NORMAL
BH CV LOWER VASCULAR LEFT POPLITEAL COMPETENT: NORMAL
BH CV LOWER VASCULAR LEFT POPLITEAL COMPRESS: NORMAL
BH CV LOWER VASCULAR LEFT POPLITEAL PHASIC: NORMAL
BH CV LOWER VASCULAR LEFT POPLITEAL SPONT: NORMAL
BH CV LOWER VASCULAR LEFT POSTERIOR TIBIAL AUGMENT: NORMAL
BH CV LOWER VASCULAR LEFT POSTERIOR TIBIAL COMPRESS: NORMAL
BH CV LOWER VASCULAR LEFT PROXIMAL FEMORAL COMPRESS: NORMAL
BH CV LOWER VASCULAR RIGHT COMMON FEMORAL AUGMENT: NORMAL
BH CV LOWER VASCULAR RIGHT COMMON FEMORAL COMPETENT: NORMAL
BH CV LOWER VASCULAR RIGHT COMMON FEMORAL COMPRESS: NORMAL
BH CV LOWER VASCULAR RIGHT COMMON FEMORAL PHASIC: NORMAL
BH CV LOWER VASCULAR RIGHT COMMON FEMORAL SPONT: NORMAL
BH CV UPPER VENOUS LEFT AXILLARY AUGMENT: NORMAL
BH CV UPPER VENOUS LEFT AXILLARY COMPRESS: NORMAL
BH CV UPPER VENOUS LEFT AXILLARY PHASIC: NORMAL
BH CV UPPER VENOUS LEFT AXILLARY SPONT: NORMAL
BH CV UPPER VENOUS LEFT BASILIC FOREARM COMPRESS: NORMAL
BH CV UPPER VENOUS LEFT BASILIC UPPER COMPRESS: NORMAL
BH CV UPPER VENOUS LEFT BRACHIAL AUGMENT: NORMAL
BH CV UPPER VENOUS LEFT BRACHIAL COMPRESS: NORMAL
BH CV UPPER VENOUS LEFT CEPHALIC FOREARM COMPRESS: NORMAL
BH CV UPPER VENOUS LEFT CEPHALIC UPPER COMPRESS: NORMAL
BH CV UPPER VENOUS LEFT INTERNAL JUGULAR COMPRESS: NORMAL
BH CV UPPER VENOUS LEFT INTERNAL JUGULAR PHASIC: NORMAL
BH CV UPPER VENOUS LEFT INTERNAL JUGULAR SPONT: NORMAL
BH CV UPPER VENOUS LEFT RADIAL COMPRESS: NORMAL
BH CV UPPER VENOUS LEFT SUBCLAVIAN AUGMENT: NORMAL
BH CV UPPER VENOUS LEFT SUBCLAVIAN COMPRESS: NORMAL
BH CV UPPER VENOUS LEFT SUBCLAVIAN PHASIC: NORMAL
BH CV UPPER VENOUS LEFT SUBCLAVIAN SPONT: NORMAL
BH CV UPPER VENOUS LEFT ULNAR COMPRESS: NORMAL
BH CV VAS PRELIMINARY FINDINGS SCRIPTING: 1
BH CV VAS PRELIMINARY FINDINGS SCRIPTING: 1
BILIRUB SERPL-MCNC: 0.6 MG/DL (ref 0–1.2)
BUN SERPL-MCNC: 20 MG/DL (ref 8–23)
BUN/CREAT SERPL: 12.5 (ref 7–25)
CALCIUM SPEC-SCNC: 8.3 MG/DL (ref 8.6–10.5)
CHLORIDE SERPL-SCNC: 98 MMOL/L (ref 98–107)
CO2 SERPL-SCNC: 24.3 MMOL/L (ref 22–29)
CREAT SERPL-MCNC: 1.6 MG/DL (ref 0.76–1.27)
DEPRECATED RDW RBC AUTO: 53.9 FL (ref 37–54)
EGFRCR SERPLBLD CKD-EPI 2021: 47.5 ML/MIN/1.73
EOSINOPHIL # BLD AUTO: 0.21 10*3/MM3 (ref 0–0.4)
EOSINOPHIL NFR BLD AUTO: 1.9 % (ref 0.3–6.2)
ERYTHROCYTE [DISTWIDTH] IN BLOOD BY AUTOMATED COUNT: 18.9 % (ref 12.3–15.4)
GLOBULIN UR ELPH-MCNC: 3.4 GM/DL
GLUCOSE SERPL-MCNC: 256 MG/DL (ref 65–99)
HCT VFR BLD AUTO: 33 % (ref 37.5–51)
HGB BLD-MCNC: 10.4 G/DL (ref 13–17.7)
HOLD SPECIMEN: NORMAL
HOLD SPECIMEN: NORMAL
IMM GRANULOCYTES # BLD AUTO: 0.06 10*3/MM3 (ref 0–0.05)
IMM GRANULOCYTES NFR BLD AUTO: 0.6 % (ref 0–0.5)
LYMPHOCYTES # BLD AUTO: 1.21 10*3/MM3 (ref 0.7–3.1)
LYMPHOCYTES NFR BLD AUTO: 11.1 % (ref 19.6–45.3)
MAXIMAL PREDICTED HEART RATE: 155 BPM
MAXIMAL PREDICTED HEART RATE: 155 BPM
MCH RBC QN AUTO: 25.4 PG (ref 26.6–33)
MCHC RBC AUTO-ENTMCNC: 31.5 G/DL (ref 31.5–35.7)
MCV RBC AUTO: 80.5 FL (ref 79–97)
MONOCYTES # BLD AUTO: 0.59 10*3/MM3 (ref 0.1–0.9)
MONOCYTES NFR BLD AUTO: 5.4 % (ref 5–12)
NEUTROPHILS NFR BLD AUTO: 8.79 10*3/MM3 (ref 1.7–7)
NEUTROPHILS NFR BLD AUTO: 80.6 % (ref 42.7–76)
NRBC BLD AUTO-RTO: 0 /100 WBC (ref 0–0.2)
NT-PROBNP SERPL-MCNC: ABNORMAL PG/ML (ref 0–900)
PLATELET # BLD AUTO: 394 10*3/MM3 (ref 140–450)
PMV BLD AUTO: 8.7 FL (ref 6–12)
POTASSIUM SERPL-SCNC: 4 MMOL/L (ref 3.5–5.2)
PROT SERPL-MCNC: 6 G/DL (ref 6–8.5)
RBC # BLD AUTO: 4.1 10*6/MM3 (ref 4.14–5.8)
SODIUM SERPL-SCNC: 130 MMOL/L (ref 136–145)
STRESS TARGET HR: 132 BPM
STRESS TARGET HR: 132 BPM
TROPONIN T SERPL HS-MCNC: 101 NG/L
TROPONIN T SERPL HS-MCNC: 89 NG/L
WBC NRBC COR # BLD: 10.9 10*3/MM3 (ref 3.4–10.8)
WHOLE BLOOD HOLD COAG: NORMAL
WHOLE BLOOD HOLD SPECIMEN: NORMAL

## 2023-03-16 PROCEDURE — G0378 HOSPITAL OBSERVATION PER HR: HCPCS

## 2023-03-16 PROCEDURE — 85025 COMPLETE CBC W/AUTO DIFF WBC: CPT

## 2023-03-16 PROCEDURE — 71045 X-RAY EXAM CHEST 1 VIEW: CPT

## 2023-03-16 PROCEDURE — 84484 ASSAY OF TROPONIN QUANT: CPT | Performed by: EMERGENCY MEDICINE

## 2023-03-16 PROCEDURE — 93005 ELECTROCARDIOGRAM TRACING: CPT | Performed by: FAMILY MEDICINE

## 2023-03-16 PROCEDURE — 93971 EXTREMITY STUDY: CPT

## 2023-03-16 PROCEDURE — 93005 ELECTROCARDIOGRAM TRACING: CPT

## 2023-03-16 PROCEDURE — 25010000002 FUROSEMIDE PER 20 MG: Performed by: FAMILY MEDICINE

## 2023-03-16 PROCEDURE — 84484 ASSAY OF TROPONIN QUANT: CPT

## 2023-03-16 PROCEDURE — 83880 ASSAY OF NATRIURETIC PEPTIDE: CPT

## 2023-03-16 PROCEDURE — 36415 COLL VENOUS BLD VENIPUNCTURE: CPT

## 2023-03-16 PROCEDURE — 93005 ELECTROCARDIOGRAM TRACING: CPT | Performed by: EMERGENCY MEDICINE

## 2023-03-16 PROCEDURE — 93971 EXTREMITY STUDY: CPT | Performed by: SURGERY

## 2023-03-16 PROCEDURE — 25010000002 FUROSEMIDE PER 20 MG: Performed by: EMERGENCY MEDICINE

## 2023-03-16 PROCEDURE — 80053 COMPREHEN METABOLIC PANEL: CPT

## 2023-03-16 PROCEDURE — 99285 EMERGENCY DEPT VISIT HI MDM: CPT

## 2023-03-16 PROCEDURE — 99223 1ST HOSP IP/OBS HIGH 75: CPT | Performed by: FAMILY MEDICINE

## 2023-03-16 RX ORDER — SPIRONOLACTONE 25 MG/1
25 TABLET ORAL DAILY
Status: DISCONTINUED | OUTPATIENT
Start: 2023-03-17 | End: 2023-03-23 | Stop reason: HOSPADM

## 2023-03-16 RX ORDER — FUROSEMIDE 10 MG/ML
80 INJECTION INTRAMUSCULAR; INTRAVENOUS ONCE
Status: COMPLETED | OUTPATIENT
Start: 2023-03-16 | End: 2023-03-16

## 2023-03-16 RX ORDER — SODIUM CHLORIDE 0.9 % (FLUSH) 0.9 %
10 SYRINGE (ML) INJECTION AS NEEDED
Status: DISCONTINUED | OUTPATIENT
Start: 2023-03-16 | End: 2023-03-23 | Stop reason: HOSPADM

## 2023-03-16 RX ORDER — ATORVASTATIN CALCIUM 40 MG/1
80 TABLET, FILM COATED ORAL NIGHTLY
Status: DISCONTINUED | OUTPATIENT
Start: 2023-03-16 | End: 2023-03-23 | Stop reason: HOSPADM

## 2023-03-16 RX ORDER — HYDROCODONE BITARTRATE AND ACETAMINOPHEN 7.5; 325 MG/1; MG/1
1 TABLET ORAL ONCE
Status: COMPLETED | OUTPATIENT
Start: 2023-03-16 | End: 2023-03-16

## 2023-03-16 RX ORDER — MELATONIN
1000 DAILY
Status: DISCONTINUED | OUTPATIENT
Start: 2023-03-17 | End: 2023-03-23 | Stop reason: HOSPADM

## 2023-03-16 RX ORDER — CARVEDILOL 12.5 MG/1
12.5 TABLET ORAL 2 TIMES DAILY WITH MEALS
Status: DISCONTINUED | OUTPATIENT
Start: 2023-03-16 | End: 2023-03-23 | Stop reason: HOSPADM

## 2023-03-16 RX ORDER — ASPIRIN 81 MG/1
81 TABLET ORAL DAILY
Status: DISCONTINUED | OUTPATIENT
Start: 2023-03-17 | End: 2023-03-23 | Stop reason: HOSPADM

## 2023-03-16 RX ORDER — HEPARIN SODIUM 5000 [USP'U]/ML
5000 INJECTION, SOLUTION INTRAVENOUS; SUBCUTANEOUS EVERY 12 HOURS SCHEDULED
Status: DISCONTINUED | OUTPATIENT
Start: 2023-03-16 | End: 2023-03-16

## 2023-03-16 RX ORDER — FUROSEMIDE 10 MG/ML
80 INJECTION INTRAMUSCULAR; INTRAVENOUS 2 TIMES DAILY
Status: DISCONTINUED | OUTPATIENT
Start: 2023-03-16 | End: 2023-03-18

## 2023-03-16 RX ADMIN — FUROSEMIDE 80 MG: 10 INJECTION, SOLUTION INTRAMUSCULAR; INTRAVENOUS at 22:38

## 2023-03-16 RX ADMIN — FUROSEMIDE 80 MG: 10 INJECTION, SOLUTION INTRAMUSCULAR; INTRAVENOUS at 16:09

## 2023-03-16 RX ADMIN — ATORVASTATIN CALCIUM 80 MG: 40 TABLET, FILM COATED ORAL at 22:37

## 2023-03-16 RX ADMIN — SACUBITRIL AND VALSARTAN 1 TABLET: 97; 103 TABLET, FILM COATED ORAL at 22:41

## 2023-03-16 RX ADMIN — APIXABAN 10 MG: 5 TABLET, FILM COATED ORAL at 22:37

## 2023-03-16 RX ADMIN — HYDROCODONE BITARTRATE AND ACETAMINOPHEN 1 TABLET: 7.5; 325 TABLET ORAL at 16:08

## 2023-03-16 RX ADMIN — CARVEDILOL 12.5 MG: 12.5 TABLET, FILM COATED ORAL at 22:38

## 2023-03-16 NOTE — H&P
"[facility]   HISTORY AND PHYSICAL    Patient Name: Lyle Lozano  : 1955  MRN: [id number]  Primary Care Physician:  Heidi Villa, APRN  Date of admission: 3/16/2023    Subjective   Subjective     Chief Complaint:  Shortness of breath    History of Present Illness  Patient is a pleasant 67-year-old male with past medical history of congestive heart failure, hyperlipidemia, hypertension, sleep apnea and chronic kidney disease.  Patient presents to the emergency department with increasing shortness of breath.  Patient says that shortness of breath worsens with exertion or when he tries to lay flat.  Patient was recently admitted for congestive heart failure and placed on 3 separate diuretics.  He seems to be at baseline when discharged home.  Unfortunately the patient has very poor support system at home and has not been able to obtain his medications.  As such he has been without his medicines for greater than a week.  Patient denies chest pain, fever, chills, cough or chest congestion.  He says that his urine output has been \"way down\" since being without his oral medications.  Here in the emergency department the patient's BNP remains greatly elevated.  He also has some elevation in his troponin levels.  Because of this combination plus the very poor social situation at home and the ER doctor asked that we admit at least overnight for diuresis and to devise a good discharge plan.  Review of Systems   Constitutional: Positive for fatigue.   Respiratory: Positive for shortness of breath.    Cardiovascular: Positive for leg swelling.   All other systems reviewed and are negative.       Personal History     Past Medical History:   Diagnosis Date   • Abnormal kidney function    • Arthritis    • Bursitis    • CHF (congestive heart failure) (HCC)    • Depression    • Diabetes ([facility])    • Edema    • Essential hypertension 2021   • Forgetfulness    • Head injury    • Hernia cerebri ([facility])    • " Hyperlipidemia    • Hypertension    • IFG (impaired fasting glucose)    • Low back pain    • Lumbago     `   • Pain of left hip    • Right shoulder pain    • Sleep apnea    • SOB (shortness of breath)        Past Surgical History:   Procedure Laterality Date   • CYST REMOVAL Right     leg       Family History: family history includes Diabetes in his sister; No Known Problems in his father; Stroke in his sister. Otherwise pertinent FHx was reviewed and not pertinent to current issue.    Social History:  reports that he quit smoking about 10 years ago. His smoking use included cigarettes. He started smoking about 41 years ago. He smoked an average of .25 packs per day. He has never used smokeless tobacco. He reports current alcohol use. He reports that he does not use drugs.    Home Medications:  aspirin, atorvastatin, carvedilol, cholecalciferol, sacubitril-valsartan, sitaGLIPtin-metFORMIN, and spironolactone    Allergies:  No Known Allergies    Objective    Objective     Vitals:   Temp:  [98.4 °F (36.9 °C)] 98.4 °F (36.9 °C)  Heart Rate:  [100] 100  Resp:  [18] 18  BP: (154)/(84) 154/84    Physical Exam  Vitals and nursing note reviewed.   Constitutional:       Appearance: Normal appearance.   HENT:      Nose: Nose normal.      Mouth/Throat:      Mouth: Mucous membranes are moist.   Eyes:      Extraocular Movements: Extraocular movements intact.      Pupils: Pupils are equal, round, and reactive to light.   Cardiovascular:      Rate and Rhythm: Normal rate and regular rhythm.      Pulses: Normal pulses.      Heart sounds: Normal heart sounds.   Pulmonary:      Effort: Pulmonary effort is normal.      Breath sounds: Rales present.   Abdominal:      General: Abdomen is flat.      Palpations: Abdomen is soft.   Skin:     General: Skin is warm and dry.   Neurological:      General: No focal deficit present.      Mental Status: He is alert and oriented to person, place, and time.         Result Review    Result  Review:  I have personally reviewed the results from the time of this admission to 3/16/2023 17:54 EDT and agree with these findings:  [x]  Laboratory list / accordion  []  Microbiology  [x]  Radiology  []  EKG/Telemetry   []  Cardiology/Vascular   []  Pathology  []  Old records  []  Other:  Most notable findings include:   Elevated BNP    Assessment & Plan   Assessment / Plan     Brief Patient Summary:  Lyle Lozano is a 67 y.o. male who presented to the emergency department with increasing shortness of breath.  The patient has a history of congestive heart failure and had not been taking his medications.  Here in the ER he was found to have an acute exacerbation of CHF and will be admitted for further evaluation and treatment.  Active Hospital Problems:  1.  Acute on chronic exacerbation of systolic congestive heart failure  2.  Elevated troponin most likely troponin leak secondary to congestive heart failure less likely secondary to NSTEMI  3.  Hypertension  2.  Diabetes mellitus  3.  Hyperlipidemia  Plan:   Admit the patient to the hospitalist service  Patient has been started on the congestive heart failure pathway  We will continue to diurese with IV medications  I have consulted  to try and help with a better discharge plan as the patient does not have good social support at home  Keep the patient on telemetry for closer monitoring  I will resume the patient's home medications including Entresto and spironolactone  Patient has had a recent echocardiogram within the last 2 months.  Therefore I do not feel that the patient will need another echo during this hospitalization.    DVT prophylaxis:  No DVT prophylaxis order currently exists.    CODE STATUS:       Admission Status:  I believe this patient meets observation status.    Brigido Avila,

## 2023-03-17 ENCOUNTER — APPOINTMENT (OUTPATIENT)
Dept: GENERAL RADIOLOGY | Facility: HOSPITAL | Age: 68
DRG: 291 | End: 2023-03-17
Payer: MEDICARE

## 2023-03-17 LAB
GLUCOSE BLDC GLUCOMTR-MCNC: 112 MG/DL (ref 70–99)
GLUCOSE BLDC GLUCOMTR-MCNC: 209 MG/DL (ref 70–99)
GLUCOSE BLDC GLUCOMTR-MCNC: 263 MG/DL (ref 70–99)
QT INTERVAL: 355 MS
QT INTERVAL: 378 MS
QT INTERVAL: 383 MS
URATE SERPL-MCNC: 9.6 MG/DL (ref 3.4–7)

## 2023-03-17 PROCEDURE — 93010 ELECTROCARDIOGRAM REPORT: CPT | Performed by: INTERNAL MEDICINE

## 2023-03-17 PROCEDURE — 25010000002 FUROSEMIDE PER 20 MG: Performed by: FAMILY MEDICINE

## 2023-03-17 PROCEDURE — 73100 X-RAY EXAM OF WRIST: CPT

## 2023-03-17 PROCEDURE — 84550 ASSAY OF BLOOD/URIC ACID: CPT | Performed by: FAMILY MEDICINE

## 2023-03-17 PROCEDURE — 93005 ELECTROCARDIOGRAM TRACING: CPT | Performed by: FAMILY MEDICINE

## 2023-03-17 PROCEDURE — 94799 UNLISTED PULMONARY SVC/PX: CPT

## 2023-03-17 PROCEDURE — G0378 HOSPITAL OBSERVATION PER HR: HCPCS

## 2023-03-17 PROCEDURE — 63710000001 PREDNISONE PER 1 MG: Performed by: FAMILY MEDICINE

## 2023-03-17 PROCEDURE — 99233 SBSQ HOSP IP/OBS HIGH 50: CPT | Performed by: FAMILY MEDICINE

## 2023-03-17 PROCEDURE — 63710000001 INSULIN LISPRO (HUMAN) PER 5 UNITS: Performed by: FAMILY MEDICINE

## 2023-03-17 PROCEDURE — 82962 GLUCOSE BLOOD TEST: CPT

## 2023-03-17 PROCEDURE — 63710000001 INSULIN DETEMIR PER 5 UNITS: Performed by: FAMILY MEDICINE

## 2023-03-17 RX ORDER — INSULIN LISPRO 100 [IU]/ML
1-200 INJECTION, SOLUTION INTRAVENOUS; SUBCUTANEOUS AS NEEDED
Status: DISCONTINUED | OUTPATIENT
Start: 2023-03-17 | End: 2023-03-23 | Stop reason: HOSPADM

## 2023-03-17 RX ORDER — INSULIN LISPRO 100 [IU]/ML
1-200 INJECTION, SOLUTION INTRAVENOUS; SUBCUTANEOUS
Status: DISCONTINUED | OUTPATIENT
Start: 2023-03-17 | End: 2023-03-23 | Stop reason: HOSPADM

## 2023-03-17 RX ORDER — PREDNISONE 20 MG/1
40 TABLET ORAL DAILY
Status: DISCONTINUED | OUTPATIENT
Start: 2023-03-17 | End: 2023-03-23 | Stop reason: HOSPADM

## 2023-03-17 RX ORDER — DEXTROSE MONOHYDRATE 25 G/50ML
10-50 INJECTION, SOLUTION INTRAVENOUS
Status: DISCONTINUED | OUTPATIENT
Start: 2023-03-17 | End: 2023-03-23 | Stop reason: HOSPADM

## 2023-03-17 RX ORDER — NICOTINE POLACRILEX 4 MG
15 LOZENGE BUCCAL
Status: DISCONTINUED | OUTPATIENT
Start: 2023-03-17 | End: 2023-03-23 | Stop reason: HOSPADM

## 2023-03-17 RX ADMIN — INSULIN LISPRO 7 UNITS: 100 INJECTION, SOLUTION INTRAVENOUS; SUBCUTANEOUS at 18:04

## 2023-03-17 RX ADMIN — CARVEDILOL 12.5 MG: 12.5 TABLET, FILM COATED ORAL at 18:03

## 2023-03-17 RX ADMIN — APIXABAN 10 MG: 5 TABLET, FILM COATED ORAL at 08:15

## 2023-03-17 RX ADMIN — INSULIN LISPRO 3 UNITS: 100 INJECTION, SOLUTION INTRAVENOUS; SUBCUTANEOUS at 21:11

## 2023-03-17 RX ADMIN — FUROSEMIDE 80 MG: 10 INJECTION, SOLUTION INTRAMUSCULAR; INTRAVENOUS at 21:00

## 2023-03-17 RX ADMIN — PREDNISONE 40 MG: 20 TABLET ORAL at 13:21

## 2023-03-17 RX ADMIN — ASPIRIN 81 MG: 81 TABLET, COATED ORAL at 08:14

## 2023-03-17 RX ADMIN — INSULIN LISPRO 11 UNITS: 100 INJECTION, SOLUTION INTRAVENOUS; SUBCUTANEOUS at 13:20

## 2023-03-17 RX ADMIN — Medication 1000 UNITS: at 08:15

## 2023-03-17 RX ADMIN — Medication 10 ML: at 08:15

## 2023-03-17 RX ADMIN — CARVEDILOL 12.5 MG: 12.5 TABLET, FILM COATED ORAL at 08:17

## 2023-03-17 RX ADMIN — INSULIN DETEMIR 24 UNITS: 100 INJECTION, SOLUTION SUBCUTANEOUS at 21:12

## 2023-03-17 RX ADMIN — SACUBITRIL AND VALSARTAN 1 TABLET: 97; 103 TABLET, FILM COATED ORAL at 11:36

## 2023-03-17 RX ADMIN — SACUBITRIL AND VALSARTAN 1 TABLET: 97; 103 TABLET, FILM COATED ORAL at 21:00

## 2023-03-17 RX ADMIN — ATORVASTATIN CALCIUM 80 MG: 40 TABLET, FILM COATED ORAL at 20:59

## 2023-03-17 RX ADMIN — APIXABAN 10 MG: 5 TABLET, FILM COATED ORAL at 20:59

## 2023-03-17 RX ADMIN — FUROSEMIDE 80 MG: 10 INJECTION, SOLUTION INTRAMUSCULAR; INTRAVENOUS at 08:15

## 2023-03-17 RX ADMIN — SPIRONOLACTONE 25 MG: 25 TABLET ORAL at 08:15

## 2023-03-17 NOTE — SIGNIFICANT NOTE
03/17/23 1045   Coping/Psychosocial   Additional Documentation Spiritual Care (Group)   Spiritual Care   Spiritual Care Source  initiative   Response to Spiritual Care receptive of support   Spiritual Care Interventions other (see comments)  (introductions made and my role explained. pt educated on how to contact  if needed)   Spiritual Care Visit Type initial   Spiritual Care Request coping/stress of illness support;spiritual/moral support   Receptivity to Spiritual Care visit welcomed     At time of visit pt is on 3NT

## 2023-03-17 NOTE — PLAN OF CARE
Goal Outcome Evaluation:  Plan of Care Reviewed With: patient        Progress: improving  Outcome Evaluation: Patient has c/o of his left wrist and hand hurting. Patient's wrist and hand is swollen and warm to the touch. X-ray suggested inflammatory arthritis. VSS, patient started on Glucommander.

## 2023-03-17 NOTE — PLAN OF CARE
"Goal Outcome Evaluation:  Plan of Care Reviewed With: patient        Progress: no change  Outcome Evaluation: pt arrived from ED, c/o pain, edema to extremities, and generally feeling \"sick\". Patient was previously admitted 1/25/23 and discharged 2/1/23. Acute on chronic CHF. BNP and troponin elevated on admission. Patient has slept well, good appetite, AAOx4, VSS but mild tachycardia and hypertension at times. DVT found in vascular ultrasound, currently receiving eliquis.  "

## 2023-03-18 LAB
ALBUMIN SERPL-MCNC: 2.2 G/DL (ref 3.5–5.2)
ALP SERPL-CCNC: 122 U/L (ref 39–117)
ALT SERPL W P-5'-P-CCNC: 14 U/L (ref 1–41)
ANION GAP SERPL CALCULATED.3IONS-SCNC: 11.1 MMOL/L (ref 5–15)
AST SERPL-CCNC: 9 U/L (ref 1–40)
BASOPHILS # BLD AUTO: 0.01 10*3/MM3 (ref 0–0.2)
BASOPHILS NFR BLD AUTO: 0.1 % (ref 0–1.5)
BILIRUB CONJ SERPL-MCNC: <0.2 MG/DL (ref 0–0.3)
BILIRUB INDIRECT SERPL-MCNC: ABNORMAL MG/DL
BILIRUB SERPL-MCNC: 0.3 MG/DL (ref 0–1.2)
BUN SERPL-MCNC: 32 MG/DL (ref 8–23)
BUN/CREAT SERPL: 15.7 (ref 7–25)
CALCIUM SPEC-SCNC: 8.1 MG/DL (ref 8.6–10.5)
CHLORIDE SERPL-SCNC: 98 MMOL/L (ref 98–107)
CO2 SERPL-SCNC: 23.9 MMOL/L (ref 22–29)
CREAT SERPL-MCNC: 2.04 MG/DL (ref 0.76–1.27)
DEPRECATED RDW RBC AUTO: 53.2 FL (ref 37–54)
EGFRCR SERPLBLD CKD-EPI 2021: 35.5 ML/MIN/1.73
EOSINOPHIL # BLD AUTO: 0 10*3/MM3 (ref 0–0.4)
EOSINOPHIL NFR BLD AUTO: 0 % (ref 0.3–6.2)
ERYTHROCYTE [DISTWIDTH] IN BLOOD BY AUTOMATED COUNT: 18.5 % (ref 12.3–15.4)
GLUCOSE BLDC GLUCOMTR-MCNC: 177 MG/DL (ref 70–99)
GLUCOSE BLDC GLUCOMTR-MCNC: 235 MG/DL (ref 70–99)
GLUCOSE BLDC GLUCOMTR-MCNC: 249 MG/DL (ref 70–99)
GLUCOSE BLDC GLUCOMTR-MCNC: 307 MG/DL (ref 70–99)
GLUCOSE SERPL-MCNC: 297 MG/DL (ref 65–99)
HCT VFR BLD AUTO: 28.7 % (ref 37.5–51)
HGB BLD-MCNC: 9.3 G/DL (ref 13–17.7)
IMM GRANULOCYTES # BLD AUTO: 0.04 10*3/MM3 (ref 0–0.05)
IMM GRANULOCYTES NFR BLD AUTO: 0.4 % (ref 0–0.5)
LYMPHOCYTES # BLD AUTO: 1.07 10*3/MM3 (ref 0.7–3.1)
LYMPHOCYTES NFR BLD AUTO: 10.6 % (ref 19.6–45.3)
MAGNESIUM SERPL-MCNC: 1.7 MG/DL (ref 1.6–2.4)
MCH RBC QN AUTO: 25.6 PG (ref 26.6–33)
MCHC RBC AUTO-ENTMCNC: 32.4 G/DL (ref 31.5–35.7)
MCV RBC AUTO: 79.1 FL (ref 79–97)
MONOCYTES # BLD AUTO: 0.55 10*3/MM3 (ref 0.1–0.9)
MONOCYTES NFR BLD AUTO: 5.4 % (ref 5–12)
NEUTROPHILS NFR BLD AUTO: 8.43 10*3/MM3 (ref 1.7–7)
NEUTROPHILS NFR BLD AUTO: 83.5 % (ref 42.7–76)
NRBC BLD AUTO-RTO: 0 /100 WBC (ref 0–0.2)
PHOSPHATE SERPL-MCNC: 3.8 MG/DL (ref 2.5–4.5)
PLATELET # BLD AUTO: 331 10*3/MM3 (ref 140–450)
PMV BLD AUTO: 9.2 FL (ref 6–12)
POTASSIUM SERPL-SCNC: 3.7 MMOL/L (ref 3.5–5.2)
PROT SERPL-MCNC: 5.4 G/DL (ref 6–8.5)
RBC # BLD AUTO: 3.63 10*6/MM3 (ref 4.14–5.8)
SODIUM SERPL-SCNC: 133 MMOL/L (ref 136–145)
WBC NRBC COR # BLD: 10.1 10*3/MM3 (ref 3.4–10.8)

## 2023-03-18 PROCEDURE — 80048 BASIC METABOLIC PNL TOTAL CA: CPT | Performed by: FAMILY MEDICINE

## 2023-03-18 PROCEDURE — 83735 ASSAY OF MAGNESIUM: CPT | Performed by: FAMILY MEDICINE

## 2023-03-18 PROCEDURE — 82962 GLUCOSE BLOOD TEST: CPT

## 2023-03-18 PROCEDURE — 80076 HEPATIC FUNCTION PANEL: CPT | Performed by: FAMILY MEDICINE

## 2023-03-18 PROCEDURE — 63710000001 PREDNISONE PER 1 MG: Performed by: FAMILY MEDICINE

## 2023-03-18 PROCEDURE — 85025 COMPLETE CBC W/AUTO DIFF WBC: CPT | Performed by: FAMILY MEDICINE

## 2023-03-18 PROCEDURE — 99232 SBSQ HOSP IP/OBS MODERATE 35: CPT | Performed by: FAMILY MEDICINE

## 2023-03-18 PROCEDURE — 63710000001 INSULIN LISPRO (HUMAN) PER 5 UNITS: Performed by: FAMILY MEDICINE

## 2023-03-18 PROCEDURE — 94799 UNLISTED PULMONARY SVC/PX: CPT

## 2023-03-18 PROCEDURE — 63710000001 INSULIN DETEMIR PER 5 UNITS: Performed by: FAMILY MEDICINE

## 2023-03-18 PROCEDURE — 84100 ASSAY OF PHOSPHORUS: CPT | Performed by: FAMILY MEDICINE

## 2023-03-18 RX ORDER — FUROSEMIDE 10 MG/ML
60 INJECTION INTRAMUSCULAR; INTRAVENOUS DAILY
Status: DISCONTINUED | OUTPATIENT
Start: 2023-03-19 | End: 2023-03-22

## 2023-03-18 RX ADMIN — ATORVASTATIN CALCIUM 80 MG: 40 TABLET, FILM COATED ORAL at 22:01

## 2023-03-18 RX ADMIN — APIXABAN 10 MG: 5 TABLET, FILM COATED ORAL at 22:01

## 2023-03-18 RX ADMIN — INSULIN DETEMIR 29 UNITS: 100 INJECTION, SOLUTION SUBCUTANEOUS at 21:59

## 2023-03-18 RX ADMIN — PREDNISONE 40 MG: 20 TABLET ORAL at 08:57

## 2023-03-18 RX ADMIN — SPIRONOLACTONE 25 MG: 25 TABLET ORAL at 08:56

## 2023-03-18 RX ADMIN — INSULIN LISPRO 14 UNITS: 100 INJECTION, SOLUTION INTRAVENOUS; SUBCUTANEOUS at 17:44

## 2023-03-18 RX ADMIN — ASPIRIN 81 MG: 81 TABLET, COATED ORAL at 08:56

## 2023-03-18 RX ADMIN — SACUBITRIL AND VALSARTAN 1 TABLET: 97; 103 TABLET, FILM COATED ORAL at 22:01

## 2023-03-18 RX ADMIN — CARVEDILOL 12.5 MG: 12.5 TABLET, FILM COATED ORAL at 08:57

## 2023-03-18 RX ADMIN — INSULIN LISPRO 8 UNITS: 100 INJECTION, SOLUTION INTRAVENOUS; SUBCUTANEOUS at 08:55

## 2023-03-18 RX ADMIN — Medication 10 ML: at 08:56

## 2023-03-18 RX ADMIN — INSULIN LISPRO 5 UNITS: 100 INJECTION, SOLUTION INTRAVENOUS; SUBCUTANEOUS at 13:43

## 2023-03-18 RX ADMIN — CARVEDILOL 12.5 MG: 12.5 TABLET, FILM COATED ORAL at 17:18

## 2023-03-18 RX ADMIN — APIXABAN 10 MG: 5 TABLET, FILM COATED ORAL at 08:56

## 2023-03-18 RX ADMIN — Medication 1000 UNITS: at 08:56

## 2023-03-18 RX ADMIN — SACUBITRIL AND VALSARTAN 1 TABLET: 97; 103 TABLET, FILM COATED ORAL at 08:56

## 2023-03-18 RX ADMIN — INSULIN LISPRO 6 UNITS: 100 INJECTION, SOLUTION INTRAVENOUS; SUBCUTANEOUS at 21:57

## 2023-03-18 NOTE — PLAN OF CARE
Goal Outcome Evaluation:  Plan of Care Reviewed With: patient        Progress: no change  Outcome Evaluation: patient resting in bed, no c/o pain or discomfort, patient alert and oriented, call light within reach.

## 2023-03-18 NOTE — PLAN OF CARE
Goal Outcome Evaluation:  Plan of Care Reviewed With: patient   Patient is alert and orientated x4. Denies any pain or discomfort at this time. Vital signs within normal limits at this time.      Progress: no change

## 2023-03-18 NOTE — PROGRESS NOTES
[facility]   Hospitalist Progress Note  Date: 3/18/2023  Patient Name: Lyle Lozano  : 1955  MRN: [id number]  Date of admission: 3/16/2023      Subjective   Subjective     Chief complaint: Shortness of breath     Summary:  67-year-old male with history of heart failure with reduced ejection fraction with previous echo 2023 showing EF 36 to 40% with diastolic dysfunction, on Entresto, diuretics, with underlying CKD stage IIIa, dyslipidemia, essential hypertension, obstructive sleep apnea, diabetes mellitus, medical noncompliance, chronic back pain, depression, hospitalized on 3/16/2023 with shortness of breath symptoms, with acute on chronic exacerbation of combined CHF, with elevated troponin likely troponin leak secondary to CHF, started on IV diuretics at more than 2 times his home dosing, in the setting of not having compliance with home medications, with left wrist pain, started on prednisone, imaging pending, left lower extremity with acute DVT in gastrocnemius, started on full dose anticoagulation     Interval follow-up: Patient seen and examined this morning, no acute distress, no acute major overnight events, patient's left wrist swelling and tenderness and pain has significantly improved over the past 24 hours, he still has restricted range of motion and tenderness with flexion and extension of the left wrist, there is some mild swelling, there is no warmth.  He does have a history of gout he confirms, his breathing continues to improve, he is exertional dyspnea and intermittent cough.  His creatinine is up to 2.04, diuretics have been reduced.  His potassium is 3.7, sodium 133, blood sugars are in the 200 range.  He is on Glucomander subcu.  Telemetry reviewed, short bursts of V. tach, nonsustained, baseline sinus in the 60s, asymptomatic during those occurrences, blood pressures are trending in the 100s over 60s, heart rates in the 60s to 70s.  He is on Coreg 12.5 mg twice  daily.  Edema of the extremities remains    Review of systems:  All systems reviewed and negative except generalized fatigue, generalized weakness, left wrist pain, shortness of breath with exertion, intermittent cough    Objective   Objective     Vitals:   Temp:  [97.4 °F (36.3 °C)-99.1 °F (37.3 °C)] 97.4 °F (36.3 °C)  Heart Rate:  [66-84] 66  Resp:  [16-20] 20  BP: (104-137)/(57-72) 104/67  Flow (L/min):  [1] 1  Physical Exam    Constitutional: Awake, alert, no acute distress sitting upright at the bed              Eyes: Pupils equal, sclerae anicteric, no conjunctival injection              HENT: NCAT, mucous membranes moist              Neck: Supple, full range of motion              Respiratory: Air entry bilaterally, coarse breath sounds throughout, mostly crackles at the bases              Cardiovascular: RRR, no murmurs, rubs, or gallops, palpable pedal pulses bilaterally              Gastrointestinal: Positive bowel sounds, soft, nontender, nondistended              Musculoskeletal: Bilateral lower extremity edema 1+, left upper extremity in particular swelling around the wrist joint with mild warmth, no erythema, tenderness particularly at the left wrist with difficulty flexing and extending              Psychiatric: Appropriate affect, cooperative              Neurologic: Oriented x 3, strength symmetric in all extremities, Cranial Nerves grossly intact to confrontation, speech clear              Skin: No rashes visible on exposed skin     Result Review    Result Review:  I have personally reviewed the pertinent results from the past 24 hours to 3/18/2023 11:08 EDT and agree with these findings:  [x]  Laboratory   CBC    CBC 1/30/23 3/16/23 3/18/23   WBC 6.53 10.90 (A) 10.10   RBC 3.59 (A) 4.10 (A) 3.63 (A)   Hemoglobin 9.4 (A) 10.4 (A) 9.3 (A)   Hematocrit 29.8 (A) 33.0 (A) 28.7 (A)   MCV 83.0 80.5 79.1   MCH 26.2 (A) 25.4 (A) 25.6 (A)   MCHC 31.5 31.5 32.4   RDW 17.1 (A) 18.9 (A) 18.5 (A)   Platelets  331 394 331   (A) Abnormal value            BMP    BMP 2/1/23 3/16/23 3/18/23   BUN 43 (A) 20 32 (A)   Creatinine 2.18 (A) 1.60 (A) 2.04 (A)   Sodium 136 130 (A) 133 (A)   Potassium 3.8 4.0 3.7   Chloride 97 (A) 98 98   CO2 29.4 (A) 24.3 23.9   Calcium 8.7 8.3 (A) 8.1 (A)   (A) Abnormal value            LIVER FUNCTION TESTS:      Lab 03/18/23  0545 03/16/23  1249   TOTAL PROTEIN 5.4* 6.0   ALBUMIN 2.2* 2.6*   GLOBULIN  --  3.4   ALT (SGPT) 14 22   AST (SGOT) 9 10   BILIRUBIN 0.3 0.6   BILIRUBIN DIRECT <0.2  --    ALK PHOS 122* 164*       [x]  Microbiology No results found for: ACANTHNAEG, AFBCX, BPERTUSSISCX, BLOODCX  No results found for: BCIDPCR, CXREFLEX, CSFCX, CULTURETIS  No results found for: CULTURES, HSVCX, URCX  No results found for: EYECULTURE, GCCX, HSVCULTURE, LABHSV  No results found for: LEGIONELLA, MRSACX, MUMPSCX, MYCOPLASCX  No results found for: NOCARDIACX, STOOLCX  No results found for: THROATCX, UNSTIMCULT, URINECX, CULTURE, VZVCULTUR  No results found for: VIRALCULTU, WOUNDCX    [x]  Radiology XR Wrist 2 View Left    Result Date: 3/17/2023  PROCEDURE: XR WRIST 2 VW LEFT  COMPARISON: [facility], CR, XR HAND 3+ VW RIGHT, 12/20/2022, 11:03.  INDICATIONS: swelling and tenderness to left wrist, limited range of motion due to pain  FINDINGS:   No fractures or dislocations are identified.  Mild erosive changes are present.  Mineralization appears normal.  There appear to be ventral hook osteophytes on the heads of the 2nd and 3rd metacarpals.  There is mild generalized soft tissue swelling.  IMPRESSION: Degenerative change as above suggesting an inflammatory arthritis.  Ventral hook osteophytes can also be seen with hemochromatosis.   SHIRLEY RESENDEZ MD       Electronically Signed and Approved By: SHIRLEY RESENDEZ MD on 3/17/2023 at 14:15             XR Chest 1 View    Result Date: 3/16/2023  PROCEDURE: XR CHEST 1 VW  COMPARISON: [facility], GILDA, XR CHEST 1 VW, 1/25/2023,  16:20.  INDICATIONS: SOB  FINDINGS:  No new consolidations or pleural effusions are observed. The cardiac silhouette and mediastinum are unchanged. No definitive acute osseous abnormalities are seen on this single view.        1. No change from the previous study with no evidence for acute cardiopulmonary process.         RAY AVILA MD       Electronically Signed and Approved By: RAY AVILA MD on 3/16/2023 at 13:24             Duplex Venous Upper Extremity - Left CAR    Result Date: 3/16/2023  •  Normal left upper extremity venous duplex scan.     Duplex Venous Lower Extremity - Left CAR    Result Date: 3/16/2023  •  Acute left lower extremity deep vein thrombosis noted in the gastrocnemius. •  All other left sided veins appeared normal.       [x]  EKG/Telemetry   []  Cardiology/Vascular   []  Pathology  [x]  Old records  []  Other:    Assessment & Plan   Assessment / Plan     Assessment/Plan:  Assessment:  Acute decompensation of chronic combined CHF  Volume overload  Acute DVT of left gastrocnemius   Troponin leak secondary to CHF  Acute gout flare involving the left wrist  Hyperuricemia  Inflammatory arthritis of the left wrist  CKD stage IIIa  Dyslipidemia  Essential hypertension  Obstructive sleep apnea  Diabetes mellitus  Medical noncompliance     Plan:  Laboratory review reviewed  Reduce Lasix to 60 mg IV daily  Continue prednisone 40 mg daily for gout  Repeat uric acid tomorrow  Continue watching creatinine while aggressive diuresis is being pursued  Start subcutaneous insulin management with Glucomander weight-based dosing given being on steroids which is causing rise in blood sugars  Continue Coreg 12.5 mg twice daily  Continue aspirin 81 mg daily  Continue apixaban 10 mg twice a day for 7 days then transition down to 5 mg p.o. twice daily  Follow-up CT chest  Continue Entresto  Continue Aldactone 25 mg daily with plan to uptitrate  Cardiac diet  Telemetry monitoring  Following electrolytes while  being diuresed  A.m. labs  Full code  DVT prophylaxis with Eliquis  Clinical course dictate further management  Discussed with nurse at the bedside.      DVT prophylaxis:  Medical DVT prophylaxis orders are present.    CODE STATUS:   Level Of Support Discussed With: Patient  Code Status (Patient has no pulse and is not breathing): CPR (Attempt to Resuscitate)  Medical Interventions (Patient has pulse or is breathing): Full Support  Release to patient: Routine Release        Electronically signed by Aylin Keita MD, 03/18/23, 11:08 AM EDT.     Portions of this documentation were transcribed electronically from a voice recognition software.  I confirm all data accurately represents the service(s) I performed at today's visit.

## 2023-03-18 NOTE — PAYOR COMM NOTE
"Lyle Begum (67 y.o. Male)     Date of Birth   1955    Social Security Number       Address   [address] [place] [address]    Home Phone   [phone]    MRN   [id number]       [employer]   [facility]    Marital Status   Single                            Admission Date   3/16/23    Admission Type   Elective    Admitting Provider   Brigido Avila DO    Attending Provider   Aylin Keita MD    Department, Room/Bed   [address], 3019/1       Discharge Date       Discharge Disposition       Discharge Destination                               Attending Provider: Aylin Keita MD    Allergies: No Known Allergies    Isolation: None   Infection: None   Code Status: CPR    Ht: 167.6 cm (65.98\")   Wt: 95.8 kg (211 lb 3.2 oz)    Admission Cmt: None   Principal Problem: Acute on chronic congestive heart failure, unspecified heart failure type (HCC) [I50.9]                 Active Insurance as of 3/16/2023     Primary Coverage     Payor Plan Insurance Group Employer/Plan Group    HUMANA MEDICARE REPLACEMENT HUMANA MEDICARE REPLACEMENT [id number]     Payor Plan Address Payor Plan Phone Number Payor Plan Fax Number Effective Dates    [address] [address] [phone]  10/1/2022 - None Entered    [facility] [address]       Subscriber Name Subscriber Birth Date Member ID       LYLE BEGUM 1955 [id number]           Secondary Coverage     Payor Plan Insurance Group Employer/Plan Group    [facility] BY RIKY [facility] BY RIKY [id number]     Payor Plan Address Payor Plan Phone Number Payor Plan Fax Number Effective Dates    [address] [address]   1/1/2021 - None Entered    [facility] [id number]       Subscriber Name Subscriber Birth Date Member ID       LYLE BEGUM 1955 [id number]                 Emergency Contacts          No emergency contacts on file.               History & Physical      Brigido Avila DO at 03/16/23 1754      " "[facility] [place]   HISTORY AND PHYSICAL    Patient Name: Lyle Lozano  : 1955  MRN: [id number]  Primary Care Physician:  Heidi Villa, APRN  Date of admission: 3/16/2023    Subjective   Subjective     Chief Complaint:  Shortness of breath    History of Present Illness  Patient is a pleasant 67-year-old male with past medical history of congestive heart failure, hyperlipidemia, hypertension, sleep apnea and chronic kidney disease.  Patient presents to the emergency department with increasing shortness of breath.  Patient says that shortness of breath worsens with exertion or when he tries to lay flat.  Patient was recently admitted for congestive heart failure and placed on 3 separate diuretics.  He seems to be at baseline when discharged home.  Unfortunately the patient has very poor support system at home and has not been able to obtain his medications.  As such he has been without his medicines for greater than a week.  Patient denies chest pain, fever, chills, cough or chest congestion.  He says that his urine output has been \"way down\" since being without his oral medications.  Here in the emergency department the patient's BNP remains greatly elevated.  He also has some elevation in his troponin levels.  Because of this combination plus the very poor social situation at home and the ER doctor asked that we admit at least overnight for diuresis and to devise a good discharge plan.  Review of Systems   Constitutional: Positive for fatigue.   Respiratory: Positive for shortness of breath.    Cardiovascular: Positive for leg swelling.   All other systems reviewed and are negative.       Personal History     Past Medical History:   Diagnosis Date   • Abnormal kidney function    • Arthritis    • Bursitis    • CHF (congestive heart failure) (HCC)    • Depression    • Diabetes ([facility])    • Edema    • Essential hypertension 2021   • Forgetfulness    • Head injury    • Hernia cerebri ([facility])    • " Hyperlipidemia    • Hypertension    • IFG (impaired fasting glucose)    • Low back pain    • Lumbago     `   • Pain of left hip    • Right shoulder pain    • Sleep apnea    • SOB (shortness of breath)        Past Surgical History:   Procedure Laterality Date   • CYST REMOVAL Right     leg       Family History: family history includes Diabetes in his sister; No Known Problems in his father; Stroke in his sister. Otherwise pertinent FHx was reviewed and not pertinent to current issue.    Social History:  reports that he quit smoking about 10 years ago. His smoking use included cigarettes. He started smoking about 41 years ago. He smoked an average of .25 packs per day. He has never used smokeless tobacco. He reports current alcohol use. He reports that he does not use drugs.    Home Medications:  aspirin, atorvastatin, carvedilol, cholecalciferol, sacubitril-valsartan, sitaGLIPtin-metFORMIN, and spironolactone    Allergies:  No Known Allergies    Objective    Objective     Vitals:   Temp:  [98.4 °F (36.9 °C)] 98.4 °F (36.9 °C)  Heart Rate:  [100] 100  Resp:  [18] 18  BP: (154)/(84) 154/84    Physical Exam  Vitals and nursing note reviewed.   Constitutional:       Appearance: Normal appearance.   HENT:      Nose: Nose normal.      Mouth/Throat:      Mouth: Mucous membranes are moist.   Eyes:      Extraocular Movements: Extraocular movements intact.      Pupils: Pupils are equal, round, and reactive to light.   Cardiovascular:      Rate and Rhythm: Normal rate and regular rhythm.      Pulses: Normal pulses.      Heart sounds: Normal heart sounds.   Pulmonary:      Effort: Pulmonary effort is normal.      Breath sounds: Rales present.   Abdominal:      General: Abdomen is flat.      Palpations: Abdomen is soft.   Skin:     General: Skin is warm and dry.   Neurological:      General: No focal deficit present.      Mental Status: He is alert and oriented to person, place, and time.         Result Review    Result  Review:  I have personally reviewed the results from the time of this admission to 3/16/2023 17:54 EDT and agree with these findings:  [x]  Laboratory list / accordion  []  Microbiology  [x]  Radiology  []  EKG/Telemetry   []  Cardiology/Vascular   []  Pathology  []  Old records  []  Other:  Most notable findings include:   Elevated BNP    Assessment & Plan   Assessment / Plan     Brief Patient Summary:  Lyle Lozano is a 67 y.o. male who presented to the emergency department with increasing shortness of breath.  The patient has a history of congestive heart failure and had not been taking his medications.  Here in the ER he was found to have an acute exacerbation of CHF and will be admitted for further evaluation and treatment.  Active Hospital Problems:  1.  Acute on chronic exacerbation of systolic congestive heart failure  2.  Elevated troponin most likely troponin leak secondary to congestive heart failure less likely secondary to NSTEMI  3.  Hypertension  2.  Diabetes mellitus  3.  Hyperlipidemia  Plan:   Admit the patient to the hospitalist service  Patient has been started on the congestive heart failure pathway  We will continue to diurese with IV medications  I have consulted  to try and help with a better discharge plan as the patient does not have good social support at home  Keep the patient on telemetry for closer monitoring  I will resume the patient's home medications including Entresto and spironolactone  Patient has had a recent echocardiogram within the last 2 months.  Therefore I do not feel that the patient will need another echo during this hospitalization.    DVT prophylaxis:  No DVT prophylaxis order currently exists.    CODE STATUS:       Admission Status:  I believe this patient meets observation status.    Brigido Avila DO    Electronically signed by Brigido Avila DO at 03/17/23 0400          Emergency Department Notes      Pete Cruz MD at 03/16/23 1983           Time: 3:40 PM EDT  Date of encounter:  3/16/2023  Independent Historian/Clinical History and Information was obtained by:   Patient  Chief Complaint: dyspnea    History is limited by: N/A    History of Present Illness:  Patient is a 67 y.o. year old male who presents to the emergency department for evaluation of dyspnea. Patient reports of left arm and left leg swelling. Patient reports of mild cough. Patient states that his symptoms have been present for about a week. Patient states that the left leg pain is 10/10 at rest and with activity. Patient states that he is noticing more swelling than usual. Patient states that he takes Lasix and is compliant. Patient reports of left hand and left wrist pain. Patient states that he has run out of bumetanide and metolazone.     HPI    Patient Care Team  Primary Care Provider: Heidi Villa APRN    Past Medical History:     No Known Allergies  Past Medical History:   Diagnosis Date   • Abnormal kidney function    • Arthritis    • Bursitis    • CHF (congestive heart failure) (HCC)    • Depression    • Diabetes (HCC)    • Edema    • Essential hypertension 9/23/2021   • Forgetfulness    • Head injury    • Hernia cerebri (HCC)    • Hyperlipidemia    • Hypertension    • IFG (impaired fasting glucose)    • Low back pain    • Lumbago     `   • Pain of left hip    • Right shoulder pain    • Sleep apnea    • SOB (shortness of breath)      Past Surgical History:   Procedure Laterality Date   • CYST REMOVAL Right     leg     Family History   Problem Relation Age of Onset   • No Known Problems Father    • Diabetes Sister    • Stroke Sister        Home Medications:  Prior to Admission medications    Medication Sig Start Date End Date Taking? Authorizing Provider   aspirin (aspirin) 81 MG EC tablet Take 1 tablet by mouth Daily. 6/2/22   Heidi Villa APRN   atorvastatin (LIPITOR) 80 MG tablet  3/5/23   Provider, MD Quentin   carvedilol (COREG) 12.5 MG tablet Take 1 tablet  "by mouth 2 (Two) Times a Day With Meals. 1/11/22   Brigido Mcdonnell MD   cholecalciferol (VITAMIN D3) 25 MCG (1000 UT) tablet Take 1 tablet by mouth Daily. 8/26/22   Nehal Daniels MD   sacubitril-valsartan (Entresto)  MG tablet Take 1 tablet by mouth 2 (Two) Times a Day. 1/11/22   Brigido Mcdonnell MD   sitaGLIPtin-metFORMIN (Janumet)  MG per tablet Take 1 tablet by mouth 2 (Two) Times a Day With Meals. 9/27/22   Aysha Lacey, JIM   spironolactone (ALDACTONE) 25 MG tablet Take 25 mg by mouth Daily. 10/12/22   Provider, MD Quentin        Social History:   Social History     Tobacco Use   • Smoking status: Former     Packs/day: 0.25     Types: Cigarettes     Start date: 1982     Quit date: 8/14/2012     Years since quitting: 10.5   • Smokeless tobacco: Never   Vaping Use   • Vaping Use: Never used   Substance Use Topics   • Alcohol use: Yes     Comment: rare   • Drug use: Never         Review of Systems:  Review of Systems   Constitutional: Negative for chills and fever.   HENT: Negative for congestion, rhinorrhea and sore throat.    Eyes: Negative for pain and visual disturbance.   Respiratory: Positive for cough and shortness of breath. Negative for apnea and chest tightness.    Cardiovascular: Positive for leg swelling. Negative for chest pain and palpitations.   Gastrointestinal: Negative for abdominal pain, diarrhea, nausea and vomiting.   Genitourinary: Negative for difficulty urinating and dysuria.   Musculoskeletal: Negative for joint swelling and myalgias.   Skin: Negative for color change.   Neurological: Negative for seizures and headaches.   Psychiatric/Behavioral: Negative.    All other systems reviewed and are negative.       Physical Exam:  /84 (BP Location: Right arm, Patient Position: Sitting)   Pulse 100   Temp 98.4 °F (36.9 °C) (Oral)   Resp 18   Ht 167.6 cm (65.98\")   SpO2 100%   BMI 35.53 kg/m²     Physical Exam  Vitals and nursing note reviewed. "   Constitutional:       General: He is not in acute distress.     Appearance: Normal appearance. He is not toxic-appearing.   HENT:      Head: Normocephalic and atraumatic.      Mouth/Throat:      Mouth: Mucous membranes are moist.   Eyes:      General: No scleral icterus.  Cardiovascular:      Rate and Rhythm: Normal rate and regular rhythm.      Pulses: Normal pulses.           Radial pulses are 2+ on the right side and 2+ on the left side.      Heart sounds: Normal heart sounds. No murmur heard.    No friction rub.   Pulmonary:      Effort: Pulmonary effort is normal. No respiratory distress.      Breath sounds: Normal breath sounds. No decreased breath sounds, wheezing, rhonchi or rales.      Comments: Mild crackles at the lung bases   Abdominal:      General: Abdomen is flat. Bowel sounds are normal. There is no distension.      Palpations: Abdomen is soft.      Tenderness: There is no abdominal tenderness. There is no guarding or rebound.   Musculoskeletal:         General: Normal range of motion.      Left wrist: Swelling present.      Left hand: Swelling present.      Cervical back: Normal range of motion and neck supple.      Right lower le+ Edema present.      Left lower le+ Edema present.      Comments: 2+ edema on left hand and left wrist  No calf tenderness      Skin:     General: Skin is warm and dry.      Comments: Normal capillary refill    Neurological:      Mental Status: He is alert and oriented to person, place, and time. Mental status is at baseline.                 Procedures:  Procedures      Medical Decision Making:      Comorbidities that affect care:    Congestive Heart Failure, Diabetes, Hypertension    External Notes reviewed:    Previous Clinic Note: patient seen at Urgent Care on 3/9/23 for swelling of left hand and Hospital Discharge summary: patient discharged on 23 and diagnosed with stage 3 chronic kidney disease and acute on chronic systolic heart failure      The  following orders were placed and all results were independently analyzed by me:  Orders Placed This Encounter   Procedures   • XR Chest 1 View   • Parker Draw   • Comprehensive Metabolic Panel   • BNP   • Single High Sensitivity Troponin T   • CBC Auto Differential   • Single High Sensitivity Troponin T   • NPO Diet NPO Type: Strict NPO   • Undress & Gown   • Cardiac Monitoring   • Continuous Pulse Oximetry   • Vital Signs   • Hospitalist (on-call MD unless specified)   • Oxygen Therapy- Nasal Cannula; 2 LPM; Titrate for SPO2: equal to or greater than, 92%   • ECG 12 Lead ED Triage Standing Order; SOA   • Insert Peripheral IV   • CBC & Differential   • Green Top (Gel)   • Lavender Top   • Gold Top - SST   • Light Blue Top       Medications Given in the Emergency Department:  Medications   sodium chloride 0.9 % flush 10 mL (has no administration in time range)   furosemide (LASIX) injection 80 mg (80 mg Intravenous Given 3/16/23 1609)   HYDROcodone-acetaminophen (NORCO) 7.5-325 MG per tablet 1 tablet (1 tablet Oral Given 3/16/23 1608)        ED Course:    ED Course as of 03/16/23 1742   Thu Mar 16, 2023   1508 [facility](!): 25.4 [AJ]   1542 EKG: I reviewed and interpreted his twelve-lead EKG is sinus rhythm at 98 bpm, normal P waves, normal QRS, there are mild ST depressions and T wave inversions diffusely concerning for possible ischemia. [VS]   1542 Today's EKG is unchanged from his old EKG on January 25, 2023. [VS]      ED Course User Index  [AJ] Maira López PA-C  [VS] Pete Cruz MD       Labs:    Lab Results (last 24 hours)     Procedure Component Value Units Date/Time    CBC & Differential [[id number]]  (Abnormal) Collected: 03/16/23 1249    Specimen: Blood Updated: 03/16/23 1305    Narrative:      The following orders were created for panel order CBC & Differential.  Procedure                               Abnormality         Status                     ---------                               -----------          ------                     CBC Auto Differential[[id number]]        Abnormal            Final result                 Please view results for these tests on the individual orders.    Comprehensive Metabolic Panel [[id number]]  (Abnormal) Collected: 03/16/23 1249    Specimen: Blood Updated: 03/16/23 1326     Glucose 256 mg/dL      BUN 20 mg/dL      Creatinine 1.60 mg/dL      Sodium 130 mmol/L      Potassium 4.0 mmol/L      Chloride 98 mmol/L      CO2 24.3 mmol/L      Calcium 8.3 mg/dL      Total Protein 6.0 g/dL      Albumin 2.6 g/dL      ALT (SGPT) 22 U/L      AST (SGOT) 10 U/L      Alkaline Phosphatase 164 U/L      Total Bilirubin 0.6 mg/dL      Globulin 3.4 gm/dL      A/G Ratio 0.8 g/dL      BUN/Creatinine Ratio 12.5     Anion Gap 7.7 mmol/L      eGFR 47.5 mL/min/1.73     Narrative:      GFR Normal >60  Chronic Kidney Disease <60  Kidney Failure <15      BNP [[id number]]  (Abnormal) Collected: 03/16/23 1249    Specimen: Blood Updated: 03/16/23 1324     proBNP 20,275.0 pg/mL     Narrative:      Among patients with dyspnea, NT-proBNP is highly sensitive for the detection of acute congestive heart failure. In addition NT-proBNP of <300 pg/ml effectively rules out acute congestive heart failure with 99% negative predictive value.      Single High Sensitivity Troponin T [[id number]]  (Abnormal) Collected: 03/16/23 1249    Specimen: Blood Updated: 03/16/23 1337     HS Troponin T 101 ng/L     Narrative:      High Sensitive Troponin T Reference Range:  <10.0 ng/L- Negative Female for AMI  <15.0 ng/L- Negative Male for AMI  >=10 - Abnormal Female indicating possible myocardial injury.  >=15 - Abnormal Male indicating possible myocardial injury.   Clinicians would have to utilize clinical acumen, EKG, Troponin, and serial changes to determine if it is an Acute Myocardial Infarction or myocardial injury due to an underlying chronic condition.         CBC Auto Differential [[id number]]  (Abnormal) Collected: 03/16/23 1249     Specimen: Blood Updated: 03/16/23 1305     WBC 10.90 10*3/mm3      RBC 4.10 10*6/mm3      Hemoglobin 10.4 g/dL      Hematocrit 33.0 %      MCV 80.5 fL      MCH 25.4 pg      MCHC 31.5 g/dL      RDW 18.9 %      RDW-SD 53.9 fl      MPV 8.7 fL      Platelets 394 10*3/mm3      Neutrophil % 80.6 %      Lymphocyte % 11.1 %      Monocyte % 5.4 %      Eosinophil % 1.9 %      Basophil % 0.4 %      Immature Grans % 0.6 %      Neutrophils, Absolute 8.79 10*3/mm3      Lymphocytes, Absolute 1.21 10*3/mm3      Monocytes, Absolute 0.59 10*3/mm3      Eosinophils, Absolute 0.21 10*3/mm3      Basophils, Absolute 0.04 10*3/mm3      Immature Grans, Absolute 0.06 10*3/mm3      nRBC 0.0 /100 WBC     Single High Sensitivity Troponin T [[id number]]  (Abnormal) Collected: 03/16/23 1555    Specimen: Blood Updated: 03/16/23 1632     HS Troponin T 89 ng/L     Narrative:      High Sensitive Troponin T Reference Range:  <10.0 ng/L- Negative Female for AMI  <15.0 ng/L- Negative Male for AMI  >=10 - Abnormal Female indicating possible myocardial injury.  >=15 - Abnormal Male indicating possible myocardial injury.   Clinicians would have to utilize clinical acumen, EKG, Troponin, and serial changes to determine if it is an Acute Myocardial Infarction or myocardial injury due to an underlying chronic condition.                Imaging:    XR Chest 1 View    Result Date: 3/16/2023  PROCEDURE: XR CHEST 1 VW  COMPARISON: [facility], , XR CHEST 1 VW, 1/25/2023, 16:20.  INDICATIONS: SOB  FINDINGS:  No new consolidations or pleural effusions are observed. The cardiac silhouette and mediastinum are unchanged. No definitive acute osseous abnormalities are seen on this single view.        1. No change from the previous study with no evidence for acute cardiopulmonary process.         RAY AVILA MD       Electronically Signed and Approved By: RAY AVILA MD on 3/16/2023 at 13:24                 Differential Diagnosis and  Discussion:    Dyspnea: Differential diagnosis includes but is not limited to metabolic acidosis, neurological disorders, psychogenic, asthma, pneumothorax, upper airway obstruction, COPD, pneumonia, noncardiogenic pulmonary edema, interstitial lung disease, anemia, congestive heart failure, and pulmonary embolism    All labs were reviewed and interpreted by me.  All X-rays were independently reviewed by me.  EKG was interpreted by me.    MDM   I confirmed with ED pharmacy tech that patient uses [facility] for prescriptions, and 16 days ago he ran out of all of his water pills.                  This patient is a pleasant but somewhat chronically debilitated 67-year-old male with history of CHF who was recently discharged from the hospital about 6 weeks ago on Bumex and metolazone and spironolactone diuretics and essentially ran out of all of his meds a few weeks ago and now presents with CHF exacerbation causing significant dyspnea around the house with minimal exertion and also essentially pitting lower extremity edema and also some edema of the left hand and wrist.    I did get Doppler ultrasound of the left upper extremity which was negative and Doppler ultrasound of the left leg where he was complaining of pain, came back positive only for isolated calf vein DVT, so I did not start him on anticoagulation at this time.    I started diuresing him in the ED with IV Lasix 80 mg.    I given some Norco for pain.    I had the ED  also speak with him and it sounds like he has significant issues with transportation and finances and unable to get meds refilled.    Given his acute CHF exacerbation and currently noncompliance due to financial and transportation issues I think he may benefit from inpatient observation admission for some IV diuretics and to help arrange further management or outpatient resources.                  Patient Care Considerations:          Consultants/Shared Management  Plan:    Hospitalist: I have discussed the case with The admitting hospitalist who agrees to accept the patient for admission.    Social Determinants of Health:    Patient is independent, reliable, and has access to care.       Disposition and Care Coordination:    Admit:   Through independent evaluation of the patient's history, physical, and imperical data, the patient meets criteria for observation/admission to the hospital.        Final diagnoses:   Acute on chronic congestive heart failure, unspecified heart failure type (HCC)   Dyspnea on exertion   Acute deep vein thrombosis (DVT) of calf muscle vein of left lower extremity (HCC)   Elevated troponin level not due myocardial infarction   Noncompliance with medication regimen        ED Disposition     ED Disposition   Decision to Admit    Condition   --    Comment   --             This medical record created using voice recognition software.        Documentation assistance provided by Tere Jimenez acting as scribe for  Pete Huntley MD . Information recorded by the scribe was done at my direction and has been verified and validated by me.       Tere Jimenez  23 1552       Tere Jimenez  23 1605       Pete Cruz MD  23      Electronically signed by Pete Cruz MD at 23           Aylin Keita MD   Physician  Hospitalist  Progress Notes      Signed  Date of Service:  23  Creation Time:  23     Signed        [facility]   Hospitalist Progress Note  Date: 3/18/2023  Patient Name: Lyle Lozano  : 1955  MRN: [id number]  Date of admission: 3/16/2023           Subjective      Subjective      Chief complaint: Shortness of breath     Summary:  67-year-old male with history of heart failure with reduced ejection fraction with previous echo 2023 showing EF 36 to 40% with diastolic dysfunction, on Entresto, diuretics, with underlying CKD  stage IIIa, dyslipidemia, essential hypertension, obstructive sleep apnea, diabetes mellitus, medical noncompliance, chronic back pain, depression, hospitalized on 3/16/2023 with shortness of breath symptoms, with acute on chronic exacerbation of combined CHF, with elevated troponin likely troponin leak secondary to CHF, started on IV diuretics at more than 2 times his home dosing, in the setting of not having compliance with home medications, with left wrist pain, started on prednisone, imaging pending, left lower extremity with acute DVT in gastrocnemius, started on full dose anticoagulation     Interval follow-up: Patient seen and examined this morning, no acute distress, no acute major overnight events, patient's left wrist swelling and tenderness and pain has significantly improved over the past 24 hours, he still has restricted range of motion and tenderness with flexion and extension of the left wrist, there is some mild swelling, there is no warmth.  He does have a history of gout he confirms, his breathing continues to improve, he is exertional dyspnea and intermittent cough.  His creatinine is up to 2.04, diuretics have been reduced.  His potassium is 3.7, sodium 133, blood sugars are in the 200 range.  He is on Glucomander subcu.  Telemetry reviewed, short bursts of V. tach, nonsustained, baseline sinus in the 60s, asymptomatic during those occurrences, blood pressures are trending in the 100s over 60s, heart rates in the 60s to 70s.  He is on Coreg 12.5 mg twice daily.  Edema of the extremities remains     Review of systems:  All systems reviewed and negative except generalized fatigue, generalized weakness, left wrist pain, shortness of breath with exertion, intermittent cough           Objective      Objective      Vitals:   Temp:  [97.4 °F (36.3 °C)-99.1 °F (37.3 °C)] 97.4 °F (36.3 °C)  Heart Rate:  [66-84] 66  Resp:  [16-20] 20  BP: (104-137)/(57-72) 104/67  Flow (L/min):  [1] 1  Physical Exam                          Constitutional: Awake, alert, no acute distress sitting upright at the bed              Eyes: Pupils equal, sclerae anicteric, no conjunctival injection              HENT: NCAT, mucous membranes moist              Neck: Supple, full range of motion              Respiratory: Air entry bilaterally, coarse breath sounds throughout, mostly crackles at the bases              Cardiovascular: RRR, no murmurs, rubs, or gallops, palpable pedal pulses bilaterally              Gastrointestinal: Positive bowel sounds, soft, nontender, nondistended              Musculoskeletal: Bilateral lower extremity edema 1+, left upper extremity in particular swelling around the wrist joint with mild warmth, no erythema, tenderness particularly at the left wrist with difficulty flexing and extending              Psychiatric: Appropriate affect, cooperative              Neurologic: Oriented x 3, strength symmetric in all extremities, Cranial Nerves grossly intact to confrontation, speech clear              Skin: No rashes visible on exposed skin            Result Review       Result Review:  I have personally reviewed the pertinent results from the past 24 hours to 3/18/2023 11:08 EDT and agree with these findings:  [x]?  Laboratory   CBC Results         CBC    CBC 1/30/23 3/16/23 3/18/23   WBC 6.53 10.90 (A) 10.10   RBC 3.59 (A) 4.10 (A) 3.63 (A)   Hemoglobin 9.4 (A) 10.4 (A) 9.3 (A)   Hematocrit 29.8 (A) 33.0 (A) 28.7 (A)   MCV 83.0 80.5 79.1   MCH 26.2 (A) 25.4 (A) 25.6 (A)   MCHC 31.5 31.5 32.4   RDW 17.1 (A) 18.9 (A) 18.5 (A)   Platelets 331 394 331   (A) Abnormal value                  Basic Metabolic Profile Reults:         BMP    BMP 2/1/23 3/16/23 3/18/23   BUN 43 (A) 20 32 (A)   Creatinine 2.18 (A) 1.60 (A) 2.04 (A)   Sodium 136 130 (A) 133 (A)   Potassium 3.8 4.0 3.7   Chloride 97 (A) 98 98   CO2 29.4 (A) 24.3 23.9   Calcium 8.7 8.3 (A) 8.1 (A)   (A) Abnormal value                  LIVER FUNCTION TESTS:            Lab 03/18/23  0545 03/16/23  1249   TOTAL PROTEIN 5.4* 6.0   ALBUMIN 2.2* 2.6*   GLOBULIN  --  3.4   ALT (SGPT) 14 22   AST (SGOT) 9 10   BILIRUBIN 0.3 0.6   BILIRUBIN DIRECT <0.2  --    ALK PHOS 122* 164*         [x]?  Microbiology No results found for: ACANTHNAEG, AFBCX, BPERTUSSISCX, BLOODCX  No results found for: BCIDPCR, CXREFLEX, CSFCX, CULTURETIS  No results found for: CULTURES, HSVCX, URCX  No results found for: EYECULTURE, GCCX, HSVCULTURE, LABHSV  No results found for: LEGIONELLA, MRSACX, MUMPSCX, MYCOPLASCX  No results found for: NOCARDIACX, STOOLCX  No results found for: THROATCX, UNSTIMCULT, URINECX, CULTURE, VZVCULTUR  No results found for: VIRALCULTU, WOUNDCX     [x]?  Radiology XR Wrist 2 View Left     Result Date: 3/17/2023  PROCEDURE:            XR WRIST 2 VW LEFT  COMPARISON:      [facility], CR, XR HAND 3+ VW RIGHT, 12/20/2022, 11:03.  INDICATIONS:    swelling and tenderness to left wrist, limited range of motion due to pain  FINDINGS:           No fractures or dislocations are identified.  Mild erosive changes are present.  Mineralization appears normal.  There appear to be ventral hook osteophytes on the heads of the 2nd and 3rd metacarpals.  There is mild generalized soft tissue swelling.  IMPRESSION: Degenerative change as above suggesting an inflammatory arthritis.  Ventral hook osteophytes can also be seen with hemochromatosis.   SHIRLEY RESENDEZ MD       Electronically Signed and Approved By: SHIRLEY RESENDEZ MD on 3/17/2023 at 14:15              XR Chest 1 View     Result Date: 3/16/2023  PROCEDURE:            XR CHEST 1 VW  COMPARISON:    [facility], CR, XR CHEST 1 VW, 1/25/2023, 16:20.  INDICATIONS:      SOB  FINDINGS:      No new consolidations or pleural effusions are observed. The cardiac silhouette and mediastinum are unchanged. No definitive acute osseous abnormalities are seen on this single view.                    1. No change from the previous  study with no evidence for acute cardiopulmonary process.         RAY AVILA MD       Electronically Signed and Approved By: RAY AVILA MD on 3/16/2023 at 13:24              Duplex Venous Upper Extremity - Left CAR     Result Date: 3/16/2023  •  Normal left upper extremity venous duplex scan.      Duplex Venous Lower Extremity - Left CAR     Result Date: 3/16/2023  •  Acute left lower extremity deep vein thrombosis noted in the gastrocnemius. •  All other left sided veins appeared normal.         [x]?  EKG/Telemetry   []?  Cardiology/Vascular   []?  Pathology  [x]?  Old records  []?  Other:           Assessment & Plan     Assessment / Plan      Assessment/Plan:  Assessment:  Acute decompensation of chronic combined CHF  Volume overload  Acute DVT of left gastrocnemius   Troponin leak secondary to CHF  Acute gout flare involving the left wrist  Hyperuricemia  Inflammatory arthritis of the left wrist  CKD stage IIIa  Dyslipidemia  Essential hypertension  Obstructive sleep apnea  Diabetes mellitus  Medical noncompliance     Plan:  Laboratory review reviewed  Reduce Lasix to 60 mg IV daily  Continue prednisone 40 mg daily for gout  Repeat uric acid tomorrow  Continue watching creatinine while aggressive diuresis is being pursued  Start subcutaneous insulin management with Glucomander weight-based dosing given being on steroids which is causing rise in blood sugars  Continue Coreg 12.5 mg twice daily  Continue aspirin 81 mg daily  Continue apixaban 10 mg twice a day for 7 days then transition down to 5 mg p.o. twice daily  Follow-up CT chest  Continue Entresto  Continue Aldactone 25 mg daily with plan to uptitrate  Cardiac diet  Telemetry monitoring  Following electrolytes while being diuresed  A.m. labs  Full code  DVT prophylaxis with Eliquis  Clinical course dictate further management  Discussed with nurse at the bedside.        DVT prophylaxis:  Medical DVT prophylaxis orders are present.     CODE STATUS:    Level Of Support Discussed With: Patient  Code Status (Patient has no pulse and is not breathing): CPR (Attempt to Resuscitate)  Medical Interventions (Patient has pulse or is breathing): Full Support  Release to patient: Routine Release           Electronically signed by Aylin Keita MD, 03/18/23, 11:08 AM EDT.     Portions of this documentation were transcribed electronically from a voice recognition software.  I confirm all data accurately represents the service(s) I performed at today's visit.

## 2023-03-19 LAB
ALBUMIN SERPL-MCNC: 2.1 G/DL (ref 3.5–5.2)
ALP SERPL-CCNC: 118 U/L (ref 39–117)
ALT SERPL W P-5'-P-CCNC: 26 U/L (ref 1–41)
ANION GAP SERPL CALCULATED.3IONS-SCNC: 7.9 MMOL/L (ref 5–15)
AST SERPL-CCNC: 38 U/L (ref 1–40)
BASOPHILS # BLD AUTO: 0.01 10*3/MM3 (ref 0–0.2)
BASOPHILS NFR BLD AUTO: 0.1 % (ref 0–1.5)
BILIRUB CONJ SERPL-MCNC: <0.2 MG/DL (ref 0–0.3)
BILIRUB INDIRECT SERPL-MCNC: ABNORMAL MG/DL
BILIRUB SERPL-MCNC: 0.2 MG/DL (ref 0–1.2)
BUN SERPL-MCNC: 36 MG/DL (ref 8–23)
BUN/CREAT SERPL: 20.5 (ref 7–25)
CALCIUM SPEC-SCNC: 7.8 MG/DL (ref 8.6–10.5)
CHLORIDE SERPL-SCNC: 101 MMOL/L (ref 98–107)
CO2 SERPL-SCNC: 25.1 MMOL/L (ref 22–29)
CREAT SERPL-MCNC: 1.76 MG/DL (ref 0.76–1.27)
DEPRECATED RDW RBC AUTO: 52.7 FL (ref 37–54)
EGFRCR SERPLBLD CKD-EPI 2021: 42.4 ML/MIN/1.73
EOSINOPHIL # BLD AUTO: 0.04 10*3/MM3 (ref 0–0.4)
EOSINOPHIL NFR BLD AUTO: 0.4 % (ref 0.3–6.2)
ERYTHROCYTE [DISTWIDTH] IN BLOOD BY AUTOMATED COUNT: 18.2 % (ref 12.3–15.4)
GLUCOSE BLDC GLUCOMTR-MCNC: 119 MG/DL (ref 70–99)
GLUCOSE BLDC GLUCOMTR-MCNC: 134 MG/DL (ref 70–99)
GLUCOSE BLDC GLUCOMTR-MCNC: 140 MG/DL (ref 70–99)
GLUCOSE BLDC GLUCOMTR-MCNC: 204 MG/DL (ref 70–99)
GLUCOSE SERPL-MCNC: 219 MG/DL (ref 65–99)
HCT VFR BLD AUTO: 29.3 % (ref 37.5–51)
HGB BLD-MCNC: 9.6 G/DL (ref 13–17.7)
IMM GRANULOCYTES # BLD AUTO: 0.06 10*3/MM3 (ref 0–0.05)
IMM GRANULOCYTES NFR BLD AUTO: 0.5 % (ref 0–0.5)
LYMPHOCYTES # BLD AUTO: 1.31 10*3/MM3 (ref 0.7–3.1)
LYMPHOCYTES NFR BLD AUTO: 12 % (ref 19.6–45.3)
MAGNESIUM SERPL-MCNC: 1.9 MG/DL (ref 1.6–2.4)
MCH RBC QN AUTO: 25.8 PG (ref 26.6–33)
MCHC RBC AUTO-ENTMCNC: 32.8 G/DL (ref 31.5–35.7)
MCV RBC AUTO: 78.8 FL (ref 79–97)
MONOCYTES # BLD AUTO: 0.65 10*3/MM3 (ref 0.1–0.9)
MONOCYTES NFR BLD AUTO: 6 % (ref 5–12)
NEUTROPHILS NFR BLD AUTO: 8.84 10*3/MM3 (ref 1.7–7)
NEUTROPHILS NFR BLD AUTO: 81 % (ref 42.7–76)
NRBC BLD AUTO-RTO: 0 /100 WBC (ref 0–0.2)
PHOSPHATE SERPL-MCNC: 2.9 MG/DL (ref 2.5–4.5)
PLATELET # BLD AUTO: 348 10*3/MM3 (ref 140–450)
PMV BLD AUTO: 9.1 FL (ref 6–12)
POTASSIUM SERPL-SCNC: 3.7 MMOL/L (ref 3.5–5.2)
PROT SERPL-MCNC: 5.4 G/DL (ref 6–8.5)
RBC # BLD AUTO: 3.72 10*6/MM3 (ref 4.14–5.8)
SODIUM SERPL-SCNC: 134 MMOL/L (ref 136–145)
URATE SERPL-MCNC: 9.7 MG/DL (ref 3.4–7)
WBC NRBC COR # BLD: 10.91 10*3/MM3 (ref 3.4–10.8)

## 2023-03-19 PROCEDURE — 80076 HEPATIC FUNCTION PANEL: CPT | Performed by: FAMILY MEDICINE

## 2023-03-19 PROCEDURE — 84100 ASSAY OF PHOSPHORUS: CPT | Performed by: FAMILY MEDICINE

## 2023-03-19 PROCEDURE — 63710000001 INSULIN DETEMIR PER 5 UNITS: Performed by: FAMILY MEDICINE

## 2023-03-19 PROCEDURE — 63710000001 INSULIN LISPRO (HUMAN) PER 5 UNITS: Performed by: FAMILY MEDICINE

## 2023-03-19 PROCEDURE — 83735 ASSAY OF MAGNESIUM: CPT | Performed by: FAMILY MEDICINE

## 2023-03-19 PROCEDURE — 25010000002 MORPHINE PER 10 MG: Performed by: FAMILY MEDICINE

## 2023-03-19 PROCEDURE — 84550 ASSAY OF BLOOD/URIC ACID: CPT | Performed by: FAMILY MEDICINE

## 2023-03-19 PROCEDURE — 25010000002 FUROSEMIDE PER 20 MG: Performed by: FAMILY MEDICINE

## 2023-03-19 PROCEDURE — 82962 GLUCOSE BLOOD TEST: CPT

## 2023-03-19 PROCEDURE — 80048 BASIC METABOLIC PNL TOTAL CA: CPT | Performed by: FAMILY MEDICINE

## 2023-03-19 PROCEDURE — 63710000001 PREDNISONE PER 1 MG: Performed by: FAMILY MEDICINE

## 2023-03-19 PROCEDURE — 94799 UNLISTED PULMONARY SVC/PX: CPT

## 2023-03-19 PROCEDURE — 85025 COMPLETE CBC W/AUTO DIFF WBC: CPT | Performed by: FAMILY MEDICINE

## 2023-03-19 PROCEDURE — 99232 SBSQ HOSP IP/OBS MODERATE 35: CPT | Performed by: FAMILY MEDICINE

## 2023-03-19 RX ORDER — MORPHINE SULFATE 2 MG/ML
2 INJECTION, SOLUTION INTRAMUSCULAR; INTRAVENOUS EVERY 6 HOURS PRN
Status: DISCONTINUED | OUTPATIENT
Start: 2023-03-19 | End: 2023-03-23 | Stop reason: HOSPADM

## 2023-03-19 RX ORDER — HYDROCODONE BITARTRATE AND ACETAMINOPHEN 5; 325 MG/1; MG/1
1 TABLET ORAL EVERY 6 HOURS PRN
Status: DISCONTINUED | OUTPATIENT
Start: 2023-03-19 | End: 2023-03-23 | Stop reason: HOSPADM

## 2023-03-19 RX ORDER — HYDROCODONE BITARTRATE AND ACETAMINOPHEN 10; 325 MG/1; MG/1
1 TABLET ORAL EVERY 6 HOURS PRN
Status: DISCONTINUED | OUTPATIENT
Start: 2023-03-19 | End: 2023-03-23 | Stop reason: HOSPADM

## 2023-03-19 RX ADMIN — ASPIRIN 81 MG: 81 TABLET, COATED ORAL at 08:51

## 2023-03-19 RX ADMIN — CARVEDILOL 12.5 MG: 12.5 TABLET, FILM COATED ORAL at 17:09

## 2023-03-19 RX ADMIN — HYDROCODONE BITARTRATE AND ACETAMINOPHEN 1 TABLET: 10; 325 TABLET ORAL at 11:15

## 2023-03-19 RX ADMIN — APIXABAN 10 MG: 5 TABLET, FILM COATED ORAL at 22:04

## 2023-03-19 RX ADMIN — ATORVASTATIN CALCIUM 80 MG: 40 TABLET, FILM COATED ORAL at 22:04

## 2023-03-19 RX ADMIN — SACUBITRIL AND VALSARTAN 1 TABLET: 97; 103 TABLET, FILM COATED ORAL at 08:51

## 2023-03-19 RX ADMIN — CARVEDILOL 12.5 MG: 12.5 TABLET, FILM COATED ORAL at 08:50

## 2023-03-19 RX ADMIN — Medication 10 ML: at 08:51

## 2023-03-19 RX ADMIN — INSULIN LISPRO 7 UNITS: 100 INJECTION, SOLUTION INTRAVENOUS; SUBCUTANEOUS at 08:50

## 2023-03-19 RX ADMIN — SACUBITRIL AND VALSARTAN 1 TABLET: 97; 103 TABLET, FILM COATED ORAL at 22:04

## 2023-03-19 RX ADMIN — FUROSEMIDE 60 MG: 10 INJECTION, SOLUTION INTRAMUSCULAR; INTRAVENOUS at 08:50

## 2023-03-19 RX ADMIN — INSULIN LISPRO 7 UNITS: 100 INJECTION, SOLUTION INTRAVENOUS; SUBCUTANEOUS at 17:32

## 2023-03-19 RX ADMIN — SPIRONOLACTONE 25 MG: 25 TABLET ORAL at 08:50

## 2023-03-19 RX ADMIN — HYDROCODONE BITARTRATE AND ACETAMINOPHEN 1 TABLET: 10; 325 TABLET ORAL at 17:09

## 2023-03-19 RX ADMIN — PREDNISONE 40 MG: 20 TABLET ORAL at 08:51

## 2023-03-19 RX ADMIN — INSULIN LISPRO 6 UNITS: 100 INJECTION, SOLUTION INTRAVENOUS; SUBCUTANEOUS at 22:04

## 2023-03-19 RX ADMIN — HYDROCODONE BITARTRATE AND ACETAMINOPHEN 1 TABLET: 10; 325 TABLET ORAL at 23:07

## 2023-03-19 RX ADMIN — INSULIN DETEMIR 26 UNITS: 100 INJECTION, SOLUTION SUBCUTANEOUS at 22:05

## 2023-03-19 RX ADMIN — Medication 1000 UNITS: at 08:51

## 2023-03-19 RX ADMIN — APIXABAN 10 MG: 5 TABLET, FILM COATED ORAL at 08:51

## 2023-03-19 RX ADMIN — INSULIN LISPRO 7 UNITS: 100 INJECTION, SOLUTION INTRAVENOUS; SUBCUTANEOUS at 12:48

## 2023-03-19 RX ADMIN — MORPHINE SULFATE 2 MG: 2 INJECTION, SOLUTION INTRAMUSCULAR; INTRAVENOUS at 12:43

## 2023-03-19 NOTE — PROGRESS NOTES
[facility]   Hospitalist Progress Note  Date: 3/19/2023  Patient Name: Lyle Lozano  : 1955  MRN: [id number]  Date of admission: 3/16/2023      Subjective   Subjective     Chief complaint: Shortness of breath     Summary:  67-year-old male with history of heart failure with reduced ejection fraction with previous echo 2023 showing EF 36 to 40% with diastolic dysfunction, on Entresto, diuretics, with underlying CKD stage IIIa, dyslipidemia, essential hypertension, obstructive sleep apnea, diabetes mellitus, medical noncompliance, chronic back pain, depression, hospitalized on 3/16/2023 with shortness of breath symptoms, with acute on chronic exacerbation of combined CHF, with elevated troponin likely troponin leak secondary to CHF, started on IV diuretics at more than 2 times his home dosing, in the setting of not having compliance with home medications, with left wrist pain, started on prednisone, imaging pending, left lower extremity with acute DVT in gastrocnemius, started on full dose anticoagulation     Interval follow-up: Patient seen and examined this morning, no acute distress, no acute major overnight events, left wrist swelling is improving but still painful with range of motion, asking for further pain medication to help subside pain, denied fevers, chills, sweats.  No new local joint swelling.  Still has orthopnea symptoms difficulty laying flat, though his sats are stable on room air.  Continues to diurese he reports, not logged by nursing staff, creatinine down to 1.76, BUN 36, sodium 134, potassium 3.7.  Telemetry reviewed, frequency of runs of NSVT have subsided, ventricular trigeminy short burst, baseline sinus rhythm in the 60s.  Still has cough and shortness of breath symptoms with exertion.    Review of systems:  All systems reviewed and negative except generalized fatigue, generalized weakness, left wrist pain, shortness of breath with exertion, intermittent cough,  orthopnea      Objective   Objective     Vitals:   Temp:  [97.2 °F (36.2 °C)-97.8 °F (36.6 °C)] 97.6 °F (36.4 °C)  Heart Rate:  [66-76] 66  Resp:  [18] 18  BP: (125-160)/(63-91) 142/84  Physical Exam    Constitutional: Awake, alert, no acute distress sitting upright at the edge of the bed              Eyes: Pupils equal, sclerae anicteric, no conjunctival injection              HENT: NCAT, mucous membranes moist              Neck: Supple, full range of motion              Respiratory: Air entry bilaterally, coarse breath sounds throughout, mostly crackles at the bases              Cardiovascular: RRR, no murmurs, rubs, or gallops, palpable pedal pulses bilaterally              Gastrointestinal: Positive bowel sounds, soft, nontender, nondistended              Musculoskeletal: Bilateral lower extremity trace to +1, left upper extremity in particular swelling around the wrist joint with no warmth, no erythema, tenderness particularly at the left wrist with difficulty flexing and extending              Psychiatric: Appropriate affect, cooperative              Neurologic: Oriented x 3, strength symmetric in all extremities, Cranial Nerves grossly intact to confrontation, speech clear              Skin: No rashes visible on exposed skin         Result Review    Result Review:  I have personally reviewed the pertinent results from the past 24 hours to 3/19/2023 12:51 EDT and agree with these findings:  [x]  Laboratory   CBC    CBC 3/16/23 3/18/23 3/19/23   WBC 10.90 (A) 10.10 10.91 (A)   RBC 4.10 (A) 3.63 (A) 3.72 (A)   Hemoglobin 10.4 (A) 9.3 (A) 9.6 (A)   Hematocrit 33.0 (A) 28.7 (A) 29.3 (A)   MCV 80.5 79.1 78.8 (A)   MCH 25.4 (A) 25.6 (A) 25.8 (A)   MCHC 31.5 32.4 32.8   RDW 18.9 (A) 18.5 (A) 18.2 (A)   Platelets 394 331 348   (A) Abnormal value            BMP    BMP 3/16/23 3/18/23 3/19/23   BUN 20 32 (A) 36 (A)   Creatinine 1.60 (A) 2.04 (A) 1.76 (A)   Sodium 130 (A) 133 (A) 134 (A)   Potassium 4.0 3.7 3.7    Chloride 98 98 101   CO2 24.3 23.9 25.1   Calcium 8.3 (A) 8.1 (A) 7.8 (A)   (A) Abnormal value            LIVER FUNCTION TESTS:      Lab 03/19/23  0535 03/18/23  0545 03/16/23  1249   TOTAL PROTEIN 5.4* 5.4* 6.0   ALBUMIN 2.1* 2.2* 2.6*   GLOBULIN  --   --  3.4   ALT (SGPT) 26 14 22   AST (SGOT) 38 9 10   BILIRUBIN 0.2 0.3 0.6   BILIRUBIN DIRECT <0.2 <0.2  --    ALK PHOS 118* 122* 164*       [x]  Microbiology No results found for: ACANTHNAEG, AFBCX, BPERTUSSISCX, BLOODCX  No results found for: BCIDPCR, CXREFLEX, CSFCX, CULTURETIS  No results found for: CULTURES, HSVCX, URCX  No results found for: EYECULTURE, GCCX, HSVCULTURE, LABHSV  No results found for: LEGIONELLA, MRSACX, MUMPSCX, MYCOPLASCX  No results found for: NOCARDIACX, STOOLCX  No results found for: THROATCX, UNSTIMCULT, URINECX, CULTURE, VZVCULTUR  No results found for: VIRALCULTU, WOUNDCX    [x]  Radiology XR Wrist 2 View Left    Result Date: 3/17/2023  PROCEDURE: XR WRIST 2 VW LEFT  COMPARISON: [facility], CR, XR HAND 3+ VW RIGHT, 12/20/2022, 11:03.  INDICATIONS: swelling and tenderness to left wrist, limited range of motion due to pain  FINDINGS:   No fractures or dislocations are identified.  Mild erosive changes are present.  Mineralization appears normal.  There appear to be ventral hook osteophytes on the heads of the 2nd and 3rd metacarpals.  There is mild generalized soft tissue swelling.  IMPRESSION: Degenerative change as above suggesting an inflammatory arthritis.  Ventral hook osteophytes can also be seen with hemochromatosis.   SHIRLEY RESENDEZ MD       Electronically Signed and Approved By: SHIRLEY RESENDEZ MD on 3/17/2023 at 14:15             XR Chest 1 View    Result Date: 3/16/2023  PROCEDURE: XR CHEST 1 VW  COMPARISON: [facility], CR, XR CHEST 1 VW, 1/25/2023, 16:20.  INDICATIONS: SOB  FINDINGS:  No new consolidations or pleural effusions are observed. The cardiac silhouette and mediastinum are unchanged.  No definitive acute osseous abnormalities are seen on this single view.        1. No change from the previous study with no evidence for acute cardiopulmonary process.         RAY AVILA MD       Electronically Signed and Approved By: RAY AVILA MD on 3/16/2023 at 13:24             Duplex Venous Upper Extremity - Left CAR    Result Date: 3/16/2023  •  Normal left upper extremity venous duplex scan.     Duplex Venous Lower Extremity - Left CAR    Result Date: 3/16/2023  •  Acute left lower extremity deep vein thrombosis noted in the gastrocnemius. •  All other left sided veins appeared normal.       [x]  EKG/Telemetry   []  Cardiology/Vascular   []  Pathology  [x]  Old records  []  Other:    Assessment & Plan   Assessment / Plan     Assessment/Plan:  Assessment:  Acute decompensation of chronic combined CHF  Volume overload  Acute DVT of left gastrocnemius   Troponin leak secondary to CHF  Acute gout flare involving the left wrist  Hyperuricemia  Inflammatory arthritis of the left wrist  CKD stage IIIa  Dyslipidemia  Essential hypertension  Obstructive sleep apnea  Diabetes mellitus  Medical noncompliance     Plan:  Laboratory review reviewed  Start pain control medication Norco 5/325 and 10/25 mg every 6 hours pain for moderate-severe pain respectively  Start morphine 2 mg IV every 6 hours pain for severe breakthrough pain  When administering IV morphine, watch for respiratory depression or adverse reaction which would allude to intolerance  Continue reduced Lasix 60 mg IV daily  Continue prednisone 40 mg daily for gout we will complete 7 days  Uric acid remains elevated we will continue to follow  Continue watching creatinine while aggressive diuresis is being pursued  Continue subcutaneous insulin management with Glucomander weight-based dosing given being on steroids which is causing rise in blood sugars  Continue Coreg 12.5 mg twice daily  Continue aspirin 81 mg daily  Continue apixaban 10 mg twice a day  for 7 days then transition down to 5 mg p.o. twice daily  Continue Entresto  Continue Aldactone 25 mg daily with plan to uptitrate  Cardiac diet  Telemetry monitoring  Following electrolytes while being diuresed  A.m. labs  Full code  DVT prophylaxis with Eliquis  Clinical course dictate further management  Discussed with nurse at the bedside.          DVT prophylaxis:  Medical DVT prophylaxis orders are present.    CODE STATUS:   Level Of Support Discussed With: Patient  Code Status (Patient has no pulse and is not breathing): CPR (Attempt to Resuscitate)  Medical Interventions (Patient has pulse or is breathing): Full Support  Release to patient: Routine Release    Electronically signed by Aylin Keita MD, 03/19/23, 12:53 PM EDT.  Portions of this documentation were transcribed electronically from a voice recognition software.  I confirm all data accurately represents the service(s) I performed at today's visit.

## 2023-03-19 NOTE — PLAN OF CARE
Goal Outcome Evaluation:  Plan of Care Reviewed With: patient        Progress: improving  Outcome Evaluation: patient resting in bed, no c/o pain or discomfort, left hand edema has decreased, patient is alert and oriented call light within reach.

## 2023-03-19 NOTE — PLAN OF CARE
Goal Outcome Evaluation:  Plan of Care Reviewed With: patient   Patient is alert and orientated x4. Denies any pain or discomfort at this time, patient had pain earlier in shift, PRN pain medications effective at pain relief. Vital signs within normal limits at this time.      Progress: improving

## 2023-03-20 LAB
ALBUMIN SERPL-MCNC: 2.1 G/DL (ref 3.5–5.2)
ALP SERPL-CCNC: 113 U/L (ref 39–117)
ALT SERPL W P-5'-P-CCNC: 28 U/L (ref 1–41)
ANION GAP SERPL CALCULATED.3IONS-SCNC: 7.6 MMOL/L (ref 5–15)
AST SERPL-CCNC: 27 U/L (ref 1–40)
BASOPHILS # BLD AUTO: 0.01 10*3/MM3 (ref 0–0.2)
BASOPHILS NFR BLD AUTO: 0.1 % (ref 0–1.5)
BILIRUB CONJ SERPL-MCNC: <0.2 MG/DL (ref 0–0.3)
BILIRUB INDIRECT SERPL-MCNC: ABNORMAL MG/DL
BILIRUB SERPL-MCNC: 0.2 MG/DL (ref 0–1.2)
BUN SERPL-MCNC: 33 MG/DL (ref 8–23)
BUN/CREAT SERPL: 20.1 (ref 7–25)
CALCIUM SPEC-SCNC: 8.1 MG/DL (ref 8.6–10.5)
CHLORIDE SERPL-SCNC: 101 MMOL/L (ref 98–107)
CO2 SERPL-SCNC: 25.4 MMOL/L (ref 22–29)
CREAT SERPL-MCNC: 1.64 MG/DL (ref 0.76–1.27)
DEPRECATED RDW RBC AUTO: 52.3 FL (ref 37–54)
EGFRCR SERPLBLD CKD-EPI 2021: 46.1 ML/MIN/1.73
EOSINOPHIL # BLD AUTO: 0.04 10*3/MM3 (ref 0–0.4)
EOSINOPHIL NFR BLD AUTO: 0.4 % (ref 0.3–6.2)
ERYTHROCYTE [DISTWIDTH] IN BLOOD BY AUTOMATED COUNT: 18.2 % (ref 12.3–15.4)
GLUCOSE BLDC GLUCOMTR-MCNC: 119 MG/DL (ref 70–99)
GLUCOSE BLDC GLUCOMTR-MCNC: 211 MG/DL (ref 70–99)
GLUCOSE BLDC GLUCOMTR-MCNC: 249 MG/DL (ref 70–99)
GLUCOSE BLDC GLUCOMTR-MCNC: 85 MG/DL (ref 70–99)
GLUCOSE SERPL-MCNC: 128 MG/DL (ref 65–99)
HCT VFR BLD AUTO: 31.2 % (ref 37.5–51)
HGB BLD-MCNC: 9.9 G/DL (ref 13–17.7)
IMM GRANULOCYTES # BLD AUTO: 0.04 10*3/MM3 (ref 0–0.05)
IMM GRANULOCYTES NFR BLD AUTO: 0.4 % (ref 0–0.5)
LYMPHOCYTES # BLD AUTO: 1.74 10*3/MM3 (ref 0.7–3.1)
LYMPHOCYTES NFR BLD AUTO: 17.2 % (ref 19.6–45.3)
MAGNESIUM SERPL-MCNC: 1.9 MG/DL (ref 1.6–2.4)
MCH RBC QN AUTO: 25.3 PG (ref 26.6–33)
MCHC RBC AUTO-ENTMCNC: 31.7 G/DL (ref 31.5–35.7)
MCV RBC AUTO: 79.6 FL (ref 79–97)
MONOCYTES # BLD AUTO: 0.61 10*3/MM3 (ref 0.1–0.9)
MONOCYTES NFR BLD AUTO: 6 % (ref 5–12)
NEUTROPHILS NFR BLD AUTO: 7.68 10*3/MM3 (ref 1.7–7)
NEUTROPHILS NFR BLD AUTO: 75.9 % (ref 42.7–76)
NRBC BLD AUTO-RTO: 0 /100 WBC (ref 0–0.2)
PHOSPHATE SERPL-MCNC: 2.8 MG/DL (ref 2.5–4.5)
PLATELET # BLD AUTO: 374 10*3/MM3 (ref 140–450)
PMV BLD AUTO: 9.4 FL (ref 6–12)
POTASSIUM SERPL-SCNC: 3.6 MMOL/L (ref 3.5–5.2)
PROT SERPL-MCNC: 5.4 G/DL (ref 6–8.5)
RBC # BLD AUTO: 3.92 10*6/MM3 (ref 4.14–5.8)
SODIUM SERPL-SCNC: 134 MMOL/L (ref 136–145)
WBC NRBC COR # BLD: 10.12 10*3/MM3 (ref 3.4–10.8)

## 2023-03-20 PROCEDURE — 85025 COMPLETE CBC W/AUTO DIFF WBC: CPT | Performed by: FAMILY MEDICINE

## 2023-03-20 PROCEDURE — 63710000001 INSULIN DETEMIR PER 5 UNITS: Performed by: FAMILY MEDICINE

## 2023-03-20 PROCEDURE — 63710000001 PREDNISONE PER 1 MG: Performed by: FAMILY MEDICINE

## 2023-03-20 PROCEDURE — 80076 HEPATIC FUNCTION PANEL: CPT | Performed by: FAMILY MEDICINE

## 2023-03-20 PROCEDURE — 63710000001 INSULIN LISPRO (HUMAN) PER 5 UNITS: Performed by: FAMILY MEDICINE

## 2023-03-20 PROCEDURE — 25010000002 FUROSEMIDE PER 20 MG: Performed by: FAMILY MEDICINE

## 2023-03-20 PROCEDURE — 97161 PT EVAL LOW COMPLEX 20 MIN: CPT

## 2023-03-20 PROCEDURE — 82962 GLUCOSE BLOOD TEST: CPT

## 2023-03-20 PROCEDURE — 80048 BASIC METABOLIC PNL TOTAL CA: CPT | Performed by: FAMILY MEDICINE

## 2023-03-20 PROCEDURE — 84100 ASSAY OF PHOSPHORUS: CPT | Performed by: FAMILY MEDICINE

## 2023-03-20 PROCEDURE — 83735 ASSAY OF MAGNESIUM: CPT | Performed by: FAMILY MEDICINE

## 2023-03-20 PROCEDURE — 99232 SBSQ HOSP IP/OBS MODERATE 35: CPT | Performed by: FAMILY MEDICINE

## 2023-03-20 PROCEDURE — 94799 UNLISTED PULMONARY SVC/PX: CPT

## 2023-03-20 RX ADMIN — HYDROCODONE BITARTRATE AND ACETAMINOPHEN 1 TABLET: 10; 325 TABLET ORAL at 05:11

## 2023-03-20 RX ADMIN — APIXABAN 10 MG: 5 TABLET, FILM COATED ORAL at 08:46

## 2023-03-20 RX ADMIN — Medication 1000 UNITS: at 08:46

## 2023-03-20 RX ADMIN — HYDROCODONE BITARTRATE AND ACETAMINOPHEN 1 TABLET: 10; 325 TABLET ORAL at 11:13

## 2023-03-20 RX ADMIN — INSULIN LISPRO 6 UNITS: 100 INJECTION, SOLUTION INTRAVENOUS; SUBCUTANEOUS at 08:45

## 2023-03-20 RX ADMIN — CARVEDILOL 12.5 MG: 12.5 TABLET, FILM COATED ORAL at 08:46

## 2023-03-20 RX ADMIN — APIXABAN 10 MG: 5 TABLET, FILM COATED ORAL at 21:39

## 2023-03-20 RX ADMIN — FUROSEMIDE 60 MG: 10 INJECTION, SOLUTION INTRAMUSCULAR; INTRAVENOUS at 08:46

## 2023-03-20 RX ADMIN — INSULIN LISPRO 16 UNITS: 100 INJECTION, SOLUTION INTRAVENOUS; SUBCUTANEOUS at 18:02

## 2023-03-20 RX ADMIN — PREDNISONE 40 MG: 20 TABLET ORAL at 08:45

## 2023-03-20 RX ADMIN — SACUBITRIL AND VALSARTAN 1 TABLET: 97; 103 TABLET, FILM COATED ORAL at 08:45

## 2023-03-20 RX ADMIN — Medication 10 ML: at 08:46

## 2023-03-20 RX ADMIN — ASPIRIN 81 MG: 81 TABLET, COATED ORAL at 08:46

## 2023-03-20 RX ADMIN — INSULIN LISPRO 5 UNITS: 100 INJECTION, SOLUTION INTRAVENOUS; SUBCUTANEOUS at 21:40

## 2023-03-20 RX ADMIN — ATORVASTATIN CALCIUM 80 MG: 40 TABLET, FILM COATED ORAL at 21:40

## 2023-03-20 RX ADMIN — CARVEDILOL 12.5 MG: 12.5 TABLET, FILM COATED ORAL at 18:02

## 2023-03-20 RX ADMIN — INSULIN LISPRO 8 UNITS: 100 INJECTION, SOLUTION INTRAVENOUS; SUBCUTANEOUS at 12:39

## 2023-03-20 RX ADMIN — SACUBITRIL AND VALSARTAN 1 TABLET: 97; 103 TABLET, FILM COATED ORAL at 21:40

## 2023-03-20 RX ADMIN — INSULIN DETEMIR 23 UNITS: 100 INJECTION, SOLUTION SUBCUTANEOUS at 21:41

## 2023-03-20 RX ADMIN — SPIRONOLACTONE 25 MG: 25 TABLET ORAL at 08:46

## 2023-03-20 NOTE — PLAN OF CARE
Goal Outcome Evaluation:  Progress: no change  Outcome Evaluation: VSS. Pt has received two doses of PRN Norco 10/325 for pain rated 8/10 in his left hand/wrist. Pt has appeared to rest well and is able to ambulate to the restroom independently.

## 2023-03-20 NOTE — THERAPY EVALUATION
Acute Care - Physical Therapy Initial Evaluation   [place]     Patient Name: Lyle Lozano  : 1955  MRN: [id number]  Today's Date: 3/20/2023      Visit Dx:     ICD-10-CM ICD-9-CM   1. Acute on chronic congestive heart failure, unspecified heart failure type ([facility])  I50.9 428.0   2. Dyspnea on exertion  R06.09 786.09   3. Acute deep vein thrombosis (DVT) of calf muscle vein of left lower extremity ([facility])  I82.462 453.42   4. Elevated troponin level not due myocardial infarction  R77.8 790.6   5. Noncompliance with medication regimen  Z91.14 V15.81   6. Difficulty walking  R26.2 719.7     Patient Active Problem List   Diagnosis   • NICM (nonischemic cardiomyopathy) ([facility])   • Essential hypertension   • Dyslipidemia   • Coronary artery disease involving native coronary artery of native heart without angina pectoris   • Diabetes mellitus type 2, uncontrolled   • Hyperlipidemia   • Neuropathy   • Shortness of breath   • Acute gout of left foot   • Atypical pneumonia   • Depression   • Migraine   • Chronic idiopathic gout of foot and ankle region without tophus   • Hypertensive heart disease with heart failure ([facility])   • Gout   • Stage 3 chronic kidney disease ([facility])   • Hand arthritis   • Acute pain of left knee   • Elevated troponin level not due myocardial infarction   • Acute on chronic systolic heart failure (CMS/HCC)   • Acute on chronic congestive heart failure, unspecified heart failure type ([facility])     Past Medical History:   Diagnosis Date   • Abnormal kidney function    • Arthritis    • Bursitis    • CHF (congestive heart failure) ([facility])    • Depression    • Diabetes ([facility])    • Edema    • Essential hypertension 2021   • Forgetfulness    • Head injury    • Hernia cerebri ([facility])    • Hyperlipidemia    • Hypertension    • IFG (impaired fasting glucose)    • Low back pain    • Lumbago     `   • Pain of left hip    • Right shoulder pain    • Sleep apnea    • SOB (shortness of breath)      Past Surgical  History:   Procedure Laterality Date   • CYST REMOVAL Right     leg     PT Assessment (last 12 hours)     PT Evaluation and Treatment     Row Name 03/20/23 1259          Physical Therapy Time and Intention    Subjective Information no complaints (P)   -TG     Document Type evaluation (P)   -TG     Mode of Treatment individual therapy;physical therapy (P)   -TG     Patient Effort good (P)   -TG     Symptoms Noted During/After Treatment none (P)   -TG     Row Name 03/20/23 1259          General Information    Patient Profile Reviewed yes (P)   -TG     Patient Observations alert;cooperative;agree to therapy (P)   -TG     Prior Level of Function independent:;gait;transfer;bed mobility;ADL's (P)   -TG     Equipment Currently Used at Home none (P)   -TG     Barriers to Rehab none identified (P)   -TG     Row Name 03/20/23 1259          Living Environment    Current Living Arrangements home (P)   -TG     Home Accessibility stairs to enter home (P)   -TG     People in Home alone (P)   -TG     Primary Care Provided by self (P)   -TG     Row Name 03/20/23 1259          Home Main Entrance    Number of Stairs, Main Entrance one (P)   -TG     Row Name 03/20/23 1259          Home Use of Assistive/Adaptive Equipment    Equipment Currently Used at Home none (P)   -TG     Row Name 03/20/23 1259          Cognition    Orientation Status (Cognition) oriented x 4 (P)   -TG     Row Name 03/20/23 1259          Range of Motion (ROM)    Range of Motion bilateral lower extremities;ROM is WFL (P)   -TG     Row Name 03/20/23 1259          Strength (Manual Muscle Testing)    Strength (Manual Muscle Testing) bilateral lower extremities;strength is WFL (P)   -TG     Row Name 03/20/23 1259          Bed Mobility    Comment, (Bed Mobility) pt seated in recliner upon therapist entry (P)   -TG     Row Name 03/20/23 1259          Transfers    Transfers sit-stand transfer (P)   -TG     Row Name 03/20/23 1259          Sit-Stand Transfer    Sit-Stand  [place] (Transfers) supervision (P)   -TG     Assistive Device (Sit-Stand Transfers) other (see comments) (P)   none  -TG     Row Name 03/20/23 1255          Gait/Stairs (Locomotion)    Gait/Stairs Locomotion gait/ambulation independence (P)   -TG     [place] Level (Gait) supervision (P)   -TG     Assistive Device (Gait) other (see comments) (P)   none  -TG     Distance in Feet (Gait) 100 (P)   -TG     Row Name 03/20/23 1250          Balance    Balance Assessment standing dynamic balance (P)   -TG     Dynamic Standing Balance supervision (P)   -TG     Position/Device Used, Standing Balance unsupported (P)   -TG     Row Name 03/20/23 1255          Plan of Care Review    Plan of Care Reviewed With patient (P)   -TG     Outcome Evaluation Pt demonstrates deficits in strength, balance, and mobility. He will benefit from in patient PT services. Recommend home with home health following discharge from hospital. (P)   -TG     Row Name 03/20/23 125          Therapy Assessment/Plan (PT)    Rehab Potential (PT) good, to achieve stated therapy goals (P)   -TG     Criteria for Skilled Interventions Met (PT) yes;skilled treatment is necessary (P)   -TG     Therapy Frequency (PT) daily (P)   -TG     Predicted Duration of Therapy Intervention (PT) 10 (P)   -TG     Problem List (PT) problems related to;balance;strength;mobility (P)   -TG     Activity Limitations Related to Problem List (PT) unable to ambulate safely;unable to transfer safely (P)   -TG     Row Name 03/20/23 5021          PT Evaluation Complexity    History, PT Evaluation Complexity 1-2 personal factors and/or comorbidities (P)   -TG     Examination of Body Systems (PT Eval Complexity) total of 4 or more elements (P)   -TG     Clinical Presentation (PT Evaluation Complexity) stable (P)   -TG     Clinical Decision Making (PT Evaluation Complexity) low complexity (P)   -TG     Overall Complexity (PT Evaluation Complexity) low complexity (P)   -TG     Row  Name 03/20/23 1259          Physical Therapy Goals    Gait Training Goal Selection (PT) gait training, PT goal 1 (P)   -TG     Row Name 03/20/23 1259          Gait Training Goal 1 (PT)    Activity/Assistive Device (Gait Training Goal 1, PT) gait (walking locomotion) (P)   -TG     [place] Level (Gait Training Goal 1, PT) independent (P)   -TG     Distance (Gait Training Goal 1, PT) 300 (P)   -TG     Time Frame (Gait Training Goal 1, PT) 10 days (P)   -TG           User Key  (r) = Recorded By, (t) = Taken By, (c) = Cosigned By    Initials Name Provider Type    TG Andressa Husain, PT Student PT Student                  PT Recommendation and Plan  Anticipated Discharge Disposition (PT): (P) home with home health  Planned Therapy Interventions (PT): (P) balance training, bed mobility training, gait training, strengthening, transfer training  Therapy Frequency (PT): (P) daily  Plan of Care Reviewed With: (P) patient  Outcome Evaluation: (P) Pt demonstrates deficits in strength, balance, and mobility. He will benefit from in patient PT services. Recommend home with home health following discharge from hospital.   Outcome Measures     Row Name 03/20/23 1300             How much help from another person do you currently need...    Turning from your back to your side while in flat bed without using bedrails? 4 (P)   -TG      Moving from lying on back to sitting on the side of a flat bed without bedrails? 4 (P)   -TG      Moving to and from a bed to a chair (including a wheelchair)? 4 (P)   -TG      Standing up from a chair using your arms (e.g., wheelchair, bedside chair)? 4 (P)   -TG      Climbing 3-5 steps with a railing? 3 (P)   -TG      To walk in hospital room? 4 (P)   -TG      AM-PAC 6 Clicks Score (PT) 23 (P)   -TG         Functional Assessment    Outcome Measure Options AM-PAC 6 Clicks Basic Mobility (PT) (P)   -TG            User Key  (r) = Recorded By, (t) = Taken By, (c) = Cosigned By    Initials Name Provider  Type    TG Andressa Husain, PT Student PT Student                 Time Calculation:    PT Charges     Row Name 03/20/23 1307             Time Calculation    PT Received On 03/20/23 (P)   -TG      PT Goal Re-Cert Due Date 03/29/23 (P)   -TG         Untimed Charges    PT Eval/Re-eval Minutes 40 (P)   -TG         Total Minutes    Untimed Charges Total Minutes 40 (P)   -TG       Total Minutes 40 (P)   -TG            User Key  (r) = Recorded By, (t) = Taken By, (c) = Cosigned By    Initials Name Provider Type    TG Andressa Husain, PT Student PT Student              Therapy Charges for Today     Code Description Service Date Service Provider Modifiers Qty    [id number] HC PT EVAL LOW COMPLEXITY 3 3/20/2023 Andressa Husain, PT Student GP 1          PT G-Codes  Outcome Measure Options: (P) AM-PAC 6 Clicks Basic Mobility (PT)  AM-PAC 6 Clicks Score (PT): (P) 23    Andressa Husain PT Student  3/20/2023

## 2023-03-20 NOTE — PLAN OF CARE
Goal Outcome Evaluation:  Plan of Care Reviewed With: (P) patient           Outcome Evaluation: (P) Pt demonstrates deficits in strength, balance, and mobility. He will benefit from in patient PT services. Recommend home with home health following discharge from hospital.

## 2023-03-20 NOTE — PLAN OF CARE
Goal Outcome Evaluation:  Plan of Care Reviewed With: patient        Progress: no change  Outcome Evaluation: Pt up ad veronica. vss, possible dc tomorrow. Gave Norco for pain in his left hand, no other c/o. Will continue to monitor

## 2023-03-20 NOTE — PROGRESS NOTES
[facility]   Hospitalist Progress Note  Date: 3/20/2023  Patient Name: Lyle Lozano  : 1955  MRN: [id number]  Date of admission: 3/16/2023      Subjective   Subjective     Chief complaint: Shortness of breath     Summary:  67-year-old male with history of heart failure with reduced ejection fraction with previous echo 2023 showing EF 36 to 40% with diastolic dysfunction, on Entresto, diuretics, with underlying CKD stage IIIa, dyslipidemia, essential hypertension, obstructive sleep apnea, diabetes mellitus, medical noncompliance, chronic back pain, depression, hospitalized on 3/16/2023 with shortness of breath symptoms, with acute on chronic exacerbation of combined CHF, with elevated troponin likely troponin leak secondary to CHF, started on IV diuretics at more than 2 times his home dosing, in the setting of not having compliance with home medications, with left wrist pain, started on prednisone, imaging pending, left lower extremity with acute DVT in gastrocnemius, started on full dose anticoagulation     Interval follow-up: Patient seen and examined this morning, no acute distress, no acute major overnight events, left wrist swelling continues to improve, range of motion continues to improve, orthopnea is less, shortness of breath is less.  No hypoxemia, denies chest pain or palpitations, creatinine remains elevated at 1.64 but overall improved from previous day.  Potassium 3.6, sodium 134, blood cell count 10,000, hemoglobin 9.9.  Telemetry reviewed, no acute major rhythmic events.  Still shortness of breath with exertion and cough.  Denied chest pain, denied paroxysmal nocturnal dyspnea    Review of systems:  All systems reviewed and negative except generalized fatigue, generalized weakness, left wrist pain, shortness of breath with exertion, intermittent cough      Objective   Objective     Vitals:   Temp:  [97.3 °F (36.3 °C)-98.1 °F (36.7 °C)] 97.3 °F (36.3 °C)  Heart Rate:   [60-74] 60  Resp:  [18-20] 20  BP: (110-163)/(66-91) 110/66  Physical Exam    Constitutional: Awake, alert, no acute distress standing at the bedside              Eyes: Pupils equal, sclerae anicteric, no conjunctival injection              HENT: NCAT, mucous membranes moist              Neck: Supple, full range of motion              Respiratory: Air entry bilaterally, coarse breath sounds throughout, mostly crackles at the bases              Cardiovascular: RRR, no murmurs, rubs, or gallops, palpable pedal pulses bilaterally              Gastrointestinal: Positive bowel sounds, soft, nontender, nondistended              Musculoskeletal: Bilateral lower extremity trace to +1, left upper extremity in particular swelling around the wrist joint with no warmth, no erythema, tenderness particularly at the left wrist with difficulty flexing and extending              Psychiatric: Appropriate affect, cooperative              Neurologic: Oriented x 3, strength symmetric in all extremities, Cranial Nerves grossly intact to confrontation, speech clear              Skin: No rashes visible on exposed skin             Result Review    Result Review:  I have personally reviewed the pertinent results from the past 24 hours to 3/20/2023 13:48 EDT and agree with these findings:  [x]  Laboratory   CBC    CBC 3/18/23 3/19/23 3/20/23   WBC 10.10 10.91 (A) 10.12   RBC 3.63 (A) 3.72 (A) 3.92 (A)   Hemoglobin 9.3 (A) 9.6 (A) 9.9 (A)   Hematocrit 28.7 (A) 29.3 (A) 31.2 (A)   MCV 79.1 78.8 (A) 79.6   MCH 25.6 (A) 25.8 (A) 25.3 (A)   MCHC 32.4 32.8 31.7   RDW 18.5 (A) 18.2 (A) 18.2 (A)   Platelets 331 348 374   (A) Abnormal value            BMP    BMP 3/18/23 3/19/23 3/20/23   BUN 32 (A) 36 (A) 33 (A)   Creatinine 2.04 (A) 1.76 (A) 1.64 (A)   Sodium 133 (A) 134 (A) 134 (A)   Potassium 3.7 3.7 3.6   Chloride 98 101 101   CO2 23.9 25.1 25.4   Calcium 8.1 (A) 7.8 (A) 8.1 (A)   (A) Abnormal value            LIVER FUNCTION TESTS:      Lab  03/20/23  0508 03/19/23  0535 03/18/23  0545 03/16/23  1249   TOTAL PROTEIN 5.4* 5.4* 5.4* 6.0   ALBUMIN 2.1* 2.1* 2.2* 2.6*   GLOBULIN  --   --   --  3.4   ALT (SGPT) 28 26 14 22   AST (SGOT) 27 38 9 10   BILIRUBIN 0.2 0.2 0.3 0.6   BILIRUBIN DIRECT <0.2 <0.2 <0.2  --    ALK PHOS 113 118* 122* 164*       [x]  Microbiology No results found for: ACANTHNAEG, AFBCX, BPERTUSSISCX, BLOODCX  No results found for: BCIDPCR, CXREFLEX, CSFCX, CULTURETIS  No results found for: CULTURES, HSVCX, URCX  No results found for: EYECULTURE, GCCX, HSVCULTURE, LABHSV  No results found for: LEGIONELLA, MRSACX, MUMPSCX, MYCOPLASCX  No results found for: NOCARDIACX, STOOLCX  No results found for: THROATCX, UNSTIMCULT, URINECX, CULTURE, VZVCULTUR  No results found for: VIRALCULTU, WOUNDCX    [x]  Radiology XR Wrist 2 View Left    Result Date: 3/17/2023  PROCEDURE: XR WRIST 2 VW LEFT  COMPARISON: [facility], , XR HAND 3+ VW RIGHT, 12/20/2022, 11:03.  INDICATIONS: swelling and tenderness to left wrist, limited range of motion due to pain  FINDINGS:   No fractures or dislocations are identified.  Mild erosive changes are present.  Mineralization appears normal.  There appear to be ventral hook osteophytes on the heads of the 2nd and 3rd metacarpals.  There is mild generalized soft tissue swelling.  IMPRESSION: Degenerative change as above suggesting an inflammatory arthritis.  Ventral hook osteophytes can also be seen with hemochromatosis.   SHIRLEY RESENDEZ MD       Electronically Signed and Approved By: SHIRLEY RESENDEZ MD on 3/17/2023 at 14:15             XR Chest 1 View    Result Date: 3/16/2023  PROCEDURE: XR CHEST 1 VW  COMPARISON: [facility], CR, XR CHEST 1 VW, 1/25/2023, 16:20.  INDICATIONS: SOB  FINDINGS:  No new consolidations or pleural effusions are observed. The cardiac silhouette and mediastinum are unchanged. No definitive acute osseous abnormalities are seen on this single view.        1. No change  from the previous study with no evidence for acute cardiopulmonary process.         RAY AVILA MD       Electronically Signed and Approved By: RAY AVILA MD on 3/16/2023 at 13:24             Duplex Venous Upper Extremity - Left CAR    Result Date: 3/16/2023  •  Normal left upper extremity venous duplex scan.     Duplex Venous Lower Extremity - Left CAR    Result Date: 3/16/2023  •  Acute left lower extremity deep vein thrombosis noted in the gastrocnemius. •  All other left sided veins appeared normal.       [x]  EKG/Telemetry   []  Cardiology/Vascular   []  Pathology  [x]  Old records  []  Other:    Assessment & Plan   Assessment / Plan     Assessment/Plan:  Assessment:  Acute decompensation of chronic combined CHF  Volume overload  Acute DVT of left gastrocnemius   Troponin leak secondary to CHF  Acute gout flare involving the left wrist  Hyperuricemia  Inflammatory arthritis of the left wrist  CKD stage IIIa  Dyslipidemia  Essential hypertension  Obstructive sleep apnea  Diabetes mellitus  Medical noncompliance     Plan:  Laboratory review reviewed  Continuing diuresis as he still has peripheral signs of volume increase  Anticipating disposition tomorrow if he continues to maintain improving volume state  Continue following creatinine, down to 1.64  Continue pain control medication Norco 5/325 and 10/25 mg every 6 hours pain for moderate-severe pain respectively  Continue morphine 2 mg IV every 6 hours pain for severe breakthrough pain  When administering IV morphine, watch for respiratory depression or adverse reaction which would allude to intolerance  Continue Lasix 60 mg IV daily switch to p.o. tomorrow  Continue prednisone 40 mg daily for gout we will complete 7 days  Uric acid remains elevated we will continue to follow  We will need referral to orthopedist as outpatient  Continue watching creatinine while aggressive diuresis is being pursued  Continue subcutaneous insulin management with  Glucomander weight-based dosing given being on steroids which is causing rise in blood sugars  Continue Coreg 12.5 mg twice daily  Continue aspirin 81 mg daily  Continue apixaban 10 mg twice a day for 7 days then transition down to 5 mg p.o. twice daily  Continue Entresto  Continue Aldactone 25 mg daily with plan to uptitrate  Cardiac diet  Telemetry monitoring  Following electrolytes while being diuresed  A.m. labs  Full code  DVT prophylaxis with Eliquis  Clinical course dictate further management  Discussed with nurse at the bedside.    DVT prophylaxis:  Medical DVT prophylaxis orders are present.    CODE STATUS:   Level Of Support Discussed With: Patient  Code Status (Patient has no pulse and is not breathing): CPR (Attempt to Resuscitate)  Medical Interventions (Patient has pulse or is breathing): Full Support  Release to patient: Routine Release    Electronically signed by Aylin Keita MD, 03/20/23, 1:48 PM EDT.    Portions of this documentation were transcribed electronically from a voice recognition software.  I confirm all data accurately represents the service(s) I performed at today's visit.

## 2023-03-21 LAB
ALBUMIN SERPL-MCNC: 2.2 G/DL (ref 3.5–5.2)
ALP SERPL-CCNC: 113 U/L (ref 39–117)
ALT SERPL W P-5'-P-CCNC: 25 U/L (ref 1–41)
ANION GAP SERPL CALCULATED.3IONS-SCNC: 7.2 MMOL/L (ref 5–15)
AST SERPL-CCNC: 18 U/L (ref 1–40)
BASOPHILS # BLD AUTO: 0.02 10*3/MM3 (ref 0–0.2)
BASOPHILS NFR BLD AUTO: 0.2 % (ref 0–1.5)
BILIRUB CONJ SERPL-MCNC: <0.2 MG/DL (ref 0–0.3)
BILIRUB INDIRECT SERPL-MCNC: ABNORMAL MG/DL
BILIRUB SERPL-MCNC: 0.2 MG/DL (ref 0–1.2)
BUN SERPL-MCNC: 38 MG/DL (ref 8–23)
BUN/CREAT SERPL: 21.2 (ref 7–25)
CALCIUM SPEC-SCNC: 8 MG/DL (ref 8.6–10.5)
CHLORIDE SERPL-SCNC: 101 MMOL/L (ref 98–107)
CO2 SERPL-SCNC: 26.8 MMOL/L (ref 22–29)
CREAT SERPL-MCNC: 1.79 MG/DL (ref 0.76–1.27)
DEPRECATED RDW RBC AUTO: 52.9 FL (ref 37–54)
EGFRCR SERPLBLD CKD-EPI 2021: 41.5 ML/MIN/1.73
EOSINOPHIL # BLD AUTO: 0.06 10*3/MM3 (ref 0–0.4)
EOSINOPHIL NFR BLD AUTO: 0.6 % (ref 0.3–6.2)
ERYTHROCYTE [DISTWIDTH] IN BLOOD BY AUTOMATED COUNT: 18.2 % (ref 12.3–15.4)
GLUCOSE BLDC GLUCOMTR-MCNC: 139 MG/DL (ref 70–99)
GLUCOSE BLDC GLUCOMTR-MCNC: 162 MG/DL (ref 70–99)
GLUCOSE BLDC GLUCOMTR-MCNC: 79 MG/DL (ref 70–99)
GLUCOSE BLDC GLUCOMTR-MCNC: 79 MG/DL (ref 70–99)
GLUCOSE SERPL-MCNC: 125 MG/DL (ref 65–99)
HCT VFR BLD AUTO: 34 % (ref 37.5–51)
HGB BLD-MCNC: 10.7 G/DL (ref 13–17.7)
IMM GRANULOCYTES # BLD AUTO: 0.04 10*3/MM3 (ref 0–0.05)
IMM GRANULOCYTES NFR BLD AUTO: 0.4 % (ref 0–0.5)
LYMPHOCYTES # BLD AUTO: 1.93 10*3/MM3 (ref 0.7–3.1)
LYMPHOCYTES NFR BLD AUTO: 18 % (ref 19.6–45.3)
MAGNESIUM SERPL-MCNC: 1.9 MG/DL (ref 1.6–2.4)
MCH RBC QN AUTO: 25.4 PG (ref 26.6–33)
MCHC RBC AUTO-ENTMCNC: 31.5 G/DL (ref 31.5–35.7)
MCV RBC AUTO: 80.8 FL (ref 79–97)
MONOCYTES # BLD AUTO: 0.77 10*3/MM3 (ref 0.1–0.9)
MONOCYTES NFR BLD AUTO: 7.2 % (ref 5–12)
NEUTROPHILS NFR BLD AUTO: 7.89 10*3/MM3 (ref 1.7–7)
NEUTROPHILS NFR BLD AUTO: 73.6 % (ref 42.7–76)
NRBC BLD AUTO-RTO: 0 /100 WBC (ref 0–0.2)
PHOSPHATE SERPL-MCNC: 2.6 MG/DL (ref 2.5–4.5)
PLATELET # BLD AUTO: 389 10*3/MM3 (ref 140–450)
PMV BLD AUTO: 9.2 FL (ref 6–12)
POTASSIUM SERPL-SCNC: 4 MMOL/L (ref 3.5–5.2)
PROT SERPL-MCNC: 5.5 G/DL (ref 6–8.5)
RBC # BLD AUTO: 4.21 10*6/MM3 (ref 4.14–5.8)
SODIUM SERPL-SCNC: 135 MMOL/L (ref 136–145)
WBC NRBC COR # BLD: 10.71 10*3/MM3 (ref 3.4–10.8)

## 2023-03-21 PROCEDURE — 25010000002 FUROSEMIDE PER 20 MG: Performed by: FAMILY MEDICINE

## 2023-03-21 PROCEDURE — 94799 UNLISTED PULMONARY SVC/PX: CPT

## 2023-03-21 PROCEDURE — 80048 BASIC METABOLIC PNL TOTAL CA: CPT | Performed by: FAMILY MEDICINE

## 2023-03-21 PROCEDURE — 83735 ASSAY OF MAGNESIUM: CPT | Performed by: FAMILY MEDICINE

## 2023-03-21 PROCEDURE — 63710000001 INSULIN LISPRO (HUMAN) PER 5 UNITS: Performed by: FAMILY MEDICINE

## 2023-03-21 PROCEDURE — 25010000002 METHYLPREDNISOLONE PER 125 MG: Performed by: PHYSICIAN ASSISTANT

## 2023-03-21 PROCEDURE — 84100 ASSAY OF PHOSPHORUS: CPT | Performed by: FAMILY MEDICINE

## 2023-03-21 PROCEDURE — 63710000001 PREDNISONE PER 1 MG: Performed by: FAMILY MEDICINE

## 2023-03-21 PROCEDURE — 80076 HEPATIC FUNCTION PANEL: CPT | Performed by: FAMILY MEDICINE

## 2023-03-21 PROCEDURE — 85025 COMPLETE CBC W/AUTO DIFF WBC: CPT | Performed by: FAMILY MEDICINE

## 2023-03-21 PROCEDURE — 99232 SBSQ HOSP IP/OBS MODERATE 35: CPT | Performed by: FAMILY MEDICINE

## 2023-03-21 PROCEDURE — 82962 GLUCOSE BLOOD TEST: CPT

## 2023-03-21 PROCEDURE — 63710000001 INSULIN DETEMIR PER 5 UNITS: Performed by: FAMILY MEDICINE

## 2023-03-21 RX ORDER — METHYLPREDNISOLONE SODIUM SUCCINATE 125 MG/2ML
60 INJECTION, POWDER, LYOPHILIZED, FOR SOLUTION INTRAMUSCULAR; INTRAVENOUS DAILY
Status: COMPLETED | OUTPATIENT
Start: 2023-03-21 | End: 2023-03-21

## 2023-03-21 RX ADMIN — APIXABAN 10 MG: 5 TABLET, FILM COATED ORAL at 21:13

## 2023-03-21 RX ADMIN — APIXABAN 10 MG: 5 TABLET, FILM COATED ORAL at 08:59

## 2023-03-21 RX ADMIN — HYDROCODONE BITARTRATE AND ACETAMINOPHEN 1 TABLET: 10; 325 TABLET ORAL at 17:36

## 2023-03-21 RX ADMIN — FUROSEMIDE 60 MG: 10 INJECTION, SOLUTION INTRAMUSCULAR; INTRAVENOUS at 08:58

## 2023-03-21 RX ADMIN — INSULIN LISPRO 3 UNITS: 100 INJECTION, SOLUTION INTRAVENOUS; SUBCUTANEOUS at 21:13

## 2023-03-21 RX ADMIN — CARVEDILOL 12.5 MG: 12.5 TABLET, FILM COATED ORAL at 17:36

## 2023-03-21 RX ADMIN — ASPIRIN 81 MG: 81 TABLET, COATED ORAL at 08:59

## 2023-03-21 RX ADMIN — ATORVASTATIN CALCIUM 80 MG: 40 TABLET, FILM COATED ORAL at 21:13

## 2023-03-21 RX ADMIN — SACUBITRIL AND VALSARTAN 1 TABLET: 97; 103 TABLET, FILM COATED ORAL at 21:13

## 2023-03-21 RX ADMIN — INSULIN LISPRO 11 UNITS: 100 INJECTION, SOLUTION INTRAVENOUS; SUBCUTANEOUS at 12:38

## 2023-03-21 RX ADMIN — CARVEDILOL 12.5 MG: 12.5 TABLET, FILM COATED ORAL at 08:59

## 2023-03-21 RX ADMIN — SACUBITRIL AND VALSARTAN 1 TABLET: 97; 103 TABLET, FILM COATED ORAL at 08:59

## 2023-03-21 RX ADMIN — INSULIN LISPRO 18 UNITS: 100 INJECTION, SOLUTION INTRAVENOUS; SUBCUTANEOUS at 17:36

## 2023-03-21 RX ADMIN — METHYLPREDNISOLONE SODIUM SUCCINATE 60 MG: 125 INJECTION, POWDER, FOR SOLUTION INTRAMUSCULAR; INTRAVENOUS at 11:42

## 2023-03-21 RX ADMIN — Medication 1000 UNITS: at 08:59

## 2023-03-21 RX ADMIN — Medication 10 ML: at 08:58

## 2023-03-21 RX ADMIN — SPIRONOLACTONE 25 MG: 25 TABLET ORAL at 08:59

## 2023-03-21 RX ADMIN — INSULIN LISPRO 7 UNITS: 100 INJECTION, SOLUTION INTRAVENOUS; SUBCUTANEOUS at 08:58

## 2023-03-21 RX ADMIN — PREDNISONE 40 MG: 20 TABLET ORAL at 08:59

## 2023-03-21 RX ADMIN — INSULIN DETEMIR 18 UNITS: 100 INJECTION, SOLUTION SUBCUTANEOUS at 21:13

## 2023-03-21 RX ADMIN — HYDROCODONE BITARTRATE AND ACETAMINOPHEN 1 TABLET: 10; 325 TABLET ORAL at 08:59

## 2023-03-21 NOTE — PLAN OF CARE
Goal Outcome Evaluation:  Plan of Care Reviewed With: patient        Progress: no change  Outcome Evaluation: Patient up ad veronica, Still c/o left hand/arm pain, wants to wait a bit to take pain meds to see if pain subsides. VSS, no significant changes.

## 2023-03-21 NOTE — PROGRESS NOTES
[facility]   Hospitalist Progress Note  Date: 3/21/2023  Patient Name: Lyle Lozano  : 1955  MRN: [id number]  Date of admission: 3/16/2023      Subjective   Subjective     Chief complaint: Shortness of breath     Summary:  67-year-old male with history of heart failure with reduced ejection fraction with previous echo 2023 showing EF 36 to 40% with diastolic dysfunction, on Entresto, diuretics, with underlying CKD stage IIIa, dyslipidemia, essential hypertension, obstructive sleep apnea, diabetes mellitus, medical noncompliance, chronic back pain, depression, hospitalized on 3/16/2023 with shortness of breath symptoms, with acute on chronic exacerbation of combined CHF, with elevated troponin likely troponin leak secondary to CHF, started on IV diuretics at more than 2 times his home dosing, in the setting of not having compliance with home medications, with left wrist pain, started on prednisone, imaging pending, left lower extremity with acute DVT in gastrocnemius, started on full dose anticoagulation     Interval follow-up: Patient seen and examined this morning, no acute distress, no acute major overnight events, continues to complain of left wrist swelling, still struggling with flexion and extension, pain is significant and sometimes not tolerated well.  Still feels like he has symptoms of ongoing orthopnea, difficulty laying flat. Difficulty traveling distances as he becomes easily winded and short of air.  Denied fevers, chills, sweats.  Creatinine up to 1.79, potassium 4.0, sodium 135, blood sugars in the 100 range, white blood cell count 10,000, hemoglobin 10.7.  Telemetry reviewed, short bursts of PVCs, baseline sinus rhythm in the 60s.     Review of systems:  All systems reviewed and negative except generalized fatigue, generalized weakness, left wrist pain, shortness of breath with exertion, intermittent cough        Objective   Objective     Vitals:   Temp:  [97.3 °F  (36.3 °C)-97.9 °F (36.6 °C)] 97.5 °F (36.4 °C)  Heart Rate:  [69-75] 70  Resp:  [18-20] 18  BP: (120-158)/(46-87) 120/46  Physical Exam    Constitutional: Awake, alert, no acute distress standing at the bedside              Eyes: Pupils equal, sclerae anicteric, no conjunctival injection              HENT: NCAT, mucous membranes moist              Neck: Supple, full range of motion              Respiratory: Air entry bilaterally, coarse breath sounds throughout, mostly crackles at the bases              Cardiovascular: RRR, no murmurs, rubs, or gallops, palpable pedal pulses bilaterally              Gastrointestinal: Positive bowel sounds, soft, nontender, nondistended              Musculoskeletal: Bilateral lower extremity trace to +1, left upper extremity with swelling around the wrist joint with no warmth, no erythema, tenderness particularly at the left wrist with difficulty flexing and extending and intrinsic strength with  and finger squeeze              Psychiatric: Appropriate affect, cooperative              Neurologic: Oriented x 3, strength symmetric in all extremities, Cranial Nerves grossly intact to confrontation, speech clear              Skin: No rashes visible on exposed skin       Result Review    Result Review:  I have personally reviewed the pertinent results from the past 24 hours to 3/21/2023 13:38 EDT and agree with these findings:  [x]  Laboratory   CBC    CBC 3/19/23 3/20/23 3/21/23   WBC 10.91 (A) 10.12 10.71   RBC 3.72 (A) 3.92 (A) 4.21   Hemoglobin 9.6 (A) 9.9 (A) 10.7 (A)   Hematocrit 29.3 (A) 31.2 (A) 34.0 (A)   MCV 78.8 (A) 79.6 80.8   MCH 25.8 (A) 25.3 (A) 25.4 (A)   MCHC 32.8 31.7 31.5   RDW 18.2 (A) 18.2 (A) 18.2 (A)   Platelets 348 374 389   (A) Abnormal value            BMP    BMP 3/19/23 3/20/23 3/21/23   BUN 36 (A) 33 (A) 38 (A)   Creatinine 1.76 (A) 1.64 (A) 1.79 (A)   Sodium 134 (A) 134 (A) 135 (A)   Potassium 3.7 3.6 4.0   Chloride 101 101 101   CO2 25.1 25.4 26.8    Calcium 7.8 (A) 8.1 (A) 8.0 (A)   (A) Abnormal value            LIVER FUNCTION TESTS:      Lab 03/21/23  0512 03/20/23  0508 03/19/23  0535 03/18/23  0545 03/16/23  1249   TOTAL PROTEIN 5.5* 5.4* 5.4* 5.4* 6.0   ALBUMIN 2.2* 2.1* 2.1* 2.2* 2.6*   GLOBULIN  --   --   --   --  3.4   ALT (SGPT) 25 28 26 14 22   AST (SGOT) 18 27 38 9 10   BILIRUBIN 0.2 0.2 0.2 0.3 0.6   BILIRUBIN DIRECT <0.2 <0.2 <0.2 <0.2  --    ALK PHOS 113 113 118* 122* 164*       [x]  Microbiology No results found for: ACANTHNAEG, AFBCX, BPERTUSSISCX, BLOODCX  No results found for: BCIDPCR, CXREFLEX, CSFCX, CULTURETIS  No results found for: CULTURES, HSVCX, URCX  No results found for: EYECULTURE, GCCX, HSVCULTURE, LABHSV  No results found for: LEGIONELLA, MRSACX, MUMPSCX, MYCOPLASCX  No results found for: NOCARDIACX, STOOLCX  No results found for: THROATCX, UNSTIMCULT, URINECX, CULTURE, VZVCULTUR  No results found for: VIRALCULTU, WOUNDCX    [x]  Radiology XR Wrist 2 View Left    Result Date: 3/17/2023  PROCEDURE: XR WRIST 2 VW LEFT  COMPARISON: [facility], [facility], XR HAND 3+ VW RIGHT, 12/20/2022, 11:03.  INDICATIONS: swelling and tenderness to left wrist, limited range of motion due to pain  FINDINGS:   No fractures or dislocations are identified.  Mild erosive changes are present.  Mineralization appears normal.  There appear to be ventral hook osteophytes on the heads of the 2nd and 3rd metacarpals.  There is mild generalized soft tissue swelling.  IMPRESSION: Degenerative change as above suggesting an inflammatory arthritis.  Ventral hook osteophytes can also be seen with hemochromatosis.   SHIRLEY RESENDEZ MD       Electronically Signed and Approved By: SHIRLEY RESENDEZ MD on 3/17/2023 at 14:15             XR Chest 1 View    Result Date: 3/16/2023  PROCEDURE: XR CHEST 1 VW  COMPARISON: [facility], GILDA, XR CHEST 1 VW, 1/25/2023, 16:20.  INDICATIONS: SOB  FINDINGS:  No new consolidations or pleural effusions are observed.  The cardiac silhouette and mediastinum are unchanged. No definitive acute osseous abnormalities are seen on this single view.        1. No change from the previous study with no evidence for acute cardiopulmonary process.         RAY AVILA MD       Electronically Signed and Approved By: RAY AVILA MD on 3/16/2023 at 13:24             Duplex Venous Upper Extremity - Left CAR    Result Date: 3/16/2023  •  Normal left upper extremity venous duplex scan.     Duplex Venous Lower Extremity - Left CAR    Result Date: 3/16/2023  •  Acute left lower extremity deep vein thrombosis noted in the gastrocnemius. •  All other left sided veins appeared normal.       [x]  EKG/Telemetry   []  Cardiology/Vascular   []  Pathology  [x]  Old records  []  Other:    Assessment & Plan   Assessment / Plan     Assessment/Plan:        Assessment:  Acute decompensation of chronic combined CHF  Volume overload  Acute DVT of left gastrocnemius   Troponin leak secondary to CHF  Acute gout flare involving the left wrist  Hyperuricemia  Inflammatory arthritis of the left wrist  CKD stage IIIa  Dyslipidemia  Essential hypertension  Obstructive sleep apnea  Diabetes mellitus  Medical noncompliance     Plan:  Laboratory review reviewed  One-time dose of Solu-Medrol 60 mg IV in addition to p.o. prednisone 40 mg daily to help alleviate inflammation in the patient's left wrist  Continuing diuresis as he continues to have symptoms of volume overload and orthopnea  Still has opportunity for diuresis  Continue following creatinine, now up to 1.79  Continue pain control medication Norco 5/325 and 10/25 mg every 6 hours pain for moderate-severe pain respectively  Continue morphine 2 mg IV every 6 hours pain for severe breakthrough pain  When administering IV morphine, watch for respiratory depression or adverse reaction which would allude to intolerance  Continue prednisone 40 mg daily for gout we will complete 7 days  We will start allopurinol once  acute symptoms of gout improve  Uric acid remains elevated we will continue to follow; a.m. labs added  We will need referral to orthopedist as outpatient  Continue watching creatinine while aggressive diuresis is being pursued  Continue subcutaneous insulin management with Glucomander weight-based dosing given being on steroids which is causing rise in blood sugars  Continue Coreg 12.5 mg twice daily  Continue aspirin 81 mg daily  Continue apixaban 10 mg twice a day for 7 days then transition down to 5 mg p.o. twice daily  Continue Entresto  Continue Aldactone 25 mg daily with plan to uptitrate  Cardiac diet  Telemetry monitoring  Following electrolytes while being diuresed  A.m. labs  Full code  DVT prophylaxis with Eliquis  Clinical course dictate further management  Discussed with nurse at the bedside.    DVT prophylaxis:  Medical DVT prophylaxis orders are present.    CODE STATUS:   Level Of Support Discussed With: Patient  Code Status (Patient has no pulse and is not breathing): CPR (Attempt to Resuscitate)  Medical Interventions (Patient has pulse or is breathing): Full Support  Release to patient: Routine Release    Electronically signed by Aylin Keita MD, 03/21/23, 1:38 PM EDT.    Portions of this documentation were transcribed electronically from a voice recognition software.  I confirm all data accurately represents the service(s) I performed at today's visit.

## 2023-03-21 NOTE — CASE MANAGEMENT/SOCIAL WORK
HF cm reviewed chart.     Pt admitted for CHF. BNP >20,200 EF 36-40%, no repeat ordered. Cardiology not consulted IP. Pt sees [facility] OP. Pt has upcoming appt3/23 @ 10.    HF CM is familiar with pt from previous admission in Feb. Pt referred to the heart failure clinic and appt made 2/8. Pt cancelled appt and rescheduled 2/14 and was no show. Pt on coreg, Entresto, spironolactone and bumex PTA. Pt started on Lasix IP. No output documented, daily weights are being done.     Will continue to follow for heart failure needs.

## 2023-03-22 LAB
ALBUMIN SERPL-MCNC: 2.3 G/DL (ref 3.5–5.2)
ALP SERPL-CCNC: 113 U/L (ref 39–117)
ALT SERPL W P-5'-P-CCNC: 22 U/L (ref 1–41)
ANION GAP SERPL CALCULATED.3IONS-SCNC: 6.3 MMOL/L (ref 5–15)
AST SERPL-CCNC: 19 U/L (ref 1–40)
BASOPHILS # BLD AUTO: 0 10*3/MM3 (ref 0–0.2)
BASOPHILS NFR BLD AUTO: 0 % (ref 0–1.5)
BILIRUB CONJ SERPL-MCNC: <0.2 MG/DL (ref 0–0.3)
BILIRUB INDIRECT SERPL-MCNC: ABNORMAL MG/DL
BILIRUB SERPL-MCNC: <0.2 MG/DL (ref 0–1.2)
BUN SERPL-MCNC: 48 MG/DL (ref 8–23)
BUN/CREAT SERPL: 24.6 (ref 7–25)
CALCIUM SPEC-SCNC: 8.2 MG/DL (ref 8.6–10.5)
CHLORIDE SERPL-SCNC: 101 MMOL/L (ref 98–107)
CO2 SERPL-SCNC: 27.7 MMOL/L (ref 22–29)
CREAT SERPL-MCNC: 1.95 MG/DL (ref 0.76–1.27)
DEPRECATED RDW RBC AUTO: 54.4 FL (ref 37–54)
EGFRCR SERPLBLD CKD-EPI 2021: 37.5 ML/MIN/1.73
EOSINOPHIL # BLD AUTO: 0.01 10*3/MM3 (ref 0–0.4)
EOSINOPHIL NFR BLD AUTO: 0.1 % (ref 0.3–6.2)
ERYTHROCYTE [DISTWIDTH] IN BLOOD BY AUTOMATED COUNT: 18.5 % (ref 12.3–15.4)
GLUCOSE BLDC GLUCOMTR-MCNC: 155 MG/DL (ref 70–99)
GLUCOSE BLDC GLUCOMTR-MCNC: 212 MG/DL (ref 70–99)
GLUCOSE BLDC GLUCOMTR-MCNC: 233 MG/DL (ref 70–99)
GLUCOSE BLDC GLUCOMTR-MCNC: 350 MG/DL (ref 70–99)
GLUCOSE SERPL-MCNC: 185 MG/DL (ref 65–99)
HCT VFR BLD AUTO: 32.8 % (ref 37.5–51)
HGB BLD-MCNC: 10.3 G/DL (ref 13–17.7)
IMM GRANULOCYTES # BLD AUTO: 0.05 10*3/MM3 (ref 0–0.05)
IMM GRANULOCYTES NFR BLD AUTO: 0.4 % (ref 0–0.5)
LYMPHOCYTES # BLD AUTO: 1.66 10*3/MM3 (ref 0.7–3.1)
LYMPHOCYTES NFR BLD AUTO: 14.6 % (ref 19.6–45.3)
MAGNESIUM SERPL-MCNC: 1.9 MG/DL (ref 1.6–2.4)
MCH RBC QN AUTO: 25.4 PG (ref 26.6–33)
MCHC RBC AUTO-ENTMCNC: 31.4 G/DL (ref 31.5–35.7)
MCV RBC AUTO: 80.8 FL (ref 79–97)
MONOCYTES # BLD AUTO: 0.6 10*3/MM3 (ref 0.1–0.9)
MONOCYTES NFR BLD AUTO: 5.3 % (ref 5–12)
NEUTROPHILS NFR BLD AUTO: 79.6 % (ref 42.7–76)
NEUTROPHILS NFR BLD AUTO: 9.03 10*3/MM3 (ref 1.7–7)
NRBC BLD AUTO-RTO: 0 /100 WBC (ref 0–0.2)
PHOSPHATE SERPL-MCNC: 3 MG/DL (ref 2.5–4.5)
PLATELET # BLD AUTO: 393 10*3/MM3 (ref 140–450)
PMV BLD AUTO: 9.5 FL (ref 6–12)
POTASSIUM SERPL-SCNC: 4.5 MMOL/L (ref 3.5–5.2)
PROT SERPL-MCNC: 5.5 G/DL (ref 6–8.5)
RBC # BLD AUTO: 4.06 10*6/MM3 (ref 4.14–5.8)
SODIUM SERPL-SCNC: 135 MMOL/L (ref 136–145)
URATE SERPL-MCNC: 9.5 MG/DL (ref 3.4–7)
WBC NRBC COR # BLD: 11.35 10*3/MM3 (ref 3.4–10.8)

## 2023-03-22 PROCEDURE — 80076 HEPATIC FUNCTION PANEL: CPT | Performed by: FAMILY MEDICINE

## 2023-03-22 PROCEDURE — 99221 1ST HOSP IP/OBS SF/LOW 40: CPT | Performed by: STUDENT IN AN ORGANIZED HEALTH CARE EDUCATION/TRAINING PROGRAM

## 2023-03-22 PROCEDURE — 63710000001 INSULIN DETEMIR PER 5 UNITS: Performed by: FAMILY MEDICINE

## 2023-03-22 PROCEDURE — 80048 BASIC METABOLIC PNL TOTAL CA: CPT | Performed by: FAMILY MEDICINE

## 2023-03-22 PROCEDURE — 97165 OT EVAL LOW COMPLEX 30 MIN: CPT

## 2023-03-22 PROCEDURE — 63710000001 INSULIN LISPRO (HUMAN) PER 5 UNITS: Performed by: FAMILY MEDICINE

## 2023-03-22 PROCEDURE — 94799 UNLISTED PULMONARY SVC/PX: CPT

## 2023-03-22 PROCEDURE — 97110 THERAPEUTIC EXERCISES: CPT

## 2023-03-22 PROCEDURE — 99233 SBSQ HOSP IP/OBS HIGH 50: CPT | Performed by: FAMILY MEDICINE

## 2023-03-22 PROCEDURE — 82962 GLUCOSE BLOOD TEST: CPT

## 2023-03-22 PROCEDURE — 83735 ASSAY OF MAGNESIUM: CPT | Performed by: FAMILY MEDICINE

## 2023-03-22 PROCEDURE — 84100 ASSAY OF PHOSPHORUS: CPT | Performed by: FAMILY MEDICINE

## 2023-03-22 PROCEDURE — 84550 ASSAY OF BLOOD/URIC ACID: CPT | Performed by: FAMILY MEDICINE

## 2023-03-22 PROCEDURE — 85025 COMPLETE CBC W/AUTO DIFF WBC: CPT | Performed by: FAMILY MEDICINE

## 2023-03-22 PROCEDURE — 63710000001 PREDNISONE PER 1 MG: Performed by: FAMILY MEDICINE

## 2023-03-22 RX ORDER — FUROSEMIDE 40 MG/1
40 TABLET ORAL DAILY
Status: DISCONTINUED | OUTPATIENT
Start: 2023-03-22 | End: 2023-03-23 | Stop reason: HOSPADM

## 2023-03-22 RX ADMIN — HYDROCODONE BITARTRATE AND ACETAMINOPHEN 1 TABLET: 10; 325 TABLET ORAL at 21:12

## 2023-03-22 RX ADMIN — INSULIN LISPRO 5 UNITS: 100 INJECTION, SOLUTION INTRAVENOUS; SUBCUTANEOUS at 08:40

## 2023-03-22 RX ADMIN — INSULIN LISPRO 9 UNITS: 100 INJECTION, SOLUTION INTRAVENOUS; SUBCUTANEOUS at 21:12

## 2023-03-22 RX ADMIN — APIXABAN 10 MG: 5 TABLET, FILM COATED ORAL at 08:38

## 2023-03-22 RX ADMIN — APIXABAN 10 MG: 5 TABLET, FILM COATED ORAL at 21:12

## 2023-03-22 RX ADMIN — INSULIN DETEMIR 18 UNITS: 100 INJECTION, SOLUTION SUBCUTANEOUS at 21:12

## 2023-03-22 RX ADMIN — CARVEDILOL 12.5 MG: 12.5 TABLET, FILM COATED ORAL at 17:33

## 2023-03-22 RX ADMIN — SACUBITRIL AND VALSARTAN 1 TABLET: 97; 103 TABLET, FILM COATED ORAL at 08:38

## 2023-03-22 RX ADMIN — INSULIN LISPRO 7 UNITS: 100 INJECTION, SOLUTION INTRAVENOUS; SUBCUTANEOUS at 17:34

## 2023-03-22 RX ADMIN — SPIRONOLACTONE 25 MG: 25 TABLET ORAL at 08:40

## 2023-03-22 RX ADMIN — ATORVASTATIN CALCIUM 80 MG: 40 TABLET, FILM COATED ORAL at 21:11

## 2023-03-22 RX ADMIN — SACUBITRIL AND VALSARTAN 1 TABLET: 97; 103 TABLET, FILM COATED ORAL at 21:11

## 2023-03-22 RX ADMIN — CARVEDILOL 12.5 MG: 12.5 TABLET, FILM COATED ORAL at 08:39

## 2023-03-22 RX ADMIN — FUROSEMIDE 40 MG: 40 TABLET ORAL at 08:40

## 2023-03-22 RX ADMIN — INSULIN LISPRO 7 UNITS: 100 INJECTION, SOLUTION INTRAVENOUS; SUBCUTANEOUS at 12:38

## 2023-03-22 RX ADMIN — ASPIRIN 81 MG: 81 TABLET, COATED ORAL at 08:38

## 2023-03-22 RX ADMIN — PREDNISONE 40 MG: 20 TABLET ORAL at 08:40

## 2023-03-22 RX ADMIN — Medication 1000 UNITS: at 08:40

## 2023-03-22 NOTE — PLAN OF CARE
Problem: Adult Inpatient Plan of Care  Goal: Plan of Care Review  Outcome: Ongoing, Progressing  Flowsheets (Taken 3/22/2023 4862)  Progress: improving  Plan of Care Reviewed With: patient   Goal Outcome Evaluation:  Plan of Care Reviewed With: patient        Progress: improving   Vitals stable throughout shift. No complaints of pain. Slept well tonight. Possible discharge today. Will continue to monitor.

## 2023-03-22 NOTE — PLAN OF CARE
Goal Outcome Evaluation:  Plan of Care Reviewed With: patient         Pt is alert and orient x4.  Pt insulin adm as ordered.

## 2023-03-22 NOTE — PROGRESS NOTES
[facility]   Hospitalist Progress Note  Date: 3/22/2023  Patient Name: Lyle Lozano  : 1955  MRN: [id number]  Date of admission: 3/16/2023      Subjective   Subjective     Chief complaint: Shortness of breath     Summary:  67-year-old male with history of heart failure with reduced ejection fraction with previous echo 2023 showing EF 36 to 40% with diastolic dysfunction, on Entresto, diuretics, with underlying CKD stage IIIa, dyslipidemia, essential hypertension, obstructive sleep apnea, diabetes mellitus, medical noncompliance, chronic back pain, depression, hospitalized on 3/16/2023 with shortness of breath symptoms, with acute on chronic exacerbation of combined CHF, with elevated troponin likely troponin leak secondary to CHF, started on IV diuretics at more than 2 times his home dosing, in the setting of not having compliance with home medications, with left wrist pain, started on prednisone, imaging pending, left lower extremity with acute DVT in gastrocnemius, started on full dose anticoagulation, no significant improvements in left wrist swelling and pain and limited range of motion, consulted Ortho     Interval follow-up: Patient seen and examined this morning, no acute distress, no acute major overnight events, still has a lot of swelling and tenderness of his left wrist with difficulty with range of motion in flexion and extension.  Denied fevers, chills, sweats.  Creatinine up to 1.9 range, no significant improvement despite steroid use, consulted orthopedics for further evaluation of left wrist.  White blood cell count 11,000.  Telemetry reviewed, few PVCs, baseline heart rate sinus rhythm in the 80s.  Exertional tolerance improving, orthopnea improving.  Urine output not logged with potassium 4.5, BUN 48, sodium 135.    Review of systems:  All systems reviewed and negative except generalized fatigue, generalized weakness, left wrist pain, shortness of breath with  exertion, intermittent cough    Objective   Objective     Vitals:   Temp:  [97.5 °F (36.4 °C)-98 °F (36.7 °C)] 97.8 °F (36.6 °C)  Heart Rate:  [66-72] 68  Resp:  [18-20] 18  BP: ()/(48-99) 167/83  Physical Exam     Constitutional: Awake, alert, no acute distress laying in bed              Eyes: Pupils equal, sclerae anicteric, no conjunctival injection              HENT: NCAT, mucous membranes moist              Neck: Supple, full range of motion              Respiratory: Air entry bilaterally, coarse breath sounds throughout, no crackles              Cardiovascular: RRR, no murmurs, rubs, or gallops, palpable pedal pulses bilaterally              Gastrointestinal: Positive bowel sounds, soft, nontender, nondistended              Musculoskeletal: Bilateral lower extremity trace to +1, left upper extremity with swelling around the wrist joint with no warmth, no erythema, tenderness particularly at the left wrist with difficulty flexing and extending and intrinsic strength with  and finger squeeze              Psychiatric: Appropriate affect, cooperative              Neurologic: Oriented x 3, strength symmetric in all extremities, Cranial Nerves grossly intact to confrontation, speech clear              Skin: No rashes visible on exposed skin      Result Review    Result Review:  I have personally reviewed the pertinent results from the past 24 hours to 3/22/2023 14:57 EDT and agree with these findings:  [x]  Laboratory   CBC    CBC 3/20/23 3/21/23 3/22/23   WBC 10.12 10.71 11.35 (A)   RBC 3.92 (A) 4.21 4.06 (A)   Hemoglobin 9.9 (A) 10.7 (A) 10.3 (A)   Hematocrit 31.2 (A) 34.0 (A) 32.8 (A)   MCV 79.6 80.8 80.8   MCH 25.3 (A) 25.4 (A) 25.4 (A)   MCHC 31.7 31.5 31.4 (A)   RDW 18.2 (A) 18.2 (A) 18.5 (A)   Platelets 374 389 393   (A) Abnormal value            BMP    BMP 3/20/23 3/21/23 3/22/23   BUN 33 (A) 38 (A) 48 (A)   Creatinine 1.64 (A) 1.79 (A) 1.95 (A)   Sodium 134 (A) 135 (A) 135 (A)   Potassium 3.6 4.0  4.5   Chloride 101 101 101   CO2 25.4 26.8 27.7   Calcium 8.1 (A) 8.0 (A) 8.2 (A)   (A) Abnormal value            LIVER FUNCTION TESTS:      Lab 03/22/23  0503 03/21/23  0512 03/20/23  0508 03/19/23  0535 03/18/23  0545 03/16/23  1249   TOTAL PROTEIN 5.5* 5.5* 5.4* 5.4* 5.4* 6.0   ALBUMIN 2.3* 2.2* 2.1* 2.1* 2.2* 2.6*   GLOBULIN  --   --   --   --   --  3.4   ALT (SGPT) 22 25 28 26 14 22   AST (SGOT) 19 18 27 38 9 10   BILIRUBIN <0.2 0.2 0.2 0.2 0.3 0.6   BILIRUBIN DIRECT <0.2 <0.2 <0.2 <0.2 <0.2  --    ALK PHOS 113 113 113 118* 122* 164*       [x]  Microbiology No results found for: ACANTHNAEG, AFBCX, BPERTUSSISCX, BLOODCX  No results found for: BCIDPCR, CXREFLEX, CSFCX, CULTURETIS  No results found for: CULTURES, HSVCX, URCX  No results found for: EYECULTURE, GCCX, HSVCULTURE, LABHSV  No results found for: LEGIONELLA, MRSACX, MUMPSCX, MYCOPLASCX  No results found for: NOCARDIACX, STOOLCX  No results found for: THROATCX, UNSTIMCULT, URINECX, CULTURE, VZVCULTUR  No results found for: VIRALCULTU, WOUNDCX    [x]  Radiology XR Wrist 2 View Left    Result Date: 3/17/2023  PROCEDURE: XR WRIST 2 VW LEFT  COMPARISON: [facility], CR, XR HAND 3+ VW RIGHT, 12/20/2022, 11:03.  INDICATIONS: swelling and tenderness to left wrist, limited range of motion due to pain  FINDINGS:   No fractures or dislocations are identified.  Mild erosive changes are present.  Mineralization appears normal.  There appear to be ventral hook osteophytes on the heads of the 2nd and 3rd metacarpals.  There is mild generalized soft tissue swelling.  IMPRESSION: Degenerative change as above suggesting an inflammatory arthritis.  Ventral hook osteophytes can also be seen with hemochromatosis.   SHIRLEY RESENDEZ MD       Electronically Signed and Approved By: SHIRLEY RESENDEZ MD on 3/17/2023 at 14:15             XR Chest 1 View    Result Date: 3/16/2023  PROCEDURE: XR CHEST 1 VW  COMPARISON: [facility], CR, XR CHEST 1 VW,  1/25/2023, 16:20.  INDICATIONS: SOB  FINDINGS:  No new consolidations or pleural effusions are observed. The cardiac silhouette and mediastinum are unchanged. No definitive acute osseous abnormalities are seen on this single view.        1. No change from the previous study with no evidence for acute cardiopulmonary process.         RAY AVILA MD       Electronically Signed and Approved By: RAY AVILA MD on 3/16/2023 at 13:24             Duplex Venous Upper Extremity - Left CAR    Result Date: 3/16/2023  •  Normal left upper extremity venous duplex scan.     Duplex Venous Lower Extremity - Left CAR    Result Date: 3/16/2023  •  Acute left lower extremity deep vein thrombosis noted in the gastrocnemius. •  All other left sided veins appeared normal.       [x]  EKG/Telemetry   []  Cardiology/Vascular   []  Pathology  [x]  Old records  []  Other:    Assessment & Plan   Assessment / Plan     Assessment/Plan:  Assessment:  Acute decompensation of chronic combined CHF  Volume overload  Acute DVT of left gastrocnemius   Troponin leak secondary to CHF  Acute gout flare involving the left wrist  Hyperuricemia  Inflammatory arthritis of the left wrist  CKD stage IIIa  Dyslipidemia  Essential hypertension  Obstructive sleep apnea  Diabetes mellitus  Medical noncompliance     Plan:  Laboratory review reviewed  Consulted orthopedics discussed with Dr. Krause to follow-up recommendations on the left wrist swelling  Continue following creatinine, up to 1.95, diuretic switch to p.o. Lasix 40 mg daily  Continue pain control medication Norco 5/325 and 10/25 mg every 6 hours pain for moderate-severe pain respectively  Continue morphine 2 mg IV every 6 hours pain for severe breakthrough pain  When administering IV morphine, watch for respiratory depression or adverse reaction which would allude to intolerance  Continue prednisone 40 mg daily for gout   We will start allopurinol once acute symptoms of gout improve  Continue  watching creatinine while aggressive diuresis is being pursued  Continue subcutaneous insulin management with Glucomander weight-based dosing given being on steroids which is causing rise in blood sugars  Continue Coreg 12.5 mg twice daily  Continue aspirin 81 mg daily  Continue apixaban 10 mg twice a day for 7 days then transition down to 5 mg p.o. twice daily  Continue Entresto  Continue Aldactone 25 mg daily with plan to uptitrate  Cardiac diet  Telemetry monitoring  Following electrolytes while being diuresed  A.m. labs  Full code  DVT prophylaxis with Eliquis  Clinical course dictate further management  Discussed with nurse at the bedside.        DVT prophylaxis:  Medical DVT prophylaxis orders are present.    CODE STATUS:   Level Of Support Discussed With: Patient  Code Status (Patient has no pulse and is not breathing): CPR (Attempt to Resuscitate)  Medical Interventions (Patient has pulse or is breathing): Full Support  Release to patient: Routine Release    Electronically signed by Aylin Keita MD, 03/22/23, 2:57 PM EDT.    Portions of this documentation were transcribed electronically from a voice recognition software.  I confirm all data accurately represents the service(s) I performed at today's visit.       Peng Advancement Flap Text: The defect edges were debeveled with a #15 scalpel blade.  Given the location of the defect, shape of the defect and the proximity to free margins a Peng advancement flap was deemed most appropriate.  Using a sterile surgical marker, an appropriate advancement flap was drawn incorporating the defect and placing the expected incisions within the relaxed skin tension lines where possible. The area thus outlined was incised deep to adipose tissue with a #15 scalpel blade.  The skin margins were undermined to an appropriate distance in all directions utilizing iris scissors.

## 2023-03-22 NOTE — THERAPY EVALUATION
Patient Name: Lyle Lozano  : 1955    MRN: [id number]                              Today's Date: 3/22/2023       Admit Date: 3/16/2023    Visit Dx:     ICD-10-CM ICD-9-CM   1. Acute on chronic congestive heart failure, unspecified heart failure type (formerly [facility])  I50.9 428.0   2. Dyspnea on exertion  R06.09 786.09   3. Acute deep vein thrombosis (DVT) of calf muscle vein of left lower extremity (formerly [facility])  I82.462 453.42   4. Elevated troponin level not due myocardial infarction  R77.8 790.6   5. Noncompliance with medication regimen  Z91.14 V15.81   6. Difficulty walking  R26.2 719.7   7. 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   • Hypertensive heart disease with heart failure (formerly [facility])   • Gout   • Stage 3 chronic kidney disease (formerly [facility])   • Hand arthritis   • Acute pain of left knee   • Elevated troponin level not due myocardial infarction   • Acute on chronic systolic heart failure (CMS/HCC)   • Acute on chronic congestive heart failure, unspecified heart failure type (formerly [facility])     Past Medical History:   Diagnosis Date   • Abnormal kidney function    • Arthritis    • Bursitis    • CHF (congestive heart failure) (formerly [facility])    • Depression    • Diabetes (formerly [facility])    • Edema    • Essential hypertension 2021   • Forgetfulness    • Head injury    • Hernia cerebri (formerly [facility])    • Hyperlipidemia    • Hypertension    • IFG (impaired fasting glucose)    • Low back pain    • Lumbago     `   • Pain of left hip    • Right shoulder pain    • Sleep  apnea    • SOB (shortness of breath)      Past Surgical History:   Procedure Laterality Date   • CYST REMOVAL Right     leg      General Information     Row Name 03/22/23 0914 03/22/23 0906       OT Time and Intention    Document Type therapy note (daily note)  -PG evaluation  -PG    Mode of Treatment individual therapy;occupational therapy  -PG individual therapy;occupational therapy  -PG    Row Name 03/22/23 0906          General Information    Patient Profile Reviewed yes  -PG     Prior Level of Function independent:;transfer;ADL's  -PG     Existing Precautions/Restrictions fall  -PG     Barriers to Rehab none identified  -PG     Row Name 03/22/23 0906          Occupational Profile    Reason for Services/Referral (Occupational Profile) Patient is a pleasant 67-year-old male admitted for dyspnea and congestive heart failure as well as gout in his left hand.  No previous OT services reported.  Patient is being evaluated by Occupational Therapy due to his recent decline in ADL function  -PG     Row Name 03/22/23 0906          Living Environment    People in Home alone  -PG     Row Name 03/22/23 0906          Cognition    Orientation Status (Cognition) oriented x 4  -PG     Row Name 03/22/23 0906          Safety Issues, Functional Mobility    Impairments Affecting Function (Mobility) coordination;grasp;range of motion (ROM);strength;pain  -PG           User Key  (r) = Recorded By, (t) = Taken By, (c) = Cosigned By    Initials Name Provider Type    PG Fabrice Abarca OT Occupational Therapist                 Mobility/ADL's     Row Name 03/22/23 0907          Transfers    Transfers bed-chair transfer  -PG     Row Name 03/22/23 0907          Bed-Chair Transfer    Bed-Chair [place] (Transfers) standby assist  -PG     Row Name 03/22/23 0907          Activities of Daily Living    BADL Assessment/Intervention bathing;upper body dressing;lower body dressing;grooming;toileting  -PG     Row Name 03/22/23 0907           Bathing Assessment/Intervention    [place] Level (Bathing) bathing skills;set up  -PG     Row Name 03/22/23 0907          Upper Body Dressing Assessment/Training    [place] Level (Upper Body Dressing) upper body dressing skills;set up  -PG     Row Name 03/22/23 0907          Lower Body Dressing Assessment/Training    [place] Level (Lower Body Dressing) lower body dressing skills;minimum assist (75% patient effort)  -PG     Row Name 03/22/23 0907          Grooming Assessment/Training    [place] Level (Grooming) grooming skills;minimum assist (75% patient effort)  -PG     Row Name 03/22/23 0907          Toileting Assessment/Training    [place] Level (Toileting) toileting skills;standby assist  -PG           User Key  (r) = Recorded By, (t) = Taken By, (c) = Cosigned By    Initials Name Provider Type    PG Fabrice Abarca OT Occupational Therapist               Obj/Interventions     Row Name 03/22/23 0908          Sensory Assessment (Somatosensory)    Sensory Assessment (Somatosensory) sensation intact  -PG     Row Name 03/22/23 0908          Vision Assessment/Intervention    Visual Impairment/Limitations WFL  -PG     Row Name 03/22/23 0908          Range of Motion Comprehensive    General Range of Motion hand range of motion deficits identified  -PG     Comment, General Range of Motion Left digits range of motion limitations due to gout  -PG     Row Name 03/22/23 0908          Strength Comprehensive (MMT)    General Manual Muscle Testing (MMT) Assessment hand strength deficits identified  -PG     Comment, General Manual Muscle Testing (MMT) Assessment Left  strength fair minus/poor plus  -PG     Row Name 03/22/23 0914          Hand (Therapeutic Exercise)    Hand (Therapeutic Exercise) PROM (passive range of motion)  -PG     Hand PROM (Therapeutic Exercise) left;finger flexion;finger extension;10 second hold;10 repetitions  -PG     Row Name 03/22/23 0914 03/22/23 0908       Motor Skills     Motor Skills -- coordination;functional endurance  -PG    Coordination -- fine motor deficit;minimal impairment;left  -PG    Functional Endurance -- Fair  -PG    Therapeutic Exercise hand  -PG --          User Key  (r) = Recorded By, (t) = Taken By, (c) = Cosigned By    Initials Name Provider Type    PG Fabrice Abarca OT Occupational Therapist               Goals/Plan     Row Name 03/22/23 0911          Transfer Goal 1 (OT)    Activity/Assistive Device (Transfer Goal 1, OT) transfers, all  -PG     [place] Level/Cues Needed (Transfer Goal 1, OT) independent  -PG     Time Frame (Transfer Goal 1, OT) long term goal (LTG);10 days  -PG     Row Name 03/22/23 0911          Bathing Goal 1 (OT)    Activity/Device (Bathing Goal 1, OT) bathing skills, all  -PG     [place] Level/Cues Needed (Bathing Goal 1, OT) independent  -PG     Time Frame (Bathing Goal 1, OT) long term goal (LTG);10 days  -PG     Row Name 03/22/23 0911          Dressing Goal 1 (OT)    Activity/Device (Dressing Goal 1, OT) dressing skills, all  -PG     [place]/Cues Needed (Dressing Goal 1, OT) independent  -PG     Time Frame (Dressing Goal 1, OT) long term goal (LTG);10 days  -PG     Row Name 03/22/23 0911          Toileting Goal 1 (OT)    Activity/Device (Toileting Goal 1, OT) toileting skills, all  -PG     [place] Level/Cues Needed (Toileting Goal 1, OT) independent  -PG     Time Frame (Toileting Goal 1, OT) long term goal (LTG);10 days  -PG     Row Name 03/22/23 0911          Grooming Goal 1 (OT)    Activity/Device (Grooming Goal 1, OT) grooming skills, all  -PG     [place] (Grooming Goal 1, OT) independent  -PG     Time Frame (Grooming Goal 1, OT) long term goal (LTG);10 days  -PG     Row Name 03/22/23 0911          ROM Goal 1 (OT)    ROM Goal 1 (OT) Patient will improve active range of motion left digits to within functional limits to support grooming and dressing activities  -PG     Time Frame (ROM Goal 1, OT) long term goal  (LTG);10 days  -PG     Row Name 03/22/23 0911          Strength Goal 1 (OT)    Strength Goal 1 (OT) Patient will improve left  strength to 4 -/5 to support grooming and self-care activities  -PG     Time Frame (Strength Goal 1, OT) long term goal (LTG);10 days  -PG     Row Name 03/22/23 0911          Therapy Assessment/Plan (OT)    Planned Therapy Interventions (OT) activity tolerance training;BADL retraining;occupation/activity based interventions;patient/caregiver education/training;transfer/mobility retraining;strengthening exercise;passive ROM/stretching;ROM/therapeutic exercise  -PG           User Key  (r) = Recorded By, (t) = Taken By, (c) = Cosigned By    Initials Name Provider Type    PG Fabrice Abarca OT Occupational Therapist               Clinical Impression     Row Name 03/22/23 0909          Pain Assessment    Pain Intervention(s) Nursing Notified  -PG     Additional Documentation Pain Scale: FACES Pre/Post-Treatment (Group)  -PG     Row Name 03/22/23 0909          Pain Scale: FACES Pre/Post-Treatment    Pain: FACES Scale, Pretreatment 8-->hurts whole lot  -PG     Posttreatment Pain Rating 8-->hurts whole lot  -PG     Pain Location - Side/Orientation Left  -PG     Pain Location - hand  -PG     Row Name 03/22/23 0909          Plan of Care Review    Plan of Care Reviewed With patient  -PG     Progress no change  -PG     Outcome Evaluation Patient presents with limitations affecting strength, activity tolerance, and balance impacting patient's ability to return home safely and independently.  The skills of a therapist will be required to safely and effectively implement the following treatment plan to restore maximal level of function  -PG     Row Name 03/22/23 0909          Therapy Assessment/Plan (OT)    Patient/Family Therapy Goal Statement (OT) Patient would like to return to work and be able to use his left hand  -PG     Rehab Potential (OT) good, to achieve stated therapy goals  -PG     Criteria  for Skilled Therapeutic Interventions Met (OT) yes;meets criteria;skilled treatment is necessary  -PG     Therapy Frequency (OT) 5 times/wk  -PG     Row Name 03/22/23 0909          Therapy Plan Review/Discharge Plan (OT)    Anticipated Discharge Disposition (OT) home with outpatient therapy services  -PG           User Key  (r) = Recorded By, (t) = Taken By, (c) = Cosigned By    Initials Name Provider Type    PG Fabrice Abarca, KENROY Occupational Therapist               Outcome Measures     Row Name 03/22/23 0912          How much help from another is currently needed...    Putting on and taking off regular lower body clothing? 3  -PG     Bathing (including washing, rinsing, and drying) 3  -PG     Toileting (which includes using toilet bed pan or urinal) 3  -PG     Putting on and taking off regular upper body clothing 3  -PG     Taking care of personal grooming (such as brushing teeth) 4  -PG     Eating meals 4  -PG     AM-PAC 6 Clicks Score (OT) 20  -PG     Row Name 03/22/23 0040          How much help from another person do you currently need...    Turning from your back to your side while in flat bed without using bedrails? 4  -MF     Moving from lying on back to sitting on the side of a flat bed without bedrails? 4  -MF     Moving to and from a bed to a chair (including a wheelchair)? 4  -MF     Standing up from a chair using your arms (e.g., wheelchair, bedside chair)? 4  -MF     Climbing 3-5 steps with a railing? 3  -MF     To walk in hospital room? 4  -MF     AM-PAC 6 Clicks Score (PT) 23  -MF     Highest level of mobility 7 --> Walked 25 feet or more  -MF     Row Name 03/22/23 0912          Functional Assessment    Outcome Measure Options AM-PAC 6 Clicks Daily Activity (OT);Optimal Instrument  -PG     Row Name 03/22/23 0912          Optimal Instrument    Optimal Instrument Optimal - 3  -PG     Bending/Stooping 2  -PG     Standing 2  -PG     Reaching 1  -PG     From the list, choose the 3 activities you would  most like to be able to do without any difficulty Bending/stooping;Standing;Reaching  -PG     Total Score Optimal - 3 5  -PG           User Key  (r) = Recorded By, (t) = Taken By, (c) = Cosigned By    Initials Name Provider Type    PG Fabrice Abarca OT Occupational Therapist    June Ordonez LPN Licensed Nurse                Occupational Therapy Education     Title: PT OT SLP Therapies (Done)     Topic: Occupational Therapy (Done)     Point: ADL training (Done)     Description:   Instruct learner(s) on proper safety adaptation and remediation techniques during self care or transfers.   Instruct in proper use of assistive devices.              Learning Progress Summary           Patient Acceptance, E,D, DU by PG at 3/22/2023 0913                   Point: Home exercise program (Done)     Description:   Instruct learner(s) on appropriate technique for monitoring, assisting and/or progressing therapeutic exercises/activities.              Learning Progress Summary           Patient Acceptance, E,D, DU by PG at 3/22/2023 0913                   Point: Precautions (Done)     Description:   Instruct learner(s) on prescribed precautions during self-care and functional transfers.              Learning Progress Summary           Patient Acceptance, E,D, DU by PG at 3/22/2023 0913                   Point: Body mechanics (Done)     Description:   Instruct learner(s) on proper positioning and spine alignment during self-care, functional mobility activities and/or exercises.              Learning Progress Summary           Patient Acceptance, E,D, DU by PG at 3/22/2023 0913                               User Key     Initials Effective Dates Name Provider Type Discipline    PG 06/16/21 -  Fabrice Abarca OT Occupational Therapist OT              OT Recommendation and Plan  Planned Therapy Interventions (OT): activity tolerance training, BADL retraining, occupation/activity based interventions, patient/caregiver  education/training, transfer/mobility retraining, strengthening exercise, passive ROM/stretching, ROM/therapeutic exercise  Therapy Frequency (OT): 5 times/wk  Plan of Care Review  Plan of Care Reviewed With: patient  Progress: no change  Outcome Evaluation: Patient presents with limitations affecting strength, activity tolerance, and balance impacting patient's ability to return home safely and independently.  The skills of a therapist will be required to safely and effectively implement the following treatment plan to restore maximal level of function     Time Calculation:    Time Calculation- OT     Row Name 03/22/23 0915             Time Calculation- OT    OT Received On 03/22/23  -PG      OT Goal Re-Cert Due Date 03/31/23  -PG         Timed Charges    25136 - OT Therapeutic Exercise Minutes 10  -PG         Untimed Charges    OT Eval/Re-eval Minutes 30  -PG         Total Minutes    Timed Charges Total Minutes 10  -PG      Untimed Charges Total Minutes 30  -PG       Total Minutes 40  -PG            User Key  (r) = Recorded By, (t) = Taken By, (c) = Cosigned By    Initials Name Provider Type    PG Fabrice Abarca OT Occupational Therapist              Therapy Charges for Today     Code Description Service Date Service Provider Modifiers Qty    [id number]  OT EVAL LOW COMPLEXITY 2 3/22/2023 Fabrice Abarca OT GO 1    [id number]  OT THER PROC EA 15 MIN 3/22/2023 Fabrice Abarca OT GO 1               Fabrice Abarca OT  3/22/2023

## 2023-03-22 NOTE — CONSULTS
[facility]   Consult Note    Patient Name: Lyle Lozano  : 1955  MRN: [id number]  Primary Care Physician:  Heidi Villa APRN  Referring Physician: No ref. provider found  Date of admission: 3/16/2023    Subjective   Subjective     Reason for Consult/ Chief Complaint: Left hand/wrist pain    HPI:  Lyle Lozano is a 67 y.o. male admitted with CHF.  He is being managed by the medical team.  I was consulted due to worsening left wrist and hand pain.  The patient describes 1 week of symptoms.  He denies any trauma.  He reports similar symptoms in the past but never this severe.  He denies any formal evaluation previously.  He has been taking oral steroids prescribed by the medicine team and working with occupational therapy.  He has stiffness and swelling primarily in the first, second, and third MCP joints.  He also describes wrist and IP joint pain that is less severe at this time.     Review of Systems   14 Point ROS is negative except as noted above.     Personal History     Past Medical History:   Diagnosis Date   • Abnormal kidney function    • Arthritis    • Bursitis    • CHF (congestive heart failure) (HCC)    • Depression    • Diabetes (HCC)    • Edema    • Essential hypertension 2021   • Forgetfulness    • Head injury    • Hernia cerebri (HCC)    • Hyperlipidemia    • Hypertension    • IFG (impaired fasting glucose)    • Low back pain    • Lumbago     `   • Pain of left hip    • Right shoulder pain    • Sleep apnea    • SOB (shortness of breath)        Past Surgical History:   Procedure Laterality Date   • CYST REMOVAL Right     leg       Family History: family history includes Diabetes in his sister; No Known Problems in his father; Stroke in his sister. Otherwise pertinent FHx was reviewed and not pertinent to current issue.    Social History:  reports that he quit smoking about 10 years ago. His smoking use included cigarettes. He started smoking about 41 years ago.  He smoked an average of .25 packs per day. He has never used smokeless tobacco. He reports current alcohol use. He reports that he does not use drugs.    Home Medications:  aspirin, atorvastatin, carvedilol, cholecalciferol, sacubitril-valsartan, sitaGLIPtin-metFORMIN, and spironolactone    Allergies:  No Known Allergies    Objective    Objective     Vitals:   Temp:  [97.5 °F (36.4 °C)-98 °F (36.7 °C)] 97.5 °F (36.4 °C)  Heart Rate:  [66-72] 68  Resp:  [18-20] 18  BP: ()/(48-99) 142/82    Physical Exam:   Constitutional: Awake, alert   HENT: Atraumatic, Normocephalic   Respiratory: Nonlabored respirations    Cardiovascular: Intact peripheral pulses    Musculoskeletal:     Left upper extremity: No wounds.  Multiple arthritic deformities.  Tolerates wrist flexion and extension to the mid range.  No obvious wrist effusion.  Terminal stiffness and pain with flexion and extension.  Tender palpation of the first, second, third IP joints with arthritic deformity.  Stiffness with MCP and IP joint flexion and extension.  No motor deficits noted.  Sensation intact in the median, radial, ulnar nerve distribution.  Palpable radial pulse.     Imaging:  Imaging Results (Last 24 Hours)     ** No results found for the last 24 hours. **           Result Review    Result Review:  I have personally reviewed the results from the time of this admission to 3/22/2023 16:02 EDT and agree with these findings:  [x]  Laboratory  []  Microbiology  [x]  Radiology  []  EKG/Telemetry   []  Cardiology/Vascular   []  Pathology  []  Old records  []  Other:      Assessment & Plan   Assessment / Plan     Brief Patient Summary:  Lyle Lozano is a 67 y.o. male left wrist and hand pain secondary to likely underlying inflammatory arthritis    Active Hospital Problems:  Active Hospital Problems    Diagnosis    • **Acute on chronic congestive heart failure, unspecified heart failure type (HCC)      Plan:     Recommend conservative treatment  at this time.  A wrist brace was provided and may be used as needed.  I agree with occupational therapy work, primarily focusing on range of motion and activities of daily living.  Continue oral steroid course per medicine team.  Ultimately I believe she would benefit from a rheumatology evaluation in the outpatient setting.  No additional orthopedic interventions planned.  Please call with any concerns.      Electronically signed by Thai Krause MD, 03/22/23, 4:02 PM EDT.

## 2023-03-23 ENCOUNTER — READMISSION MANAGEMENT (OUTPATIENT)
Dept: CALL CENTER | Facility: HOSPITAL | Age: 68
End: 2023-03-23
Payer: MEDICARE

## 2023-03-23 VITALS
DIASTOLIC BLOOD PRESSURE: 97 MMHG | SYSTOLIC BLOOD PRESSURE: 177 MMHG | HEART RATE: 66 BPM | HEIGHT: 66 IN | OXYGEN SATURATION: 97 % | RESPIRATION RATE: 18 BRPM | TEMPERATURE: 97.9 F | BODY MASS INDEX: 30.01 KG/M2 | WEIGHT: 186.73 LBS

## 2023-03-23 PROBLEM — I50.23 ACUTE ON CHRONIC HFREF (HEART FAILURE WITH REDUCED EJECTION FRACTION): Status: ACTIVE | Noted: 2023-03-23

## 2023-03-23 LAB
ALBUMIN SERPL-MCNC: 2.3 G/DL (ref 3.5–5.2)
ALP SERPL-CCNC: 101 U/L (ref 39–117)
ALT SERPL W P-5'-P-CCNC: 21 U/L (ref 1–41)
ANION GAP SERPL CALCULATED.3IONS-SCNC: 6.3 MMOL/L (ref 5–15)
AST SERPL-CCNC: 14 U/L (ref 1–40)
BASOPHILS # BLD AUTO: 0.01 10*3/MM3 (ref 0–0.2)
BASOPHILS NFR BLD AUTO: 0.1 % (ref 0–1.5)
BILIRUB CONJ SERPL-MCNC: <0.2 MG/DL (ref 0–0.3)
BILIRUB INDIRECT SERPL-MCNC: ABNORMAL MG/DL
BILIRUB SERPL-MCNC: 0.2 MG/DL (ref 0–1.2)
BUN SERPL-MCNC: 39 MG/DL (ref 8–23)
BUN/CREAT SERPL: 23.9 (ref 7–25)
CALCIUM SPEC-SCNC: 7.9 MG/DL (ref 8.6–10.5)
CHLORIDE SERPL-SCNC: 102 MMOL/L (ref 98–107)
CO2 SERPL-SCNC: 26.7 MMOL/L (ref 22–29)
CREAT SERPL-MCNC: 1.63 MG/DL (ref 0.76–1.27)
DEPRECATED RDW RBC AUTO: 53.2 FL (ref 37–54)
EGFRCR SERPLBLD CKD-EPI 2021: 46.5 ML/MIN/1.73
EOSINOPHIL # BLD AUTO: 0.08 10*3/MM3 (ref 0–0.4)
EOSINOPHIL NFR BLD AUTO: 0.7 % (ref 0.3–6.2)
ERYTHROCYTE [DISTWIDTH] IN BLOOD BY AUTOMATED COUNT: 18.2 % (ref 12.3–15.4)
GLUCOSE BLDC GLUCOMTR-MCNC: 117 MG/DL (ref 70–99)
GLUCOSE SERPL-MCNC: 196 MG/DL (ref 65–99)
HCT VFR BLD AUTO: 32.6 % (ref 37.5–51)
HGB BLD-MCNC: 10.3 G/DL (ref 13–17.7)
IMM GRANULOCYTES # BLD AUTO: 0.05 10*3/MM3 (ref 0–0.05)
IMM GRANULOCYTES NFR BLD AUTO: 0.5 % (ref 0–0.5)
LYMPHOCYTES # BLD AUTO: 1.98 10*3/MM3 (ref 0.7–3.1)
LYMPHOCYTES NFR BLD AUTO: 18.3 % (ref 19.6–45.3)
MAGNESIUM SERPL-MCNC: 1.9 MG/DL (ref 1.6–2.4)
MCH RBC QN AUTO: 25.4 PG (ref 26.6–33)
MCHC RBC AUTO-ENTMCNC: 31.6 G/DL (ref 31.5–35.7)
MCV RBC AUTO: 80.5 FL (ref 79–97)
MONOCYTES # BLD AUTO: 0.79 10*3/MM3 (ref 0.1–0.9)
MONOCYTES NFR BLD AUTO: 7.3 % (ref 5–12)
NEUTROPHILS NFR BLD AUTO: 7.9 10*3/MM3 (ref 1.7–7)
NEUTROPHILS NFR BLD AUTO: 73.1 % (ref 42.7–76)
NRBC BLD AUTO-RTO: 0 /100 WBC (ref 0–0.2)
PHOSPHATE SERPL-MCNC: 2.6 MG/DL (ref 2.5–4.5)
PLATELET # BLD AUTO: 379 10*3/MM3 (ref 140–450)
PMV BLD AUTO: 9.3 FL (ref 6–12)
POTASSIUM SERPL-SCNC: 4 MMOL/L (ref 3.5–5.2)
PROT SERPL-MCNC: 5.5 G/DL (ref 6–8.5)
RBC # BLD AUTO: 4.05 10*6/MM3 (ref 4.14–5.8)
SODIUM SERPL-SCNC: 135 MMOL/L (ref 136–145)
WBC NRBC COR # BLD: 10.81 10*3/MM3 (ref 3.4–10.8)

## 2023-03-23 PROCEDURE — 82962 GLUCOSE BLOOD TEST: CPT

## 2023-03-23 PROCEDURE — 84100 ASSAY OF PHOSPHORUS: CPT | Performed by: FAMILY MEDICINE

## 2023-03-23 PROCEDURE — 85025 COMPLETE CBC W/AUTO DIFF WBC: CPT | Performed by: FAMILY MEDICINE

## 2023-03-23 PROCEDURE — 63710000001 PREDNISONE PER 1 MG: Performed by: FAMILY MEDICINE

## 2023-03-23 PROCEDURE — 83735 ASSAY OF MAGNESIUM: CPT | Performed by: FAMILY MEDICINE

## 2023-03-23 PROCEDURE — 80048 BASIC METABOLIC PNL TOTAL CA: CPT | Performed by: FAMILY MEDICINE

## 2023-03-23 PROCEDURE — 80076 HEPATIC FUNCTION PANEL: CPT | Performed by: FAMILY MEDICINE

## 2023-03-23 PROCEDURE — 99239 HOSP IP/OBS DSCHRG MGMT >30: CPT | Performed by: FAMILY MEDICINE

## 2023-03-23 PROCEDURE — 97110 THERAPEUTIC EXERCISES: CPT

## 2023-03-23 PROCEDURE — 63710000001 INSULIN LISPRO (HUMAN) PER 5 UNITS: Performed by: FAMILY MEDICINE

## 2023-03-23 RX ORDER — PREDNISONE 20 MG/1
40 TABLET ORAL DAILY
Qty: 8 TABLET | Refills: 0 | Status: SHIPPED | OUTPATIENT
Start: 2023-03-24 | End: 2023-03-28

## 2023-03-23 RX ORDER — FUROSEMIDE 40 MG/1
40 TABLET ORAL DAILY
Qty: 30 TABLET | Refills: 0 | Status: ON HOLD | OUTPATIENT
Start: 2023-03-24 | End: 2023-04-23

## 2023-03-23 RX ORDER — ALLOPURINOL 100 MG/1
100 TABLET ORAL DAILY
Status: SHIPPED | OUTPATIENT
Start: 2023-03-23

## 2023-03-23 RX ORDER — HYDROCODONE BITARTRATE AND ACETAMINOPHEN 5; 325 MG/1; MG/1
1 TABLET ORAL EVERY 6 HOURS PRN
Qty: 12 TABLET | Refills: 0 | Status: SHIPPED | OUTPATIENT
Start: 2023-03-23 | End: 2023-03-26

## 2023-03-23 RX ADMIN — INSULIN LISPRO 5 UNITS: 100 INJECTION, SOLUTION INTRAVENOUS; SUBCUTANEOUS at 08:36

## 2023-03-23 RX ADMIN — SPIRONOLACTONE 25 MG: 25 TABLET ORAL at 08:20

## 2023-03-23 RX ADMIN — Medication 1000 UNITS: at 08:20

## 2023-03-23 RX ADMIN — CARVEDILOL 12.5 MG: 12.5 TABLET, FILM COATED ORAL at 08:20

## 2023-03-23 RX ADMIN — FUROSEMIDE 40 MG: 40 TABLET ORAL at 08:20

## 2023-03-23 RX ADMIN — APIXABAN 10 MG: 5 TABLET, FILM COATED ORAL at 08:20

## 2023-03-23 RX ADMIN — PREDNISONE 40 MG: 20 TABLET ORAL at 08:20

## 2023-03-23 RX ADMIN — HYDROCODONE BITARTRATE AND ACETAMINOPHEN 1 TABLET: 10; 325 TABLET ORAL at 08:25

## 2023-03-23 RX ADMIN — ASPIRIN 81 MG: 81 TABLET, COATED ORAL at 08:20

## 2023-03-23 RX ADMIN — SACUBITRIL AND VALSARTAN 1 TABLET: 97; 103 TABLET, FILM COATED ORAL at 08:37

## 2023-03-23 NOTE — OUTREACH NOTE
Prep Survey    Flowsheet Row Responses   Religious facility patient discharged from? [facility]   Is LACE score < 7 ? No   Eligibility [facility]   Hospital [facility]   Date of Admission 03/16/23   Date of Discharge 03/23/23   Discharge Disposition Home or Self Care   Discharge diagnosis a/c CHF   Does the patient have one of the following disease processes/diagnoses(primary or secondary)? CHF   Does the patient have Home health ordered? No   Is there a DME ordered? No   Prep survey completed? Yes          ANUEL A - Registered Nurse

## 2023-03-23 NOTE — PLAN OF CARE
Problem: Adjustment to Illness (Heart Failure)  Goal: Optimal Coping  Outcome: Adequate for Care Transition     Problem: Cardiac Output Decreased (Heart Failure)  Goal: Optimal Cardiac Output  Outcome: Adequate for Care Transition     Problem: Dysrhythmia (Heart Failure)  Goal: Stable Heart Rate and Rhythm  Outcome: Adequate for Care Transition     Problem: Fluid Imbalance (Heart Failure)  Goal: Fluid Balance  Outcome: Adequate for Care Transition     Problem: Functional Ability Impaired (Heart Failure)  Goal: Optimal Functional Ability  Outcome: Adequate for Care Transition     Problem: Oral Intake Inadequate (Heart Failure)  Goal: Optimal Nutrition Intake  Outcome: Adequate for Care Transition     Problem: Respiratory Compromise (Heart Failure)  Goal: Effective Oxygenation and Ventilation  Outcome: Adequate for Care Transition     Problem: Sleep Disordered Breathing (Heart Failure)  Goal: Effective Breathing Pattern During Sleep  Outcome: Adequate for Care Transition     Problem: Diabetes Comorbidity  Goal: Blood Glucose Level Within Targeted Range  Outcome: Adequate for Care Transition     Problem: Heart Failure Comorbidity  Goal: Maintenance of Heart Failure Symptom Control  Outcome: Adequate for Care Transition     Problem: Hypertension Comorbidity  Goal: Blood Pressure in Desired Range  Outcome: Adequate for Care Transition     Problem: Obstructive Sleep Apnea Risk or Actual Comorbidity Management  Goal: Unobstructed Breathing During Sleep  Outcome: Adequate for Care Transition     Problem: Osteoarthritis Comorbidity  Goal: Maintenance of Osteoarthritis Symptom Control  Outcome: Adequate for Care Transition     Problem: Adult Inpatient Plan of Care  Goal: Plan of Care Review  Outcome: Adequate for Care Transition  Goal: Patient-Specific Goal (Individualized)  Outcome: Adequate for Care Transition  Goal: Absence of Hospital-Acquired Illness or Injury  Outcome: Adequate for Care Transition  Goal: Optimal  Comfort and Wellbeing  Outcome: Adequate for Care Transition  Goal: Readiness for Transition of Care  Outcome: Adequate for Care Transition     Problem: Fall Injury Risk  Goal: Absence of Fall and Fall-Related Injury  Outcome: Adequate for Care Transition   Goal Outcome Evaluation:

## 2023-03-23 NOTE — DISCHARGE SUMMARY
[facility]         HOSPITALIST  DISCHARGE SUMMARY    Patient Name: Lyle Lozano  : 1955  MRN: [id number]    Date of Admission: 3/16/2023  Date of Discharge:  3/23/2023    Primary Care Physician: Heidi Villa APRN    Consults     Date and Time Order Name Status Description    3/22/2023  8:57 AM Inpatient Orthopedic Surgery Consult Completed           Active and Resolved Hospital Problems:  Acute decompensation of chronic combined CHF  Volume overload  Acute DVT of left gastrocnemius   Troponin leak secondary to CHF  Acute gout flare involving the left wrist  Hyperuricemia  Inflammatory arthritis of the left wrist  CKD stage IIIa  Dyslipidemia  Essential hypertension  Obstructive sleep apnea  Diabetes mellitus  Medical noncompliance  Active Hospital Problems    Diagnosis POA   • **Acute on chronic congestive heart failure, unspecified heart failure type (HCC) [I50.9] Yes   • Acute on chronic HFrEF (heart failure with reduced ejection fraction) (HCC) [I50.23] Unknown      Resolved Hospital Problems   No resolved problems to display.       Hospital Course     Hospital Course:  67-year-old male with history of heart failure with reduced ejection fraction with previous echo 2023 showing EF 36 to 40% with diastolic dysfunction, on Entresto, diuretics, with underlying CKD stage IIIa, dyslipidemia, essential hypertension, obstructive sleep apnea, diabetes mellitus, medical noncompliance, chronic back pain, depression, hospitalized on 3/16/2023 with shortness of breath symptoms, with acute on chronic exacerbation of combined CHF, with elevated troponin likely troponin leak secondary to CHF, started on IV diuretics at more than 2 times his home dosing, in the setting of not having compliance with home medications, with left wrist pain, started on prednisone, imaging pending, left lower extremity with acute DVT in gastrocnemius, started on full dose anticoagulation, no significant  improvements in left wrist swelling and pain and limited range of motion, consulted Ortho, finally had some positive response with improvements in left wrist swelling as of 3/23/2023, to follow-up with orthopedist as outpatient, also referred to rheumatology Dr. Coronel locally to evaluate patient.  Discharged in hemodynamically segmentation once volume status improved as of 3/23/2023 to follow-up with cardiology as scheduled.  He was kept on steroids and started on allopurinol renally dosed at 100 mg daily.      Day of Discharge     Vital Signs:  Temp:  [97.3 °F (36.3 °C)-98.1 °F (36.7 °C)] 97.9 °F (36.6 °C)  Heart Rate:  [66-76] 66  Resp:  [18] 18  BP: (130-177)/(67-97) 177/97  Review of systems:  All systems reviewed and negative except generalized fatigue, generalized weakness, left wrist pain    Physical Exam                         Constitutional: Awake, alert, no acute distress standing in the bathroom              Eyes: Pupils equal, sclerae anicteric, no conjunctival injection              HENT: NCAT, mucous membranes moist              Neck: Supple, full range of motion              Respiratory: Air entry bilaterally, coarse breath sounds throughout, no crackles              Cardiovascular: RRR, no murmurs, rubs, or gallops, palpable pedal pulses bilaterally              Gastrointestinal: Positive bowel sounds, soft, nontender, nondistended              Musculoskeletal: Bilateral lower extremity, left upper extremity with swelling around the wrist joint with no warmth, no erythema, tenderness particularly at the left wrist with difficulty flexing and extending and intrinsic strength with  and finger squeeze              Psychiatric: Appropriate affect, cooperative              Neurologic: Oriented x 3, strength symmetric in all extremities, Cranial Nerves grossly intact to confrontation, speech clear              Skin: No rashes visible on exposed skin           Discharge Details        Discharge  Medications      New Medications      Instructions Start Date   furosemide 40 MG tablet  Commonly known as: LASIX   40 mg, Oral, Daily   Start Date: March 24, 2023     HYDROcodone-acetaminophen 5-325 MG per tablet  Commonly known as: NORCO   1 tablet, Oral, Every 6 Hours PRN      predniSONE 20 MG tablet  Commonly known as: DELTASONE   40 mg, Oral, Daily   Start Date: March 24, 2023        Continue These Medications      Instructions Start Date   aspirin 81 MG EC tablet   81 mg, Oral, Daily      atorvastatin 80 MG tablet  Commonly known as: LIPITOR   80 mg, Oral, Nightly      carvedilol 12.5 MG tablet  Commonly known as: COREG   12.5 mg, Oral, 2 Times Daily With Meals      cholecalciferol 25 MCG (1000 UT) tablet  Commonly known as: VITAMIN D3   1,000 Units, Oral, Daily      Entresto  MG tablet  Generic drug: sacubitril-valsartan   1 tablet, Oral, 2 Times Daily      Janumet  MG per tablet  Generic drug: sitaGLIPtin-metFORMIN   1 tablet, Oral, 2 Times Daily With Meals      spironolactone 25 MG tablet  Commonly known as: ALDACTONE   25 mg, Oral, Daily             No Known Allergies    Discharge Disposition:  Home or Self Care    Diet:  Hospital:No active diet order      Discharge Activity: as tolerates      CODE STATUS:  Code Status and Medical Interventions:   Ordered at: 03/17/23 0740     Level Of Support Discussed With:    Patient     Code Status (Patient has no pulse and is not breathing):    CPR (Attempt to Resuscitate)     Medical Interventions (Patient has pulse or is breathing):    Full Support     Release to patient:    Routine Release         Future Appointments   Date Time Provider Department Center   3/27/2023  9:15 AM Thai Krause MD [facility] ORS RING [facility]   3/30/2023  1:00 PM Gretta Green PA-C [facility] IMP RADC [facility]   4/25/2023  8:00 AM NANCY NM CARD ADMIN RM 1 LTAC[facility] located within [facility] NM [facility]   4/25/2023  8:45 AM NANCY NM CARD 2 SCAN ROOM  [facility] NM [facility]   4/25/2023  9:00 AM NANCY NM STRESS TREADMILL 1 LTAC, located within [facility] NM [facility]   4/25/2023  10:00 AM [facility] CARD 2 SCAN ROOM [facility]       Additional Instructions for the Follow-ups that You Need to Schedule     Discharge Follow-up with PCP   As directed       Currently Documented PCP:    Heidi Villa APRN    PCP Phone Number:    [phone]     Follow Up Details: 3 to 7 days               Pertinent  and/or Most Recent Results     PROCEDURES:   XR Wrist 2 View Left    Result Date: 3/17/2023  PROCEDURE: XR WRIST 2 VW LEFT  COMPARISON: [facility], CR, XR HAND 3+ VW RIGHT, 12/20/2022, 11:03.  INDICATIONS: swelling and tenderness to left wrist, limited range of motion due to pain  FINDINGS:   No fractures or dislocations are identified.  Mild erosive changes are present.  Mineralization appears normal.  There appear to be ventral hook osteophytes on the heads of the 2nd and 3rd metacarpals.  There is mild generalized soft tissue swelling.  IMPRESSION: Degenerative change as above suggesting an inflammatory arthritis.  Ventral hook osteophytes can also be seen with hemochromatosis.   SHIRLEY RESENDEZ MD       Electronically Signed and Approved By: SHIRLEY RESENDEZ MD on 3/17/2023 at 14:15             XR Chest 1 View    Result Date: 3/16/2023  PROCEDURE: XR CHEST 1 VW  COMPARISON: [facility], CR, XR CHEST 1 VW, 1/25/2023, 16:20.  INDICATIONS: SOB  FINDINGS:  No new consolidations or pleural effusions are observed. The cardiac silhouette and mediastinum are unchanged. No definitive acute osseous abnormalities are seen on this single view.        1. No change from the previous study with no evidence for acute cardiopulmonary process.         RAY AVILA MD       Electronically Signed and Approved By: RAY AVILA MD on 3/16/2023 at 13:24             Duplex Venous Upper Extremity - Left CAR    Result Date: 3/16/2023  •  Normal left upper extremity venous duplex scan.     Duplex Venous Lower Extremity - Left CAR    Result Date: 3/16/2023  •  Acute left lower extremity deep  vein thrombosis noted in the gastrocnemius. •  All other left sided veins appeared normal.       LAB RESULTS:      Lab 03/23/23  0536 03/22/23  0503 03/21/23  0512 03/20/23  0508 03/19/23  0535   WBC 10.81* 11.35* 10.71 10.12 10.91*   HEMOGLOBIN 10.3* 10.3* 10.7* 9.9* 9.6*   HEMATOCRIT 32.6* 32.8* 34.0* 31.2* 29.3*   PLATELETS 379 393 389 374 348   NEUTROS ABS 7.90* 9.03* 7.89* 7.68* 8.84*   IMMATURE GRANS (ABS) 0.05 0.05 0.04 0.04 0.06*   LYMPHS ABS 1.98 1.66 1.93 1.74 1.31   MONOS ABS 0.79 0.60 0.77 0.61 0.65   EOS ABS 0.08 0.01 0.06 0.04 0.04   MCV 80.5 80.8 80.8 79.6 78.8*         Lab 03/23/23  0536 03/22/23  0503 03/21/23  0512 03/20/23  0508 03/19/23  0535   SODIUM 135* 135* 135* 134* 134*   POTASSIUM 4.0 4.5 4.0 3.6 3.7   CHLORIDE 102 101 101 101 101   CO2 26.7 27.7 26.8 25.4 25.1   ANION GAP 6.3 6.3 7.2 7.6 7.9   BUN 39* 48* 38* 33* 36*   CREATININE 1.63* 1.95* 1.79* 1.64* 1.76*   EGFR 46.5* 37.5* 41.5* 46.1* 42.4*   GLUCOSE 196* 185* 125* 128* 219*   CALCIUM 7.9* 8.2* 8.0* 8.1* 7.8*   MAGNESIUM 1.9 1.9 1.9 1.9 1.9   PHOSPHORUS 2.6 3.0 2.6 2.8 2.9         Lab 03/23/23  0536 03/22/23  0503 03/21/23  0512 03/20/23  0508 03/19/23  0535 03/18/23  0545 03/16/23  1249   TOTAL PROTEIN 5.5* 5.5* 5.5* 5.4* 5.4*   < > 6.0   ALBUMIN 2.3* 2.3* 2.2* 2.1* 2.1*   < > 2.6*   GLOBULIN  --   --   --   --   --   --  3.4   ALT (SGPT) 21 22 25 28 26   < > 22   AST (SGOT) 14 19 18 27 38   < > 10   BILIRUBIN 0.2 <0.2 0.2 0.2 0.2   < > 0.6   BILIRUBIN DIRECT <0.2 <0.2 <0.2 <0.2 <0.2   < >  --    ALK PHOS 101 113 113 113 118*   < > 164*    < > = values in this interval not displayed.         Lab 03/16/23  1555 03/16/23  1249   PROBNP  --  20,275.0*   HSTROP T 89* 101*                 Brief Urine Lab Results     None        Microbiology Results (last 10 days)     ** No results found for the last 240 hours. **          XR Chest 1 View    Result Date: 3/16/2023  Impression:   1. No change from the previous study with no evidence for  acute cardiopulmonary process.         RAY AVILA MD       Electronically Signed and Approved By: RAY AVILA MD on 3/16/2023 at 13:24               Results for orders placed during the hospital encounter of 03/16/23    Duplex Venous Upper Extremity - Left CAR    Interpretation Summary  •  Normal left upper extremity venous duplex scan.      Results for orders placed during the hospital encounter of 03/16/23    Duplex Venous Upper Extremity - Left CAR    Interpretation Summary  •  Normal left upper extremity venous duplex scan.      Results for orders placed during the hospital encounter of 01/25/23    Adult Transthoracic Echo Complete With Contrast if Necessary Per Protocol    Interpretation Summary  •  Left ventricular ejection fraction appears to be 36 - 40% cannot exclude wall motion abnormalities  •  Left ventricular diastolic function is consistent with (grade II w/high LAP) pseudonormalization.  •  The right ventricular cavity is mildly dilated.  •  The right atrial cavity is mildly  dilated.  •  The left atrial cavity is mild to moderately dilated  •  Elevated left atrial pressure.      Labs Pending at Discharge: None        Time spent on Discharge including face to face service:  35 minutes  Electronically signed by Aylin Keita MD, 03/23/23, 12:43 PM EDT.    Portions of this documentation were transcribed electronically from a voice recognition software.  I confirm all data accurately represents the service(s) I performed at today's visit.

## 2023-03-23 NOTE — NURSING NOTE
Discharge:    IV  Removed: Removed with no complications. Dry dressing placed.    Discharge instructions: Given to patient. Explained up coming appointments. Pt has cardiology appointment at 10 am.     Pharmacy orders: Orders sent to [facility] in [facility], pt instructed of location and what prescription waiting.    Transported: Transported to [facility] discharge entrance by Nursing student.    Destination: home with family.

## 2023-03-23 NOTE — PLAN OF CARE
Problem: Adult Inpatient Plan of Care  Goal: Plan of Care Review  Outcome: Ongoing, Progressing  Flowsheets (Taken 3/23/2023 0710)  Progress: no change  Plan of Care Reviewed With: patient   Goal Outcome Evaluation:  Plan of Care Reviewed With: patient        Progress: no change   Vitals stable. Pt still complaining of left hand/wrist pain. No other complaints this shift. Will continue to monitor progress.

## 2023-03-23 NOTE — THERAPY TREATMENT NOTE
Patient Name: Lyle Lozano  : 1955    MRN: [id number]                              Today's Date: 3/23/2023       Admit Date: 3/16/2023    Visit Dx:     ICD-10-CM ICD-9-CM   1. Acute on chronic congestive heart failure, unspecified heart failure type ([facility])  I50.9 428.0   2. Dyspnea on exertion  R06.09 786.09   3. Acute deep vein thrombosis (DVT) of calf muscle vein of left lower extremity ([facility])  I82.462 453.42   4. Elevated troponin level not due myocardial infarction  R77.8 790.6   5. Noncompliance with medication regimen  Z91.14 V15.81   6. Difficulty walking  R26.2 719.7   7. Decreased activities of daily living (ADL)  Z78.9 V49.89   8. Acute idiopathic gout of left wrist  M10.032 274.01   9. 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   • Hypertensive heart disease with heart failure ([facility])   • Gout   • Stage 3 chronic kidney disease ([facility])   • Hand arthritis   • Acute pain of left knee   • Elevated troponin level not due myocardial infarction   • Acute on chronic systolic heart failure (CMS/HCC)   • Acute on chronic congestive heart failure, unspecified heart failure type ([facility])   • Acute on chronic HFrEF (heart failure with reduced ejection fraction) ([facility])     Past Medical History:   Diagnosis Date   • Abnormal kidney function    • Arthritis    • Bursitis    • CHF (congestive heart failure) ([facility])    • Depression    • Diabetes ([facility])    • Edema    • Essential hypertension 2021   • Forgetfulness    •  Head injury    • Hernia cerebri (HCC)    • Hyperlipidemia    • Hypertension    • IFG (impaired fasting glucose)    • Low back pain    • Lumbago     `   • Pain of left hip    • Right shoulder pain    • Sleep apnea    • SOB (shortness of breath)      Past Surgical History:   Procedure Laterality Date   • CYST REMOVAL Right     leg      General Information     Row Name 03/23/23 0955          OT Time and Intention    Document Type therapy note (daily note)  -PG     Mode of Treatment individual therapy;occupational therapy  -PG           User Key  (r) = Recorded By, (t) = Taken By, (c) = Cosigned By    Initials Name Provider Type    PG Fabrice Abarca OT Occupational Therapist                 Mobility/ADL's    No documentation.                Obj/Interventions     Row Name 03/23/23 0955          Hand (Therapeutic Exercise)    Hand (Therapeutic Exercise) PROM (passive range of motion)  -PG     Hand PROM (Therapeutic Exercise) 10 repetitions  -PG     Row Name 03/23/23 0955          Motor Skills    Therapeutic Exercise hand  -PG           User Key  (r) = Recorded By, (t) = Taken By, (c) = Cosigned By    Initials Name Provider Type    PG Fabrice Abarca OT Occupational Therapist               Goals/Plan    No documentation.                Clinical Impression    No documentation.                Outcome Measures     Row Name 03/23/23 0955          How much help from another is currently needed...    Putting on and taking off regular lower body clothing? 4  -PG     Bathing (including washing, rinsing, and drying) 4  -PG     Toileting (which includes using toilet bed pan or urinal) 4  -PG     Putting on and taking off regular upper body clothing 4  -PG     Taking care of personal grooming (such as brushing teeth) 4  -PG     Eating meals 4  -PG     AM-PAC 6 Clicks Score (OT) 24  -PG     Row Name 03/23/23 0320          How much help from another person do you currently need...    Turning from your back to your side while in flat bed  without using bedrails? 4  -MF     Moving from lying on back to sitting on the side of a flat bed without bedrails? 4  -MF     Moving to and from a bed to a chair (including a wheelchair)? 4  -MF     Standing up from a chair using your arms (e.g., wheelchair, bedside chair)? 4  -MF     Climbing 3-5 steps with a railing? 3  -MF     To walk in hospital room? 4  -MF     AM-PAC 6 Clicks Score (PT) 23  -MF     Highest level of mobility 7 --> Walked 25 feet or more  -MF     Row Name 03/23/23 0955          Functional Assessment    Outcome Measure Options AM-PAC 6 Clicks Daily Activity (OT);Optimal Instrument  -PG     Row Name 03/23/23 0955          Optimal Instrument    Optimal Instrument Optimal - 3  -PG     Bending/Stooping 1  -PG     Standing 1  -PG     Reaching 1  -PG           User Key  (r) = Recorded By, (t) = Taken By, (c) = Cosigned By    Initials Name Provider Type    PG Fabrice Abarca OT Occupational Therapist     June Sheets LPN Licensed Nurse                Occupational Therapy Education     Title: PT OT SLP Therapies (Resolved)     Topic: Occupational Therapy (Resolved)     Point: ADL training (Resolved)     Description:   Instruct learner(s) on proper safety adaptation and remediation techniques during self care or transfers.   Instruct in proper use of assistive devices.              Learning Progress Summary           Patient Acceptance, E,D, DU by PG at 3/22/2023 0913                   Point: Home exercise program (Resolved)     Description:   Instruct learner(s) on appropriate technique for monitoring, assisting and/or progressing therapeutic exercises/activities.              Learning Progress Summary           Patient Acceptance, E,D, DU by PG at 3/22/2023 0913                   Point: Precautions (Resolved)     Description:   Instruct learner(s) on prescribed precautions during self-care and functional transfers.              Learning Progress Summary           Patient Acceptance, E,D, DU by PG  at 3/22/2023 0913                   Point: Body mechanics (Resolved)     Description:   Instruct learner(s) on proper positioning and spine alignment during self-care, functional mobility activities and/or exercises.              Learning Progress Summary           Patient Acceptance, E,D, DU by PG at 3/22/2023 0913                               User Key     Initials Effective Dates Name Provider Type Discipline    PG 06/16/21 -  Fabrice Abarca OT Occupational Therapist OT              OT Recommendation and Plan  Planned Therapy Interventions (OT): activity tolerance training, BADL retraining, occupation/activity based interventions, patient/caregiver education/training, transfer/mobility retraining, strengthening exercise, passive ROM/stretching, ROM/therapeutic exercise  Therapy Frequency (OT): 5 times/wk  Plan of Care Review  Plan of Care Reviewed With: patient  Progress: no change  Outcome Evaluation: Patient presents with limitations affecting strength, activity tolerance, and balance impacting patient's ability to return home safely and independently.  The skills of a therapist will be required to safely and effectively implement the following treatment plan to restore maximal level of function     Time Calculation:    Time Calculation- OT     Row Name 03/23/23 0956             Time Calculation- OT    OT Received On 03/23/23  -PG         Timed Charges    39384 - OT Therapeutic Exercise Minutes 10  -PG         Total Minutes    Timed Charges Total Minutes 10  -PG       Total Minutes 10  -PG            User Key  (r) = Recorded By, (t) = Taken By, (c) = Cosigned By    Initials Name Provider Type    PG Fabrice Abarca OT Occupational Therapist              Therapy Charges for Today     Code Description Service Date Service Provider Modifiers Qty    43029037189  OT EVAL LOW COMPLEXITY 2 3/22/2023 Fabrice Abarca OT GO 1    [id number]  OT THER PROC EA 15 MIN 3/22/2023 Fabrice Abarca OT GO 1    [id number]  OT  THER PROC EA 15 MIN 3/23/2023 Fabrice Abarca, OT GO 1               Fabrice Abarca, KENROY  3/23/2023

## 2023-03-24 ENCOUNTER — TRANSITIONAL CARE MANAGEMENT TELEPHONE ENCOUNTER (OUTPATIENT)
Dept: CALL CENTER | Facility: HOSPITAL | Age: 68
End: 2023-03-24
Payer: MEDICARE

## 2023-03-24 NOTE — OUTREACH NOTE
Call Center TCM Note    Flowsheet Row Responses   [facility] facility patient discharged from? [place] [facility]   Does the patient have one of the following disease processes/diagnoses(primary or secondary)? CHF   TCM attempt successful? No   Unsuccessful attempts Attempt 2           Crystal Carnes RN    3/24/2023, 11:44 EDT

## 2023-03-24 NOTE — OUTREACH NOTE
Call Center TCM Note    Flowsheet Row Responses   [facility] facility patient discharged from? [facility]   Does the patient have one of the following disease processes/diagnoses(primary or secondary)? CHF   TCM attempt successful? No   Unsuccessful attempts Attempt 1           Crystal Carnes RN    3/24/2023, 10:53 EDT

## 2023-03-25 ENCOUNTER — TRANSITIONAL CARE MANAGEMENT TELEPHONE ENCOUNTER (OUTPATIENT)
Dept: CALL CENTER | Facility: HOSPITAL | Age: 68
End: 2023-03-25
Payer: MEDICARE

## 2023-03-25 NOTE — OUTREACH NOTE
Call Center TCM Note    Flowsheet Row Responses   [facility] patient discharged from? [facility]   Does the patient have one of the following disease processes/diagnoses(primary or secondary)? CHF   TCM attempt successful? No   Unsuccessful attempts Attempt 3           Raven Gaffney RN    3/25/2023, 12:02 EDT

## 2023-04-04 RX ORDER — SPIRONOLACTONE 25 MG/1
25 TABLET ORAL DAILY
Qty: 90 TABLET | OUTPATIENT
Start: 2023-04-04

## 2023-04-07 ENCOUNTER — APPOINTMENT (OUTPATIENT)
Dept: GENERAL RADIOLOGY | Facility: HOSPITAL | Age: 68
DRG: 291 | End: 2023-04-07
Payer: MEDICARE

## 2023-04-07 ENCOUNTER — HOSPITAL ENCOUNTER (INPATIENT)
Facility: HOSPITAL | Age: 68
LOS: 10 days | Discharge: NURSING, REHAB OR LONG-TERM CARE | DRG: 291 | End: 2023-04-17
Attending: EMERGENCY MEDICINE | Admitting: INTERNAL MEDICINE
Payer: MEDICARE

## 2023-04-07 DIAGNOSIS — N18.9 CHRONIC RENAL IMPAIRMENT, UNSPECIFIED CKD STAGE: ICD-10-CM

## 2023-04-07 DIAGNOSIS — R06.02 SHORTNESS OF BREATH: ICD-10-CM

## 2023-04-07 DIAGNOSIS — R26.2 DIFFICULTY IN WALKING: ICD-10-CM

## 2023-04-07 DIAGNOSIS — Z78.9 DECREASED ACTIVITIES OF DAILY LIVING (ADL): ICD-10-CM

## 2023-04-07 DIAGNOSIS — R79.89 ELEVATED BRAIN NATRIURETIC PEPTIDE (BNP) LEVEL: ICD-10-CM

## 2023-04-07 DIAGNOSIS — Z91.199 MEDICALLY NONCOMPLIANT: ICD-10-CM

## 2023-04-07 DIAGNOSIS — R73.9 BLOOD GLUCOSE ELEVATED: ICD-10-CM

## 2023-04-07 DIAGNOSIS — I50.9 ACUTE ON CHRONIC CONGESTIVE HEART FAILURE, UNSPECIFIED HEART FAILURE TYPE: Primary | ICD-10-CM

## 2023-04-07 DIAGNOSIS — D64.9 CHRONIC ANEMIA: ICD-10-CM

## 2023-04-07 LAB
ALBUMIN SERPL-MCNC: 2.4 G/DL (ref 3.5–5.2)
ALBUMIN/GLOB SERPL: 0.6 G/DL
ALP SERPL-CCNC: 146 U/L (ref 39–117)
ALT SERPL W P-5'-P-CCNC: 10 U/L (ref 1–41)
ANION GAP SERPL CALCULATED.3IONS-SCNC: 11.2 MMOL/L (ref 5–15)
AST SERPL-CCNC: 10 U/L (ref 1–40)
BACTERIA UR QL AUTO: ABNORMAL /HPF
BASOPHILS # BLD AUTO: 0.02 10*3/MM3 (ref 0–0.2)
BASOPHILS NFR BLD AUTO: 0.2 % (ref 0–1.5)
BILIRUB SERPL-MCNC: 0.5 MG/DL (ref 0–1.2)
BILIRUB UR QL STRIP: NEGATIVE
BUN SERPL-MCNC: 31 MG/DL (ref 8–23)
BUN/CREAT SERPL: 15.3 (ref 7–25)
CALCIUM SPEC-SCNC: 8 MG/DL (ref 8.6–10.5)
CHLORIDE SERPL-SCNC: 98 MMOL/L (ref 98–107)
CLARITY UR: CLEAR
CO2 SERPL-SCNC: 20.8 MMOL/L (ref 22–29)
COLOR UR: YELLOW
CREAT SERPL-MCNC: 2.02 MG/DL (ref 0.76–1.27)
DEPRECATED RDW RBC AUTO: 49.8 FL (ref 37–54)
EGFRCR SERPLBLD CKD-EPI 2021: 35.9 ML/MIN/1.73
EOSINOPHIL # BLD AUTO: 0.05 10*3/MM3 (ref 0–0.4)
EOSINOPHIL NFR BLD AUTO: 0.5 % (ref 0.3–6.2)
ERYTHROCYTE [DISTWIDTH] IN BLOOD BY AUTOMATED COUNT: 17.3 % (ref 12.3–15.4)
GLOBULIN UR ELPH-MCNC: 4 GM/DL
GLUCOSE SERPL-MCNC: 290 MG/DL (ref 65–99)
GLUCOSE UR STRIP-MCNC: ABNORMAL MG/DL
HCT VFR BLD AUTO: 27.9 % (ref 37.5–51)
HGB BLD-MCNC: 9.2 G/DL (ref 13–17.7)
HGB UR QL STRIP.AUTO: ABNORMAL
HOLD SPECIMEN: NORMAL
HOLD SPECIMEN: NORMAL
HYALINE CASTS UR QL AUTO: ABNORMAL /LPF
IMM GRANULOCYTES # BLD AUTO: 0.05 10*3/MM3 (ref 0–0.05)
IMM GRANULOCYTES NFR BLD AUTO: 0.5 % (ref 0–0.5)
KETONES UR QL STRIP: NEGATIVE
LEUKOCYTE ESTERASE UR QL STRIP.AUTO: NEGATIVE
LYMPHOCYTES # BLD AUTO: 1.29 10*3/MM3 (ref 0.7–3.1)
LYMPHOCYTES NFR BLD AUTO: 12.5 % (ref 19.6–45.3)
MCH RBC QN AUTO: 25.9 PG (ref 26.6–33)
MCHC RBC AUTO-ENTMCNC: 33 G/DL (ref 31.5–35.7)
MCV RBC AUTO: 78.6 FL (ref 79–97)
MONOCYTES # BLD AUTO: 0.64 10*3/MM3 (ref 0.1–0.9)
MONOCYTES NFR BLD AUTO: 6.2 % (ref 5–12)
NEUTROPHILS NFR BLD AUTO: 8.24 10*3/MM3 (ref 1.7–7)
NEUTROPHILS NFR BLD AUTO: 80.1 % (ref 42.7–76)
NITRITE UR QL STRIP: NEGATIVE
NRBC BLD AUTO-RTO: 0 /100 WBC (ref 0–0.2)
NT-PROBNP SERPL-MCNC: ABNORMAL PG/ML (ref 0–900)
PH UR STRIP.AUTO: 5.5 [PH] (ref 5–8)
PLATELET # BLD AUTO: 256 10*3/MM3 (ref 140–450)
PMV BLD AUTO: 9.3 FL (ref 6–12)
POTASSIUM SERPL-SCNC: 3.7 MMOL/L (ref 3.5–5.2)
PROT SERPL-MCNC: 6.4 G/DL (ref 6–8.5)
PROT UR QL STRIP: ABNORMAL
RBC # BLD AUTO: 3.55 10*6/MM3 (ref 4.14–5.8)
RBC # UR STRIP: ABNORMAL /HPF
REF LAB TEST METHOD: ABNORMAL
SODIUM SERPL-SCNC: 130 MMOL/L (ref 136–145)
SP GR UR STRIP: 1.02 (ref 1–1.03)
SQUAMOUS #/AREA URNS HPF: ABNORMAL /HPF
TROPONIN T SERPL HS-MCNC: 119 NG/L
UROBILINOGEN UR QL STRIP: ABNORMAL
WBC # UR STRIP: ABNORMAL /HPF
WBC NRBC COR # BLD: 10.29 10*3/MM3 (ref 3.4–10.8)
WHOLE BLOOD HOLD COAG: NORMAL
WHOLE BLOOD HOLD SPECIMEN: NORMAL

## 2023-04-07 PROCEDURE — 85025 COMPLETE CBC W/AUTO DIFF WBC: CPT

## 2023-04-07 PROCEDURE — 25010000002 HEPARIN (PORCINE) PER 1000 UNITS: Performed by: FAMILY MEDICINE

## 2023-04-07 PROCEDURE — 94799 UNLISTED PULMONARY SVC/PX: CPT

## 2023-04-07 PROCEDURE — 99284 EMERGENCY DEPT VISIT MOD MDM: CPT

## 2023-04-07 PROCEDURE — 71045 X-RAY EXAM CHEST 1 VIEW: CPT

## 2023-04-07 PROCEDURE — 84484 ASSAY OF TROPONIN QUANT: CPT

## 2023-04-07 PROCEDURE — 83880 ASSAY OF NATRIURETIC PEPTIDE: CPT

## 2023-04-07 PROCEDURE — 25010000002 FUROSEMIDE PER 20 MG: Performed by: FAMILY MEDICINE

## 2023-04-07 PROCEDURE — 99222 1ST HOSP IP/OBS MODERATE 55: CPT | Performed by: FAMILY MEDICINE

## 2023-04-07 PROCEDURE — 80053 COMPREHEN METABOLIC PANEL: CPT

## 2023-04-07 PROCEDURE — 81001 URINALYSIS AUTO W/SCOPE: CPT | Performed by: EMERGENCY MEDICINE

## 2023-04-07 RX ORDER — NITROGLYCERIN 0.4 MG/1
0.4 TABLET SUBLINGUAL
Status: DISCONTINUED | OUTPATIENT
Start: 2023-04-07 | End: 2023-04-17 | Stop reason: HOSPADM

## 2023-04-07 RX ORDER — SODIUM CHLORIDE 0.9 % (FLUSH) 0.9 %
10 SYRINGE (ML) INJECTION EVERY 12 HOURS SCHEDULED
Status: DISCONTINUED | OUTPATIENT
Start: 2023-04-07 | End: 2023-04-17 | Stop reason: HOSPADM

## 2023-04-07 RX ORDER — SODIUM CHLORIDE 0.9 % (FLUSH) 0.9 %
10 SYRINGE (ML) INJECTION AS NEEDED
Status: DISCONTINUED | OUTPATIENT
Start: 2023-04-07 | End: 2023-04-17 | Stop reason: HOSPADM

## 2023-04-07 RX ORDER — FUROSEMIDE 10 MG/ML
80 INJECTION INTRAMUSCULAR; INTRAVENOUS 2 TIMES DAILY
Status: DISCONTINUED | OUTPATIENT
Start: 2023-04-07 | End: 2023-04-13

## 2023-04-07 RX ORDER — INSULIN LISPRO 100 [IU]/ML
2-9 INJECTION, SOLUTION INTRAVENOUS; SUBCUTANEOUS
Status: DISCONTINUED | OUTPATIENT
Start: 2023-04-08 | End: 2023-04-17 | Stop reason: HOSPADM

## 2023-04-07 RX ORDER — SODIUM CHLORIDE 0.9 % (FLUSH) 0.9 %
10 SYRINGE (ML) INJECTION AS NEEDED
Status: DISCONTINUED | OUTPATIENT
Start: 2023-04-07 | End: 2023-04-07

## 2023-04-07 RX ORDER — HEPARIN SODIUM 5000 [USP'U]/ML
5000 INJECTION, SOLUTION INTRAVENOUS; SUBCUTANEOUS EVERY 12 HOURS SCHEDULED
Status: DISCONTINUED | OUTPATIENT
Start: 2023-04-07 | End: 2023-04-08

## 2023-04-07 RX ORDER — DEXTROSE MONOHYDRATE 25 G/50ML
25 INJECTION, SOLUTION INTRAVENOUS
Status: DISCONTINUED | OUTPATIENT
Start: 2023-04-07 | End: 2023-04-17 | Stop reason: HOSPADM

## 2023-04-07 RX ORDER — SODIUM CHLORIDE 9 MG/ML
40 INJECTION, SOLUTION INTRAVENOUS AS NEEDED
Status: DISCONTINUED | OUTPATIENT
Start: 2023-04-07 | End: 2023-04-17 | Stop reason: HOSPADM

## 2023-04-07 RX ORDER — NICOTINE POLACRILEX 4 MG
15 LOZENGE BUCCAL
Status: DISCONTINUED | OUTPATIENT
Start: 2023-04-07 | End: 2023-04-17 | Stop reason: HOSPADM

## 2023-04-07 RX ADMIN — Medication 10 ML: at 22:55

## 2023-04-07 RX ADMIN — HEPARIN SODIUM 5000 UNITS: 5000 INJECTION INTRAVENOUS; SUBCUTANEOUS at 22:55

## 2023-04-07 RX ADMIN — FUROSEMIDE 80 MG: 10 INJECTION, SOLUTION INTRAMUSCULAR; INTRAVENOUS at 22:54

## 2023-04-07 NOTE — ED PROVIDER NOTES
Time: 5:31 PM EDT  Date of encounter:  4/7/2023  Independent Historian/Clinical History and Information was obtained by:   Patient and Chart  Chief Complaint: leg pain  Room number: 62/62      History is limited by: N/A    History of Present Illness:  Patient is a 67 y.o. year old male who presents to the emergency department for evaluation of joint pain and general swelling.  He also states that he cannot bear weight on his right lower extremity and that he cannot ambulate due to the pain.  He complains of his greatest pain to be of his right leg and his left hand.  He rates his discomfort as a 10 on a scale 0-10. He also complains of shortness of air.     He was recently discharged from this facility on 3/23/2023 (after a 7 day admission) and fears that he was discharged too soon.  He states since being discharged he has continued to feel worse however he has not had any of his home meds for 4 days stating that he was out of refills.    HPI    Patient Care Team  Primary Care Provider: Heidi Villa APRN    Past Medical History:     No Known Allergies  Past Medical History:   Diagnosis Date   • Abnormal kidney function    • Arthritis    • Bursitis    • CHF (congestive heart failure)    • Depression    • Diabetes    • Edema    • Essential hypertension 09/23/2021   • Forgetfulness    • Gout    • Head injury    • Hernia cerebri    • Hyperlipidemia    • Hypertension    • IFG (impaired fasting glucose)    • Low back pain    • Lumbago     `   • Pain of left hip    • Right shoulder pain    • Sleep apnea    • SOB (shortness of breath)      Past Surgical History:   Procedure Laterality Date   • CYST REMOVAL Right     leg     Family History   Problem Relation Age of Onset   • No Known Problems Father    • Diabetes Sister    • Stroke Sister        Home Medications:  Prior to Admission medications    Medication Sig Start Date End Date Taking? Authorizing Provider   aspirin (aspirin) 81 MG EC tablet Take 1 tablet by mouth  "Daily. 6/2/22   Heidi Villa APRN   atorvastatin (LIPITOR) 80 MG tablet Take 1 tablet by mouth Every Night. 3/5/23   ProviderQuentin MD   carvedilol (COREG) 12.5 MG tablet Take 1 tablet by mouth 2 (Two) Times a Day With Meals. 1/11/22   Brigido Mcdonnell MD   cholecalciferol (VITAMIN D3) 25 MCG (1000 UT) tablet Take 1 tablet by mouth Daily. 8/26/22   Nehal Daniels MD   furosemide (LASIX) 40 MG tablet Take 1 tablet by mouth Daily for 30 days. 3/24/23 4/23/23  Aylin Keita MD   sacubitril-valsartan (Entresto)  MG tablet Take 1 tablet by mouth 2 (Two) Times a Day. 1/11/22   Briigdo Mcdonnell MD   sitaGLIPtin-metFORMIN (Janumet)  MG per tablet Take 1 tablet by mouth 2 (Two) Times a Day With Meals. 9/27/22   Aysha Lacey, PAAKIRA   spironolactone (ALDACTONE) 25 MG tablet Take 1 tablet by mouth Daily. 10/12/22   Provider, MD Quentin        Social History:   Social History     Tobacco Use   • Smoking status: Former     Packs/day: 0.25     Types: Cigarettes     Start date: 1982     Quit date: 8/14/2012     Years since quitting: 10.6   • Smokeless tobacco: Never   Vaping Use   • Vaping Use: Never used   Substance Use Topics   • Alcohol use: Yes     Comment: rare   • Drug use: Never         Review of Systems:  Review of Systems   Constitutional: Negative for chills and fever.   HENT: Negative for congestion, ear pain and sore throat.    Eyes: Negative for pain.   Respiratory: Positive for shortness of breath. Negative for cough and chest tightness.    Cardiovascular: Negative for chest pain.   Gastrointestinal: Negative for abdominal pain, diarrhea, nausea and vomiting.   Genitourinary: Negative for flank pain and hematuria.   Musculoskeletal: Positive for arthralgias (right leg pain, left hand pain ) and gait problem (\"cant walking on my leg\" (refering to right leg) ). Negative for joint swelling.   Skin: Negative for pallor.   Neurological: Negative for seizures and headaches.   All other " systems reviewed and are negative.       Physical Exam:  /95 (BP Location: Right arm, Patient Position: Sitting)   Pulse 90   Temp 99.8 °F (37.7 °C) (Oral)   Resp 20   SpO2 98%     Physical Exam  Vitals and nursing note reviewed.   Constitutional:       General: He is not in acute distress.     Appearance: Normal appearance. He is not toxic-appearing or diaphoretic.      Comments: Patient's general appearance is that he does not feel well.  Low-grade fever is noted.   HENT:      Head: Normocephalic and atraumatic.      Mouth/Throat:      Mouth: Mucous membranes are moist.   Eyes:      General: No scleral icterus.  Cardiovascular:      Rate and Rhythm: Normal rate and regular rhythm.      Pulses: Normal pulses.      Heart sounds: Normal heart sounds.   Pulmonary:      Effort: Pulmonary effort is normal. No respiratory distress.      Breath sounds: Normal breath sounds. No stridor. No wheezing or rhonchi.      Comments: Breath sounds are decreased.  Patient does appear to be slightly short of air and a winded after having a conversation  Abdominal:      General: Abdomen is flat.      Palpations: Abdomen is soft.      Tenderness: There is no abdominal tenderness.   Musculoskeletal:         General: Swelling present. Normal range of motion.      Cervical back: Normal range of motion and neck supple.      Right lower leg: Edema present.      Left lower leg: Edema present.      Comments: Swelling to left hand   Skin:     General: Skin is warm and dry.      Coloration: Skin is not jaundiced or pale.      Findings: No bruising or erythema.   Neurological:      Mental Status: He is alert and oriented to person, place, and time. Mental status is at baseline.      Sensory: No sensory deficit.      Gait: Gait abnormal (Patient cannot bear weight to right lower extremity however this appears to be from a pain and from the amount of swelling not from a neurological deficit).   Psychiatric:         Mood and Affect: Mood  normal.         Behavior: Behavior normal.         Thought Content: Thought content normal.         Judgment: Judgment normal.                  Procedures:  Procedures      Medical Decision Making:      Comorbidities that affect care:    gout, Chronic Kidney Disease, Congestive Heart Failure, Diabetes, Hypertension    External Notes reviewed:    Previous Admission Note: Previous admission note and discharge note from recent hospitalization on 3/23/2023.      The following orders were placed and all results were independently analyzed by me:  Orders Placed This Encounter   Procedures   • XR Chest 1 View   • Comprehensive Metabolic Panel   • [place] Draw   • Urinalysis With Microscopic If Indicated (No Culture) - Urine, Clean Catch   • CBC Auto Differential   • Urinalysis, Microscopic Only - Urine, Clean Catch   • BNP   • Single High Sensitivity Troponin T   • IP General Consult (Use specialty-specific consult if known)   • Insert peripheral IV   • CBC & Differential   • Green Top (Gel)   • Lavender Top   • Gold Top - SST   • Light Blue Top       Medications Given in the Emergency Department:  Medications   sodium chloride 0.9 % flush 10 mL (has no administration in time range)        ED Course:    ED Course as of 04/07/23 2058 Fri Apr 07, 2023 2029 Sodium Correction for Hyperglycemia - MDCalc  Calculated on Apr 07 2023 8:29 PM  133 mEq/L -> Corrected Sodium (Marcello, 1973)  135 mEq/L -> Corrected Sodium (Lauren, 1999) [MS]   2030 I have discussed this pt with ER attending Dr. Nolasco. [MS]   2030 Order placed for consult with hospitalist at this time for possible hospital admission  [MS]   2048 I have discussed this patient with hospitalist Dr. Hahn who has agreed to admit the patient for hospitalization [MS]   2049 Lab just called to advise that patient's troponin was elevated however is likely elevated due to his chronic renal failure.  Patient is admitted to the hospital at this time.  He has had no chest pain  while in the emergency department and all vital signs have remained within acceptable limits. [MS]      ED Course User Index  [MS] Crystal Braswell, HUMA       Labs:    Lab Results (last 24 hours)     Procedure Component Value Units Date/Time    CBC & Differential [[id number]]  (Abnormal) Collected: 04/07/23 1505    Specimen: Blood Updated: 04/07/23 1513    Narrative:      The following orders were created for panel order CBC & Differential.  Procedure                               Abnormality         Status                     ---------                               -----------         ------                     CBC Auto Differential[[id number]]        Abnormal            Final result                 Please view results for these tests on the individual orders.    Comprehensive Metabolic Panel [[id number]]  (Abnormal) Collected: 04/07/23 1505    Specimen: Blood Updated: 04/07/23 1532     Glucose 290 mg/dL      BUN 31 mg/dL      Creatinine 2.02 mg/dL      Sodium 130 mmol/L      Potassium 3.7 mmol/L      Chloride 98 mmol/L      CO2 20.8 mmol/L      Calcium 8.0 mg/dL      Total Protein 6.4 g/dL      Albumin 2.4 g/dL      ALT (SGPT) 10 U/L      AST (SGOT) 10 U/L      Alkaline Phosphatase 146 U/L      Total Bilirubin 0.5 mg/dL      Globulin 4.0 gm/dL      A/G Ratio 0.6 g/dL      BUN/Creatinine Ratio 15.3     Anion Gap 11.2 mmol/L      eGFR 35.9 mL/min/1.73     Narrative:      GFR Normal >60  Chronic Kidney Disease <60  Kidney Failure <15      CBC Auto Differential [[id number]]  (Abnormal) Collected: 04/07/23 1505    Specimen: Blood Updated: 04/07/23 1513     WBC 10.29 10*3/mm3      RBC 3.55 10*6/mm3      Hemoglobin 9.2 g/dL      Hematocrit 27.9 %      MCV 78.6 fL      MCH 25.9 pg      MCHC 33.0 g/dL      RDW 17.3 %      RDW-SD 49.8 fl      MPV 9.3 fL      Platelets 256 10*3/mm3      Neutrophil % 80.1 %      Lymphocyte % 12.5 %      Monocyte % 6.2 %      Eosinophil % 0.5 %      Basophil % 0.2 %      Immature Grans % 0.5  %      Neutrophils, Absolute 8.24 10*3/mm3      Lymphocytes, Absolute 1.29 10*3/mm3      Monocytes, Absolute 0.64 10*3/mm3      Eosinophils, Absolute 0.05 10*3/mm3      Basophils, Absolute 0.02 10*3/mm3      Immature Grans, Absolute 0.05 10*3/mm3      nRBC 0.0 /100 WBC     Urinalysis With Microscopic If Indicated (No Culture) - Urine, Clean Catch [[id number]]  (Abnormal) Collected: 04/07/23 1611    Specimen: Urine, Clean Catch Updated: 04/07/23 1811     Color, UA Yellow     Appearance, UA Clear     pH, UA 5.5     Specific Gravity, UA 1.021     Glucose,  mg/dL (2+)     Ketones, UA Negative     Bilirubin, UA Negative     Blood, UA Moderate (2+)     Protein, UA >=300 mg/dL (3+)     Leuk Esterase, UA Negative     Nitrite, UA Negative     Urobilinogen, UA 1.0 E.U./dL    Urinalysis, Microscopic Only - Urine, Clean Catch [[id number]]  (Abnormal) Collected: 04/07/23 1611    Specimen: Urine, Clean Catch Updated: 04/07/23 1812     RBC, UA None Seen /HPF      WBC, UA 0-2 /HPF      Bacteria, UA None Seen /HPF      Squamous Epithelial Cells, UA None Seen /HPF      Hyaline Casts, UA 3-6 /LPF      Methodology Manual Light Microscopy    BNP [[id number]]  (Abnormal) Collected: 04/07/23 1930    Specimen: Blood Updated: 04/07/23 2006     proBNP 22,516.0 pg/mL     Narrative:      Among patients with dyspnea, NT-proBNP is highly sensitive for the detection of acute congestive heart failure. In addition NT-proBNP of <300 pg/ml effectively rules out acute congestive heart failure with 99% negative predictive value.    Results may be falsely decreased if patient taking Biotin.      Single High Sensitivity Troponin T [[id number]]  (Abnormal) Collected: 04/07/23 1930    Specimen: Blood Updated: 04/07/23 2049     HS Troponin T 119 ng/L     Narrative:      High Sensitive Troponin T Reference Range:  <10.0 ng/L- Negative Female for AMI  <15.0 ng/L- Negative Male for AMI  >=10 - Abnormal Female indicating possible myocardial injury.  >=15 -  Abnormal Male indicating possible myocardial injury.   Clinicians would have to utilize clinical acumen, EKG, Troponin, and serial changes to determine if it is an Acute Myocardial Infarction or myocardial injury due to an underlying chronic condition.                Imaging:    XR Chest 1 View    Result Date: 4/7/2023  PROCEDURE: XR CHEST 1 VW  COMPARISON: [facility], CR, XR CHEST 1 VW, 3/16/2023, 13:07.  INDICATIONS: SOA /  CHF  FINDINGS:  The lungs are clear bilaterally.  The cardiac and mediastinal silhouettes appear normal.  No effusion is seen.        1. No acute cardiopulmonary disease.       Claudio Mike M.D.       Electronically Signed and Approved By: Claudio Mike M.D. on 4/07/2023 at 20:40                 Differential Diagnosis and Discussion:    Dyspnea: Differential diagnosis includes but is not limited to metabolic acidosis, neurological disorders, psychogenic, asthma, pneumothorax, upper airway obstruction, COPD, pneumonia, noncardiogenic pulmonary edema, interstitial lung disease, anemia, congestive heart failure, and pulmonary embolism  Extremity Pain: Differential diagnosis includes but is not limited to soft tissue sprain, tendonitis, tendon injury, dislocation, fracture, deep vein thrombosis, arterial insufficiency, osteoarthritis, bursitis, and ligamentous damage.  Weakness: Based on the patient's history, signs, and symptoms, the diffential diagnosis includes but is not limited to meningitis, stroke, sepsis, subarachnoid hemorrhage, intracranial bleeding, encephalitis, acute uti, dehydration, MS, myasthenia gravis, [place], migraine variant, neuromuscular disorders vertigo, electrolyte imbalance, and metabolic disorders.    All labs were reviewed and interpreted by me.  All X-rays were independently reviewed by me.    MDM  Number of Diagnoses or Management Options  Acute on chronic congestive heart failure, unspecified heart failure type: established and worsening  Blood  glucose elevated: established and worsening  Chronic anemia: established and worsening  Chronic renal impairment, unspecified CKD stage: established and worsening  Elevated brain natriuretic peptide (BNP) level: established and worsening  Medically noncompliant: established and worsening  Shortness of breath: established and worsening  Diagnosis management comments: Patient is a 67-year-old male that presented to the emergency department today complaining of generalized edema, inability to bear weight on his right lower extremity as well as shortness of air with exertion.  He was just discharged from this facility on 3/23/2020 7:23 day stay for acute on chronic COPD exacerbation.  His work-up completed here in the emergency department did show an elevated BNP over 22,000.  His BUN and creatinine was also increased at 31 and 2.02 which is slightly worse than his last admission.  He was also found to be chronically anemic and also hyponatremic at 3.0. However corrected sodium, with his elevated glucose of 290, brings his sodium level to 133-135.  His potassium was normal at 3.7.  He was also found to have an elevated troponin however this is likely due to his chronic renal insufficiency and renal failure.  No chest pain.  He had a low-grade fever upon arrival that did improve.  Patient does admit to being noncompliant with his medications.  He has not had his medicine within 4 days.  He states that he did not have any refills.  Patient remained awake alert and oriented and in no acute distress throughout his entire visit.  He maintained his own airway with equal rise and fall of chest and required no O2 supplementation.  Based on patient's symptoms as well as his labs his current medical state does not warrant hospital admission.  I discussed this with ER attending Dr. Nolasco.  I then consulted hospitalist Dr. Hahn who agreed to admit the patient.       Amount and/or Complexity of Data Reviewed  Clinical lab tests:  reviewed and ordered  Tests in the radiology section of CPT®: ordered and reviewed  Decide to obtain previous medical records or to obtain history from someone other than the patient: yes  Review and summarize past medical records: yes (I have personally reviewed patient's previous medical encounters.)  Discuss the patient with other providers: yes (I have discussed this patient with hospitalist Dr. Thrasher who has agreed to admit the patient for hospitalization for acute on chronic CHF exacerbation.)    Risk of Complications, Morbidity, and/or Mortality  Presenting problems: high  Diagnostic procedures: moderate  Management options: moderate    Patient Progress  Patient progress: stable           Patient Care Considerations:          Consultants/Shared Management Plan:    Hospitalist: I have discussed the case with Dr. Hahn who agrees to accept the patient for admission.    Social Determinants of Health:    Patient is independent, reliable, and has access to care.       Disposition and Care Coordination:    Admit:   Through independent evaluation of the patient's history, physical, and imperical data, the patient meets criteria for observation/admission to the hospital.        Final diagnoses:   Acute on chronic congestive heart failure, unspecified heart failure type   Shortness of breath   Elevated brain natriuretic peptide (BNP) level   Chronic renal impairment, unspecified CKD stage   Blood glucose elevated   Medically noncompliant   Chronic anemia        ED Disposition     ED Disposition   Decision to Admit    Condition   --    Comment   --           Documentation assistance provided by Emanuel Pugh acting as scribe for Flores Aguirre. Information recorded by the scribe was done at my direction and has been verified and validated by me.       This medical record created using voice recognition software.             Emanuel Pugh  04/07/23 [id number]       Crystal Braswell APRN  04/07/23 [id number]

## 2023-04-08 LAB
ANION GAP SERPL CALCULATED.3IONS-SCNC: 9.9 MMOL/L (ref 5–15)
BASOPHILS # BLD AUTO: 0.03 10*3/MM3 (ref 0–0.2)
BASOPHILS NFR BLD AUTO: 0.3 % (ref 0–1.5)
BUN SERPL-MCNC: 31 MG/DL (ref 8–23)
BUN/CREAT SERPL: 15.9 (ref 7–25)
CALCIUM SPEC-SCNC: 7.9 MG/DL (ref 8.6–10.5)
CHLORIDE SERPL-SCNC: 99 MMOL/L (ref 98–107)
CHOLEST SERPL-MCNC: 105 MG/DL (ref 0–200)
CO2 SERPL-SCNC: 21.1 MMOL/L (ref 22–29)
CREAT SERPL-MCNC: 1.95 MG/DL (ref 0.76–1.27)
DEPRECATED RDW RBC AUTO: 49.7 FL (ref 37–54)
EGFRCR SERPLBLD CKD-EPI 2021: 37.5 ML/MIN/1.73
EOSINOPHIL # BLD AUTO: 0.11 10*3/MM3 (ref 0–0.4)
EOSINOPHIL NFR BLD AUTO: 1.2 % (ref 0.3–6.2)
ERYTHROCYTE [DISTWIDTH] IN BLOOD BY AUTOMATED COUNT: 17.2 % (ref 12.3–15.4)
GLUCOSE BLDC GLUCOMTR-MCNC: 131 MG/DL (ref 70–99)
GLUCOSE BLDC GLUCOMTR-MCNC: 190 MG/DL (ref 70–99)
GLUCOSE BLDC GLUCOMTR-MCNC: 258 MG/DL (ref 70–99)
GLUCOSE SERPL-MCNC: 353 MG/DL (ref 65–99)
HBA1C MFR BLD: 9.4 % (ref 4.8–5.6)
HCT VFR BLD AUTO: 25.8 % (ref 37.5–51)
HDLC SERPL-MCNC: 30 MG/DL (ref 40–60)
HGB BLD-MCNC: 8.4 G/DL (ref 13–17.7)
IMM GRANULOCYTES # BLD AUTO: 0.05 10*3/MM3 (ref 0–0.05)
IMM GRANULOCYTES NFR BLD AUTO: 0.5 % (ref 0–0.5)
LDLC SERPL CALC-MCNC: 47 MG/DL (ref 0–100)
LDLC/HDLC SERPL: 1.39 {RATIO}
LYMPHOCYTES # BLD AUTO: 1.14 10*3/MM3 (ref 0.7–3.1)
LYMPHOCYTES NFR BLD AUTO: 12.5 % (ref 19.6–45.3)
MCH RBC QN AUTO: 25.6 PG (ref 26.6–33)
MCHC RBC AUTO-ENTMCNC: 32.6 G/DL (ref 31.5–35.7)
MCV RBC AUTO: 78.7 FL (ref 79–97)
MONOCYTES # BLD AUTO: 0.6 10*3/MM3 (ref 0.1–0.9)
MONOCYTES NFR BLD AUTO: 6.6 % (ref 5–12)
NEUTROPHILS NFR BLD AUTO: 7.19 10*3/MM3 (ref 1.7–7)
NEUTROPHILS NFR BLD AUTO: 78.9 % (ref 42.7–76)
NRBC BLD AUTO-RTO: 0 /100 WBC (ref 0–0.2)
PLATELET # BLD AUTO: 245 10*3/MM3 (ref 140–450)
PMV BLD AUTO: 9.4 FL (ref 6–12)
POTASSIUM SERPL-SCNC: 3.4 MMOL/L (ref 3.5–5.2)
RBC # BLD AUTO: 3.28 10*6/MM3 (ref 4.14–5.8)
SODIUM SERPL-SCNC: 130 MMOL/L (ref 136–145)
TRIGL SERPL-MCNC: 166 MG/DL (ref 0–150)
VLDLC SERPL-MCNC: 28 MG/DL (ref 5–40)
WBC NRBC COR # BLD: 9.12 10*3/MM3 (ref 3.4–10.8)

## 2023-04-08 PROCEDURE — 82962 GLUCOSE BLOOD TEST: CPT

## 2023-04-08 PROCEDURE — 85025 COMPLETE CBC W/AUTO DIFF WBC: CPT | Performed by: FAMILY MEDICINE

## 2023-04-08 PROCEDURE — 99233 SBSQ HOSP IP/OBS HIGH 50: CPT | Performed by: INTERNAL MEDICINE

## 2023-04-08 PROCEDURE — 25010000002 ENOXAPARIN PER 10 MG: Performed by: INTERNAL MEDICINE

## 2023-04-08 PROCEDURE — 25010000002 FUROSEMIDE PER 20 MG: Performed by: FAMILY MEDICINE

## 2023-04-08 PROCEDURE — 80061 LIPID PANEL: CPT | Performed by: FAMILY MEDICINE

## 2023-04-08 PROCEDURE — 83036 HEMOGLOBIN GLYCOSYLATED A1C: CPT | Performed by: FAMILY MEDICINE

## 2023-04-08 PROCEDURE — 63710000001 INSULIN LISPRO (HUMAN) PER 5 UNITS: Performed by: FAMILY MEDICINE

## 2023-04-08 PROCEDURE — 25010000002 HEPARIN (PORCINE) PER 1000 UNITS: Performed by: FAMILY MEDICINE

## 2023-04-08 PROCEDURE — 80048 BASIC METABOLIC PNL TOTAL CA: CPT | Performed by: FAMILY MEDICINE

## 2023-04-08 PROCEDURE — 94799 UNLISTED PULMONARY SVC/PX: CPT

## 2023-04-08 RX ORDER — OXYCODONE AND ACETAMINOPHEN 7.5; 325 MG/1; MG/1
1 TABLET ORAL EVERY 4 HOURS PRN
Status: DISCONTINUED | OUTPATIENT
Start: 2023-04-08 | End: 2023-04-09

## 2023-04-08 RX ORDER — ENOXAPARIN SODIUM 100 MG/ML
1 INJECTION SUBCUTANEOUS EVERY 12 HOURS
Status: DISCONTINUED | OUTPATIENT
Start: 2023-04-08 | End: 2023-04-13

## 2023-04-08 RX ADMIN — OXYCODONE HYDROCHLORIDE AND ACETAMINOPHEN 1 TABLET: 7.5; 325 TABLET ORAL at 21:27

## 2023-04-08 RX ADMIN — OXYCODONE HYDROCHLORIDE AND ACETAMINOPHEN 1 TABLET: 7.5; 325 TABLET ORAL at 12:41

## 2023-04-08 RX ADMIN — INSULIN LISPRO 6 UNITS: 100 INJECTION, SOLUTION INTRAVENOUS; SUBCUTANEOUS at 08:38

## 2023-04-08 RX ADMIN — ENOXAPARIN SODIUM 80 MG: 100 INJECTION SUBCUTANEOUS at 10:35

## 2023-04-08 RX ADMIN — Medication 10 ML: at 21:27

## 2023-04-08 RX ADMIN — FUROSEMIDE 80 MG: 10 INJECTION, SOLUTION INTRAMUSCULAR; INTRAVENOUS at 21:27

## 2023-04-08 RX ADMIN — FUROSEMIDE 80 MG: 10 INJECTION, SOLUTION INTRAMUSCULAR; INTRAVENOUS at 08:39

## 2023-04-08 RX ADMIN — HEPARIN SODIUM 5000 UNITS: 5000 INJECTION INTRAVENOUS; SUBCUTANEOUS at 08:38

## 2023-04-08 RX ADMIN — OXYCODONE HYDROCHLORIDE AND ACETAMINOPHEN 1 TABLET: 7.5; 325 TABLET ORAL at 17:13

## 2023-04-08 RX ADMIN — INSULIN LISPRO 2 UNITS: 100 INJECTION, SOLUTION INTRAVENOUS; SUBCUTANEOUS at 12:46

## 2023-04-08 RX ADMIN — Medication 10 ML: at 08:39

## 2023-04-08 RX ADMIN — ENOXAPARIN SODIUM 80 MG: 100 INJECTION SUBCUTANEOUS at 21:27

## 2023-04-08 NOTE — PROGRESS NOTES
" [facility]  Date: 2023  Patient Name: Lyle Lozano  : 1955  MRN: [id number]  Date of admission: 2023    Subjective   Subjective     Chief Complaint:   Leg pain    Summary:   Lyle Lozano is a 67 y.o. male brought to the emergency department for evaluation of generalized swelling and bilateral leg pain.  He reports that his right leg is more tender than the left.  Patient was recently discharged from our facility on 2023 following a weeklong admission.  Patient states since being discharged, he has been having difficulty bear weight on his right lower extremity and has lost the ability to ambulate over the last 48 hours due to the pain.  Patient states that he has been out of his home medications for approximately 4 days, due to being \"out of refills\".    Interval Followup:   Patient continues to complain of lower extremity pain, patient did have DVT diagnosed last admission, patient's volume status is improving with IV diuresis    Objective   Objective     Vitals:   Temp:  [97.8 °F (36.6 °C)-100.4 °F (38 °C)] 98.1 °F (36.7 °C)  Heart Rate:  [] 86  Resp:  [18-20] 18  BP: (159-178)/() 161/85    Physical Exam   General appearance: NAD, conversant   Eyes: anicteric sclerae, moist conjunctivae; no lid-lag; PERRLA  HENT: Atraumatic; oropharynx clear with moist mucous membranes and no mucosal ulcerations; normal hard and soft palate  Neck: Trachea midline; FROM, supple, no thyromegaly or lymphadenopathy  Lungs: CTA, with normal respiratory effort and no intercostal retractions  CV: Regular Rate and Rhythm, no Murmurs, Rubs, or Gallops     Result Review    Result Review:  I have personally reviewed the results as below  [x]  Laboratory  CBC    CBC 3/23/23 4/7/23 4/8/23   WBC 10.81 (A) 10.29 9.12   RBC 4.05 (A) 3.55 (A) 3.28 (A)   Hemoglobin 10.3 (A) 9.2 (A) 8.4 (A)   Hematocrit 32.6 (A) 27.9 (A) 25.8 (A)   MCV 80.5 78.6 (A) 78.7 (A) "   MCH 25.4 (A) 25.9 (A) 25.6 (A)   MCHC 31.6 33.0 32.6   RDW 18.2 (A) 17.3 (A) 17.2 (A)   Platelets 379 256 245   (A) Abnormal value            CMP    CMP 3/23/23 3/23/23 4/7/23 4/8/23    0536 0536     Glucose 196 (A)  290 (A) 353 (A)   BUN 39 (A)  31 (A) 31 (A)   Creatinine 1.63 (A)  2.02 (A) 1.95 (A)   eGFR 46.5 (A)  35.9 (A) 37.5 (A)   Sodium 135 (A)  130 (A) 130 (A)   Potassium 4.0  3.7 3.4 (A)   Chloride 102  98 99   Calcium 7.9 (A)  8.0 (A) 7.9 (A)   Total Protein  5.5 (A) 6.4    Albumin  2.3 (A) 2.4 (A)    Globulin   4.0    Total Bilirubin  0.2 0.5    Alkaline Phosphatase  101 146 (A)    AST (SGOT)  14 10    ALT (SGPT)  21 10    Albumin/Globulin Ratio   0.6    BUN/Creatinine Ratio 23.9  15.3 15.9   Anion Gap 6.3  11.2 9.9   (A) Abnormal value            []  Microbiology  []  Radiology  []  EKG/Telemetry   []  Cardiology/Vascular   []  Pathology  []  Old records  []  Other:    Assessment & Plan   Assessment / Plan     Assessment:  Acute on chronic systolic and diastolic congestive heart failure  Elevated troponin, unlikely to be ACS  Uncontrolled type 2 diabetes mellitus  Acute kidney injury    Plan:  • Patient mated to the hospital for further work-up and management of above  • Continue aggressive diuresis with IV Lasix  • NT proBNP reviewed, 22,000  • Records from last hospitalization reviewed, patient has DVT, will start anticoagulation today  • Continue sliding-scale insulin, monitor blood glucose closely  • Monitor renal function  • Repeat CBC, CMP in a.m.  • Chest x-ray personally reviewed, consistent with pulmonary edema  • Current level of care is adequate, no reason to transfer at this time  • Clinical course will dictate further management     Reviewed patients labs and imaging, and discussed with patient and nurse at bedside.    DVT prophylaxis:  Medical DVT prophylaxis orders are present.    CODE STATUS:   Level Of Support Discussed With: Patient  Code Status (Patient has no pulse and is not  breathing): CPR (Attempt to Resuscitate)  Medical Interventions (Patient has pulse or is breathing): Full Support      Electronically signed by Ramon Card MD, 04/08/23, 12:27 PM EDT.

## 2023-04-08 NOTE — CASE MANAGEMENT/SOCIAL WORK
Discharge Planning Assessment  JARRET Del Castillo     Patient Name: Lyle Lozano  MRN: [id number]  Today's Date: 4/8/2023    Admit Date: 4/7/2023    Plan: Patient admitted due to achfe. RNCM met with patient at the bedside, verified the PCP and pharmacy. Patient is independent at home with ADLs, self employeed. Patient plans to return home at discharge, family/friends or TACK/GRITS will provide transportation. Patient is a readmission due to previous admission. Patient reports he did not follow up with scheduled PCP due to lack of reliable transportation. Patient reports he does not know what TRACK/GRITS are, and does report he has family or friends that can help with transportation. RNCM instructed patient the importance of arranging transportation or MD appointments, provided patient with a community resource guide, moms meals, and GRITS/TACK infromation. Off note, patient was also provided this information with last admission patient unaware of packet given with last admission. Packet left at bedside today.   Discharge Needs Assessment     Row Name 04/08/23 1319       Living Environment    People in Home alone    Current Living Arrangements home    Primary Care Provided by self    Able to Return to Prior Arrangements yes       Resource/Environmental Concerns    Resource/Environmental Concerns reliable transportation    Transportation Concerns rides, unreliable from others       Transportation Needs    In the past 12 months, has lack of transportation kept you from medical appointments or from getting medications? yes    In the past 12 months, has lack of transportation kept you from meetings, work, or from getting things needed for daily living? Yes       Food Insecurity    Within the past 12 months, you worried that your food would run out before you got the money to buy more. Never true    Within the past 12 months, the food you bought just didn't last and you didn't have money to get more. Never true        Transition Planning    Patient/Family Anticipates Transition to home    Patient/Family Anticipated Services at Transition none    Transportation Anticipated family or friend will provide       Discharge Needs Assessment    Readmission Within the Last 30 Days other (see comments)    Equipment Currently Used at Home none    Concerns to be Addressed discharge planning    Anticipated Changes Related to Illness none    Equipment Needed After Discharge none    Provided Post Acute Provider List? N/A    Provided Post Acute Provider Quality & Resource List? N/A               Discharge Plan     Row Name 04/08/23 1321       Plan    Plan Patient admitted due to achfe. RNCM met with patient at the bedside, verified the PCP and pharmacy. Patient is independent at home with ADLs, self employeed. Patient plans to return home at discharge, family/friends or TACK/GRITS will provide transportation. Patient is a readmission due to previous admission. Patient reports he did not follow up with scheduled PCP due to lack of reliable transportation. Patient reports he does not know what TRACK/GRITS are, and does report he has family or friends that can help with transportation. RNCM instructed patient the importance of arranging transportation or MD appointments, provided patient with a community resource guide, moms meals, and GRITS/TACK infromation. Off note, patient was also provided this information with last admission patient unaware of packet given with last admission. Packet left at bedside today.    Final Discharge Disposition Code 01 - home or self-care              Continued Care and Services - Admitted Since 4/7/2023    Coordination has not been started for this encounter.          Demographic Summary     Row Name 04/08/23 1315       General Information    Admission Type inpatient    Arrived From home;emergency department    Referral Source admission list    Reason for Consult discharge planning    Preferred Language English        Contact Information    Permission Granted to Share Info With family/designee    Contact Information Obtained for                Functional Status     Row Name 04/08/23 3985       Functional Status    Usual Activity Tolerance moderate    Current Activity Tolerance moderate       Assessment of Health Literacy    How often do you have someone help you read hospital materials? Occasionally    How often do you have problems learning about your medical condition because of difficulty understanding written information? Never    How often do you have a problem understanding what is told to you about your medical condition? Never    How confident are you filling out medical forms by yourself? Quite a bit    Health Literacy Fair       Functional Status, IADL    Medications independent    Meal Preparation independent    Housekeeping independent    Laundry independent    Shopping independent       Mental Status    General Appearance WDL WDL       Mental Status Summary    Recent Changes in Mental Status/Cognitive Functioning no changes       Employment/    Employment Status self-employed               Psychosocial    No documentation.                Abuse/Neglect    No documentation.                Legal    No documentation.                Substance Abuse    No documentation.                Patient Forms    No documentation.                   Mary Frias RN

## 2023-04-08 NOTE — H&P
[facility]  Date: [facility]   Patient Name: Lyle Lozano  : 1955  MRN: [id number]  Primary Care Physician:  Heidi Villa, HUMA  Date of admission: 2023    Subjective   Subjective     Chief Complaint: Leg pain    HPI:    Lyle Lozano is a 67 y.o. male brought to the emergency department for evaluation of generalized swelling and bilateral leg pain.  He reports that his right leg is more tender than the left.  Patient was recently discharged from our facility on 2023 following a weeklong admission.  Patient states since being discharged, he has been having difficulty bear weight on his right lower extremity and has lost the ability to ambulate over the last 48 hours due to the pain.  Patient states that he has been out of his home medications for approximately 4 days, due to being \"out of refills\".      Personal History     Past Medical History:  Chronic kidney disease  Systolic and diastolic congestive heart failure  Diabetes  Hypertension  Gout  Hyperlipidemia  Lumbar ago  Sleep apnea    Past Surgical History:  Cyst removal    Family History:   CAD  Hypertension  Stroke  Diabetes    Social History:   Patient has a 30-pack-year smoking history, quit in .  Denies any alcohol or illicit drug use.    Home Medications:  aspirin, atorvastatin, carvedilol, cholecalciferol, furosemide, sacubitril-valsartan, sitaGLIPtin-metFORMIN, and spironolactone    Allergies:  No Known Allergies    Review of Systems   All systems were reviewed and negative except for: Generalized swelling, generalized weakness, right lower extremity pain    Objective   Objective     Vitals:   Temp:  [98.2 °F (36.8 °C)-100.4 °F (38 °C)] 98.2 °F (36.8 °C)  Heart Rate:  [90-95] 93  Resp:  [18-20] 18  BP: (159-167)/() 159/104    Physical Exam    Constitutional: Awake, alert, no acute distress, chronically ill-appearing.   Eyes: Pupils equal, sclerae anicteric, no " conjunctival injection   HENT: NCAT, mucous membranes moist   Neck: Supple, no thyromegaly, no lymphadenopathy, trachea midline   Respiratory: Clear to auscultation bilaterally, nonlabored respirations    Cardiovascular: RRR, no murmurs, rubs, or gallops, palpable pedal pulses bilaterally   Gastrointestinal: Positive bowel sounds, soft, nontender, nondistended   Musculoskeletal: 4+ pitting edema bilateral lower extremities, minimal edema appreciated bilateral wrists.  No clubbing or cyanosis to extremities   Psychiatric: Appropriate affect, cooperative   Neurologic: Oriented x 3, strength symmetric in all extremities, Cranial Nerves grossly intact to confrontation, speech clear   Skin: No rashes     Result Review    Result Review:  I have personally reviewed the results from the time of this admission to 4/7/2023 21:53 EDT and agree with these findings:  [x]  Laboratory  []  Microbiology  [x]  Radiology  [x]  EKG/Telemetry   [x]  Cardiology/Vascular   []  Pathology  []  Old records  []  Other:      Assessment & Plan   Assessment / Plan     Assessment/Plan:   • Acute on chronic systolic and diastolic congestive heart failure-last echo was performed January 26, 2023, shows a left ventricular ejection fraction of 40%, grade 2 diastolic dysfunction.  No indication to repeat this study at this time.  Patient will be started on Lasix 80 mg IV twice daily.  Patient should be given medication in hand to go home with, to reduce opportunity for medical noncompliance.  Patient reports making urine without any difficulty.  Denies any chest pain, BNP 22,000.  • Elevated troponin-unlikely to be due to ACS, likely due to acute on chronic diastolic congestive heart failure.  We will continue to monitor.  Patient has been absent of chest pain.  • Uncontrolled type 2 diabetes mellitus-patient was started on sliding scale insulin, resume home medications as appropriate.  Consider weight-based insulin therapy if hyperglycemia persists  through hospitalization.  • Acute kidney injury-likely prerenal, will can resume diuresis.  Patient is grossly fluid overloaded, will withhold IV fluids at this time.  If no improvement in next 24 to 48 hours, consider nephrology consultation.  •       DVT prophylaxis: Lovenox    CODE STATUS:    Level Of Support Discussed With: Patient  Code Status (Patient has no pulse and is not breathing): CPR (Attempt to Resuscitate)  Medical Interventions (Patient has pulse or is breathing): Full Support      Admission Status:  I believe this patient meets inpatient status.    Electronically signed by Chapito Hahn DO, 04/07/23, 9:53 PM EDT.

## 2023-04-08 NOTE — PAYOR COMM NOTE
"[facility] [id number] [P]-ATT CLINICALS TO SITE     Aviva CÁRDENAS  [facility] ,[place]  Ph [phone]  F [phone]            Lyle Begum (67 y.o. Male)     Date of Birth   1955    Social Security Number       Address   [address] [place] [address]    Home Phone   [phone]    MRN   [id number]       Episcopal   Taoism    Marital Status   Single                            Admission Date   4/7/23    Admission Type   Emergency    Admitting Provider   Chapito Hahn DO    Attending Provider   Ramon Card MD    Department, Room/Bed   [facility] PROGRESSIVE CARE UNIT, 217/1       Discharge Date       Discharge Disposition       Discharge Destination                               Attending Provider: Ramon Card MD    Allergies: No Known Allergies    Isolation: None   Infection: None   Code Status: CPR    Ht: 167.6 cm (65.98\")   Wt: 84.5 kg (186 lb 4.6 oz)    Admission Cmt: None   Principal Problem: Acute on chronic congestive heart failure, unspecified heart failure type [I50.9]                 Active Insurance as of 4/7/2023     Primary Coverage     Payor Plan Insurance Group Employer/Plan Group    HUMANA MEDICARE REPLACEMENT HUMANA MEDICARE REPLACEMENT [id number]     Payor Plan Address Payor Plan Phone Number Payor Plan Fax Number Effective Dates    [address] [address] [phone]  10/1/2022 - None Entered    [facility] [id number]       Subscriber Name Subscriber Birth Date Member ID       LYLE BEGUM 1955 [id number]           Secondary Coverage     Payor Plan Insurance Group Employer/Plan Group    [facility] BY RIKY [facility] BY RIKY [id number]     Payor Plan Address Payor Plan Phone Number Payor Plan Fax Number Effective Dates    [address] [address]   1/1/2021 - None Entered    [facility] [address]       Subscriber Name Subscriber Birth Date Member ID       LYLE BEGUM 1955 [id number]           "       Emergency Contacts          No emergency contacts on file.           Heart Failure RRG Inpatient Care by Aviva Crump RN       Indications Met: Reviewed on 4/8/2023 by Aviva Crump RN       Created Using Review Status Review Entered   [facility] for Case Management Primary Completed 4/8/2023 0841       Created By   Aviva Crump RN       Criteria Set Name - Subset   Heart Failure RRG Inpatient Care      Criteria Review   Heart Failure RRG Inpatient Care     Overall Determination: Indications Met     Criteria:  [×] Admission is indicated for  1 or more  of the following :      [  ] Hemodynamic instability          4/8/2023  8:41 AM              -- 4/8/2023  8:41 AM by Aviva Crump RN --                  100.4 95 20 167/95 159/104 sats 100%      [  ] Severe electrolyte abnormalities requiring inpatient care          4/8/2023  8:41 AM              -- 4/8/2023  8:41 AM by Aviva Crump RN --                  sodium 130      [×] Anasarca or severe peripheral edema (eg, impairs ability to void or ambulate)          4/8/2023  8:41 AM              -- 4/8/2023  8:41 AM by Aviva Crump RN --                  gen swelling.dieter leg pain and unable to bear weight/ambulate.recently discharged and was out of meds,no refills.      [  ] Pulmonary edema that is persistent, as indicated by  ALL  of the following :          [×] Has not improved sufficiently with observation care (eg, appropriately dosed IV diuretics) [A]              4/8/2023  8:41 AM                  -- 4/8/2023  8:41 AM by Aviva Crump RN --                      bnp 22,516.00 trop 119 glu 290      [  ] Progressively (ongoing) rising creatinine with reduction of more than 25% in estimated glomerular filtration rate from baseline           4/8/2023  8:41 AM              -- 4/8/2023  8:41 AM by Aviva Crump RN --                  GFR: 36 mL/min/1.73m2            History & Physical      HahnChapito adams DO at 04/07/23 6276            [facility]   Patient Name: Lyle Lozano  : 1955  MRN: [id number]  Primary Care Physician:  Heidi Villa, HUMA  Date of admission: 2023    Subjective    Subjective     Chief Complaint: Leg pain    HPI:    Lyle Lozano is a 67 y.o. male brought to the emergency department for evaluation of generalized swelling and bilateral leg pain.  He reports that his right leg is more tender than the left.  Patient was recently discharged from our facility on 2023 following a weeklong admission.  Patient states since being discharged, he has been having difficulty bear weight on his right lower extremity and has lost the ability to ambulate over the last 48 hours due to the pain.  Patient states that he has been out of his home medications for approximately 4 days, due to being \"out of refills\".      Personal History     Past Medical History:  Chronic kidney disease  Systolic and diastolic congestive heart failure  Diabetes  Hypertension  Gout  Hyperlipidemia  Lumbar ago  Sleep apnea    Past Surgical History:  Cyst removal    Family History:   CAD  Hypertension  Stroke  Diabetes    Social History:   Patient has a 30-pack-year smoking history, quit in .  Denies any alcohol or illicit drug use.    Home Medications:  aspirin, atorvastatin, carvedilol, cholecalciferol, furosemide, sacubitril-valsartan, sitaGLIPtin-metFORMIN, and spironolactone    Allergies:  No Known Allergies    Review of Systems   All systems were reviewed and negative except for: Generalized swelling, generalized weakness, right lower extremity pain    Objective    Objective     Vitals:   Temp:  [98.2 °F (36.8 °C)-100.4 °F (38 °C)] 98.2 °F (36.8 °C)  Heart Rate:  [90-95] 93  Resp:  [18-20] 18  BP: (159-167)/() 159/104    Physical Exam    Constitutional: Awake, alert, no acute distress, chronically ill-appearing.   Eyes: Pupils equal, sclerae anicteric, no " conjunctival injection   HENT: NCAT, mucous membranes moist   Neck: Supple, no thyromegaly, no lymphadenopathy, trachea midline   Respiratory: Clear to auscultation bilaterally, nonlabored respirations    Cardiovascular: RRR, no murmurs, rubs, or gallops, palpable pedal pulses bilaterally   Gastrointestinal: Positive bowel sounds, soft, nontender, nondistended   Musculoskeletal: 4+ pitting edema bilateral lower extremities, minimal edema appreciated bilateral wrists.  No clubbing or cyanosis to extremities   Psychiatric: Appropriate affect, cooperative   Neurologic: Oriented x 3, strength symmetric in all extremities, Cranial Nerves grossly intact to confrontation, speech clear   Skin: No rashes     Result Review    Result Review:  I have personally reviewed the results from the time of this admission to 4/7/2023 21:53 EDT and agree with these findings:  [x]  Laboratory  []  Microbiology  [x]  Radiology  [x]  EKG/Telemetry   [x]  Cardiology/Vascular   []  Pathology  []  Old records  []  Other:      Assessment & Plan   Assessment / Plan     Assessment/Plan:   • Acute on chronic systolic and diastolic congestive heart failure-last echo was performed January 26, 2023, shows a left ventricular ejection fraction of 40%, grade 2 diastolic dysfunction.  No indication to repeat this study at this time.  Patient will be started on Lasix 80 mg IV twice daily.  Patient should be given medication in hand to go home with, to reduce opportunity for medical noncompliance.  Patient reports making urine without any difficulty.  Denies any chest pain, BNP 22,000.  • Elevated troponin-unlikely to be due to ACS, likely due to acute on chronic diastolic congestive heart failure.  We will continue to monitor.  Patient has been absent of chest pain.  • Uncontrolled type 2 diabetes mellitus-patient was started on sliding scale insulin, resume home medications as appropriate.  Consider weight-based insulin therapy if hyperglycemia persists  through hospitalization.  • Acute kidney injury-likely prerenal, will can resume diuresis.  Patient is grossly fluid overloaded, will withhold IV fluids at this time.  If no improvement in next 24 to 48 hours, consider nephrology consultation.  •       DVT prophylaxis: Lovenox    CODE STATUS:    Level Of Support Discussed With: Patient  Code Status (Patient has no pulse and is not breathing): CPR (Attempt to Resuscitate)  Medical Interventions (Patient has pulse or is breathing): Full Support      Admission Status:  I believe this patient meets inpatient status.    Electronically signed by Chapito Hahn DO, 04/07/23, 9:53 PM EDT.               Electronically signed by Chapito Hahn DO at 04/07/23 2335          Emergency Department Notes      Crystal Braswell APRN at 04/07/23 1731          Time: 5:31 PM EDT  Date of encounter:  4/7/2023  Independent Historian/Clinical History and Information was obtained by:   Patient and Chart  Chief Complaint: leg pain  Room number: 62/62      History is limited by: N/A    History of Present Illness:  Patient is a 67 y.o. year old male who presents to the emergency department for evaluation of joint pain and general swelling.  He also states that he cannot bear weight on his right lower extremity and that he cannot ambulate due to the pain.  He complains of his greatest pain to be of his right leg and his left hand.  He rates his discomfort as a 10 on a scale 0-10. He also complains of shortness of air.     He was recently discharged from this facility on 3/23/2023 (after a 7 day admission) and fears that he was discharged too soon.  He states since being discharged he has continued to feel worse however he has not had any of his home meds for 4 days stating that he was out of refills.    HPI    Patient Care Team  Primary Care Provider: Heidi Villa APRN    Past Medical History:     No Known Allergies  Past Medical History:   Diagnosis Date   • Abnormal kidney function     • Arthritis    • Bursitis    • CHF (congestive heart failure)    • Depression    • Diabetes    • Edema    • Essential hypertension 09/23/2021   • Forgetfulness    • Gout    • Head injury    • Hernia cerebri    • Hyperlipidemia    • Hypertension    • IFG (impaired fasting glucose)    • Low back pain    • Lumbago     `   • Pain of left hip    • Right shoulder pain    • Sleep apnea    • SOB (shortness of breath)      Past Surgical History:   Procedure Laterality Date   • CYST REMOVAL Right     leg     Family History   Problem Relation Age of Onset   • No Known Problems Father    • Diabetes Sister    • Stroke Sister        Home Medications:  Prior to Admission medications    Medication Sig Start Date End Date Taking? Authorizing Provider   aspirin (aspirin) 81 MG EC tablet Take 1 tablet by mouth Daily. 6/2/22   Heidi Villa APRN   atorvastatin (LIPITOR) 80 MG tablet Take 1 tablet by mouth Every Night. 3/5/23   ProviderQuentin MD   carvedilol (COREG) 12.5 MG tablet Take 1 tablet by mouth 2 (Two) Times a Day With Meals. 1/11/22   Brigido Mcdonnell MD   cholecalciferol (VITAMIN D3) 25 MCG (1000 UT) tablet Take 1 tablet by mouth Daily. 8/26/22   Nehal Daniels MD   furosemide (LASIX) 40 MG tablet Take 1 tablet by mouth Daily for 30 days. 3/24/23 4/23/23  Aylin Keita MD   sacubitril-valsartan (Entresto)  MG tablet Take 1 tablet by mouth 2 (Two) Times a Day. 1/11/22   Brigido Mcdonnell MD   sitaGLIPtin-metFORMIN (Janumet)  MG per tablet Take 1 tablet by mouth 2 (Two) Times a Day With Meals. 9/27/22   Aysha Lacey, PAWillamC   spironolactone (ALDACTONE) 25 MG tablet Take 1 tablet by mouth Daily. 10/12/22   Provider, MD Quentin        Social History:   Social History     Tobacco Use   • Smoking status: Former     Packs/day: 0.25     Types: Cigarettes     Start date: 1982     Quit date: 8/14/2012     Years since quitting: 10.6   • Smokeless tobacco: Never   Vaping Use   • Vaping Use: Never  "used   Substance Use Topics   • Alcohol use: Yes     Comment: rare   • Drug use: Never         Review of Systems:  Review of Systems   Constitutional: Negative for chills and fever.   HENT: Negative for congestion, ear pain and sore throat.    Eyes: Negative for pain.   Respiratory: Positive for shortness of breath. Negative for cough and chest tightness.    Cardiovascular: Negative for chest pain.   Gastrointestinal: Negative for abdominal pain, diarrhea, nausea and vomiting.   Genitourinary: Negative for flank pain and hematuria.   Musculoskeletal: Positive for arthralgias (right leg pain, left hand pain ) and gait problem (\"cant walking on my leg\" (refering to right leg) ). Negative for joint swelling.   Skin: Negative for pallor.   Neurological: Negative for seizures and headaches.   All other systems reviewed and are negative.       Physical Exam:  /95 (BP Location: Right arm, Patient Position: Sitting)   Pulse 90   Temp 99.8 °F (37.7 °C) (Oral)   Resp 20   SpO2 98%     Physical Exam  Vitals and nursing note reviewed.   Constitutional:       General: He is not in acute distress.     Appearance: Normal appearance. He is not toxic-appearing or diaphoretic.      Comments: Patient's general appearance is that he does not feel well.  Low-grade fever is noted.   HENT:      Head: Normocephalic and atraumatic.      Mouth/Throat:      Mouth: Mucous membranes are moist.   Eyes:      General: No scleral icterus.  Cardiovascular:      Rate and Rhythm: Normal rate and regular rhythm.      Pulses: Normal pulses.      Heart sounds: Normal heart sounds.   Pulmonary:      Effort: Pulmonary effort is normal. No respiratory distress.      Breath sounds: Normal breath sounds. No stridor. No wheezing or rhonchi.      Comments: Breath sounds are decreased.  Patient does appear to be slightly short of air and a winded after having a conversation  Abdominal:      General: Abdomen is flat.      Palpations: Abdomen is soft.    "   Tenderness: There is no abdominal tenderness.   Musculoskeletal:         General: Swelling present. Normal range of motion.      Cervical back: Normal range of motion and neck supple.      Right lower leg: Edema present.      Left lower leg: Edema present.      Comments: Swelling to left hand   Skin:     General: Skin is warm and dry.      Coloration: Skin is not jaundiced or pale.      Findings: No bruising or erythema.   Neurological:      Mental Status: He is alert and oriented to person, place, and time. Mental status is at baseline.      Sensory: No sensory deficit.      Gait: Gait abnormal (Patient cannot bear weight to right lower extremity however this appears to be from a pain and from the amount of swelling not from a neurological deficit).   Psychiatric:         Mood and Affect: Mood normal.         Behavior: Behavior normal.         Thought Content: Thought content normal.         Judgment: Judgment normal.                 Procedures:  Procedures      Medical Decision Making:      Comorbidities that affect care:    gout, Chronic Kidney Disease, Congestive Heart Failure, Diabetes, Hypertension    External Notes reviewed:    Previous Admission Note: Previous admission note and discharge note from recent hospitalization on 3/23/2023.      The following orders were placed and all results were independently analyzed by me:  Orders Placed This Encounter   Procedures   • XR Chest 1 View   • Comprehensive Metabolic Panel   • [place] Draw   • Urinalysis With Microscopic If Indicated (No Culture) - Urine, Clean Catch   • CBC Auto Differential   • Urinalysis, Microscopic Only - Urine, Clean Catch   • BNP   • Single High Sensitivity Troponin T   • IP General Consult (Use specialty-specific consult if known)   • Insert peripheral IV   • CBC & Differential   • Green Top (Gel)   • Lavender Top   • Gold Top - SST   • Light Blue Top       Medications Given in the Emergency Department:  Medications   sodium chloride 0.9  % flush 10 mL (has no administration in time range)        ED Course:    ED Course as of 04/07/23 2058 Fri Apr 07, 2023 2029 Sodium Correction for Hyperglycemia - MDCalc  Calculated on Apr 07 2023 8:29 PM  133 mEq/L -> Corrected Sodium (Marcello, 1973)  135 mEq/L -> Corrected Sodium (Lauren, 1999) [MS]   2030 I have discussed this pt with ER attending Dr. Nolasco. [MS]   2030 Order placed for consult with hospitalist at this time for possible hospital admission  [MS]   2048 I have discussed this patient with hospitalist Dr. Hahn who has agreed to admit the patient for hospitalization [MS]   2049 Lab just called to advise that patient's troponin was elevated however is likely elevated due to his chronic renal failure.  Patient is admitted to the hospital at this time.  He has had no chest pain while in the emergency department and all vital signs have remained within acceptable limits. [MS]      ED Course User Index  [MS] French Crystal Flores, APRN       Labs:    Lab Results (last 24 hours)     Procedure Component Value Units Date/Time    CBC & Differential [[id number]]  (Abnormal) Collected: 04/07/23 1505    Specimen: Blood Updated: 04/07/23 1513    Narrative:      The following orders were created for panel order CBC & Differential.  Procedure                               Abnormality         Status                     ---------                               -----------         ------                     CBC Auto Differential[[id number]]        Abnormal            Final result                 Please view results for these tests on the individual orders.    Comprehensive Metabolic Panel [[id number]]  (Abnormal) Collected: 04/07/23 1505    Specimen: Blood Updated: 04/07/23 1532     Glucose 290 mg/dL      BUN 31 mg/dL      Creatinine 2.02 mg/dL      Sodium 130 mmol/L      Potassium 3.7 mmol/L      Chloride 98 mmol/L      CO2 20.8 mmol/L      Calcium 8.0 mg/dL      Total Protein 6.4 g/dL      Albumin 2.4 g/dL      ALT  (SGPT) 10 U/L      AST (SGOT) 10 U/L      Alkaline Phosphatase 146 U/L      Total Bilirubin 0.5 mg/dL      Globulin 4.0 gm/dL      A/G Ratio 0.6 g/dL      BUN/Creatinine Ratio 15.3     Anion Gap 11.2 mmol/L      eGFR 35.9 mL/min/1.73     Narrative:      GFR Normal >60  Chronic Kidney Disease <60  Kidney Failure <15      CBC Auto Differential [[id number]]  (Abnormal) Collected: 04/07/23 1505    Specimen: Blood Updated: 04/07/23 1513     WBC 10.29 10*3/mm3      RBC 3.55 10*6/mm3      Hemoglobin 9.2 g/dL      Hematocrit 27.9 %      MCV 78.6 fL      MCH 25.9 pg      MCHC 33.0 g/dL      RDW 17.3 %      RDW-SD 49.8 fl      MPV 9.3 fL      Platelets 256 10*3/mm3      Neutrophil % 80.1 %      Lymphocyte % 12.5 %      Monocyte % 6.2 %      Eosinophil % 0.5 %      Basophil % 0.2 %      Immature Grans % 0.5 %      Neutrophils, Absolute 8.24 10*3/mm3      Lymphocytes, Absolute 1.29 10*3/mm3      Monocytes, Absolute 0.64 10*3/mm3      Eosinophils, Absolute 0.05 10*3/mm3      Basophils, Absolute 0.02 10*3/mm3      Immature Grans, Absolute 0.05 10*3/mm3      nRBC 0.0 /100 WBC     Urinalysis With Microscopic If Indicated (No Culture) - Urine, Clean Catch [[id number]]  (Abnormal) Collected: 04/07/23 1611    Specimen: Urine, Clean Catch Updated: 04/07/23 1811     Color, UA Yellow     Appearance, UA Clear     pH, UA 5.5     Specific Gravity, UA 1.021     Glucose,  mg/dL (2+)     Ketones, UA Negative     Bilirubin, UA Negative     Blood, UA Moderate (2+)     Protein, UA >=300 mg/dL (3+)     Leuk Esterase, UA Negative     Nitrite, UA Negative     Urobilinogen, UA 1.0 E.U./dL    Urinalysis, Microscopic Only - Urine, Clean Catch [[id number]]  (Abnormal) Collected: 04/07/23 1611    Specimen: Urine, Clean Catch Updated: 04/07/23 1812     RBC, UA None Seen /HPF      WBC, UA 0-2 /HPF      Bacteria, UA None Seen /HPF      Squamous Epithelial Cells, UA None Seen /HPF      Hyaline Casts, UA 3-6 /LPF      Methodology Manual Light Microscopy     BNP [[id number]]  (Abnormal) Collected: 04/07/23 1930    Specimen: Blood Updated: 04/07/23 2006     proBNP 22,516.0 pg/mL     Narrative:      Among patients with dyspnea, NT-proBNP is highly sensitive for the detection of acute congestive heart failure. In addition NT-proBNP of <300 pg/ml effectively rules out acute congestive heart failure with 99% negative predictive value.    Results may be falsely decreased if patient taking Biotin.      Single High Sensitivity Troponin T [[id number]]  (Abnormal) Collected: 04/07/23 1930    Specimen: Blood Updated: 04/07/23 2049     HS Troponin T 119 ng/L     Narrative:      High Sensitive Troponin T Reference Range:  <10.0 ng/L- Negative Female for AMI  <15.0 ng/L- Negative Male for AMI  >=10 - Abnormal Female indicating possible myocardial injury.  >=15 - Abnormal Male indicating possible myocardial injury.   Clinicians would have to utilize clinical acumen, EKG, Troponin, and serial changes to determine if it is an Acute Myocardial Infarction or myocardial injury due to an underlying chronic condition.                Imaging:    XR Chest 1 View    Result Date: 4/7/2023  PROCEDURE: XR CHEST 1 VW  COMPARISON: [facility], , XR CHEST 1 VW, 3/16/2023, 13:07.  INDICATIONS: SOA /  CHF  FINDINGS:  The lungs are clear bilaterally.  The cardiac and mediastinal silhouettes appear normal.  No effusion is seen.        1. No acute cardiopulmonary disease.       Claudio Mike M.D.       Electronically Signed and Approved By: Claudio Mike M.D. on 4/07/2023 at 20:40                 Differential Diagnosis and Discussion:    Dyspnea: Differential diagnosis includes but is not limited to metabolic acidosis, neurological disorders, psychogenic, asthma, pneumothorax, upper airway obstruction, COPD, pneumonia, noncardiogenic pulmonary edema, interstitial lung disease, anemia, congestive heart failure, and pulmonary embolism  Extremity Pain: Differential diagnosis includes but is not  limited to soft tissue sprain, tendonitis, tendon injury, dislocation, fracture, deep vein thrombosis, arterial insufficiency, osteoarthritis, bursitis, and ligamentous damage.  Weakness: Based on the patient's history, signs, and symptoms, the diffential diagnosis includes but is not limited to meningitis, stroke, sepsis, subarachnoid hemorrhage, intracranial bleeding, encephalitis, acute uti, dehydration, MS, myasthenia gravis, Guillan Heiskell, migraine variant, neuromuscular disorders vertigo, electrolyte imbalance, and metabolic disorders.    All labs were reviewed and interpreted by me.  All X-rays were independently reviewed by me.    MDM  Number of Diagnoses or Management Options  Acute on chronic congestive heart failure, unspecified heart failure type: established and worsening  Blood glucose elevated: established and worsening  Chronic anemia: established and worsening  Chronic renal impairment, unspecified CKD stage: established and worsening  Elevated brain natriuretic peptide (BNP) level: established and worsening  Medically noncompliant: established and worsening  Shortness of breath: established and worsening  Diagnosis management comments: Patient is a 67-year-old male that presented to the emergency department today complaining of generalized edema, inability to bear weight on his right lower extremity as well as shortness of air with exertion.  He was just discharged from this facility on 3/23/2020 7:23 day stay for acute on chronic COPD exacerbation.  His work-up completed here in the emergency department did show an elevated BNP over 22,000.  His BUN and creatinine was also increased at 31 and 2.02 which is slightly worse than his last admission.  He was also found to be chronically anemic and also hyponatremic at 3.0. However corrected sodium, with his elevated glucose of 290, brings his sodium level to 133-135.  His potassium was normal at 3.7.  He was also found to have an elevated troponin  however this is likely due to his chronic renal insufficiency and renal failure.  No chest pain.  He had a low-grade fever upon arrival that did improve.  Patient does admit to being noncompliant with his medications.  He has not had his medicine within 4 days.  He states that he did not have any refills.  Patient remained awake alert and oriented and in no acute distress throughout his entire visit.  He maintained his own airway with equal rise and fall of chest and required no O2 supplementation.  Based on patient's symptoms as well as his labs his current medical state does not warrant hospital admission.  I discussed this with ER attending Dr. Nolasco.  I then consulted hospitalist Dr. Hahn who agreed to admit the patient.       Amount and/or Complexity of Data Reviewed  Clinical lab tests: reviewed and ordered  Tests in the radiology section of CPT®: ordered and reviewed  Decide to obtain previous medical records or to obtain history from someone other than the patient: yes  Review and summarize past medical records: yes (I have personally reviewed patient's previous medical encounters.)  Discuss the patient with other providers: yes (I have discussed this patient with hospitalist Dr. Thrasher who has agreed to admit the patient for hospitalization for acute on chronic CHF exacerbation.)    Risk of Complications, Morbidity, and/or Mortality  Presenting problems: high  Diagnostic procedures: moderate  Management options: moderate    Patient Progress  Patient progress: stable           Patient Care Considerations:          Consultants/Shared Management Plan:    Hospitalist: I have discussed the case with Dr. Hahn who agrees to accept the patient for admission.    Social Determinants of Health:    Patient is independent, reliable, and has access to care.       Disposition and Care Coordination:    Admit:   Through independent evaluation of the patient's history, physical, and imperical data, the patient meets criteria  for observation/admission to the hospital.        Final diagnoses:   Acute on chronic congestive heart failure, unspecified heart failure type   Shortness of breath   Elevated brain natriuretic peptide (BNP) level   Chronic renal impairment, unspecified CKD stage   Blood glucose elevated   Medically noncompliant   Chronic anemia        ED Disposition     ED Disposition   Decision to Admit    Condition   --    Comment   --           Documentation assistance provided by Emanuel Pugh acting as scribe for Flores Aguirre. Information recorded by the scribe was done at my direction and has been verified and validated by me.       This medical record created using voice recognition software.             Emanuel Pugh  04/07/23 1743       Crystal Braswell APRN  04/07/23 2058      Electronically signed by Crystal Braswell APRN at 04/07/23 2058       Physician Progress Notes (last 24 hours)  Notes from 04/07/23 0851 through 04/08/23 0851   No notes of this type exist for this encounter.         Consult Notes (last 24 hours)  Notes from 04/07/23 0851 through 04/08/23 0851   No notes of this type exist for this encounter.

## 2023-04-08 NOTE — PLAN OF CARE
Goal Outcome Evaluation:      Pt c/o of leg pain throughout shift. See MAR for interventions, no acute changes this shift. VSS.

## 2023-04-09 LAB
ALBUMIN SERPL-MCNC: 2.1 G/DL (ref 3.5–5.2)
ALBUMIN/GLOB SERPL: 0.5 G/DL
ALP SERPL-CCNC: 136 U/L (ref 39–117)
ALT SERPL W P-5'-P-CCNC: 10 U/L (ref 1–41)
ANION GAP SERPL CALCULATED.3IONS-SCNC: 10.3 MMOL/L (ref 5–15)
AST SERPL-CCNC: 12 U/L (ref 1–40)
BASOPHILS # BLD AUTO: 0.03 10*3/MM3 (ref 0–0.2)
BASOPHILS NFR BLD AUTO: 0.3 % (ref 0–1.5)
BILIRUB SERPL-MCNC: 0.5 MG/DL (ref 0–1.2)
BUN SERPL-MCNC: 34 MG/DL (ref 8–23)
BUN/CREAT SERPL: 16 (ref 7–25)
CALCIUM SPEC-SCNC: 8.3 MG/DL (ref 8.6–10.5)
CHLORIDE SERPL-SCNC: 98 MMOL/L (ref 98–107)
CO2 SERPL-SCNC: 23.7 MMOL/L (ref 22–29)
CREAT SERPL-MCNC: 2.12 MG/DL (ref 0.76–1.27)
DEPRECATED RDW RBC AUTO: 49.7 FL (ref 37–54)
EGFRCR SERPLBLD CKD-EPI 2021: 33.9 ML/MIN/1.73
EOSINOPHIL # BLD AUTO: 0.12 10*3/MM3 (ref 0–0.4)
EOSINOPHIL NFR BLD AUTO: 1.3 % (ref 0.3–6.2)
ERYTHROCYTE [DISTWIDTH] IN BLOOD BY AUTOMATED COUNT: 16.9 % (ref 12.3–15.4)
GLOBULIN UR ELPH-MCNC: 4.2 GM/DL
GLUCOSE BLDC GLUCOMTR-MCNC: 142 MG/DL (ref 70–99)
GLUCOSE BLDC GLUCOMTR-MCNC: 155 MG/DL (ref 70–99)
GLUCOSE BLDC GLUCOMTR-MCNC: 192 MG/DL (ref 70–99)
GLUCOSE SERPL-MCNC: 202 MG/DL (ref 65–99)
HCT VFR BLD AUTO: 28.2 % (ref 37.5–51)
HGB BLD-MCNC: 9.1 G/DL (ref 13–17.7)
IMM GRANULOCYTES # BLD AUTO: 0.04 10*3/MM3 (ref 0–0.05)
IMM GRANULOCYTES NFR BLD AUTO: 0.4 % (ref 0–0.5)
LYMPHOCYTES # BLD AUTO: 1.4 10*3/MM3 (ref 0.7–3.1)
LYMPHOCYTES NFR BLD AUTO: 15.3 % (ref 19.6–45.3)
MAGNESIUM SERPL-MCNC: 1.8 MG/DL (ref 1.6–2.4)
MCH RBC QN AUTO: 25.5 PG (ref 26.6–33)
MCHC RBC AUTO-ENTMCNC: 32.3 G/DL (ref 31.5–35.7)
MCV RBC AUTO: 79 FL (ref 79–97)
MONOCYTES # BLD AUTO: 0.6 10*3/MM3 (ref 0.1–0.9)
MONOCYTES NFR BLD AUTO: 6.6 % (ref 5–12)
NEUTROPHILS NFR BLD AUTO: 6.95 10*3/MM3 (ref 1.7–7)
NEUTROPHILS NFR BLD AUTO: 76.1 % (ref 42.7–76)
NRBC BLD AUTO-RTO: 0 /100 WBC (ref 0–0.2)
PHOSPHATE SERPL-MCNC: 3.8 MG/DL (ref 2.5–4.5)
PLATELET # BLD AUTO: 284 10*3/MM3 (ref 140–450)
PMV BLD AUTO: 9.4 FL (ref 6–12)
POTASSIUM SERPL-SCNC: 3.6 MMOL/L (ref 3.5–5.2)
PROT SERPL-MCNC: 6.3 G/DL (ref 6–8.5)
RBC # BLD AUTO: 3.57 10*6/MM3 (ref 4.14–5.8)
SODIUM SERPL-SCNC: 132 MMOL/L (ref 136–145)
WBC NRBC COR # BLD: 9.14 10*3/MM3 (ref 3.4–10.8)

## 2023-04-09 PROCEDURE — 85025 COMPLETE CBC W/AUTO DIFF WBC: CPT | Performed by: INTERNAL MEDICINE

## 2023-04-09 PROCEDURE — 82962 GLUCOSE BLOOD TEST: CPT

## 2023-04-09 PROCEDURE — 25010000002 ENOXAPARIN PER 10 MG: Performed by: INTERNAL MEDICINE

## 2023-04-09 PROCEDURE — 94799 UNLISTED PULMONARY SVC/PX: CPT

## 2023-04-09 PROCEDURE — 99233 SBSQ HOSP IP/OBS HIGH 50: CPT | Performed by: INTERNAL MEDICINE

## 2023-04-09 PROCEDURE — 25010000002 MAGNESIUM SULFATE 2 GM/50ML SOLUTION: Performed by: INTERNAL MEDICINE

## 2023-04-09 PROCEDURE — 84100 ASSAY OF PHOSPHORUS: CPT | Performed by: INTERNAL MEDICINE

## 2023-04-09 PROCEDURE — 25010000002 FUROSEMIDE PER 20 MG: Performed by: FAMILY MEDICINE

## 2023-04-09 PROCEDURE — 83735 ASSAY OF MAGNESIUM: CPT | Performed by: INTERNAL MEDICINE

## 2023-04-09 PROCEDURE — 63710000001 INSULIN LISPRO (HUMAN) PER 5 UNITS: Performed by: FAMILY MEDICINE

## 2023-04-09 PROCEDURE — 80053 COMPREHEN METABOLIC PANEL: CPT | Performed by: INTERNAL MEDICINE

## 2023-04-09 RX ORDER — MAGNESIUM SULFATE HEPTAHYDRATE 40 MG/ML
2 INJECTION, SOLUTION INTRAVENOUS ONCE
Status: COMPLETED | OUTPATIENT
Start: 2023-04-09 | End: 2023-04-09

## 2023-04-09 RX ORDER — OXYCODONE AND ACETAMINOPHEN 10; 325 MG/1; MG/1
1 TABLET ORAL EVERY 4 HOURS PRN
Status: DISCONTINUED | OUTPATIENT
Start: 2023-04-09 | End: 2023-04-17 | Stop reason: HOSPADM

## 2023-04-09 RX ORDER — POTASSIUM CHLORIDE 750 MG/1
40 CAPSULE, EXTENDED RELEASE ORAL ONCE
Status: COMPLETED | OUTPATIENT
Start: 2023-04-09 | End: 2023-04-09

## 2023-04-09 RX ADMIN — OXYCODONE HYDROCHLORIDE AND ACETAMINOPHEN 1 TABLET: 7.5; 325 TABLET ORAL at 08:15

## 2023-04-09 RX ADMIN — MAGNESIUM SULFATE HEPTAHYDRATE 2 G: 2 INJECTION, SOLUTION INTRAVENOUS at 18:32

## 2023-04-09 RX ADMIN — Medication 10 ML: at 08:15

## 2023-04-09 RX ADMIN — FUROSEMIDE 80 MG: 10 INJECTION, SOLUTION INTRAMUSCULAR; INTRAVENOUS at 21:54

## 2023-04-09 RX ADMIN — Medication 10 ML: at 21:55

## 2023-04-09 RX ADMIN — OXYCODONE HYDROCHLORIDE AND ACETAMINOPHEN 1 TABLET: 10; 325 TABLET ORAL at 21:54

## 2023-04-09 RX ADMIN — ENOXAPARIN SODIUM 80 MG: 100 INJECTION SUBCUTANEOUS at 09:10

## 2023-04-09 RX ADMIN — OXYCODONE HYDROCHLORIDE AND ACETAMINOPHEN 1 TABLET: 7.5; 325 TABLET ORAL at 02:25

## 2023-04-09 RX ADMIN — INSULIN LISPRO 2 UNITS: 100 INJECTION, SOLUTION INTRAVENOUS; SUBCUTANEOUS at 12:44

## 2023-04-09 RX ADMIN — OXYCODONE HYDROCHLORIDE AND ACETAMINOPHEN 1 TABLET: 10; 325 TABLET ORAL at 15:03

## 2023-04-09 RX ADMIN — FUROSEMIDE 80 MG: 10 INJECTION, SOLUTION INTRAMUSCULAR; INTRAVENOUS at 08:15

## 2023-04-09 RX ADMIN — POTASSIUM CHLORIDE 40 MEQ: 10 CAPSULE, COATED, EXTENDED RELEASE ORAL at 18:32

## 2023-04-09 RX ADMIN — ENOXAPARIN SODIUM 80 MG: 100 INJECTION SUBCUTANEOUS at 21:54

## 2023-04-09 RX ADMIN — INSULIN LISPRO 2 UNITS: 100 INJECTION, SOLUTION INTRAVENOUS; SUBCUTANEOUS at 08:15

## 2023-04-09 NOTE — PROGRESS NOTES
[facility]  Date: [facility]  Patient Name: Lyle Lozano  : 1955  MRN: [id number]  Date of admission: 2023    Subjective   Subjective     Chief Complaint:   Leg pain    Summary:   Lyle Lozano is a 67 y.o. male brought to the emergency department for evaluation of generalized swelling and bilateral leg pain.  He reports that his right leg is more tender than the left.  Patient was recently discharged from our facility on 2023 following a weeklong admission.  Patient states since being discharged, he has been having difficulty bear weight on his right lower extremity and has lost the ability to ambulate over the last 48 hours due to the pain.  Patient states that he has been out of his home medications for approximately 4 days, due to being \"out of refills\".    Interval Followup:   No acute events overnight, still complaining of lower extremity pain, making good urine    Objective   Objective     Vitals:   Temp:  [98.2 °F (36.8 °C)-99.5 °F (37.5 °C)] 98.4 °F (36.9 °C)  Heart Rate:  [81-97] 81  Resp:  [16-18] 18  BP: (137-175)/(72-94) 137/78    Physical Exam   General appearance: NAD, conversant   Eyes: anicteric sclerae, moist conjunctivae; no lid-lag; PERRLA  HENT: Atraumatic; oropharynx clear with moist mucous membranes and no mucosal ulcerations; normal hard and soft palate  Neck: Trachea midline; FROM, supple, no thyromegaly or lymphadenopathy  Lungs: CTA, with normal respiratory effort and no intercostal retractions  CV: Regular Rate and Rhythm, no Murmurs, Rubs, or Gallops     Result Review    Result Review:  I have personally reviewed the results as below  [x]  Laboratory  CBC    CBC 23   WBC 10.29 9.12 9.14   RBC 3.55 (A) 3.28 (A) 3.57 (A)   Hemoglobin 9.2 (A) 8.4 (A) 9.1 (A)   Hematocrit 27.9 (A) 25.8 (A) 28.2 (A)   MCV 78.6 (A) 78.7 (A) 79.0   MCH 25.9 (A) 25.6 (A) 25.5 (A)   MCHC 33.0 32.6 32.3   RDW 17.3 (A) 17.2 " (A) 16.9 (A)   Platelets 256 245 284   (A) Abnormal value            CMP    CMP 4/7/23 4/8/23 4/9/23   Glucose 290 (A) 353 (A) 202 (A)   BUN 31 (A) 31 (A) 34 (A)   Creatinine 2.02 (A) 1.95 (A) 2.12 (A)   eGFR 35.9 (A) 37.5 (A) 33.9 (A)   Sodium 130 (A) 130 (A) 132 (A)   Potassium 3.7 3.4 (A) 3.6   Chloride 98 99 98   Calcium 8.0 (A) 7.9 (A) 8.3 (A)   Total Protein 6.4  6.3   Albumin 2.4 (A)  2.1 (A)   Globulin 4.0  4.2   Total Bilirubin 0.5  0.5   Alkaline Phosphatase 146 (A)  136 (A)   AST (SGOT) 10  12   ALT (SGPT) 10  10   Albumin/Globulin Ratio 0.6  0.5   BUN/Creatinine Ratio 15.3 15.9 16.0   Anion Gap 11.2 9.9 10.3   (A) Abnormal value            []  Microbiology  []  Radiology  []  EKG/Telemetry   []  Cardiology/Vascular   []  Pathology  []  Old records  []  Other:    Assessment & Plan   Assessment / Plan     Assessment:  Acute on chronic systolic and diastolic congestive heart failure  Elevated troponin, unlikely to be ACS  Uncontrolled type 2 diabetes mellitus  Acute kidney injury    Plan:  • Patient admitted to the hospital for further work-up and management of above  • Continue aggressive diuresis with IV Lasix  • NT proBNP reviewed, 22,000  • Records from last hospitalization reviewed, patient has DVT, will continue anticoagulation with Lovenox  • continue sliding-scale insulin, monitor blood glucose closely  • Monitor renal function  • CBC, CMP reviewed  • Monitor renal function closely  • Repeat CBC, CMP, mag and Phos in a.m.  • Chest x-ray personally reviewed, consistent with pulmonary edema  • Continue aggressive IV diuretics, 3.6 L net negative this hospitalization  • Increase Percocet to 10 mg for uncontrolled pain  • Current level of care is adequate, no reason to transfer at this time  • Clinical course will dictate further management     Reviewed patients labs and imaging, and discussed with patient and nurse at bedside.    DVT prophylaxis:  Medical DVT prophylaxis orders are present.    CODE  STATUS:   Level Of Support Discussed With: Patient  Code Status (Patient has no pulse and is not breathing): CPR (Attempt to Resuscitate)  Medical Interventions (Patient has pulse or is breathing): Full Support      Electronically signed by Ramon Card MD, 04/09/23, 11:30 AM EDT.

## 2023-04-10 ENCOUNTER — APPOINTMENT (OUTPATIENT)
Dept: CARDIOLOGY | Facility: HOSPITAL | Age: 68
DRG: 291 | End: 2023-04-10
Payer: MEDICARE

## 2023-04-10 LAB
ALBUMIN SERPL-MCNC: 2.2 G/DL (ref 3.5–5.2)
ALBUMIN/GLOB SERPL: 0.5 G/DL
ALP SERPL-CCNC: 130 U/L (ref 39–117)
ALT SERPL W P-5'-P-CCNC: 9 U/L (ref 1–41)
ANION GAP SERPL CALCULATED.3IONS-SCNC: 12.9 MMOL/L (ref 5–15)
AST SERPL-CCNC: 11 U/L (ref 1–40)
BASOPHILS # BLD AUTO: 0.03 10*3/MM3 (ref 0–0.2)
BASOPHILS NFR BLD AUTO: 0.3 % (ref 0–1.5)
BH CV LOW VAS RIGHT GASTRONEMIUS VESSEL: 1
BH CV LOWER VASCULAR LEFT COMMON FEMORAL AUGMENT: NORMAL
BH CV LOWER VASCULAR LEFT COMMON FEMORAL COMPETENT: NORMAL
BH CV LOWER VASCULAR LEFT COMMON FEMORAL COMPRESS: NORMAL
BH CV LOWER VASCULAR LEFT COMMON FEMORAL PHASIC: NORMAL
BH CV LOWER VASCULAR LEFT COMMON FEMORAL SPONT: NORMAL
BH CV LOWER VASCULAR RIGHT COMMON FEMORAL AUGMENT: NORMAL
BH CV LOWER VASCULAR RIGHT COMMON FEMORAL COMPETENT: NORMAL
BH CV LOWER VASCULAR RIGHT COMMON FEMORAL COMPRESS: NORMAL
BH CV LOWER VASCULAR RIGHT COMMON FEMORAL PHASIC: NORMAL
BH CV LOWER VASCULAR RIGHT COMMON FEMORAL SPONT: NORMAL
BH CV LOWER VASCULAR RIGHT DISTAL FEMORAL COMPRESS: NORMAL
BH CV LOWER VASCULAR RIGHT GASTRONEMIUS COMPRESS: NORMAL
BH CV LOWER VASCULAR RIGHT GASTRONEMIUS THROMBUS: NORMAL
BH CV LOWER VASCULAR RIGHT GREATER SAPH AK COMPRESS: NORMAL
BH CV LOWER VASCULAR RIGHT GREATER SAPH BK COMPRESS: NORMAL
BH CV LOWER VASCULAR RIGHT LESSER SAPH COMPRESS: NORMAL
BH CV LOWER VASCULAR RIGHT MID FEMORAL AUGMENT: NORMAL
BH CV LOWER VASCULAR RIGHT MID FEMORAL COMPETENT: NORMAL
BH CV LOWER VASCULAR RIGHT MID FEMORAL COMPRESS: NORMAL
BH CV LOWER VASCULAR RIGHT MID FEMORAL PHASIC: NORMAL
BH CV LOWER VASCULAR RIGHT MID FEMORAL SPONT: NORMAL
BH CV LOWER VASCULAR RIGHT PERONEAL COMPRESS: NORMAL
BH CV LOWER VASCULAR RIGHT POPLITEAL AUGMENT: NORMAL
BH CV LOWER VASCULAR RIGHT POPLITEAL COMPETENT: NORMAL
BH CV LOWER VASCULAR RIGHT POPLITEAL COMPRESS: NORMAL
BH CV LOWER VASCULAR RIGHT POPLITEAL PHASIC: NORMAL
BH CV LOWER VASCULAR RIGHT POPLITEAL SPONT: NORMAL
BH CV LOWER VASCULAR RIGHT POSTERIOR TIBIAL COMPRESS: NORMAL
BH CV LOWER VASCULAR RIGHT PROXIMAL FEMORAL COMPRESS: NORMAL
BH CV LOWER VASCULAR RIGHT SAPHENOFEMORAL JUNCTION COMPRESS: NORMAL
BH CV VAS PRELIMINARY FINDINGS SCRIPTING: 1
BILIRUB SERPL-MCNC: 0.5 MG/DL (ref 0–1.2)
BUN SERPL-MCNC: 38 MG/DL (ref 8–23)
BUN/CREAT SERPL: 15.6 (ref 7–25)
CALCIUM SPEC-SCNC: 8.3 MG/DL (ref 8.6–10.5)
CHLORIDE SERPL-SCNC: 98 MMOL/L (ref 98–107)
CO2 SERPL-SCNC: 23.1 MMOL/L (ref 22–29)
CREAT SERPL-MCNC: 2.43 MG/DL (ref 0.76–1.27)
DEPRECATED RDW RBC AUTO: 49.1 FL (ref 37–54)
EGFRCR SERPLBLD CKD-EPI 2021: 28.8 ML/MIN/1.73
EOSINOPHIL # BLD AUTO: 0.09 10*3/MM3 (ref 0–0.4)
EOSINOPHIL NFR BLD AUTO: 1 % (ref 0.3–6.2)
ERYTHROCYTE [DISTWIDTH] IN BLOOD BY AUTOMATED COUNT: 17.1 % (ref 12.3–15.4)
GLOBULIN UR ELPH-MCNC: 4.3 GM/DL
GLUCOSE BLDC GLUCOMTR-MCNC: 136 MG/DL (ref 70–99)
GLUCOSE BLDC GLUCOMTR-MCNC: 169 MG/DL (ref 70–99)
GLUCOSE BLDC GLUCOMTR-MCNC: 183 MG/DL (ref 70–99)
GLUCOSE SERPL-MCNC: 187 MG/DL (ref 65–99)
HCT VFR BLD AUTO: 28.7 % (ref 37.5–51)
HGB BLD-MCNC: 9.3 G/DL (ref 13–17.7)
IMM GRANULOCYTES # BLD AUTO: 0.03 10*3/MM3 (ref 0–0.05)
IMM GRANULOCYTES NFR BLD AUTO: 0.3 % (ref 0–0.5)
LYMPHOCYTES # BLD AUTO: 1.7 10*3/MM3 (ref 0.7–3.1)
LYMPHOCYTES NFR BLD AUTO: 18.3 % (ref 19.6–45.3)
MAGNESIUM SERPL-MCNC: 2.1 MG/DL (ref 1.6–2.4)
MAXIMAL PREDICTED HEART RATE: 155 BPM
MCH RBC QN AUTO: 25.4 PG (ref 26.6–33)
MCHC RBC AUTO-ENTMCNC: 32.4 G/DL (ref 31.5–35.7)
MCV RBC AUTO: 78.4 FL (ref 79–97)
MONOCYTES # BLD AUTO: 0.71 10*3/MM3 (ref 0.1–0.9)
MONOCYTES NFR BLD AUTO: 7.7 % (ref 5–12)
NEUTROPHILS NFR BLD AUTO: 6.72 10*3/MM3 (ref 1.7–7)
NEUTROPHILS NFR BLD AUTO: 72.4 % (ref 42.7–76)
NRBC BLD AUTO-RTO: 0 /100 WBC (ref 0–0.2)
PHOSPHATE SERPL-MCNC: 4 MG/DL (ref 2.5–4.5)
PLATELET # BLD AUTO: 330 10*3/MM3 (ref 140–450)
PMV BLD AUTO: 9.2 FL (ref 6–12)
POTASSIUM SERPL-SCNC: 3.9 MMOL/L (ref 3.5–5.2)
PROT SERPL-MCNC: 6.5 G/DL (ref 6–8.5)
RBC # BLD AUTO: 3.66 10*6/MM3 (ref 4.14–5.8)
SODIUM SERPL-SCNC: 134 MMOL/L (ref 136–145)
STRESS TARGET HR: 132 BPM
WBC NRBC COR # BLD: 9.28 10*3/MM3 (ref 3.4–10.8)

## 2023-04-10 PROCEDURE — 82962 GLUCOSE BLOOD TEST: CPT

## 2023-04-10 PROCEDURE — 93971 EXTREMITY STUDY: CPT

## 2023-04-10 PROCEDURE — 83735 ASSAY OF MAGNESIUM: CPT | Performed by: INTERNAL MEDICINE

## 2023-04-10 PROCEDURE — 85025 COMPLETE CBC W/AUTO DIFF WBC: CPT | Performed by: INTERNAL MEDICINE

## 2023-04-10 PROCEDURE — 25010000002 FUROSEMIDE PER 20 MG: Performed by: FAMILY MEDICINE

## 2023-04-10 PROCEDURE — 97165 OT EVAL LOW COMPLEX 30 MIN: CPT

## 2023-04-10 PROCEDURE — 97161 PT EVAL LOW COMPLEX 20 MIN: CPT

## 2023-04-10 PROCEDURE — 63710000001 INSULIN LISPRO (HUMAN) PER 5 UNITS: Performed by: FAMILY MEDICINE

## 2023-04-10 PROCEDURE — 25010000002 ENOXAPARIN PER 10 MG: Performed by: INTERNAL MEDICINE

## 2023-04-10 PROCEDURE — 80053 COMPREHEN METABOLIC PANEL: CPT | Performed by: INTERNAL MEDICINE

## 2023-04-10 PROCEDURE — 99233 SBSQ HOSP IP/OBS HIGH 50: CPT | Performed by: INTERNAL MEDICINE

## 2023-04-10 PROCEDURE — 94799 UNLISTED PULMONARY SVC/PX: CPT

## 2023-04-10 PROCEDURE — 84100 ASSAY OF PHOSPHORUS: CPT | Performed by: INTERNAL MEDICINE

## 2023-04-10 PROCEDURE — 93971 EXTREMITY STUDY: CPT | Performed by: SURGERY

## 2023-04-10 RX ADMIN — ENOXAPARIN SODIUM 80 MG: 100 INJECTION SUBCUTANEOUS at 20:45

## 2023-04-10 RX ADMIN — Medication 10 ML: at 20:45

## 2023-04-10 RX ADMIN — OXYCODONE HYDROCHLORIDE AND ACETAMINOPHEN 1 TABLET: 10; 325 TABLET ORAL at 12:17

## 2023-04-10 RX ADMIN — FUROSEMIDE 80 MG: 10 INJECTION, SOLUTION INTRAMUSCULAR; INTRAVENOUS at 08:32

## 2023-04-10 RX ADMIN — OXYCODONE HYDROCHLORIDE AND ACETAMINOPHEN 1 TABLET: 10; 325 TABLET ORAL at 20:45

## 2023-04-10 RX ADMIN — Medication 10 ML: at 08:32

## 2023-04-10 RX ADMIN — OXYCODONE HYDROCHLORIDE AND ACETAMINOPHEN 1 TABLET: 10; 325 TABLET ORAL at 08:33

## 2023-04-10 RX ADMIN — FUROSEMIDE 80 MG: 10 INJECTION, SOLUTION INTRAMUSCULAR; INTRAVENOUS at 20:45

## 2023-04-10 RX ADMIN — INSULIN LISPRO 2 UNITS: 100 INJECTION, SOLUTION INTRAVENOUS; SUBCUTANEOUS at 08:32

## 2023-04-10 RX ADMIN — ENOXAPARIN SODIUM 80 MG: 100 INJECTION SUBCUTANEOUS at 10:19

## 2023-04-10 RX ADMIN — INSULIN LISPRO 2 UNITS: 100 INJECTION, SOLUTION INTRAVENOUS; SUBCUTANEOUS at 17:02

## 2023-04-10 NOTE — THERAPY EVALUATION
Acute Care - Physical Therapy Initial Evaluation   [facility]     Patient Name: Lyle Lozano  : 1955  MRN: [id number]  Today's Date: 4/10/2023      Visit Dx: Admit date: 2023     Referring Physician: Ramon Card MD     Surgery Date:* No surgery found *          ICD-10-CM ICD-9-CM   1. Acute on chronic congestive heart failure, unspecified heart failure type  I50.9 428.0   2. Shortness of breath  R06.02 786.05   3. Elevated brain natriuretic peptide (BNP) level  R79.89 790.99   4. Chronic renal impairment, unspecified CKD stage  N18.9 585.9   5. Blood glucose elevated  R73.9 790.29   6. Medically noncompliant  Z91.199 V15.81   7. Chronic anemia  D64.9 285.9   8. 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   • Hypertensive heart disease with heart failure   • Gout   • Stage 3 chronic kidney disease   • Hand arthritis   • Acute pain of left knee   • Elevated troponin level not due myocardial infarction   • Acute on chronic systolic heart failure (CMS/HCC)   • Acute on chronic congestive heart failure, unspecified heart failure type   • Acute on chronic HFrEF (heart failure with reduced ejection fraction)     Past Medical History:   Diagnosis Date   • Abnormal kidney function    • Arthritis    • Bursitis    • CHF (congestive heart failure)    • Depression    • Diabetes    • Edema    • Essential hypertension 2021   • Forgetfulness    • Gout    • Head injury    • Hernia cerebri    • Hyperlipidemia    • Hypertension    •  IFG (impaired fasting glucose)    • Low back pain    • Lumbago     `   • Pain of left hip    • Right shoulder pain    • Sleep apnea    • SOB (shortness of breath)      Past Surgical History:   Procedure Laterality Date   • CYST REMOVAL Right     leg     PT Assessment (last 12 hours)     PT Evaluation and Treatment     Row Name 04/10/23 1020          Physical Therapy Time and Intention    Subjective Information complains of;pain  Patient reported feeling increased pain in his right leg.  -MARIELA     Document Type evaluation  -MARIELA     Mode of Treatment individual therapy;physical therapy  -MARIELA     Patient Effort good  -MARIELA     Symptoms Noted During/After Treatment none  -MARIELA     Row Name 04/10/23 1020          General Information    Patient Profile Reviewed yes  -MARIELA     Patient Observations alert;cooperative;agree to therapy  -MARIELA     Prior Level of Function independent:;gait;transfer;bed mobility;ADL's  -MARIELA     Equipment Currently Used at Home walker, rolling  PRN  -MARIELA     Existing Precautions/Restrictions fall  -MARIELA     Barriers to Rehab none identified  -MARIELA     Row Name 04/10/23 1020          Living Environment    Current Living Arrangements home  -MARIELA     Home Accessibility wheelchair accessible  -MARIELA     People in Home alone  -MARIELA     Primary Care Provided by self  -MARIELA     Row Name 04/10/23 1020          Range of Motion (ROM)    Range of Motion ROM is WFL;bilateral lower extremities  -MARIELA     Row Name 04/10/23 1020          Strength (Manual Muscle Testing)    Strength (Manual Muscle Testing) bilateral lower extremities  RLE: 4-/5, LLE: 4/5  -MARIELA     Row Name 04/10/23 1020          Bed Mobility    Bed Mobility supine-sit;sit-supine  -MARIELA     Supine-Sit [place] (Bed Mobility) standby assist  -MRAIELA     Sit-Supine [place] (Bed Mobility) standby assist  -MARIELA     Assistive Device (Bed Mobility) head of bed elevated  -MARIELA     Row Name 04/10/23 1020          Transfers    Transfers sit-stand transfer;bed-chair transfer  -MARIELA     Row  Name 04/10/23 1020          Bed-Chair Transfer    Bed-Chair [place] (Transfers) minimum assist (75% patient effort);1 person assist  -MARIELA     Assistive Device (Bed-Chair Transfers) walker, front-wheeled  -MARIELA     Row Name 04/10/23 1020          Sit-Stand Transfer    Sit-Stand [place] (Transfers) minimum assist (75% patient effort);1 person assist  -MARIELA     Assistive Device (Sit-Stand Transfers) walker, front-wheeled  -MARIELA     Row Name 04/10/23 1020          Gait/Stairs (Locomotion)    Gait/Stairs Locomotion gait/ambulation assistive device  -MARIELA     [place] Level (Gait) minimum assist (75% patient effort);1 person assist  -MARIELA     Assistive Device (Gait) walker, front-wheeled  -MARIELA     Distance in Feet (Gait) 3  -MARIELA     Row Name 04/10/23 1020          Safety Issues, Functional Mobility    Impairments Affecting Function (Mobility) balance;pain;strength  -MARIELA     Row Name 04/10/23 1020          Balance    Balance Assessment standing dynamic balance  -MARIELA     Dynamic Standing Balance minimal assist;1-person assist  -MARIELA     Position/Device Used, Standing Balance supported;walker, front-wheeled  -MARIELA     Row Name 04/10/23 1020          Plan of Care Review    Plan of Care Reviewed With patient  -MARIELA     Outcome Evaluation Patient presents with deficits including balance, strength, transfers, and gait. The patient will benefit from physical therapy services while in the hospital. Upon discharge, it is recommended he goes to an inpatient rehab facility to continue working on these deficits.  -MARIELA     Row Name 04/10/23 1020          Therapy Assessment/Plan (PT)    Rehab Potential (PT) good, to achieve stated therapy goals  -MARIELA     Criteria for Skilled Interventions Met (PT) skilled treatment is necessary  -MARIELA     Therapy Frequency (PT) daily  -MARIELA     Predicted Duration of Therapy Intervention (PT) 10 days  -MARIELA     Problem List (PT) problems related to;balance;strength;pain  -MARIELA     Activity Limitations Related to Problem  List (PT) unable to ambulate safely  -MARIELA     Row Name 04/10/23 1020          PT Evaluation Complexity    History, PT Evaluation Complexity 1-2 personal factors and/or comorbidities  -MARIELA     Examination of Body Systems (PT Eval Complexity) total of 4 or more elements  -MARIELA     Clinical Presentation (PT Evaluation Complexity) stable  -MARIELA     Clinical Decision Making (PT Evaluation Complexity) low complexity  -MARIELA     Overall Complexity (PT Evaluation Complexity) low complexity  -MARIELA     Row Name 04/10/23 1020          Physical Therapy Goals    Bed Mobility Goal Selection (PT) bed mobility, PT goal 1  -MARIELA     Transfer Goal Selection (PT) transfer, PT goal 1  -MARIELA     Gait Training Goal Selection (PT) gait training, PT goal 1  -MARIELA     Row Name 04/10/23 1020          Bed Mobility Goal 1 (PT)    Activity/Assistive Device (Bed Mobility Goal 1, PT) sit to supine/supine to sit  -MARIELA     [place] Level/Cues Needed (Bed Mobility Goal 1, PT) independent  -MARIELA     Time Frame (Bed Mobility Goal 1, PT) 10 days  -MARIELA     Row Name 04/10/23 1020          Transfer Goal 1 (PT)    Activity/Assistive Device (Transfer Goal 1, PT) sit-to-stand/stand-to-sit;bed-to-chair/chair-to-bed  -MARIELA     [place] Level/Cues Needed (Transfer Goal 1, PT) standby assist  -MARIELA     Time Frame (Transfer Goal 1, PT) 10 days  -MARIELA     Row Name 04/10/23 1020          Gait Training Goal 1 (PT)    Activity/Assistive Device (Gait Training Goal 1, PT) gait (walking locomotion);assistive device use;walker, rolling  -MARIELA     [place] Level (Gait Training Goal 1, PT) standby assist  -MARIELA     Distance (Gait Training Goal 1, PT) 50 ft  -MARIELA     Time Frame (Gait Training Goal 1, PT) 10 days  -MARIELA           User Key  (r) = Recorded By, (t) = Taken By, (c) = Cosigned By    Initials Name Provider Type    Hu Bernal, PT Physical Therapist                  PT Recommendation and Plan  Anticipated Discharge Disposition (PT): inpatient rehabilitation facility  Planned  Therapy Interventions (PT): balance training, bed mobility training, gait training, motor coordination training, neuromuscular re-education, strengthening, transfer training  Therapy Frequency (PT): daily  Plan of Care Reviewed With: patient  Outcome Evaluation: Patient presents with deficits including balance, strength, transfers, and gait. The patient will benefit from physical therapy services while in the hospital. Upon discharge, it is recommended he goes to an inpatient rehab facility to continue working on these deficits.   Outcome Measures     Row Name 04/10/23 1036             How much help from another person do you currently need...    Turning from your back to your side while in flat bed without using bedrails? 4  -MARIELA      Moving from lying on back to sitting on the side of a flat bed without bedrails? 4  -MARIELA      Moving to and from a bed to a chair (including a wheelchair)? 3  -MARIELA      Standing up from a chair using your arms (e.g., wheelchair, bedside chair)? 3  -MARIELA      Climbing 3-5 steps with a railing? 3  -MARIELA      To walk in hospital room? 3  -MARIELA      AM-PAC 6 Clicks Score (PT) 20  -MARIELA         Functional Assessment    Outcome Measure Options AM-PAC 6 Clicks Basic Mobility (PT)  -MARIELA            User Key  (r) = Recorded By, (t) = Taken By, (c) = Cosigned By    Initials Name Provider Type    Hu Bernal PT Physical Therapist                 Time Calculation:    PT Charges     Row Name 04/10/23 1041             Time Calculation    PT Received On 04/10/23  -MARIELA      PT Goal Re-Cert Due Date 04/19/23  -MARIELA         Untimed Charges    PT Eval/Re-eval Minutes 50  -MARIELA         Total Minutes    Untimed Charges Total Minutes 50  -MAIRELA       Total Minutes 50  -MARIELA            User Key  (r) = Recorded By, (t) = Taken By, (c) = Cosigned By    Initials Name Provider Type    Hu Bernal PT Physical Therapist              Therapy Charges for Today     Code Description Service Date Service Provider Modifiers  Qty    76003589534 HC PT EVAL LOW COMPLEXITY 4 4/10/2023 Hu Avila, PT GP 1          PT G-Codes  Outcome Measure Options: AM-PAC 6 Clicks Basic Mobility (PT)  AM-PAC 6 Clicks Score (PT): 20    Hu Avila, PT  4/10/2023

## 2023-04-10 NOTE — PLAN OF CARE
Goal Outcome Evaluation:  Plan of Care Reviewed With: patient        Progress: no change (initial evaluation encounter)  Outcome Evaluation: Patient has experienced decline in function from baseline status, presenting w/ deficits related to activity tolerance, strength, functional balance, transfers and mobility that impede patient independence with activities of daily living.  Patient would benefit from skilled Occupational Therapy intervention to maxamize patient safety, and promote return to baseline independence.    Recommend: inpatient rehabilitation

## 2023-04-10 NOTE — THERAPY EVALUATION
Patient Name: Lyle Lozano  : 1955    MRN: [id number]                              Today's Date: 4/10/2023       Admit Date: 2023    Visit Dx:     ICD-10-CM ICD-9-CM   1. Acute on chronic congestive heart failure, unspecified heart failure type  I50.9 428.0   2. Shortness of breath  R06.02 786.05   3. Elevated brain natriuretic peptide (BNP) level  R79.89 790.99   4. Chronic renal impairment, unspecified CKD stage  N18.9 585.9   5. Blood glucose elevated  R73.9 790.29   6. Medically noncompliant  Z91.199 V15.81   7. Chronic anemia  D64.9 285.9   8. Difficulty in walking  R26.2 719.7   9. 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   • Hypertensive heart disease with heart failure   • Gout   • Stage 3 chronic kidney disease   • Hand arthritis   • Acute pain of left knee   • Elevated troponin level not due myocardial infarction   • Acute on chronic systolic heart failure (CMS/HCC)   • Acute on chronic congestive heart failure, unspecified heart failure type   • Acute on chronic HFrEF (heart failure with reduced ejection fraction)     Past Medical History:   Diagnosis Date   • Abnormal kidney function    • Arthritis    • Bursitis    • CHF (congestive heart failure)    • Depression    • Diabetes    • Edema    • Essential hypertension 2021   • Forgetfulness    • Gout    • Head injury    • Hernia cerebri    • Hyperlipidemia    • Hypertension    • IFG (impaired fasting glucose)    • Low back pain    • Lumbago      `   • Pain of left hip    • Right shoulder pain    • Sleep apnea    • SOB (shortness of breath)      Past Surgical History:   Procedure Laterality Date   • CYST REMOVAL Right     leg      General Information     Row Name 04/10/23 1439          OT Time and Intention    Document Type evaluation  -ES     Mode of Treatment individual therapy;occupational therapy  -ES     Row Name 04/10/23 1439          General Information    Patient Profile Reviewed yes  -ES     Prior Level of Function independent:;ADL's;all household mobility;community mobility  Patient reports independent with ADLs at baseline. Has cane and rolling walker for functional mobility, no device x2 weeks prior to symptom onset. Walk in showr, standing shower completion. [place] in stance. No home O2.  -ES     Existing Precautions/Restrictions fall  -ES     Barriers to Rehab none identified  -ES     Row Name 04/10/23 1439          Occupational Profile    Reason for Services/Referral (Occupational Profile) Patient is 67 yr old male admitted [facility] on [facility]/7/2023 with reports of generalized swelling, bilaeral leg pain, and inability to ambulate x2 days. OT evaluation and treatment ordered d/t recent decline in ADLs/transfer ability and discharge planning recommendations. No previous OT services for current condition.  -ES     Row Name 04/10/23 1439          Living Environment    People in Home alone  -ES     Row Name 04/10/23 1439          Cognition    Orientation Status (Cognition) oriented x 3  patient pleasant and cooperative, agreeable to therapy evaluation  -ES     Row Name 04/10/23 1439          Safety Issues, Functional Mobility    Impairments Affecting Function (Mobility) balance;pain;strength;endurance/activity tolerance  -ES           User Key  (r) = Recorded By, (t) = Taken By, (c) = Cosigned By    Initials Name Provider Type    ES Elva Lezama, OTR/L, CSRS Occupational Therapist                 Mobility/ADL's     Row Name 04/10/23  1445          Bed Mobility    Comment, (Bed Mobility) unable to assess. Patient met seated in recliner at thearpy arrival.  -ES     Row Name 04/10/23 1445          Transfers    Transfers sit-stand transfer;stand-sit transfer  -ES     Row Name 04/10/23 1445          Sit-Stand Transfer    Sit-Stand [place] (Transfers) moderate assist (50% patient effort);1 person assist  -ES     Assistive Device (Sit-Stand Transfers) walker, front-wheeled  -ES     Comment, (Sit-Stand Transfer) patient provided cueing for hand placement with transfers to ensure safe sit <> stand as patient attempts to stand with hands placed on RWX and rolling tray table. Education provided to decrease patient fall risk.  -ES     Row Name 04/10/23 1445          Stand-Sit Transfer    Stand-Sit [place] (Transfers) moderate assist (50% patient effort);1 person assist  -ES     Comment, (Stand-Sit Transfer) cueing provided with descent to decrease patient fall risk as patient attempts to sit prior to positioned in front of recliner, increasing patient fall risk  -ES     Row Name 04/10/23 1445          Functional Mobility    Functional Mobility- Ind. Level moderate assist (50% patient effort);1 person  -ES     Functional Mobility- Device walker, front-wheeled  -ES           User Key  (r) = Recorded By, (t) = Taken By, (c) = Cosigned By    Initials Name Provider Type    Elva Shah, KENROYR/L, CSRS Occupational Therapist               Obj/Interventions     Row Name 04/10/23 1448          Sensory Assessment (Somatosensory)    Sensory Assessment (Somatosensory) sensation intact  -ES     Row Name 04/10/23 1448          Vision Assessment/Intervention    Visual Impairment/Limitations WFL  -ES     Row Name 04/10/23 1448          Range of Motion Comprehensive    General Range of Motion no range of motion deficits identified;bilateral upper extremity ROM WNL  -ES     Row Name 04/10/23 1448          Strength Comprehensive (MMT)    Comment, General Manual  Muscle Testing (MMT) Assessment bilateral upper extremities assessed 4/5. Bilateral grasp 3/5 with substantial  strength weakness noted bilaterally at time of assessment  -ES     Row Name 04/10/23 1448          Motor Skills    Motor Skills coordination;functional endurance  -ES     Coordination WFL;upper extremity  -ES     Functional Endurance fair minus  -ES     Row Name 04/10/23 1448          Balance    Balance Assessment sitting dynamic balance;standing static balance  -ES     Dynamic Sitting Balance standby assist  -ES     Position, Sitting Balance unsupported;sitting in chair  -ES     Static Standing Balance moderate assist;1-person assist  -ES     Position/Device Used, Standing Balance supported;walker, front-wheeled  -ES           User Key  (r) = Recorded By, (t) = Taken By, (c) = Cosigned By    Initials Name Provider Type    Elva Shah, OTR/L, CSRS Occupational Therapist               Goals/Plan     Row Name 04/10/23 1451          Transfer Goal 1 (OT)    Activity/Assistive Device (Transfer Goal 1, OT) transfers, all;walker, rolling  -ES     [place] Level/Cues Needed (Transfer Goal 1, OT) modified independence  -ES     Time Frame (Transfer Goal 1, OT) long term goal (LTG);10 days  -ES     Row Name 04/10/23 1451          Bathing Goal 1 (OT)    Activity/Device (Bathing Goal 1, OT) bathing skills, all  -ES     [place] Level/Cues Needed (Bathing Goal 1, OT) modified independence  -ES     Time Frame (Bathing Goal 1, OT) long term goal (LTG);10 days  -ES     Row Name 04/10/23 1451          Dressing Goal 1 (OT)    Activity/Device (Dressing Goal 1, OT) dressing skills, all  -ES     [place]/Cues Needed (Dressing Goal 1, OT) modified independence  -ES     Time Frame (Dressing Goal 1, OT) long term goal (LTG);10 days  -ES     Row Name 04/10/23 1451          Toileting Goal 1 (OT)    Activity/Device (Toileting Goal 1, OT) toileting skills, all  -ES     [place] Level/Cues Needed (Toileting  Goal 1, OT) modified independence  -ES     Time Frame (Toileting Goal 1, OT) long term goal (LTG);10 days  -ES     Row Name 04/10/23 1451          Grooming Goal 1 (OT)    Activity/Device (Grooming Goal 1, OT) grooming skills, all  -ES     [place] (Grooming Goal 1, OT) modified independence  -ES     Time Frame (Grooming Goal 1, OT) long term goal (LTG);10 days  -ES     Row Name 04/10/23 1451          Strength Goal 1 (OT)    Strength Goal 1 (OT) Pt will increase BUE strength 1/2 muscle grade  for increased UE strength required for ADL transfers and task completion  -ES     Time Frame (Strength Goal 1, OT) long term goal (LTG);10 days  -ES     Row Name 04/10/23 1451          Problem Specific Goal 1 (OT)    Problem Specific Goal 1 (OT) Patient will demonstrate fair plus activity tolerance in preperation for independent ADL routine completion at time of discharge  -ES     Time Frame (Problem Specific Goal 1, OT) long term goal (LTG);10 days  -ES     Row Name 04/10/23 1451          Therapy Assessment/Plan (OT)    Planned Therapy Interventions (OT) activity tolerance training;BADL retraining;functional balance retraining;occupation/activity based interventions;patient/caregiver education/training;strengthening exercise;transfer/mobility retraining  -ES           User Key  (r) = Recorded By, (t) = Taken By, (c) = Cosigned By    Initials Name Provider Type    Elva Shah, OTR/L, CSRS Occupational Therapist               Clinical Impression     Row Name 04/10/23 1446          Plan of Care Review    Plan of Care Reviewed With patient  -ES     Progress no change  initial evaluation encounter  -ES     Outcome Evaluation Patient has experienced decline in function from baseline status, presenting w/ deficits related to activity tolerance, strength, functional balance, transfers and mobility that impede patient independence with activities of daily living.  Patient would benefit from skilled Occupational Therapy  intervention to maxamize patient safety, and promote return to baseline independence.  -ES     Row Name 04/10/23 1449          Therapy Assessment/Plan (OT)    Rehab Potential (OT) good, to achieve stated therapy goals  -ES     Criteria for Skilled Therapeutic Interventions Met (OT) yes;meets criteria;skilled treatment is necessary  -ES     Therapy Frequency (OT) 5 times/wk  -ES     Row Name 04/10/23 1449          Therapy Plan Review/Discharge Plan (OT)    Anticipated Discharge Disposition (OT) inpatient rehabilitation facility  -ES     Row Name 04/10/23 1449          Vital Signs    O2 Delivery Pre Treatment room air  -ES     O2 Delivery Intra Treatment room air  -ES     O2 Delivery Post Treatment room air  -ES     Row Name 04/10/23 1449          Positioning and Restraints    Pre-Treatment Position sitting in chair/recliner  -ES     Post Treatment Position chair  -ES           User Key  (r) = Recorded By, (t) = Taken By, (c) = Cosigned By    Initials Name Provider Type    ES Elva Lezama, OTR/L, CSRS Occupational Therapist               Outcome Measures     Row Name 04/10/23 1453          How much help from another is currently needed...    Putting on and taking off regular lower body clothing? 2  -ES     Bathing (including washing, rinsing, and drying) 2  -ES     Toileting (which includes using toilet bed pan or urinal) 2  -ES     Putting on and taking off regular upper body clothing 3  -ES     Taking care of personal grooming (such as brushing teeth) 3  -ES     Eating meals 3  -ES     AM-PAC 6 Clicks Score (OT) 15  -ES     Row Name 04/10/23 1036 04/10/23 0800       How much help from another person do you currently need...    Turning from your back to your side while in flat bed without using bedrails? 4  -MARIELA 4  -JH    Moving from lying on back to sitting on the side of a flat bed without bedrails? 4  -MARIELA 4  -JH    Moving to and from a bed to a chair (including a wheelchair)? 3  -MARIELA 3  -JH    Standing up from a  chair using your arms (e.g., wheelchair, bedside chair)? 3  -MARIELA 3  -JH    Climbing 3-5 steps with a railing? 3  -MARIELA 3  -JH    To walk in hospital room? 3  -MARIELA 3  -JH    AM-PAC 6 Clicks Score (PT) 20  -MARIELA 20  -JH    Highest level of mobility 6 --> Walked 10 steps or more  -MARIELA 6 --> Walked 10 steps or more  -    Row Name 04/10/23 1453 04/10/23 1036       Functional Assessment    Outcome Measure Options AM-PAC 6 Clicks Daily Activity (OT);Optimal Instrument  -ES AM-PAC 6 Clicks Basic Mobility (PT)  -MARIELA    Row Name 04/10/23 1453          Optimal Instrument    Optimal Instrument Optimal - 3  -ES     Bending/Stooping 3  -ES     Standing 3  -ES     Reaching 2  -ES     From the list, choose the 3 activities you would most like to be able to do without any difficulty Bending/stooping;Standing;Reaching  -ES     Total Score Optimal - 3 8  -ES           User Key  (r) = Recorded By, (t) = Taken By, (c) = Cosigned By    Initials Name Provider Type    Dania Ledesma, RN Registered Nurse    Hu Bernal, PT Physical Therapist    ES Elva Lezama, OTR/L, CSRS Occupational Therapist                  OT Recommendation and Plan  Planned Therapy Interventions (OT): activity tolerance training, BADL retraining, functional balance retraining, occupation/activity based interventions, patient/caregiver education/training, strengthening exercise, transfer/mobility retraining  Therapy Frequency (OT): 5 times/wk  Plan of Care Review  Plan of Care Reviewed With: patient  Progress: no change (initial evaluation encounter)  Outcome Evaluation: Patient has experienced decline in function from baseline status, presenting w/ deficits related to activity tolerance, strength, functional balance, transfers and mobility that impede patient independence with activities of daily living.  Patient would benefit from skilled Occupational Therapy intervention to maxamize patient safety, and promote return to baseline independence.     Time  Calculation:    Time Calculation- OT     Row Name 04/10/23 1455             Time Calculation- OT    OT Received On 04/10/23  -ES      OT Goal Re-Cert Due Date 04/19/23  -ES         Untimed Charges    OT Eval/Re-eval Minutes 32  -ES         Total Minutes    Untimed Charges Total Minutes 32  -ES       Total Minutes 32  -ES            User Key  (r) = Recorded By, (t) = Taken By, (c) = Cosigned By    Initials Name Provider Type    ES Elva Lezama, OTR/L, CSRS Occupational Therapist              Therapy Charges for Today     Code Description Service Date Service Provider Modifiers Qty    50627091210 HC OT EVAL LOW COMPLEXITY 3 4/10/2023 Elva Lezama, OTR/L, CSRS GO 1               IGNACIO Song/L, CSRS  4/10/2023

## 2023-04-10 NOTE — PLAN OF CARE
Goal Outcome Evaluation:  Plan of Care Reviewed With: patient           Outcome Evaluation: Patient presents with deficits including balance, strength, transfers, and gait. The patient will benefit from physical therapy services while in the hospital. Upon discharge, it is recommended he goes to an inpatient rehab facility to continue working on these deficits.

## 2023-04-10 NOTE — PROGRESS NOTES
" [facility]  Date: 4/10/[facility]  Patient Name: Lyle Lozano  : 1955  MRN: [id number]  Date of admission: 2023    Subjective   Subjective     Chief Complaint:   Leg pain    Summary:   Lyle Lozano is a 67 y.o. male brought to the emergency department for evaluation of generalized swelling and bilateral leg pain.  He reports that his right leg is more tender than the left.  Patient was recently discharged from our facility on 2023 following a weeklong admission.  Patient states since being discharged, he has been having difficulty bear weight on his right lower extremity and has lost the ability to ambulate over the last 48 hours due to the pain.  Patient states that he has been out of his home medications for approximately 4 days, due to being \"out of refills\".    Interval Followup:   No acute events overnight, still complaining of right lower extremity pain    Objective   Objective     Vitals:   Temp:  [97.8 °F (36.6 °C)-99.8 °F (37.7 °C)] 97.8 °F (36.6 °C)  Heart Rate:  [] 76  Resp:  [16-18] 17  BP: (124-172)/(71-90) 124/82    Physical Exam   General appearance: NAD, conversant   Eyes: anicteric sclerae, moist conjunctivae; no lid-lag; PERRLA  HENT: Atraumatic; oropharynx clear with moist mucous membranes and no mucosal ulcerations; normal hard and soft palate  Neck: Trachea midline; FROM, supple, no thyromegaly or lymphadenopathy  Lungs: CTA, with normal respiratory effort and no intercostal retractions  CV: Regular Rate and Rhythm, no Murmurs, Rubs, or Gallops     Result Review    Result Review:  I have personally reviewed the results as below  [x]  Laboratory  CBC    CBC 4/8/23 4/9/23 4/10/23   WBC 9.12 9.14 9.28   RBC 3.28 (A) 3.57 (A) 3.66 (A)   Hemoglobin 8.4 (A) 9.1 (A) 9.3 (A)   Hematocrit 25.8 (A) 28.2 (A) 28.7 (A)   MCV 78.7 (A) 79.0 78.4 (A)   MCH 25.6 (A) 25.5 (A) 25.4 (A)   MCHC 32.6 32.3 32.4   RDW 17.2 (A) 16.9 (A) 17.1 (A) "   Platelets 245 284 330   (A) Abnormal value            CMP    CMP 4/8/23 4/9/23 4/10/23   Glucose 353 (A) 202 (A) 187 (A)   BUN 31 (A) 34 (A) 38 (A)   Creatinine 1.95 (A) 2.12 (A) 2.43 (A)   eGFR 37.5 (A) 33.9 (A) 28.8 (A)   Sodium 130 (A) 132 (A) 134 (A)   Potassium 3.4 (A) 3.6 3.9   Chloride 99 98 98   Calcium 7.9 (A) 8.3 (A) 8.3 (A)   Total Protein  6.3 6.5   Albumin  2.1 (A) 2.2 (A)   Globulin  4.2 4.3   Total Bilirubin  0.5 0.5   Alkaline Phosphatase  136 (A) 130 (A)   AST (SGOT)  12 11   ALT (SGPT)  10 9   Albumin/Globulin Ratio  0.5 0.5   BUN/Creatinine Ratio 15.9 16.0 15.6   Anion Gap 9.9 10.3 12.9   (A) Abnormal value            []  Microbiology  []  Radiology  []  EKG/Telemetry   []  Cardiology/Vascular   []  Pathology  []  Old records  []  Other:    Assessment & Plan   Assessment / Plan     Assessment:  Acute on chronic systolic and diastolic congestive heart failure  Elevated troponin, unlikely to be ACS  Uncontrolled type 2 diabetes mellitus  Acute kidney injury    Plan:  • Patient admitted to the hospital for further work-up and management of above  • Continue aggressive diuresis with IV Lasix  • NT proBNP reviewed, 22,000  • Records from last hospitalization reviewed, patient has DVT, will continue anticoagulation with Lovenox, transition to Eliquis at discharge  • Check right lower extremity Doppler given new swelling and pain in the calf  • continue sliding-scale insulin, monitor blood glucose closely  • Monitor renal function  • CBC, CMP reviewed  • Monitor renal function closely, this is a trending however still with significant edema on the right lower extremity  • Repeat CBC, CMP, mag and Phos in a.m.  • Chest x-ray personally reviewed, consistent with pulmonary edema  • Continue aggressive IV diuretics, 3.6 L net negative this hospitalization  • Continue increased dose of Percocet to 10 mg for uncontrolled pain  • Current level of care is adequate, no reason to transfer at this  time  • Clinical course will dictate further management     Reviewed patients labs and imaging, and discussed with patient and nurse at bedside.    DVT prophylaxis:  Medical DVT prophylaxis orders are present.    CODE STATUS:   Level Of Support Discussed With: Patient  Code Status (Patient has no pulse and is not breathing): CPR (Attempt to Resuscitate)  Medical Interventions (Patient has pulse or is breathing): Full Support      Electronically signed by Ramon Card MD, 04/10/23, 12:05 PM EDT.

## 2023-04-11 ENCOUNTER — APPOINTMENT (OUTPATIENT)
Dept: MRI IMAGING | Facility: HOSPITAL | Age: 68
DRG: 291 | End: 2023-04-11
Payer: MEDICARE

## 2023-04-11 ENCOUNTER — APPOINTMENT (OUTPATIENT)
Dept: CT IMAGING | Facility: HOSPITAL | Age: 68
DRG: 291 | End: 2023-04-11
Payer: MEDICARE

## 2023-04-11 LAB
ALBUMIN SERPL-MCNC: 2.1 G/DL (ref 3.5–5.2)
ALBUMIN/GLOB SERPL: 0.4 G/DL
ALP SERPL-CCNC: 132 U/L (ref 39–117)
ALT SERPL W P-5'-P-CCNC: 9 U/L (ref 1–41)
ANION GAP SERPL CALCULATED.3IONS-SCNC: 11.4 MMOL/L (ref 5–15)
AST SERPL-CCNC: 12 U/L (ref 1–40)
BASOPHILS # BLD AUTO: 0.02 10*3/MM3 (ref 0–0.2)
BASOPHILS NFR BLD AUTO: 0.2 % (ref 0–1.5)
BILIRUB SERPL-MCNC: 0.4 MG/DL (ref 0–1.2)
BUN SERPL-MCNC: 42 MG/DL (ref 8–23)
BUN/CREAT SERPL: 19.6 (ref 7–25)
CALCIUM SPEC-SCNC: 8.3 MG/DL (ref 8.6–10.5)
CHLORIDE SERPL-SCNC: 95 MMOL/L (ref 98–107)
CO2 SERPL-SCNC: 24.6 MMOL/L (ref 22–29)
CREAT SERPL-MCNC: 2.14 MG/DL (ref 0.76–1.27)
DEPRECATED RDW RBC AUTO: 49.9 FL (ref 37–54)
EGFRCR SERPLBLD CKD-EPI 2021: 33.5 ML/MIN/1.73
EOSINOPHIL # BLD AUTO: 0.17 10*3/MM3 (ref 0–0.4)
EOSINOPHIL NFR BLD AUTO: 1.8 % (ref 0.3–6.2)
ERYTHROCYTE [DISTWIDTH] IN BLOOD BY AUTOMATED COUNT: 17.4 % (ref 12.3–15.4)
GLOBULIN UR ELPH-MCNC: 4.7 GM/DL
GLUCOSE BLDC GLUCOMTR-MCNC: 136 MG/DL (ref 70–99)
GLUCOSE BLDC GLUCOMTR-MCNC: 167 MG/DL (ref 70–99)
GLUCOSE BLDC GLUCOMTR-MCNC: 186 MG/DL (ref 70–99)
GLUCOSE SERPL-MCNC: 118 MG/DL (ref 65–99)
HCT VFR BLD AUTO: 29.5 % (ref 37.5–51)
HGB BLD-MCNC: 9.4 G/DL (ref 13–17.7)
IMM GRANULOCYTES # BLD AUTO: 0.05 10*3/MM3 (ref 0–0.05)
IMM GRANULOCYTES NFR BLD AUTO: 0.5 % (ref 0–0.5)
LYMPHOCYTES # BLD AUTO: 1.5 10*3/MM3 (ref 0.7–3.1)
LYMPHOCYTES NFR BLD AUTO: 15.7 % (ref 19.6–45.3)
MCH RBC QN AUTO: 24.9 PG (ref 26.6–33)
MCHC RBC AUTO-ENTMCNC: 31.9 G/DL (ref 31.5–35.7)
MCV RBC AUTO: 78.2 FL (ref 79–97)
MONOCYTES # BLD AUTO: 0.59 10*3/MM3 (ref 0.1–0.9)
MONOCYTES NFR BLD AUTO: 6.2 % (ref 5–12)
NEUTROPHILS NFR BLD AUTO: 7.22 10*3/MM3 (ref 1.7–7)
NEUTROPHILS NFR BLD AUTO: 75.6 % (ref 42.7–76)
NRBC BLD AUTO-RTO: 0 /100 WBC (ref 0–0.2)
PLATELET # BLD AUTO: 386 10*3/MM3 (ref 140–450)
PMV BLD AUTO: 9.1 FL (ref 6–12)
POTASSIUM SERPL-SCNC: 3.7 MMOL/L (ref 3.5–5.2)
PROT SERPL-MCNC: 6.8 G/DL (ref 6–8.5)
RBC # BLD AUTO: 3.77 10*6/MM3 (ref 4.14–5.8)
SODIUM SERPL-SCNC: 131 MMOL/L (ref 136–145)
WBC NRBC COR # BLD: 9.55 10*3/MM3 (ref 3.4–10.8)

## 2023-04-11 PROCEDURE — G0425 INPT/ED TELECONSULT30: HCPCS | Performed by: PSYCHIATRY & NEUROLOGY

## 2023-04-11 PROCEDURE — 63710000001 INSULIN LISPRO (HUMAN) PER 5 UNITS: Performed by: FAMILY MEDICINE

## 2023-04-11 PROCEDURE — 70551 MRI BRAIN STEM W/O DYE: CPT

## 2023-04-11 PROCEDURE — 70547 MR ANGIOGRAPHY NECK W/O DYE: CPT

## 2023-04-11 PROCEDURE — 94799 UNLISTED PULMONARY SVC/PX: CPT

## 2023-04-11 PROCEDURE — 70544 MR ANGIOGRAPHY HEAD W/O DYE: CPT

## 2023-04-11 PROCEDURE — 25010000002 FUROSEMIDE PER 20 MG: Performed by: FAMILY MEDICINE

## 2023-04-11 PROCEDURE — 99233 SBSQ HOSP IP/OBS HIGH 50: CPT | Performed by: INTERNAL MEDICINE

## 2023-04-11 PROCEDURE — 25010000002 ENOXAPARIN PER 10 MG: Performed by: INTERNAL MEDICINE

## 2023-04-11 PROCEDURE — 82962 GLUCOSE BLOOD TEST: CPT

## 2023-04-11 PROCEDURE — 80053 COMPREHEN METABOLIC PANEL: CPT | Performed by: INTERNAL MEDICINE

## 2023-04-11 PROCEDURE — 85025 COMPLETE CBC W/AUTO DIFF WBC: CPT | Performed by: INTERNAL MEDICINE

## 2023-04-11 PROCEDURE — 70450 CT HEAD/BRAIN W/O DYE: CPT

## 2023-04-11 PROCEDURE — 94760 N-INVAS EAR/PLS OXIMETRY 1: CPT

## 2023-04-11 RX ORDER — ACETAMINOPHEN 325 MG/1
650 TABLET ORAL ONCE
Status: COMPLETED | OUTPATIENT
Start: 2023-04-11 | End: 2023-04-11

## 2023-04-11 RX ADMIN — Medication 10 ML: at 08:08

## 2023-04-11 RX ADMIN — ENOXAPARIN SODIUM 80 MG: 100 INJECTION SUBCUTANEOUS at 11:08

## 2023-04-11 RX ADMIN — FUROSEMIDE 80 MG: 10 INJECTION, SOLUTION INTRAMUSCULAR; INTRAVENOUS at 20:24

## 2023-04-11 RX ADMIN — ENOXAPARIN SODIUM 80 MG: 100 INJECTION SUBCUTANEOUS at 20:30

## 2023-04-11 RX ADMIN — Medication 10 ML: at 20:24

## 2023-04-11 RX ADMIN — ACETAMINOPHEN 650 MG: 325 TABLET ORAL at 20:24

## 2023-04-11 RX ADMIN — INSULIN LISPRO 2 UNITS: 100 INJECTION, SOLUTION INTRAVENOUS; SUBCUTANEOUS at 08:08

## 2023-04-11 RX ADMIN — INSULIN LISPRO 2 UNITS: 100 INJECTION, SOLUTION INTRAVENOUS; SUBCUTANEOUS at 17:10

## 2023-04-11 NOTE — CONSULTS
[facility]  [facility]      Patient Name:   Lyle Lozano  YOB: 1955  Identification Number:   MRN - [id number]  Date of Service:   04/11/2023 08:36:26    Diagnosis:      •  G45.9 - Transient cerebral ischemic attack, unspecified    Impression:      • 66 y/o M with HTN, HLD, DM2, CAD, CHF, with active bilateral DVT's currently admitted to hospital, who developed acute change in mental status this morning, with concern for aphasia and visual field abnormality. On examination, he has mild right lower facial weakness (which has been present since admission as per Dr. Card), reduced sensation over right leg, and mild lethargy. NCHCT without acute abnormalities.      Possibilities include TIA/small stroke (?paradoxical embolism from DVT), versus metabolic encephalopathy from acute kidney dysfunction, less likely seizure.      Recommend MRI brain w/o contrast, MRA head/neck w/o contrast. Low clinical suspicion that he had large stroke, so safe to continue anticoagulation from neurological standpoint. If MRI ends up showing larger stroke than expected, then may need to weigh benefits versus risks of actively continuing anticoagulation. Also recommend TTE w/ bubble study, also suggest EEG. Further input as per inpatient neurology.    Our recommendations are outlined below.    Recommendations:        •  Stroke/Telemetry Floor      •  Neuro Checks      •  Bedside Swallow Eval      •  DVT Prophylaxis      •  IV Fluids, Normal Saline      •  Head of Bed 30 Degrees      •  Euglycemia and Avoid Hyperthermia (PRN Acetaminophen)    Per facility request will defer further work up, management, and referrals to inpatient service, inclusive of inpatient neurology consult.    Sign Out:      •  Discussed with Primary Attending        ------------------------------------------------------------------------------    Advanced Imaging:  Advanced Imaging Not Completed  because:    low suspicion for LVO; kidney dysfunction      Metrics:  Last Known Well: 04/11/2023 07:35:00  [facility] Notification Time: 04/11/2023 08:35:49  Stamp Time: 04/11/2023 08:36:26  Initial Response Time: 04/11/2023 08:40:31  Symptoms: confusion, aphasia.  NIHSS Start Assessment Time: 04/11/2023 08:49:16  Patient is not a candidate for Thrombolytic.  Thrombolytic Medical Decision: 04/11/2023 08:48:51  Patient was not deemed candidate for Thrombolytic because of following reasons:  Therapeutic doses of Lovenox within past 24 hours.    I personally Reviewed the CT Head and it Showed no acute intracranial abnormalities    ED Physician not notified of diagnostic impression and management plan because Discussed with attending Dr. Card at approximately 9:10 AM.        ------------------------------------------------------------------------------    History of Present Illness:  Patient is a 67 year old Male.    Inpatient stroke alert was called for symptoms of confusion, aphasia.  This is a 68 y/o M with history of HTN, HLD, CAD, cardiomyopathy, who has been admitted to hospital with bilateral DVT as well as acute kidney injury. Neurology consulted for change in mental status. His last known normal as per RN was 7:35 AM, at which time he was communicating appropriately. At 8:05 AM, patient was noted to be more sluggish, and potentially having trouble communicating, concern raised for aphasia. There was also concern for right visual field abnormality. RRT was activated. Patient has been on therapeutic Lovenox for DVT's. At the time of my assessment, he seems improved as far as his speaking and communication. NCHCT does not seem to show acute abnormalities.      Past Medical History:      • Hypertension      • Diabetes Mellitus      • Hyperlipidemia      • Coronary Artery Disease      • There is no history of Atrial Fibrillation      • There is no history of Stroke  Othere PMH:  DVT's,  CHF    Medications:    Anticoagulant use:  Yes Lovenox  No Antiplatelet use  Reviewed EMR for current medications    Allergies:   Reviewed    Social History:  Smoking: No  Alcohol Use: No  Drug Use: No    Family History:    There is no family history of premature cerebrovascular disease pertinent to this consultation    ROS :  14 Points Review of Systems was performed and was negative except mentioned in HPI.    Past Surgical History:  There Is No Surgical History Contributory To Today’s Visit        Examination:  BP(144/76), Pulse(86), Blood Glucose(187)  1A: Level of Consciousness - Arouses to minor stimulation + 1  1B: Ask Month and Age - Both Questions Right + 0  1C: Blink Eyes & Squeeze Hands - Performs Both Tasks + 0  2: Test Horizontal Extraocular Movements - Normal + 0  3: Test Visual Fields - No Visual Loss + 0  4: Test Facial Palsy (Use Grimace if Obtunded) - Minor paralysis (flat nasolabial fold, smile asymmetry) + 1  5A: Test Left Arm Motor Drift - No Drift for 10 Seconds + 0  5B: Test Right Arm Motor Drift - No Drift for 10 Seconds + 0  6A: Test Left Leg Motor Drift - No Drift for 5 Seconds + 0  6B: Test Right Leg Motor Drift - No Drift for 5 Seconds + 0  7: Test Limb Ataxia (FNF/Heel-Shin) - No Ataxia + 0  8: Test Sensation - Mild-Moderate Loss: Less Sharp/More Dull + 1  9: Test Language/Aphasia - Normal; No aphasia + 0  10: Test Dysarthria - Normal + 0  11: Test Extinction/Inattention - No abnormality + 0    NIHSS Score: 3    NIHSS Free Text : Mild right lower facial weakness   Reduced sensation over right leg to light touch    Pre-Morbid Modified [place] Scale:  1 Points = No significant disability despite symptoms; able to carry out all usual duties and activities    I reviewed the available imaging via A.I. software Rapid and initiated discussion with the primary provider    Patient/Family was informed the Neurology Consult would occur via TeleHealth consult by way of interactive audio and video  telecommunications and consented to receiving care in this manner.      Patient is being evaluated for possible acute neurologic impairment and high probability of imminent or life-threatening deterioration. I spent total of 40 minutes providing care to this patient, including time for face to face visit via telemedicine, review of medical records, imaging studies and discussion of findings with providers, the patient and/or family.      Dr Jr Thrasher      [facility]  [phone]      Case [id number]

## 2023-04-11 NOTE — PLAN OF CARE
Goal Outcome Evaluation:  Plan of Care Reviewed With: patient, family  Daily Care Plan Summary: Heart Failure    Diuretic in use (IV or PO):   IV        Daily weight (up or down):    gain: 2.1 lbs      Output > Intake (yes/no):Yes      O2 Requirements (current, any change?): room air      Symptoms noted with Activity (Respiratory Tolerance, functional state):     none      Anticipated Discharge Plans:     PT discussed willingness for rehab. Will need to confirm pt has refills on meds, and importance of compliance.

## 2023-04-11 NOTE — PROGRESS NOTES
" [facility]   Hospitalist Progress Note  Date: 2023  Patient Name: Lyle Lozano  : 1955  MRN: [id number]  Date of admission: 2023    Subjective   Subjective     Chief Complaint:   Leg pain    Summary:   Lyle Lozano is a 67 y.o. male brought to the emergency department for evaluation of generalized swelling and bilateral leg pain.  He reports that his right leg is more tender than the left.  Patient was recently discharged from our facility on 2023 following a weeklong admission.  Patient states since being discharged, he has been having difficulty bear weight on his right lower extremity and has lost the ability to ambulate over the last 48 hours due to the pain.  Patient states that he has been out of his home medications for approximately 4 days, due to being \"out of refills\".    Interval Followup:   This a.m. stroke RRT called, patient able to talk, no focal deficits on my exam, possible facial droop    Objective   Objective     Vitals:   Temp:  [97.8 °F (36.6 °C)-99.1 °F (37.3 °C)] 98.8 °F (37.1 °C)  Heart Rate:  [74-99] 92  Resp:  [17-20] 20  BP: (124-164)/(66-97) 144/76    Physical Exam   General appearance: NAD, conversant   Eyes: anicteric sclerae, moist conjunctivae; no lid-lag; PERRLA  HENT: Atraumatic; oropharynx clear with moist mucous membranes and no mucosal ulcerations; normal hard and soft palate  Neck: Trachea midline; FROM, supple, no thyromegaly or lymphadenopathy  Lungs: CTA, with normal respiratory effort and no intercostal retractions  CV: Regular Rate and Rhythm, no Murmurs, Rubs, or Gallops   Neuro: Moving all 4 extremities spontaneously, no focal deficits or weakness, possibly some slurred speech    Result Review    Result Review:  I have personally reviewed the results as below  [x]  Laboratory  CBC        2023    04:19 2023    05:30 4/10/2023    04:18   CBC   WBC 9.12   9.14   9.28     RBC 3.28   3.57   3.66     Hemoglobin 8.4   " 9.1   9.3     Hematocrit 25.8   28.2   28.7     MCV 78.7   79.0   78.4     MCH 25.6   25.5   25.4     MCHC 32.6   32.3   32.4     RDW 17.2   16.9   17.1     Platelets 245   284   330       CMP        4/8/2023    04:19 4/9/2023    05:30 4/10/2023    04:18   CMP   Glucose 353   202   187     BUN 31   34   38     Creatinine 1.95   2.12   2.43     EGFR 37.5   33.9   28.8     Sodium 130   132   134     Potassium 3.4   3.6   3.9     Chloride 99   98   98     Calcium 7.9   8.3   8.3     Total Protein  6.3   6.5     Albumin  2.1   2.2     Globulin  4.2   4.3     Total Bilirubin  0.5   0.5     Alkaline Phosphatase  136   130     AST (SGOT)  12   11     ALT (SGPT)  10   9     Albumin/Globulin Ratio  0.5   0.5     BUN/Creatinine Ratio 15.9   16.0   15.6     Anion Gap 9.9   10.3   12.9       []  Microbiology  []  Radiology  []  EKG/Telemetry   []  Cardiology/Vascular   []  Pathology  []  Old records  []  Other:    Assessment & Plan   Assessment / Plan     Assessment:  Acute on chronic systolic and diastolic congestive heart failure  Elevated troponin, unlikely to be ACS  Uncontrolled type 2 diabetes mellitus  Acute kidney injury    Plan:  • Patient admitted to the hospital for further work-up and management of above  • Hold diuretics this morning  • CBC, CMP pending  • Patient with confusion, slurred speech this a.m., stroke RRT called  • Stat CT scan concerning for small frontal lobe stroke  • MRI/MRA ordered and pending  • We will hold on IV contrast for CTA secondary to increasing renal function  • New DVT found in the right lower extremity, patient has bilateral lower extremity DVTs  • On Eliquis, patient would benefit from hematology follow-up as outpatient for hypercoagulable work-up  • Repeat echocardiogram with bubble study today  • Discussed case with teleneurology, discussed management plan  • Chest x-ray personally reviewed, consistent with pulmonary edema  • Continue Percocet for pain control  • Current level of  care is adequate, no reason to transfer at this time  • Continue stroke pathway  • Clinical course will dictate further management     Reviewed patients labs and imaging, and discussed with patient and nurse at bedside.    DVT prophylaxis:  Medical DVT prophylaxis orders are present.    CODE STATUS:   Level Of Support Discussed With: Patient  Code Status (Patient has no pulse and is not breathing): CPR (Attempt to Resuscitate)  Medical Interventions (Patient has pulse or is breathing): Full Support      Electronically signed by Ramon Card MD, 04/11/23, 10:14 AM EDT.

## 2023-04-11 NOTE — PLAN OF CARE
Goal Outcome Evaluation:  Plan of Care Reviewed With: patient, family  PT was noted to have new onset confusion and aphasia. Stroke RRT called. Family notified.

## 2023-04-12 ENCOUNTER — APPOINTMENT (OUTPATIENT)
Dept: GENERAL RADIOLOGY | Facility: HOSPITAL | Age: 68
DRG: 291 | End: 2023-04-12
Payer: MEDICARE

## 2023-04-12 ENCOUNTER — APPOINTMENT (OUTPATIENT)
Dept: CARDIOLOGY | Facility: HOSPITAL | Age: 68
DRG: 291 | End: 2023-04-12
Payer: MEDICARE

## 2023-04-12 LAB
ALBUMIN SERPL-MCNC: 1.9 G/DL (ref 3.5–5.2)
ALBUMIN/GLOB SERPL: 0.4 G/DL
ALP SERPL-CCNC: 139 U/L (ref 39–117)
ALT SERPL W P-5'-P-CCNC: 9 U/L (ref 1–41)
ANION GAP SERPL CALCULATED.3IONS-SCNC: 11.7 MMOL/L (ref 5–15)
AST SERPL-CCNC: 14 U/L (ref 1–40)
BASOPHILS # BLD AUTO: 0.02 10*3/MM3 (ref 0–0.2)
BASOPHILS NFR BLD AUTO: 0.2 % (ref 0–1.5)
BILIRUB SERPL-MCNC: 0.5 MG/DL (ref 0–1.2)
BUN SERPL-MCNC: 43 MG/DL (ref 8–23)
BUN/CREAT SERPL: 22.1 (ref 7–25)
CALCIUM SPEC-SCNC: 8.2 MG/DL (ref 8.6–10.5)
CHLORIDE SERPL-SCNC: 96 MMOL/L (ref 98–107)
CO2 SERPL-SCNC: 24.3 MMOL/L (ref 22–29)
CREAT SERPL-MCNC: 1.95 MG/DL (ref 0.76–1.27)
DEPRECATED RDW RBC AUTO: 48.9 FL (ref 37–54)
EGFRCR SERPLBLD CKD-EPI 2021: 37.5 ML/MIN/1.73
EOSINOPHIL # BLD AUTO: 0.21 10*3/MM3 (ref 0–0.4)
EOSINOPHIL NFR BLD AUTO: 2.4 % (ref 0.3–6.2)
ERYTHROCYTE [DISTWIDTH] IN BLOOD BY AUTOMATED COUNT: 17.2 % (ref 12.3–15.4)
GLOBULIN UR ELPH-MCNC: 4.5 GM/DL
GLUCOSE BLDC GLUCOMTR-MCNC: 133 MG/DL (ref 70–99)
GLUCOSE BLDC GLUCOMTR-MCNC: 195 MG/DL (ref 70–99)
GLUCOSE BLDC GLUCOMTR-MCNC: 222 MG/DL (ref 70–99)
GLUCOSE SERPL-MCNC: 208 MG/DL (ref 65–99)
HCT VFR BLD AUTO: 27.8 % (ref 37.5–51)
HGB BLD-MCNC: 9 G/DL (ref 13–17.7)
IMM GRANULOCYTES # BLD AUTO: 0.03 10*3/MM3 (ref 0–0.05)
IMM GRANULOCYTES NFR BLD AUTO: 0.3 % (ref 0–0.5)
LYMPHOCYTES # BLD AUTO: 1.35 10*3/MM3 (ref 0.7–3.1)
LYMPHOCYTES NFR BLD AUTO: 15.2 % (ref 19.6–45.3)
MAGNESIUM SERPL-MCNC: 1.9 MG/DL (ref 1.6–2.4)
MCH RBC QN AUTO: 25.1 PG (ref 26.6–33)
MCHC RBC AUTO-ENTMCNC: 32.4 G/DL (ref 31.5–35.7)
MCV RBC AUTO: 77.7 FL (ref 79–97)
MONOCYTES # BLD AUTO: 0.57 10*3/MM3 (ref 0.1–0.9)
MONOCYTES NFR BLD AUTO: 6.4 % (ref 5–12)
NEUTROPHILS NFR BLD AUTO: 6.69 10*3/MM3 (ref 1.7–7)
NEUTROPHILS NFR BLD AUTO: 75.5 % (ref 42.7–76)
NRBC BLD AUTO-RTO: 0 /100 WBC (ref 0–0.2)
PHOSPHATE SERPL-MCNC: 3.3 MG/DL (ref 2.5–4.5)
PLATELET # BLD AUTO: 379 10*3/MM3 (ref 140–450)
PMV BLD AUTO: 9.5 FL (ref 6–12)
POTASSIUM SERPL-SCNC: 3.5 MMOL/L (ref 3.5–5.2)
PROT SERPL-MCNC: 6.4 G/DL (ref 6–8.5)
RBC # BLD AUTO: 3.58 10*6/MM3 (ref 4.14–5.8)
SODIUM SERPL-SCNC: 132 MMOL/L (ref 136–145)
WBC NRBC COR # BLD: 8.87 10*3/MM3 (ref 3.4–10.8)

## 2023-04-12 PROCEDURE — 99233 SBSQ HOSP IP/OBS HIGH 50: CPT | Performed by: INTERNAL MEDICINE

## 2023-04-12 PROCEDURE — 93306 TTE W/DOPPLER COMPLETE: CPT

## 2023-04-12 PROCEDURE — 99233 SBSQ HOSP IP/OBS HIGH 50: CPT | Performed by: PSYCHIATRY & NEUROLOGY

## 2023-04-12 PROCEDURE — 85025 COMPLETE CBC W/AUTO DIFF WBC: CPT | Performed by: INTERNAL MEDICINE

## 2023-04-12 PROCEDURE — 80053 COMPREHEN METABOLIC PANEL: CPT | Performed by: INTERNAL MEDICINE

## 2023-04-12 PROCEDURE — 25010000002 FUROSEMIDE PER 20 MG: Performed by: FAMILY MEDICINE

## 2023-04-12 PROCEDURE — 94799 UNLISTED PULMONARY SVC/PX: CPT

## 2023-04-12 PROCEDURE — 84100 ASSAY OF PHOSPHORUS: CPT | Performed by: INTERNAL MEDICINE

## 2023-04-12 PROCEDURE — 82962 GLUCOSE BLOOD TEST: CPT

## 2023-04-12 PROCEDURE — 25010000002 ENOXAPARIN PER 10 MG: Performed by: INTERNAL MEDICINE

## 2023-04-12 PROCEDURE — 97110 THERAPEUTIC EXERCISES: CPT

## 2023-04-12 PROCEDURE — 73030 X-RAY EXAM OF SHOULDER: CPT

## 2023-04-12 PROCEDURE — 83735 ASSAY OF MAGNESIUM: CPT | Performed by: INTERNAL MEDICINE

## 2023-04-12 PROCEDURE — 63710000001 INSULIN LISPRO (HUMAN) PER 5 UNITS: Performed by: FAMILY MEDICINE

## 2023-04-12 PROCEDURE — 93306 TTE W/DOPPLER COMPLETE: CPT | Performed by: SPECIALIST

## 2023-04-12 RX ORDER — ATORVASTATIN CALCIUM 40 MG/1
80 TABLET, FILM COATED ORAL NIGHTLY
Status: DISCONTINUED | OUTPATIENT
Start: 2023-04-12 | End: 2023-04-17 | Stop reason: HOSPADM

## 2023-04-12 RX ORDER — ASPIRIN 81 MG/1
81 TABLET ORAL DAILY
Status: DISCONTINUED | OUTPATIENT
Start: 2023-04-12 | End: 2023-04-13

## 2023-04-12 RX ORDER — ALLOPURINOL 100 MG/1
100 TABLET ORAL DAILY
Status: DISCONTINUED | OUTPATIENT
Start: 2023-04-13 | End: 2023-04-17 | Stop reason: HOSPADM

## 2023-04-12 RX ADMIN — INSULIN LISPRO 4 UNITS: 100 INJECTION, SOLUTION INTRAVENOUS; SUBCUTANEOUS at 12:14

## 2023-04-12 RX ADMIN — DICLOFENAC SODIUM 2 G: 10 GEL TOPICAL at 23:17

## 2023-04-12 RX ADMIN — OXYCODONE HYDROCHLORIDE AND ACETAMINOPHEN 1 TABLET: 10; 325 TABLET ORAL at 12:16

## 2023-04-12 RX ADMIN — ASPIRIN 81 MG: 81 TABLET, COATED ORAL at 12:14

## 2023-04-12 RX ADMIN — INSULIN LISPRO 2 UNITS: 100 INJECTION, SOLUTION INTRAVENOUS; SUBCUTANEOUS at 08:20

## 2023-04-12 RX ADMIN — ENOXAPARIN SODIUM 80 MG: 100 INJECTION SUBCUTANEOUS at 20:42

## 2023-04-12 RX ADMIN — ATORVASTATIN CALCIUM 80 MG: 40 TABLET, FILM COATED ORAL at 20:42

## 2023-04-12 RX ADMIN — Medication 10 ML: at 08:09

## 2023-04-12 RX ADMIN — ENOXAPARIN SODIUM 80 MG: 100 INJECTION SUBCUTANEOUS at 08:09

## 2023-04-12 RX ADMIN — OXYCODONE HYDROCHLORIDE AND ACETAMINOPHEN 1 TABLET: 10; 325 TABLET ORAL at 08:08

## 2023-04-12 RX ADMIN — FUROSEMIDE 80 MG: 10 INJECTION, SOLUTION INTRAMUSCULAR; INTRAVENOUS at 08:08

## 2023-04-12 RX ADMIN — OXYCODONE HYDROCHLORIDE AND ACETAMINOPHEN 1 TABLET: 10; 325 TABLET ORAL at 18:38

## 2023-04-12 RX ADMIN — FUROSEMIDE 80 MG: 10 INJECTION, SOLUTION INTRAMUSCULAR; INTRAVENOUS at 20:42

## 2023-04-12 RX ADMIN — Medication 10 ML: at 20:42

## 2023-04-12 NOTE — THERAPY TREATMENT NOTE
Acute Care - Physical Therapy Treatment Note  [facility]     Patient Name: Lyle Lozano  : 1955  MRN: [id number]  Today's Date: 2023      Visit Dx:     ICD-10-CM ICD-9-CM   1. Acute on chronic congestive heart failure, unspecified heart failure type  I50.9 428.0   2. Shortness of breath  R06.02 786.05   3. Elevated brain natriuretic peptide (BNP) level  R79.89 790.99   4. Chronic renal impairment, unspecified CKD stage  N18.9 585.9   5. Blood glucose elevated  R73.9 790.29   6. Medically noncompliant  Z91.199 V15.81   7. Chronic anemia  D64.9 285.9   8. Difficulty in walking  R26.2 719.7   9. 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   • Hypertensive heart disease with heart failure   • Gout   • Stage 3 chronic kidney disease   • Hand arthritis   • Acute pain of left knee   • Elevated troponin level not due myocardial infarction   • Acute on chronic systolic heart failure (CMS/HCC)   • Acute on chronic congestive heart failure, unspecified heart failure type   • Acute on chronic HFrEF (heart failure with reduced ejection fraction)     Past Medical History:   Diagnosis Date   • Abnormal kidney function    • Arthritis    • Bursitis    • CHF (congestive heart failure)    • Depression    • Diabetes    • Edema    • Essential hypertension 2021   • Forgetfulness    • Gout    • Head injury    • Hernia cerebri    • Hyperlipidemia    • Hypertension    • IFG (impaired fasting glucose)    • Low back pain    •  Lumbago     `   • Pain of left hip    • Right shoulder pain    • Sleep apnea    • SOB (shortness of breath)      Past Surgical History:   Procedure Laterality Date   • CYST REMOVAL Right     leg     PT Assessment (last 12 hours)     PT Evaluation and Treatment     Row Name 04/12/23 1400          Physical Therapy Time and Intention    Subjective Information no complaints (P)   -     Document Type therapy note (daily note) (P)   -     Mode of Treatment individual therapy;physical therapy (P)   -     Patient Effort good (P)   -     Symptoms Noted During/After Treatment none (P)   -[facility] Name 04/12/23 1400          Bed Mobility    Bed Mobility bed mobility (all) activities (P)   -     All Activities, [place] (Bed Mobility) standby assist (P)   -     Assistive Device (Bed Mobility) bed rails;head of bed elevated (P)   -SW     Row Name 04/12/23 1400          Sit-Stand Transfer    Sit-Stand [place] (Transfers) -- (P)   not tested, pt declined standing this date  -SW     Row Name 04/12/23 1400          Gait/Stairs (Locomotion)    Gait/Stairs Locomotion other (see comments) (P)   Not tested, pt declined walking this date  -SW     Row Name 04/12/23 1400          Safety Issues, Functional Mobility    Impairments Affecting Function (Mobility) balance;pain;strength;endurance/activity tolerance (P)   -SW     Row Name 04/12/23 1400          Balance    Balance Assessment sitting dynamic balance (P)   -     Dynamic Sitting Balance standby assist (P)   -     Position, Sitting Balance sitting edge of bed (P)   -SW     Row Name 04/12/23 1400          Motor Skills    Therapeutic Exercise -- (P)   Seated exercises x20 reps each side, long arc quads, hip marches, and ankle pumps.  -[facility] Name 04/12/23 1400          Positioning and Restraints    Pre-Treatment Position in bed (P)   -     Post Treatment Position bed (P)   -     In Bed sitting;call light within reach;with other staff (P)   -[facility]  Name 04/12/23 1400          Progress Summary (PT)    Progress Toward Functional Goals (PT) progress toward functional goals is fair (P)   -SW     Daily Progress Summary (PT) Pt tolerated seated exercises with minimal difficulty. Pt was SBA for bed mobility and declined walking this date. Pt would continue to benefit from physical therapy services while in the hospital. (P)   -           User Key  (r) = Recorded By, (t) = Taken By, (c) = Cosigned By    Initials Name Provider Type    Brianna Horton, PT Student PT Student                  PT Recommendation and Plan     Progress Summary (PT)  Progress Toward Functional Goals (PT): (P) progress toward functional goals is fair  Daily Progress Summary (PT): (P) Pt tolerated seated exercises with minimal difficulty. Pt was SBA for bed mobility and declined walking this date. Pt would continue to benefit from physical therapy services while in the hospital.   Outcome Measures     Row Name 04/12/23 1400 04/10/23 1036          How much help from another person do you currently need...    Turning from your back to your side while in flat bed without using bedrails? 4 (P)   -SW 4  -MARIELA     Moving from lying on back to sitting on the side of a flat bed without bedrails? 4 (P)   -SW 4  -MARIELA     Moving to and from a bed to a chair (including a wheelchair)? 3 (P)   -SW 3  -MARIELA     Standing up from a chair using your arms (e.g., wheelchair, bedside chair)? 3 (P)   -SW 3  -MARIELA     Climbing 3-5 steps with a railing? 3 (P)   -SW 3  -MARIELA     To walk in hospital room? 3 (P)   -SW 3  -MARIELA     AM-PAC 6 Clicks Score (PT) 20 (P)   -SW 20  -MARIELA        Functional Assessment    Outcome Measure Options AM-PAC 6 Clicks Basic Mobility (PT) (P)   -SW AM-PAC 6 Clicks Basic Mobility (PT)  -MARIELA           User Key  (r) = Recorded By, (t) = Taken By, (c) = Cosigned By    Initials Name Provider Type    Hu Bernal, PT Physical Therapist    Brianna Horton, PT Student PT Student                  Time Calculation:    PT Charges     Row Name 04/12/23 1424             Time Calculation    PT Received On 04/12/23 (P)   -SW         Timed Charges    20531 - PT Therapeutic Exercise Minutes 15 (P)   -SW         Total Minutes    Timed Charges Total Minutes 15 (P)   -SW       Total Minutes 15 (P)   -SW            User Key  (r) = Recorded By, (t) = Taken By, (c) = Cosigned By    Initials Name Provider Type    SW Brianna Webster, PT Student PT Student              Therapy Charges for Today     Code Description Service Date Service Provider Modifiers Qty    [id number] HC PT THER PROC EA 15 MIN 4/12/2023 Brianna Webster PT Student GP 1          PT G-Codes  Outcome Measure Options: (P) AM-PAC 6 Clicks Basic Mobility (PT)  AM-PAC 6 Clicks Score (PT): (P) 20  AM-PAC 6 Clicks Score (OT): 15    Brianna Webster PT Student  4/12/2023

## 2023-04-12 NOTE — PROGRESS NOTES
" [facility]   Hospitalist Progress Note  Date: 2023  Patient Name: Lyle Lozano  : 1955  MRN: [id number]  Date of admission: 2023    Subjective   Subjective     Chief Complaint:   Leg pain, confusion    Summary:   Lyle Lozano is a 67 y.o. male brought to the emergency department for evaluation of generalized swelling and bilateral leg pain.  He reports that his right leg is more tender than the left.  Patient was recently discharged from our facility on 2023 following a weeklong admission.  Patient states since being discharged, he has been having difficulty bear weight on his right lower extremity and has lost the ability to ambulate over the last 48 hours due to the pain.  Patient states that he has been out of his home medications for approximately 4 days, due to being \"out of refills\".  Patient was admitted to the hospital, restarted on his anticoagulation for which she was discharged without last time.  Patient was found to have new DVT in the right lower extremity, patient was diuresed with IV diuretics with good response.  On the morning of 2023 patient had acute change in mental status, stroke RRT was called, the patient underwent CT scan of the head which was negative for any obvious intracranial abnormalities, patient went MRI which was significant for a possible frontal lobe stroke.  Inpatient neurology was consulted on 2023    Interval Followup:   Patient's mental status improved, complaining of some shoulder pain this morning    Objective   Objective     Vitals:   Temp:  [97.9 °F (36.6 °C)-100.8 °F (38.2 °C)] 97.9 °F (36.6 °C)  Heart Rate:  [83-96] 90  Resp:  [18-20] 18  BP: (114-167)/() 167/91    Physical Exam   General appearance: NAD, conversant   Eyes: anicteric sclerae, moist conjunctivae; no lid-lag; PERRLA  HENT: Atraumatic; oropharynx clear with moist mucous membranes and no mucosal ulcerations; normal hard and soft " palate  Neck: Trachea midline; FROM, supple, no thyromegaly or lymphadenopathy  Lungs: CTA, with normal respiratory effort and no intercostal retractions  CV: Regular Rate and Rhythm, no Murmurs, Rubs, or Gallops   Neuro: Moving all 4 extremities spontaneously, no focal deficits or weakness, possibly some slurred speech    Result Review    Result Review:  I have personally reviewed the results as below  [x]  Laboratory  CBC        4/10/2023    04:18 4/11/2023    10:36 4/12/2023    04:19   CBC   WBC 9.28   9.55   8.87     RBC 3.66   3.77   3.58     Hemoglobin 9.3   9.4   9.0     Hematocrit 28.7   29.5   27.8     MCV 78.4   78.2   77.7     MCH 25.4   24.9   25.1     MCHC 32.4   31.9   32.4     RDW 17.1   17.4   17.2     Platelets 330   386   379       CMP        4/10/2023    04:18 4/11/2023    10:36 4/12/2023    04:19   CMP   Glucose 187   118   208     BUN 38   42   43     Creatinine 2.43   2.14   1.95     EGFR 28.8   33.5   37.5     Sodium 134   131   132     Potassium 3.9   3.7   3.5     Chloride 98   95   96     Calcium 8.3   8.3   8.2     Total Protein 6.5   6.8   6.4     Albumin 2.2   2.1   1.9     Globulin 4.3   4.7   4.5     Total Bilirubin 0.5   0.4   0.5     Alkaline Phosphatase 130   132   139     AST (SGOT) 11   12   14     ALT (SGPT) 9   9   9     Albumin/Globulin Ratio 0.5   0.4   0.4     BUN/Creatinine Ratio 15.6   19.6   22.1     Anion Gap 12.9   11.4   11.7       []  Microbiology  []  Radiology  []  EKG/Telemetry   []  Cardiology/Vascular   []  Pathology  []  Old records  []  Other:    Assessment & Plan   Assessment / Plan     Assessment:  Acute on chronic systolic and diastolic congestive heart failure  Elevated troponin, unlikely to be ACS  Uncontrolled type 2 diabetes mellitus  Acute kidney injury    Plan:  • Patient admitted to the hospital for further work-up and management of above  • Continue IV diuretics  • Hold home Entresto, carvedilol to allow for permissive hypertension  • Neurology  consulted  • Continue aspirin, statin, Lovenox  • CBC, CMP reviewed  • Mental status back to baseline  • MRI with possible frontal lobe stroke  • Check right shoulder x-ray for pain, possible gout, continue patient's home allopurinol  • New DVT found in the right lower extremity, patient has bilateral lower extremity DVTs  • Will need Eliquis at discharge, patient would benefit from hematology follow-up as outpatient for hypercoagulable work-up  • Repeat echocardiogram with bubble study, for stroke, this is pending  • Discussed case with teleneurology, discussed management plan  • Chest x-ray personally reviewed, consistent with pulmonary edema  • Continue Percocet for pain control  • Current level of care is adequate, no reason to transfer at this time  • Continue stroke pathway  • Clinical course will dictate further management     Reviewed patients labs and imaging, and discussed with patient and nurse at bedside.    DVT prophylaxis:  Medical DVT prophylaxis orders are present.    CODE STATUS:   Level Of Support Discussed With: Patient  Code Status (Patient has no pulse and is not breathing): CPR (Attempt to Resuscitate)  Medical Interventions (Patient has pulse or is breathing): Full Support      Electronically signed by Ramon Card MD, 04/12/23, 10:26 AM EDT.

## 2023-04-12 NOTE — CONSULTS
[facility]   Neurology Consult Note    Patient Name: Lyle Lozano  : 1955  MRN: [id number]  Primary Care Physician:  Heidi Villa APRN  Referring Physician: Jose Nolasco DO  Date of admission: 2023    Subjective   Subjective     Reason for Consult/ Chief Complaint: Acute stroke    HPI:  Lyle Lozano is a 67 y.o. male evaluated for acute stroke.  Patient was 5 days ago for lower extremity generalized swelling and pain.  2 days ago stroke RRT was called since the patient was unable to talk with concern for aphasia and visual field abnormality.  He was seen by teleneurology yesterday and recommended further work-up.  MRI of the brain showed diffusion restriction abnormality with associated FLAIR and T2 signal changes and changes on T1 and susceptibility artifact noted suggesting evolving subacute infarct with associated laminar necrosis, hemosiderin staining.  MRA of the head and neck was unremarkable.  Patient was on Lovenox.  Eliquis is being contemplated on discharge.    Personal History     Past Medical History:   Diagnosis Date   • Abnormal kidney function    • Arthritis    • Bursitis    • CHF (congestive heart failure)    • Depression    • Diabetes    • Edema    • Essential hypertension 2021   • Forgetfulness    • Gout    • Head injury    • Hernia cerebri    • Hyperlipidemia    • Hypertension    • IFG (impaired fasting glucose)    • Low back pain    • Lumbago     `   • Pain of left hip    • Right shoulder pain    • Sleep apnea    • SOB (shortness of breath)        Past Surgical History:   Procedure Laterality Date   • CYST REMOVAL Right     leg       Family History: family history includes Diabetes in his sister; No Known Problems in his father; Stroke in his sister. Otherwise pertinent FHx was reviewed and not pertinent to current issue.    Social History:  reports that he quit smoking about 10 years ago. His smoking use included cigarettes. He started smoking about  41 years ago. He smoked an average of .25 packs per day. He has never used smokeless tobacco. He reports current alcohol use. He reports that he does not use drugs.    Home Medications:  aspirin, atorvastatin, carvedilol, cholecalciferol, furosemide, sacubitril-valsartan, sitaGLIPtin-metFORMIN, and spironolactone    Allergies:  No Known Allergies    Objective    Objective     Vitals:   Temp:  [97.2 °F (36.2 °C)-100.8 °F (38.2 °C)] 98.1 °F (36.7 °C)  Heart Rate:  [76-90] 76  Resp:  [16-20] 16  BP: (114-167)/(69-91) 147/84    Physical Exam: He is having expressive difficulties.  He has a difficult time in a history and could not complete sentences.  He kept on saying a curse word when he could not complete his sentence.  He has difficulty naming objects.  He called my pen stethoscope.  's glasses for the sunglasses.  Could not name thumbnail.  He has difficulty in following commands.  Visual fields are full, EOMs full directions gaze, facial strength is full.  He states that he has significant pain in his right shoulder and his leg.  He feels like his arms are weak.  Fine finger movements are intact.  Heart is regular and rhythm.      Result Review    Result Review:  I have personally reviewed the results from the time of this admission to 4/12/2023 19:04 EDT and agree with these findings:  []  Laboratory  []  Microbiology  [x]  Radiology  [x]  EKG/Telemetry   []  Cardiology/Vascular   []  Pathology  [x]  Old records  []  Other:      Assessment & Plan   Assessment / Plan   Active Hospital Problems:  Active Hospital Problems    Diagnosis    • **Acute on chronic congestive heart failure, unspecified heart failure type          Plan: He has a left frontal lobe infarct with petechial hemorrhage and hemorrhagic staining.  I would recommend for him to be on anticoagulation in 1 to 2 weeks.  He is to remain on single antiplatelet therapy at this time.  Recommend cardiology to evaluate him for JUAN JOSE as well as 30-day event  monitor.  Total time spent with the patient and coordinating patient care was 55 minutes.    electronically signed by Grady Loredo MD, 04/12/23, 7:04 PM EDT.

## 2023-04-13 PROBLEM — I63.512 ACUTE ISCHEMIC LEFT MCA STROKE: Status: ACTIVE | Noted: 2023-04-13

## 2023-04-13 LAB
ALBUMIN SERPL-MCNC: 1.7 G/DL (ref 3.5–5.2)
ALBUMIN/GLOB SERPL: 0.4 G/DL
ALP SERPL-CCNC: 128 U/L (ref 39–117)
ALT SERPL W P-5'-P-CCNC: 8 U/L (ref 1–41)
ANION GAP SERPL CALCULATED.3IONS-SCNC: 10.5 MMOL/L (ref 5–15)
AST SERPL-CCNC: 12 U/L (ref 1–40)
BASOPHILS # BLD AUTO: 0.03 10*3/MM3 (ref 0–0.2)
BASOPHILS NFR BLD AUTO: 0.4 % (ref 0–1.5)
BILIRUB SERPL-MCNC: 0.4 MG/DL (ref 0–1.2)
BUN SERPL-MCNC: 43 MG/DL (ref 8–23)
BUN/CREAT SERPL: 20.7 (ref 7–25)
CALCIUM SPEC-SCNC: 8.1 MG/DL (ref 8.6–10.5)
CHLORIDE SERPL-SCNC: 96 MMOL/L (ref 98–107)
CO2 SERPL-SCNC: 24.5 MMOL/L (ref 22–29)
CREAT SERPL-MCNC: 2.08 MG/DL (ref 0.76–1.27)
DEPRECATED RDW RBC AUTO: 49 FL (ref 37–54)
EGFRCR SERPLBLD CKD-EPI 2021: 34.7 ML/MIN/1.73
EOSINOPHIL # BLD AUTO: 0.27 10*3/MM3 (ref 0–0.4)
EOSINOPHIL NFR BLD AUTO: 3.2 % (ref 0.3–6.2)
ERYTHROCYTE [DISTWIDTH] IN BLOOD BY AUTOMATED COUNT: 17.1 % (ref 12.3–15.4)
GLOBULIN UR ELPH-MCNC: 4.6 GM/DL
GLUCOSE BLDC GLUCOMTR-MCNC: 101 MG/DL (ref 70–99)
GLUCOSE BLDC GLUCOMTR-MCNC: 257 MG/DL (ref 70–99)
GLUCOSE SERPL-MCNC: 199 MG/DL (ref 65–99)
HCT VFR BLD AUTO: 29.1 % (ref 37.5–51)
HGB BLD-MCNC: 9.4 G/DL (ref 13–17.7)
IMM GRANULOCYTES # BLD AUTO: 0.04 10*3/MM3 (ref 0–0.05)
IMM GRANULOCYTES NFR BLD AUTO: 0.5 % (ref 0–0.5)
LYMPHOCYTES # BLD AUTO: 1.16 10*3/MM3 (ref 0.7–3.1)
LYMPHOCYTES NFR BLD AUTO: 13.6 % (ref 19.6–45.3)
MAGNESIUM SERPL-MCNC: 1.9 MG/DL (ref 1.6–2.4)
MCH RBC QN AUTO: 25.3 PG (ref 26.6–33)
MCHC RBC AUTO-ENTMCNC: 32.3 G/DL (ref 31.5–35.7)
MCV RBC AUTO: 78.2 FL (ref 79–97)
MONOCYTES # BLD AUTO: 0.54 10*3/MM3 (ref 0.1–0.9)
MONOCYTES NFR BLD AUTO: 6.3 % (ref 5–12)
NEUTROPHILS NFR BLD AUTO: 6.51 10*3/MM3 (ref 1.7–7)
NEUTROPHILS NFR BLD AUTO: 76 % (ref 42.7–76)
NRBC BLD AUTO-RTO: 0 /100 WBC (ref 0–0.2)
PHOSPHATE SERPL-MCNC: 3.6 MG/DL (ref 2.5–4.5)
PLATELET # BLD AUTO: 406 10*3/MM3 (ref 140–450)
PMV BLD AUTO: 9 FL (ref 6–12)
POTASSIUM SERPL-SCNC: 3.7 MMOL/L (ref 3.5–5.2)
PROT SERPL-MCNC: 6.3 G/DL (ref 6–8.5)
RBC # BLD AUTO: 3.72 10*6/MM3 (ref 4.14–5.8)
SODIUM SERPL-SCNC: 131 MMOL/L (ref 136–145)
URATE SERPL-MCNC: 9.3 MG/DL (ref 3.4–7)
WBC NRBC COR # BLD: 8.55 10*3/MM3 (ref 3.4–10.8)

## 2023-04-13 PROCEDURE — 63710000001 PREDNISONE PER 1 MG: Performed by: PHYSICIAN ASSISTANT

## 2023-04-13 PROCEDURE — 63710000001 INSULIN LISPRO (HUMAN) PER 5 UNITS: Performed by: FAMILY MEDICINE

## 2023-04-13 PROCEDURE — 83735 ASSAY OF MAGNESIUM: CPT | Performed by: INTERNAL MEDICINE

## 2023-04-13 PROCEDURE — 25010000002 ENOXAPARIN PER 10 MG: Performed by: INTERNAL MEDICINE

## 2023-04-13 PROCEDURE — 94799 UNLISTED PULMONARY SVC/PX: CPT

## 2023-04-13 PROCEDURE — 85025 COMPLETE CBC W/AUTO DIFF WBC: CPT | Performed by: INTERNAL MEDICINE

## 2023-04-13 PROCEDURE — 84550 ASSAY OF BLOOD/URIC ACID: CPT | Performed by: PHYSICIAN ASSISTANT

## 2023-04-13 PROCEDURE — 82962 GLUCOSE BLOOD TEST: CPT

## 2023-04-13 PROCEDURE — 99233 SBSQ HOSP IP/OBS HIGH 50: CPT | Performed by: INTERNAL MEDICINE

## 2023-04-13 PROCEDURE — 80053 COMPREHEN METABOLIC PANEL: CPT | Performed by: INTERNAL MEDICINE

## 2023-04-13 PROCEDURE — 99231 SBSQ HOSP IP/OBS SF/LOW 25: CPT | Performed by: PSYCHIATRY & NEUROLOGY

## 2023-04-13 PROCEDURE — 84100 ASSAY OF PHOSPHORUS: CPT | Performed by: INTERNAL MEDICINE

## 2023-04-13 PROCEDURE — 25010000002 FUROSEMIDE PER 20 MG: Performed by: FAMILY MEDICINE

## 2023-04-13 RX ORDER — FUROSEMIDE 40 MG/1
40 TABLET ORAL
Status: DISCONTINUED | OUTPATIENT
Start: 2023-04-13 | End: 2023-04-14

## 2023-04-13 RX ORDER — PREDNISONE 20 MG/1
20 TABLET ORAL DAILY
Status: DISCONTINUED | OUTPATIENT
Start: 2023-04-13 | End: 2023-04-17

## 2023-04-13 RX ORDER — FAMOTIDINE 20 MG/1
20 TABLET, FILM COATED ORAL
Status: DISCONTINUED | OUTPATIENT
Start: 2023-04-13 | End: 2023-04-17 | Stop reason: HOSPADM

## 2023-04-13 RX ORDER — SPIRONOLACTONE 25 MG/1
25 TABLET ORAL DAILY
Status: DISCONTINUED | OUTPATIENT
Start: 2023-04-14 | End: 2023-04-14

## 2023-04-13 RX ADMIN — ENOXAPARIN SODIUM 80 MG: 100 INJECTION SUBCUTANEOUS at 09:13

## 2023-04-13 RX ADMIN — FAMOTIDINE 20 MG: 20 TABLET, FILM COATED ORAL at 17:15

## 2023-04-13 RX ADMIN — FUROSEMIDE 40 MG: 40 TABLET ORAL at 20:32

## 2023-04-13 RX ADMIN — OXYCODONE HYDROCHLORIDE AND ACETAMINOPHEN 1 TABLET: 10; 325 TABLET ORAL at 09:13

## 2023-04-13 RX ADMIN — INSULIN LISPRO 2 UNITS: 100 INJECTION, SOLUTION INTRAVENOUS; SUBCUTANEOUS at 09:11

## 2023-04-13 RX ADMIN — Medication 10 ML: at 20:32

## 2023-04-13 RX ADMIN — INSULIN LISPRO 6 UNITS: 100 INJECTION, SOLUTION INTRAVENOUS; SUBCUTANEOUS at 12:22

## 2023-04-13 RX ADMIN — Medication 10 ML: at 09:13

## 2023-04-13 RX ADMIN — OXYCODONE HYDROCHLORIDE AND ACETAMINOPHEN 1 TABLET: 10; 325 TABLET ORAL at 17:15

## 2023-04-13 RX ADMIN — APIXABAN 10 MG: 5 TABLET, FILM COATED ORAL at 20:32

## 2023-04-13 RX ADMIN — PREDNISONE 20 MG: 20 TABLET ORAL at 19:17

## 2023-04-13 RX ADMIN — ALLOPURINOL 100 MG: 100 TABLET ORAL at 09:11

## 2023-04-13 RX ADMIN — OXYCODONE HYDROCHLORIDE AND ACETAMINOPHEN 1 TABLET: 10; 325 TABLET ORAL at 04:19

## 2023-04-13 RX ADMIN — FUROSEMIDE 80 MG: 10 INJECTION, SOLUTION INTRAMUSCULAR; INTRAVENOUS at 09:11

## 2023-04-13 RX ADMIN — ASPIRIN 81 MG: 81 TABLET, COATED ORAL at 09:10

## 2023-04-13 RX ADMIN — ATORVASTATIN CALCIUM 80 MG: 40 TABLET, FILM COATED ORAL at 20:32

## 2023-04-13 NOTE — PLAN OF CARE
Goal Outcome Evaluation:            Pt has still c/o pain in bilateral lower extremities as well as in rt upper extremity.intervention done as per MAR. VSS. Will continue to monitor.

## 2023-04-13 NOTE — PROGRESS NOTES
[facility]   Neurology Progress Note    Patient Name: Lyle Lozano  : 1955  MRN: [id number]  Primary Care Physician:  Heidi Villa APRN  Referring Physician: Jose Nolasco DO  Date of admission: 2023    Subjective   Subjective     Reason for Consult/ Chief Complaint: Follow-up visit    HPI:  Lyle Lozano is a 67 y.o. male chart review for question of Lovenox and hemorrhagic transformation of stroke.  Patient has new DVT found on the right lower extremity and has bilateral lower extremity DVTs.  He is on full dose of Lovenox.    He is able to carry on a conversation today and able to express himself better than yesterday.  Continues to complain of pain.      Objective     Vitals:   Temp:  [97.8 °F (36.6 °C)-99 °F (37.2 °C)] 99 °F (37.2 °C)  Heart Rate:  [78-98] 90  Resp:  [18-20] 20  BP: (145-177)/(80-98) 177/98    Physical Exam: Alert, fluent, phasic.  Naming is intact.  He is able to follow commands.      Result Review    Result Review:  I have personally reviewed the results from the time of this admission to 2023 21:20 EDT and agree with these findings:  []  Laboratory  []  Microbiology  []  Radiology  []  EKG/Telemetry   []  Cardiology/Vascular   []  Pathology  [x]  Old records  []  Other:      Assessment & Plan   Assessment / Plan   Active Hospital Problems:  Active Hospital Problems    Diagnosis    • **Acute on chronic congestive heart failure, unspecified heart failure type    • Acute ischemic left MCA stroke          Plan: We can start full  anticoagulation for the DVT.  He can be started on Eliquis discontinue Lovenox.  The benefits of anticoagulation for DVT outweighs the risk of increased bleeding with the petechial hemorrhage seen on MRI since it is relatively small.    Started him on Eliquis 10 mg twice a day for 7 days and discontinued aspirin and Lovenox.  He will be seen by cardiology JUAN JOSE.    Total time spent with the patient and coordinating patient care  was 25 minutes.    electronically signed by Grady Loredo MD, 04/13/23, 2:05 PM EDT.

## 2023-04-13 NOTE — PROGRESS NOTES
" [facility]   Hospitalist Progress Note  Date: 2023  Patient Name: Lyle Lozano  : 1955  MRN: [id number]  Date of admission: 2023    Subjective   Subjective     Chief Complaint:   Leg pain, confusion    Summary:   Lyle Lozano is a 67 y.o. male brought to the emergency department for evaluation of generalized swelling and bilateral leg pain.  He reports that his right leg is more tender than the left.  Patient was recently discharged from our facility on 2023 following a weeklong admission.  Patient states since being discharged, he has been having difficulty bear weight on his right lower extremity due to significant right foot pain and has lost the ability to ambulate over the last 48 hours due to the pain.  Patient states that he has been out of his home medications for approximately 4 days, due to being \"out of refills\".  Patient was admitted to the hospital, restarted on his anticoagulation for which she was discharged without last time.  Patient was found to have new DVT in the right lower extremity, patient was diuresed with IV diuretics with good response.  On the morning of 2023 patient had acute change in mental status, stroke RRT was called, the patient underwent CT scan of the head which was negative for any obvious intracranial abnormalities, patient went MRI which demonstrated left frontal lobe infarct with petechial hemorrhage inpatient neurology consulted recommended cardiology consultation for JUAN JOSE as well as 30-day event monitor cardiology has been consulted.    Interval Followup:   Patient seen and examined resting comfortably complains of continued right foot pain patient with history of gout we will recheck uric acid today.  Neurologically the patient seems stable with no focal deficits or confusion.  Remains on therapeutic anticoagulation for bilateral DVTs volume status appears stable will transition from IV to oral Lasix we will add " Aldactone can likely resume Coreg and Entresto, cardiology consulted per neurology recommendations for possible JUAN JOSE and 30-day event recording.  Remains very weak patient will likely need rehab at time of discharge.    Review of systems:  All systems reviewed and negative except the following: Patient complains of generalized weakness fatigue right foot pain right shoulder pain    Objective   Objective     Vitals:   Temp:  [97.8 °F (36.6 °C)-99.4 °F (37.4 °C)] 97.8 °F (36.6 °C)  Heart Rate:  [76-98] 79  Resp:  [16-20] 18  BP: (145-176)/(77-90) 152/86    Physical Exam   General appearance: Very pleasant awake alert oriented no acute distress   eyes: anicteric sclerae, moist conjunctivae; no lid-lag; PERRLA  Lungs: CTA, with normal respiratory effort and no intercostal retractions  CV: Regular Rate and Rhythm, no Murmurs, Rubs, or Gallops   Neuro: No focal deficits no slurred speech  Musculoskeletal decreased range of motion right foot pain right foot  Result Review    Result Review:  I have personally reviewed the results as below  [x]  Laboratory  CBC        4/11/2023    10:36 4/12/2023    04:19 4/13/2023    05:35   CBC   WBC 9.55   8.87   8.55     RBC 3.77   3.58   3.72     Hemoglobin 9.4   9.0   9.4     Hematocrit 29.5   27.8   29.1     MCV 78.2   77.7   78.2     MCH 24.9   25.1   25.3     MCHC 31.9   32.4   32.3     RDW 17.4   17.2   17.1     Platelets 386   379   406       CMP        4/11/2023    10:36 4/12/2023    04:19 4/13/2023    05:35   CMP   Glucose 118   208   199     BUN 42   43   43     Creatinine 2.14   1.95   2.08     EGFR 33.5   37.5   34.7     Sodium 131   132   131     Potassium 3.7   3.5   3.7     Chloride 95   96   96     Calcium 8.3   8.2   8.1     Total Protein 6.8   6.4   6.3     Albumin 2.1   1.9   1.7     Globulin 4.7   4.5   4.6     Total Bilirubin 0.4   0.5   0.4     Alkaline Phosphatase 132   139   128     AST (SGOT) 12   14   12     ALT (SGPT) 9   9   8     Albumin/Globulin Ratio 0.4    0.4   0.4     BUN/Creatinine Ratio 19.6   22.1   20.7     Anion Gap 11.4   11.7   10.5       []  Microbiology  []  Radiology  []  EKG/Telemetry   []  Cardiology/Vascular   []  Pathology  []  Old records  []  Other:    Assessment & Plan   Assessment / Plan     Assessment:  · Acute on chronic systolic and diastolic congestive heart failure  · Acute left frontal lobe infarct with petechial hemorrhage  · Hypophosphatemia  · Hypomagnesemia  · Bilateral lower extremity DVTs gastrocnemius veins patient need to be discharged on Eliquis at time of discharge and would likely benefit from hematology consultation for hypercoagulable work-up  · Medical noncompliance  · Elevated troponin, unlikely to be ACS  · Uncontrolled type 2 diabetes mellitus  · Chronic kidney disease stage III  · Probable acute gout flare     Plan:  • Continue cardiac telemetry  • Check 2D echocardiogram  • Transition from IV to oral Lasix 40 mg p.o. twice daily  • Add Aldactone 25 mg daily  • Add oral fluid restriction  • Holding Entresto carvedilol likely resume tomorrow  • Continue with aspirin and statin therapy  • Continuing with full dose Lovenox for bilateral DVT likely need to transition to Eliquis at discharge  • Patient with continued right foot pain with history of gout checking uric acid may need to start prednisone for acute gout flare  • Rechecking labs in the morning  • Continue stroke pathway  • Neurology consultation appreciated continue care per the recommendation  • Cardiology consultation per neurology recommendations for possible JUAN JOSE and ready to event monitor  • Continue with PT OT speech therapy  • Social service consultation patient will likely need rehab at time of discharge  • Further treatment is patient's contingent upon his hospital course  • Discussed with RN  .    DVT prophylaxis:  Medical DVT prophylaxis orders are present.    CODE STATUS:   Level Of Support Discussed With: Patient  Code Status (Patient has no pulse and is  not breathing): CPR (Attempt to Resuscitate)  Medical Interventions (Patient has pulse or is breathing): Full Support    Electronically signed by JAYMIE Bach, 04/13/23, 2:01 PM EDT.      Attending Note:  Patient independently seen and evaluated, agree with assessment and plan, above documentation reflects plan put forth during bedside rounds.  More than 51% of the time of this patient encounter was performed by me.   67-year-old male presented with generalized swelling, bilateral leg pain.  Found to have DVT.  Also MRI shows left frontal lobe stroke.  Neurology has cleared him to take anticoagulation for his DVT.  We will monitor clinically.  Also has gout in his right foot that we will treat with short course of steroid.  Electronically signed by Brigido Degroot MD, 04/13/23, 4:53 PM EDT.

## 2023-04-13 NOTE — SIGNIFICANT NOTE
04/13/23 1555   Plan   Plan Met with patient at bedside to discuss bed offers. He would like to speak with his sister before making a decision.  spoke with patient's sister, Farida, and discussed bed offers. Patient's sister is agreeable to [facility]. Patient lives in [place] and sister feels this location is better suitable. [facility] will start pre-cert.

## 2023-04-14 LAB
ALBUMIN SERPL-MCNC: 2 G/DL (ref 3.5–5.2)
ALP SERPL-CCNC: 136 U/L (ref 39–117)
ALT SERPL W P-5'-P-CCNC: 9 U/L (ref 1–41)
ANION GAP SERPL CALCULATED.3IONS-SCNC: 9.4 MMOL/L (ref 5–15)
AST SERPL-CCNC: 15 U/L (ref 1–40)
BH CV ECHO MEAS - EDV(MOD-SP2): 87 ML
BH CV ECHO MEAS - EDV(MOD-SP4): 82 ML
BH CV ECHO MEAS - EF(MOD-BP): 40 %
BH CV ECHO MEAS - EF(MOD-SP2): 45 %
BH CV ECHO MEAS - EF(MOD-SP4): 40 %
BH CV ECHO MEAS - ESV(MOD-SP2): 48 ML
BH CV ECHO MEAS - ESV(MOD-SP4): 49 ML
BH CV ECHO MEAS - IVSD: 2.5 CM
BH CV ECHO MEAS - LA DIMENSION: 3.6 CM
BH CV ECHO MEAS - LAT PEAK E' VEL: 6.6 CM/SEC
BH CV ECHO MEAS - LVIDD: 4.6 CM
BH CV ECHO MEAS - LVIDS: 3.7 CM
BH CV ECHO MEAS - LVPWD: 1.3 CM
BH CV ECHO MEAS - MED PEAK E' VEL: 6.1 CM/SEC
BH CV ECHO MEAS - MR MAX PG: 95 MMHG
BH CV ECHO MEAS - MR MAX VEL: 487 CM/SEC
BH CV ECHO MEAS - MV A MAX VEL: 89 CM/SEC
BH CV ECHO MEAS - MV DEC TIME: 124 MSEC
BH CV ECHO MEAS - MV E MAX VEL: 124 CM/SEC
BH CV ECHO MEAS - MV E/A: 1.4
BH CV ECHO MEASUREMENTS AVERAGE E/E' RATIO: 19.53
BILIRUB CONJ SERPL-MCNC: 0.2 MG/DL (ref 0–0.3)
BILIRUB INDIRECT SERPL-MCNC: 0.3 MG/DL
BILIRUB SERPL-MCNC: 0.5 MG/DL (ref 0–1.2)
BUN SERPL-MCNC: 48 MG/DL (ref 8–23)
BUN/CREAT SERPL: 21.6 (ref 7–25)
CALCIUM SPEC-SCNC: 8.2 MG/DL (ref 8.6–10.5)
CHLORIDE SERPL-SCNC: 93 MMOL/L (ref 98–107)
CO2 SERPL-SCNC: 25.6 MMOL/L (ref 22–29)
CREAT SERPL-MCNC: 2.22 MG/DL (ref 0.76–1.27)
DEPRECATED RDW RBC AUTO: 48.9 FL (ref 37–54)
EGFRCR SERPLBLD CKD-EPI 2021: 32.1 ML/MIN/1.73
ERYTHROCYTE [DISTWIDTH] IN BLOOD BY AUTOMATED COUNT: 16.9 % (ref 12.3–15.4)
GLUCOSE BLDC GLUCOMTR-MCNC: 175 MG/DL (ref 70–99)
GLUCOSE BLDC GLUCOMTR-MCNC: 228 MG/DL (ref 70–99)
GLUCOSE BLDC GLUCOMTR-MCNC: 321 MG/DL (ref 70–99)
GLUCOSE BLDC GLUCOMTR-MCNC: 325 MG/DL (ref 70–99)
GLUCOSE SERPL-MCNC: 289 MG/DL (ref 65–99)
HCT VFR BLD AUTO: 31.1 % (ref 37.5–51)
HGB BLD-MCNC: 9.8 G/DL (ref 13–17.7)
IVRT: 95 MSEC
LEFT ATRIUM VOLUME INDEX: 19.8 ML/M2
LEFT ATRIUM VOLUME: 38 ML
LV EF 2D ECHO EST: 50 %
MAGNESIUM SERPL-MCNC: 2.1 MG/DL (ref 1.6–2.4)
MAXIMAL PREDICTED HEART RATE: 155 BPM
MCH RBC QN AUTO: 24.9 PG (ref 26.6–33)
MCHC RBC AUTO-ENTMCNC: 31.5 G/DL (ref 31.5–35.7)
MCV RBC AUTO: 79.1 FL (ref 79–97)
PHOSPHATE SERPL-MCNC: 4.7 MG/DL (ref 2.5–4.5)
PLATELET # BLD AUTO: 444 10*3/MM3 (ref 140–450)
PMV BLD AUTO: 9 FL (ref 6–12)
POTASSIUM SERPL-SCNC: 4.2 MMOL/L (ref 3.5–5.2)
PROT SERPL-MCNC: 6.8 G/DL (ref 6–8.5)
RBC # BLD AUTO: 3.93 10*6/MM3 (ref 4.14–5.8)
SODIUM SERPL-SCNC: 128 MMOL/L (ref 136–145)
STRESS TARGET HR: 132 BPM
WBC NRBC COR # BLD: 8.58 10*3/MM3 (ref 3.4–10.8)

## 2023-04-14 PROCEDURE — 63710000001 INSULIN LISPRO (HUMAN) PER 5 UNITS: Performed by: FAMILY MEDICINE

## 2023-04-14 PROCEDURE — 93005 ELECTROCARDIOGRAM TRACING: CPT

## 2023-04-14 PROCEDURE — 97116 GAIT TRAINING THERAPY: CPT

## 2023-04-14 PROCEDURE — 83735 ASSAY OF MAGNESIUM: CPT | Performed by: PHYSICIAN ASSISTANT

## 2023-04-14 PROCEDURE — 63710000001 PREDNISONE PER 1 MG: Performed by: PHYSICIAN ASSISTANT

## 2023-04-14 PROCEDURE — 82962 GLUCOSE BLOOD TEST: CPT

## 2023-04-14 PROCEDURE — 80076 HEPATIC FUNCTION PANEL: CPT | Performed by: PHYSICIAN ASSISTANT

## 2023-04-14 PROCEDURE — 94799 UNLISTED PULMONARY SVC/PX: CPT

## 2023-04-14 PROCEDURE — 99233 SBSQ HOSP IP/OBS HIGH 50: CPT | Performed by: INTERNAL MEDICINE

## 2023-04-14 PROCEDURE — 80048 BASIC METABOLIC PNL TOTAL CA: CPT | Performed by: PHYSICIAN ASSISTANT

## 2023-04-14 PROCEDURE — 63710000001 INSULIN DETEMIR PER 5 UNITS: Performed by: PHYSICIAN ASSISTANT

## 2023-04-14 PROCEDURE — 93010 ELECTROCARDIOGRAM REPORT: CPT | Performed by: INTERNAL MEDICINE

## 2023-04-14 PROCEDURE — 85027 COMPLETE CBC AUTOMATED: CPT | Performed by: PHYSICIAN ASSISTANT

## 2023-04-14 PROCEDURE — 84100 ASSAY OF PHOSPHORUS: CPT | Performed by: PHYSICIAN ASSISTANT

## 2023-04-14 RX ORDER — CARVEDILOL 6.25 MG/1
6.25 TABLET ORAL 2 TIMES DAILY WITH MEALS
Status: DISCONTINUED | OUTPATIENT
Start: 2023-04-14 | End: 2023-04-17 | Stop reason: HOSPADM

## 2023-04-14 RX ORDER — SPIRONOLACTONE 25 MG/1
25 TABLET ORAL DAILY
Status: DISCONTINUED | OUTPATIENT
Start: 2023-04-15 | End: 2023-04-17 | Stop reason: HOSPADM

## 2023-04-14 RX ORDER — HYDROXYZINE HYDROCHLORIDE 25 MG/1
25 TABLET, FILM COATED ORAL ONCE AS NEEDED
Status: DISCONTINUED | OUTPATIENT
Start: 2023-04-14 | End: 2023-04-17 | Stop reason: HOSPADM

## 2023-04-14 RX ORDER — HALOPERIDOL 5 MG/ML
1 INJECTION INTRAMUSCULAR ONCE AS NEEDED
Status: DISCONTINUED | OUTPATIENT
Start: 2023-04-14 | End: 2023-04-14

## 2023-04-14 RX ORDER — FUROSEMIDE 40 MG/1
40 TABLET ORAL DAILY
Status: DISCONTINUED | OUTPATIENT
Start: 2023-04-15 | End: 2023-04-17 | Stop reason: HOSPADM

## 2023-04-14 RX ADMIN — INSULIN LISPRO 7 UNITS: 100 INJECTION, SOLUTION INTRAVENOUS; SUBCUTANEOUS at 17:58

## 2023-04-14 RX ADMIN — CARVEDILOL 6.25 MG: 6.25 TABLET, FILM COATED ORAL at 08:44

## 2023-04-14 RX ADMIN — INSULIN LISPRO 4 UNITS: 100 INJECTION, SOLUTION INTRAVENOUS; SUBCUTANEOUS at 12:47

## 2023-04-14 RX ADMIN — FAMOTIDINE 20 MG: 20 TABLET, FILM COATED ORAL at 17:58

## 2023-04-14 RX ADMIN — Medication 10 ML: at 20:27

## 2023-04-14 RX ADMIN — OXYCODONE HYDROCHLORIDE AND ACETAMINOPHEN 1 TABLET: 10; 325 TABLET ORAL at 01:15

## 2023-04-14 RX ADMIN — INSULIN DETEMIR 10 UNITS: 100 INJECTION, SOLUTION SUBCUTANEOUS at 08:44

## 2023-04-14 RX ADMIN — PREDNISONE 20 MG: 20 TABLET ORAL at 08:36

## 2023-04-14 RX ADMIN — ATORVASTATIN CALCIUM 80 MG: 40 TABLET, FILM COATED ORAL at 20:26

## 2023-04-14 RX ADMIN — APIXABAN 10 MG: 5 TABLET, FILM COATED ORAL at 08:36

## 2023-04-14 RX ADMIN — ALLOPURINOL 100 MG: 100 TABLET ORAL at 08:36

## 2023-04-14 RX ADMIN — CARVEDILOL 6.25 MG: 6.25 TABLET, FILM COATED ORAL at 17:58

## 2023-04-14 RX ADMIN — APIXABAN 10 MG: 5 TABLET, FILM COATED ORAL at 20:26

## 2023-04-14 RX ADMIN — OXYCODONE HYDROCHLORIDE AND ACETAMINOPHEN 1 TABLET: 10; 325 TABLET ORAL at 19:43

## 2023-04-14 RX ADMIN — INSULIN LISPRO 8 UNITS: 100 INJECTION, SOLUTION INTRAVENOUS; SUBCUTANEOUS at 08:36

## 2023-04-14 RX ADMIN — FAMOTIDINE 20 MG: 20 TABLET, FILM COATED ORAL at 08:36

## 2023-04-14 RX ADMIN — Medication 10 ML: at 08:37

## 2023-04-14 NOTE — PLAN OF CARE
Goal Outcome Evaluation:           Progress: no change       Patient VSS at this time, no complaints of pain. NIH Qshift with score of 1 for facial drooping. Patient able to answer orientation questions correctly but has spurts of confusion, able to reorient. BS managed with SSI. Per social work, awaiting aproval for placement. No new concerns at this time.

## 2023-04-14 NOTE — CONSULTS
Nutrition Services    Patient Name: Lyle Lozano  YOB: 1955  MRN: [id number]  Admission date: 4/7/2023      CLINICAL NUTRITION ASSESSMENT      Reason for Assessment  LOS     H&P:    Past Medical History:   Diagnosis Date   • Abnormal kidney function    • Arthritis    • Bursitis    • CHF (congestive heart failure)    • Depression    • Diabetes    • Edema    • Essential hypertension 09/23/2021   • Forgetfulness    • Gout    • Head injury    • Hernia cerebri    • Hyperlipidemia    • Hypertension    • IFG (impaired fasting glucose)    • Low back pain    • Lumbago     `   • Pain of left hip    • Right shoulder pain    • Sleep apnea    • SOB (shortness of breath)         Current Problems:   Active Hospital Problems    Diagnosis    • **Acute on chronic congestive heart failure, unspecified heart failure type    • Acute ischemic left MCA stroke         Nutrition/Diet History         Narrative     Pt assessed for LOS. Pt is admitted d/t generalized swelling and leg pain, found to have multiple DVTs.     Per chart review, pt has had significant wt loss x 3 months, some attributable to volume overload and diuresis. Pt on fluid restriction right now. Pt's intake is variable, %. RD to order ONS to promote nutrient intake. Will continue to monitor.        Anthropometrics        Current Height, Weight    Weight: 80.4 kg (177 lb 4 oz)   Current BMI Body mass index is 28.62 kg/m².       Weight Hx  Wt Readings from Last 30 Encounters:   04/14/23 0330 80.4 kg (177 lb 4 oz)   04/13/23 0500 84.6 kg (186 lb 8.2 oz)   04/12/23 0528 82.4 kg (181 lb 10.5 oz)   04/11/23 0607 83.8 kg (184 lb 11.9 oz)   04/10/23 0258 81.7 kg (180 lb 1.9 oz)   04/09/23 0511 83.5 kg (184 lb 1.4 oz)   04/09/23 0320 83.5 kg (184 lb 1.4 oz)   04/08/23 0346 84.5 kg (186 lb 4.6 oz)   03/22/23 0332 84.7 kg (186 lb 11.7 oz)   03/20/23 0507 84.5 kg (186 lb 4.6 oz)   03/19/23 0539 91.4 kg (201 lb 8 oz)   03/18/23 0600 95.8 kg (211 lb 3.2  oz)   03/16/23 2050 96.9 kg (213 lb 10 oz)   03/09/23 1636 99.8 kg (220 lb)   02/01/23 0551 97.3 kg (214 lb 8.1 oz)   01/29/23 0624 97.1 kg (214 lb 1.1 oz)   01/27/23 0500 96.5 kg (212 lb 11.9 oz)   01/26/23 0000 96.5 kg (212 lb 11.9 oz)   01/25/23 1602 96.5 kg (212 lb 11.9 oz)   01/25/23 1309 97.1 kg (214 lb)   01/06/23 1100 93 kg (205 lb)   12/20/22 1046 93.4 kg (205 lb 14.6 oz)   10/26/22 1022 87.1 kg (192 lb)   09/27/22 1050 87.3 kg (192 lb 6.4 oz)   08/26/22 0833 88.4 kg (194 lb 12.8 oz)   08/21/22 0839 86.8 kg (191 lb 5.8 oz)   03/18/22 0938 92.5 kg (204 lb)   03/04/22 0952 92.5 kg (204 lb)   03/02/22 1413 91 kg (200 lb 9.6 oz)   02/11/22 0907 93.4 kg (206 lb)   02/08/22 0903 92.5 kg (204 lb)   02/03/22 0806 90.2 kg (198 lb 12.8 oz)   01/11/22 1501 93.9 kg (207 lb)   01/04/22 1548 91.6 kg (202 lb)   01/03/22 1438 90.1 kg (198 lb 10.2 oz)   10/28/21 1218 89.8 kg (198 lb)   10/11/21 0941 93.4 kg (206 lb)   09/23/21 1023 91.6 kg (202 lb)   08/14/21 1537 82.6 kg (182 lb)   07/10/21 1456 83.9 kg (185 lb)   04/02/21 0000 94.4 kg (208 lb 3 oz)   02/03/21 0000 91.6 kg (202 lb)   07/15/20 0000 89.4 kg (197 lb)   06/26/20 0000 85.4 kg (188 lb 4 oz)   06/09/20 0000 87.8 kg (193 lb 8 oz)            Wt Change Observation -17% x 3 mo, -8L since admit      Estimated/Assessed Needs       Energy Requirements 25-30 kcal/kg CBW   EST Needs (kcal/day) 6443-1019       Protein Requirements 1.0 g/kg   EST Daily Needs (g/day) 80       Fluid Requirements Fluid restriction    Estimated Needs (mL/day) 1500 ml/d     Labs/Medications         Pertinent Labs Reviewed.   Results from last 7 days   Lab Units 04/14/23  0409 04/13/23  0535 04/12/23  0419   SODIUM mmol/L 128* 131* 132*   POTASSIUM mmol/L 4.2 3.7 3.5   CHLORIDE mmol/L 93* 96* 96*   CO2 mmol/L 25.6 24.5 24.3   BUN mg/dL 48* 43* 43*   CREATININE mg/dL 2.22* 2.08* 1.95*   CALCIUM mg/dL 8.2* 8.1* 8.2*   BILIRUBIN mg/dL 0.5 0.4 0.5   ALK PHOS U/L 136* 128* 139*   ALT (SGPT) U/L 9 8 9    AST (SGOT) U/L 15 12 14   GLUCOSE mg/dL 289* 199* 208*     Results from last 7 days   Lab Units 04/14/23  0409 04/13/23  0535 04/12/23  0419 04/09/23  0530 04/08/23  0419   MAGNESIUM mg/dL 2.1 1.9 1.9   < >  --    PHOSPHORUS mg/dL 4.7* 3.6 3.3   < >  --    HEMOGLOBIN g/dL 9.8* 9.4* 9.0*   < > 8.4*   HEMATOCRIT % 31.1* 29.1* 27.8*   < > 25.8*   TRIGLYCERIDES mg/dL  --   --   --   --  166*    < > = values in this interval not displayed.     No results found for: COVID19  Lab Results   Component Value Date    HGBA1C 9.40 (H) 04/08/2023         Pertinent Medications Reviewed.     Current Nutrition Orders & Evaluation of Intake       Oral Nutrition     Current PO Diet Diet: Diabetic Diets, Fluid Restriction (240 mL/tray) Diets; Consistent Carbohydrate; 1500 mL/day; Texture: Regular Texture (IDDSI 7); Fluid Consistency: Thin (IDDSI 0)   Supplement Orders Placed This Encounter      Dietary Nutrition Supplements Boost Pudding       Malnutrition Severity Assessment                Nutrition Diagnosis         Nutrition Dx Problem 1 Inadequate energy Intake related to decreased ability to consume sufficient energy as evidenced by unintended wt change. and variable intake       Nutrition Intervention         Boost Pudding TID   +750 kcal, 21 g PRO/day     Medical Nutrition Therapy/Nutrition Education          Learner     Readiness Patient  N/A     Method     Response N/A  N/A     Monitor/Evaluation        Monitor I&O, PO intake, Supplement intake, Weight, Symptoms       Nutrition Discharge Plan         To be determined       Electronically signed by:  Cheryl Weller RD  04/14/23 13:06 EDT

## 2023-04-14 NOTE — THERAPY TREATMENT NOTE
Acute Care - Physical Therapy Treatment Note  JARRET Del Castillo     Patient Name: Lyle Lozano  : 1955  MRN: [id number]  Today's Date: 2023      Visit Dx:     ICD-10-CM ICD-9-CM   1. Acute on chronic congestive heart failure, unspecified heart failure type  I50.9 428.0   2. Shortness of breath  R06.02 786.05   3. Elevated brain natriuretic peptide (BNP) level  R79.89 790.99   4. Chronic renal impairment, unspecified CKD stage  N18.9 585.9   5. Blood glucose elevated  R73.9 790.29   6. Medically noncompliant  Z91.199 V15.81   7. Chronic anemia  D64.9 285.9   8. Difficulty in walking  R26.2 719.7   9. 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   • Hypertensive heart disease with heart failure   • Gout   • Stage 3 chronic kidney disease   • Hand arthritis   • Acute pain of left knee   • Elevated troponin level not due myocardial infarction   • Acute on chronic systolic heart failure (CMS/HCC)   • Acute on chronic congestive heart failure, unspecified heart failure type   • Acute on chronic HFrEF (heart failure with reduced ejection fraction)   • Acute ischemic left MCA stroke     Past Medical History:   Diagnosis Date   • Abnormal kidney function    • Arthritis    • Bursitis    • CHF (congestive heart failure)    • Depression    • Diabetes    • Edema    • Essential hypertension 2021   • Forgetfulness    • Gout    • Head injury    • Hernia cerebri    • Hyperlipidemia    • Hypertension    • IFG (impaired fasting  glucose)    • Low back pain    • Lumbago     `   • Pain of left hip    • Right shoulder pain    • Sleep apnea    • SOB (shortness of breath)      Past Surgical History:   Procedure Laterality Date   • CYST REMOVAL Right     leg     PT Assessment (last 12 hours)     PT Evaluation and Treatment     [facility] Name 04/14/23 1359          Physical Therapy Time and Intention    Subjective Information complains of;weakness;fatigue  -     Document Type therapy note (daily note)  -     Mode of Treatment physical therapy;individual therapy  -     Patient Effort fair  -[facility] Name 04/14/23 1359          Pain    Additional Documentation Pain Scale: FACES Pre/Post-Treatment (Group)  -[facility] Name 04/14/23 1359          Pain Scale: FACES Pre/Post-Treatment    Pain: FACES Scale, Pretreatment 0-->no hurt  -     Posttreatment Pain Rating 0-->no hurt  -RH     Row Name 04/14/23 9569          Bed Mobility    Bed Mobility scooting/bridging;supine-sit;sit-supine  -     All Activities, [place] (Bed Mobility) contact [facility] [place]  -     Scooting/Bridging [place] (Bed Mobility) contact [facility] [place]  -     Supine-Sit [place] (Bed Mobility) contact [facility] [place]  -     Sit-Supine [place] (Bed Mobility) contact guard  -     Comment, (Bed Mobility) Pt maintains sitting with supervision.  -     Row Name 04/14/23 1359          Transfers    Transfers sit-stand transfer;stand-sit transfer  -[facility] 04/14/23 1359          Sit-Stand Transfer    Sit-Stand [place] (Transfers) contact guard  -RH     Assistive Device (Sit-Stand Transfers) walker, front-wheeled  -[facility] Name 04/14/23 1359          Stand-Sit Transfer    Stand-Sit [place] (Transfers) contact guard  -RH     Assistive Device (Stand-Sit Transfers) walker, front-wheeled  -[facility] Name 04/14/23 1351          Gait/Stairs (Locomotion)    Gait/Stairs Locomotion gait/ambulation independence;gait/ambulation assistive device;distance ambulated;gait  pattern;gait deviations  -     [place] Level (Gait) contact guard  -RH     Assistive Device (Gait) walker, front-wheeled  -RH     Distance in Feet (Gait) 20  -RH     Pattern (Gait) 3-point;step-through  -RH     Deviations/Abnormal Patterns (Gait) base of support, wide;gait speed decreased;stride length decreased  -     Bilateral Gait Deviations heel strike decreased  -RH     Gait Assessment/Intervention Pt amb with RW and CGA with 3 point step-through gait pattern with wide base of support and decreased bilateral heel strike.  -     Row Name 04/14/23 1359          Balance    Dynamic Standing Balance contact guard  -RH     Position/Device Used, Standing Balance walker, front-wheeled  -RH     Row Name 04/14/23 1359          Vital Signs    O2 Delivery Intra Treatment room air  -     Row Name 04/14/23 1359          Progress Summary (PT)    Progress Toward Functional Goals (PT) progress toward functional goals is fair  -           User Key  (r) = Recorded By, (t) = Taken By, (c) = Cosigned By    Initials Name Provider Type     Emanuel Daniels PTA Physical Therapist Assistant                  PT Recommendation and Plan     Progress Summary (PT)  Progress Toward Functional Goals (PT): progress toward functional goals is fair   Outcome Measures     Row Name 04/14/23 1400 04/12/23 1400          How much help from another person do you currently need...    Turning from your back to your side while in flat bed without using bedrails? 4  -RH 4  -MARIELA (r) SW (t) MARIELA (c)     Moving from lying on back to sitting on the side of a flat bed without bedrails? 4  -RH 4  -MARIELA (r) SW (t) MARIELA (c)     Moving to and from a bed to a chair (including a wheelchair)? 3  - 3  -MARIELA (r) SW (t) MARIELA (c)     Standing up from a chair using your arms (e.g., wheelchair, bedside chair)? 3  -RH 3  -MARIELA (r) SW (t) MARIELA (c)     Climbing 3-5 steps with a railing? 3  - 3  -MARIELA (r) SW (t) MARIELA (c)     To walk in hospital room? 3  - 3  -MARIELA (r) SW (t) MARIELA  (c)     AM-PAC 6 Clicks Score (PT) 20  -RH 20  -MARIELA (r) SW (t)        Functional Assessment    Outcome Measure Options -- AM-PAC 6 Clicks Basic Mobility (PT)  -MARIELA (r) SW (t) MARIELA (c)           User Key  (r) = Recorded By, (t) = Taken By, (c) = Cosigned By    Initials Name Provider Type     Emanuel Daniels PTA Physical Therapist Assistant    Hu Bernal, PT Physical Therapist    Brianna Horton, PT Student PT Student                 Time Calculation:    PT Charges     Row Name 04/14/23 1358             Time Calculation    PT Received On 04/14/23  -RH         Timed Charges    18200 - Gait Training Minutes  5  -RH      48297 - PT Therapeutic Activity Minutes 5  -RH         Total Minutes    Timed Charges Total Minutes 10  -RH       Total Minutes 10  -RH            User Key  (r) = Recorded By, (t) = Taken By, (c) = Cosigned By    Initials Name Provider Type     Emanuel Daniels PTA Physical Therapist Assistant              Therapy Charges for Today     Code Description Service Date Service Provider Modifiers Qty    95366311523 HC GAIT TRAINING EA 15 MIN 4/14/2023 Emanuel Daniels PTA GP 1          PT G-Codes  Outcome Measure Options: AM-PAC 6 Clicks Basic Mobility (PT)  AM-PAC 6 Clicks Score (PT): 20  AM-PAC 6 Clicks Score (OT): 15    Emanuel Daniels PTA  4/14/2023

## 2023-04-14 NOTE — PROGRESS NOTES
" [facility]   Hospitalist Progress Note  Date: 2023  Patient Name: Lyle Lozano  : 1955  MRN: [id number]  Date of admission: 2023    Subjective   Subjective     Chief Complaint:   Leg pain, confusion    Summary:   Lyle Lozano is a 67 y.o. male brought to the emergency department for evaluation of generalized swelling and bilateral leg pain.  He reports that his right leg is more tender than the left.  Patient was recently discharged from our facility on 2023 following a weeklong admission.  Patient states since being discharged, he has been having difficulty bear weight on his right lower extremity due to significant right foot pain and has lost the ability to ambulate over the last 48 hours due to the pain.  Patient states that he has been out of his home medications for approximately 4 days, due to being \"out of refills\".  Patient was admitted to the hospital, restarted on his anticoagulation for which she was discharged without last time.  Patient was found to have new DVT in the right lower extremity, patient was diuresed with IV diuretics with good response.  On the morning of 2023 patient had acute change in mental status, stroke RRT was called, the patient underwent CT scan of the head which was negative for any obvious intracranial abnormalities, patient went MRI which demonstrated left frontal lobe infarct with petechial hemorrhage inpatient neurology consulted recommended cardiology consultation for JUAN JOSE as well as 30-day event monitor cardiology has been consulted.    Interval Followup:   Patient seen and examined resting comfortably in bed right foot pain somewhat improved continuing prednisone 20 mg daily for gout flare volume status appears adequate continue with oral Lasix sodium somewhat lower this morning but suspect that could be due to hyperglycemia related to steroids increasing basal insulin continue SSI per protocol.  Patient with " bilateral acute/subacute DVTs initially started on Lovenox has been transition to Eliquis 10 mg twice daily for 7 days followed by 5 mg twice daily after that patient will likely benefit from outpatient hematology consultation for hypercoagulable work-up.  Additionally neurology was consulted cardiology for JUAN JOSE.    Review of systems:  All systems reviewed and negative except the following: Patient complains of generalized weakness fatigue right foot pain right shoulder pain    Objective   Objective     Vitals:   Temp:  [98.1 °F (36.7 °C)-99 °F (37.2 °C)] 98.2 °F (36.8 °C)  Heart Rate:  [78-96] 91  Resp:  [16-20] 16  BP: (148-177)/(81-98) 150/88    Physical Exam   General appearance: Very pleasant awake alert oriented no acute distress   eyes: anicteric sclerae, moist conjunctivae; no lid-lag; PERRLA  Lungs: CTA, with normal respiratory effort and no intercostal retractions  CV: Regular Rate and Rhythm, no Murmurs, Rubs, or Gallops   Neuro: No focal deficits no slurred speech  Musculoskeletal decreased range of motion right foot pain right foot  Result Review    Result Review:  I have personally reviewed the results as below  [x]  Laboratory  CBC        4/12/2023    04:19 4/13/2023    05:35 4/14/2023    04:09   CBC   WBC 8.87   8.55   8.58     RBC 3.58   3.72   3.93     Hemoglobin 9.0   9.4   9.8     Hematocrit 27.8   29.1   31.1     MCV 77.7   78.2   79.1     MCH 25.1   25.3   24.9     MCHC 32.4   32.3   31.5     RDW 17.2   17.1   16.9     Platelets 379   406   444       CMP        4/12/2023    04:19 4/13/2023    05:35 4/14/2023    04:09   CMP   Glucose 208   199   289     BUN 43   43   48     Creatinine 1.95   2.08   2.22     EGFR 37.5   34.7   32.1     Sodium 132   131   128     Potassium 3.5   3.7   4.2     Chloride 96   96   93     Calcium 8.2   8.1   8.2     Total Protein 6.4   6.3   6.8     Albumin 1.9   1.7   2.0     Globulin 4.5   4.6      Total Bilirubin 0.5   0.4   0.5     Alkaline Phosphatase 139   128    136     AST (SGOT) 14   12   15     ALT (SGPT) 9   8   9     Albumin/Globulin Ratio 0.4   0.4      BUN/Creatinine Ratio 22.1   20.7   21.6     Anion Gap 11.7   10.5   9.4       []  Microbiology  []  Radiology  []  EKG/Telemetry   []  Cardiology/Vascular   []  Pathology  []  Old records  []  Other:    Assessment & Plan   Assessment / Plan     Assessment:  · Acute on chronic systolic and diastolic congestive heart failure  · Acute left frontal lobe infarct with petechial hemorrhage  · Hypophosphatemia  · Hypomagnesemia  · Bilateral lower extremity DVTs gastrocnemius veins patient need to be discharged on Eliquis at time of discharge and would likely benefit from hematology consultation for hypercoagulable work-up  · Medical noncompliance  · Elevated troponin, unlikely to be ACS  · Uncontrolled type 2 diabetes mellitus  · Chronic kidney disease stage III  · Acute gout flare    Plan:  • Continue cardiac telemetry   • Check 2D echocardiogram final report pending  • Continue Lasix and Aldactone monitor volume status and renal function close  • Continue oral fluid restriction  • Resuming carvedilol holding Entresto  • Lovenox discontinued transitioned to Eliquis after discussion with neurology started on 10 mg p.o. twice daily for 7 days followed for 5 mg twice daily thereafter  • Started on prednisone 20 mg daily for gout flare  • Increasing basal insulin for expected hyperglycemia secondary to steroids continue SSI per protocol  • Continue stroke pathway  • Neurology consultation appreciated continue care per the recommendation  • Neurology is consulted cardiology for JUAN JOSE  • Continue with PT OT speech therapy  • Social service consultation patient will likely need rehab at time of discharge  • Further treatment is patient's contingent upon his hospital course  • Discussed with RN  .    DVT prophylaxis:  Medical DVT prophylaxis orders are present.    CODE STATUS:   Level Of Support Discussed With: Patient  Code Status  (Patient has no pulse and is not breathing): CPR (Attempt to Resuscitate)  Medical Interventions (Patient has pulse or is breathing): Full Support    Electronically signed by JAYMIE Bach, 04/14/23, 11:34 AM EDT.        Attending Note:  Patient independently seen and evaluated, agree with assessment and plan, above documentation reflects plan put forth during bedside rounds.  More than 51% of the time of this patient encounter was performed by me.   67-year-old male presented with generalized swelling, bilateral leg pain.  Found to have DVT.  Also MRI shows left frontal lobe stroke.  Neurology has cleared him to take anticoagulation for his DVT, started on Eliquis.  No new complaints.  Still w/ foot pain.  Will do short course of prednisone, as to avoid NSAIDS given initiation of AC.  Discussed with patient.  Will benefit from rehab.    Electronically signed by Brigido Degroot MD, 04/14/23, 2:06 PM EDT.

## 2023-04-15 ENCOUNTER — APPOINTMENT (OUTPATIENT)
Dept: CT IMAGING | Facility: HOSPITAL | Age: 68
DRG: 291 | End: 2023-04-15
Payer: MEDICARE

## 2023-04-15 LAB
ALBUMIN SERPL-MCNC: 2 G/DL (ref 3.5–5.2)
ANION GAP SERPL CALCULATED.3IONS-SCNC: 10.9 MMOL/L (ref 5–15)
BUN SERPL-MCNC: 56 MG/DL (ref 8–23)
BUN/CREAT SERPL: 24.6 (ref 7–25)
CALCIUM SPEC-SCNC: 7.9 MG/DL (ref 8.6–10.5)
CHLORIDE SERPL-SCNC: 92 MMOL/L (ref 98–107)
CO2 SERPL-SCNC: 26.1 MMOL/L (ref 22–29)
CREAT SERPL-MCNC: 2.28 MG/DL (ref 0.76–1.27)
DEPRECATED RDW RBC AUTO: 48.7 FL (ref 37–54)
EGFRCR SERPLBLD CKD-EPI 2021: 31.1 ML/MIN/1.73
ERYTHROCYTE [DISTWIDTH] IN BLOOD BY AUTOMATED COUNT: 16.8 % (ref 12.3–15.4)
GLUCOSE BLDC GLUCOMTR-MCNC: 230 MG/DL (ref 70–99)
GLUCOSE BLDC GLUCOMTR-MCNC: 240 MG/DL (ref 70–99)
GLUCOSE BLDC GLUCOMTR-MCNC: 247 MG/DL (ref 70–99)
GLUCOSE SERPL-MCNC: 270 MG/DL (ref 65–99)
HCT VFR BLD AUTO: 27.6 % (ref 37.5–51)
HGB BLD-MCNC: 8.8 G/DL (ref 13–17.7)
MAGNESIUM SERPL-MCNC: 2.2 MG/DL (ref 1.6–2.4)
MCH RBC QN AUTO: 24.9 PG (ref 26.6–33)
MCHC RBC AUTO-ENTMCNC: 31.9 G/DL (ref 31.5–35.7)
MCV RBC AUTO: 78 FL (ref 79–97)
NT-PROBNP SERPL-MCNC: 8707 PG/ML (ref 0–900)
PHOSPHATE SERPL-MCNC: 3.4 MG/DL (ref 2.5–4.5)
PLATELET # BLD AUTO: 436 10*3/MM3 (ref 140–450)
PMV BLD AUTO: 9.1 FL (ref 6–12)
POTASSIUM SERPL-SCNC: 3.9 MMOL/L (ref 3.5–5.2)
QT INTERVAL: 474 MS
RBC # BLD AUTO: 3.54 10*6/MM3 (ref 4.14–5.8)
SODIUM SERPL-SCNC: 129 MMOL/L (ref 136–145)
WBC NRBC COR # BLD: 7.67 10*3/MM3 (ref 3.4–10.8)

## 2023-04-15 PROCEDURE — 83735 ASSAY OF MAGNESIUM: CPT | Performed by: PHYSICIAN ASSISTANT

## 2023-04-15 PROCEDURE — 83880 ASSAY OF NATRIURETIC PEPTIDE: CPT | Performed by: PHYSICIAN ASSISTANT

## 2023-04-15 PROCEDURE — 63710000001 INSULIN LISPRO (HUMAN) PER 5 UNITS: Performed by: FAMILY MEDICINE

## 2023-04-15 PROCEDURE — 70450 CT HEAD/BRAIN W/O DYE: CPT

## 2023-04-15 PROCEDURE — 63710000001 PREDNISONE PER 1 MG: Performed by: PHYSICIAN ASSISTANT

## 2023-04-15 PROCEDURE — 94799 UNLISTED PULMONARY SVC/PX: CPT

## 2023-04-15 PROCEDURE — 82962 GLUCOSE BLOOD TEST: CPT

## 2023-04-15 PROCEDURE — 63710000001 INSULIN DETEMIR PER 5 UNITS: Performed by: PHYSICIAN ASSISTANT

## 2023-04-15 PROCEDURE — 99232 SBSQ HOSP IP/OBS MODERATE 35: CPT | Performed by: INTERNAL MEDICINE

## 2023-04-15 PROCEDURE — 85027 COMPLETE CBC AUTOMATED: CPT | Performed by: PHYSICIAN ASSISTANT

## 2023-04-15 PROCEDURE — 80069 RENAL FUNCTION PANEL: CPT | Performed by: PHYSICIAN ASSISTANT

## 2023-04-15 RX ADMIN — FUROSEMIDE 40 MG: 40 TABLET ORAL at 09:10

## 2023-04-15 RX ADMIN — ALLOPURINOL 100 MG: 100 TABLET ORAL at 09:09

## 2023-04-15 RX ADMIN — INSULIN LISPRO 4 UNITS: 100 INJECTION, SOLUTION INTRAVENOUS; SUBCUTANEOUS at 17:15

## 2023-04-15 RX ADMIN — SPIRONOLACTONE 25 MG: 25 TABLET ORAL at 09:09

## 2023-04-15 RX ADMIN — APIXABAN 10 MG: 5 TABLET, FILM COATED ORAL at 09:10

## 2023-04-15 RX ADMIN — INSULIN DETEMIR 10 UNITS: 100 INJECTION, SOLUTION SUBCUTANEOUS at 09:10

## 2023-04-15 RX ADMIN — PREDNISONE 20 MG: 20 TABLET ORAL at 09:10

## 2023-04-15 RX ADMIN — FAMOTIDINE 20 MG: 20 TABLET, FILM COATED ORAL at 09:10

## 2023-04-15 RX ADMIN — Medication 10 ML: at 20:43

## 2023-04-15 RX ADMIN — INSULIN LISPRO 4 UNITS: 100 INJECTION, SOLUTION INTRAVENOUS; SUBCUTANEOUS at 09:10

## 2023-04-15 RX ADMIN — FAMOTIDINE 20 MG: 20 TABLET, FILM COATED ORAL at 17:15

## 2023-04-15 RX ADMIN — INSULIN LISPRO 4 UNITS: 100 INJECTION, SOLUTION INTRAVENOUS; SUBCUTANEOUS at 12:21

## 2023-04-15 RX ADMIN — OXYCODONE HYDROCHLORIDE AND ACETAMINOPHEN 1 TABLET: 10; 325 TABLET ORAL at 20:43

## 2023-04-15 RX ADMIN — Medication 10 ML: at 09:09

## 2023-04-15 RX ADMIN — CARVEDILOL 6.25 MG: 6.25 TABLET, FILM COATED ORAL at 09:09

## 2023-04-15 RX ADMIN — CARVEDILOL 6.25 MG: 6.25 TABLET, FILM COATED ORAL at 17:15

## 2023-04-15 RX ADMIN — ATORVASTATIN CALCIUM 80 MG: 40 TABLET, FILM COATED ORAL at 20:43

## 2023-04-15 RX ADMIN — APIXABAN 10 MG: 5 TABLET, FILM COATED ORAL at 20:43

## 2023-04-15 NOTE — PLAN OF CARE
Goal Outcome Evaluation:  Plan of Care Reviewed With: patient   No acute events this shift.  Family in to visit.  VSS.     Progress: improving

## 2023-04-15 NOTE — PLAN OF CARE
Goal Outcome Evaluation:  Plan of Care Reviewed With: patient        Progress: declining  Outcome Evaluation: Patient alert to self and situation. Patient has been impulsive and at times agitated with staff. Patient was getting out of bed without using the call light despite education. Educated and reoriented patient on importance of using the call light. Informed MD of patient behavior. Repeat CT head completed. MD aware of results. Patient is resting at this time. VSS. Call light within reach.

## 2023-04-15 NOTE — SIGNIFICANT NOTE
04/15/23 0735   Plan   Final Discharge Disposition Code 03 - skilled nursing facility (SNF)   Final Note Patient can discharge to [facility] today.

## 2023-04-15 NOTE — PROGRESS NOTES
" [facility]   Hospitalist Progress Note  Date: 4/15/2023  Patient Name: Lyle Lozano  : 1955  MRN: [id number]  Date of admission: 2023    Subjective   Subjective     Chief Complaint:   Leg pain, confusion    Summary:   Lyle Lozano is a 67 y.o. male brought to the emergency department for evaluation of generalized swelling and bilateral leg pain.  He reports that his right leg is more tender than the left.  Patient was recently discharged from our facility on 2023 following a weeklong admission.  Patient states since being discharged, he has been having difficulty bear weight on his right lower extremity due to significant right foot pain and has lost the ability to ambulate over the last 48 hours due to the pain.  Patient states that he has been out of his home medications for approximately 4 days, due to being \"out of refills\".  Patient was admitted to the hospital, restarted on his anticoagulation for which she was discharged without last time.  Patient was found to have new DVT in the right lower extremity, patient was diuresed with IV diuretics with good response.  On the morning of 2023 patient had acute change in mental status, stroke RRT was called, the patient underwent CT scan of the head which was negative for any obvious intracranial abnormalities, patient went MRI which demonstrated left frontal lobe infarct with petechial hemorrhage inpatient neurology consulted recommended cardiology consultation for JUAN JOSE as well as 30-day event monitor cardiology has been consulted.    Interval Followup:   Patient seen and examined resting comfortably respiratory status is stable small increase in serum creatinine but otherwise doing well.  Right foot pain improved with oral prednisone continuing for an additional 2 to 3 days.  Blood glucose remains mildly elevated continuing basal insulin SSI per protocol.  Continuing on anticoagulation with Eliquis for acute DVTs " given the fact the patient is on full dose anticoagulation no need for JUAN JOSE at this point or long-term cardiac monitoring cardiology consultation discontinued patient is stable nearing discharge question home with home health versus rehab in next 24 to 48 hours    Review of systems:  All systems reviewed and negative except the following: Patient complains of generalized weakness fatigue right foot pain right shoulder pain    Objective   Objective     Vitals:   Temp:  [97.2 °F (36.2 °C)-97.9 °F (36.6 °C)] 97.6 °F (36.4 °C)  Heart Rate:  [67-74] 73  Resp:  [16-20] 20  BP: (128-170)/(67-90) 170/90    Physical Exam   General appearance: Very pleasant awake alert oriented no acute distress   eyes: anicteric sclerae, moist conjunctivae; no lid-lag; PERRLA  Lungs: CTA, with normal respiratory effort and no intercostal retractions  CV: Regular Rate and Rhythm, no Murmurs, Rubs, or Gallops   Neuro: No focal deficits no slurred speech  Musculoskeletal decreased range of motion right foot pain right foot  Result Review    Result Review:  I have personally reviewed the results as below  [x]  Laboratory  CBC        4/13/2023    05:35 4/14/2023    04:09 4/15/2023    04:11   CBC   WBC 8.55   8.58   7.67     RBC 3.72   3.93   3.54     Hemoglobin 9.4   9.8   8.8     Hematocrit 29.1   31.1   27.6     MCV 78.2   79.1   78.0     MCH 25.3   24.9   24.9     MCHC 32.3   31.5   31.9     RDW 17.1   16.9   16.8     Platelets 406   444   436       CMP        4/13/2023    05:35 4/14/2023    04:09 4/15/2023    04:11   CMP   Glucose 199   289   270     BUN 43   48   56     Creatinine 2.08   2.22   2.28     EGFR 34.7   32.1   31.1     Sodium 131   128   129     Potassium 3.7   4.2   3.9     Chloride 96   93   92     Calcium 8.1   8.2   7.9     Total Protein 6.3   6.8      Albumin 1.7   2.0   2.0     Globulin 4.6       Total Bilirubin 0.4   0.5      Alkaline Phosphatase 128   136      AST (SGOT) 12   15      ALT (SGPT) 8   9      Albumin/Globulin  Ratio 0.4       BUN/Creatinine Ratio 20.7   21.6   24.6     Anion Gap 10.5   9.4   10.9       []  Microbiology  []  Radiology  []  EKG/Telemetry   []  Cardiology/Vascular   []  Pathology  []  Old records  []  Other:    Assessment & Plan   Assessment / Plan     Assessment:  · Acute on chronic systolic and diastolic congestive heart failure  · Acute left frontal lobe infarct with petechial hemorrhage  · Hypophosphatemia  · Hypomagnesemia  · Bilateral lower extremity DVTs gastrocnemius veins patient need to be discharged on Eliquis at time of discharge and would likely benefit from hematology consultation for hypercoagulable work-up  · Medical noncompliance  · Elevated troponin, unlikely to be ACS  · Uncontrolled type 2 diabetes mellitus  · Chronic kidney disease stage III  · Acute gout flare    Plan:  • Continue cardiac telemetry   • Check 2D echocardiogram reviewed ejection fraction 50% trace TR and MR   • continue Lasix and Aldactone monitor volume status and renal function close  • Continue oral fluid restriction  • Resuming carvedilol holding Entresto  • Continue Eliquis for acute DVT  • Continue prednisone 20 mg daily for gout flare  • Increasing basal insulin for expected hyperglycemia secondary to steroids continue SSI per protocol  • Continue stroke pathway  • Neurology consultation appreciated continue care per the recommendation  • Neurology is consulted cardiology for JUAN JOSE, but will be deferred as pt already on AC  • Continue with PT OT speech therapy  • Social service consultation patient will likely need rehab at time of discharge  • Further treatment is patient's contingent upon his hospital course  • Discussed with RN  .    DVT prophylaxis:  Medical DVT prophylaxis orders are present.    CODE STATUS:   Level Of Support Discussed With: Patient  Code Status (Patient has no pulse and is not breathing): CPR (Attempt to Resuscitate)  Medical Interventions (Patient has pulse or is breathing): Full  Support    Electronically signed by JAYMIE Bach, 04/15/23, 11:15 AM EDT.          Attending Note:  Patient independently seen and evaluated, agree with assessment and plan, above documentation reflects plan put forth during bedside rounds.  More than 51% of the time of this patient encounter was performed by me.   67-year-old male presented with generalized swelling, bilateral leg pain.  Found to have DVT.  Also MRI shows left frontal lobe stroke.  Neurology has cleared him to take anticoagulation for his DVT, started on Eliquis. Foot better.  Denies new complaints.  CT'd overnight.  No new findings today.  Anticipate dc to rehab tomorrow.  Electronically signed by Brigido Degroot MD, 04/15/23, 1:45 PM EDT.

## 2023-04-15 NOTE — PROGRESS NOTES
[facility]   Neurology Progress Note    Patient Name: Lyle Lozano  : 1955  MRN: [id number]  Primary Care Physician:  Heidi Villa APRN  Referring Physician: Jose Nolasco DO  Date of admission: 2023    Subjective   Subjective     Reason for Consult/ Chief Complaint: Chart review    HPI:  Lyle Lozano is a 67 y.o. male for chart review        Objective     Vitals:   Temp:  [97.2 °F (36.2 °C)-98.6 °F (37 °C)] 97.7 °F (36.5 °C)  Heart Rate:  [70-96] 74  Resp:  [16-20] 16  BP: (128-155)/(67-88) 132/68    Physical Exam:      Result Review    Result Review:  I have personally reviewed the results from the time of this admission to 2023 22:47 EDT and agree with these findings:  []  Laboratory  []  Microbiology  []  Radiology  []  EKG/Telemetry   []  Cardiology/Vascular   []  Pathology  [x]  Old records  []  Other:      Assessment & Plan   Assessment / Plan   Active Hospital Problems:  Active Hospital Problems    Diagnosis    • **Acute on chronic congestive heart failure, unspecified heart failure type    • Acute ischemic left MCA stroke          Plan: I spoke to cardiology today and discussed that we will not do JUAN JOSE or long-term cardiac monitoring at this time since the patient is on full anticoagulation on Eliquis.  Cardiology consult was discontinued.    Total time spent with the patient and coordinating patient care was  0 minutes.    electronically signed by Grady Loredo MD, 23, 10:47 PM EDT.

## 2023-04-16 LAB
ALBUMIN SERPL-MCNC: 2.2 G/DL (ref 3.5–5.2)
ANION GAP SERPL CALCULATED.3IONS-SCNC: 8.9 MMOL/L (ref 5–15)
BUN SERPL-MCNC: 57 MG/DL (ref 8–23)
BUN/CREAT SERPL: 25.6 (ref 7–25)
CALCIUM SPEC-SCNC: 8.1 MG/DL (ref 8.6–10.5)
CHLORIDE SERPL-SCNC: 95 MMOL/L (ref 98–107)
CO2 SERPL-SCNC: 27.1 MMOL/L (ref 22–29)
CREAT SERPL-MCNC: 2.23 MG/DL (ref 0.76–1.27)
DEPRECATED RDW RBC AUTO: 49.3 FL (ref 37–54)
EGFRCR SERPLBLD CKD-EPI 2021: 31.9 ML/MIN/1.73
ERYTHROCYTE [DISTWIDTH] IN BLOOD BY AUTOMATED COUNT: 16.9 % (ref 12.3–15.4)
GLUCOSE BLDC GLUCOMTR-MCNC: 220 MG/DL (ref 70–99)
GLUCOSE BLDC GLUCOMTR-MCNC: 251 MG/DL (ref 70–99)
GLUCOSE BLDC GLUCOMTR-MCNC: 430 MG/DL (ref 70–99)
GLUCOSE SERPL-MCNC: 278 MG/DL (ref 65–99)
HCT VFR BLD AUTO: 29.2 % (ref 37.5–51)
HGB BLD-MCNC: 9.2 G/DL (ref 13–17.7)
MAGNESIUM SERPL-MCNC: 2.4 MG/DL (ref 1.6–2.4)
MCH RBC QN AUTO: 24.9 PG (ref 26.6–33)
MCHC RBC AUTO-ENTMCNC: 31.5 G/DL (ref 31.5–35.7)
MCV RBC AUTO: 79.1 FL (ref 79–97)
PHOSPHATE SERPL-MCNC: 2.6 MG/DL (ref 2.5–4.5)
PLATELET # BLD AUTO: 503 10*3/MM3 (ref 140–450)
PMV BLD AUTO: 9.3 FL (ref 6–12)
POTASSIUM SERPL-SCNC: 4.4 MMOL/L (ref 3.5–5.2)
RBC # BLD AUTO: 3.69 10*6/MM3 (ref 4.14–5.8)
SODIUM SERPL-SCNC: 131 MMOL/L (ref 136–145)
VIT B12 BLD-MCNC: 489 PG/ML (ref 211–946)
WBC NRBC COR # BLD: 7 10*3/MM3 (ref 3.4–10.8)

## 2023-04-16 PROCEDURE — 99232 SBSQ HOSP IP/OBS MODERATE 35: CPT | Performed by: INTERNAL MEDICINE

## 2023-04-16 PROCEDURE — 82962 GLUCOSE BLOOD TEST: CPT

## 2023-04-16 PROCEDURE — 97116 GAIT TRAINING THERAPY: CPT

## 2023-04-16 PROCEDURE — 63710000001 INSULIN LISPRO (HUMAN) PER 5 UNITS: Performed by: FAMILY MEDICINE

## 2023-04-16 PROCEDURE — 85027 COMPLETE CBC AUTOMATED: CPT | Performed by: PHYSICIAN ASSISTANT

## 2023-04-16 PROCEDURE — 63710000001 PREDNISONE PER 1 MG: Performed by: PHYSICIAN ASSISTANT

## 2023-04-16 PROCEDURE — 97530 THERAPEUTIC ACTIVITIES: CPT

## 2023-04-16 PROCEDURE — 63710000001 INSULIN LISPRO (HUMAN) PER 5 UNITS: Performed by: INTERNAL MEDICINE

## 2023-04-16 PROCEDURE — 80069 RENAL FUNCTION PANEL: CPT | Performed by: PHYSICIAN ASSISTANT

## 2023-04-16 PROCEDURE — 82607 VITAMIN B-12: CPT | Performed by: INTERNAL MEDICINE

## 2023-04-16 PROCEDURE — 63710000001 INSULIN DETEMIR PER 5 UNITS: Performed by: PHYSICIAN ASSISTANT

## 2023-04-16 PROCEDURE — 83735 ASSAY OF MAGNESIUM: CPT | Performed by: PHYSICIAN ASSISTANT

## 2023-04-16 PROCEDURE — 94799 UNLISTED PULMONARY SVC/PX: CPT

## 2023-04-16 RX ORDER — CHOLECALCIFEROL (VITAMIN D3) 125 MCG
500 CAPSULE ORAL DAILY
Status: DISCONTINUED | OUTPATIENT
Start: 2023-04-16 | End: 2023-04-17 | Stop reason: HOSPADM

## 2023-04-16 RX ORDER — AMLODIPINE BESYLATE 2.5 MG/1
2.5 TABLET ORAL
Status: DISCONTINUED | OUTPATIENT
Start: 2023-04-16 | End: 2023-04-17 | Stop reason: DRUGHIGH

## 2023-04-16 RX ORDER — INSULIN LISPRO 100 [IU]/ML
6 INJECTION, SOLUTION INTRAVENOUS; SUBCUTANEOUS ONCE
Status: COMPLETED | OUTPATIENT
Start: 2023-04-16 | End: 2023-04-16

## 2023-04-16 RX ORDER — INSULIN LISPRO 100 [IU]/ML
16 INJECTION, SOLUTION INTRAVENOUS; SUBCUTANEOUS ONCE
Status: DISCONTINUED | OUTPATIENT
Start: 2023-04-16 | End: 2023-04-16

## 2023-04-16 RX ORDER — AMLODIPINE BESYLATE 2.5 MG/1
2.5 TABLET ORAL
Status: CANCELLED
Start: 2023-04-17

## 2023-04-16 RX ADMIN — APIXABAN 10 MG: 5 TABLET, FILM COATED ORAL at 20:15

## 2023-04-16 RX ADMIN — AMLODIPINE BESYLATE 2.5 MG: 2.5 TABLET ORAL at 14:09

## 2023-04-16 RX ADMIN — OXYCODONE HYDROCHLORIDE AND ACETAMINOPHEN 1 TABLET: 10; 325 TABLET ORAL at 12:12

## 2023-04-16 RX ADMIN — ALLOPURINOL 100 MG: 100 TABLET ORAL at 08:26

## 2023-04-16 RX ADMIN — INSULIN DETEMIR 10 UNITS: 100 INJECTION, SOLUTION SUBCUTANEOUS at 08:25

## 2023-04-16 RX ADMIN — CYANOCOBALAMIN TAB 500 MCG 500 MCG: 500 TAB at 14:09

## 2023-04-16 RX ADMIN — INSULIN LISPRO 6 UNITS: 100 INJECTION, SOLUTION INTRAVENOUS; SUBCUTANEOUS at 17:13

## 2023-04-16 RX ADMIN — INSULIN LISPRO 9 UNITS: 100 INJECTION, SOLUTION INTRAVENOUS; SUBCUTANEOUS at 12:11

## 2023-04-16 RX ADMIN — PREDNISONE 20 MG: 20 TABLET ORAL at 08:26

## 2023-04-16 RX ADMIN — FAMOTIDINE 20 MG: 20 TABLET, FILM COATED ORAL at 08:26

## 2023-04-16 RX ADMIN — FUROSEMIDE 40 MG: 40 TABLET ORAL at 08:26

## 2023-04-16 RX ADMIN — INSULIN LISPRO 6 UNITS: 100 INJECTION, SOLUTION INTRAVENOUS; SUBCUTANEOUS at 12:11

## 2023-04-16 RX ADMIN — OXYCODONE HYDROCHLORIDE AND ACETAMINOPHEN 1 TABLET: 10; 325 TABLET ORAL at 20:19

## 2023-04-16 RX ADMIN — CARVEDILOL 6.25 MG: 6.25 TABLET, FILM COATED ORAL at 17:13

## 2023-04-16 RX ADMIN — Medication 10 ML: at 20:15

## 2023-04-16 RX ADMIN — FAMOTIDINE 20 MG: 20 TABLET, FILM COATED ORAL at 17:13

## 2023-04-16 RX ADMIN — ATORVASTATIN CALCIUM 80 MG: 40 TABLET, FILM COATED ORAL at 20:15

## 2023-04-16 RX ADMIN — SPIRONOLACTONE 25 MG: 25 TABLET ORAL at 08:26

## 2023-04-16 RX ADMIN — Medication 10 ML: at 08:26

## 2023-04-16 RX ADMIN — APIXABAN 10 MG: 5 TABLET, FILM COATED ORAL at 08:27

## 2023-04-16 RX ADMIN — INSULIN LISPRO 4 UNITS: 100 INJECTION, SOLUTION INTRAVENOUS; SUBCUTANEOUS at 08:25

## 2023-04-16 RX ADMIN — CARVEDILOL 6.25 MG: 6.25 TABLET, FILM COATED ORAL at 08:26

## 2023-04-16 NOTE — PROGRESS NOTES
" [facility]   Hospitalist Progress Note  Date: 2023  Patient Name: Lyle Lozano  : 1955  MRN: [id number]  Date of admission: 2023    Subjective   Subjective     Chief Complaint:   Leg pain, confusion    Summary:   Lyle Lozano is a 67 y.o. male brought to the emergency department for evaluation of generalized swelling and bilateral leg pain.  He reports that his right leg is more tender than the left.  Patient was recently discharged from our facility on 2023 following a weeklong admission.  Patient states since being discharged, he has been having difficulty bear weight on his right lower extremity due to significant right foot pain and has lost the ability to ambulate over the last 48 hours due to the pain.  Patient states that he has been out of his home medications for approximately 4 days, due to being \"out of refills\".  Patient was admitted to the hospital, restarted on his anticoagulation for which she was discharged without last time.  Patient was found to have new DVT in the right lower extremity, patient was diuresed with IV diuretics with good response.  On the morning of 2023 patient had acute change in mental status, stroke RRT was called, the patient underwent CT scan of the head which was negative for any obvious intracranial abnormalities, patient went MRI which demonstrated left frontal lobe infarct with petechial hemorrhage inpatient neurology consulted initially was recommended to have cardiology consultation for JUAN JOSE however patient is on full dose anticoagulation so JUAN JOSE has been deferred likely to rehab tomorrow morning.    Interval Followup: Patient seen and examined resting comfortably in bed no focal deficits some intermittent confusion volume status is stable right foot pain improved overall doing better likely to rehab tomorrow if patient is agreeable.    Review of systems:  All systems reviewed and negative except the following: " Patient complains of generalized weakness fatigue right foot pain right shoulder pain    Objective   Objective     Vitals:   Temp:  [97.3 °F (36.3 °C)-98.2 °F (36.8 °C)] 97.3 °F (36.3 °C)  Heart Rate:  [70-84] 75  Resp:  [18] 18  BP: (137-170)/() 160/110    Physical Exam   General appearance: Very pleasant awake alert oriented no acute distress   eyes: anicteric sclerae, moist conjunctivae; no lid-lag; PERRLA  Lungs: CTA, with normal respiratory effort and no intercostal retractions  CV: Regular Rate and Rhythm, no Murmurs, Rubs, or Gallops   Neuro: No focal deficits no slurred speech  Musculoskeletal decreased range of motion right foot pain right foot  Result Review    Result Review:  I have personally reviewed the results as below  [x]  Laboratory  CBC        4/14/2023    04:09 4/15/2023    04:11 4/16/2023    05:08   CBC   WBC 8.58   7.67   7.00     RBC 3.93   3.54   3.69     Hemoglobin 9.8   8.8   9.2     Hematocrit 31.1   27.6   29.2     MCV 79.1   78.0   79.1     MCH 24.9   24.9   24.9     MCHC 31.5   31.9   31.5     RDW 16.9   16.8   16.9     Platelets 444   436   503       CMP        4/14/2023    04:09 4/15/2023    04:11 4/16/2023    05:08   CMP   Glucose 289   270   278     BUN 48   56   57     Creatinine 2.22   2.28   2.23     EGFR 32.1   31.1   31.9     Sodium 128   129   131     Potassium 4.2   3.9   4.4     Chloride 93   92   95     Calcium 8.2   7.9   8.1     Total Protein 6.8       Albumin 2.0   2.0   2.2     Total Bilirubin 0.5       Alkaline Phosphatase 136       AST (SGOT) 15       ALT (SGPT) 9       BUN/Creatinine Ratio 21.6   24.6   25.6     Anion Gap 9.4   10.9   8.9       []  Microbiology  []  Radiology  []  EKG/Telemetry   []  Cardiology/Vascular   []  Pathology  []  Old records  []  Other:    Assessment & Plan   Assessment / Plan     Assessment:  · Acute on chronic systolic and diastolic congestive heart failure  · Acute left frontal lobe infarct with petechial  hemorrhage  · Hypophosphatemia  · Hypomagnesemia  · Bilateral lower extremity DVTs gastrocnemius veins patient need to be discharged on Eliquis at time of discharge and would likely benefit from hematology consultation for hypercoagulable work-up  · Medical noncompliance  · Elevated troponin, unlikely to be ACS  · Uncontrolled type 2 diabetes mellitus  · Chronic kidney disease stage III  · Acute gout flare    Plan:  • Continue cardiac telemetry   • Check 2D echocardiogram reviewed ejection fraction 50% trace TR and MR   • continue Lasix and Aldactone monitor volume status and renal function close  • Continue oral fluid restriction  • Resuming carvedilol holding Entresto until renal function stabilizes  • Continue Eliquis for acute DVT  • Continue prednisone 20 mg daily for gout flare continue for an additional 2 to 3 days then can discontinue  • Continue basal insulin and SSI per protocol  • Continue stroke pathway  • Neurology consultation appreciated continue care per the recommendation  • Neurology consulted cardiology for JUAN JOSE, but this will be deferred as pt is already on AC  • Continue with PT OT speech therapy  • Social service consultation patient will likely need rehab at time of discharge  • Further treatment is patient's contingent upon his hospital course  • Anticipate discharge to rehab tomorrow if patient family agreeable  • Discussed with RN  .    DVT prophylaxis:  Medical DVT prophylaxis orders are present.    CODE STATUS:   Level Of Support Discussed With: Patient  Code Status (Patient has no pulse and is not breathing): CPR (Attempt to Resuscitate)  Medical Interventions (Patient has pulse or is breathing): Full Support    Electronically signed by JAYMIE Bach, 04/16/23, 10:34 AM EDT.            Attending Note:  Patient independently seen and evaluated, agree with assessment and plan, above documentation reflects plan put forth during bedside rounds.  More than 51% of the time of this patient  encounter was performed by me.   67-year-old male presented with generalized swelling, bilateral leg pain.  Found to have DVT.  Also MRI shows left frontal lobe stroke.  Neurology has cleared him to take anticoagulation for his DVT, started on Eliquis. Foot continues to improve.  We will anticipate discharge to nursing facility tomorrow if no new issues.  Renal function relatively stable.  Electronically signed by Brigido Degroot MD, 04/16/23, 11:08 AM EDT.

## 2023-04-16 NOTE — THERAPY TREATMENT NOTE
Acute Care - Physical Therapy Progress Note  [facility]     Patient Name: Lyle Lozano  : 1955  MRN: [id number]  Today's Date: 2023      Visit Dx:     ICD-10-CM ICD-9-CM   1. Acute on chronic congestive heart failure, unspecified heart failure type  I50.9 428.0   2. Shortness of breath  R06.02 786.05   3. Elevated brain natriuretic peptide (BNP) level  R79.89 790.99   4. Chronic renal impairment, unspecified CKD stage  N18.9 585.9   5. Blood glucose elevated  R73.9 790.29   6. Medically noncompliant  Z91.199 V15.81   7. Chronic anemia  D64.9 285.9   8. Difficulty in walking  R26.2 719.7   9. 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   • Hypertensive heart disease with heart failure   • Gout   • Stage 3 chronic kidney disease   • Hand arthritis   • Acute pain of left knee   • Elevated troponin level not due myocardial infarction   • Acute on chronic systolic heart failure (CMS/HCC)   • Acute on chronic congestive heart failure, unspecified heart failure type   • Acute on chronic HFrEF (heart failure with reduced ejection fraction)   • Acute ischemic left MCA stroke     Past Medical History:   Diagnosis Date   • Abnormal kidney function    • Arthritis    • Bursitis    • CHF (congestive heart failure)    • Depression    • Diabetes    • Edema    • Essential hypertension 2021   • Forgetfulness    • Gout    • Head injury    • Hernia cerebri    • Hyperlipidemia    • Hypertension    • IFG (impaired fasting  glucose)    • Low back pain    • Lumbago     `   • Pain of left hip    • Right shoulder pain    • Sleep apnea    • SOB (shortness of breath)      Past Surgical History:   Procedure Laterality Date   • CYST REMOVAL Right     leg     PT Assessment (last 12 hours)     PT Evaluation and Treatment     Row Name 04/16/23 1200          Physical Therapy Time and Intention    Subjective Information complains of;pain  -CS     Document Type therapy note (daily note)  -CS     Mode of Treatment individual therapy;physical therapy  -CS     Patient Effort good  -CS     Symptoms Noted During/After Treatment increased pain  -CS     Row Name 04/16/23 1200          Pain    Pretreatment Pain Rating 3/10  -CS     Posttreatment Pain Rating 6/10  -CS     Pain Location - Side/Orientation Right  -CS     Pain Location upper  -CS     Pain Location - extremity  -CS     Pain Intervention(s) Other (Comment)  instructed pt. to elevate R UE above level of heart when in bed  -CS     Row Name 04/16/23 1200          Sit-Stand Transfer    Sit-Stand [facility] (Transfers) verbal cues;minimum assist (75% patient effort);1 person assist  -CS     Assistive Device (Sit-Stand Transfers) other (see comments)  hands on assistance (GELACIO)  -     Row Name 04/16/23 1200          Stand-Sit Transfer    Stand-Sit [facility] (Transfers) minimum assist (75% patient effort);1 person assist  -CS     Assistive Device (Stand-Sit Transfers) --  GELACIO  -     Row Name 04/16/23 1200          Gait/Stairs (Locomotion)    [facility] Level (Gait) minimum assist (75% patient effort);1 person assist  -CS     Assistive Device (Gait) --  GELACIO and handrails in hallway  -CS     Distance in Feet (Gait) 90  then standing rest break, 80' then standing rest break and 75'  -CS     Pattern (Gait) 3-point;step-through  -CS     Deviations/Abnormal Patterns (Gait) base of support, wide;gait speed decreased;stride length decreased  -CS     Bilateral Gait Deviations heel strike decreased   -CS     Row Name 04/16/23 1200          Vital Signs    Pretreatment Heart Rate (beats/min) 74  -CS     Intratreatment Heart Rate (beats/min) 87  -CS     Pre Patient Position Sitting  -CS     Intra Patient Position Standing  -CS     Row Name 04/16/23 1200          Positioning and Restraints    Pre-Treatment Position sitting in chair/recliner  -CS     Post Treatment Position chair  -CS     In Chair with family/caregiver  -CS     Row Name 04/16/23 1200          Progress Summary (PT)    Progress Toward Functional Goals (PT) progress toward functional goals is good  -CS           User Key  (r) = Recorded By, (t) = Taken By, (c) = Cosigned By    Initials Name Provider Type    CS Babak Owens PTA Physical Therapist Assistant                  PT Recommendation and Plan     Progress Summary (PT)  Progress Toward Functional Goals (PT): progress toward functional goals is good   Outcome Measures     Row Name 04/16/23 1200 04/14/23 1400          How much help from another person do you currently need...    Turning from your back to your side while in flat bed without using bedrails? 4  -CS 4  -RH     Moving from lying on back to sitting on the side of a flat bed without bedrails? 3  -CS 4  -RH     Moving to and from a bed to a chair (including a wheelchair)? 3  -CS 3  -RH     Standing up from a chair using your arms (e.g., wheelchair, bedside chair)? 3  -CS 3  -RH     Climbing 3-5 steps with a railing? 3  -CS 3  -RH     To walk in hospital room? 3  -CS 3  -RH     AM-PAC 6 Clicks Score (PT) 19  -CS 20  -RH        Functional Assessment    Outcome Measure Options AM-PAC 6 Clicks Basic Mobility (PT)  -CS --           User Key  (r) = Recorded By, (t) = Taken By, (c) = Cosigned By    Initials Name Provider Type     Emanuel Daniels PTA Physical Therapist Assistant    Babak Bullock PTA Physical Therapist Assistant                 Time Calculation:    PT Charges     Row Name 04/16/23 6145             Time Calculation     Start Time 1128  -CS      PT Received On 04/16/23  -CS         Timed Charges    31600 - Gait Training Minutes  14  -CS      10426 - PT Therapeutic Activity Minutes 10  -CS         Total Minutes    Timed Charges Total Minutes 24  -CS       Total Minutes 24  -CS            User Key  (r) = Recorded By, (t) = Taken By, (c) = Cosigned By    Initials Name Provider Type    CS Babak Owens PTA Physical Therapist Assistant              Therapy Charges for Today     Code Description Service Date Service Provider Modifiers Qty    05823305536 HC GAIT TRAINING EA 15 MIN 4/16/2023 Babak Owens PTA GP 1    50102222438 HC PT THERAPEUTIC ACT EA 15 MIN 4/16/2023 Babak Owens PTA GP 1          PT G-Codes  Outcome Measure Options: AM-PAC 6 Clicks Basic Mobility (PT)  AM-PAC 6 Clicks Score (PT): 19  AM-PAC 6 Clicks Score (OT): 15    Babak Owens PTA  4/16/2023

## 2023-04-16 NOTE — PLAN OF CARE
Goal Outcome Evaluation:  Plan of Care Reviewed With: patient        Progress: no change  Outcome Evaluation: Patient alert to self and situation. Patient complained of bilateral lower leg pain, medicated per mar. No other complaints at this time. VSS. Call light within reach

## 2023-04-16 NOTE — PLAN OF CARE
Goal Outcome Evaluation:  Plan of Care Reviewed With: patient, family   No acute events.  Expected to discharge to rehab facility.  VSS.     Progress: improving

## 2023-04-17 ENCOUNTER — TELEPHONE (OUTPATIENT)
Dept: CARDIOLOGY | Facility: CLINIC | Age: 68
End: 2023-04-17

## 2023-04-17 VITALS
DIASTOLIC BLOOD PRESSURE: 93 MMHG | WEIGHT: 162.7 LBS | SYSTOLIC BLOOD PRESSURE: 166 MMHG | HEIGHT: 66 IN | BODY MASS INDEX: 26.15 KG/M2 | HEART RATE: 73 BPM | TEMPERATURE: 97.3 F | RESPIRATION RATE: 18 BRPM | OXYGEN SATURATION: 99 %

## 2023-04-17 LAB
ALBUMIN SERPL-MCNC: 2.2 G/DL (ref 3.5–5.2)
ANION GAP SERPL CALCULATED.3IONS-SCNC: 7.3 MMOL/L (ref 5–15)
BUN SERPL-MCNC: 55 MG/DL (ref 8–23)
BUN/CREAT SERPL: 29.4 (ref 7–25)
CALCIUM SPEC-SCNC: 8.2 MG/DL (ref 8.6–10.5)
CHLORIDE SERPL-SCNC: 97 MMOL/L (ref 98–107)
CO2 SERPL-SCNC: 26.7 MMOL/L (ref 22–29)
CREAT SERPL-MCNC: 1.87 MG/DL (ref 0.76–1.27)
DEPRECATED RDW RBC AUTO: 48.9 FL (ref 37–54)
EGFRCR SERPLBLD CKD-EPI 2021: 39.4 ML/MIN/1.73
ERYTHROCYTE [DISTWIDTH] IN BLOOD BY AUTOMATED COUNT: 16.7 % (ref 12.3–15.4)
GLUCOSE BLDC GLUCOMTR-MCNC: 228 MG/DL (ref 70–99)
GLUCOSE SERPL-MCNC: 211 MG/DL (ref 65–99)
HCT VFR BLD AUTO: 29 % (ref 37.5–51)
HGB BLD-MCNC: 9.1 G/DL (ref 13–17.7)
MAGNESIUM SERPL-MCNC: 2.3 MG/DL (ref 1.6–2.4)
MCH RBC QN AUTO: 25.1 PG (ref 26.6–33)
MCHC RBC AUTO-ENTMCNC: 31.4 G/DL (ref 31.5–35.7)
MCV RBC AUTO: 79.9 FL (ref 79–97)
PHOSPHATE SERPL-MCNC: 2.1 MG/DL (ref 2.5–4.5)
PLATELET # BLD AUTO: 517 10*3/MM3 (ref 140–450)
PMV BLD AUTO: 9 FL (ref 6–12)
POTASSIUM SERPL-SCNC: 4.6 MMOL/L (ref 3.5–5.2)
RBC # BLD AUTO: 3.63 10*6/MM3 (ref 4.14–5.8)
SODIUM SERPL-SCNC: 131 MMOL/L (ref 136–145)
WBC NRBC COR # BLD: 8.31 10*3/MM3 (ref 3.4–10.8)

## 2023-04-17 PROCEDURE — 83735 ASSAY OF MAGNESIUM: CPT | Performed by: PHYSICIAN ASSISTANT

## 2023-04-17 PROCEDURE — 80069 RENAL FUNCTION PANEL: CPT | Performed by: PHYSICIAN ASSISTANT

## 2023-04-17 PROCEDURE — 94799 UNLISTED PULMONARY SVC/PX: CPT

## 2023-04-17 PROCEDURE — 99239 HOSP IP/OBS DSCHRG MGMT >30: CPT | Performed by: INTERNAL MEDICINE

## 2023-04-17 PROCEDURE — 85027 COMPLETE CBC AUTOMATED: CPT | Performed by: PHYSICIAN ASSISTANT

## 2023-04-17 PROCEDURE — 82962 GLUCOSE BLOOD TEST: CPT

## 2023-04-17 PROCEDURE — 63710000001 INSULIN LISPRO (HUMAN) PER 5 UNITS: Performed by: FAMILY MEDICINE

## 2023-04-17 PROCEDURE — 63710000001 INSULIN DETEMIR PER 5 UNITS: Performed by: PHYSICIAN ASSISTANT

## 2023-04-17 RX ORDER — AMLODIPINE BESYLATE 5 MG/1
5 TABLET ORAL
Status: DISCONTINUED | OUTPATIENT
Start: 2023-04-18 | End: 2023-04-17 | Stop reason: HOSPADM

## 2023-04-17 RX ORDER — AMLODIPINE BESYLATE 5 MG/1
5 TABLET ORAL
Qty: 30 TABLET | Refills: 0 | Status: SHIPPED | OUTPATIENT
Start: 2023-04-18 | End: 2023-05-18

## 2023-04-17 RX ORDER — INSULIN LISPRO 100 [IU]/ML
2-9 INJECTION, SOLUTION INTRAVENOUS; SUBCUTANEOUS
Refills: 12
Start: 2023-04-17

## 2023-04-17 RX ORDER — CARVEDILOL 6.25 MG/1
6.25 TABLET ORAL 2 TIMES DAILY WITH MEALS
Qty: 60 TABLET | Refills: 0
Start: 2023-04-17 | End: 2023-05-17

## 2023-04-17 RX ORDER — FAMOTIDINE 20 MG/1
20 TABLET, FILM COATED ORAL
Start: 2023-04-17

## 2023-04-17 RX ORDER — AMLODIPINE BESYLATE 2.5 MG/1
2.5 TABLET ORAL ONCE
Status: COMPLETED | OUTPATIENT
Start: 2023-04-17 | End: 2023-04-17

## 2023-04-17 RX ADMIN — SPIRONOLACTONE 25 MG: 25 TABLET ORAL at 09:00

## 2023-04-17 RX ADMIN — AMLODIPINE BESYLATE 2.5 MG: 2.5 TABLET ORAL at 11:05

## 2023-04-17 RX ADMIN — AMLODIPINE BESYLATE 2.5 MG: 2.5 TABLET ORAL at 09:01

## 2023-04-17 RX ADMIN — ALLOPURINOL 100 MG: 100 TABLET ORAL at 09:00

## 2023-04-17 RX ADMIN — FAMOTIDINE 20 MG: 20 TABLET, FILM COATED ORAL at 06:31

## 2023-04-17 RX ADMIN — OXYCODONE HYDROCHLORIDE AND ACETAMINOPHEN 1 TABLET: 10; 325 TABLET ORAL at 09:08

## 2023-04-17 RX ADMIN — INSULIN LISPRO 4 UNITS: 100 INJECTION, SOLUTION INTRAVENOUS; SUBCUTANEOUS at 09:01

## 2023-04-17 RX ADMIN — CARVEDILOL 6.25 MG: 6.25 TABLET, FILM COATED ORAL at 09:00

## 2023-04-17 RX ADMIN — APIXABAN 10 MG: 5 TABLET, FILM COATED ORAL at 09:01

## 2023-04-17 RX ADMIN — FUROSEMIDE 40 MG: 40 TABLET ORAL at 09:00

## 2023-04-17 RX ADMIN — INSULIN DETEMIR 10 UNITS: 100 INJECTION, SOLUTION SUBCUTANEOUS at 09:02

## 2023-04-17 RX ADMIN — Medication 10 ML: at 09:00

## 2023-04-17 RX ADMIN — CYANOCOBALAMIN TAB 500 MCG 500 MCG: 500 TAB at 09:01

## 2023-04-17 NOTE — DISCHARGE SUMMARY
[facility]         HOSPITALIST  DISCHARGE SUMMARY    Patient Name: Lyle Lozano  : 1955  MRN: [id number]    Date of Admission: 2023  Date of Discharge: 2023  Primary Care Physician: Heidi Villa, HUMA  Reason for admission:  Shortness of air    Final diagnosis:  • Acute on chronic systolic and diastolic congestive heart failure  • Acute left frontal lobe infarct with petechial hemorrhage  • Hypophosphatemia  • Hypomagnesemia  • Bilateral lower extremity DVTs gastrocnemius veins patient started on Eliquis at time of discharge  would likely benefit from hematology consultation for hypercoagulable work-up outpatient  • Medical noncompliance  • Elevated troponin, unlikely to be ACS remained flat without chest pain in the setting of CKD  • Uncontrolled type 2 diabetes mellitus  • Chronic kidney disease stage III  • Acute gout flare    Consults     Date and Time Order Name Status Description    2023  7:47 AM Inpatient Neurology Consult Stroke Completed     2023  8:30 PM IP General Consult (Use specialty-specific consult if known)      3/22/2023  8:57 AM Inpatient Orthopedic Surgery Consult Completed           Active and Resolved Hospital Problems:  Active Hospital Problems    Diagnosis POA   • **Acute on chronic congestive heart failure, unspecified heart failure type [I50.9] Yes   • Acute ischemic left MCA stroke [I63.512] Unknown      Resolved Hospital Problems   No resolved problems to display.       Hospital Course     Hospital Course:  Lyle Lozano is a 67 y.o. male past medical history significant for combined systolic/diastolic congestive heart failure, hypertension, hyperlipidemia, uncontrolled type 2 diabetes, chronic kidney disease, gouty arthritis the presents to the emergency department with chief complaint of shortness of air.  Patient recently admitted to our hospital for systolic congestive heart failure was diuresed and discharged home in  stable condition unfortunately it appears the patient was not taking his home medications once again.  Patient was evaluated in the emergency department was noted to be in acute exacerbation of his known congestive heart failure admitted to the hospitalist service started on IV diuretics electrolytes replaced supplemental oxygen provided with aggressive diuresis patient's clinical status improved respiratory status is now stable.  Additionally patient noted to have worsening right foot pain in the setting of history of gouty arthritis uric acid checked and elevated started on short course of prednisone with improvement in symptoms patient to resume his renally dosed allopurinol.  On the morning of 4/11/2023 patient had acute change in mental status stroke RRT called patient underwent CT scan of the head which was negative subsequent MRI demonstrated left frontal lobe infarct with petechial hemorrhage inpatient neurology consultation was sought initially neurology recommending cardiology consultation for possible JUAN JOSE however patient with recently diagnosed bilateral DVT on therapeutic anticoagulation JUAN JOSE and cardiology consultation deferred for now.  Patient noted to have bilateral DVT has been started on Eliquis will need to be resumed in the outpatient setting patient may benefit from hematology consultation for hypercoagulable work-up this can be done in the outpatient setting.  Currently holding the patient's Entresto until his serum creatinine stabilizes would recommend the patient follow-up with his primary cardiologist 1 to 2 weeks for repeat BMP.  At this point time the patient is doing much better right foot pain improved respiratory status improved volume status stable/at baseline hemodynamics stable/improving he will be discharged to [facility] for inpatient rehab he will need to follow-up with his primary care provider in 1 week he will need to follow-up with his primary cardiologist in 1  to 2 weeks.  Patient will also need to follow-up with rheumatology regarding his gouty arthritis.      DISCHARGE Follow Up Recommendations for labs and diagnostics: As above      Day of Discharge     Vital Signs:  Temp:  [97.3 °F (36.3 °C)-98.1 °F (36.7 °C)] 97.3 °F (36.3 °C)  Heart Rate:  [71-88] 78  Resp:  [18] 18  BP: (138-188)/(76-99) 188/99  Physical Exam:   Constitutional: Awake alert oriented no acute  Respiratory: Clear  Cardiovascular: RRR  GI: Abdomen soft nontender bowel sounds      Discharge Details        Discharge Medications      New Medications      Instructions Start Date   amLODIPine 5 MG tablet  Commonly known as: NORVASC   5 mg, Oral, Every 24 Hours Scheduled   Start Date: April 18, 2023     apixaban 5 MG tablet tablet  Commonly known as: ELIQUIS   5 mg, Oral, 2 Times Daily      cyanocobalamin 500 MCG tablet  Commonly known as: VITAMIN B-12   500 mcg, Oral, Daily      Diclofenac Sodium 1 % gel gel  Commonly known as: VOLTAREN   2 g, Topical, 2 Times Daily PRN      famotidine 20 MG tablet  Commonly known as: PEPCID   20 mg, Oral, 2 Times Daily Before Meals      insulin detemir 100 UNIT/ML injection  Commonly known as: LEVEMIR   10 Units, Subcutaneous, Daily      Insulin Lispro 100 UNIT/ML injection  Commonly known as: humaLOG   2-9 Units, Subcutaneous, 3 Times Daily With Meals         Changes to Medications      Instructions Start Date   carvedilol 6.25 MG tablet  Commonly known as: COREG  What changed:   · medication strength  · how much to take   6.25 mg, Oral, 2 Times Daily With Meals         Continue These Medications      Instructions Start Date   atorvastatin 80 MG tablet  Commonly known as: LIPITOR   80 mg, Oral, Nightly      cholecalciferol 25 MCG (1000 UT) tablet  Commonly known as: VITAMIN D3   1,000 Units, Oral, Daily      furosemide 40 MG tablet  Commonly known as: LASIX   40 mg, Oral, Daily      spironolactone 25 MG tablet  Commonly known as: ALDACTONE   25 mg, Oral, Daily          Stop These Medications    aspirin 81 MG EC tablet     Entresto  MG tablet  Generic drug: sacubitril-valsartan     Janumet  MG per tablet  Generic drug: sitaGLIPtin-metFORMIN            No Known Allergies    Discharge Disposition:  Skilled Nursing Facility (DC - External)    Diet:  Hospital:  Diet Order   Procedures   • Diet: Diabetic Diets, Fluid Restriction (240 mL/tray) Diets; Consistent Carbohydrate; 1500 mL/day; Texture: Regular Texture (IDDSI 7); Fluid Consistency: Thin (IDDSI 0)       Discharge Activity: Increase slowly as tolerated      CODE STATUS:  Code Status and Medical Interventions:   Ordered at: 04/07/23 2232     Level Of Support Discussed With:    Patient     Code Status (Patient has no pulse and is not breathing):    CPR (Attempt to Resuscitate)     Medical Interventions (Patient has pulse or is breathing):    Full Support         Future Appointments   Date Time Provider Department Center   4/25/2023  8:00 AM [facility] CARD ADMIN RM 1 [facility]   4/25/2023  8:45 AM [facility] NM CARD 2 SCAN ROOM  [facility]   4/25/2023  9:00 AM [facility] NM STRESS TREADMILL 1 [facility]   4/25/2023 10:00 AM [facility] NM CARD 2 SCAN ROOM [facility]       Additional Instructions for the Follow-ups that You Need to Schedule     Discharge Follow-up with PCP   As directed       Currently Documented PCP:    Heidi Villa APRN    PCP Phone Number:    [phone]     Follow Up Details: one week         Discharge Follow-up with Specified Provider: Dr. Mcdonnell  primary cardiologist 1 to 2 weeks; 2 Weeks   As directed      To: Dr. Mcdonnell  primary cardiologist 1 to 2 weeks    Follow Up: 2 Weeks    Follow Up Details: Hold Entresto until seen by cardiologist               Pertinent  and/or Most Recent Results     PROCEDURES:   None    LAB RESULTS:      Lab 04/17/23  0419 04/16/23  0508 04/15/23  0411 04/14/23  0409 04/13/23  0535 04/12/23  0419 04/11/23  1036   WBC 8.31 7.00 7.67 8.58 8.55 8.87 9.55   HEMOGLOBIN 9.1* 9.2* 8.8*  9.8* 9.4* 9.0* 9.4*   HEMATOCRIT 29.0* 29.2* 27.6* 31.1* 29.1* 27.8* 29.5*   PLATELETS 517* 503* 436 444 406 379 386   NEUTROS ABS  --   --   --   --  6.51 6.69 7.22*   IMMATURE GRANS (ABS)  --   --   --   --  0.04 0.03 0.05   LYMPHS ABS  --   --   --   --  1.16 1.35 1.50   MONOS ABS  --   --   --   --  0.54 0.57 0.59   EOS ABS  --   --   --   --  0.27 0.21 0.17   MCV 79.9 79.1 78.0* 79.1 78.2* 77.7* 78.2*         Lab 04/17/23 0419 04/16/23  0508 04/15/23  0411 04/14/23  0409 04/13/23  0535   SODIUM 131* 131* 129* 128* 131*   POTASSIUM 4.6 4.4 3.9 4.2 3.7   CHLORIDE 97* 95* 92* 93* 96*   CO2 26.7 27.1 26.1 25.6 24.5   ANION GAP 7.3 8.9 10.9 9.4 10.5   BUN 55* 57* 56* 48* 43*   CREATININE 1.87* 2.23* 2.28* 2.22* 2.08*   EGFR 39.4* 31.9* 31.1* 32.1* 34.7*   GLUCOSE 211* 278* 270* 289* 199*   CALCIUM 8.2* 8.1* 7.9* 8.2* 8.1*   MAGNESIUM 2.3 2.4 2.2 2.1 1.9   PHOSPHORUS 2.1* 2.6 3.4 4.7* 3.6         Lab 04/17/23  0419 04/16/23  0508 04/15/23  0411 04/14/23  0409 04/13/23  0535 04/12/23  0419 04/11/23  1036   TOTAL PROTEIN  --   --   --  6.8 6.3 6.4 6.8   ALBUMIN 2.2* 2.2* 2.0* 2.0* 1.7* 1.9* 2.1*   GLOBULIN  --   --   --   --  4.6 4.5 4.7   ALT (SGPT)  --   --   --  9 8 9 9   AST (SGOT)  --   --   --  15 12 14 12   BILIRUBIN  --   --   --  0.5 0.4 0.5 0.4   INDIRECT BILIRUBIN  --   --   --  0.3  --   --   --    BILIRUBIN DIRECT  --   --   --  0.2  --   --   --    ALK PHOS  --   --   --  136* 128* 139* 132*         Lab 04/15/23  0411   PROBNP 8,707.0*             Lab 04/16/23  0508   VITAMIN B 12 489         Brief Urine Lab Results  (Last result in the past 365 days)      Color   Clarity   Blood   Leuk Est   Nitrite   Protein   CREAT   Urine HCG        04/07/23 1611 Yellow   Clear   Moderate (2+)   Negative   Negative   >=300 mg/dL (3+)               Microbiology Results (last 10 days)     ** No results found for the last 240 hours. **          XR Shoulder 2+ View Right    Result Date: 4/12/2023  Impression:   1. Mild  glenohumeral and moderate acromioclavicular degenerative joint disease. 2. Stable well corticated osseous fragment along the superior aspect of the right AC joint, likely related to old trauma or chronic degeneration.       NICOLE DUBON MD       Electronically Signed and Approved By: NICOLE DUBON MD on 4/12/2023 at 16:27             CT Head Without Contrast    Result Date: 4/15/2023  Impression:   Evolving left frontal subacute hemorrhage is noted.  No acute intracranial hemorrhage is identified by nonenhanced head CT.  No new acute infarct is suggested.  No midline shift or acute intracranial herniation syndrome.     Please note that portions of this note were completed with a voice recognition program.  CHAU BERMUDEZ JR, MD       Electronically Signed and Approved By: CHAU BERMUDEZ JR, MD on 4/15/2023 at 0:43              CT Head Without Contrast    Result Date: 4/11/2023  Impression:  New focal hypoattenuation in the left frontal lobe with loss of gray-white differentiation likely reflecting acute/subacute infarct.  Recommend correlation with MRI.     CATHERINE ARZATE MD       Electronically Signed and Approved By: CATHERINE ARZATE MD on 4/11/2023 at 9:21             MRI Angiogram Head Without Contrast    Result Date: 4/11/2023  Impression:   1. No evidence of large vessel occlusion, significant stenosis or aneurysm formation given limitations of exam     JUAN JOSE GILLETTE MD       Electronically Signed and Approved By: JUAN JOSE GILLETTE MD on 4/11/2023 at 11:05             MRI Angiogram Neck Without Contrast    Result Date: 4/11/2023  Impression:   1. No evidence of large vessel occlusion, significant stenosis or aneurysm formation given limitations of exam     JUAN JOSE GILLETTE MD       Electronically Signed and Approved By: JUAN JOSE GILLETTE MD on 4/11/2023 at 11:05             MRI Brain Without Contrast    Result Date: 4/11/2023  Impression:   1. Diffusion restriction abnormality with associated FLAIR and  T2 signal changes and changes on T1 and susceptibility artifact noted suggesting evolving subacute infarct with associated laminar necrosis, hemosiderin staining.  Please correlate clinically 2. White matter changes compatible with small-vessel ischemic disease additionally noted.      JUAN JOSE GILLETTE MD       Electronically Signed and Approved By: JUAN JOSE GILLETTE MD on 4/11/2023 at 10:57             XR Chest 1 View    Result Date: 4/7/2023  Impression:   1. No acute cardiopulmonary disease.       Claudio Mike M.D.       Electronically Signed and Approved By: Claudio Mike M.D. on 4/07/2023 at 20:40               Results for orders placed during the hospital encounter of 04/07/23    Duplex Venous Lower Extremity - Right CV-READ    Interpretation Summary  •  Acute right lower extremity deep vein thrombosis noted in the gastrocnemius.  •  All other right sided veins appeared normal.  •  This is a new finding in comparison to study dated 1/25/2023.      Results for orders placed during the hospital encounter of 04/07/23    Duplex Venous Lower Extremity - Right CV-READ    Interpretation Summary  •  Acute right lower extremity deep vein thrombosis noted in the gastrocnemius.  •  All other right sided veins appeared normal.  •  This is a new finding in comparison to study dated 1/25/2023.      Results for orders placed during the hospital encounter of 04/07/23    Adult Transthoracic Echo Complete W/ Cont if Necessary Per Protocol    Interpretation Summary  Technically limited study.  Borderline normal left ventricular systolic function ejection fraction around 50%.  Trace MR and trace TR      Labs Pending at Discharge:        Time spent on Discharge including face to face service: 35 minutes    Electronically signed by JAYMIE Bach, 04/17/23, 10:45 AM EDT.    Attending Note:  Patient independently seen and evaluated, agree with assessment and plan, above documentation reflects plan put forth during bedside  rounds.  More than 51% of the time of this patient encounter was performed by me.   67-year-old male presented with CHF, found to have a stroke.  Also noted to have DVT.  Cleared to start Eliquis per neurology.  Did have a gout flare, managed with prednisone and better.  Stable for discharge to rehab today.  Electronically signed by Brigido Degroot MD, 04/17/23, 11:20 AM EDT.

## 2023-04-17 NOTE — TELEPHONE ENCOUNTER
Caller: Lyle Lozano    Relationship: Self    Best call back number:[phone]    What is the best time to reach you: ANYTIME    Who are you requesting to speak with (clinical staff, provider,  specific staff member): ANYONE    What was the call regarding: PT WAS RELEASED FROM HOSPITAL AND NURSE NEEDED INQUIRY IF PT NEEDS TO HAVE ANOTHER F/U WITH DR CHANG AFTER [facility] APPT ON 4.25. PT MISSED APPTS IN 2022 FOR CHF.     Do you require a callback: YES

## 2023-04-17 NOTE — PLAN OF CARE
Problem: Adjustment to Illness (Heart Failure)  Goal: Optimal Coping  Outcome: Ongoing, Progressing     Problem: Cardiac Output Decreased (Heart Failure)  Goal: Optimal Cardiac Output  Outcome: Ongoing, Progressing     Problem: Dysrhythmia (Heart Failure)  Goal: Stable Heart Rate and Rhythm  Outcome: Ongoing, Progressing     Problem: Fluid Imbalance (Heart Failure)  Goal: Fluid Balance  Outcome: Ongoing, Progressing     Problem: Functional Ability Impaired (Heart Failure)  Goal: Optimal Functional Ability  Outcome: Ongoing, Progressing  Intervention: Optimize Functional Ability  Recent Flowsheet Documentation  Taken 4/16/2023 2015 by Elva Dietrich RN  Activity Management: ambulated to bathroom     Problem: Oral Intake Inadequate (Heart Failure)  Goal: Optimal Nutrition Intake  Outcome: Ongoing, Progressing     Problem: Respiratory Compromise (Heart Failure)  Goal: Effective Oxygenation and Ventilation  Outcome: Ongoing, Progressing     Problem: Sleep Disordered Breathing (Heart Failure)  Goal: Effective Breathing Pattern During Sleep  Outcome: Ongoing, Progressing     Problem: Fall Injury Risk  Goal: Absence of Fall and Fall-Related Injury  Outcome: Ongoing, Progressing  Intervention: Promote Injury-Free Environment  Recent Flowsheet Documentation  Taken 4/17/2023 0400 by Elva Dietrich RN  Safety Promotion/Fall Prevention: safety round/check completed  Taken 4/17/2023 0200 by Elva Dietrich RN  Safety Promotion/Fall Prevention: safety round/check completed  Taken 4/17/2023 0000 by Elva Dietrich RN  Safety Promotion/Fall Prevention: safety round/check completed  Taken 4/16/2023 2300 by Elva Dietrich RN  Safety Promotion/Fall Prevention: safety round/check completed  Taken 4/16/2023 2200 by Elva Dietrich RN  Safety Promotion/Fall Prevention: safety round/check completed  Taken 4/16/2023 2015 by Elva Dietrich RN  Safety Promotion/Fall Prevention: safety round/check completed  Taken 4/16/2023 1935  by Kaur, Elva, RN  Safety Promotion/Fall Prevention: safety round/check completed  Taken 4/16/2023 1903 by Elva Dietrich RN  Safety Promotion/Fall Prevention: safety round/check completed     Problem: Diabetes Comorbidity  Goal: Blood Glucose Level Within Targeted Range  Outcome: Ongoing, Progressing  Intervention: Monitor and Manage Glycemia  Recent Flowsheet Documentation  Taken 4/16/2023 2015 by Elva Dietrich RN  Glycemic Management: blood glucose monitored     Problem: Heart Failure Comorbidity  Goal: Maintenance of Heart Failure Symptom Control  Outcome: Ongoing, Progressing     Problem: Hypertension Comorbidity  Goal: Blood Pressure in Desired Range  Outcome: Ongoing, Progressing     Problem: Adult Inpatient Plan of Care  Goal: Plan of Care Review  Outcome: Ongoing, Progressing  Flowsheets (Taken 4/17/2023 0536)  Progress: improving  Goal: Patient-Specific Goal (Individualized)  Outcome: Ongoing, Progressing  Goal: Absence of Hospital-Acquired Illness or Injury  Outcome: Ongoing, Progressing  Intervention: Identify and Manage Fall Risk  Recent Flowsheet Documentation  Taken 4/17/2023 0400 by Elva Dietrich RN  Safety Promotion/Fall Prevention: safety round/check completed  Taken 4/17/2023 0200 by Elva Dietrich RN  Safety Promotion/Fall Prevention: safety round/check completed  Taken 4/17/2023 0000 by Elva Dietrich RN  Safety Promotion/Fall Prevention: safety round/check completed  Taken 4/16/2023 2300 by Elva Dietrich RN  Safety Promotion/Fall Prevention: safety round/check completed  Taken 4/16/2023 2200 by Elva Dietrich RN  Safety Promotion/Fall Prevention: safety round/check completed  Taken 4/16/2023 2015 by Elva Dietrich RN  Safety Promotion/Fall Prevention: safety round/check completed  Taken 4/16/2023 1935 by Elva Dietrich RN  Safety Promotion/Fall Prevention: safety round/check completed  Taken 4/16/2023 1903 by Elva Dietrich RN  Safety Promotion/Fall Prevention:  safety round/check completed  Intervention: Prevent Skin Injury  Recent Flowsheet Documentation  Taken 4/16/2023 2015 by Elva Dietrich RN  Body Position: position changed independently  Intervention: Prevent and Manage VTE (Venous Thromboembolism) Risk  Recent Flowsheet Documentation  Taken 4/16/2023 2015 by Elva Dietrich RN  Activity Management: ambulated to bathroom  Goal: Optimal Comfort and Wellbeing  Outcome: Ongoing, Progressing  Intervention: Monitor Pain and Promote Comfort  Recent Flowsheet Documentation  Taken 4/16/2023 2015 by Elva Dietrich RN  Pain Management Interventions:   see MAR   position adjusted   pillow support provided   quiet environment facilitated  Intervention: Provide Person-Centered Care  Recent Flowsheet Documentation  Taken 4/16/2023 2015 by Elva Dietrich RN  Trust Relationship/Rapport:   care explained   emotional support provided   questions encouraged   thoughts/feelings acknowledged  Goal: Readiness for Transition of Care  Outcome: Ongoing, Progressing     Problem: Adjustment to Illness (Stroke, Ischemic/Transient Ischemic Attack)  Goal: Optimal Coping  Outcome: Ongoing, Progressing     Problem: Cognitive Impairment (Stroke, Ischemic/Transient Ischemic Attack)  Goal: Optimal Cognitive Function  Outcome: Ongoing, Progressing     Problem: Renal Function Impairment (Acute Kidney Injury/Impairment)  Goal: Effective Renal Function  Outcome: Ongoing, Progressing   Goal Outcome Evaluation:           Progress: improving, no changes. Discharge to rehab today. Elva Dietrich RN

## 2023-04-25 ENCOUNTER — READMISSION MANAGEMENT (OUTPATIENT)
Dept: CALL CENTER | Facility: HOSPITAL | Age: 68
End: 2023-04-25
Payer: MEDICARE

## 2023-04-25 ENCOUNTER — TELEPHONE (OUTPATIENT)
Dept: INTERNAL MEDICINE | Facility: CLINIC | Age: 68
End: 2023-04-25

## 2023-04-25 NOTE — TELEPHONE ENCOUNTER
Caller: CECI WITH [facility]    Relationship to patient: Provider    Best call back number: [phone]    New or established patient?  [] New  [x] Established    Date of discharge: 4.25.23    Facility discharged from: [facility]    Diagnosis/Symptoms: CHF    Length of stay (If applicable): SINCE 4.25.23       STATED THAT BOTH BLOOD SUGAR AND BLOOD PRESSURE HAVE RUN HIGH DURING HIS TIME WITH THEM.

## 2023-04-25 NOTE — OUTREACH NOTE
Prep Survey    Flowsheet Row Responses   Scientologist facility patient discharged from? Non-BH   Is LACE score < 7 ? Non-BH Discharge   Eligibility [facility]   Date of Discharge 04/25/23   Discharge diagnosis unknown   Does the patient have one of the following disease processes/diagnoses(primary or secondary)? Other   Prep survey completed? Yes          Skylar Quarles Registered Nurse

## 2023-04-26 ENCOUNTER — TRANSITIONAL CARE MANAGEMENT TELEPHONE ENCOUNTER (OUTPATIENT)
Dept: CALL CENTER | Facility: HOSPITAL | Age: 68
End: 2023-04-26
Payer: MEDICARE

## 2023-04-26 NOTE — OUTREACH NOTE
Call Center TCM Note    Flowsheet Row Responses   [facility] [place] patient discharged from? Non-BH  [Signature Osmani Del Castillo]   Does the patient have one of the following disease processes/diagnoses(primary or secondary)? Other   TCM attempt successful? No   Unsuccessful attempts Attempt 2  [Outdated PCP verbal release. ]   Call Status Voice mail issues  [Mailbox full]          Crystal Carnes RN    4/26/2023, 12:14 EDT

## 2023-04-27 ENCOUNTER — TRANSITIONAL CARE MANAGEMENT TELEPHONE ENCOUNTER (OUTPATIENT)
Dept: CALL CENTER | Facility: HOSPITAL | Age: 68
End: 2023-04-27
Payer: MEDICARE

## 2023-04-27 RX ORDER — SACUBITRIL AND VALSARTAN 97; 103 MG/1; MG/1
TABLET, FILM COATED ORAL
Qty: 180 TABLET | Refills: 3 | OUTPATIENT
Start: 2023-04-27

## 2023-04-27 NOTE — OUTREACH NOTE
Call Center TCM Note    Flowsheet Row Responses   [facility] patient discharged from? Non-BH  [Signature]   Does the patient have one of the following disease processes/diagnoses(primary or secondary)? Other   TCM attempt successful? No   Unsuccessful attempts Attempt 3          Juany Rosado RN    4/27/2023, 11:44 EDT

## 2023-05-05 ENCOUNTER — TELEPHONE (OUTPATIENT)
Dept: INTERNAL MEDICINE | Facility: CLINIC | Age: 68
End: 2023-05-05
Payer: MEDICARE

## 2023-05-05 NOTE — TELEPHONE ENCOUNTER
He is in [facility] is making him a non admit because  he is not home bound. He never stays on the phone long enough to answer their questions.

## 2023-05-11 ENCOUNTER — APPOINTMENT (OUTPATIENT)
Dept: GENERAL RADIOLOGY | Facility: HOSPITAL | Age: 68
DRG: 291 | End: 2023-05-11
Payer: MEDICARE

## 2023-05-11 ENCOUNTER — APPOINTMENT (OUTPATIENT)
Dept: CT IMAGING | Facility: HOSPITAL | Age: 68
DRG: 291 | End: 2023-05-11
Payer: MEDICARE

## 2023-05-11 ENCOUNTER — HOSPITAL ENCOUNTER (INPATIENT)
Facility: HOSPITAL | Age: 68
LOS: 3 days | Discharge: HOME | DRG: 291 | End: 2023-05-14
Attending: EMERGENCY MEDICINE | Admitting: STUDENT IN AN ORGANIZED HEALTH CARE EDUCATION/TRAINING PROGRAM
Payer: MEDICARE

## 2023-05-11 DIAGNOSIS — D64.9 CHRONIC ANEMIA: ICD-10-CM

## 2023-05-11 DIAGNOSIS — N18.9 ACUTE ON CHRONIC RENAL INSUFFICIENCY: ICD-10-CM

## 2023-05-11 DIAGNOSIS — R77.8 ELEVATED TROPONIN: ICD-10-CM

## 2023-05-11 DIAGNOSIS — J81.0 ACUTE PULMONARY EDEMA: Primary | ICD-10-CM

## 2023-05-11 DIAGNOSIS — N28.9 ACUTE ON CHRONIC RENAL INSUFFICIENCY: ICD-10-CM

## 2023-05-11 DIAGNOSIS — R79.89 ELEVATED BRAIN NATRIURETIC PEPTIDE (BNP) LEVEL: ICD-10-CM

## 2023-05-11 DIAGNOSIS — E11.65 UNCONTROLLED TYPE 2 DIABETES MELLITUS WITH HYPERGLYCEMIA: ICD-10-CM

## 2023-05-11 DIAGNOSIS — I50.9 CONGESTIVE HEART FAILURE, UNSPECIFIED HF CHRONICITY, UNSPECIFIED HEART FAILURE TYPE: ICD-10-CM

## 2023-05-11 DIAGNOSIS — I10 UNCONTROLLED HYPERTENSION: ICD-10-CM

## 2023-05-11 LAB
ALBUMIN SERPL-MCNC: 2.8 G/DL (ref 3.5–5.2)
ALBUMIN/GLOB SERPL: 0.7 G/DL
ALP SERPL-CCNC: 134 U/L (ref 39–117)
ALT SERPL W P-5'-P-CCNC: 24 U/L (ref 1–41)
ANION GAP SERPL CALCULATED.3IONS-SCNC: 12.4 MMOL/L (ref 5–15)
AST SERPL-CCNC: 20 U/L (ref 1–40)
BASOPHILS # BLD AUTO: 0.03 10*3/MM3 (ref 0–0.2)
BASOPHILS NFR BLD AUTO: 0.4 % (ref 0–1.5)
BILIRUB SERPL-MCNC: 0.4 MG/DL (ref 0–1.2)
BUN SERPL-MCNC: 32 MG/DL (ref 8–23)
BUN/CREAT SERPL: 15.9 (ref 7–25)
CALCIUM SPEC-SCNC: 8.6 MG/DL (ref 8.6–10.5)
CHLORIDE SERPL-SCNC: 100 MMOL/L (ref 98–107)
CO2 SERPL-SCNC: 21.6 MMOL/L (ref 22–29)
CREAT SERPL-MCNC: 2.01 MG/DL (ref 0.76–1.27)
DEPRECATED RDW RBC AUTO: 53.4 FL (ref 37–54)
EGFRCR SERPLBLD CKD-EPI 2021: 36.1 ML/MIN/1.73
EOSINOPHIL # BLD AUTO: 0.09 10*3/MM3 (ref 0–0.4)
EOSINOPHIL NFR BLD AUTO: 1.1 % (ref 0.3–6.2)
ERYTHROCYTE [DISTWIDTH] IN BLOOD BY AUTOMATED COUNT: 18.8 % (ref 12.3–15.4)
GEN 5 2HR TROPONIN T REFLEX: 99 NG/L
GLOBULIN UR ELPH-MCNC: 4.1 GM/DL
GLUCOSE BLDC GLUCOMTR-MCNC: 214 MG/DL (ref 70–99)
GLUCOSE SERPL-MCNC: 322 MG/DL (ref 65–99)
HCT VFR BLD AUTO: 28.3 % (ref 37.5–51)
HGB BLD-MCNC: 9.2 G/DL (ref 13–17.7)
HOLD SPECIMEN: NORMAL
HOLD SPECIMEN: NORMAL
IMM GRANULOCYTES # BLD AUTO: 0.02 10*3/MM3 (ref 0–0.05)
IMM GRANULOCYTES NFR BLD AUTO: 0.2 % (ref 0–0.5)
LYMPHOCYTES # BLD AUTO: 2.31 10*3/MM3 (ref 0.7–3.1)
LYMPHOCYTES NFR BLD AUTO: 27.9 % (ref 19.6–45.3)
MCH RBC QN AUTO: 25.8 PG (ref 26.6–33)
MCHC RBC AUTO-ENTMCNC: 32.5 G/DL (ref 31.5–35.7)
MCV RBC AUTO: 79.3 FL (ref 79–97)
MONOCYTES # BLD AUTO: 0.37 10*3/MM3 (ref 0.1–0.9)
MONOCYTES NFR BLD AUTO: 4.5 % (ref 5–12)
NEUTROPHILS NFR BLD AUTO: 5.47 10*3/MM3 (ref 1.7–7)
NEUTROPHILS NFR BLD AUTO: 65.9 % (ref 42.7–76)
NRBC BLD AUTO-RTO: 0 /100 WBC (ref 0–0.2)
NT-PROBNP SERPL-MCNC: ABNORMAL PG/ML (ref 0–900)
PLATELET # BLD AUTO: 239 10*3/MM3 (ref 140–450)
PMV BLD AUTO: 9.2 FL (ref 6–12)
POTASSIUM SERPL-SCNC: 4.4 MMOL/L (ref 3.5–5.2)
PROT SERPL-MCNC: 6.9 G/DL (ref 6–8.5)
QT INTERVAL: 349 MS
RBC # BLD AUTO: 3.57 10*6/MM3 (ref 4.14–5.8)
SODIUM SERPL-SCNC: 134 MMOL/L (ref 136–145)
TROPONIN T DELTA: -11 NG/L
TROPONIN T SERPL HS-MCNC: 110 NG/L
WBC NRBC COR # BLD: 8.29 10*3/MM3 (ref 3.4–10.8)
WHOLE BLOOD HOLD COAG: NORMAL
WHOLE BLOOD HOLD SPECIMEN: NORMAL

## 2023-05-11 PROCEDURE — 93005 ELECTROCARDIOGRAM TRACING: CPT | Performed by: EMERGENCY MEDICINE

## 2023-05-11 PROCEDURE — 80053 COMPREHEN METABOLIC PANEL: CPT | Performed by: EMERGENCY MEDICINE

## 2023-05-11 PROCEDURE — 85025 COMPLETE CBC W/AUTO DIFF WBC: CPT

## 2023-05-11 PROCEDURE — 25010000002 HEPARIN (PORCINE) PER 1000 UNITS: Performed by: STUDENT IN AN ORGANIZED HEALTH CARE EDUCATION/TRAINING PROGRAM

## 2023-05-11 PROCEDURE — 94761 N-INVAS EAR/PLS OXIMETRY MLT: CPT

## 2023-05-11 PROCEDURE — 71045 X-RAY EXAM CHEST 1 VIEW: CPT

## 2023-05-11 PROCEDURE — 82948 REAGENT STRIP/BLOOD GLUCOSE: CPT

## 2023-05-11 PROCEDURE — 93010 ELECTROCARDIOGRAM REPORT: CPT | Performed by: INTERNAL MEDICINE

## 2023-05-11 PROCEDURE — 99223 1ST HOSP IP/OBS HIGH 75: CPT | Performed by: STUDENT IN AN ORGANIZED HEALTH CARE EDUCATION/TRAINING PROGRAM

## 2023-05-11 PROCEDURE — 71260 CT THORAX DX C+: CPT

## 2023-05-11 PROCEDURE — 63710000001 INSULIN DETEMIR PER 5 UNITS: Performed by: STUDENT IN AN ORGANIZED HEALTH CARE EDUCATION/TRAINING PROGRAM

## 2023-05-11 PROCEDURE — 25010000002 FUROSEMIDE PER 20 MG: Performed by: STUDENT IN AN ORGANIZED HEALTH CARE EDUCATION/TRAINING PROGRAM

## 2023-05-11 PROCEDURE — 83880 ASSAY OF NATRIURETIC PEPTIDE: CPT | Performed by: EMERGENCY MEDICINE

## 2023-05-11 PROCEDURE — 94799 UNLISTED PULMONARY SVC/PX: CPT

## 2023-05-11 PROCEDURE — 25510000001 IOPAMIDOL PER 1 ML: Performed by: EMERGENCY MEDICINE

## 2023-05-11 PROCEDURE — 99285 EMERGENCY DEPT VISIT HI MDM: CPT

## 2023-05-11 PROCEDURE — 93005 ELECTROCARDIOGRAM TRACING: CPT

## 2023-05-11 PROCEDURE — 84484 ASSAY OF TROPONIN QUANT: CPT | Performed by: EMERGENCY MEDICINE

## 2023-05-11 RX ORDER — ATORVASTATIN CALCIUM 40 MG/1
80 TABLET, FILM COATED ORAL NIGHTLY
Status: DISCONTINUED | OUTPATIENT
Start: 2023-05-11 | End: 2023-05-14 | Stop reason: HOSPADM

## 2023-05-11 RX ORDER — AMLODIPINE BESYLATE 5 MG/1
10 TABLET ORAL ONCE
Status: DISCONTINUED | OUTPATIENT
Start: 2023-05-11 | End: 2023-05-11

## 2023-05-11 RX ORDER — NITROGLYCERIN 20 MG/100ML
5-200 INJECTION INTRAVENOUS
Status: DISCONTINUED | OUTPATIENT
Start: 2023-05-11 | End: 2023-05-12

## 2023-05-11 RX ORDER — SODIUM CHLORIDE 0.9 % (FLUSH) 0.9 %
10 SYRINGE (ML) INJECTION AS NEEDED
Status: DISCONTINUED | OUTPATIENT
Start: 2023-05-11 | End: 2023-05-14 | Stop reason: HOSPADM

## 2023-05-11 RX ORDER — NITROGLYCERIN 0.4 MG/1
0.4 TABLET SUBLINGUAL
Status: COMPLETED | OUTPATIENT
Start: 2023-05-11 | End: 2023-05-11

## 2023-05-11 RX ORDER — AMLODIPINE BESYLATE 5 MG/1
10 TABLET ORAL DAILY
Status: DISCONTINUED | OUTPATIENT
Start: 2023-05-12 | End: 2023-05-14 | Stop reason: HOSPADM

## 2023-05-11 RX ORDER — NICOTINE POLACRILEX 4 MG
15 LOZENGE BUCCAL
Status: DISCONTINUED | OUTPATIENT
Start: 2023-05-11 | End: 2023-05-13

## 2023-05-11 RX ORDER — SODIUM CHLORIDE 0.9 % (FLUSH) 0.9 %
10 SYRINGE (ML) INJECTION EVERY 12 HOURS SCHEDULED
Status: DISCONTINUED | OUTPATIENT
Start: 2023-05-11 | End: 2023-05-14 | Stop reason: HOSPADM

## 2023-05-11 RX ORDER — HEPARIN SODIUM 5000 [USP'U]/ML
5000 INJECTION, SOLUTION INTRAVENOUS; SUBCUTANEOUS EVERY 8 HOURS SCHEDULED
Status: DISCONTINUED | OUTPATIENT
Start: 2023-05-11 | End: 2023-05-11

## 2023-05-11 RX ORDER — CARVEDILOL 12.5 MG/1
12.5 TABLET ORAL 2 TIMES DAILY WITH MEALS
Status: DISCONTINUED | OUTPATIENT
Start: 2023-05-11 | End: 2023-05-13

## 2023-05-11 RX ORDER — INSULIN LISPRO 100 [IU]/ML
1-200 INJECTION, SOLUTION INTRAVENOUS; SUBCUTANEOUS AS NEEDED
Status: DISCONTINUED | OUTPATIENT
Start: 2023-05-11 | End: 2023-05-13

## 2023-05-11 RX ORDER — SODIUM CHLORIDE 9 MG/ML
40 INJECTION, SOLUTION INTRAVENOUS AS NEEDED
Status: DISCONTINUED | OUTPATIENT
Start: 2023-05-11 | End: 2023-05-14 | Stop reason: HOSPADM

## 2023-05-11 RX ORDER — INSULIN LISPRO 100 [IU]/ML
1-200 INJECTION, SOLUTION INTRAVENOUS; SUBCUTANEOUS
Status: DISCONTINUED | OUTPATIENT
Start: 2023-05-11 | End: 2023-05-13

## 2023-05-11 RX ORDER — ALLOPURINOL 100 MG/1
100 TABLET ORAL DAILY
Status: DISCONTINUED | OUTPATIENT
Start: 2023-05-12 | End: 2023-05-14 | Stop reason: HOSPADM

## 2023-05-11 RX ORDER — ACETAMINOPHEN 325 MG/1
650 TABLET ORAL EVERY 6 HOURS PRN
Status: COMPLETED | OUTPATIENT
Start: 2023-05-11 | End: 2023-05-12

## 2023-05-11 RX ORDER — DEXTROSE MONOHYDRATE 25 G/50ML
10-50 INJECTION, SOLUTION INTRAVENOUS
Status: DISCONTINUED | OUTPATIENT
Start: 2023-05-11 | End: 2023-05-13

## 2023-05-11 RX ORDER — FUROSEMIDE 10 MG/ML
80 INJECTION INTRAMUSCULAR; INTRAVENOUS ONCE
Status: COMPLETED | OUTPATIENT
Start: 2023-05-11 | End: 2023-05-11

## 2023-05-11 RX ADMIN — ATORVASTATIN CALCIUM 80 MG: 40 TABLET, FILM COATED ORAL at 20:43

## 2023-05-11 RX ADMIN — NITROGLYCERIN 0.4 MG: 0.4 TABLET, ORALLY DISINTEGRATING SUBLINGUAL at 18:45

## 2023-05-11 RX ADMIN — INSULIN DETEMIR 12 UNITS: 100 INJECTION, SOLUTION SUBCUTANEOUS at 21:18

## 2023-05-11 RX ADMIN — FUROSEMIDE 80 MG: 10 INJECTION, SOLUTION INTRAMUSCULAR; INTRAVENOUS at 21:17

## 2023-05-11 RX ADMIN — HEPARIN SODIUM 5000 UNITS: 5000 INJECTION INTRAVENOUS; SUBCUTANEOUS at 21:18

## 2023-05-11 RX ADMIN — APIXABAN 5 MG: 5 TABLET, FILM COATED ORAL at 22:14

## 2023-05-11 RX ADMIN — NITROGLYCERIN 0.4 MG: 0.4 TABLET, ORALLY DISINTEGRATING SUBLINGUAL at 18:35

## 2023-05-11 RX ADMIN — NITROGLYCERIN 0.4 MG: 0.4 TABLET, ORALLY DISINTEGRATING SUBLINGUAL at 18:40

## 2023-05-11 RX ADMIN — Medication 10 ML: at 21:22

## 2023-05-11 RX ADMIN — IOPAMIDOL 100 ML: 755 INJECTION, SOLUTION INTRAVENOUS at 17:06

## 2023-05-11 RX ADMIN — NITROGLYCERIN 50 MCG/MIN: 20 INJECTION INTRAVENOUS at 20:32

## 2023-05-11 RX ADMIN — ACETAMINOPHEN 650 MG: 325 TABLET ORAL at 21:24

## 2023-05-11 RX ADMIN — CARVEDILOL 12.5 MG: 12.5 TABLET, FILM COATED ORAL at 20:43

## 2023-05-11 RX ADMIN — NITROGLYCERIN 40 MCG/MIN: 20 INJECTION INTRAVENOUS at 18:56

## 2023-05-11 NOTE — ED PROVIDER NOTES
Time: 2:45 PM EDT  Date of encounter:  5/11/2023  Independent Historian/Clinical History and Information was obtained by:   Patient  Chief Complaint: Shortness of breath    History is limited by: N/A    History of Present Illness:  Patient is a 67 y.o. year old male who presents to the emergency department for evaluation of shortness of breath..  The patient states he has shortness of breath for several weeks.  The patient states that his shortness of breath began with moderate exertion and now he has shortness of breath with very minimal exertion.  The patient states that the shortness of breath is relieved by rest.  The patient has had no fever, rigors or myalgias.  The patient's had no chest pain or chest pressure.  The patient's had no abdominal pain.  The patient states his bowel movements are normal no hematochezia or melena.  The patient does note he has chronic swelling in his legs that is unchanged.  The patient does note that he has a history of congestive heart failure and has been told in the past that that is why he is short of breath..  The patient does note that he was hospitalized a month ago for congestive heart failure.  The patient states that he was told at that time that he has a blood clot.  The patient states he was placed on a blood thinner in the hospital but it was not continued outside of the hospital.  The patient did go to a nursing home for 2 weeks post admission.    HPI    Patient Care Team  Primary Care Provider: Heidi Villa APRN    Past Medical History:     No Known Allergies  Past Medical History:   Diagnosis Date   • Abnormal kidney function    • Arthritis    • Bursitis    • CHF (congestive heart failure)    • Depression    • Diabetes    • Edema    • Essential hypertension 09/23/2021   • Forgetfulness    • Gout    • Head injury    • Hernia cerebri    • Hyperlipidemia    • Hypertension    • IFG (impaired fasting glucose)    • Low back pain    • Lumbago     `   • Pain of left hip     • Right shoulder pain    • Sleep apnea    • SOB (shortness of breath)      Past Surgical History:   Procedure Laterality Date   • CYST REMOVAL Right     leg     Family History   Problem Relation Age of Onset   • No Known Problems Father    • Diabetes Sister    • Stroke Sister        Home Medications:  Prior to Admission medications    Medication Sig Start Date End Date Taking? Authorizing Provider   amLODIPine (NORVASC) 5 MG tablet Take 1 tablet by mouth Daily for 30 days. 4/18/23 5/18/23  Terrance Garcia PA   amLODIPine (NORVASC) 5 MG tablet Take 1 tablet by mouth Daily for 30 days. 5/1/23 5/31/23  Taqui, Humera, MD   apixaban (ELIQUIS) 5 MG tablet tablet Take 1 tablet by mouth 2 (Two) Times a Day for 30 days. 4/17/23 5/17/23  Terrance Garcia PA   apixaban (ELIQUIS) 5 MG tablet tablet Take 1 tablet by mouth Every 12 (Twelve) Hours for 30 days. 5/1/23 5/31/23  Taqui, Humera, MD   atorvastatin (LIPITOR) 80 MG tablet Take 1 tablet by mouth Every Night. 3/5/23   Provider, MD Quentin   carvedilol (COREG) 6.25 MG tablet Take 1 tablet by mouth 2 (Two) Times a Day With Meals for 30 days. 4/17/23 5/17/23  Terrance Garcia PA   carvedilol (Coreg) 6.25 MG tablet Take 1 tablet by mouth 2 (Two) Times a Day With Meals for 30 days. 5/1/23 5/31/23  Taqui, Humera, MD   cholecalciferol (VITAMIN D3) 25 MCG (1000 UT) tablet Take 1 tablet by mouth Daily. 8/26/22   Nehal Daniels MD   Diclofenac Sodium (VOLTAREN) 1 % gel gel Apply 2 g topically to the appropriate area as directed 2 (Two) Times a Day As Needed (joint pain). 4/17/23   Terrance Garcia PA   famotidine (PEPCID) 20 MG tablet Take 1 tablet by mouth 2 (Two) Times a Day Before Meals. 4/17/23   Terrance Garcia PA   furosemide (LASIX) 40 MG tablet Take 1 tablet by mouth Daily for 30 days. 3/24/23 4/23/23  Aylin Keita MD   furosemide (LASIX) 40 MG tablet Take 1 tablet by mouth Daily for 30 days. 5/1/23 5/31/23  Taqui, Humera, MD   insulin detemir  "(LEVEMIR) 100 UNIT/ML injection Inject 10 Units under the skin into the appropriate area as directed Daily. 4/17/23   Terrance Garcia PA   Insulin Lispro (humaLOG) 100 UNIT/ML injection Inject 2-9 Units under the skin into the appropriate area as directed 3 (Three) Times a Day With Meals. 4/17/23   Terrance Garcia PA   spironolactone (ALDACTONE) 25 MG tablet Take 1 tablet by mouth Daily. 10/12/22   Provider, MD Quentin   vitamin B-12 (VITAMIN B-12) 500 MCG tablet Take 1 tablet by mouth Daily. 4/17/23   Terrance Garcia PA        Social History:   Social History     Tobacco Use   • Smoking status: Former     Packs/day: 0.25     Types: Cigarettes     Start date: 1982     Quit date: 8/14/2012     Years since quitting: 10.7   • Smokeless tobacco: Never   Vaping Use   • Vaping Use: Never used   Substance Use Topics   • Alcohol use: Yes     Comment: rare   • Drug use: Never         Review of Systems:  Review of Systems   Constitutional: Negative for chills, diaphoresis and fever.   HENT: Negative for congestion, postnasal drip, rhinorrhea and sore throat.    Eyes: Negative for photophobia.   Respiratory: Positive for shortness of breath. Negative for cough and chest tightness.    Cardiovascular: Positive for leg swelling. Negative for chest pain and palpitations.   Gastrointestinal: Negative for abdominal pain, diarrhea, nausea and vomiting.   Genitourinary: Negative for difficulty urinating, dysuria, flank pain, frequency, hematuria and urgency.   Musculoskeletal: Negative for neck pain and neck stiffness.   Skin: Negative for pallor and rash.   Neurological: Negative for dizziness, syncope, weakness, numbness and headaches.   Hematological: Negative for adenopathy. Does not bruise/bleed easily.   Psychiatric/Behavioral: Negative.         Physical Exam:  /85 (BP Location: Left arm, Patient Position: Lying)   Pulse 83   Temp 97.9 °F (36.6 °C) (Oral)   Resp 18   Ht 167.6 cm (65.98\")   Wt 85.1 kg (187 lb " 9.8 oz)   SpO2 99%   BMI 30.30 kg/m²     Physical Exam  Vitals and nursing note reviewed.   Constitutional:       General: He is not in acute distress.     Appearance: Normal appearance. He is not ill-appearing, toxic-appearing or diaphoretic.   HENT:      Head: Normocephalic and atraumatic.      Mouth/Throat:      Mouth: Mucous membranes are moist.   Eyes:      Pupils: Pupils are equal, round, and reactive to light.   Cardiovascular:      Rate and Rhythm: Regular rhythm. Tachycardia present.      Pulses: Normal pulses.           Carotid pulses are 2+ on the right side and 2+ on the left side.       Radial pulses are 2+ on the right side and 2+ on the left side.        Femoral pulses are 2+ on the right side and 2+ on the left side.       Popliteal pulses are 2+ on the right side and 2+ on the left side.        Dorsalis pedis pulses are 2+ on the right side and 2+ on the left side.        Posterior tibial pulses are 2+ on the right side and 2+ on the left side.      Heart sounds: Normal heart sounds. No murmur heard.  Pulmonary:      Effort: Pulmonary effort is normal. No accessory muscle usage, respiratory distress or retractions.      Breath sounds: Normal breath sounds. No decreased breath sounds, wheezing, rhonchi or rales.   Chest:      Chest wall: No mass or tenderness.   Abdominal:      General: Abdomen is flat. There is no distension.      Palpations: Abdomen is soft. There is no mass or pulsatile mass.      Tenderness: There is no abdominal tenderness. There is no right CVA tenderness, left CVA tenderness, guarding or rebound.      Comments: No rigidity   Musculoskeletal:         General: No swelling, tenderness or deformity.      Cervical back: Neck supple. No tenderness.      Right lower leg: No tenderness. Edema present.      Left lower leg: No tenderness. Edema present.   Skin:     General: Skin is warm and dry.      Capillary Refill: Capillary refill takes less than 2 seconds.      Coloration: Skin  is not jaundiced or pale.      Findings: No erythema.   Neurological:      General: No focal deficit present.      Mental Status: He is alert and oriented to person, place, and time. Mental status is at baseline.      Cranial Nerves: Cranial nerves 2-12 are intact. No cranial nerve deficit.      Sensory: Sensation is intact. No sensory deficit.      Motor: Motor function is intact. No weakness or pronator drift.      Coordination: Coordination is intact. Coordination normal.   Psychiatric:         Mood and Affect: Mood normal.         Behavior: Behavior normal.                  Procedures:  Procedures      Medical Decision Making:      Comorbidities that affect care:    Congestive Heart Failure, Diabetes, Hypertension        The following orders were placed and all results were independently analyzed by me:  Orders Placed This Encounter   Procedures   • XR Chest 1 View   • CT Chest With Contrast Diagnostic   • [place] Draw   • Comprehensive Metabolic Panel   • BNP   • Single High Sensitivity Troponin T   • CBC Auto Differential   • High Sensitivity Troponin T 2Hr   • Basic Metabolic Panel   • CBC (No Diff)   • Phosphorus   • Magnesium   • TSH   • Basic Metabolic Panel   • CBC Auto Differential   • Diet: Cardiac Diets, Diabetic Diets; Healthy Heart (2-3 Na+); Consistent Carbohydrate; Texture: Regular Texture (IDDSI 7); Fluid Consistency: Thin (IDDSI 0)   • Undress & Gown   • Vital Signs   • Vital Signs   • Intake & Output   • Weigh Patient   • Oral Care   • Saline Lock & Maintain IV Access   • Pulse Oximetry, Continuous   • Cardiac Monitoring   • Daily Weights   • Strict Intake & Output   • Initiate Glucommander™ SQ   • RN to Order STAT Glucose (Lab Performed) for POC Glucose <10 or >600   • Code Status and Medical Interventions:   • Inpatient Hospitalist Consult   • Oxygen Therapy- Nasal Cannula; Titrate for SPO2: 90% - 95%   • Incentive Spirometry   • POC Glucose PRN   • POC Glucose 4x Daily AC & at Bedtime   • POC  Glucose Once   • POC Glucose Once   • POC Glucose Once   • POC Glucose Once   • POC Glucose Once   • POC Glucose Once   • ECG 12 Lead ED Triage Standing Order; SOA   • SCANNED - TELEMETRY     • Insert Peripheral IV   • Insert Peripheral IV   • Inpatient Admission   • CBC & Differential   • Green Top (Gel)   • Lavender Top   • Gold Top - SST   • Light Blue Top   • CBC & Differential       Medications Given in the Emergency Department:  Medications   sodium chloride 0.9 % flush 10 mL (has no administration in time range)   allopurinol (ZYLOPRIM) tablet 100 mg (100 mg Oral Given 5/12/23 1014)   amLODIPine (NORVASC) tablet 10 mg (10 mg Oral Given 5/12/23 0929)   atorvastatin (LIPITOR) tablet 80 mg (80 mg Oral Given 5/12/23 2124)   carvedilol (COREG) tablet 12.5 mg (12.5 mg Oral Given 5/12/23 1739)   sodium chloride 0.9 % flush 10 mL (has no administration in time range)   sodium chloride 0.9 % flush 10 mL (10 mL Intravenous Given 5/12/23 2125)   sodium chloride 0.9 % infusion 40 mL (has no administration in time range)   insulin detemir (LEVEMIR) injection 1-200 Units (12 Units Subcutaneous Given 5/12/23 2124)   insulin lispro (humaLOG) injection 1-200 Units (1 Units Subcutaneous Not Given 5/12/23 2126)   insulin lispro (humaLOG) injection 1-200 Units (has no administration in time range)   dextrose (GLUTOSE) oral gel 15 g (has no administration in time range)   dextrose (D50W) (25 g/50 mL) IV injection 10-50 mL (has no administration in time range)   glucagon (GLUCAGEN) injection 1 mg (has no administration in time range)   apixaban (ELIQUIS) tablet 5 mg (5 mg Oral Given 5/12/23 2124)   nitroglycerin (TRIDIL) 200 mcg/ml infusion (20 mcg/min Intravenous Rate/Dose Change 5/12/23 1827)   furosemide (LASIX) tablet 40 mg (40 mg Oral Given 5/12/23 0929)   spironolactone (ALDACTONE) tablet 25 mg (25 mg Oral Given 5/12/23 0929)   acetaminophen (TYLENOL) tablet 650 mg (650 mg Oral Given 5/12/23 1824)     Or   acetaminophen  (TYLENOL) suppository 650 mg ( Rectal Not Given:  See Alt 5/12/23 1824)   iopamidol (ISOVUE-370) 76 % injection 100 mL (100 mL Intravenous Given 5/11/23 1706)   nitroglycerin (NITROSTAT) SL tablet 0.4 mg (0.4 mg Sublingual Given 5/11/23 1845)   furosemide (LASIX) injection 80 mg (80 mg Intravenous Given 5/11/23 2117)   acetaminophen (TYLENOL) tablet 650 mg (650 mg Oral Given 5/12/23 0423)   prochlorperazine (COMPAZINE) injection 10 mg (10 mg Intravenous Given 5/12/23 0055)        ED Course:    ED Course as of 05/12/23 2301   Thu May 11, 2023   1340 EKG:    Rhythm: Sinus tachycardia  Rate: 105  Intervals: Normal AL and QT interval  T-wave: T wave inversion V2, V3, V4, V5, I, aVL, there is nonspecific T wave flattening, artifact does obscure detail slightly  ST Segment: No obvious pathological ST elevation or reciprocal ST depression to suggest acute myocardial infarction    EKG Comparison: The T wave inversions appear old from the prior EKG but the patient does have new sinus tachycardia in comparison to an EKG performed April 14, 2023    Interpreted by me   [SD]      ED Course User Index  [SD] Obed Gambino DO       Labs:    Lab Results (last 24 hours)     Procedure Component Value Units Date/Time    Basic Metabolic Panel [[id number]]  (Abnormal) Collected: 05/12/23 0400    Specimen: Blood Updated: 05/12/23 0557     Glucose 210 mg/dL      BUN 30 mg/dL      Creatinine 1.89 mg/dL      Sodium 134 mmol/L      Potassium 4.0 mmol/L      Chloride 102 mmol/L      CO2 23.5 mmol/L      Calcium 8.7 mg/dL      BUN/Creatinine Ratio 15.9     Anion Gap 8.5 mmol/L      eGFR 38.9 mL/min/1.73     Narrative:      GFR Normal >60  Chronic Kidney Disease <60  Kidney Failure <15      CBC (No Diff) [[id number]]  (Abnormal) Collected: 05/12/23 0400    Specimen: Blood Updated: 05/12/23 0522     WBC 8.50 10*3/mm3      RBC 3.03 10*6/mm3      Hemoglobin 7.8 g/dL      Hematocrit 24.1 %      MCV 79.5 fL      MCH 25.7 pg      MCHC 32.4 g/dL       RDW 18.5 %      RDW-SD 53.1 fl      MPV 9.1 fL      Platelets 221 10*3/mm3     Phosphorus [[id number]]  (Normal) Collected: 05/12/23 0400    Specimen: Blood Updated: 05/12/23 0557     Phosphorus 3.4 mg/dL     Magnesium [[id number]]  (Normal) Collected: 05/12/23 0400    Specimen: Blood Updated: 05/12/23 0557     Magnesium 1.8 mg/dL     TSH [[id number]]  (Normal) Collected: 05/12/23 0400    Specimen: Blood Updated: 05/12/23 0551     TSH 3.300 uIU/mL     POC Glucose Once [[id number]]  (Abnormal) Collected: 05/12/23 0806    Specimen: Blood Updated: 05/12/23 0807     Glucose 172 mg/dL      Comment: Serial Number: [id number]:  [id number]       POC Glucose Once [[id number]]  (Abnormal) Collected: 05/12/23 1148    Specimen: Blood Updated: 05/12/23 1151     Glucose 231 mg/dL      Comment: Serial Number: [id number]:  [id number]       POC Glucose Once [[id number]]  (Abnormal) Collected: 05/12/23 1349    Specimen: Blood Updated: 05/12/23 1350     Glucose 168 mg/dL      Comment: Serial Number: [id number]:  [id number]       POC Glucose Once [[id number]]  (Abnormal) Collected: 05/12/23 1736    Specimen: Blood Updated: 05/12/23 1739     Glucose 158 mg/dL      Comment: Serial Number: [id number]:  [id number]       POC Glucose Once [[id number]]  (Abnormal) Collected: 05/12/23 2039    Specimen: Blood Updated: 05/12/23 2042     Glucose 146 mg/dL      Comment: Serial Number: [id number]:  [id number]              Imaging:    No Radiology Exams Resulted Within Past 24 Hours      Differential Diagnosis and Discussion:    Dyspnea: Differential diagnosis includes but is not limited to metabolic acidosis, neurological disorders, psychogenic, asthma, pneumothorax, upper airway obstruction, COPD, pneumonia, noncardiogenic pulmonary edema, interstitial lung disease, anemia, congestive heart failure, and pulmonary embolism    All labs were reviewed and interpreted by me.  All X-rays impressions were independently interpreted  by me.  EKG was interpreted by me.    MDM  Number of Diagnoses or Management Options  Acute on chronic renal insufficiency  Acute pulmonary edema  Chronic anemia  Congestive heart failure, unspecified HF chronicity, unspecified heart failure type  Elevated brain natriuretic peptide (BNP) level  Elevated troponin  Uncontrolled hypertension  Uncontrolled type 2 diabetes mellitus with hyperglycemia  Diagnosis management comments: Patient was hypertensive while in the emergency room.  The patient had specifically had diastolic dysfunction.  The patient's heart rate was essentially normal..    CBC demonstrates a hemoglobin 9.2 and a hematocrit of 28.3.  I reviewed the patient's prior CBCs and the patient has chronic anemia which is unchanged    CMP demonstrates a glucose of 322, BUN of 32, creatinine of 2.01, sodium 134, bicarb of 21.6.  The patient had a normal anion gap.  The patient's liver enzymes are normal.  The patient had no evidence of nonketotic hyperosmolar syndrome or DKA by laboratory values.  The patient did appear to have some chronic renal insufficiency that was new today    Since EKG demonstrated sinus tachycardia with a rate of 105    Patient's chest x-ray demonstrated dependent opacities favored to represent atelectasis      CT scan of the chest was performed due to the patient's reported DVT and not taking his Eliquis to rule out pulmonary embolism due to the tachycardia.  Patient CT scan demonstrated no evidence of pulmonary embolism.  There is small bilateral pleural effusions.  There was subtle groundglass attenuation within the perihilar regions most consistent with alveolar edema.    When the patient returned from CT.  He was given 3 sublingual nitroglycerin and started on a nitroglycerin drip because of the uncontrolled hypertension.  Once the patient's blood pressure was controlled.  The patient was given 80 Lasix    Patient's room air pulse ox did persist around 90%.  Sometimes it would dip  slightly lower.  The patient will subsequently be admitted to the hospital for acute pulmonary edema congestive heart failure uncontrolled type II by diabetes, acute on chronic renal insufficiency and uncontrolled hypertension       Amount and/or Complexity of Data Reviewed  Clinical lab tests: reviewed  Tests in the radiology section of CPT®: reviewed  Tests in the medicine section of CPT®: reviewed  Decide to obtain previous medical records or to obtain history from someone other than the patient: yes (I reviewed the patient's echocardiogram performed on April 14, 2023.  The patient had an ejection fraction of 50%)  Discuss the patient with other providers: yes (18:35 EDT  I discussed the case with the hospitalist.  We have discussed the patient's presenting symptoms, laboratory values, imaging and condition at the time of admission.  They will evaluate the patient in the emergency room and admit the patient to the hospital)    Critical Care  Total time providing critical care: 35 minutes (Total critical care time of.  Total critical care time documented does not include time spent on separately billed procedures for services of nurses or physician assistants.  I personally saw and examined the patient.  I have reviewed all diagnostic interpretations and treatment plans as written.  I was present for the key portions of any procedures performed and the inclusive time noted in any critical care statement.  Critical care time includes patient management by me, time spent at the patient bedside, time to review labs/ ABG's, imaging results, discussing patient care, documentation in the medical record and time spent with family or caregiver)             Social Determinants of Health:    Patient is independent, reliable, and has access to care.       Disposition and Care Coordination:    Admit:   Through independent evaluation of the patient's history, physical, and imperical data, the patient meets criteria for  observation/admission to the hospital.        Final diagnoses:   Acute pulmonary edema   Congestive heart failure, unspecified HF chronicity, unspecified heart failure type   Uncontrolled type 2 diabetes mellitus with hyperglycemia   Acute on chronic renal insufficiency   Chronic anemia   Elevated brain natriuretic peptide (BNP) level   Elevated troponin   Uncontrolled hypertension        ED Disposition     ED Disposition   Decision to Admit    Condition   --    Comment   Level of Care: Telemetry [5]   Diagnosis: Acute pulmonary edema [[id number]]   Certification: I Certify That Inpatient Hospital Services Are Medically Necessary For Greater Than 2 Midnights               This medical record created using voice recognition software.           Obed Gambino DO  05/12/23 [id number]

## 2023-05-11 NOTE — H&P
[facility] HISTORY AND PHYSICAL  Date: 2023   Patient Name: Lyle Lozano  : 1955  MRN: [id number]  Primary Care Physician:  Heidi Villa, HUMA  Date of admission: 2023    Subjective   Subjective     Chief Complaint: Shortness of breath    HPI:    Lyle Lozano is a 67 y.o. male with history of heart failure with preserved EF, hypertension, hyperlipidemia, uncontrolled type 2 diabetes, chronic kidney disease, gout arthritis and bilateral DVT who presented with shortness of breath for the past couple of days.  Of note patient was discharged  and was admitted for heart failure exacerbation.  He was also admitted on  for heart exacerbation as well.  Patient states that for the past couple of days he been noticing bilateral edema with increasing orthopnea.  He been gaining weight as well.  Patient states he is compliant his medications.  He is on Eliquis for his DVT.  On presentation patient was satting in the low 90s on room air and was hypertensive in the 180s.  Patient was also tachycardic.  Given his history of bilateral DVT patient had a CT scan to rule out PE.  CT showed pleural effusion.  CT did not show any pulmonary embolism.  Patient was started on nitroglycerin drip.  Last echo was on  that showed an EF of 50%.    His proBNP was over 20,000.  First troponin was 110-second 1 was 99.  His hemoglobin stable at 9.2.  His creatinine is stable at 2.01.      Personal History     Past Medical History:   Diagnosis Date   • Abnormal kidney function    • Arthritis    • Bursitis    • CHF (congestive heart failure)    • Depression    • Diabetes    • Edema    • Essential hypertension 2021   • Forgetfulness    • Gout    • Head injury    • Hernia cerebri    • Hyperlipidemia    • Hypertension    • IFG (impaired fasting glucose)    • Low back pain    • Lumbago     `   • Pain of left hip    • Right shoulder pain    • Sleep apnea    • SOB  (shortness of breath)          Past Surgical History:   Procedure Laterality Date   • CYST REMOVAL Right     leg         Family History   Problem Relation Age of Onset   • No Known Problems Father    • Diabetes Sister    • Stroke Sister          Social History     Socioeconomic History   • Marital status: Single   Tobacco Use   • Smoking status: Former     Packs/day: 0.25     Types: Cigarettes     Start date: 1982     Quit date: 8/14/2012     Years since quitting: 10.7   • Smokeless tobacco: Never   Vaping Use   • Vaping Use: Never used   Substance and Sexual Activity   • Alcohol use: Yes     Comment: rare   • Drug use: Never   • Sexual activity: Defer         Home Medications:  Diclofenac Sodium, Insulin Lispro, amLODIPine, apixaban, atorvastatin, carvedilol, cholecalciferol, cyanocobalamin, famotidine, furosemide, insulin detemir, and spironolactone    Allergies:  No Known Allergies    Review of Systems   Constitutional: Positive for fatigue. Negative for chills and fever.   HENT: Negative for postnasal drip, sinus pain, sore throat and trouble swallowing.    Eyes: Negative for pain, discharge and itching.   Respiratory: Positive for shortness of breath. Negative for cough, chest tightness and wheezing.    Cardiovascular: Positive for leg swelling. Negative for chest pain and palpitations.   Gastrointestinal: Negative for abdominal pain, blood in stool, constipation, diarrhea, nausea and vomiting.   Genitourinary: Negative for difficulty urinating, dysuria, frequency, hematuria and urgency.   Skin: Negative for color change, rash and wound.   Neurological: Negative for dizziness, tremors, seizures, syncope, facial asymmetry, speech difficulty, weakness, light-headedness, numbness and headaches.   Psychiatric/Behavioral: Negative for agitation and confusion. The patient is not nervous/anxious and is not hyperactive.         Objective   Objective     Vitals:   Heart Rate:  [] 101  Resp:  [16] 16  BP:  (166-191)/(105-130) 168/118  Flow (L/min):  [2] 2    Physical Exam    Constitutional: Awake, alert, no acute distress   Eyes: Pupils equal, sclerae anicteric, no conjunctival injection   HENT: NCAT, mucous membranes moist   Neck: Supple, no thyromegaly, no lymphadenopathy, trachea midline   Respiratory: Clear to auscultation bilaterally, nonlabored respirations    Cardiovascular: RRR, no murmurs, rubs, or gallops, palpable pedal pulses bilaterally   Gastrointestinal: Positive bowel sounds, soft, nontender, nondistended   Musculoskeletal: +3 edema bilateral extremity, no clubbing or cyanosis to extremities   Psychiatric: Appropriate affect, cooperative   Neurologic: Oriented x 3, strength symmetric in all extremities, Cranial Nerves grossly intact to confrontation, speech clear   Skin: No rashes     Result Review    Result Review:  I have personally reviewed the results from the time of this admission to 5/11/2023 18:51 EDT and agree with these findings:  [x]  Laboratory  []  Microbiology  [x]  Radiology  []  EKG/Telemetry   []  Cardiology/Vascular   []  Pathology  []  Old records  []  Other:    Imaging Results (Last 24 Hours)     Procedure Component Value Units Date/Time    CT Chest With Contrast Diagnostic [[id number]] Collected: 05/11/23 1727     Updated: 05/11/23 1730    Narrative:      PROCEDURE: CT CHEST W CONTRAST DIAGNOSTIC     COMPARISON:  [facility], CT, ABD PEL W/O CONTRAST, 5/21/2019, 16:09.  INDICATIONS: shortness of breath, chest pain     TECHNIQUE: After obtaining the patient's consent, CT images were obtained with non-ionic   intravenous contrast material.       PROTOCOL:   Pulmonary embolism imaging protocol performed      RADIATION:   DLP: 428.6 mGy*cm    Automated exposure control was utilized to minimize radiation dose.   CONTRAST: 100 cc Isovue 370 I.V.     FINDINGS:   Pulmonary arteries are well opacified.  There is no evidence of pulmonary embolus.  Thoracic aorta   is of normal  caliber.  There is a large amount of coronary artery calcification.  There are small   bilateral pleural effusions.  There is mild cardiomegaly.  No mediastinal, hilar, or axillary   adenopathy.  There is reflux into the inferior vena cava and hepatic veins suggesting elevated   right heart pressures.  There is subtle ground-glass attenuation within the perihilar regions of   both lungs suggesting changes of pulmonary of the alert edema.  Multifocal pneumonia would also be   within the differential.  No other focal airspace consolidation.  No suspicious pulmonary nodule.     Bilateral adrenal glands are within normal limits.     There are degenerative changes of the spine.  There are no suspicious lytic or sclerotic bony   lesions identified.          CONCLUSION:   1. No evidence of pulmonary embolus  2. Small bilateral pleural effusions  3. Subtle ground-glass attenuation within the perihilar regions of both lungs which may be due to   developing pulmonary alveolar edema.  Multifocal pneumonia would be within the differential but   felt to be less likely.            OSIRIS FRANKLIN MD         Electronically Signed and Approved By: OSIRIS FRANKLIN MD on 5/11/2023 at 17:26                     XR Chest 1 View [[id number]] Collected: 05/11/23 1443     Updated: 05/11/23 1446    Narrative:      PROCEDURE: XR CHEST 1 VW     COMPARISON: [facility], CR, XR CHEST 1 VW, 4/07/2023, 20:27.     INDICATIONS: SOA Triage Protocol     FINDINGS:   Study is limited by overlying support and monitoring apparatus.  Perigraft the heart and   mediastinal contours are stable in appearance.  There is vascular crowding secondary to relatively   low lung volumes and dependent atelectasis noted.  Findings are slightly more prominent on left   than the right and are increased compared to the previous exam.  Osseous structures demonstrate no   acute abnormality       Impression:         1. Dependent opacities favored to represent  atelectasis.  Pneumonia cannot be excluded.                  JUAN JOSE GILLETTE MD         Electronically Signed and Approved By: JUAN JOSE GILLETTE MD on 5/11/2023 at 14:42                            Assessment & Plan   Assessment / Plan     Assessment  #Heart failure exacerbation  #Hypertensive urgency  #History of diabetes  #History of hypertension  #History of gout        Plan:   -Admit to telemetry  -Continue nitroglycerin drip  -Increase Coreg to 12.5  -80 IV Lasix tonight  -Redose Lasix tomorrow in the morning after checking creatinine  -Continue spironolactone  -Continue other appropriate home medication  -Continue home insulin  -Daily weight  -Strict AMI's  -Increase amlodipine to 10 mg daily  -Consider starting SGLT2 inhibitor given he has history of-heart failure with preserved EF during hospital stay      DVT prophylaxis:  No DVT prophylaxis order currently exists.    CODE STATUS:         Admission Status:  I believe this patient meets inpatient status.    Jo Robins MD

## 2023-05-12 LAB
ANION GAP SERPL CALCULATED.3IONS-SCNC: 8.5 MMOL/L (ref 5–15)
BUN SERPL-MCNC: 30 MG/DL (ref 8–23)
BUN/CREAT SERPL: 15.9 (ref 7–25)
CALCIUM SPEC-SCNC: 8.7 MG/DL (ref 8.6–10.5)
CHLORIDE SERPL-SCNC: 102 MMOL/L (ref 98–107)
CO2 SERPL-SCNC: 23.5 MMOL/L (ref 22–29)
CREAT SERPL-MCNC: 1.89 MG/DL (ref 0.76–1.27)
DEPRECATED RDW RBC AUTO: 53.1 FL (ref 37–54)
EGFRCR SERPLBLD CKD-EPI 2021: 38.9 ML/MIN/1.73
ERYTHROCYTE [DISTWIDTH] IN BLOOD BY AUTOMATED COUNT: 18.5 % (ref 12.3–15.4)
GLUCOSE BLDC GLUCOMTR-MCNC: 146 MG/DL (ref 70–99)
GLUCOSE BLDC GLUCOMTR-MCNC: 158 MG/DL (ref 70–99)
GLUCOSE BLDC GLUCOMTR-MCNC: 168 MG/DL (ref 70–99)
GLUCOSE BLDC GLUCOMTR-MCNC: 172 MG/DL (ref 70–99)
GLUCOSE BLDC GLUCOMTR-MCNC: 231 MG/DL (ref 70–99)
GLUCOSE SERPL-MCNC: 210 MG/DL (ref 65–99)
HCT VFR BLD AUTO: 24.1 % (ref 37.5–51)
HGB BLD-MCNC: 7.8 G/DL (ref 13–17.7)
MAGNESIUM SERPL-MCNC: 1.8 MG/DL (ref 1.6–2.4)
MCH RBC QN AUTO: 25.7 PG (ref 26.6–33)
MCHC RBC AUTO-ENTMCNC: 32.4 G/DL (ref 31.5–35.7)
MCV RBC AUTO: 79.5 FL (ref 79–97)
PHOSPHATE SERPL-MCNC: 3.4 MG/DL (ref 2.5–4.5)
PLATELET # BLD AUTO: 221 10*3/MM3 (ref 140–450)
PMV BLD AUTO: 9.1 FL (ref 6–12)
POTASSIUM SERPL-SCNC: 4 MMOL/L (ref 3.5–5.2)
RBC # BLD AUTO: 3.03 10*6/MM3 (ref 4.14–5.8)
SODIUM SERPL-SCNC: 134 MMOL/L (ref 136–145)
TSH SERPL DL<=0.05 MIU/L-ACNC: 3.3 UIU/ML (ref 0.27–4.2)
WBC NRBC COR # BLD: 8.5 10*3/MM3 (ref 3.4–10.8)

## 2023-05-12 PROCEDURE — 63710000001 INSULIN LISPRO (HUMAN) PER 5 UNITS: Performed by: STUDENT IN AN ORGANIZED HEALTH CARE EDUCATION/TRAINING PROGRAM

## 2023-05-12 PROCEDURE — 84443 ASSAY THYROID STIM HORMONE: CPT | Performed by: STUDENT IN AN ORGANIZED HEALTH CARE EDUCATION/TRAINING PROGRAM

## 2023-05-12 PROCEDURE — 94761 N-INVAS EAR/PLS OXIMETRY MLT: CPT

## 2023-05-12 PROCEDURE — 83735 ASSAY OF MAGNESIUM: CPT | Performed by: STUDENT IN AN ORGANIZED HEALTH CARE EDUCATION/TRAINING PROGRAM

## 2023-05-12 PROCEDURE — 82948 REAGENT STRIP/BLOOD GLUCOSE: CPT

## 2023-05-12 PROCEDURE — 63710000001 INSULIN DETEMIR PER 5 UNITS: Performed by: STUDENT IN AN ORGANIZED HEALTH CARE EDUCATION/TRAINING PROGRAM

## 2023-05-12 PROCEDURE — 94799 UNLISTED PULMONARY SVC/PX: CPT

## 2023-05-12 PROCEDURE — 85027 COMPLETE CBC AUTOMATED: CPT | Performed by: STUDENT IN AN ORGANIZED HEALTH CARE EDUCATION/TRAINING PROGRAM

## 2023-05-12 PROCEDURE — 80048 BASIC METABOLIC PNL TOTAL CA: CPT | Performed by: STUDENT IN AN ORGANIZED HEALTH CARE EDUCATION/TRAINING PROGRAM

## 2023-05-12 PROCEDURE — 25010000002 PROCHLORPERAZINE 10 MG/2ML SOLUTION

## 2023-05-12 PROCEDURE — 99233 SBSQ HOSP IP/OBS HIGH 50: CPT | Performed by: INTERNAL MEDICINE

## 2023-05-12 PROCEDURE — 84100 ASSAY OF PHOSPHORUS: CPT | Performed by: STUDENT IN AN ORGANIZED HEALTH CARE EDUCATION/TRAINING PROGRAM

## 2023-05-12 RX ORDER — NITROGLYCERIN 20 MG/100ML
5-200 INJECTION INTRAVENOUS
Status: DISCONTINUED | OUTPATIENT
Start: 2023-05-12 | End: 2023-05-14 | Stop reason: HOSPADM

## 2023-05-12 RX ORDER — ACETAMINOPHEN 325 MG/1
650 TABLET ORAL EVERY 6 HOURS PRN
Status: DISCONTINUED | OUTPATIENT
Start: 2023-05-12 | End: 2023-05-14 | Stop reason: HOSPADM

## 2023-05-12 RX ORDER — SPIRONOLACTONE 25 MG/1
25 TABLET ORAL DAILY
Status: DISCONTINUED | OUTPATIENT
Start: 2023-05-12 | End: 2023-05-14 | Stop reason: HOSPADM

## 2023-05-12 RX ORDER — ACETAMINOPHEN 650 MG/1
650 SUPPOSITORY RECTAL EVERY 4 HOURS PRN
Status: DISCONTINUED | OUTPATIENT
Start: 2023-05-12 | End: 2023-05-14 | Stop reason: HOSPADM

## 2023-05-12 RX ORDER — PROCHLORPERAZINE EDISYLATE 5 MG/ML
10 INJECTION INTRAMUSCULAR; INTRAVENOUS ONCE AS NEEDED
Status: COMPLETED | OUTPATIENT
Start: 2023-05-12 | End: 2023-05-12

## 2023-05-12 RX ORDER — FUROSEMIDE 40 MG/1
40 TABLET ORAL DAILY
Status: DISCONTINUED | OUTPATIENT
Start: 2023-05-12 | End: 2023-05-14 | Stop reason: HOSPADM

## 2023-05-12 RX ADMIN — SPIRONOLACTONE 25 MG: 25 TABLET ORAL at 09:29

## 2023-05-12 RX ADMIN — NITROGLYCERIN 90 MCG/MIN: 20 INJECTION INTRAVENOUS at 06:09

## 2023-05-12 RX ADMIN — INSULIN LISPRO 3 UNITS: 100 INJECTION, SOLUTION INTRAVENOUS; SUBCUTANEOUS at 14:00

## 2023-05-12 RX ADMIN — ALLOPURINOL 100 MG: 100 TABLET ORAL at 10:14

## 2023-05-12 RX ADMIN — PROCHLORPERAZINE EDISYLATE 10 MG: 5 INJECTION, SOLUTION INTRAMUSCULAR; INTRAVENOUS at 00:55

## 2023-05-12 RX ADMIN — Medication 10 ML: at 21:25

## 2023-05-12 RX ADMIN — ATORVASTATIN CALCIUM 80 MG: 40 TABLET, FILM COATED ORAL at 21:24

## 2023-05-12 RX ADMIN — FUROSEMIDE 40 MG: 40 TABLET ORAL at 09:29

## 2023-05-12 RX ADMIN — ACETAMINOPHEN 650 MG: 325 TABLET ORAL at 18:24

## 2023-05-12 RX ADMIN — NITROGLYCERIN 55 MCG/MIN: 20 INJECTION INTRAVENOUS at 03:52

## 2023-05-12 RX ADMIN — Medication 10 ML: at 09:30

## 2023-05-12 RX ADMIN — NITROGLYCERIN 90 MCG/MIN: 20 INJECTION INTRAVENOUS at 09:17

## 2023-05-12 RX ADMIN — ACETAMINOPHEN 650 MG: 325 TABLET ORAL at 04:23

## 2023-05-12 RX ADMIN — INSULIN LISPRO 2 UNITS: 100 INJECTION, SOLUTION INTRAVENOUS; SUBCUTANEOUS at 09:28

## 2023-05-12 RX ADMIN — ACETAMINOPHEN 650 MG: 325 TABLET ORAL at 10:14

## 2023-05-12 RX ADMIN — CARVEDILOL 12.5 MG: 12.5 TABLET, FILM COATED ORAL at 09:29

## 2023-05-12 RX ADMIN — NITROGLYCERIN 70 MCG/MIN: 20 INJECTION INTRAVENOUS at 04:25

## 2023-05-12 RX ADMIN — APIXABAN 5 MG: 5 TABLET, FILM COATED ORAL at 09:29

## 2023-05-12 RX ADMIN — CARVEDILOL 12.5 MG: 12.5 TABLET, FILM COATED ORAL at 17:39

## 2023-05-12 RX ADMIN — NITROGLYCERIN 80 MCG/MIN: 20 INJECTION INTRAVENOUS at 04:38

## 2023-05-12 RX ADMIN — APIXABAN 5 MG: 5 TABLET, FILM COATED ORAL at 21:24

## 2023-05-12 RX ADMIN — AMLODIPINE BESYLATE 10 MG: 5 TABLET ORAL at 09:29

## 2023-05-12 RX ADMIN — NITROGLYCERIN 60 MCG/MIN: 20 INJECTION INTRAVENOUS at 04:10

## 2023-05-12 RX ADMIN — NITROGLYCERIN 50 MCG/MIN: 20 INJECTION INTRAVENOUS at 01:31

## 2023-05-12 RX ADMIN — INSULIN DETEMIR 12 UNITS: 100 INJECTION, SOLUTION SUBCUTANEOUS at 21:24

## 2023-05-12 NOTE — PROGRESS NOTES
[facility]   Hospitalist Progress Note  Date: 2023  Patient Name: Lyle Lozano  : 1955  MRN: [id number]  Date of admission: 2023      Subjective   Subjective     Chief Complaint: SOB    Summary: Lyle Lozano is a 67 y.o. male with history of heart failure with preserved EF, hypertension, hyperlipidemia, uncontrolled type 2 diabetes, chronic kidney disease, gout arthritis and bilateral DVT who presented with shortness of breath for the past couple of days.  Of note patient was discharged  and was admitted for heart failure exacerbation.  He was also admitted on  for heart exacerbation as well.  Patient states that for the past couple of days he been noticing bilateral edema with increasing orthopnea.  He been gaining weight as well.  Patient states he is compliant his medications.  He is on Eliquis for his DVT.  On presentation patient was satting in the low 90s on room air and was hypertensive in the 180s.  Patient was also tachycardic.  Given his history of bilateral DVT patient had a CT scan to rule out PE.  CT showed pleural effusion.  CT did not show any pulmonary embolism.  Patient was started on nitroglycerin drip.  Last echo was on  that showed an EF of 50%.     His proBNP was over 20,000.  First troponin was 110-second 1 was 99.  His hemoglobin stable at 9.2.  His creatinine is stable at 2.01.    Interval Followup: Patient seen and examined.  NO CP, SOB improved.  Feels at baseline    Review of Systems   All systems were reviewed and negative except for: SOB    Objective   Objective     Vitals:   Temp:  [97.5 °F (36.4 °C)-98.4 °F (36.9 °C)] 97.6 °F (36.4 °C)  Heart Rate:  [] 84  Resp:  [16-26] 18  BP: (137-191)/() 145/92  Flow (L/min):  [2-2.5] 2  Physical Exam    Constitutional: Awake, alert, no acute distress   Eyes: Pupils equal, sclerae anicteric, no conjunctival injection   HENT: NCAT, mucous membranes moist   Neck: Supple,  no thyromegaly, no lymphadenopathy, trachea midline   Respiratory: Clear to auscultation bilaterally, nonlabored respirations    Cardiovascular: RRR, no murmurs, rubs, or gallops, palpable pedal pulses bilaterally   Gastrointestinal: Positive bowel sounds, soft, nontender, nondistended   Musculoskeletal: No bilateral ankle edema, no clubbing or cyanosis to extremities   Psychiatric: Appropriate affect, cooperative   Neurologic: Oriented x 3, strength symmetric in all extremities, Cranial Nerves grossly intact to confrontation, speech clear   Skin: No rashes     Result Review    Result Review:  I have personally reviewed the results from the time of this admission to 5/12/2023 08:23 EDT and agree with these findings:  [x]  Laboratory  []  Microbiology  [x]  Radiology  []  EKG/Telemetry   []  Cardiology/Vascular   []  Pathology  []  Old records  []  Other:    Assessment & Plan   Assessment / Plan     Assessment/Plan:  # CHFpEF  - EF 50%  - received IV lasix, transition to PO lasix and spironolactone  - monitor Is and Os  - supp O2 if needed    # HTN urgency  - on nitro gtt  - wean as able  - resume home meds    # Elevated trop  - consistent with previous  - no cp  - flat trend  - monitor on tele    # IDDM  - insulin sliding scale  - monitor and titrate as needed  - ADA diet when taking PO    # PAF???  - continue eliquis and BB       Discussed plan with RN.    DVT prophylaxis:  Medical DVT prophylaxis orders are present.    CODE STATUS:    Full      Electronically signed by Grady Rodriguez Jr, MD, 05/12/23, 8:23 AM EDT.

## 2023-05-12 NOTE — PLAN OF CARE
Goal Outcome Evaluation:   Patient currently on a nitro gtt infusing at 30 mcg/min. Blood pressures improving. Call light in reach. No signs of distress noted. No complaints at this time.

## 2023-05-12 NOTE — PAYOR COMM NOTE
"    #[id number] [PENDED CASE]    Aviva CÁRDENAS  [facility] ,[place]   [phone]  F [phone]      Lyle Begum (67 y.o. Male)     Date of Birth   1955    Social Security Number       Address   [address] [place] [id number]    Home Phone   [phone]    MRN   [id number]       Pentecostal   Alevism    Marital Status   Single                            Admission Date   5/11/23    Admission Type   Emergency    Admitting Provider   Jo Robins MD    Attending Provider   Grady Rodriguez Jr., MD    Department, Room/Bed   [facility] PROGRESSIVE CARE UNIT, 213/2       Discharge Date       Discharge Disposition       Discharge Destination                               Attending Provider: Grady Rodriguez Jr., MD    Allergies: No Known Allergies    Isolation: None   Infection: None   Code Status: CPR    Ht: 167.6 cm (65.98\")   Wt: 85.1 kg (187 lb 9.8 oz)    Admission Cmt: None   Principal Problem: None                Active Insurance as of 5/11/2023     Primary Coverage     Payor Plan Insurance Group Employer/Plan Group    HUMANA MEDICARE REPLACEMENT HUMANA MEDICARE REPLACEMENT [id number]     Payor Plan Address Payor Plan Phone Number Payor Plan Fax Number Effective Dates    [address] [address] [phone]  10/1/2022 - None Entered    [facility] [phone]       Subscriber Name Subscriber Birth Date Member ID       LYLE BEGUM 1955 [id number]           Secondary Coverage     Payor Plan Insurance Group Employer/Plan Group    [facility] BY RIKY [facility] BY RIKY [id number]     Payor Plan Address Payor Plan Phone Number Payor Plan Fax Number Effective Dates    [address] [address]   1/1/2021 - None Entered    [facility] [phone]       Subscriber Name Subscriber Birth Date Member ID       LYLE BEGUM 1955 [id number]                 Emergency Contacts      (Rel.) Home Phone Work Phone Mobile Phone    Farida Pitt (Sister) " -- [phone] --           Hypertension RRG Inpatient Care by Rebecca Clay RN       Indications Met: Reviewed on 2023 by Rebecca Clay RN       Created Using Review Status Review Entered   [facility] for Case Management Primary Completed 2023 1046       Created By   Rebecca Clay RN       Criteria Set Name - Subset   Hypertension RRG Inpatient Care      Criteria Review   Hypertension RRG Inpatient Care     Overall Determination: Indications Met     Criteria:  [×] Admission is indicated for  1 or more  of the following :      [×] Hypertensive emergency indicated by SBP greater than 180 mm Hg or DBP greater than 120 mm Hg [G] with evidence of acute or worsening target organ damage, as indicated by  1 or more  of the following :          [×] Heart failure (eg, pulmonary edema). See Heart Failure guideline.              2023 10:46 AM                  -- 2023 10:46 AM by Rebecca Clay RN --                      BP: 191/116 Nitro gtt proBNP: 72077.0 HS Bhaskar: 110*/99*     Notes:  -- 2023 10:46 AM by Rebecca Clay RN --      Review for 23 & 23 - Care Day #1 & #2 (Late admission - IP order 23 @ 1849)            -CC: SOB      -PMH: HFpEF (2023 TTT - EF 50%), HTN, HLD, uncontrolled T2DM, CKD, DVT (Eliquis)            -HR: 110, BP: 137/84 - 191/116, RR: 26, O2: 90% on 2LNC, afebrile      -proBNP: 01569.0, HS Bhaskar: 110*/99* (Trop T Delta: -11), Bun/Cr: 32/2.01, Na: 134, CO2: 21.6, H/H: 9.2/28.3 --> 7.8/24.1, B      -Meds in ED: Nitro SL x 3, Nitro gtt      -Meds ordered: Nitro gtt, IV Lasix, IV Compazine PRN, Coreg, Aldactone PO, Lasix PO, Norvasc PO, Lipitor, Eliquis PO, SSI/Levemir, Heparin SQ      -O2 titrate sats 90-95%, continuous pulse ox, I/S q4hr wa, continuous cardiac monitoring, daily weights, strict I/Os            -Dr. Robins 23: … presented with shortness of breath for the past couple of days...Of note patient was discharged  and was admitted  for heart failure exacerbation. He was also admitted on  for heart exacerbation as well. Patient states that for the past couple of days he been noticing bilateral edema with increasing orthopnea. He been gaining weight as well… On presentation patient was satting in the low 90s on room air and was hypertensive in the 180s. Patient was also tachycardic… started on nitroglycerin drip...+3 edema bilateral extremity…      Assessment:      #Heart failure exacerbation      #Hypertensive urgency      Etc...            -CXR 23: 1. Dependent opacities favored to represent atelectasis. Pneumonia cannot be excluded.      -CT Chest 23: 1. No evidence of pulmonary embolus. 2. Small bilateral pleural effusions. 3. Subtle ground-glass attenuation within the perihilar regions of both lungs which may be due to developing pulmonary alveolar edema. Multifocal pneumonia would be within the differential but felt to be less likely.                Jo Robins MD   Physician  Hospitalist  H&P      Signed  Date of Service:  23  Creation Time:  23     Signed        [facility] All [facility]   HOSPITALIST HISTORY AND PHYSICAL  Date: 2023   Patient Name: Lyle Lozano  : 1955  MRN: [id number]  Primary Care Physician:  Heidi Villa, HUMA  Date of admission: 2023        Subjective []Expand by Default     Subjective      Chief Complaint: Shortness of breath     HPI:     Lyle Lozano is a 67 y.o. male with history of heart failure with preserved EF, hypertension, hyperlipidemia, uncontrolled type 2 diabetes, chronic kidney disease, gout arthritis and bilateral DVT who presented with shortness of breath for the past couple of days.  Of note patient was discharged  and was admitted for heart failure exacerbation.  He was also admitted on  for heart exacerbation as well.  Patient states that for the past couple of days he been noticing  bilateral edema with increasing orthopnea.  He been gaining weight as well.  Patient states he is compliant his medications.  He is on Eliquis for his DVT.  On presentation patient was satting in the low 90s on room air and was hypertensive in the 180s.  Patient was also tachycardic.  Given his history of bilateral DVT patient had a CT scan to rule out PE.  CT showed pleural effusion.  CT did not show any pulmonary embolism.  Patient was started on nitroglycerin drip.  Last echo was on April 14 that showed an EF of 50%.     His proBNP was over 20,000.  First troponin was 110-second 1 was 99.  His hemoglobin stable at 9.2.  His creatinine is stable at 2.01.        Personal History      Medical History        Past Medical History:   Diagnosis Date   • Abnormal kidney function     • Arthritis     • Bursitis     • CHF (congestive heart failure)     • Depression     • Diabetes     • Edema     • Essential hypertension 09/23/2021   • Forgetfulness     • Gout     • Head injury     • Hernia cerebri     • Hyperlipidemia     • Hypertension     • IFG (impaired fasting glucose)     • Low back pain     • Lumbago       `   • Pain of left hip     • Right shoulder pain     • Sleep apnea     • SOB (shortness of breath)                 Surgical History         Past Surgical History:   Procedure Laterality Date   • CYST REMOVAL Right       leg                     Family History   Problem Relation Age of Onset   • No Known Problems Father     • Diabetes Sister     • Stroke Sister              Social History   Social History            Socioeconomic History   • Marital status: Single   Tobacco Use   • Smoking status: Former       Packs/day: 0.25       Types: Cigarettes       Start date: 1982       Quit date: 8/14/2012       Years since quitting: 10.7   • Smokeless tobacco: Never   Vaping Use   • Vaping Use: Never used   Substance and Sexual Activity   • Alcohol use: Yes       Comment: rare   • Drug use: Never   • Sexual activity: Defer                Home Medications:  Diclofenac Sodium, Insulin Lispro, amLODIPine, apixaban, atorvastatin, carvedilol, cholecalciferol, cyanocobalamin, famotidine, furosemide, insulin detemir, and spironolactone     Allergies:  No Known Allergies     Review of Systems   Constitutional: Positive for fatigue. Negative for chills and fever.   HENT: Negative for postnasal drip, sinus pain, sore throat and trouble swallowing.    Eyes: Negative for pain, discharge and itching.   Respiratory: Positive for shortness of breath. Negative for cough, chest tightness and wheezing.    Cardiovascular: Positive for leg swelling. Negative for chest pain and palpitations.   Gastrointestinal: Negative for abdominal pain, blood in stool, constipation, diarrhea, nausea and vomiting.   Genitourinary: Negative for difficulty urinating, dysuria, frequency, hematuria and urgency.   Skin: Negative for color change, rash and wound.   Neurological: Negative for dizziness, tremors, seizures, syncope, facial asymmetry, speech difficulty, weakness, light-headedness, numbness and headaches.   Psychiatric/Behavioral: Negative for agitation and confusion. The patient is not nervous/anxious and is not hyperactive.                Objective      Objective      Vitals:   Heart Rate:  [] 101  Resp:  [16] 16  BP: (166-191)/(105-130) 168/118  Flow (L/min):  [2] 2     Physical Exam                         Constitutional: Awake, alert, no acute distress              Eyes: Pupils equal, sclerae anicteric, no conjunctival injection              HENT: NCAT, mucous membranes moist              Neck: Supple, no thyromegaly, no lymphadenopathy, trachea midline              Respiratory: Clear to auscultation bilaterally, nonlabored respirations               Cardiovascular: RRR, no murmurs, rubs, or gallops, palpable pedal pulses bilaterally              Gastrointestinal: Positive bowel sounds, soft, nontender, nondistended              Musculoskeletal: +3 edema  bilateral extremity, no clubbing or cyanosis to extremities              Psychiatric: Appropriate affect, cooperative              Neurologic: Oriented x 3, strength symmetric in all extremities, Cranial Nerves grossly intact to confrontation, speech clear              Skin: No rashes            Result Review       Result Review:  I have personally reviewed the results from the time of this admission to 5/11/2023 18:51 EDT and agree with these findings:  [x]?  Laboratory  []?  Microbiology  [x]?  Radiology  []?  EKG/Telemetry   []?  Cardiology/Vascular   []?  Pathology  []?  Old records  []?  Other:             Imaging Results (Last 24 Hours)      Procedure Component Value Units Date/Time     CT Chest With Contrast Diagnostic [[id number]] Collected: 05/11/23 1727       Updated: 05/11/23 1730     Narrative:       PROCEDURE:        CT CHEST W CONTRAST DIAGNOSTIC     COMPARISON:         [facility], CT, ABD PEL W/O CONTRAST, 5/21/2019, 16:09.  INDICATIONS:        shortness of breath, chest pain     TECHNIQUE:        After obtaining the patient's consent, CT images were obtained with non-ionic   intravenous contrast material.       PROTOCOL:        Pulmonary embolism imaging protocol performed            RADIATION:        DLP: 428.6 mGy*cm          Automated exposure control was utilized to minimize radiation dose.   CONTRAST:        100 cc Isovue 370 I.V.     FINDINGS:          Pulmonary arteries are well opacified.  There is no evidence of pulmonary embolus.  Thoracic aorta   is of normal caliber.  There is a large amount of coronary artery calcification.  There are small   bilateral pleural effusions.  There is mild cardiomegaly.  No mediastinal, hilar, or axillary   adenopathy.  There is reflux into the inferior vena cava and hepatic veins suggesting elevated   right heart pressures.  There is subtle ground-glass attenuation within the perihilar regions of   both lungs suggesting changes of pulmonary of  the alert edema.  Multifocal pneumonia would also be   within the differential.  No other focal airspace consolidation.  No suspicious pulmonary nodule.     Bilateral adrenal glands are within normal limits.     There are degenerative changes of the spine.  There are no suspicious lytic or sclerotic bony   lesions identified.          CONCLUSION:       1. No evidence of pulmonary embolus  2. Small bilateral pleural effusions  3. Subtle ground-glass attenuation within the perihilar regions of both lungs which may be due to   developing pulmonary alveolar edema.  Multifocal pneumonia would be within the differential but   felt to be less likely.            OSIRIS FRANKLIN MD         Electronically Signed and Approved By: OSIRIS FRANKLIN MD on 5/11/2023 at 17:26                      XR Chest 1 View [[id number]] Collected: 05/11/23 1443       Updated: 05/11/23 1446     Narrative:       PROCEDURE:        XR CHEST 1 VW     [facility].     INDICATIONS:        SOA Triage Protocol     FINDINGS:          Study is limited by overlying support and monitoring apparatus.  Perigraft the heart and   mediastinal contours are stable in appearance.  There is vascular crowding secondary to relatively   low lung volumes and dependent atelectasis noted.  Findings are slightly more prominent on left   than the right and are increased compared to the previous exam.  Osseous structures demonstrate no   acute abnormality        Impression:                 1. Dependent opacities favored to represent atelectasis.  Pneumonia cannot be excluded.                  JUAN JOSE GILLETTE MD         Electronically Signed and Approved By: JUAN JOSE GILLETTE MD on 5/11/2023 at 14:42                                    Assessment & Plan     Assessment / Plan      Assessment  #Heart failure exacerbation  #Hypertensive urgency  #History of diabetes  #History of hypertension  #History of  gout           Plan:   -Admit to telemetry  -Continue nitroglycerin drip  -Increase Coreg to 12.5  -80 IV Lasix tonight  -Redose Lasix tomorrow in the morning after checking creatinine  -Continue spironolactone  -Continue other appropriate home medication  -Continue home insulin  -Daily weight  -Strict AMI's  -Increase amlodipine to 10 mg daily  -Consider starting SGLT2 inhibitor given he has history of-heart failure with preserved EF during hospital stay        DVT prophylaxis:  No DVT prophylaxis order currently exists.     CODE STATUS:       Admission Status:  I believe this patient meets inpatient status.     Jo Robins MD                                   Routing History                     Grady Rodriguez Jr., MD   Physician  Hospitalist  Progress Notes      Addendum  Date of Service:  23  Creation Time:  23     Expand All [facility]   Hospitalist Progress Note  Date: 2023  Patient Name: Lyle Lozano  : 1955  MRN: [id number]  Date of admission: 2023           Subjective      Subjective      Chief Complaint: SOB     Summary: Lyle Lozano is a 67 y.o. male with history of heart failure with preserved EF, hypertension, hyperlipidemia, uncontrolled type 2 diabetes, chronic kidney disease, gout arthritis and bilateral DVT who presented with shortness of breath for the past couple of days.  Of note patient was discharged  and was admitted for heart failure exacerbation.  He was also admitted on  for heart exacerbation as well.  Patient states that for the past couple of days he been noticing bilateral edema with increasing orthopnea.  He been gaining weight as well.  Patient states he is compliant his medications.  He is on Eliquis for his DVT.  On presentation patient was satting in the low 90s on room air and was hypertensive in the 180s.  Patient was also tachycardic.  Given his history of bilateral DVT patient had  a CT scan to rule out PE.  CT showed pleural effusion.  CT did not show any pulmonary embolism.  Patient was started on nitroglycerin drip.  Last echo was on April 14 that showed an EF of 50%.     His proBNP was over 20,000.  First troponin was 110-second 1 was 99.  His hemoglobin stable at 9.2.  His creatinine is stable at 2.01.     Interval Followup: Patient seen and examined.  NO CP, SOB improved.  Feels at baseline     Review of Systems              All systems were reviewed and negative except for: SOB           Objective      Objective      Vitals:   Temp:  [97.5 °F (36.4 °C)-98.4 °F (36.9 °C)] 97.6 °F (36.4 °C)  Heart Rate:  [] 84  Resp:  [16-26] 18  BP: (137-191)/() 145/92  Flow (L/min):  [2-2.5] 2  Physical Exam                         Constitutional: Awake, alert, no acute distress              Eyes: Pupils equal, sclerae anicteric, no conjunctival injection              HENT: NCAT, mucous membranes moist              Neck: Supple, no thyromegaly, no lymphadenopathy, trachea midline              Respiratory: Clear to auscultation bilaterally, nonlabored respirations               Cardiovascular: RRR, no murmurs, rubs, or gallops, palpable pedal pulses bilaterally              Gastrointestinal: Positive bowel sounds, soft, nontender, nondistended              Musculoskeletal: No bilateral ankle edema, no clubbing or cyanosis to extremities              Psychiatric: Appropriate affect, cooperative              Neurologic: Oriented x 3, strength symmetric in all extremities, Cranial Nerves grossly intact to confrontation, speech clear              Skin: No rashes            Result Review       Result Review:  I have personally reviewed the results from the time of this admission to 5/12/2023 08:23 EDT and agree with these findings:  [x]?  Laboratory  []?  Microbiology  [x]?  Radiology  []?  EKG/Telemetry   []?  Cardiology/Vascular   []?  Pathology  []?  Old records  []?   Other:           Assessment & Plan     Assessment / Plan      Assessment/Plan:  # CHFpEF  - EF 50%  - received IV lasix, transition to PO lasix and spironolactone  - monitor Is and Os  - supp O2 if needed     # HTN urgency  - on nitro gtt  - wean as able  - resume home meds     # Elevated trop  - consistent with previous  - no cp  - flat trend  - monitor on tele     # IDDM  - insulin sliding scale  - monitor and titrate as needed  - ADA diet when taking PO     # PAF???  - continue eliquis and BB        Discussed plan with RN.     DVT prophylaxis:  Medical DVT prophylaxis orders are present.     CODE STATUS:    Full        Electronically signed by Grady Rodriguez Jr, MD, 05/12/23, 8:23 AM EDT.                          Revision History

## 2023-05-12 NOTE — CASE MANAGEMENT/SOCIAL WORK
Discharge Planning Assessment   [facility]     Patient Name: Lyle Lozano  MRN: [id number]  Today's Date: 5/12/2023    Admit Date: 5/11/2023    Plan: Patient admitted due to [facility]. Patient is a readmission for the same diganosis as previous admission. RNCM met with patient at the bedside, verified the PCP and pharmacy. Patient was discharged to [facility] nursing and rehab from the previous hospital stay. Patient is on 2LNC currently, does not wear home O2. Will follow for discharge planning needs   Discharge Needs Assessment    No documentation.                Discharge Plan     Row Name 05/12/23 1318       Plan    Plan Patient admitted due to A[facility]FE. Patient is a readmission for the same diganosis as previous admission. RNCM met with patient at the bedside, verified the PCP and pharmacy. Patient was discharged to NH nursing and rehab from the previous hospital stay. Patient is on 2LNC currently, does not wear home O2. Will follow for discharge planning needs              Continued Care and Services - Admitted Since 5/11/2023    Coordination has not been started for this encounter.          Demographic Summary     Row Name 05/12/23 1305       General Information    Admission Type inpatient    Arrived From home;emergency department    Referral Source admission list    Reason for Consult discharge planning    Preferred Language English       Contact Information    Permission Granted to Share Info With family/designee    Contact Information Obtained for                Functional Status     Row Name 05/12/23 1312       Functional Status    Usual Activity Tolerance moderate    Current Activity Tolerance moderate       Assessment of Health Literacy    How often do you have someone help you read hospital materials? Occasionally    How often do you have problems learning about your medical condition because of difficulty understanding written information? Never    How often do you have a problem understanding  what is told to you about your medical condition? Never    How confident are you filling out medical forms by yourself? Quite a bit    Health Literacy Moderate       Functional Status, IADL    Medications independent    Meal Preparation independent    Housekeeping independent    Laundry independent    Shopping independent       Mental Status    General Appearance WDL WDL       Mental Status Summary    Recent Changes in Mental Status/Cognitive Functioning no changes               Psychosocial    No documentation.                Abuse/Neglect    No documentation.                Legal    No documentation.                Substance Abuse    No documentation.                Patient Forms    No documentation.                   Mary Frias RN

## 2023-05-13 ENCOUNTER — APPOINTMENT (OUTPATIENT)
Dept: CT IMAGING | Facility: HOSPITAL | Age: 68
DRG: 291 | End: 2023-05-13
Payer: MEDICARE

## 2023-05-13 LAB
ANION GAP SERPL CALCULATED.3IONS-SCNC: 8.5 MMOL/L (ref 5–15)
BASOPHILS # BLD AUTO: 0.02 10*3/MM3 (ref 0–0.2)
BASOPHILS NFR BLD AUTO: 0.3 % (ref 0–1.5)
BUN SERPL-MCNC: 32 MG/DL (ref 8–23)
BUN/CREAT SERPL: 15.5 (ref 7–25)
CALCIUM SPEC-SCNC: 8.5 MG/DL (ref 8.6–10.5)
CHLORIDE SERPL-SCNC: 102 MMOL/L (ref 98–107)
CO2 SERPL-SCNC: 24.5 MMOL/L (ref 22–29)
CREAT SERPL-MCNC: 2.06 MG/DL (ref 0.76–1.27)
DEPRECATED RDW RBC AUTO: 53.6 FL (ref 37–54)
EGFRCR SERPLBLD CKD-EPI 2021: 35.1 ML/MIN/1.73
EOSINOPHIL # BLD AUTO: 0.16 10*3/MM3 (ref 0–0.4)
EOSINOPHIL NFR BLD AUTO: 2.6 % (ref 0.3–6.2)
ERYTHROCYTE [DISTWIDTH] IN BLOOD BY AUTOMATED COUNT: 18.6 % (ref 12.3–15.4)
GLUCOSE BLDC GLUCOMTR-MCNC: 122 MG/DL (ref 70–99)
GLUCOSE BLDC GLUCOMTR-MCNC: 127 MG/DL (ref 70–99)
GLUCOSE BLDC GLUCOMTR-MCNC: 138 MG/DL (ref 70–99)
GLUCOSE BLDC GLUCOMTR-MCNC: 81 MG/DL (ref 70–99)
GLUCOSE SERPL-MCNC: 122 MG/DL (ref 65–99)
HCT VFR BLD AUTO: 24.1 % (ref 37.5–51)
HGB BLD-MCNC: 7.9 G/DL (ref 13–17.7)
IMM GRANULOCYTES # BLD AUTO: 0.02 10*3/MM3 (ref 0–0.05)
IMM GRANULOCYTES NFR BLD AUTO: 0.3 % (ref 0–0.5)
LYMPHOCYTES # BLD AUTO: 1.38 10*3/MM3 (ref 0.7–3.1)
LYMPHOCYTES NFR BLD AUTO: 22.8 % (ref 19.6–45.3)
MCH RBC QN AUTO: 26.2 PG (ref 26.6–33)
MCHC RBC AUTO-ENTMCNC: 32.8 G/DL (ref 31.5–35.7)
MCV RBC AUTO: 79.8 FL (ref 79–97)
MONOCYTES # BLD AUTO: 0.3 10*3/MM3 (ref 0.1–0.9)
MONOCYTES NFR BLD AUTO: 5 % (ref 5–12)
NEUTROPHILS NFR BLD AUTO: 4.18 10*3/MM3 (ref 1.7–7)
NEUTROPHILS NFR BLD AUTO: 69 % (ref 42.7–76)
NRBC BLD AUTO-RTO: 0 /100 WBC (ref 0–0.2)
PLATELET # BLD AUTO: 222 10*3/MM3 (ref 140–450)
PMV BLD AUTO: 9.3 FL (ref 6–12)
POTASSIUM SERPL-SCNC: 4.1 MMOL/L (ref 3.5–5.2)
RBC # BLD AUTO: 3.02 10*6/MM3 (ref 4.14–5.8)
SODIUM SERPL-SCNC: 135 MMOL/L (ref 136–145)
WBC NRBC COR # BLD: 6.06 10*3/MM3 (ref 3.4–10.8)

## 2023-05-13 PROCEDURE — 70450 CT HEAD/BRAIN W/O DYE: CPT

## 2023-05-13 PROCEDURE — 94761 N-INVAS EAR/PLS OXIMETRY MLT: CPT

## 2023-05-13 PROCEDURE — 80048 BASIC METABOLIC PNL TOTAL CA: CPT | Performed by: INTERNAL MEDICINE

## 2023-05-13 PROCEDURE — 63710000001 INSULIN LISPRO (HUMAN) PER 5 UNITS: Performed by: STUDENT IN AN ORGANIZED HEALTH CARE EDUCATION/TRAINING PROGRAM

## 2023-05-13 PROCEDURE — 99232 SBSQ HOSP IP/OBS MODERATE 35: CPT | Performed by: INTERNAL MEDICINE

## 2023-05-13 PROCEDURE — 85025 COMPLETE CBC W/AUTO DIFF WBC: CPT | Performed by: INTERNAL MEDICINE

## 2023-05-13 PROCEDURE — 94799 UNLISTED PULMONARY SVC/PX: CPT

## 2023-05-13 PROCEDURE — 82948 REAGENT STRIP/BLOOD GLUCOSE: CPT

## 2023-05-13 PROCEDURE — 63710000001 INSULIN DETEMIR PER 5 UNITS: Performed by: INTERNAL MEDICINE

## 2023-05-13 RX ORDER — CARVEDILOL 25 MG/1
25 TABLET ORAL 2 TIMES DAILY WITH MEALS
Status: DISCONTINUED | OUTPATIENT
Start: 2023-05-13 | End: 2023-05-13

## 2023-05-13 RX ORDER — TRAMADOL HYDROCHLORIDE 50 MG/1
50 TABLET ORAL EVERY 6 HOURS PRN
Status: DISCONTINUED | OUTPATIENT
Start: 2023-05-13 | End: 2023-05-14 | Stop reason: HOSPADM

## 2023-05-13 RX ORDER — CARVEDILOL 25 MG/1
25 TABLET ORAL 2 TIMES DAILY WITH MEALS
Status: DISCONTINUED | OUTPATIENT
Start: 2023-05-13 | End: 2023-05-14 | Stop reason: HOSPADM

## 2023-05-13 RX ORDER — NICOTINE POLACRILEX 4 MG
15 LOZENGE BUCCAL
Status: DISCONTINUED | OUTPATIENT
Start: 2023-05-13 | End: 2023-05-14 | Stop reason: HOSPADM

## 2023-05-13 RX ORDER — DEXTROSE MONOHYDRATE 25 G/50ML
25 INJECTION, SOLUTION INTRAVENOUS
Status: DISCONTINUED | OUTPATIENT
Start: 2023-05-13 | End: 2023-05-14 | Stop reason: HOSPADM

## 2023-05-13 RX ORDER — INSULIN LISPRO 100 [IU]/ML
2-9 INJECTION, SOLUTION INTRAVENOUS; SUBCUTANEOUS
Status: DISCONTINUED | OUTPATIENT
Start: 2023-05-13 | End: 2023-05-14 | Stop reason: HOSPADM

## 2023-05-13 RX ADMIN — INSULIN LISPRO 1 UNITS: 100 INJECTION, SOLUTION INTRAVENOUS; SUBCUTANEOUS at 12:47

## 2023-05-13 RX ADMIN — TRAMADOL HYDROCHLORIDE 50 MG: 50 TABLET, COATED ORAL at 17:47

## 2023-05-13 RX ADMIN — ACETAMINOPHEN 650 MG: 325 TABLET ORAL at 09:27

## 2023-05-13 RX ADMIN — INSULIN DETEMIR 10 UNITS: 100 INJECTION, SOLUTION SUBCUTANEOUS at 21:17

## 2023-05-13 RX ADMIN — CARVEDILOL 25 MG: 25 TABLET, FILM COATED ORAL at 17:46

## 2023-05-13 RX ADMIN — TRAMADOL HYDROCHLORIDE 50 MG: 50 TABLET, COATED ORAL at 11:00

## 2023-05-13 RX ADMIN — ALLOPURINOL 100 MG: 100 TABLET ORAL at 09:26

## 2023-05-13 RX ADMIN — APIXABAN 5 MG: 5 TABLET, FILM COATED ORAL at 20:20

## 2023-05-13 RX ADMIN — FUROSEMIDE 40 MG: 40 TABLET ORAL at 09:26

## 2023-05-13 RX ADMIN — SPIRONOLACTONE 25 MG: 25 TABLET ORAL at 09:26

## 2023-05-13 RX ADMIN — ATORVASTATIN CALCIUM 80 MG: 40 TABLET, FILM COATED ORAL at 20:20

## 2023-05-13 RX ADMIN — Medication 10 ML: at 09:27

## 2023-05-13 RX ADMIN — AMLODIPINE BESYLATE 10 MG: 5 TABLET ORAL at 09:26

## 2023-05-13 RX ADMIN — CARVEDILOL 25 MG: 25 TABLET, FILM COATED ORAL at 11:00

## 2023-05-13 RX ADMIN — APIXABAN 5 MG: 5 TABLET, FILM COATED ORAL at 09:26

## 2023-05-13 RX ADMIN — Medication 10 ML: at 20:21

## 2023-05-13 RX ADMIN — ACETAMINOPHEN 650 MG: 325 TABLET ORAL at 02:38

## 2023-05-13 NOTE — PLAN OF CARE
Goal Outcome Evaluation:      Nitro drip infusing at 10mc/kg/min. Tylenol given for headache. Pt stated he is leaving today regardless if he is discharged or not.

## 2023-05-13 NOTE — PLAN OF CARE
Goal Outcome Evaluation:   Patient no longer on nitro gtt. VSS. No signs of distress noted. Call light in reach. Medicated patient for pain during shift, see MAR.

## 2023-05-13 NOTE — PROGRESS NOTES
[facility]   Hospitalist Progress Note  Date: 2023  Patient Name: Lyle Lozano  : 1955  MRN: [id number]  Date of admission: 2023      Subjective   Subjective     Chief Complaint: SOB    Summary: Lyle Lozano is a 67 y.o. male with history of heart failure with preserved EF, hypertension, hyperlipidemia, uncontrolled type 2 diabetes, chronic kidney disease, gout arthritis and bilateral DVT who presented with shortness of breath for the past couple of days.  Of note patient was discharged  and was admitted for heart failure exacerbation.  He was also admitted on  for heart exacerbation as well.  Patient states that for the past couple of days he been noticing bilateral edema with increasing orthopnea.  He been gaining weight as well.  Patient states he is compliant his medications.  He is on Eliquis for his DVT.  On presentation patient was satting in the low 90s on room air and was hypertensive in the 180s.  Patient was also tachycardic.  Given his history of bilateral DVT patient had a CT scan to rule out PE.  CT showed pleural effusion.  CT did not show any pulmonary embolism.  Patient was started on nitroglycerin drip.  Last echo was on  that showed an EF of 50%.     His proBNP was over 20,000.  First troponin was 110-second 1 was 99.  His hemoglobin stable at 9.2.  His creatinine is stable at 2.01.    Interval Followup: Patient seen and examined.  No acute events.  Requesting dc at this time.  No CP or SOB    Review of Systems   All systems were reviewed and negative except for: SOB    Objective   Objective     Vitals:   Temp:  [97.2 °F (36.2 °C)-97.9 °F (36.6 °C)] 97.5 °F (36.4 °C)  Heart Rate:  [70-98] 73  Resp:  [18] 18  BP: (107-168)/() 137/90  Flow (L/min):  [2] 2  Physical Exam    Constitutional: Awake, alert, no acute distress   Eyes: Pupils equal, sclerae anicteric, no conjunctival injection   HENT: NCAT, mucous membranes  moist   Neck: Supple, no thyromegaly, no lymphadenopathy, trachea midline   Respiratory: Clear to auscultation bilaterally, nonlabored respirations    Cardiovascular: RRR, no murmurs, rubs, or gallops, palpable pedal pulses bilaterally   Gastrointestinal: Positive bowel sounds, soft, nontender, nondistended   Musculoskeletal: No bilateral ankle edema, no clubbing or cyanosis to extremities   Psychiatric: Appropriate affect, cooperative   Neurologic: Oriented x 3, strength symmetric in all extremities, Cranial Nerves grossly intact to confrontation, speech clear   Skin: No rashes     Result Review    Result Review:  I have personally reviewed the results from the time of this admission to 5/13/2023 08:19 EDT and agree with these findings:  [x]  Laboratory  []  Microbiology  [x]  Radiology  []  EKG/Telemetry   []  Cardiology/Vascular   []  Pathology  []  Old records  []  Other:    Assessment & Plan   Assessment / Plan     Assessment/Plan:  # CHFpEF  - EF 50%  - received IV lasix, transition to PO lasix and spironolactone  - monitor Is and Os  - supp O2 if needed    # HTN urgency  - on nitro gtt  - wean as able  - resume home meds and increase BB    # Elevated trop  - consistent with previous  - no cp  - flat trend  - monitor on tele    # IDDM  - insulin sliding scale  - monitor and titrate as needed  - ADA diet when taking PO    # PAF???  - seen on tele but not caught on ekg  - outpatient follow up  - continue eliquis and BB       Discussed plan with RN.    DVT prophylaxis:  Medical DVT prophylaxis orders are present.    CODE STATUS:   Code Status (Patient has no pulse and is not breathing): CPR (Attempt to Resuscitate)  Medical Interventions (Patient has pulse or is breathing): Full Support  Release to patient: Routine Release  Full      Electronically signed by Grady Rodriguez Jr, MD, 05/12/23, 8:23 AM EDT.

## 2023-05-14 ENCOUNTER — READMISSION MANAGEMENT (OUTPATIENT)
Dept: CALL CENTER | Facility: HOSPITAL | Age: 68
End: 2023-05-14
Payer: MEDICARE

## 2023-05-14 VITALS
OXYGEN SATURATION: 97 % | DIASTOLIC BLOOD PRESSURE: 73 MMHG | HEART RATE: 77 BPM | TEMPERATURE: 98.2 F | WEIGHT: 198.85 LBS | HEIGHT: 66 IN | BODY MASS INDEX: 31.96 KG/M2 | RESPIRATION RATE: 18 BRPM | SYSTOLIC BLOOD PRESSURE: 132 MMHG

## 2023-05-14 PROBLEM — I50.21 ACUTE HFREF (HEART FAILURE WITH REDUCED EJECTION FRACTION): Status: ACTIVE | Noted: 2023-05-14

## 2023-05-14 PROBLEM — I50.22 CHRONIC SYSTOLIC HEART FAILURE: Status: ACTIVE | Noted: 2023-05-14

## 2023-05-14 PROBLEM — J81.0 ACUTE PULMONARY EDEMA: Status: RESOLVED | Noted: 2023-05-11 | Resolved: 2023-05-14

## 2023-05-14 PROBLEM — I50.21 ACUTE SYSTOLIC HEART FAILURE: Status: ACTIVE | Noted: 2023-05-14

## 2023-05-14 LAB
ANION GAP SERPL CALCULATED.3IONS-SCNC: 8.6 MMOL/L (ref 5–15)
BASOPHILS # BLD AUTO: 0.03 10*3/MM3 (ref 0–0.2)
BASOPHILS NFR BLD AUTO: 0.5 % (ref 0–1.5)
BUN SERPL-MCNC: 29 MG/DL (ref 8–23)
BUN/CREAT SERPL: 15.5 (ref 7–25)
CALCIUM SPEC-SCNC: 8.5 MG/DL (ref 8.6–10.5)
CHLORIDE SERPL-SCNC: 102 MMOL/L (ref 98–107)
CO2 SERPL-SCNC: 25.4 MMOL/L (ref 22–29)
CREAT SERPL-MCNC: 1.87 MG/DL (ref 0.76–1.27)
DEPRECATED RDW RBC AUTO: 54.2 FL (ref 37–54)
EGFRCR SERPLBLD CKD-EPI 2021: 39.4 ML/MIN/1.73
EOSINOPHIL # BLD AUTO: 0.19 10*3/MM3 (ref 0–0.4)
EOSINOPHIL NFR BLD AUTO: 3.1 % (ref 0.3–6.2)
ERYTHROCYTE [DISTWIDTH] IN BLOOD BY AUTOMATED COUNT: 18.8 % (ref 12.3–15.4)
GLUCOSE BLDC GLUCOMTR-MCNC: 143 MG/DL (ref 70–99)
GLUCOSE BLDC GLUCOMTR-MCNC: 77 MG/DL (ref 70–99)
GLUCOSE SERPL-MCNC: 88 MG/DL (ref 65–99)
HCT VFR BLD AUTO: 28.2 % (ref 37.5–51)
HGB BLD-MCNC: 9 G/DL (ref 13–17.7)
IMM GRANULOCYTES # BLD AUTO: 0.02 10*3/MM3 (ref 0–0.05)
IMM GRANULOCYTES NFR BLD AUTO: 0.3 % (ref 0–0.5)
LYMPHOCYTES # BLD AUTO: 1.95 10*3/MM3 (ref 0.7–3.1)
LYMPHOCYTES NFR BLD AUTO: 31.4 % (ref 19.6–45.3)
MCH RBC QN AUTO: 25.8 PG (ref 26.6–33)
MCHC RBC AUTO-ENTMCNC: 31.9 G/DL (ref 31.5–35.7)
MCV RBC AUTO: 80.8 FL (ref 79–97)
MONOCYTES # BLD AUTO: 0.45 10*3/MM3 (ref 0.1–0.9)
MONOCYTES NFR BLD AUTO: 7.2 % (ref 5–12)
NEUTROPHILS NFR BLD AUTO: 3.58 10*3/MM3 (ref 1.7–7)
NEUTROPHILS NFR BLD AUTO: 57.5 % (ref 42.7–76)
NRBC BLD AUTO-RTO: 0 /100 WBC (ref 0–0.2)
PLATELET # BLD AUTO: 257 10*3/MM3 (ref 140–450)
PMV BLD AUTO: 9.1 FL (ref 6–12)
POTASSIUM SERPL-SCNC: 4.2 MMOL/L (ref 3.5–5.2)
RBC # BLD AUTO: 3.49 10*6/MM3 (ref 4.14–5.8)
SODIUM SERPL-SCNC: 136 MMOL/L (ref 136–145)
WBC NRBC COR # BLD: 6.22 10*3/MM3 (ref 3.4–10.8)

## 2023-05-14 PROCEDURE — 99239 HOSP IP/OBS DSCHRG MGMT >30: CPT | Performed by: INTERNAL MEDICINE

## 2023-05-14 PROCEDURE — 94799 UNLISTED PULMONARY SVC/PX: CPT

## 2023-05-14 PROCEDURE — 94761 N-INVAS EAR/PLS OXIMETRY MLT: CPT

## 2023-05-14 PROCEDURE — 82948 REAGENT STRIP/BLOOD GLUCOSE: CPT

## 2023-05-14 PROCEDURE — 80048 BASIC METABOLIC PNL TOTAL CA: CPT | Performed by: INTERNAL MEDICINE

## 2023-05-14 PROCEDURE — 85025 COMPLETE CBC W/AUTO DIFF WBC: CPT | Performed by: INTERNAL MEDICINE

## 2023-05-14 RX ORDER — CARVEDILOL 25 MG/1
25 TABLET ORAL 2 TIMES DAILY WITH MEALS
Qty: 60 TABLET | Refills: 0 | Status: SHIPPED | OUTPATIENT
Start: 2023-05-14 | End: 2023-06-13

## 2023-05-14 RX ADMIN — CARVEDILOL 25 MG: 25 TABLET, FILM COATED ORAL at 08:19

## 2023-05-14 RX ADMIN — ALLOPURINOL 100 MG: 100 TABLET ORAL at 08:19

## 2023-05-14 RX ADMIN — APIXABAN 5 MG: 5 TABLET, FILM COATED ORAL at 08:19

## 2023-05-14 RX ADMIN — AMLODIPINE BESYLATE 10 MG: 5 TABLET ORAL at 08:19

## 2023-05-14 RX ADMIN — SPIRONOLACTONE 25 MG: 25 TABLET ORAL at 08:19

## 2023-05-14 RX ADMIN — FUROSEMIDE 40 MG: 40 TABLET ORAL at 08:19

## 2023-05-14 RX ADMIN — Medication 10 ML: at 08:20

## 2023-05-14 NOTE — DISCHARGE SUMMARY
[facility]         HOSPITALIST  DISCHARGE SUMMARY    Patient Name: Lyle Lozano  : 1955  MRN: [id number]    Date of Admission: 2023  Date of Discharge: 23  Primary Care Physician: Heidi Villa APRN    Consults       Date and Time Order Name Status Description    2023  6:07 PM Inpatient Hospitalist Consult      2023  7:47 AM Inpatient Neurology Consult Stroke Completed             Active and Resolved Hospital Problems:  Active Hospital Problems    Diagnosis POA                     Resolved Hospital Problems    Diagnosis POA    Acute pulmonary edema [J81.0] Yes   Acute on Chronic Heart Failure with preserved EF/Diastolic heart failure    Hospital Course     Hospital Course:  Lyle Lozano is a 67 y.o. male with history of heart failure with preserved EF, hypertension, hyperlipidemia, uncontrolled type 2 diabetes, chronic kidney disease, gout arthritis and bilateral DVT who presented with shortness of breath for the past couple of days.  Of note patient was discharged  and was admitted for heart failure exacerbation.  He was also admitted on  for heart exacerbation as well.  Patient states that for the past couple of days he been noticing bilateral edema with increasing orthopnea.  He been gaining weight as well.  Patient states he is compliant his medications.  He is on Eliquis for his DVT.  On presentation patient was satting in the low 90s on room air and was hypertensive in the 180s.  Patient was also tachycardic.  Given his history of bilateral DVT patient had a CT scan to rule out PE.  CT showed pleural effusion.  CT did not show any pulmonary embolism.  Patient was started on nitroglycerin drip.  Last echo was on  that showed an EF of 50%.     His proBNP was over 20,000.  First troponin was 110-second 1 was 99.  His hemoglobin stable at 9.2.  His creatinine is stable at 2.01.    Patient admitted and received IV  diuresis.  Tolerating RA since day 1 of hospitalization.  Medications adjusted and BP has remained stable off nitro gtt X 24 hours.  Patient noted to have 2 short runs of NSVT while being monitored (4-6 beats) and questionable very short run of irregular heart beat when he comes out of the NSVT that may be afib but too short to get ekg or really evaluate from tele strip.  I have deducated patient and recommended he follow up with cardiologist soon upon discharge.  He is requesting discharge this AM however and he agrees to short course follow up.  Pplan discussed at length and patient is in agreement.        DISCHARGE Follow Up Recommendations for labs and diagnostics: PCP and cardio in 1 week      Day of Discharge     Vital Signs:  Temp:  [97.3 °F (36.3 °C)-98.2 °F (36.8 °C)] 98.2 °F (36.8 °C)  Heart Rate:  [64-84] 77  Resp:  [16-18] 18  BP: (123-163)/() 132/73  Flow (L/min):  [2] 2  GENERAL:  age appropriate, NAD  EARS,NOSE, MOUTH, THROAT:  MMM, hearing intact  EYES: PERRL, EOMI  CV:  NL S1/S2  RESPIRATORY:  CTAB no w/c/r  GI:  S/NT/ND +BS  SKIN: No rash or lesions. No nodules.  INT: No edema. No joint deformity.  PSYCH:  Normal affect, A+O x 3  NEURO: Alert and oriented, grossly nonfocal.        Discharge Details        Discharge Medications        Changes to Medications        Instructions Start Date   carvedilol 25 MG tablet  Commonly known as: COREG  What changed:   medication strength  how much to take   25 mg, Oral, 2 Times Daily With Meals             Continue These Medications        Instructions Start Date   amLODIPine 5 MG tablet  Commonly known as: NORVASC   5 mg, Oral, Daily      apixaban 5 MG tablet tablet  Commonly known as: ELIQUIS   5 mg, Oral, Every 12 Hours Scheduled      atorvastatin 80 MG tablet  Commonly known as: LIPITOR   80 mg, Oral, Nightly      cholecalciferol 25 MCG (1000 UT) tablet  Commonly known as: VITAMIN D3   1,000 Units, Oral, Daily      cyanocobalamin 500 MCG  tablet  Commonly known as: VITAMIN B-12   500 mcg, Oral, Daily      Diclofenac Sodium 1 % gel gel  Commonly known as: VOLTAREN   2 g, Topical, 2 Times Daily PRN      famotidine 20 MG tablet  Commonly known as: PEPCID   20 mg, Oral, 2 Times Daily Before Meals      furosemide 40 MG tablet  Commonly known as: LASIX   40 mg, Oral, Daily      insulin detemir 100 UNIT/ML injection  Commonly known as: LEVEMIR   10 Units, Subcutaneous, Daily      Insulin Lispro 100 UNIT/ML injection  Commonly known as: humaLOG   2-9 Units, Subcutaneous, 3 Times Daily With Meals      spironolactone 25 MG tablet  Commonly known as: ALDACTONE   25 mg, Oral, Daily               No Known Allergies    Discharge Disposition:  Home or Self Care    Diet:  Hospital:  Diet Order   Procedures    Diet: Cardiac Diets, Diabetic Diets; Healthy Heart (2-3 Na+); Consistent Carbohydrate; Texture: Regular Texture (IDDSI 7); Fluid Consistency: Thin (IDDSI 0)       Discharge Activity: as tolerated      CODE STATUS:  Code Status and Medical Interventions:   Ordered at: 05/12/23 0823     Code Status (Patient has no pulse and is not breathing):    CPR (Attempt to Resuscitate)     Medical Interventions (Patient has pulse or is breathing):    Full Support     Release to patient:    Routine Release         No future appointments.        Pertinent  and/or Most Recent Results     PROCEDURES:   none    LAB RESULTS:      Lab 05/14/23 0425 05/13/23 0431 05/12/23 0400 05/11/23  1345   WBC 6.22 6.06 8.50 8.29   HEMOGLOBIN 9.0* 7.9* 7.8* 9.2*   HEMATOCRIT 28.2* 24.1* 24.1* 28.3*   PLATELETS 257 222 221 239   NEUTROS ABS 3.58 4.18  --  5.47   IMMATURE GRANS (ABS) 0.02 0.02  --  0.02   LYMPHS ABS 1.95 1.38  --  2.31   MONOS ABS 0.45 0.30  --  0.37   EOS ABS 0.19 0.16  --  0.09   MCV 80.8 79.8 79.5 79.3         Lab 05/14/23 0425 05/13/23 0431 05/12/23  0400 05/11/23  1345   SODIUM 136 135* 134* 134*   POTASSIUM 4.2 4.1 4.0 4.4   CHLORIDE 102 102 102 100   CO2 25.4 24.5 23.5  21.6*   ANION GAP 8.6 8.5 8.5 12.4   BUN 29* 32* 30* 32*   CREATININE 1.87* 2.06* 1.89* 2.01*   EGFR 39.4* 35.1* 38.9* 36.1*   GLUCOSE 88 122* 210* 322*   CALCIUM 8.5* 8.5* 8.7 8.6   MAGNESIUM  --   --  1.8  --    PHOSPHORUS  --   --  3.4  --    TSH  --   --  3.300  --          Lab 05/11/23  1345   TOTAL PROTEIN 6.9   ALBUMIN 2.8*   GLOBULIN 4.1   ALT (SGPT) 24   AST (SGOT) 20   BILIRUBIN 0.4   ALK PHOS 134*         Lab 05/11/23  1636 05/11/23  1345   PROBNP  --  23,550.0*   HSTROP T 99* 110*                 Brief Urine Lab Results  (Last result in the past 365 days)        Color   Clarity   Blood   Leuk Est   Nitrite   Protein   CREAT   Urine HCG        04/07/23 1611 Yellow   Clear   Moderate (2+)   Negative   Negative   >=300 mg/dL (3+)                 Microbiology Results (last 10 days)       ** No results found for the last 240 hours. **            CT Head Without Contrast    Result Date: 5/13/2023  Impression:   1. Continued evolution of subacute infarct left frontal lobe.  No acute abnormality noted     JUAN JOSE GILLETTE MD       Electronically Signed and Approved By: JUAN JOSE GILLETTE MD on 5/13/2023 at 12:25             CT Head Without Contrast    Result Date: 4/15/2023  Impression:   Evolving left frontal subacute hemorrhage is noted.  No acute intracranial hemorrhage is identified by nonenhanced head CT.  No new acute infarct is suggested.  No midline shift or acute intracranial herniation syndrome.     Please note that portions of this note were completed with a voice recognition program.  CHAU BERMUDEZ JR, MD       Electronically Signed and Approved By: CHAU BERMUDEZ JR, MD on 4/15/2023 at 0:43              XR Chest 1 View    Result Date: 5/11/2023  Impression:   1. Dependent opacities favored to represent atelectasis.  Pneumonia cannot be excluded.       JUAN JOSE GILLETTE MD       Electronically Signed and Approved By: JUAN JOSE GILLETTE MD on 5/11/2023 at 14:42               Results for orders placed  during the hospital encounter of 04/07/23    Duplex Venous Lower Extremity - Right CV-READ    Interpretation Summary    Acute right lower extremity deep vein thrombosis noted in the gastrocnemius.    All other right sided veins appeared normal.    This is a new finding in comparison to study dated 1/25/2023.      Results for orders placed during the hospital encounter of 04/07/23    Duplex Venous Lower Extremity - Right CV-READ    Interpretation Summary    Acute right lower extremity deep vein thrombosis noted in the gastrocnemius.    All other right sided veins appeared normal.    This is a new finding in comparison to study dated 1/25/2023.      Results for orders placed during the hospital encounter of 04/07/23    Adult Transthoracic Echo Complete W/ Cont if Necessary Per Protocol    Interpretation Summary  Technically limited study.  Borderline normal left ventricular systolic function ejection fraction around 50%.  Trace MR and trace TR      Labs Pending at Discharge:        Time spent on Discharge including face to face service:  35 minutes    Electronically signed by Grady Rodriguez Jr, MD, 05/14/23, 8:13 AM EDT.

## 2023-05-14 NOTE — OUTREACH NOTE
Prep Survey    Flowsheet Row Responses   [facility] facility patient discharged from? [facility]   Is LACE score < 7 ? No   Eligibility [facility]   Hospital [facility]   Date of Admission 05/11/23   Date of Discharge 05/14/23   Discharge Disposition Home or Self Care   Discharge diagnosis A/C CHF, acute pulmonary edema   Does the patient have one of the following disease processes/diagnoses(primary or secondary)? CHF   Does the patient have Home health ordered? No   Is there a DME ordered? No   Prep survey completed? Yes          Mine ALDRIDGE - Registered Nurse

## 2023-05-14 NOTE — PLAN OF CARE
Problem: Adult Inpatient Plan of Care  Goal: Plan of Care Review  Outcome: Ongoing, Progressing  Goal: Patient-Specific Goal (Individualized)  Outcome: Ongoing, Progressing  Goal: Absence of Hospital-Acquired Illness or Injury  Outcome: Ongoing, Progressing  Goal: Optimal Comfort and Wellbeing  Outcome: Ongoing, Progressing  Goal: Readiness for Transition of Care  Outcome: Ongoing, Progressing     Problem: Adjustment to Illness (Heart Failure)  Goal: Optimal Coping  Outcome: Ongoing, Progressing     Problem: Cardiac Output Decreased (Heart Failure)  Goal: Optimal Cardiac Output  Outcome: Ongoing, Progressing     Problem: Dysrhythmia (Heart Failure)  Goal: Stable Heart Rate and Rhythm  Outcome: Ongoing, Progressing     Problem: Fluid Imbalance (Heart Failure)  Goal: Fluid Balance  Outcome: Ongoing, Progressing     Problem: Functional Ability Impaired (Heart Failure)  Goal: Optimal Functional Ability  Outcome: Ongoing, Progressing     Problem: Oral Intake Inadequate (Heart Failure)  Goal: Optimal Nutrition Intake  Outcome: Ongoing, Progressing     Problem: Respiratory Compromise (Heart Failure)  Goal: Effective Oxygenation and Ventilation  Outcome: Ongoing, Progressing     Problem: Sleep Disordered Breathing (Heart Failure)  Goal: Effective Breathing Pattern During Sleep  Outcome: Ongoing, Progressing     Problem: Asthma Comorbidity  Goal: Maintenance of Asthma Control  Outcome: Ongoing, Progressing     Problem: Behavioral Health Comorbidity  Goal: Maintenance of Behavioral Health Symptom Control  Outcome: Ongoing, Progressing     Problem: COPD (Chronic Obstructive Pulmonary Disease) Comorbidity  Goal: Maintenance of COPD Symptom Control  Outcome: Ongoing, Progressing     Problem: Diabetes Comorbidity  Goal: Blood Glucose Level Within Targeted Range  Outcome: Ongoing, Progressing     Problem: Heart Failure Comorbidity  Goal: Maintenance of Heart Failure Symptom Control  Outcome: Ongoing, Progressing     Problem:  Hypertension Comorbidity  Goal: Blood Pressure in Desired Range  Outcome: Ongoing, Progressing     Problem: Obstructive Sleep Apnea Risk or Actual Comorbidity Management  Goal: Unobstructed Breathing During Sleep  Outcome: Ongoing, Progressing     Problem: Osteoarthritis Comorbidity  Goal: Maintenance of Osteoarthritis Symptom Control  Outcome: Ongoing, Progressing     Problem: Pain Chronic (Persistent) (Comorbidity Management)  Goal: Acceptable Pain Control and Functional Ability  Outcome: Ongoing, Progressing     Problem: Seizure Disorder Comorbidity  Goal: Maintenance of Seizure Control  Outcome: Ongoing, Progressing   Goal Outcome Evaluation:

## 2023-05-14 NOTE — PLAN OF CARE
Goal Outcome Evaluation:   Patient is being discharged to home. No complaints at this time. No signs of distress noted.

## 2023-05-15 ENCOUNTER — HOSPITAL ENCOUNTER (INPATIENT)
Facility: HOSPITAL | Age: 68
LOS: 4 days | Discharge: HOME | DRG: 291 | End: 2023-05-19
Attending: EMERGENCY MEDICINE | Admitting: INTERNAL MEDICINE
Payer: MEDICARE

## 2023-05-15 ENCOUNTER — TRANSITIONAL CARE MANAGEMENT TELEPHONE ENCOUNTER (OUTPATIENT)
Dept: CALL CENTER | Facility: HOSPITAL | Age: 68
End: 2023-05-15
Payer: MEDICARE

## 2023-05-15 ENCOUNTER — APPOINTMENT (OUTPATIENT)
Dept: GENERAL RADIOLOGY | Facility: HOSPITAL | Age: 68
DRG: 291 | End: 2023-05-15
Payer: MEDICARE

## 2023-05-15 DIAGNOSIS — N18.9 CHRONIC RENAL IMPAIRMENT, UNSPECIFIED CKD STAGE: ICD-10-CM

## 2023-05-15 DIAGNOSIS — J96.01 ACUTE RESPIRATORY FAILURE WITH HYPOXIA: Primary | ICD-10-CM

## 2023-05-15 DIAGNOSIS — E11.65 UNCONTROLLED TYPE 2 DIABETES MELLITUS WITH HYPERGLYCEMIA: ICD-10-CM

## 2023-05-15 DIAGNOSIS — J81.0 ACUTE PULMONARY EDEMA: ICD-10-CM

## 2023-05-15 DIAGNOSIS — R26.2 DIFFICULTY WALKING: ICD-10-CM

## 2023-05-15 DIAGNOSIS — R77.8 ELEVATED TROPONIN: ICD-10-CM

## 2023-05-15 DIAGNOSIS — I50.9 ACUTE ON CHRONIC CONGESTIVE HEART FAILURE, UNSPECIFIED HEART FAILURE TYPE: ICD-10-CM

## 2023-05-15 DIAGNOSIS — J90 PLEURAL EFFUSION: ICD-10-CM

## 2023-05-15 LAB
ALBUMIN SERPL-MCNC: 2.6 G/DL (ref 3.5–5.2)
ALBUMIN/GLOB SERPL: 0.7 G/DL
ALP SERPL-CCNC: 128 U/L (ref 39–117)
ALT SERPL W P-5'-P-CCNC: 15 U/L (ref 1–41)
ANION GAP SERPL CALCULATED.3IONS-SCNC: 8.8 MMOL/L (ref 5–15)
ARTERIAL PATENCY WRIST A: POSITIVE
AST SERPL-CCNC: 15 U/L (ref 1–40)
BASE EXCESS BLDA CALC-SCNC: -1.5 MMOL/L (ref -2–2)
BASOPHILS # BLD AUTO: 0.05 10*3/MM3 (ref 0–0.2)
BASOPHILS NFR BLD AUTO: 0.7 % (ref 0–1.5)
BDY SITE: ABNORMAL
BILIRUB SERPL-MCNC: 0.4 MG/DL (ref 0–1.2)
BUN SERPL-MCNC: 27 MG/DL (ref 8–23)
BUN/CREAT SERPL: 14.7 (ref 7–25)
CA-I BLDA-SCNC: 1.11 MMOL/L (ref 1.13–1.32)
CALCIUM SPEC-SCNC: 8.3 MG/DL (ref 8.6–10.5)
CHLORIDE BLDA-SCNC: 104 MMOL/L (ref 98–106)
CHLORIDE SERPL-SCNC: 103 MMOL/L (ref 98–107)
CO2 SERPL-SCNC: 21.2 MMOL/L (ref 22–29)
COHGB MFR BLD: 0.6 % (ref 0–1.5)
CREAT SERPL-MCNC: 1.84 MG/DL (ref 0.76–1.27)
DEPRECATED RDW RBC AUTO: 55.8 FL (ref 37–54)
EGFRCR SERPLBLD CKD-EPI 2021: 40.2 ML/MIN/1.73
EOSINOPHIL # BLD AUTO: 0.17 10*3/MM3 (ref 0–0.4)
EOSINOPHIL NFR BLD AUTO: 2.3 % (ref 0.3–6.2)
ERYTHROCYTE [DISTWIDTH] IN BLOOD BY AUTOMATED COUNT: 19.4 % (ref 12.3–15.4)
FHHB: 4.4 % (ref 0–5)
FLUAV AG NPH QL: NEGATIVE
FLUBV AG NPH QL IA: NEGATIVE
GAS FLOW AIRWAY: 2 LPM
GEN 5 2HR TROPONIN T REFLEX: 105 NG/L
GLOBULIN UR ELPH-MCNC: 4 GM/DL
GLUCOSE BLDA-MCNC: 283 MG/DL (ref 70–99)
GLUCOSE BLDC GLUCOMTR-MCNC: 221 MG/DL (ref 70–99)
GLUCOSE SERPL-MCNC: 256 MG/DL (ref 65–99)
HCO3 BLDA-SCNC: 22 MMOL/L (ref 22–26)
HCT VFR BLD AUTO: 27.3 % (ref 37.5–51)
HGB BLD-MCNC: 8.8 G/DL (ref 13–17.7)
HGB BLDA-MCNC: 9.2 G/DL (ref 13.8–16.4)
HOLD SPECIMEN: NORMAL
HOLD SPECIMEN: NORMAL
IMM GRANULOCYTES # BLD AUTO: 0.03 10*3/MM3 (ref 0–0.05)
IMM GRANULOCYTES NFR BLD AUTO: 0.4 % (ref 0–0.5)
INHALED O2 CONCENTRATION: 28 %
LACTATE BLDA-SCNC: 1.75 MMOL/L (ref 0.5–2)
LYMPHOCYTES # BLD AUTO: 1.88 10*3/MM3 (ref 0.7–3.1)
LYMPHOCYTES NFR BLD AUTO: 25.8 % (ref 19.6–45.3)
MCH RBC QN AUTO: 26.2 PG (ref 26.6–33)
MCHC RBC AUTO-ENTMCNC: 32.2 G/DL (ref 31.5–35.7)
MCV RBC AUTO: 81.3 FL (ref 79–97)
METHGB BLD QL: 0.2 % (ref 0–1.5)
MODALITY: ABNORMAL
MONOCYTES # BLD AUTO: 0.47 10*3/MM3 (ref 0.1–0.9)
MONOCYTES NFR BLD AUTO: 6.4 % (ref 5–12)
NEUTROPHILS NFR BLD AUTO: 4.7 10*3/MM3 (ref 1.7–7)
NEUTROPHILS NFR BLD AUTO: 64.4 % (ref 42.7–76)
NOTE: ABNORMAL
NRBC BLD AUTO-RTO: 0 /100 WBC (ref 0–0.2)
NT-PROBNP SERPL-MCNC: ABNORMAL PG/ML (ref 0–900)
OXYHGB MFR BLDV: 94.8 % (ref 94–99)
PCO2 BLDA: 32.1 MM HG (ref 35–45)
PH BLDA: 7.45 PH UNITS (ref 7.35–7.45)
PLATELET # BLD AUTO: 259 10*3/MM3 (ref 140–450)
PMV BLD AUTO: 9 FL (ref 6–12)
PO2 BLD: 302 MM[HG] (ref 0–500)
PO2 BLDA: 84.6 MM HG (ref 80–100)
POTASSIUM BLDA-SCNC: 4.09 MMOL/L (ref 3.5–5)
POTASSIUM SERPL-SCNC: 4.1 MMOL/L (ref 3.5–5.2)
PROT SERPL-MCNC: 6.6 G/DL (ref 6–8.5)
RBC # BLD AUTO: 3.36 10*6/MM3 (ref 4.14–5.8)
SAO2 % BLDCOA: 95.6 % (ref 95–99)
SODIUM BLDA-SCNC: 135.1 MMOL/L (ref 136–146)
SODIUM SERPL-SCNC: 133 MMOL/L (ref 136–145)
TROPONIN T DELTA: -25 NG/L
TROPONIN T SERPL HS-MCNC: 130 NG/L
WBC NRBC COR # BLD: 7.3 10*3/MM3 (ref 3.4–10.8)
WHOLE BLOOD HOLD COAG: NORMAL
WHOLE BLOOD HOLD SPECIMEN: NORMAL

## 2023-05-15 PROCEDURE — 83880 ASSAY OF NATRIURETIC PEPTIDE: CPT

## 2023-05-15 PROCEDURE — 99285 EMERGENCY DEPT VISIT HI MDM: CPT

## 2023-05-15 PROCEDURE — 63710000001 INSULIN LISPRO (HUMAN) PER 5 UNITS: Performed by: INTERNAL MEDICINE

## 2023-05-15 PROCEDURE — 84484 ASSAY OF TROPONIN QUANT: CPT

## 2023-05-15 PROCEDURE — 82375 ASSAY CARBOXYHB QUANT: CPT

## 2023-05-15 PROCEDURE — 36600 WITHDRAWAL OF ARTERIAL BLOOD: CPT

## 2023-05-15 PROCEDURE — 25010000002 FUROSEMIDE PER 20 MG: Performed by: EMERGENCY MEDICINE

## 2023-05-15 PROCEDURE — 71045 X-RAY EXAM CHEST 1 VIEW: CPT

## 2023-05-15 PROCEDURE — 84484 ASSAY OF TROPONIN QUANT: CPT | Performed by: EMERGENCY MEDICINE

## 2023-05-15 PROCEDURE — 80053 COMPREHEN METABOLIC PANEL: CPT

## 2023-05-15 PROCEDURE — 82805 BLOOD GASES W/O2 SATURATION: CPT

## 2023-05-15 PROCEDURE — 85025 COMPLETE CBC W/AUTO DIFF WBC: CPT

## 2023-05-15 PROCEDURE — 99223 1ST HOSP IP/OBS HIGH 75: CPT | Performed by: INTERNAL MEDICINE

## 2023-05-15 PROCEDURE — 82948 REAGENT STRIP/BLOOD GLUCOSE: CPT

## 2023-05-15 PROCEDURE — 36415 COLL VENOUS BLD VENIPUNCTURE: CPT

## 2023-05-15 PROCEDURE — 93005 ELECTROCARDIOGRAM TRACING: CPT

## 2023-05-15 PROCEDURE — 83050 HGB METHEMOGLOBIN QUAN: CPT

## 2023-05-15 PROCEDURE — 94799 UNLISTED PULMONARY SVC/PX: CPT

## 2023-05-15 PROCEDURE — 93005 ELECTROCARDIOGRAM TRACING: CPT | Performed by: EMERGENCY MEDICINE

## 2023-05-15 PROCEDURE — 87635 SARS-COV-2 COVID-19 AMP PRB: CPT | Performed by: INTERNAL MEDICINE

## 2023-05-15 PROCEDURE — 87804 INFLUENZA ASSAY W/OPTIC: CPT | Performed by: INTERNAL MEDICINE

## 2023-05-15 RX ORDER — DEXTROSE MONOHYDRATE 25 G/50ML
25 INJECTION, SOLUTION INTRAVENOUS
Status: DISCONTINUED | OUTPATIENT
Start: 2023-05-15 | End: 2023-05-19 | Stop reason: HOSPADM

## 2023-05-15 RX ORDER — SODIUM CHLORIDE 9 MG/ML
40 INJECTION, SOLUTION INTRAVENOUS AS NEEDED
Status: DISCONTINUED | OUTPATIENT
Start: 2023-05-15 | End: 2023-05-19 | Stop reason: HOSPADM

## 2023-05-15 RX ORDER — SODIUM CHLORIDE 0.9 % (FLUSH) 0.9 %
10 SYRINGE (ML) INJECTION EVERY 12 HOURS SCHEDULED
Status: DISCONTINUED | OUTPATIENT
Start: 2023-05-15 | End: 2023-05-19 | Stop reason: HOSPADM

## 2023-05-15 RX ORDER — INSULIN LISPRO 100 [IU]/ML
2-7 INJECTION, SOLUTION INTRAVENOUS; SUBCUTANEOUS
Status: DISCONTINUED | OUTPATIENT
Start: 2023-05-15 | End: 2023-05-19 | Stop reason: HOSPADM

## 2023-05-15 RX ORDER — IBUPROFEN 600 MG/1
1 TABLET ORAL
Status: DISCONTINUED | OUTPATIENT
Start: 2023-05-15 | End: 2023-05-19 | Stop reason: HOSPADM

## 2023-05-15 RX ORDER — FUROSEMIDE 10 MG/ML
80 INJECTION INTRAMUSCULAR; INTRAVENOUS ONCE
Status: COMPLETED | OUTPATIENT
Start: 2023-05-15 | End: 2023-05-15

## 2023-05-15 RX ORDER — SODIUM CHLORIDE 0.9 % (FLUSH) 0.9 %
10 SYRINGE (ML) INJECTION AS NEEDED
Status: DISCONTINUED | OUTPATIENT
Start: 2023-05-15 | End: 2023-05-19 | Stop reason: HOSPADM

## 2023-05-15 RX ORDER — CARVEDILOL 12.5 MG/1
25 TABLET ORAL 2 TIMES DAILY WITH MEALS
Status: DISCONTINUED | OUTPATIENT
Start: 2023-05-15 | End: 2023-05-19 | Stop reason: HOSPADM

## 2023-05-15 RX ORDER — ALLOPURINOL 100 MG/1
100 TABLET ORAL DAILY
Status: DISCONTINUED | OUTPATIENT
Start: 2023-05-16 | End: 2023-05-19 | Stop reason: HOSPADM

## 2023-05-15 RX ORDER — NICOTINE POLACRILEX 4 MG
15 LOZENGE BUCCAL
Status: DISCONTINUED | OUTPATIENT
Start: 2023-05-15 | End: 2023-05-19 | Stop reason: HOSPADM

## 2023-05-15 RX ORDER — FUROSEMIDE 10 MG/ML
40 INJECTION INTRAMUSCULAR; INTRAVENOUS
Status: DISCONTINUED | OUTPATIENT
Start: 2023-05-16 | End: 2023-05-19 | Stop reason: HOSPADM

## 2023-05-15 RX ORDER — SPIRONOLACTONE 25 MG/1
25 TABLET ORAL DAILY
Status: DISCONTINUED | OUTPATIENT
Start: 2023-05-16 | End: 2023-05-19 | Stop reason: HOSPADM

## 2023-05-15 RX ORDER — ACETAMINOPHEN 325 MG/1
650 TABLET ORAL EVERY 4 HOURS PRN
Status: DISCONTINUED | OUTPATIENT
Start: 2023-05-15 | End: 2023-05-19 | Stop reason: HOSPADM

## 2023-05-15 RX ORDER — AMLODIPINE BESYLATE 5 MG/1
5 TABLET ORAL DAILY
Status: DISCONTINUED | OUTPATIENT
Start: 2023-05-16 | End: 2023-05-19 | Stop reason: HOSPADM

## 2023-05-15 RX ORDER — ATORVASTATIN CALCIUM 40 MG/1
80 TABLET, FILM COATED ORAL NIGHTLY
Status: DISCONTINUED | OUTPATIENT
Start: 2023-05-15 | End: 2023-05-19 | Stop reason: HOSPADM

## 2023-05-15 RX ADMIN — APIXABAN 5 MG: 5 TABLET, FILM COATED ORAL at 21:24

## 2023-05-15 RX ADMIN — ATORVASTATIN CALCIUM 80 MG: 40 TABLET, FILM COATED ORAL at 21:24

## 2023-05-15 RX ADMIN — FUROSEMIDE 80 MG: 10 INJECTION, SOLUTION INTRAMUSCULAR; INTRAVENOUS at 16:35

## 2023-05-15 RX ADMIN — CARVEDILOL 25 MG: 12.5 TABLET, FILM COATED ORAL at 21:24

## 2023-05-15 RX ADMIN — INSULIN LISPRO 3 UNITS: 100 INJECTION, SOLUTION INTRAVENOUS; SUBCUTANEOUS at 17:59

## 2023-05-15 NOTE — H&P
[facility]  Date: 5/15/2023   Patient Name: Lyle Lozano  : 1955  MRN: [id number]  Primary Care Physician:  Heidi Villa, HUMA  Date of admission: 5/15/2023    Subjective   Subjective     Chief Complaint: Shortness of breath    HPI:    Lyle Lozano is a 67 y.o. male past medical history of CKD stage IIIb with baseline creatinine 1.8-2.2, heart failure with preserved ejection fraction EF of 50%, type 2 diabetes, hypertension, dyslipidemia, gout, and obstructive sleep apnea who presents with shortness of breath    Patient was discharged home and he is not sure if he took his Lasix or not.  He states that he started having shortness of breath.  No chest pain.  No fevers.  No chills.  No cough little bit of lower extremity swelling.  Since he was not getting better, he came the ER for further evaluation.    In the emergency department the patient's vital signs are as follows: Temperature is 98.3, pulse 77, blood pressure 144/85, satting 88% on room air and 95% on 2 L.  CBC shows a hemoglobin of 8.8 which is close to his baseline.  CMP shows a glucose of 256, sodium 133, bicarb of 21.2 and a creatinine of 1.84 which is close to his baseline.  BNP is 10,000 which is improved from when he was noted previously when it was 20,000.  Troponin has been anywhere from 100-1 20 over the last 2 months and currently is 130.  No chest pain.  EKG has no changes.  Chest x-ray remains abnormal as recently noted with findings could relate to underlying interstitial process and bibasilar effusions.  Patient be admitted to hospital for acute heart failure exacerbation causing acute hypoxic respiratory failure.    All systems reviewed abnormal as noted above      Personal History     Past Medical History:  CKD stage IIIb with baseline creatinine 1.8-2.2  Heart failure with preserved ejection fraction with EF of 50%  Type 2 diabetes  Hypertension  Dyslipidemia,  gout  Obstructive sleep apnea    Past Surgical History:  Cyst removal    Family History:   Diabetes and stroke    Social History:   Former cigarette smoker  No alcohol use    Home Medications:  Diclofenac Sodium, Insulin Lispro, amLODIPine, apixaban, atorvastatin, carvedilol, cholecalciferol, cyanocobalamin, famotidine, furosemide, insulin detemir, and spironolactone    Allergies:  No Known Allergies    Objective   Objective     Vitals:   Temp:  [98.3 °F (36.8 °C)] 98.3 °F (36.8 °C)  Heart Rate:  [77-88] 77  Resp:  [19] 19  BP: (144-178)/(82-92) 144/85  Flow (L/min):  [2] 2    Physical Exam    Constitutional: Awake, alert, no acute distress   Eyes: Pupils equal, sclerae anicteric, no conjunctival injection   HENT: NCAT, mucous membranes moist   Neck: Supple, no thyromegaly, no lymphadenopathy, trachea midline   Respiratory: Rhonchi throughout.   Cardiovascular: RRR, no murmurs, rubs, or gallops, palpable pedal pulses bilaterally   Gastrointestinal: Positive bowel sounds, soft, nontender, nondistended   Musculoskeletal: 1+ pitting edema, no clubbing or cyanosis to extremities   Psychiatric: Appropriate affect, cooperative   Neurologic: Oriented x 3, strength symmetric in all extremities, Cranial Nerves grossly intact to confrontation, speech clear   Skin: No rashes     Result Review    Result Review:  I have personally reviewed the results from the time of this admission to 5/15/2023 16:45 EDT and agree with these findings:  BNP is 10,300  Bicarb is 21.2  Creatinine 1.84  Troponin 130    Assessment & Plan   Assessment / Plan     Assessment/Plan:   Acute hypoxic respiratory failure  Acute on chronic heart failure with preserved ejection fraction EF 50%  Type 2 diabetes with hyperglycemia  Hypertension  [place] metabolic acidosis  CKD stage IIIb with baseline creatinine between 1.8-2.2 currently 1.87  COVID and influenza rule out  Elevated troponin due to heart failure and kidney disease  History of bilateral  DVT  Chronic anemia      Plan:  --Admit to hospital service   -- Oxygen for saturations less than 90%  --40 of IV Lasix twice daily starting tomorrow  -- Heart healthy diet and carb consistent  -- 2 g sodium restriction and 2000 mL fluid restrict  -- COVID and influenza rule out  -- Sliding scale for type 2 diabetes  -- Apixaban for bilateral DVT  -- Continue Coreg and prophylax      DVT prophylaxis:  Apixaban    CODE STATUS:      Full code    Admission Status:  I believe this patient meets admission status.    Electronically signed by Hunter Huggins MD, 05/15/23, 4:45 PM EDT.

## 2023-05-15 NOTE — ED PROVIDER NOTES
Time: 2:33 PM EDT  Date of encounter:  5/15/2023  Independent Historian/Clinical History and Information was obtained by:   Patient  Chief Complaint   Patient presents with   • Shortness of Breath       History is limited by: N/A    History of Present Illness:  Patient is a 67 y.o. year old male who presents to the emergency department for evaluation of dyspnea.  Patient states he was recently admitted to the hospital for dyspnea but since discharge he feels like his symptoms have gotten worse.  Patient does not wear oxygen at home.  On my initial evaluation patient oxygen saturation was 87% on room air.  Patient states she has had progressive dyspnea since discharge at rest.  The patient has orthopnea..  The patient notes a nonproductive cough.  The patient's had no fever, rigors or myalgias.  The patient denies any chest pain, nausea or diaphoresis.  Patient does complain of weakness and fatigue.  The patient notes minimal fluid in his legs..  The patient denies any unilateral leg pain or unilateral calf pain  HPI    Patient Care Team  Primary Care Provider: Heidi Villa APRN    Past Medical History:     No Known Allergies  Past Medical History:   Diagnosis Date   • Abnormal kidney function    • Arthritis    • Bursitis    • CHF (congestive heart failure)    • Depression    • Diabetes    • Edema    • Essential hypertension 09/23/2021   • Forgetfulness    • Gout    • Head injury    • Hernia cerebri    • Hyperlipidemia    • Hypertension    • IFG (impaired fasting glucose)    • Low back pain    • Lumbago     `   • Pain of left hip    • Right shoulder pain    • Sleep apnea    • SOB (shortness of breath)      Past Surgical History:   Procedure Laterality Date   • CYST REMOVAL Right     leg     Family History   Problem Relation Age of Onset   • No Known Problems Father    • Diabetes Sister    • Stroke Sister        Home Medications:  Prior to Admission medications    Medication Sig Start Date End Date Taking?  Authorizing Provider   amLODIPine (NORVASC) 5 MG tablet Take 1 tablet by mouth Daily for 30 days. 5/1/23 5/31/23  Taqui, Humera, MD   apixaban (ELIQUIS) 5 MG tablet tablet Take 1 tablet by mouth Every 12 (Twelve) Hours for 30 days. 5/1/23 5/31/23  Taqui, Humera, MD   atorvastatin (LIPITOR) 80 MG tablet Take 1 tablet by mouth Every Night. 3/5/23   Quentin Mejia MD   carvedilol (COREG) 25 MG tablet Take 1 tablet by mouth 2 (Two) Times a Day With Meals for 30 days. 5/14/23 6/13/23  Grady Rodriguez Jr., MD   cholecalciferol (VITAMIN D3) 25 MCG (1000 UT) tablet Take 1 tablet by mouth Daily. 8/26/22   Nehal Daniels MD   Diclofenac Sodium (VOLTAREN) 1 % gel gel Apply 2 g topically to the appropriate area as directed 2 (Two) Times a Day As Needed (joint pain). 4/17/23   Terrance Garcia PA   famotidine (PEPCID) 20 MG tablet Take 1 tablet by mouth 2 (Two) Times a Day Before Meals. 4/17/23   Terrance Garcia PA   furosemide (LASIX) 40 MG tablet Take 1 tablet by mouth Daily for 30 days. 5/1/23 5/31/23  Taqui, Humera, MD   insulin detemir (LEVEMIR) 100 UNIT/ML injection Inject 10 Units under the skin into the appropriate area as directed Daily. 4/17/23   Terrance Garcia PA   Insulin Lispro (humaLOG) 100 UNIT/ML injection Inject 2-9 Units under the skin into the appropriate area as directed 3 (Three) Times a Day With Meals. 4/17/23   Terrance Garcia PA   spironolactone (ALDACTONE) 25 MG tablet Take 1 tablet by mouth Daily. 10/12/22   Quentin Mejia MD   vitamin B-12 (VITAMIN B-12) 500 MCG tablet Take 1 tablet by mouth Daily. 4/17/23   Terrance Garcia PA        Social History:   Social History     Tobacco Use   • Smoking status: Former     Packs/day: 0.25     Types: Cigarettes     Start date: 1982     Quit date: 8/14/2012     Years since quitting: 10.7   • Smokeless tobacco: Never   Vaping Use   • Vaping Use: Never used   Substance Use Topics   • Alcohol use: Yes     Comment: rare   • Drug use:  "Never         Review of Systems:  Review of Systems   Constitutional: Positive for fatigue. Negative for chills, diaphoresis and fever.   HENT: Negative for congestion, postnasal drip, rhinorrhea and sore throat.    Eyes: Negative for photophobia.   Respiratory: Positive for shortness of breath. Negative for cough and chest tightness.    Cardiovascular: Positive for leg swelling. Negative for chest pain and palpitations.   Gastrointestinal: Negative for abdominal pain, diarrhea, nausea and vomiting.   Genitourinary: Negative for difficulty urinating, dysuria, flank pain, frequency, hematuria and urgency.   Musculoskeletal: Negative for neck pain and neck stiffness.   Skin: Negative for pallor and rash.   Neurological: Positive for weakness. Negative for dizziness, syncope, numbness and headaches.   Hematological: Negative for adenopathy. Does not bruise/bleed easily.   Psychiatric/Behavioral: Negative.         Physical Exam:  /51 (BP Location: Right arm, Patient Position: Lying)   Pulse 86   Temp 99.7 °F (37.6 °C) (Oral)   Resp 17   Ht 170.2 cm (67\")   Wt 78.9 kg (173 lb 15.1 oz)   SpO2 95%   BMI 27.24 kg/m²     Physical Exam  Vitals and nursing note reviewed.   Constitutional:       General: He is not in acute distress.     Appearance: Normal appearance. He is not ill-appearing, toxic-appearing or diaphoretic.   HENT:      Head: Normocephalic and atraumatic.      Mouth/Throat:      Mouth: Mucous membranes are moist.   Eyes:      Extraocular Movements: Extraocular movements intact.      Conjunctiva/sclera: Conjunctivae normal.      Pupils: Pupils are equal, round, and reactive to light.   Cardiovascular:      Rate and Rhythm: Normal rate and regular rhythm.      Pulses: Normal pulses.           Carotid pulses are 2+ on the right side and 2+ on the left side.       Radial pulses are 2+ on the right side and 2+ on the left side.        Femoral pulses are 2+ on the right side and 2+ on the left side.       " Popliteal pulses are 2+ on the right side and 2+ on the left side.        Dorsalis pedis pulses are 2+ on the right side and 2+ on the left side.        Posterior tibial pulses are 2+ on the right side and 2+ on the left side.      Heart sounds: Normal heart sounds. No murmur heard.  Pulmonary:      Effort: Pulmonary effort is normal. No accessory muscle usage, respiratory distress or retractions.      Breath sounds: Examination of the right-middle field reveals rales. Examination of the left-middle field reveals rales. Examination of the right-lower field reveals rales. Examination of the left-lower field reveals rales. Rales present. No wheezing or rhonchi.   Abdominal:      General: Abdomen is flat. There is no distension.      Palpations: Abdomen is soft. There is no mass or pulsatile mass.      Tenderness: There is no abdominal tenderness. There is no right CVA tenderness, left CVA tenderness, guarding or rebound.      Comments: No rigidity   Musculoskeletal:         General: No swelling, tenderness or deformity.      Cervical back: Neck supple. No tenderness.      Right lower leg: No tenderness. Edema present.      Left lower leg: No tenderness. Edema present.   Skin:     General: Skin is warm and dry.      Capillary Refill: Capillary refill takes less than 2 seconds.      Coloration: Skin is not cyanotic, jaundiced or pale.      Findings: No erythema.   Neurological:      General: No focal deficit present.      Mental Status: He is alert and oriented to person, place, and time. Mental status is at baseline.      Cranial Nerves: Cranial nerves 2-12 are intact. No cranial nerve deficit.      Sensory: Sensation is intact. No sensory deficit.      Motor: Motor function is intact. No weakness or pronator drift.      Coordination: Coordination is intact. Coordination normal.   Psychiatric:         Attention and Perception: Attention and perception normal.         Mood and Affect: Mood normal.         Behavior:  Behavior normal.                  Procedures:  Procedures      Medical Decision Making:      Comorbidities that affect care:    Congestive Heart Failure, Diabetes, Hypertension    External Notes reviewed:    None      The following orders were placed and all results were independently analyzed by me:  Orders Placed This Encounter   Procedures   • COVID-19,APTIMA PANTHER(DALJIT),[facility]/ NANCY, NP/OP SWAB IN UTM/VTM/SALINE TRANSPORT MEDIA,24 HR TAT - Swab, Nasopharynx   • Influenza Antigen, Rapid - Swab, Nasopharynx   • XR Chest 1 View   • [place] Draw   • Comprehensive Metabolic Panel   • BNP   • Single High Sensitivity Troponin T   • CBC Auto Differential   • ABG with Co-Ox and Electrolytes   • High Sensitivity Troponin T 2Hr   • CBC Auto Differential   • Comprehensive Metabolic Panel   • Magnesium   • High Sensitivity Troponin T   • Phosphorus   • Comprehensive Metabolic Panel   • Magnesium   • CBC Auto Differential   • Phosphorus   • Comprehensive Metabolic Panel   • Magnesium   • CBC Auto Differential   • Phosphorus   • Comprehensive Metabolic Panel   • Magnesium   • CBC Auto Differential   • Diet: Cardiac Diets, Diabetic Diets, Fluid Restriction (240 mL/tray) Diets; Low Sodium (2g); Consistent Carbohydrate; 2000 mL/day; Texture: Regular Texture (IDDSI 7); Fluid Consistency: Thin (IDDSI 0)   • Undress & Gown   • Continuous Pulse Oximetry   • Vital Signs   • Vital Signs   • Intake & Output   • Weigh Patient   • Oral Care   • Telemetry - Maintain IV Access   • Continuous Cardiac Monitoring   • Telemetry - Pulse Oximetry   • Daily Weights   • Strict Intake & Output   • Discontinue Insulin Infusion at Specified Time After Basal Dose Administered   • Discontinue Glucommander IV Insulin Order Set After Transition Complete   • Discontinue Glucommander After Transition Complete   • Patient May Shower   • Code Status and Medical Interventions:   • Inpatient Hospitalist Consult   • Inpatient Diabetes Educator Consult   • PT  Consult: Eval & Treat Functional Mobility Below Baseline   • Oxygen Therapy- Nasal Cannula; Titrate for SPO2: 90% - 95%   • Oxygen Therapy- Nasal Cannula; Titrate for SPO2: 90% - 95%   • POC Glucose TID AC   • POC Glucose Once   • POC Glucose Once   • POC Glucose Once   • POC Glucose Once   • POC Glucose Once   • POC Glucose Once   • POC Glucose Once   • POC Glucose Once   • POC Glucose Once   • POC Glucose Once   • ECG 12 Lead ED Triage Standing Order; SOA   • SCANNED - TELEMETRY     • SCANNED - TELEMETRY     • SCANNED - TELEMETRY     • SCANNED - TELEMETRY     • SCANNED - TELEMETRY     • SCANNED - TELEMETRY     • SCANNED - TELEMETRY     • Insert Peripheral IV   • Insert Peripheral IV   • Inpatient Admission   • CBC & Differential   • Green Top (Gel)   • Lavender Top   • Gold Top - SST   • Light Blue Top   • CBC & Differential   • CBC & Differential   • CBC & Differential       Medications Given in the Emergency Department:  Medications   sodium chloride 0.9 % flush 10 mL (has no administration in time range)   nitroglycerin (NITROSTAT) ointment 0.5 inch (0.5 inches Topical Not Given 5/15/23 1635)   sodium chloride 0.9 % flush 10 mL (has no administration in time range)   sodium chloride 0.9 % flush 10 mL (10 mL Intravenous Given 5/18/23 2147)   sodium chloride 0.9 % infusion 40 mL (has no administration in time range)   acetaminophen (TYLENOL) tablet 650 mg (650 mg Oral Given 5/18/23 2150)   furosemide (LASIX) injection 40 mg (40 mg Intravenous Given 5/18/23 1725)   dextrose (GLUTOSE) oral gel 15 g (has no administration in time range)   dextrose (D50W) (25 g/50 mL) IV injection 25 g (has no administration in time range)   glucagon (GLUCAGEN) injection 1 mg (has no administration in time range)   Insulin Lispro (humaLOG) injection 2-7 Units (2 Units Subcutaneous Not Given 5/18/23 1727)   allopurinol (ZYLOPRIM) tablet 100 mg (100 mg Oral Given 5/18/23 0833)   amLODIPine (NORVASC) tablet 5 mg (5 mg Oral Given 5/18/23  0833)   apixaban (ELIQUIS) tablet 5 mg (5 mg Oral Given 5/18/23 2146)   atorvastatin (LIPITOR) tablet 80 mg (80 mg Oral Given 5/18/23 2146)   carvedilol (COREG) tablet 25 mg (25 mg Oral Given 5/18/23 1725)   spironolactone (ALDACTONE) tablet 25 mg (25 mg Oral Given 5/18/23 0834)   insulin detemir (LEVEMIR) injection 10 Units (10 Units Subcutaneous Given 5/18/23 0832)   furosemide (LASIX) injection 80 mg (80 mg Intravenous Given 5/15/23 1635)   magnesium sulfate 2g/50 mL (PREMIX) infusion (2 g Intravenous New Bag 5/16/23 1850)   magnesium sulfate 2g/50 mL (PREMIX) infusion (2 g Intravenous New Bag 5/18/23 1230)        ED Course:    The patient was initially evaluated in the triage area where orders were placed. The patient was later dispositioned by Obed Gambino DO.      The patient was advised to stay for completion of workup which includes but is not limited to communication of labs and radiological results, reassessment and plan. The patient was advised that leaving prior to disposition by a provider could result in critical findings that are not communicated to the patient.     ED Course as of 05/19/23 0301   Mon May 15, 2023   1401 EKG:    Rhythm: Sinus rhythm  Rate: 86  Incomplete left bundle branch block  Intervals: Normal [place] and QT interval  T-wave: T wave inversion V2, V3, V4, V5, V6, I, aVL, II, nonspecific T wave flattening  ST Segment: No pathological ST elevation or reciprocal ST depression to suggest acute myocardial infarction    EKG Comparison: Probable no change in the T wave inversion and ST segments from EKG performed May 11, 2023    Interpreted by me   [SD]   1432 PROVIDER IN TRIAGE  Patient was evaluated by me in triage, Geovani Avila PA-C.  Orders were placed and patient is currently awaiting final results and disposition.     I placed patient on 2L NC while red rounding due to saturation 87% [MD]      ED Course User Index  [MD] Geovani Avila PA-C  [SD] Obed Gambino DO        Labs:    Lab Results (last 24 hours)     Procedure Component Value Units Date/Time    Phosphorus [[id number]]  (Normal) Collected: 05/18/23 0437    Specimen: Blood Updated: 05/18/23 0534     Phosphorus 3.5 mg/dL     CBC & Differential [[id number]]  (Abnormal) Collected: 05/18/23 0437    Specimen: Blood Updated: 05/18/23 0516    Narrative:      The following orders were created for panel order CBC & Differential.  Procedure                               Abnormality         Status                     ---------                               -----------         ------                     CBC Auto Differential[[id number]]        Abnormal            Final result                 Please view results for these tests on the individual orders.    Comprehensive Metabolic Panel [[id number]]  (Abnormal) Collected: 05/18/23 0437    Specimen: Blood Updated: 05/18/23 0534     Glucose 125 mg/dL      BUN 29 mg/dL      Creatinine 1.99 mg/dL      Sodium 137 mmol/L      Potassium 4.0 mmol/L      Chloride 101 mmol/L      CO2 24.2 mmol/L      Calcium 8.2 mg/dL      Total Protein 6.2 g/dL      Albumin 2.6 g/dL      ALT (SGPT) 10 U/L      AST (SGOT) 11 U/L      Alkaline Phosphatase 107 U/L      Total Bilirubin 0.3 mg/dL      Globulin 3.6 gm/dL      A/G Ratio 0.7 g/dL      BUN/Creatinine Ratio 14.6     Anion Gap 11.8 mmol/L      eGFR 36.6 mL/min/1.73     Narrative:      GFR Normal >60  Chronic Kidney Disease <60  Kidney Failure <15      Magnesium [[id number]]  (Normal) Collected: 05/18/23 0437    Specimen: Blood Updated: 05/18/23 0534     Magnesium 1.9 mg/dL     CBC Auto Differential [[id number]]  (Abnormal) Collected: 05/18/23 0437    Specimen: Blood Updated: 05/18/23 0516     WBC 8.38 10*3/mm3      RBC 3.37 10*6/mm3      Hemoglobin 8.7 g/dL      Hematocrit 27.3 %      MCV 81.0 fL      MCH 25.8 pg      MCHC 31.9 g/dL      RDW 19.5 %      RDW-SD 57.5 fl      MPV 9.4 fL      Platelets 308 10*3/mm3      Neutrophil % 53.0 %      Lymphocyte %  34.5 %      Monocyte % 8.8 %      Eosinophil % 3.1 %      Basophil % 0.4 %      Immature Grans % 0.2 %      Neutrophils, Absolute 4.44 10*3/mm3      Lymphocytes, Absolute 2.89 10*3/mm3      Monocytes, Absolute 0.74 10*3/mm3      Eosinophils, Absolute 0.26 10*3/mm3      Basophils, Absolute 0.03 10*3/mm3      Immature Grans, Absolute 0.02 10*3/mm3      nRBC 0.0 /100 WBC     POC Glucose Once [[id number]]  (Abnormal) Collected: 05/18/23 0741    Specimen: Blood Updated: 05/18/23 0743     Glucose 129 mg/dL      Comment: Serial Number: [id number]:  [id number]       POC Glucose Once [[id number]]  (Abnormal) Collected: 05/18/23 1135    Specimen: Blood Updated: 05/18/23 1136     Glucose 245 mg/dL      Comment: Serial Number: [id number]:  [id number]       POC Glucose Once [[id number]]  (Abnormal) Collected: 05/18/23 1727    Specimen: Blood Updated: 05/18/23 1728     Glucose 114 mg/dL      Comment: Serial Number: [id number]:  [id number]              Imaging:    No Radiology Exams Resulted Within Past 24 Hours      Differential Diagnosis and Discussion:      Dyspnea: Differential diagnosis includes but is not limited to metabolic acidosis, neurological disorders, psychogenic, asthma, pneumothorax, upper airway obstruction, COPD, pneumonia, noncardiogenic pulmonary edema, interstitial lung disease, anemia, congestive heart failure, and pulmonary embolism        MDM  Number of Diagnoses or Management Options  Acute on chronic congestive heart failure, unspecified heart failure type  Acute pulmonary edema  Acute respiratory failure with hypoxia  Chronic renal impairment, unspecified CKD stage  Elevated troponin  Pleural effusion  Uncontrolled type 2 diabetes mellitus with hyperglycemia  Diagnosis management comments: Patient's vital signs are stable in the emergency room.  The patient was afebrile.    Patient's flu was negative.  The patient's COVID is negative.    Patient's blood gas demonstrates a pH of 7.45, CO2  of 32, PO2 of 84    Patient's chemistry demonstrates a glucose of 256, BUN 27, creatinine 1.84, sodium 133, bicarb of 21.2.  The patient had a normal anion gap.  The patient's liver enzymes are normal.  The patient had a normal potassium.    Patient's proBNP was elevated at 10,300.  This is a significant improvement from his BNP 8 days ago which was 23,000    Patient's CBC demonstrates a hemoglobin 8.8, hematocrit of 27.3.  The patient had normal platelets and normal white blood cell count.    The patient's initial troponin was 130.  The patient's repeat high-sensitivity troponin 2 hours later was 105.  This is a negative delta of 25.  I have compared previous high-sensitivity troponins.  It appears the patient always has a high-sensitivity troponin that is elevated.    Since chest x-ray demonstrates cardiomegaly and diffuse pulmonary infiltrates most likely secondary to pulmonary edema    Since EKG demonstrated sinus rhythm with a rate of 86.  The patient had T wave inversion.  The patient had no pathological ST elevation.  The patient had no changes from the previous EKG.    Nitropaste was ordered for the patient but he refused.  The patient was given 80 mg of IV Lasix    Patient will subsequently be admitted to the hospital due to the fact that his pulse ox would drop below 90% at rest.       Amount and/or Complexity of Data Reviewed  Clinical lab tests: reviewed  Tests in the radiology section of CPT®: reviewed  Tests in the medicine section of CPT®: reviewed  Decide to obtain previous medical records or to obtain history from someone other than the patient: yes  Discuss the patient with other providers: yes (16:30 EDT  I discussed the case with the hospitalist.  We have discussed the patient's presenting symptoms, laboratory values, imaging and condition at the time of admission.  They will evaluate the patient in the emergency room and admit the patient to the hospital)               Social Determinants of  Health:    Patient is independent, reliable, and has access to care.       Disposition and Care Coordination:    Admit:   Through independent evaluation of the patient's history, physical, and imperical data, the patient meets criteria for observation/admission to the hospital.        Final diagnoses:   Acute respiratory failure with hypoxia   Acute pulmonary edema   Pleural effusion   Acute on chronic congestive heart failure, unspecified heart failure type   Elevated troponin   Chronic renal impairment, unspecified CKD stage   Uncontrolled type 2 diabetes mellitus with hyperglycemia        ED Disposition     ED Disposition   Decision to Admit    Condition   --    Comment   Level of Care: Telemetry [5]   Diagnosis: Acute respiratory failure with hypoxia [380845]   Isolate for COVID?: Yes [1]   Certification: I Certify That Inpatient Hospital Services Are Medically Necessary For Greater Than 2 Midnights               This medical record created using voice recognition software.           Obed Gambino DO  05/19/23 0305

## 2023-05-15 NOTE — OUTREACH NOTE
Call Center TCM Note    Flowsheet Row Responses   [facility] facility patient discharged from? [facility]   Does the patient have one of the following disease processes/diagnoses(primary or secondary)? CHF   TCM attempt successful? No   Unsuccessful attempts Attempt 2  [Outdated PCP verbal release]   Call Status Voice mail issues  [Mailbox full. ]           Crystal Carnes RN    5/15/2023, 10:53 EDT

## 2023-05-15 NOTE — SIGNIFICANT NOTE
05/15/23 1859   Living Environment   People in Home significant other   Name(s) of People in Home Friend, Svetlana   Current Living Arrangements home   Primary Care Provided by self   Provides Primary Care For no one   Family Caregiver if Needed none   Quality of Family Relationships helpful   Able to Return to Prior Arrangements yes   Resource/Environmental Concerns   Resource/Environmental Concerns none   Transportation Concerns none   Transition Planning   Patient/Family Anticipates Transition to home   Patient/Family Anticipated Services at Transition none   Transportation Anticipated car, drives self   Discharge Needs Assessment   Readmission Within the Last 30 Days   (pt states that he was just admitted on 5/11/2023)   Equipment Currently Used at Home none   Equipment Needed After Discharge pulse ox     SW student appears bedside to pt who was alert, jovial, and comical. Pt states the he lives with his friend named Svetlana. Pt states the he supports her more than she supports him.  Pt states the he is coming from his home and plans to return there after discharge. Pt states the he has a will on file in [place]. Pt states the he is independent at home and does not need home healthcare. Pt states the he does not need or on dialysis at this time and his PCP is Dr. Mary Simeon in [place], [place]. Pt states the he uses the [facility] in [place], [place] and he uses a blood pressure machine at home. Pt states the he is a  and does not need help with medication nor does he anticipate any services at discharge. Pt states the he was just admitted and discharged around the 5/11/2023.

## 2023-05-15 NOTE — OUTREACH NOTE
Call Center TCM Note    Flowsheet Row Responses   [facility] facility patient discharged from? [facility]   Does the patient have one of the following disease processes/diagnoses(primary or secondary)? CHF   TCM attempt successful? No   Unsuccessful attempts Attempt 1   Call Status Voice mail issues  [Mailbox full]           Crystal Carnes RN    5/15/2023, 10:18 EDT

## 2023-05-16 LAB
ALBUMIN SERPL-MCNC: 2.5 G/DL (ref 3.5–5.2)
ALBUMIN/GLOB SERPL: 0.7 G/DL
ALP SERPL-CCNC: 114 U/L (ref 39–117)
ALT SERPL W P-5'-P-CCNC: 12 U/L (ref 1–41)
ANION GAP SERPL CALCULATED.3IONS-SCNC: 8.8 MMOL/L (ref 5–15)
AST SERPL-CCNC: 13 U/L (ref 1–40)
BASOPHILS # BLD AUTO: 0.03 10*3/MM3 (ref 0–0.2)
BASOPHILS NFR BLD AUTO: 0.4 % (ref 0–1.5)
BILIRUB SERPL-MCNC: 0.4 MG/DL (ref 0–1.2)
BUN SERPL-MCNC: 26 MG/DL (ref 8–23)
BUN/CREAT SERPL: 13.9 (ref 7–25)
CALCIUM SPEC-SCNC: 8.2 MG/DL (ref 8.6–10.5)
CHLORIDE SERPL-SCNC: 102 MMOL/L (ref 98–107)
CO2 SERPL-SCNC: 21.2 MMOL/L (ref 22–29)
CREAT SERPL-MCNC: 1.87 MG/DL (ref 0.76–1.27)
DEPRECATED RDW RBC AUTO: 56.9 FL (ref 37–54)
EGFRCR SERPLBLD CKD-EPI 2021: 39.4 ML/MIN/1.73
EOSINOPHIL # BLD AUTO: 0.13 10*3/MM3 (ref 0–0.4)
EOSINOPHIL NFR BLD AUTO: 1.9 % (ref 0.3–6.2)
ERYTHROCYTE [DISTWIDTH] IN BLOOD BY AUTOMATED COUNT: 19.7 % (ref 12.3–15.4)
GLOBULIN UR ELPH-MCNC: 3.8 GM/DL
GLUCOSE BLDC GLUCOMTR-MCNC: 165 MG/DL (ref 70–99)
GLUCOSE BLDC GLUCOMTR-MCNC: 193 MG/DL (ref 70–99)
GLUCOSE BLDC GLUCOMTR-MCNC: 197 MG/DL (ref 70–99)
GLUCOSE SERPL-MCNC: 194 MG/DL (ref 65–99)
HCT VFR BLD AUTO: 27.3 % (ref 37.5–51)
HGB BLD-MCNC: 8.5 G/DL (ref 13–17.7)
IMM GRANULOCYTES # BLD AUTO: 0.02 10*3/MM3 (ref 0–0.05)
IMM GRANULOCYTES NFR BLD AUTO: 0.3 % (ref 0–0.5)
LYMPHOCYTES # BLD AUTO: 2.03 10*3/MM3 (ref 0.7–3.1)
LYMPHOCYTES NFR BLD AUTO: 29.5 % (ref 19.6–45.3)
MAGNESIUM SERPL-MCNC: 1.8 MG/DL (ref 1.6–2.4)
MCH RBC QN AUTO: 25.4 PG (ref 26.6–33)
MCHC RBC AUTO-ENTMCNC: 31.1 G/DL (ref 31.5–35.7)
MCV RBC AUTO: 81.7 FL (ref 79–97)
MONOCYTES # BLD AUTO: 0.37 10*3/MM3 (ref 0.1–0.9)
MONOCYTES NFR BLD AUTO: 5.4 % (ref 5–12)
NEUTROPHILS NFR BLD AUTO: 4.3 10*3/MM3 (ref 1.7–7)
NEUTROPHILS NFR BLD AUTO: 62.5 % (ref 42.7–76)
NRBC BLD AUTO-RTO: 0 /100 WBC (ref 0–0.2)
PLATELET # BLD AUTO: 284 10*3/MM3 (ref 140–450)
PMV BLD AUTO: 9.1 FL (ref 6–12)
POTASSIUM SERPL-SCNC: 4 MMOL/L (ref 3.5–5.2)
PROT SERPL-MCNC: 6.3 G/DL (ref 6–8.5)
RBC # BLD AUTO: 3.34 10*6/MM3 (ref 4.14–5.8)
SARS-COV-2 RNA RESP QL NAA+PROBE: NOT DETECTED
SODIUM SERPL-SCNC: 132 MMOL/L (ref 136–145)
TROPONIN T SERPL HS-MCNC: 124 NG/L
WBC NRBC COR # BLD: 6.88 10*3/MM3 (ref 3.4–10.8)

## 2023-05-16 PROCEDURE — 25010000002 MAGNESIUM SULFATE 2 GM/50ML SOLUTION: Performed by: INTERNAL MEDICINE

## 2023-05-16 PROCEDURE — 36415 COLL VENOUS BLD VENIPUNCTURE: CPT | Performed by: INTERNAL MEDICINE

## 2023-05-16 PROCEDURE — 63710000001 INSULIN LISPRO (HUMAN) PER 5 UNITS: Performed by: INTERNAL MEDICINE

## 2023-05-16 PROCEDURE — 25010000002 FUROSEMIDE PER 20 MG: Performed by: INTERNAL MEDICINE

## 2023-05-16 PROCEDURE — 84484 ASSAY OF TROPONIN QUANT: CPT | Performed by: INTERNAL MEDICINE

## 2023-05-16 PROCEDURE — 99233 SBSQ HOSP IP/OBS HIGH 50: CPT | Performed by: INTERNAL MEDICINE

## 2023-05-16 PROCEDURE — 83735 ASSAY OF MAGNESIUM: CPT | Performed by: INTERNAL MEDICINE

## 2023-05-16 PROCEDURE — 82948 REAGENT STRIP/BLOOD GLUCOSE: CPT

## 2023-05-16 PROCEDURE — 80053 COMPREHEN METABOLIC PANEL: CPT | Performed by: INTERNAL MEDICINE

## 2023-05-16 PROCEDURE — 94799 UNLISTED PULMONARY SVC/PX: CPT

## 2023-05-16 PROCEDURE — 85025 COMPLETE CBC W/AUTO DIFF WBC: CPT | Performed by: INTERNAL MEDICINE

## 2023-05-16 RX ORDER — MAGNESIUM SULFATE HEPTAHYDRATE 40 MG/ML
2 INJECTION, SOLUTION INTRAVENOUS ONCE
Status: COMPLETED | OUTPATIENT
Start: 2023-05-16 | End: 2023-05-16

## 2023-05-16 RX ADMIN — APIXABAN 5 MG: 5 TABLET, FILM COATED ORAL at 08:48

## 2023-05-16 RX ADMIN — INSULIN LISPRO 2 UNITS: 100 INJECTION, SOLUTION INTRAVENOUS; SUBCUTANEOUS at 18:50

## 2023-05-16 RX ADMIN — CARVEDILOL 25 MG: 12.5 TABLET, FILM COATED ORAL at 08:47

## 2023-05-16 RX ADMIN — SPIRONOLACTONE 25 MG: 25 TABLET ORAL at 08:52

## 2023-05-16 RX ADMIN — MAGNESIUM SULFATE HEPTAHYDRATE 2 G: 2 INJECTION, SOLUTION INTRAVENOUS at 18:50

## 2023-05-16 RX ADMIN — CARVEDILOL 25 MG: 12.5 TABLET, FILM COATED ORAL at 18:50

## 2023-05-16 RX ADMIN — AMLODIPINE BESYLATE 5 MG: 5 TABLET ORAL at 08:48

## 2023-05-16 RX ADMIN — INSULIN LISPRO 2 UNITS: 100 INJECTION, SOLUTION INTRAVENOUS; SUBCUTANEOUS at 08:47

## 2023-05-16 RX ADMIN — Medication 10 ML: at 08:52

## 2023-05-16 RX ADMIN — FUROSEMIDE 40 MG: 10 INJECTION, SOLUTION INTRAMUSCULAR; INTRAVENOUS at 08:48

## 2023-05-16 RX ADMIN — ATORVASTATIN CALCIUM 80 MG: 40 TABLET, FILM COATED ORAL at 20:20

## 2023-05-16 RX ADMIN — ALLOPURINOL 100 MG: 100 TABLET ORAL at 08:48

## 2023-05-16 RX ADMIN — Medication 10 ML: at 20:21

## 2023-05-16 RX ADMIN — APIXABAN 5 MG: 5 TABLET, FILM COATED ORAL at 20:20

## 2023-05-16 RX ADMIN — INSULIN LISPRO 2 UNITS: 100 INJECTION, SOLUTION INTRAVENOUS; SUBCUTANEOUS at 11:40

## 2023-05-16 RX ADMIN — FUROSEMIDE 40 MG: 10 INJECTION, SOLUTION INTRAMUSCULAR; INTRAVENOUS at 18:50

## 2023-05-16 NOTE — PLAN OF CARE
Problem: Adult Inpatient Plan of Care  Goal: Plan of Care Review  Outcome: Ongoing, Progressing  Flowsheets (Taken 5/16/2023 1717)  Progress: improving  Plan of Care Reviewed With: patient  Outcome Evaluation: Patient AAO throughout shift. VSS. Patient is now weaned down to RA, no complaints of SOA at this time. 6 beat run of Vtatiburcio mid-day, MD notified, see MAR. Patient is ad veronica. No complaints of pain or discomfort at this time. Continuing with plan of care   Goal Outcome Evaluation:  Plan of Care Reviewed With: patient        Progress: improving  Outcome Evaluation: Patient AAO throughout shift. VSS. Patient is now weaned down to RA, no complaints of SOA at this time. 6 beat run of Vtatiburcio mid-day, MD notified, see MAR. Patient is ad veronica. No complaints of pain or discomfort at this time. Continuing with plan of care

## 2023-05-16 NOTE — PLAN OF CARE
Goal Outcome Evaluation:  Plan of Care Reviewed With: spouse        Progress: improving  Outcome Evaluation: Pt mid 90s on 2L NC. Vitals WNL. No acute ovenight events.

## 2023-05-16 NOTE — OUTREACH NOTE
Call Center TCM Note    Flowsheet Row Responses   [facility] patient discharged from? [place]   Does the patient have one of the following disease processes/diagnoses(primary or secondary)? CHF   TCM attempt successful? No   Unsuccessful attempts Attempt 1   Change in Health Status Readmitted           Mine Harris RN    5/15/2023, 21:46 EDT

## 2023-05-16 NOTE — PROGRESS NOTES
[facility]   Hospitalist Progress Note  Date: 2023  Patient Name: Lyle Lozano  : 1955  MRN: [id number]  Date of admission: 5/15/2023    Subjective   Subjective     Chief Complaint:   Shortness of breath    Summary:   Lyle Lozano is a 67 y.o. male past medical history of CKD stage IIIb with baseline creatinine 1.8-2.2, heart failure with preserved ejection fraction EF of 50%, type 2 diabetes, hypertension, dyslipidemia, gout, and obstructive sleep apnea who presents with shortness of breath     Patient was discharged home and he is not sure if he took his Lasix or not.  He states that he started having shortness of breath.  No chest pain.  No fevers.  No chills.  No cough little bit of lower extremity swelling.  Since he was not getting better, he came the ER for further evaluation.    Interval Followup:   No acute events overnight, did have loose stools, shortness of breath stable    Objective   Objective     Vitals:   Temp:  [98.2 °F (36.8 °C)-98.3 °F (36.8 °C)] 98.2 °F (36.8 °C)  Heart Rate:  [74-95] 74  Resp:  [18-19] 18  BP: (134-178)/(69-99) 134/69  Flow (L/min):  [2-2.5] 2.5    Physical Exam   GEN: No acute distress  HEENT: Mucous membranes  LUNGS: Good chest rise bilaterally  CARDIAC: Lower extremity edema    Result Review    Result Review:  I have personally reviewed the results as below  [x]  Laboratory  CBC        2023    04:25 5/15/2023    14:07 2023    05:47   CBC   WBC 6.22   7.30   6.88     RBC 3.49   3.36   3.34     Hemoglobin 9.0   8.8   8.5     Hematocrit 28.2   27.3   27.3     MCV 80.8   81.3   81.7     MCH 25.8   26.2   25.4     MCHC 31.9   32.2   31.1     RDW 18.8   19.4   19.7     Platelets 257   259   284       CMP        2023    04:25 5/15/2023    14:07 5/15/2023    14:45 2023    05:47   CMP   Glucose 88   256   283   194     BUN 29   27    26     Creatinine 1.87   1.84    1.87     EGFR 39.4   40.2    39.4     Sodium 136   133   135.1    132     Potassium 4.2   4.1    4.0     Chloride 102   103    102     Calcium 8.5   8.3    8.2     Total Protein  6.6    6.3     Albumin  2.6    2.5     Globulin  4.0    3.8     Total Bilirubin  0.4    0.4     Alkaline Phosphatase  128    114     AST (SGOT)  15    13     ALT (SGPT)  15    12     Albumin/Globulin Ratio  0.7    0.7     BUN/Creatinine Ratio 15.5   14.7    13.9     Anion Gap 8.6   8.8    8.8       []  Microbiology  []  Radiology  []  EKG/Telemetry   []  Cardiology/Vascular   []  Pathology  []  Old records  []  Other:    Assessment & Plan   Assessment / Plan     Assessment:  Acute hypoxic respiratory failure  Acute on chronic heart failure with preserved ejection fraction EF 50%  Type 2 diabetes with hyperglycemia  Hypertension  Nongap metabolic acidosis  CKD stage IIIb with baseline creatinine between 1.8-2.2 currently 1.87  COVID and influenza rule out  Elevated troponin due to heart failure and kidney disease  History of bilateral DVT  Chronic anemia  Recent CVA      Plan:  --Admit to hospital service   -- Wean O2 for SPO2 greater than 88%  -- continue 40 of IV Lasix twice daily monitor volume status closely  -- Continue heart healthy diet and carb consistent  -- Continue 2 g sodium restriction and 2000 mL fluid restrict  -- COVID and influenza reviewed and negative  -- Continue sliding scale for type 2 diabetes  -- Continue apixaban for bilateral DVT  -- Continue Coreg  -- CBC, CMP reviewed  --Repeat CBC, CMP, mag and Phos in a.m.  --Chest x-ray personally reviewed, consistent with pulmonary edema    Reviewed patients labs and imaging, and discussed with patient and nurse at bedside.    DVT prophylaxis:  Medical DVT prophylaxis orders are present.    CODE STATUS:   Level Of Support Discussed With: Patient  Code Status (Patient has no pulse and is not breathing): CPR (Attempt to Resuscitate)  Medical Interventions (Patient has pulse or is breathing): Full Support        Electronically signed by  Ramon Card MD, 05/16/23, 10:42 AM EDT.

## 2023-05-17 LAB
ALBUMIN SERPL-MCNC: 2.5 G/DL (ref 3.5–5.2)
ALBUMIN/GLOB SERPL: 0.6 G/DL
ALP SERPL-CCNC: 111 U/L (ref 39–117)
ALT SERPL W P-5'-P-CCNC: 11 U/L (ref 1–41)
ANION GAP SERPL CALCULATED.3IONS-SCNC: 9.2 MMOL/L (ref 5–15)
AST SERPL-CCNC: 12 U/L (ref 1–40)
BASOPHILS # BLD AUTO: 0.02 10*3/MM3 (ref 0–0.2)
BASOPHILS NFR BLD AUTO: 0.3 % (ref 0–1.5)
BILIRUB SERPL-MCNC: 0.3 MG/DL (ref 0–1.2)
BUN SERPL-MCNC: 29 MG/DL (ref 8–23)
BUN/CREAT SERPL: 13.9 (ref 7–25)
CALCIUM SPEC-SCNC: 8.5 MG/DL (ref 8.6–10.5)
CHLORIDE SERPL-SCNC: 101 MMOL/L (ref 98–107)
CO2 SERPL-SCNC: 23.8 MMOL/L (ref 22–29)
CREAT SERPL-MCNC: 2.08 MG/DL (ref 0.76–1.27)
DEPRECATED RDW RBC AUTO: 58.9 FL (ref 37–54)
EGFRCR SERPLBLD CKD-EPI 2021: 34.7 ML/MIN/1.73
EOSINOPHIL # BLD AUTO: 0.22 10*3/MM3 (ref 0–0.4)
EOSINOPHIL NFR BLD AUTO: 2.9 % (ref 0.3–6.2)
ERYTHROCYTE [DISTWIDTH] IN BLOOD BY AUTOMATED COUNT: 19.7 % (ref 12.3–15.4)
GLOBULIN UR ELPH-MCNC: 3.9 GM/DL
GLUCOSE BLDC GLUCOMTR-MCNC: 122 MG/DL (ref 70–99)
GLUCOSE BLDC GLUCOMTR-MCNC: 163 MG/DL (ref 70–99)
GLUCOSE BLDC GLUCOMTR-MCNC: 167 MG/DL (ref 70–99)
GLUCOSE SERPL-MCNC: 289 MG/DL (ref 65–99)
HCT VFR BLD AUTO: 26.9 % (ref 37.5–51)
HGB BLD-MCNC: 8.3 G/DL (ref 13–17.7)
IMM GRANULOCYTES # BLD AUTO: 0.06 10*3/MM3 (ref 0–0.05)
IMM GRANULOCYTES NFR BLD AUTO: 0.8 % (ref 0–0.5)
LYMPHOCYTES # BLD AUTO: 2.11 10*3/MM3 (ref 0.7–3.1)
LYMPHOCYTES NFR BLD AUTO: 27.8 % (ref 19.6–45.3)
MAGNESIUM SERPL-MCNC: 2.1 MG/DL (ref 1.6–2.4)
MCH RBC QN AUTO: 25.9 PG (ref 26.6–33)
MCHC RBC AUTO-ENTMCNC: 30.9 G/DL (ref 31.5–35.7)
MCV RBC AUTO: 83.8 FL (ref 79–97)
MONOCYTES # BLD AUTO: 0.66 10*3/MM3 (ref 0.1–0.9)
MONOCYTES NFR BLD AUTO: 8.7 % (ref 5–12)
NEUTROPHILS NFR BLD AUTO: 4.52 10*3/MM3 (ref 1.7–7)
NEUTROPHILS NFR BLD AUTO: 59.5 % (ref 42.7–76)
NRBC BLD AUTO-RTO: 0 /100 WBC (ref 0–0.2)
PHOSPHATE SERPL-MCNC: 3.4 MG/DL (ref 2.5–4.5)
PLATELET # BLD AUTO: 287 10*3/MM3 (ref 140–450)
PMV BLD AUTO: 9.7 FL (ref 6–12)
POTASSIUM SERPL-SCNC: 4.2 MMOL/L (ref 3.5–5.2)
PROT SERPL-MCNC: 6.4 G/DL (ref 6–8.5)
QT INTERVAL: 389 MS
RBC # BLD AUTO: 3.21 10*6/MM3 (ref 4.14–5.8)
SODIUM SERPL-SCNC: 134 MMOL/L (ref 136–145)
WBC NRBC COR # BLD: 7.59 10*3/MM3 (ref 3.4–10.8)

## 2023-05-17 PROCEDURE — 25010000002 FUROSEMIDE PER 20 MG: Performed by: INTERNAL MEDICINE

## 2023-05-17 PROCEDURE — 63710000001 INSULIN DETEMIR PER 5 UNITS: Performed by: INTERNAL MEDICINE

## 2023-05-17 PROCEDURE — 97161 PT EVAL LOW COMPLEX 20 MIN: CPT

## 2023-05-17 PROCEDURE — 94799 UNLISTED PULMONARY SVC/PX: CPT

## 2023-05-17 PROCEDURE — 63710000001 INSULIN LISPRO (HUMAN) PER 5 UNITS: Performed by: INTERNAL MEDICINE

## 2023-05-17 PROCEDURE — 85025 COMPLETE CBC W/AUTO DIFF WBC: CPT | Performed by: INTERNAL MEDICINE

## 2023-05-17 PROCEDURE — 82948 REAGENT STRIP/BLOOD GLUCOSE: CPT

## 2023-05-17 PROCEDURE — 83735 ASSAY OF MAGNESIUM: CPT | Performed by: INTERNAL MEDICINE

## 2023-05-17 PROCEDURE — 80053 COMPREHEN METABOLIC PANEL: CPT | Performed by: INTERNAL MEDICINE

## 2023-05-17 PROCEDURE — 99233 SBSQ HOSP IP/OBS HIGH 50: CPT | Performed by: INTERNAL MEDICINE

## 2023-05-17 PROCEDURE — 84100 ASSAY OF PHOSPHORUS: CPT | Performed by: INTERNAL MEDICINE

## 2023-05-17 RX ADMIN — AMLODIPINE BESYLATE 5 MG: 5 TABLET ORAL at 09:36

## 2023-05-17 RX ADMIN — ATORVASTATIN CALCIUM 80 MG: 40 TABLET, FILM COATED ORAL at 21:49

## 2023-05-17 RX ADMIN — INSULIN LISPRO 4 UNITS: 100 INJECTION, SOLUTION INTRAVENOUS; SUBCUTANEOUS at 09:37

## 2023-05-17 RX ADMIN — Medication 10 ML: at 21:47

## 2023-05-17 RX ADMIN — SPIRONOLACTONE 25 MG: 25 TABLET ORAL at 09:36

## 2023-05-17 RX ADMIN — Medication 10 ML: at 10:10

## 2023-05-17 RX ADMIN — ALLOPURINOL 100 MG: 100 TABLET ORAL at 09:36

## 2023-05-17 RX ADMIN — CARVEDILOL 25 MG: 12.5 TABLET, FILM COATED ORAL at 09:36

## 2023-05-17 RX ADMIN — FUROSEMIDE 40 MG: 10 INJECTION, SOLUTION INTRAMUSCULAR; INTRAVENOUS at 09:36

## 2023-05-17 RX ADMIN — CARVEDILOL 25 MG: 12.5 TABLET, FILM COATED ORAL at 17:45

## 2023-05-17 RX ADMIN — APIXABAN 5 MG: 5 TABLET, FILM COATED ORAL at 09:37

## 2023-05-17 RX ADMIN — INSULIN DETEMIR 10 UNITS: 100 INJECTION, SOLUTION SUBCUTANEOUS at 10:24

## 2023-05-17 RX ADMIN — APIXABAN 5 MG: 5 TABLET, FILM COATED ORAL at 21:51

## 2023-05-17 RX ADMIN — FUROSEMIDE 40 MG: 10 INJECTION, SOLUTION INTRAMUSCULAR; INTRAVENOUS at 17:45

## 2023-05-17 RX ADMIN — Medication 10 ML: at 21:48

## 2023-05-17 RX ADMIN — INSULIN LISPRO 2 UNITS: 100 INJECTION, SOLUTION INTRAVENOUS; SUBCUTANEOUS at 12:38

## 2023-05-17 NOTE — PLAN OF CARE
Goal Outcome Evaluation:  Plan of Care Reviewed With: patient           Outcome Evaluation: Patient is able to complete all transfers independently and ambulate ad veronica. in his room.  He does not need inpatient PT services.      PT Evaluation Complexity  History, PT Evaluation Complexity: no personal factors and/or comorbidities  Examination of Body Systems (PT Eval Complexity): total of 4 or more elements  Clinical Presentation (PT Evaluation Complexity): stable  Clinical Decision Making (PT Evaluation Complexity): low complexity  Overall Complexity (PT Evaluation Complexity): low complexity

## 2023-05-17 NOTE — THERAPY EVALUATION
Acute Care - Physical Therapy Initial Evaluation   [facility]     Patient Name: Lyle Lozano  : 1955  MRN: [id number]  Today's Date: 2023      Visit Dx:     ICD-10-CM ICD-9-CM   1. Acute respiratory failure with hypoxia  J96.01 518.81   2. Acute pulmonary edema  J81.0 518.4   3. Pleural effusion  J90 511.9   4. Acute on chronic congestive heart failure, unspecified heart failure type  I50.9 428.0   5. Elevated troponin  R77.8 790.6   6. Chronic renal impairment, unspecified CKD stage  N18.9 585.9   7. Uncontrolled type 2 diabetes mellitus with hyperglycemia  E11.65 250.02   8. 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   • Hypertensive heart disease with heart failure   • Gout   • Stage 3 chronic kidney disease   • Hand arthritis   • Acute pain of left knee   • Elevated troponin level not due myocardial infarction   • Acute on chronic systolic heart failure (CMS/HCC)   • Acute on chronic congestive heart failure, unspecified heart failure type   • Acute on chronic HFrEF (heart failure with reduced ejection fraction)   • Acute ischemic left MCA stroke   • Acute systolic heart failure (CMS/HCC)   • Chronic systolic heart failure (CMS/HCC)   • Acute HFrEF (heart failure with reduced ejection fraction)   • Acute respiratory failure with hypoxia     Past Medical History:   Diagnosis Date   • Abnormal kidney function    • Arthritis    • Bursitis    • CHF (congestive heart failure)    • Depression    • Diabetes    • Edema    • Essential  hypertension 09/23/2021   • Forgetfulness    • Gout    • Head injury    • Hernia cerebri    • Hyperlipidemia    • Hypertension    • IFG (impaired fasting glucose)    • Low back pain    • Lumbago     `   • Pain of left hip    • Right shoulder pain    • Sleep apnea    • SOB (shortness of breath)      Past Surgical History:   Procedure Laterality Date   • CYST REMOVAL Right     leg     PT Assessment (last 12 hours)     PT Evaluation and Treatment     Row Name 05/17/23 1300          Physical Therapy Time and Intention    Subjective Information no complaints  -DP     Document Type evaluation  -DP     Mode of Treatment individual therapy;physical therapy  -DP     Patient Effort good  -DP     Row Name 05/17/23 1300          General Information    Patient Profile Reviewed yes  -DP     Patient Observations alert;cooperative;agree to therapy  -DP     Prior Level of Function independent:;gait;transfer;bed mobility;ADL's  -DP     Equipment Currently Used at Home cane, straight  -DP     Existing Precautions/Restrictions no known precautions/restrictions  -DP     Barriers to Rehab none identified  -DP     Row Name 05/17/23 1300          Living Environment    Current Living Arrangements home  -DP     Home Accessibility wheelchair accessible  -DP     People in Home alone  -DP     Row Name 05/17/23 1300          Home Use of Assistive/Adaptive Equipment    Equipment Currently Used at Home none  -DP     Row Name 05/17/23 1300          Range of Motion (ROM)    Range of Motion bilateral lower extremities;ROM is WFL  -DP     Row Name 05/17/23 1300          Strength (Manual Muscle Testing)    Strength (Manual Muscle Testing) bilateral lower extremities;strength is WFL  -DP     Row Name 05/17/23 1300          Bed Mobility    Bed Mobility supine-sit-supine  -DP     Supine-Sit-Supine [place] (Bed Mobility) independent  -DP     Row Name 05/17/23 1300          Transfers    Transfers sit-stand transfer  -DP     Row Name 05/17/23 1300           Sit-Stand Transfer    Sit-Stand [place] (Transfers) independent  -DP     Row Name 05/17/23 1300          Gait/Stairs (Locomotion)    Gait/Stairs Locomotion gait/ambulation independence  -DP     [place] Level (Gait) supervision  -DP     Assistive Device (Gait) cane, straight  -DP     Distance in Feet (Gait) 100  -DP     Row Name 05/17/23 1300          Balance    Balance Assessment standing dynamic balance  -DP     Dynamic Standing Balance supervision  -DP     Row Name 05/17/23 1300          Plan of Care Review    Plan of Care Reviewed With patient  -DP     Outcome Evaluation Patient is able to complete all transfers independently and ambulate ad veronica. in his room.  He does not need inpatient PT services.  -DP     Row Name 05/17/23 1300          Therapy Assessment/Plan (PT)    Criteria for Skilled Interventions Met (PT) no problems identified which require skilled intervention  -DP     Therapy Frequency (PT) evaluation only  -DP     Row Name 05/17/23 1300          PT Evaluation Complexity    History, PT Evaluation Complexity no personal factors and/or comorbidities  -DP     Examination of Body Systems (PT Eval Complexity) total of 4 or more elements  -DP     Clinical Presentation (PT Evaluation Complexity) stable  -DP     Clinical Decision Making (PT Evaluation Complexity) low complexity  -DP     Overall Complexity (PT Evaluation Complexity) low complexity  -DP           User Key  (r) = Recorded By, (t) = Taken By, (c) = Cosigned By    Initials Name Provider Type    Marco A Ann, PT Physical Therapist                  PT Recommendation and Plan  Anticipated Discharge Disposition (PT): home  Therapy Frequency (PT): evaluation only  Plan of Care Reviewed With: patient  Outcome Evaluation: Patient is able to complete all transfers independently and ambulate ad veronica. in his room.  He does not need inpatient PT services.   Outcome Measures     Row Name 05/17/23 1300             How much help from another  person do you currently need...    Turning from your back to your side while in flat bed without using bedrails? 4  -DP      Moving from lying on back to sitting on the side of a flat bed without bedrails? 4  -DP      Moving to and from a bed to a chair (including a wheelchair)? 4  -DP      Standing up from a chair using your arms (e.g., wheelchair, bedside chair)? 4  -DP      Climbing 3-5 steps with a railing? 4  -DP      To walk in hospital room? 4  -DP      AM-PAC 6 Clicks Score (PT) 24  -DP         Functional Assessment    Outcome Measure Options AM-PAC 6 Clicks Basic Mobility (PT)  -DP            User Key  (r) = Recorded By, (t) = Taken By, (c) = Cosigned By    Initials Name Provider Type    Marco A Ann PT Physical Therapist                 Time Calculation:    PT Charges     Row Name 05/17/23 1358             Time Calculation    PT Received On 05/17/23  -DP         Untimed Charges    PT Eval/Re-eval Minutes 30  -DP         Total Minutes    Untimed Charges Total Minutes 30  -DP       Total Minutes 30  -DP            User Key  (r) = Recorded By, (t) = Taken By, (c) = Cosigned By    Initials Name Provider Type    Marco A Ann, PT Physical Therapist              Therapy Charges for Today     Code Description Service Date Service Provider Modifiers Qty    05757268450 HC PT EVAL LOW COMPLEXITY 2 5/17/2023 Marco A Neely, PT GP 1          PT G-Codes  Outcome Measure Options: AM-PAC 6 Clicks Basic Mobility (PT)  AM-PAC 6 Clicks Score (PT): 24    Marco A Neely, PT  5/17/2023

## 2023-05-17 NOTE — PROGRESS NOTES
[facility]   Hospitalist Progress Note  Date: 2023  Patient Name: Lyle Lozano  : 1955  MRN: [id number]  Date of admission: 5/15/2023    Subjective   Subjective     Chief Complaint:   Shortness of breath    Summary:   Lyle Lozano is a 67 y.o. male past medical history of CKD stage IIIb with baseline creatinine 1.8-2.2, heart failure with preserved ejection fraction EF of 50%, type 2 diabetes, hypertension, dyslipidemia, gout, and obstructive sleep apnea who presents with shortness of breath     Patient was discharged home and he is not sure if he took his Lasix or not.  He states that he started having shortness of breath.  No chest pain.  No fevers.  No chills.  No cough little bit of lower extremity swelling.  Since he was not getting better, he came the ER for further evaluation.    Interval Followup:   No acute events overnight, breathing mildly improved, still significant dyspnea    Objective   Objective     Vitals:   Temp:  [97.2 °F (36.2 °C)-98.6 °F (37 °C)] 97.2 °F (36.2 °C)  Heart Rate:  [71-80] 73  Resp:  [18-20] 18  BP: (135-148)/(73-92) 145/80    Physical Exam   GEN: No acute distress  HEENT: Mucous membranes  LUNGS: Good chest rise bilaterally  CARDIAC: Lower extremity edema    Result Review    Result Review:  I have personally reviewed the results as below  [x]  Laboratory  CBC        5/15/2023    14:07 2023    05:47 2023    04:50   CBC   WBC 7.30   6.88   7.59     RBC 3.36   3.34   3.21     Hemoglobin 8.8   8.5   8.3     Hematocrit 27.3   27.3   26.9     MCV 81.3   81.7   83.8     MCH 26.2   25.4   25.9     MCHC 32.2   31.1   30.9     RDW 19.4   19.7   19.7     Platelets 259   284   287       CMP        5/15/2023    14:07 5/15/2023    14:45 2023    05:47 2023    04:50   CMP   Glucose 256   283   194   289     BUN 27    26   29     Creatinine 1.84    1.87   2.08     EGFR 40.2    39.4   34.7     Sodium 133   135.1   132   134     Potassium 4.1     4.0   4.2     Chloride 103    102   101     Calcium 8.3    8.2   8.5     Total Protein 6.6    6.3   6.4     Albumin 2.6    2.5   2.5     Globulin 4.0    3.8   3.9     Total Bilirubin 0.4    0.4   0.3     Alkaline Phosphatase 128    114   111     AST (SGOT) 15    13   12     ALT (SGPT) 15    12   11     Albumin/Globulin Ratio 0.7    0.7   0.6     BUN/Creatinine Ratio 14.7    13.9   13.9     Anion Gap 8.8    8.8   9.2       []  Microbiology  []  Radiology  []  EKG/Telemetry   []  Cardiology/Vascular   []  Pathology  []  Old records  []  Other:    Assessment & Plan   Assessment / Plan     Assessment:  Acute hypoxic respiratory failure  Acute on chronic heart failure with preserved ejection fraction EF 50%  Type 2 diabetes with hyperglycemia  Hypertension  [place]ong[place] metabolic acidosis  CKD stage IIIb with baseline creatinine between 1.8-2.2 currently 1.87  COVID and influenza rule out  Elevated troponin due to heart failure and kidney disease  History of bilateral DVT  Chronic anemia  Recent CVA      Plan:  -- Admit to hospital service   -- Wean O2 for SPO2 greater than 88%  -- continue 40 of IV Lasix twice daily monitor volume status closely  -- Continue heart healthy diet and carb consistent  -- Continue 2 g sodium restriction and 2000 mL fluid restrict  -- COVID and influenza reviewed and negative  -- Continue sliding scale for type 2 diabetes, add Levemir today for hyperglycemia  -- Continue apixaban for bilateral DVT, tolerating, monitor hemoglobin, reviewed  -- Continue Coreg  -- CBC, CMP reviewed  -- Repeat CBC, CMP, mag and Phos in a.m.  -- Chest x-ray personally reviewed, consistent with pulmonary edema    Reviewed patients labs and imaging, and discussed with patient and nurse at bedside.    DVT prophylaxis:  Medical DVT prophylaxis orders are present.    CODE STATUS:   Level Of Support Discussed With: Patient  Code Status (Patient has no pulse and is not breathing): CPR (Attempt to Resuscitate)  Medical  Interventions (Patient has pulse or is breathing): Full Support        Electronically signed by Ramon Card MD, 05/17/23, 9:24 AM EDT.

## 2023-05-17 NOTE — PLAN OF CARE
Goal Outcome Evaluation:  Plan of Care Reviewed With: spouse        Progress: no change  Outcome Evaluation: Pt on RA; 2L with exertion. No acute overnight events.

## 2023-05-17 NOTE — PLAN OF CARE
Goal Outcome Evaluation:        Pt VS WNL Pt up ambulating in room. Pt tolerated ivp lasix well. Pt has c/o fluid restrictions.

## 2023-05-18 LAB
ALBUMIN SERPL-MCNC: 2.6 G/DL (ref 3.5–5.2)
ALBUMIN/GLOB SERPL: 0.7 G/DL
ALP SERPL-CCNC: 107 U/L (ref 39–117)
ALT SERPL W P-5'-P-CCNC: 10 U/L (ref 1–41)
ANION GAP SERPL CALCULATED.3IONS-SCNC: 11.8 MMOL/L (ref 5–15)
AST SERPL-CCNC: 11 U/L (ref 1–40)
BASOPHILS # BLD AUTO: 0.03 10*3/MM3 (ref 0–0.2)
BASOPHILS NFR BLD AUTO: 0.4 % (ref 0–1.5)
BILIRUB SERPL-MCNC: 0.3 MG/DL (ref 0–1.2)
BUN SERPL-MCNC: 29 MG/DL (ref 8–23)
BUN/CREAT SERPL: 14.6 (ref 7–25)
CALCIUM SPEC-SCNC: 8.2 MG/DL (ref 8.6–10.5)
CHLORIDE SERPL-SCNC: 101 MMOL/L (ref 98–107)
CO2 SERPL-SCNC: 24.2 MMOL/L (ref 22–29)
CREAT SERPL-MCNC: 1.99 MG/DL (ref 0.76–1.27)
DEPRECATED RDW RBC AUTO: 57.5 FL (ref 37–54)
EGFRCR SERPLBLD CKD-EPI 2021: 36.6 ML/MIN/1.73
EOSINOPHIL # BLD AUTO: 0.26 10*3/MM3 (ref 0–0.4)
EOSINOPHIL NFR BLD AUTO: 3.1 % (ref 0.3–6.2)
ERYTHROCYTE [DISTWIDTH] IN BLOOD BY AUTOMATED COUNT: 19.5 % (ref 12.3–15.4)
GLOBULIN UR ELPH-MCNC: 3.6 GM/DL
GLUCOSE BLDC GLUCOMTR-MCNC: 114 MG/DL (ref 70–99)
GLUCOSE BLDC GLUCOMTR-MCNC: 129 MG/DL (ref 70–99)
GLUCOSE BLDC GLUCOMTR-MCNC: 245 MG/DL (ref 70–99)
GLUCOSE SERPL-MCNC: 125 MG/DL (ref 65–99)
HCT VFR BLD AUTO: 27.3 % (ref 37.5–51)
HGB BLD-MCNC: 8.7 G/DL (ref 13–17.7)
IMM GRANULOCYTES # BLD AUTO: 0.02 10*3/MM3 (ref 0–0.05)
IMM GRANULOCYTES NFR BLD AUTO: 0.2 % (ref 0–0.5)
LYMPHOCYTES # BLD AUTO: 2.89 10*3/MM3 (ref 0.7–3.1)
LYMPHOCYTES NFR BLD AUTO: 34.5 % (ref 19.6–45.3)
MAGNESIUM SERPL-MCNC: 1.9 MG/DL (ref 1.6–2.4)
MCH RBC QN AUTO: 25.8 PG (ref 26.6–33)
MCHC RBC AUTO-ENTMCNC: 31.9 G/DL (ref 31.5–35.7)
MCV RBC AUTO: 81 FL (ref 79–97)
MONOCYTES # BLD AUTO: 0.74 10*3/MM3 (ref 0.1–0.9)
MONOCYTES NFR BLD AUTO: 8.8 % (ref 5–12)
NEUTROPHILS NFR BLD AUTO: 4.44 10*3/MM3 (ref 1.7–7)
NEUTROPHILS NFR BLD AUTO: 53 % (ref 42.7–76)
NRBC BLD AUTO-RTO: 0 /100 WBC (ref 0–0.2)
PHOSPHATE SERPL-MCNC: 3.5 MG/DL (ref 2.5–4.5)
PLATELET # BLD AUTO: 308 10*3/MM3 (ref 140–450)
PMV BLD AUTO: 9.4 FL (ref 6–12)
POTASSIUM SERPL-SCNC: 4 MMOL/L (ref 3.5–5.2)
PROT SERPL-MCNC: 6.2 G/DL (ref 6–8.5)
RBC # BLD AUTO: 3.37 10*6/MM3 (ref 4.14–5.8)
SODIUM SERPL-SCNC: 137 MMOL/L (ref 136–145)
WBC NRBC COR # BLD: 8.38 10*3/MM3 (ref 3.4–10.8)

## 2023-05-18 PROCEDURE — 99233 SBSQ HOSP IP/OBS HIGH 50: CPT | Performed by: INTERNAL MEDICINE

## 2023-05-18 PROCEDURE — 25010000002 MAGNESIUM SULFATE 2 GM/50ML SOLUTION: Performed by: INTERNAL MEDICINE

## 2023-05-18 PROCEDURE — 25010000002 FUROSEMIDE PER 20 MG: Performed by: INTERNAL MEDICINE

## 2023-05-18 PROCEDURE — 63710000001 INSULIN DETEMIR PER 5 UNITS: Performed by: INTERNAL MEDICINE

## 2023-05-18 PROCEDURE — 82948 REAGENT STRIP/BLOOD GLUCOSE: CPT

## 2023-05-18 PROCEDURE — 63710000001 INSULIN LISPRO (HUMAN) PER 5 UNITS: Performed by: INTERNAL MEDICINE

## 2023-05-18 PROCEDURE — 83735 ASSAY OF MAGNESIUM: CPT | Performed by: INTERNAL MEDICINE

## 2023-05-18 PROCEDURE — 85025 COMPLETE CBC W/AUTO DIFF WBC: CPT | Performed by: INTERNAL MEDICINE

## 2023-05-18 PROCEDURE — 94799 UNLISTED PULMONARY SVC/PX: CPT

## 2023-05-18 PROCEDURE — 80053 COMPREHEN METABOLIC PANEL: CPT | Performed by: INTERNAL MEDICINE

## 2023-05-18 PROCEDURE — 84100 ASSAY OF PHOSPHORUS: CPT | Performed by: INTERNAL MEDICINE

## 2023-05-18 RX ORDER — MAGNESIUM SULFATE HEPTAHYDRATE 40 MG/ML
2 INJECTION, SOLUTION INTRAVENOUS ONCE
Status: COMPLETED | OUTPATIENT
Start: 2023-05-18 | End: 2023-05-18

## 2023-05-18 RX ADMIN — FUROSEMIDE 40 MG: 10 INJECTION, SOLUTION INTRAMUSCULAR; INTRAVENOUS at 08:32

## 2023-05-18 RX ADMIN — ATORVASTATIN CALCIUM 80 MG: 40 TABLET, FILM COATED ORAL at 21:46

## 2023-05-18 RX ADMIN — INSULIN LISPRO 3 UNITS: 100 INJECTION, SOLUTION INTRAVENOUS; SUBCUTANEOUS at 11:48

## 2023-05-18 RX ADMIN — APIXABAN 5 MG: 5 TABLET, FILM COATED ORAL at 08:34

## 2023-05-18 RX ADMIN — MAGNESIUM SULFATE HEPTAHYDRATE 2 G: 2 INJECTION, SOLUTION INTRAVENOUS at 12:30

## 2023-05-18 RX ADMIN — CARVEDILOL 25 MG: 12.5 TABLET, FILM COATED ORAL at 17:25

## 2023-05-18 RX ADMIN — CARVEDILOL 25 MG: 12.5 TABLET, FILM COATED ORAL at 08:33

## 2023-05-18 RX ADMIN — INSULIN DETEMIR 10 UNITS: 100 INJECTION, SOLUTION SUBCUTANEOUS at 08:32

## 2023-05-18 RX ADMIN — ALLOPURINOL 100 MG: 100 TABLET ORAL at 08:33

## 2023-05-18 RX ADMIN — APIXABAN 5 MG: 5 TABLET, FILM COATED ORAL at 21:46

## 2023-05-18 RX ADMIN — AMLODIPINE BESYLATE 5 MG: 5 TABLET ORAL at 08:33

## 2023-05-18 RX ADMIN — Medication 10 ML: at 08:34

## 2023-05-18 RX ADMIN — Medication 10 ML: at 21:47

## 2023-05-18 RX ADMIN — ACETAMINOPHEN 650 MG: 325 TABLET ORAL at 21:50

## 2023-05-18 RX ADMIN — SPIRONOLACTONE 25 MG: 25 TABLET ORAL at 08:34

## 2023-05-18 RX ADMIN — FUROSEMIDE 40 MG: 10 INJECTION, SOLUTION INTRAMUSCULAR; INTRAVENOUS at 17:25

## 2023-05-18 NOTE — PROGRESS NOTES
[facility]   Hospitalist Progress Note  Date: 2023  Patient Name: Lyle Lozano  : 1955  MRN: [id number]  Date of admission: 5/15/2023    Subjective   Subjective     Chief Complaint:   Shortness of breath    Summary:   Lyle Lozano is a 67 y.o. male past medical history of CKD stage IIIb with baseline creatinine 1.8-2.2, heart failure with preserved ejection fraction EF of 50%, type 2 diabetes, hypertension, dyslipidemia, gout, and obstructive sleep apnea who presents with shortness of breath     Patient was discharged home and he is not sure if he took his Lasix or not.  He states that he started having shortness of breath.  No chest pain.  No fevers.  No chills.  No cough little bit of lower extremity swelling.  Since he was not getting better, he came the ER for further evaluation.    Interval Followup:   No acute events overnight, breathing improved but still not at baseline    Objective   Objective     Vitals:   Temp:  [97.7 °F (36.5 °C)-99.1 °F (37.3 °C)] 97.9 °F (36.6 °C)  Heart Rate:  [68-76] 76  Resp:  [17-18] 17  BP: (121-143)/(63-80) 143/77    Physical Exam   GEN: No acute distress  HEENT: Mucous membranes  LUNGS: Good chest rise bilaterally  CARDIAC: Lower extremity edema    Result Review    Result Review:  I have personally reviewed the results as below  [x]  Laboratory  CBC        2023    05:47 2023    04:50 2023    04:37   CBC   WBC 6.88   7.59   8.38     RBC 3.34   3.21   3.37     Hemoglobin 8.5   8.3   8.7     Hematocrit 27.3   26.9   27.3     MCV 81.7   83.8   81.0     MCH 25.4   25.9   25.8     MCHC 31.1   30.9   31.9     RDW 19.7   19.7   19.5     Platelets 284   287   308       CMP        2023    05:47 2023    04:50 2023    04:37   CMP   Glucose 194   289   125     BUN 26   29   29     Creatinine 1.87   2.08   1.99     EGFR 39.4   34.7   36.6     Sodium 132   134   137     Potassium 4.0   4.2   4.0     Chloride 102   101   101      Calcium 8.2   8.5   8.2     Total Protein 6.3   6.4   6.2     Albumin 2.5   2.5   2.6     Globulin 3.8   3.9   3.6     Total Bilirubin 0.4   0.3   0.3     Alkaline Phosphatase 114   111   107     AST (SGOT) 13   12   11     ALT (SGPT) 12   11   10     Albumin/Globulin Ratio 0.7   0.6   0.7     BUN/Creatinine Ratio 13.9   13.9   14.6     Anion Gap 8.8   9.2   11.8       []  Microbiology  []  Radiology  []  EKG/Telemetry   []  Cardiology/Vascular   []  Pathology  []  Old records  []  Other:    Assessment & Plan   Assessment / Plan     Assessment:  Acute hypoxic respiratory failure  Acute on chronic heart failure with preserved ejection fraction EF 50%  Type 2 diabetes with hyperglycemia  Hypertension  [place] metabolic acidosis  CKD stage IIIb with baseline creatinine between 1.8-2.2 currently 1.87  COVID and influenza rule out  Elevated troponin due to heart failure and kidney disease  History of bilateral DVT  Chronic anemia  Recent CVA  NSVT    Plan:  -- Admit to hospital service   -- Wean O2 for SPO2 greater than 88%  -- continue 40 of IV Lasix twice daily monitor volume status closely, improving, monitor renal function  -- Continue heart healthy diet and carb consistent  -- Continue 2 g sodium restriction and 2000 mL fluid restrict  -- COVID and influenza reviewed and negative  -- Continue sliding scale for type 2 diabetes, add Levemir today for hyperglycemia  -- Continue apixaban for bilateral DVT, tolerating, monitor hemoglobin, reviewed  -- Continue Coreg, heart rate appropriate  -- Give 2 g IV mag today  -- CBC, CMP reviewed  -- Repeat CBC, CMP, mag and Phos in a.m.  -- Chest x-ray personally reviewed, consistent with pulmonary edema    Reviewed patients labs and imaging, and discussed with patient and nurse at bedside.    DVT prophylaxis:  Medical DVT prophylaxis orders are present.    CODE STATUS:   Level Of Support Discussed With: Patient  Code Status (Patient has no pulse and is not breathing): CPR  (Attempt to Resuscitate)  Medical Interventions (Patient has pulse or is breathing): Full Support        Electronically signed by Ramon Card MD, 05/18/23, 9:36 AM EDT.

## 2023-05-18 NOTE — PLAN OF CARE
Goal Outcome Evaluation:  Plan of Care Reviewed With: patient        Progress: no change     VSS this shift with no significant changes to report. Will continue POC.

## 2023-05-18 NOTE — CONSULTS
Patient sleeping at time of visit, not responding to his name being spoken. Will follow up at another time.

## 2023-05-19 ENCOUNTER — READMISSION MANAGEMENT (OUTPATIENT)
Dept: CALL CENTER | Facility: HOSPITAL | Age: 68
End: 2023-05-19
Payer: MEDICARE

## 2023-05-19 ENCOUNTER — TELEPHONE (OUTPATIENT)
Dept: CARDIOLOGY | Facility: CLINIC | Age: 68
End: 2023-05-19

## 2023-05-19 VITALS
HEIGHT: 67 IN | WEIGHT: 173.1 LBS | BODY MASS INDEX: 27.17 KG/M2 | SYSTOLIC BLOOD PRESSURE: 130 MMHG | RESPIRATION RATE: 18 BRPM | OXYGEN SATURATION: 100 % | DIASTOLIC BLOOD PRESSURE: 73 MMHG | TEMPERATURE: 97.6 F | HEART RATE: 99 BPM

## 2023-05-19 PROBLEM — I50.33 ACUTE ON CHRONIC HEART FAILURE WITH PRESERVED EJECTION FRACTION (HFPEF): Status: ACTIVE | Noted: 2023-05-19

## 2023-05-19 LAB
ALBUMIN SERPL-MCNC: 2.3 G/DL (ref 3.5–5.2)
ALBUMIN/GLOB SERPL: 0.6 G/DL
ALP SERPL-CCNC: 100 U/L (ref 39–117)
ALT SERPL W P-5'-P-CCNC: 8 U/L (ref 1–41)
ANION GAP SERPL CALCULATED.3IONS-SCNC: 7.8 MMOL/L (ref 5–15)
AST SERPL-CCNC: 9 U/L (ref 1–40)
BASOPHILS # BLD AUTO: 0.04 10*3/MM3 (ref 0–0.2)
BASOPHILS NFR BLD AUTO: 0.5 % (ref 0–1.5)
BILIRUB SERPL-MCNC: 0.2 MG/DL (ref 0–1.2)
BUN SERPL-MCNC: 36 MG/DL (ref 8–23)
BUN/CREAT SERPL: 16.8 (ref 7–25)
CALCIUM SPEC-SCNC: 7.7 MG/DL (ref 8.6–10.5)
CHLORIDE SERPL-SCNC: 100 MMOL/L (ref 98–107)
CO2 SERPL-SCNC: 24.2 MMOL/L (ref 22–29)
CREAT SERPL-MCNC: 2.14 MG/DL (ref 0.76–1.27)
DEPRECATED RDW RBC AUTO: 57.1 FL (ref 37–54)
EGFRCR SERPLBLD CKD-EPI 2021: 33.5 ML/MIN/1.73
EOSINOPHIL # BLD AUTO: 0.28 10*3/MM3 (ref 0–0.4)
EOSINOPHIL NFR BLD AUTO: 3.3 % (ref 0.3–6.2)
ERYTHROCYTE [DISTWIDTH] IN BLOOD BY AUTOMATED COUNT: 18.8 % (ref 12.3–15.4)
GLOBULIN UR ELPH-MCNC: 3.8 GM/DL
GLUCOSE BLDC GLUCOMTR-MCNC: 178 MG/DL (ref 70–99)
GLUCOSE BLDC GLUCOMTR-MCNC: 272 MG/DL (ref 70–99)
GLUCOSE SERPL-MCNC: 190 MG/DL (ref 65–99)
HCT VFR BLD AUTO: 26.3 % (ref 37.5–51)
HGB BLD-MCNC: 8.2 G/DL (ref 13–17.7)
IMM GRANULOCYTES # BLD AUTO: 0.02 10*3/MM3 (ref 0–0.05)
IMM GRANULOCYTES NFR BLD AUTO: 0.2 % (ref 0–0.5)
LYMPHOCYTES # BLD AUTO: 2.82 10*3/MM3 (ref 0.7–3.1)
LYMPHOCYTES NFR BLD AUTO: 33.4 % (ref 19.6–45.3)
MAGNESIUM SERPL-MCNC: 2.1 MG/DL (ref 1.6–2.4)
MCH RBC QN AUTO: 25.9 PG (ref 26.6–33)
MCHC RBC AUTO-ENTMCNC: 31.2 G/DL (ref 31.5–35.7)
MCV RBC AUTO: 83 FL (ref 79–97)
MONOCYTES # BLD AUTO: 0.91 10*3/MM3 (ref 0.1–0.9)
MONOCYTES NFR BLD AUTO: 10.8 % (ref 5–12)
NEUTROPHILS NFR BLD AUTO: 4.37 10*3/MM3 (ref 1.7–7)
NEUTROPHILS NFR BLD AUTO: 51.8 % (ref 42.7–76)
NRBC BLD AUTO-RTO: 0 /100 WBC (ref 0–0.2)
PHOSPHATE SERPL-MCNC: 3.5 MG/DL (ref 2.5–4.5)
PLATELET # BLD AUTO: 265 10*3/MM3 (ref 140–450)
PMV BLD AUTO: 9 FL (ref 6–12)
POTASSIUM SERPL-SCNC: 3.9 MMOL/L (ref 3.5–5.2)
PROT SERPL-MCNC: 6.1 G/DL (ref 6–8.5)
RBC # BLD AUTO: 3.17 10*6/MM3 (ref 4.14–5.8)
SODIUM SERPL-SCNC: 132 MMOL/L (ref 136–145)
WBC NRBC COR # BLD: 8.44 10*3/MM3 (ref 3.4–10.8)

## 2023-05-19 PROCEDURE — 25010000002 FUROSEMIDE PER 20 MG: Performed by: INTERNAL MEDICINE

## 2023-05-19 PROCEDURE — 63710000001 INSULIN LISPRO (HUMAN) PER 5 UNITS: Performed by: INTERNAL MEDICINE

## 2023-05-19 PROCEDURE — 80053 COMPREHEN METABOLIC PANEL: CPT | Performed by: INTERNAL MEDICINE

## 2023-05-19 PROCEDURE — 84100 ASSAY OF PHOSPHORUS: CPT | Performed by: INTERNAL MEDICINE

## 2023-05-19 PROCEDURE — 94799 UNLISTED PULMONARY SVC/PX: CPT

## 2023-05-19 PROCEDURE — 99239 HOSP IP/OBS DSCHRG MGMT >30: CPT | Performed by: INTERNAL MEDICINE

## 2023-05-19 PROCEDURE — 83735 ASSAY OF MAGNESIUM: CPT | Performed by: INTERNAL MEDICINE

## 2023-05-19 PROCEDURE — 85025 COMPLETE CBC W/AUTO DIFF WBC: CPT | Performed by: INTERNAL MEDICINE

## 2023-05-19 PROCEDURE — 82948 REAGENT STRIP/BLOOD GLUCOSE: CPT

## 2023-05-19 PROCEDURE — 63710000001 INSULIN DETEMIR PER 5 UNITS: Performed by: INTERNAL MEDICINE

## 2023-05-19 RX ADMIN — AMLODIPINE BESYLATE 5 MG: 5 TABLET ORAL at 09:16

## 2023-05-19 RX ADMIN — FUROSEMIDE 40 MG: 10 INJECTION, SOLUTION INTRAMUSCULAR; INTRAVENOUS at 09:20

## 2023-05-19 RX ADMIN — ALLOPURINOL 100 MG: 100 TABLET ORAL at 09:16

## 2023-05-19 RX ADMIN — SPIRONOLACTONE 25 MG: 25 TABLET ORAL at 09:15

## 2023-05-19 RX ADMIN — Medication 10 ML: at 09:22

## 2023-05-19 RX ADMIN — INSULIN LISPRO 2 UNITS: 100 INJECTION, SOLUTION INTRAVENOUS; SUBCUTANEOUS at 09:15

## 2023-05-19 RX ADMIN — ACETAMINOPHEN 650 MG: 325 TABLET ORAL at 04:06

## 2023-05-19 RX ADMIN — APIXABAN 5 MG: 5 TABLET, FILM COATED ORAL at 09:16

## 2023-05-19 RX ADMIN — CARVEDILOL 25 MG: 12.5 TABLET, FILM COATED ORAL at 09:16

## 2023-05-19 RX ADMIN — INSULIN DETEMIR 10 UNITS: 100 INJECTION, SOLUTION SUBCUTANEOUS at 09:15

## 2023-05-19 NOTE — PLAN OF CARE
Problem: Adult Inpatient Plan of Care  Goal: Plan of Care Review  Outcome: Ongoing, Progressing  Flowsheets  Taken 5/19/2023 1349 by Chacha Bravo, RN  Progress: no change  Taken 5/19/2023 0654 by Kathy Rodas, RN  Plan of Care Reviewed With: patient   Goal Outcome Evaluation:           Progress: no change

## 2023-05-19 NOTE — PLAN OF CARE
Goal Outcome Evaluation:  Plan of Care Reviewed With: patient           Outcome Evaluation: Pt rested well, c/o pain/swelling to left hand, medicated per mar, VSS,NSR, plan to d/c home this am

## 2023-05-19 NOTE — DISCHARGE SUMMARY
[facility]         HOSPITALIST  DISCHARGE SUMMARY    Patient Name: Lyle Lozano  : 1955  MRN: [id number]    Date of Admission: 5/15/2023  Date of Discharge:  2023  Primary Care Physician: Heidi Villa APRN    Consults     Date and Time Order Name Status Description    5/15/2023  4:27 PM Inpatient Hospitalist Consult      2023  7:47 AM Inpatient Neurology Consult Stroke Completed           Active and Resolved Hospital Problems:  Acute hypoxic respiratory failure  Acute on chronic heart failure with preserved ejection fraction EF 50%  Type 2 diabetes with hyperglycemia  Hypertension  [place] metabolic acidosis  CKD stage IIIb with baseline creatinine between 1.8-2.2 currently 1.87  COVID and influenza rule out  Elevated troponin due to heart failure and kidney disease  History of bilateral DVT  Chronic anemia  Recent CVA  NSVT    Hospital Course     Hospital Course:  Lyle Lozano is a 67 y.o. male past medical history of CKD stage IIIb with baseline creatinine 1.8-2.2, heart failure with preserved ejection fraction EF of 50%, type 2 diabetes, hypertension, dyslipidemia, gout, and obstructive sleep apnea who presents with shortness of breath     Patient was discharged home and he is not sure if he took his Lasix or not.  He states that he started having shortness of breath.  No chest pain.  No fevers.  No chills.  No cough little bit of lower extremity swelling.  Since he was not getting better, he came the ER for further evaluation.    Patient was diuresed with IV Lasix, his breathing improved, patient was satting well on room air on day of discharge, he was euvolemic.  Patient will follow-up with heart failure clinic in 3 to 7 days of discharge.  Patient is instructed to be compliant with his diuretics.  Patient will follow-up in heart failure clinic, patient will follow-up with his PCP.  Patient is discharged home today in stable condition.    Day of  Discharge     Vital Signs:  Temp:  [97.3 °F (36.3 °C)-99.7 °F (37.6 °C)] 97.6 °F (36.4 °C)  Heart Rate:  [66-99] 99  Resp:  [17-18] 18  BP: (111-135)/(51-79) 130/73    Physical Exam:   GEN: No acute distress  HEENT: Mucous membranes  LUNGS: Good chest rise bilaterally  CARDIAC: No lower extremity edema    Discharge Details        Discharge Medications      Continue These Medications      Instructions Start Date   amLODIPine 5 MG tablet  Commonly known as: NORVASC   5 mg, Oral, Daily      apixaban 5 MG tablet tablet  Commonly known as: ELIQUIS   5 mg, Oral, Every 12 Hours Scheduled      atorvastatin 80 MG tablet  Commonly known as: LIPITOR   80 mg, Oral, Nightly      carvedilol 25 MG tablet  Commonly known as: COREG   25 mg, Oral, 2 Times Daily With Meals      cholecalciferol 25 MCG (1000 UT) tablet  Commonly known as: VITAMIN D3   1,000 Units, Oral, Daily      cyanocobalamin 500 MCG tablet  Commonly known as: VITAMIN B-12   500 mcg, Oral, Daily      Diclofenac Sodium 1 % gel gel  Commonly known as: VOLTAREN   2 g, Topical, 2 Times Daily PRN      famotidine 20 MG tablet  Commonly known as: PEPCID   20 mg, Oral, 2 Times Daily Before Meals      furosemide 40 MG tablet  Commonly known as: LASIX   40 mg, Oral, Daily      insulin detemir 100 UNIT/ML injection  Commonly known as: LEVEMIR   10 Units, Subcutaneous, Daily      Insulin Lispro 100 UNIT/ML injection  Commonly known as: humaLOG   2-9 Units, Subcutaneous, 3 Times Daily With Meals      spironolactone 25 MG tablet  Commonly known as: ALDACTONE   25 mg, Oral, Daily             No Known Allergies    Discharge Disposition:  Home or Self Care    Diet:  Hospital:  Diet Order   Procedures   • Diet: Cardiac Diets, Diabetic Diets, Fluid Restriction (240 mL/tray) Diets; Low Sodium (2g); Consistent Carbohydrate; 2000 mL/day; Texture: Regular Texture (IDDSI 7); Fluid Consistency: Thin (IDDSI 0)       Discharge Activity:       CODE STATUS:  Code Status and Medical  Interventions:   Ordered at: 05/15/23 1718     Level Of Support Discussed With:    Patient     Code Status (Patient has no pulse and is not breathing):    CPR (Attempt to Resuscitate)     Medical Interventions (Patient has pulse or is breathing):    Full Support       No future appointments.    Additional Instructions for the Follow-ups that You Need to Schedule     Discharge Follow-up with PCP   As directed       Currently Documented PCP:    Heidi Villa APRN    PCP Phone Number:    [phone]     Follow Up Details: 3 to 7 days         Discharge Follow-up with Specified Provider: heart failure clinic; 1 Week   As directed      To: heart failure clinic    Follow Up: 1 Week               Pertinent  and/or Most Recent Results     IMAGING:  CT Head Without Contrast    Result Date: 5/13/2023  PROCEDURE: CT HEAD WO CONTRAST  COMPARISON:  [facility], MR, MRI BRAIN WO CONTRAST, 4/11/2023, 9:26.  [facility], CT, CT HEAD WO CONTRAST, 4/15/2023, 0:22. INDICATIONS: Headache, chronic, new features or increased frequency  PROTOCOL:   Standard imaging protocol performed    RADIATION:   DLP: 1018.2 mGy*cm   MA and/or KV was adjusted to minimize radiation dose.     TECHNIQUE: After obtaining the patient's consent, CT images were obtained without non-ionic intravenous contrast material.  FINDINGS:  Brain:  Low-attenuation within the left frontal lobe compatible with evolution of infarct noted on prior study.  No acute intracranial hemorrhage is noted.  Remote lacunar infarcts noted a bilaterally.  Cerebellar tonsils are low lying.  Mild atrophy noted.  No suspicious extra-axial fluid collection.  Orbits:  Orbits are grossly unremarkable and periorbital soft tissues appear grossly unremarkable.  Sinuses:  Mucosal thickening noted of the paranasal sinuses.  Mastoid air cells are clear.  Debris, cerumen noted within the external auditory canal on the left.  Calvarium and soft tissues:  Bony calvarium is  intact.  Soft tissues are grossly unremarkable in appearance.         1. Continued evolution of subacute infarct left frontal lobe.  No acute abnormality noted     JUAN JOSE GILLETTE MD       Electronically Signed and Approved By: JUAN JOSE GILLETTE MD on 5/13/2023 at 12:25             CT Chest With Contrast Diagnostic    Result Date: 5/11/2023  PROCEDURE: CT CHEST W CONTRAST DIAGNOSTIC  COMPARISON:  [facility], CT, ABD PEL W/O CONTRAST, 5/21/2019, 16:09. INDICATIONS: shortness of breath, chest pain  TECHNIQUE: After obtaining the patient's consent, CT images were obtained with non-ionic intravenous contrast material.   PROTOCOL:   Pulmonary embolism imaging protocol performed    RADIATION:   DLP: 428.6 mGy*cm   Automated exposure control was utilized to minimize radiation dose. CONTRAST: 100 cc Isovue 370 I.V.  FINDINGS:  Pulmonary arteries are well opacified.  There is no evidence of pulmonary embolus.  Thoracic aorta is of normal caliber.  There is a large amount of coronary artery calcification.  There are small bilateral pleural effusions.  There is mild cardiomegaly.  No mediastinal, hilar, or axillary adenopathy.  There is reflux into the inferior vena cava and hepatic veins suggesting elevated right heart pressures.  There is subtle ground-glass attenuation within the perihilar regions of both lungs suggesting changes of pulmonary of the alert edema.  Multifocal pneumonia would also be within the differential.  No other focal airspace consolidation.  No suspicious pulmonary nodule.  Bilateral adrenal glands are within normal limits.  There are degenerative changes of the spine.  There are no suspicious lytic or sclerotic bony lesions identified.     CONCLUSION:  1. No evidence of pulmonary embolus 2. Small bilateral pleural effusions 3. Subtle ground-glass attenuation within the perihilar regions of both lungs which may be due to developing pulmonary alveolar edema.  Multifocal pneumonia would be  within the differential but felt to be less likely.     OSIRIS FRANKLIN MD       Electronically Signed and Approved By: OSIRIS FRANKLIN MD on 5/11/2023 at 17:26             XR Chest 1 View    Result Date: 5/15/2023  PROCEDURE: XR CHEST 1 VW  COMPARISON: [facility], CT, CT CHEST W CONTRAST DIAGNOSTIC, 5/11/2023, 17:01.  [facility], CR, XR CHEST 1 VW, 5/11/2023, 14:01.  INDICATIONS: SOA Triage Protocol  FINDINGS:  There is haziness in the basilar areas which could relate to known pleural effusions.  There are some interstitial changes within the lungs that could be due to edema or secondary to inflammatory infectious process.  The heart looks enlarged.        1. The chest remains abnormal as recently noted with findings that could relate to underlying interstitial process and bibasilar effusions. 2. Suggested cardiomegaly       FABIEN BOONE MD       Electronically Signed and Approved By: FABIEN BOONE MD on 5/15/2023 at 14:59             XR Chest 1 View    Result Date: 5/11/2023  PROCEDURE: XR CHEST 1 VW  COMPARISON: [facility], CR, XR CHEST 1 VW, 4/07/2023, 20:27.  INDICATIONS: SOA Triage Protocol  FINDINGS:  Study is limited by overlying support and monitoring apparatus.  Perigraft the heart and mediastinal contours are stable in appearance.  There is vascular crowding secondary to relatively low lung volumes and dependent atelectasis noted.  Findings are slightly more prominent on left than the right and are increased compared to the previous exam.  Osseous structures demonstrate no acute abnormality        1. Dependent opacities favored to represent atelectasis.  Pneumonia cannot be excluded.       JUAN JOSE GILLETTE MD       Electronically Signed and Approved By: JUAN JOSE GILLETTE MD on 5/11/2023 at 14:42               LAB RESULTS:      Lab 05/19/23  0448 05/18/23  0437 05/17/23  0450 05/16/23  0547 05/15/23  1445 05/15/23  1407   WBC 8.44 8.38 7.59 6.88  --  7.30   HEMOGLOBIN  8.2* 8.7* 8.3* 8.5*  --  8.8*   HEMATOCRIT 26.3* 27.3* 26.9* 27.3*  --  27.3*   PLATELETS 265 308 287 284  --  259   NEUTROS ABS 4.37 4.44 4.52 4.30  --  4.70   IMMATURE GRANS (ABS) 0.02 0.02 0.06* 0.02  --  0.03   LYMPHS ABS 2.82 2.89 2.11 2.03  --  1.88   MONOS ABS 0.91* 0.74 0.66 0.37  --  0.47   EOS ABS 0.28 0.26 0.22 0.13  --  0.17   MCV 83.0 81.0 83.8 81.7  --  81.3   LACTATE, ARTERIAL  --   --   --   --  1.75  --          Lab 05/19/23 0448 05/18/23 0437 05/17/23  0450 05/16/23  05 05/15/23  1445 05/15/23  1407   SODIUM 132* 137 134* 132*  --  133*   SODIUM, ARTERIAL  --   --   --   --  135.1*  --    POTASSIUM 3.9 4.0 4.2 4.0  --  4.1   CHLORIDE 100 101 101 102  --  103   CO2 24.2 24.2 23.8 21.2*  --  21.2*   ANION GAP 7.8 11.8 9.2 8.8  --  8.8   BUN 36* 29* 29* 26*  --  27*   CREATININE 2.14* 1.99* 2.08* 1.87*  --  1.84*   EGFR 33.5* 36.6* 34.7* 39.4*  --  40.2*   GLUCOSE 190* 125* 289* 194*  --  256*   GLUCOSE, ARTERIAL  --   --   --   --  283*  --    CALCIUM 7.7* 8.2* 8.5* 8.2*  --  8.3*   IONIZED CALCIUM  --   --   --   --  1.11*  --    MAGNESIUM 2.1 1.9 2.1 1.8  --   --    PHOSPHORUS 3.5 3.5 3.4  --   --   --          Lab 05/19/23 0448 05/18/23 0437 05/17/23  0450 05/16/23  0547 05/15/23  1407   TOTAL PROTEIN 6.1 6.2 6.4 6.3 6.6   ALBUMIN 2.3* 2.6* 2.5* 2.5* 2.6*   GLOBULIN 3.8 3.6 3.9 3.8 4.0   ALT (SGPT) 8 10 11 12 15   AST (SGOT) 9 11 12 13 15   BILIRUBIN 0.2 0.3 0.3 0.4 0.4   ALK PHOS 100 107 111 114 128*         Lab 05/16/23  0547 05/15/23  1613 05/15/23  1407   PROBNP  --   --  10,349.0*   HSTROP T 124* 105* 130*                 Lab 05/15/23  1445   PH, ARTERIAL 7.453*   PCO2, ARTERIAL 32.1*   PO2 ART 84.6   O2 SATURATION ART 95.6   FIO2 28   HCO3 ART 22.0   BASE EXCESS ART -1.5   CARBOXYHEMOGLOBIN 0.6     Brief Urine Lab Results  (Last result in the past 365 days)      Color   Clarity   Blood   Leuk Est   Nitrite   Protein   CREAT   Urine HCG        04/07/23 1611 Yellow   Clear   Moderate  (2+)   Negative   Negative   >=300 mg/dL (3+)               Microbiology Results (last 10 days)     Procedure Component Value - Date/Time    COVID-19,APTIMA PANTHER(DALJIT),[facility]/ NANCY, NP/OP SWAB IN UTM/VTM/SALINE TRANSPORT MEDIA,24 HR TAT - Swab, Nasopharynx [[id number]]  (Normal) Collected: 05/15/23 1755    Lab Status: Final result Specimen: Swab from Nasopharynx Updated: 05/16/23 0107     COVID19 Not Detected    Narrative:      Fact sheet for providers: https://www.fda.gov/media/055654/download     Fact sheet for patients: https://www.fda.gov/media/586786/download    Test performed by RT PCR.    Influenza Antigen, Rapid - Swab, Nasopharynx [[id number]]  (Normal) Collected: 05/15/23 1755    Lab Status: Final result Specimen: Swab from Nasopharynx Updated: 05/15/23 1829     Influenza A Ag, EIA Negative     Influenza B Ag, EIA Negative        Results for orders placed during the hospital encounter of 04/07/23    Duplex Venous Lower Extremity - Right CV-READ    Interpretation Summary  •  Acute right lower extremity deep vein thrombosis noted in the gastrocnemius.  •  All other right sided veins appeared normal.  •  This is a new finding in comparison to study dated 1/25/2023.    Results for orders placed during the hospital encounter of 04/07/23    Duplex Venous Lower Extremity - Right CV-READ    Interpretation Summary  •  Acute right lower extremity deep vein thrombosis noted in the gastrocnemius.  •  All other right sided veins appeared normal.  •  This is a new finding in comparison to study dated 1/25/2023.    Results for orders placed during the hospital encounter of 04/07/23    Adult Transthoracic Echo Complete W/ Cont if Necessary Per Protocol    Interpretation Summary  Technically limited study.  Borderline normal left ventricular systolic function ejection fraction around 50%.  Trace MR and trace TR      Time spent on Discharge including face to face service: Greater than 30 minutes      Electronically signed  by Ramon Card MD, 05/19/23, 11:46 AM EDT.

## 2023-05-19 NOTE — TELEPHONE ENCOUNTER
Caller: Lyle Lozano    Relationship to patient: Self    Best call back number: [phone]    Type of visit: INITIAL EVALUATION    Requested date: BY 05.26.23, FIRST AVAIL TO HUB IS 06.07.23    ADDITIONAL NOTES: JARRET ASTUDILLO CALLING TO SCHEDULE WITH DR WHITTINGTON.     [facility] NOTES THAT PT HAD APPTS FROM REFERRAL ON 02.08.23 AND 02.14.23 THAT WERE CANCELLED AND NO SHOWED.

## 2023-05-19 NOTE — OUTREACH NOTE
Prep Survey    Flowsheet Row Responses   [facility] patient discharged from? [facility]   Is LACE score < 7 ? No   Eligibility [facility]   Date of Admission 05/15/23   Date of Discharge 05/19/23   Discharge Disposition Home or Self Care   Discharge diagnosis Acute respiratory failure with hypoxia   Does the patient have one of the following disease processes/diagnoses(primary or secondary)? CHF   Does the patient have Home health ordered? No   Is there a DME ordered? No   Prep survey completed? Yes          Juany CRUZ - Registered Nurse

## 2023-05-21 ENCOUNTER — TRANSITIONAL CARE MANAGEMENT TELEPHONE ENCOUNTER (OUTPATIENT)
Dept: CALL CENTER | Facility: HOSPITAL | Age: 68
End: 2023-05-21
Payer: MEDICARE

## 2023-05-21 NOTE — OUTREACH NOTE
Call Center TCM Note    Flowsheet Row Responses   [facility] patient discharged from? [facility]   Does the patient have one of the following disease processes/diagnoses(primary or secondary)? CHF   TCM attempt successful? Yes  [No verbal relese on file]   Call start time 1333   Call end time 1336   Discharge diagnosis Acute respiratory failure with hypoxia   Meds reviewed with patient/caregiver? Yes   Is the patient having any side effects they believe may be caused by any medication additions or changes? No   Does the patient have all medications ordered at discharge? N/A   Is the patient taking all medications as directed (includes completed medication regime)? Yes   Comments Hospital d/c follow up 5/25/23   Does the patient have an appointment with their PCP within 7 days of discharge? Yes   Has home health visited the patient within 72 hours of discharge? N/A   Pulse Ox monitoring None   Psychosocial issues? No   Did the patient receive a copy of their discharge instructions? Yes   Nursing interventions Reviewed instructions with patient   What is the patient's perception of their health status since discharge? Improving   Nursing interventions Nurse provided patient education   Is the patient able to teach back signs and symptoms of worsening condition? (i.e. weight gain, shortness of air, etc.) Yes   If the patient is a current smoker, are they able to teach back resources for cessation? Not a smoker   Is the patient/caregiver able to teach back the hierarchy of who to call/visit for symptoms/problems? PCP, Specialist, Home health nurse, Urgent Care, ED, 911 Yes   Is the patient able to teach back Heart Failure Zones? Yes   CHF Zone this Call Green Zone   Green Zone Patient reports doing well, No new or worsening shortness of breath, Physical activity level is normal for you, No new swelling -  feet, ankles and legs look normal for you, Weight check stable, No chest pain   Green Zone Interventions Daily  weight check, Meds as directed, Low sodium diet   TCM call completed? Yes   Wrap up additional comments Patient doing well.He is weighing himself but not daily. Taking meds as prescribed   Call end time 1336   Would this patient benefit from a Referral to [facility] Social Work? No   Is the patient interested in additional calls from an ambulatory ?  NOTE:  applies to high risk patients requiring additional follow-up. No           Zoya Milligan RN    5/21/2023, 13:37 EDT

## 2023-06-02 ENCOUNTER — READMISSION MANAGEMENT (OUTPATIENT)
Dept: CALL CENTER | Facility: HOSPITAL | Age: 68
End: 2023-06-02

## 2023-06-02 NOTE — OUTREACH NOTE
CHF Week 2 Survey    Flowsheet Row Responses   [facility] facility patient discharged from? [facility]   Does the patient have one of the following disease processes/diagnoses(primary or secondary)? CHF   Week 2 attempt successful? No   Unsuccessful attempts Attempt 1          Tara Quarles Registered Nurse

## 2023-06-05 ENCOUNTER — TELEPHONE (OUTPATIENT)
Dept: CARDIOLOGY | Facility: CLINIC | Age: 68
End: 2023-06-05
Payer: MEDICARE

## 2023-06-05 NOTE — TELEPHONE ENCOUNTER
Left a voicemail for patient to return call to office if needing to reschedule/ cancel appointment. Reminded patient to bring all medications with them to there appointment

## 2023-06-08 ENCOUNTER — READMISSION MANAGEMENT (OUTPATIENT)
Dept: CALL CENTER | Facility: HOSPITAL | Age: 68
End: 2023-06-08
Payer: MEDICARE

## 2023-06-08 NOTE — OUTREACH NOTE
CHF Week 2 Survey      Flowsheet Row Responses   [facility] [place] facility patient discharged from? [facility]   Does the patient have one of the following disease processes/diagnoses(primary or secondary)? CHF   Week 2 attempt successful? No   Unsuccessful attempts Attempt 2            Rox ALDRIDGE - Registered Nurse

## 2023-06-14 RX ORDER — SACUBITRIL AND VALSARTAN 97; 103 MG/1; MG/1
TABLET, FILM COATED ORAL
Qty: 180 TABLET | Refills: 3 | OUTPATIENT
Start: 2023-06-14

## 2023-06-27 PROBLEM — I50.22 CHRONIC SYSTOLIC HEART FAILURE: Status: RESOLVED | Noted: 2023-05-14 | Resolved: 2023-06-27

## 2023-06-27 PROBLEM — I50.21 ACUTE HFREF (HEART FAILURE WITH REDUCED EJECTION FRACTION): Status: RESOLVED | Noted: 2023-05-14 | Resolved: 2023-06-27

## 2023-06-27 PROBLEM — I50.23 ACUTE ON CHRONIC HFREF (HEART FAILURE WITH REDUCED EJECTION FRACTION): Status: RESOLVED | Noted: 2023-03-23 | Resolved: 2023-06-27

## 2023-06-27 PROBLEM — I50.21 ACUTE SYSTOLIC HEART FAILURE: Status: RESOLVED | Noted: 2023-05-14 | Resolved: 2023-06-27

## 2023-06-27 PROBLEM — I50.9 ACUTE ON CHRONIC CONGESTIVE HEART FAILURE, UNSPECIFIED HEART FAILURE TYPE: Status: RESOLVED | Noted: 2023-03-16 | Resolved: 2023-06-27

## 2023-07-08 PROBLEM — A41.9 SEPSIS: Status: ACTIVE | Noted: 2023-07-08

## 2023-07-24 ENCOUNTER — APPOINTMENT (OUTPATIENT)
Dept: GENERAL RADIOLOGY | Facility: HOSPITAL | Age: 68
End: 2023-07-24
Payer: MEDICARE

## 2023-07-24 ENCOUNTER — HOSPITAL ENCOUNTER (EMERGENCY)
Facility: HOSPITAL | Age: 68
Discharge: HOME | End: 2023-07-24
Attending: EMERGENCY MEDICINE | Admitting: EMERGENCY MEDICINE
Payer: MEDICARE

## 2023-07-24 ENCOUNTER — TELEPHONE (OUTPATIENT)
Dept: INTERNAL MEDICINE | Facility: CLINIC | Age: 68
End: 2023-07-24

## 2023-07-24 VITALS
BODY MASS INDEX: 28.12 KG/M2 | DIASTOLIC BLOOD PRESSURE: 106 MMHG | RESPIRATION RATE: 14 BRPM | HEIGHT: 66 IN | WEIGHT: 175 LBS | OXYGEN SATURATION: 98 % | TEMPERATURE: 98.9 F | HEART RATE: 83 BPM | SYSTOLIC BLOOD PRESSURE: 164 MMHG

## 2023-07-24 DIAGNOSIS — R73.9 HYPERGLYCEMIA: ICD-10-CM

## 2023-07-24 DIAGNOSIS — I50.9 ACUTE ON CHRONIC CONGESTIVE HEART FAILURE, UNSPECIFIED HEART FAILURE TYPE: Primary | ICD-10-CM

## 2023-07-24 LAB
ALBUMIN SERPL-MCNC: 2.8 G/DL (ref 3.5–5.2)
ALBUMIN/GLOB SERPL: 0.8 G/DL
ALP SERPL-CCNC: 142 U/L (ref 39–117)
ALT SERPL W P-5'-P-CCNC: 58 U/L (ref 1–41)
ANION GAP SERPL CALCULATED.3IONS-SCNC: 8.4 MMOL/L (ref 5–15)
AST SERPL-CCNC: 43 U/L (ref 1–40)
BASOPHILS # BLD AUTO: 0.05 10*3/MM3 (ref 0–0.2)
BASOPHILS NFR BLD AUTO: 0.5 % (ref 0–1.5)
BILIRUB SERPL-MCNC: 0.2 MG/DL (ref 0–1.2)
BUN SERPL-MCNC: 34 MG/DL (ref 8–23)
BUN/CREAT SERPL: 19.4 (ref 7–25)
CALCIUM SPEC-SCNC: 8.4 MG/DL (ref 8.6–10.5)
CHLORIDE SERPL-SCNC: 100 MMOL/L (ref 98–107)
CO2 SERPL-SCNC: 24.6 MMOL/L (ref 22–29)
CREAT SERPL-MCNC: 1.75 MG/DL (ref 0.76–1.27)
DEPRECATED RDW RBC AUTO: 50.1 FL (ref 37–54)
EGFRCR SERPLBLD CKD-EPI 2021: 42.7 ML/MIN/1.73
EOSINOPHIL # BLD AUTO: 0.11 10*3/MM3 (ref 0–0.4)
EOSINOPHIL NFR BLD AUTO: 1.1 % (ref 0.3–6.2)
ERYTHROCYTE [DISTWIDTH] IN BLOOD BY AUTOMATED COUNT: 16.8 % (ref 12.3–15.4)
GEN 5 2HR TROPONIN T REFLEX: 90 NG/L
GLOBULIN UR ELPH-MCNC: 3.4 GM/DL
GLUCOSE BLDC GLUCOMTR-MCNC: 374 MG/DL (ref 70–99)
GLUCOSE BLDC GLUCOMTR-MCNC: 411 MG/DL (ref 70–99)
GLUCOSE SERPL-MCNC: 435 MG/DL (ref 65–99)
HCT VFR BLD AUTO: 32.5 % (ref 37.5–51)
HGB BLD-MCNC: 10.6 G/DL (ref 13–17.7)
HOLD SPECIMEN: NORMAL
HOLD SPECIMEN: NORMAL
IMM GRANULOCYTES # BLD AUTO: 0.04 10*3/MM3 (ref 0–0.05)
IMM GRANULOCYTES NFR BLD AUTO: 0.4 % (ref 0–0.5)
LYMPHOCYTES # BLD AUTO: 1.77 10*3/MM3 (ref 0.7–3.1)
LYMPHOCYTES NFR BLD AUTO: 17.8 % (ref 19.6–45.3)
MCH RBC QN AUTO: 26.8 PG (ref 26.6–33)
MCHC RBC AUTO-ENTMCNC: 32.6 G/DL (ref 31.5–35.7)
MCV RBC AUTO: 82.3 FL (ref 79–97)
MONOCYTES # BLD AUTO: 0.57 10*3/MM3 (ref 0.1–0.9)
MONOCYTES NFR BLD AUTO: 5.7 % (ref 5–12)
NEUTROPHILS NFR BLD AUTO: 7.4 10*3/MM3 (ref 1.7–7)
NEUTROPHILS NFR BLD AUTO: 74.5 % (ref 42.7–76)
NRBC BLD AUTO-RTO: 0 /100 WBC (ref 0–0.2)
NT-PROBNP SERPL-MCNC: ABNORMAL PG/ML (ref 0–900)
PLATELET # BLD AUTO: 310 10*3/MM3 (ref 140–450)
PMV BLD AUTO: 9.1 FL (ref 6–12)
POTASSIUM SERPL-SCNC: 3.6 MMOL/L (ref 3.5–5.2)
PROT SERPL-MCNC: 6.2 G/DL (ref 6–8.5)
RBC # BLD AUTO: 3.95 10*6/MM3 (ref 4.14–5.8)
SODIUM SERPL-SCNC: 133 MMOL/L (ref 136–145)
TROPONIN T DELTA: -6 NG/L
TROPONIN T SERPL HS-MCNC: 96 NG/L
WBC NRBC COR # BLD: 9.94 10*3/MM3 (ref 3.4–10.8)
WHOLE BLOOD HOLD COAG: NORMAL
WHOLE BLOOD HOLD SPECIMEN: NORMAL

## 2023-07-24 PROCEDURE — 36415 COLL VENOUS BLD VENIPUNCTURE: CPT

## 2023-07-24 PROCEDURE — 25010000002 METHYLPREDNISOLONE PER 125 MG: Performed by: EMERGENCY MEDICINE

## 2023-07-24 PROCEDURE — 93005 ELECTROCARDIOGRAM TRACING: CPT

## 2023-07-24 PROCEDURE — 63710000001 INSULIN REGULAR HUMAN PER 5 UNITS: Performed by: EMERGENCY MEDICINE

## 2023-07-24 PROCEDURE — 96375 TX/PRO/DX INJ NEW DRUG ADDON: CPT

## 2023-07-24 PROCEDURE — 71045 X-RAY EXAM CHEST 1 VIEW: CPT

## 2023-07-24 PROCEDURE — 84484 ASSAY OF TROPONIN QUANT: CPT | Performed by: EMERGENCY MEDICINE

## 2023-07-24 PROCEDURE — 80053 COMPREHEN METABOLIC PANEL: CPT | Performed by: EMERGENCY MEDICINE

## 2023-07-24 PROCEDURE — 94640 AIRWAY INHALATION TREATMENT: CPT

## 2023-07-24 PROCEDURE — 85025 COMPLETE CBC W/AUTO DIFF WBC: CPT

## 2023-07-24 PROCEDURE — 99284 EMERGENCY DEPT VISIT MOD MDM: CPT

## 2023-07-24 PROCEDURE — 96374 THER/PROPH/DIAG INJ IV PUSH: CPT

## 2023-07-24 PROCEDURE — 83880 ASSAY OF NATRIURETIC PEPTIDE: CPT | Performed by: EMERGENCY MEDICINE

## 2023-07-24 PROCEDURE — 94799 UNLISTED PULMONARY SVC/PX: CPT

## 2023-07-24 PROCEDURE — 93005 ELECTROCARDIOGRAM TRACING: CPT | Performed by: EMERGENCY MEDICINE

## 2023-07-24 PROCEDURE — 25010000002 FUROSEMIDE PER 20 MG: Performed by: EMERGENCY MEDICINE

## 2023-07-24 PROCEDURE — 82948 REAGENT STRIP/BLOOD GLUCOSE: CPT

## 2023-07-24 RX ORDER — FUROSEMIDE 10 MG/ML
40 INJECTION INTRAMUSCULAR; INTRAVENOUS ONCE
Status: COMPLETED | OUTPATIENT
Start: 2023-07-24 | End: 2023-07-24

## 2023-07-24 RX ORDER — SODIUM CHLORIDE 0.9 % (FLUSH) 0.9 %
10 SYRINGE (ML) INJECTION AS NEEDED
Status: DISCONTINUED | OUTPATIENT
Start: 2023-07-24 | End: 2023-07-24 | Stop reason: HOSPADM

## 2023-07-24 RX ORDER — METHYLPREDNISOLONE SODIUM SUCCINATE 125 MG/2ML
125 INJECTION, POWDER, LYOPHILIZED, FOR SOLUTION INTRAMUSCULAR; INTRAVENOUS ONCE
Status: COMPLETED | OUTPATIENT
Start: 2023-07-24 | End: 2023-07-24

## 2023-07-24 RX ORDER — IPRATROPIUM BROMIDE AND ALBUTEROL SULFATE 2.5; .5 MG/3ML; MG/3ML
3 SOLUTION RESPIRATORY (INHALATION) ONCE
Status: COMPLETED | OUTPATIENT
Start: 2023-07-24 | End: 2023-07-24

## 2023-07-24 RX ADMIN — INSULIN HUMAN 12 UNITS: 100 INJECTION, SOLUTION PARENTERAL at 16:21

## 2023-07-24 RX ADMIN — INSULIN HUMAN 10 UNITS: 100 INJECTION, SOLUTION PARENTERAL at 14:53

## 2023-07-24 RX ADMIN — FUROSEMIDE 40 MG: 10 INJECTION, SOLUTION INTRAMUSCULAR; INTRAVENOUS at 14:46

## 2023-07-24 RX ADMIN — IPRATROPIUM BROMIDE AND ALBUTEROL SULFATE 3 ML: .5; 3 SOLUTION RESPIRATORY (INHALATION) at 14:54

## 2023-07-24 RX ADMIN — METHYLPREDNISOLONE SODIUM SUCCINATE 125 MG: 125 INJECTION INTRAMUSCULAR; INTRAVENOUS at 13:18

## 2023-07-24 NOTE — TELEPHONE ENCOUNTER
Caller: KATIE BEGUM    Relationship: Significant Other    Best call back number: [phone]     What is the best time to reach you: ANY    Who are you requesting to speak with (clinical staff, provider,  specific staff member): CLINICAL STAFF    What was the call regarding: PATIENTS FIANCE CALLED STATING THAT THE HUMALOG IS TOO EXPENSIVE AND WOULD LIKE TO KNOW IF THERE IS AN ALTERNATE THAT CAN BE CALLED IN

## 2023-07-24 NOTE — DISCHARGE INSTRUCTIONS
Follow-up with Dr. Herrmann, cardiology, at his congestive heart failure clinic    Take a second dose of Lasix today when you get home and continue for the next 3 days to take a Lasix 40 mg tablet twice a day.    Monitor your blood sugar closely and talk to your doctor about potentially being on insulin.

## 2023-07-24 NOTE — ED NOTES
Patient  on DC MD notified, and 12 units given to patient. Patient states that he does not have insulin at home due to insurance. Patient educated on importance of following up with PCP.

## 2023-07-24 NOTE — ED PROVIDER NOTES
Time: 12:44 PM EDT  Date of encounter:  7/24/2023  Independent Historian/Clinical History and Information was obtained by:   Patient    History is limited by: N/A    Chief Complaint: Short of breath      History of Present Illness:  Patient is a 67 y.o. year old male who presents to the emergency department for evaluation of shortness of breath.  Patient has history of congestive heart failure and sees Dr. Herrmann at the congestive heart failure clinic and also sees Dr. Mcdonnell as his primary cardiologist.  Patient also reports a history of COPD and has not smoked for approximately 8 years.  He states over the past several days he has gotten increasingly short of breath.    HPI    Patient Care Team  Primary Care Provider: Heidi Villa APRN    Past Medical History:     No Known Allergies  Past Medical History:   Diagnosis Date    Abnormal kidney function     Arthritis     Bursitis     CHF (congestive heart failure)     Depression     Diabetes     Edema     Essential hypertension 09/23/2021    Forgetfulness     Gout     Head injury     Hernia cerebri     Hyperlipidemia     Hypertension     IFG (impaired fasting glucose)     Low back pain     Lumbago     `    Pain of left hip     Right shoulder pain     Sleep apnea     SOB (shortness of breath)      Past Surgical History:   Procedure Laterality Date    CYST REMOVAL Right     leg     Family History   Problem Relation Age of Onset    No Known Problems Father     Diabetes Sister     Stroke Sister        Home Medications:  Prior to Admission medications    Medication Sig Start Date End Date Taking? Authorizing Provider   allopurinol (ZYLOPRIM) 100 MG tablet Take 1 tablet by mouth Daily. 6/27/23   Anthony Herrmann MD   apixaban (ELIQUIS) 5 MG tablet tablet Take 1 tablet by mouth 2 (Two) Times a Day.    Provider, MD Quentin   aspirin 81 MG EC tablet Take 1 tablet by mouth Daily.    Provider, MD Quentin   atorvastatin (LIPITOR) 80 MG tablet Take 1 tablet by mouth Every  "Night. 6/27/23   Anthony Herrmann MD   carvedilol (COREG) 12.5 MG tablet Take 1 tablet by mouth 2 (Two) Times a Day With Meals.    ProviderQuentin MD   cholecalciferol (VITAMIN D3) 25 MCG (1000 UT) tablet Take 1 tablet by mouth Daily. 8/26/22   Nehal Daniels MD   dapagliflozin Propanediol (Farxiga) 10 MG tablet Take 10 mg by mouth Daily. 6/27/23   Anthony Herrmann MD   furosemide (LASIX) 40 MG tablet Take 1 tablet by mouth Daily for 360 days. 6/27/23 6/21/24  Anthony Herrmann MD   ipratropium-albuterol (COMBIVENT RESPIMAT)  MCG/ACT inhaler Inhale 1 puff 4 (Four) Times a Day As Needed for Wheezing. 6/21/23   Jose Nolasco DO   sitaGLIPtin-metFORMIN (JANUMET)  MG per tablet Take 1 tablet by mouth 2 (Two) Times a Day With Meals.    Provider, MD Quentin        Social History:   Social History     Tobacco Use    Smoking status: Former     Packs/day: 0.25     Types: Cigarettes     Start date: 1982     Quit date: 8/14/2012     Years since quitting: 10.9    Smokeless tobacco: Never   Vaping Use    Vaping Use: Never used   Substance Use Topics    Alcohol use: Yes     Comment: rare    Drug use: Never         Review of Systems:  Review of Systems   Respiratory:  Positive for shortness of breath.       Physical Exam:  BP (!) 165/106   Pulse 85   Resp 14   Ht 167.6 cm (66\")   Wt 79.4 kg (175 lb)   SpO2 96%   BMI 28.25 kg/m²     Physical Exam  Vitals and nursing note reviewed.   Constitutional:       General: He is not in acute distress.     Appearance: He is well-developed.   HENT:      Head: Normocephalic.   Eyes:      Extraocular Movements: Extraocular movements intact.   Cardiovascular:      Rate and Rhythm: Normal rate and regular rhythm.   Pulmonary:      Effort: Pulmonary effort is normal. No respiratory distress.      Breath sounds: Examination of the right-upper field reveals decreased breath sounds. Examination of the left-upper field reveals decreased breath sounds. Examination of " the right-middle field reveals decreased breath sounds. Examination of the left-middle field reveals decreased breath sounds. Examination of the right-lower field reveals decreased breath sounds. Examination of the left-lower field reveals decreased breath sounds. Decreased breath sounds present.   Abdominal:      General: Abdomen is flat.      Palpations: Abdomen is soft.      Tenderness: There is no abdominal tenderness.   Musculoskeletal:         General: Normal range of motion.      Cervical back: Normal range of motion.   Skin:     General: Skin is warm and dry.   Neurological:      General: No focal deficit present.      Mental Status: He is alert and oriented to person, place, and time. Mental status is at baseline.   Psychiatric:         Mood and Affect: Mood normal.                Procedures:  Procedures      Medical Decision Making:  Patient's chest x-ray.  Stable, however, he had an elevated BNP and elevated blood sugar.  Patient was given IV Lasix and insulin in the emergency department.  Patient was discussed with Dr. Mcdonnell, cardiology, who recommended that the patient could double his Lasix for the next several days and follow-up with Dr. Herrmann in the congestive heart failure clinic.  This was discussed with the patient who agrees with this plan.    Comorbidities that affect care:    Diabetes, hypertension, CHF    External Notes reviewed:    Hospital Discharge Summary: Patient discharged on 7/7/2023 after admission to this hospital for a septic arthritis.      The following orders were placed and all results were independently analyzed by me:  Orders Placed This Encounter   Procedures    XR Chest 1 View    [place] Draw    Comprehensive Metabolic Panel    BNP    Single High Sensitivity Troponin T    CBC Auto Differential    High Sensitivity Troponin T 2Hr    NPO Diet NPO Type: Strict NPO    Undress & Gown    Continuous Pulse Oximetry    Vital Signs    Inpatient Cardiology Consult    Oxygen Therapy- Nasal  Cannula; Titrate 1-6 LPM Per SpO2; 90 - 95%    POC Glucose STAT    POC Glucose Once    POC Glucose Once    ECG 12 Lead ED Triage Standing Order; SOA    Insert Peripheral IV    CBC & Differential    Green Top (Gel)    Lavender Top    Gold Top - SST    Light Blue Top       Medications Given in the Emergency Department:  Medications   sodium chloride 0.9 % flush 10 mL (has no administration in time range)   insulin regular (humuLIN R,novoLIN R) injection 12 Units (has no administration in time range)   methylPREDNISolone sodium succinate (SOLU-Medrol) injection 125 mg (125 mg Intravenous Given 7/24/23 1318)   ipratropium-albuterol (DUO-NEB) nebulizer solution 3 mL (3 mL Nebulization Given 7/24/23 1454)   furosemide (LASIX) injection 40 mg (40 mg Intravenous Given 7/24/23 1446)   insulin regular (humuLIN R,novoLIN R) injection 10 Units (10 Units Subcutaneous Given 7/24/23 1453)        ED Course:         Labs:    Lab Results (last 24 hours)       Procedure Component Value Units Date/Time    CBC & Differential [[id number]]  (Abnormal) Collected: 07/24/23 1151    Specimen: Blood Updated: 07/24/23 1158    Narrative:      The following orders were created for panel order CBC & Differential.  Procedure                               Abnormality         Status                     ---------                               -----------         ------                     CBC Auto Differential[[id number]]        Abnormal            Final result                 Please view results for these tests on the individual orders.    CBC Auto Differential [[id number]]  (Abnormal) Collected: 07/24/23 1151    Specimen: Blood Updated: 07/24/23 1158     WBC 9.94 10*3/mm3      RBC 3.95 10*6/mm3      Hemoglobin 10.6 g/dL      Hematocrit 32.5 %      MCV 82.3 fL      MCH 26.8 pg      MCHC 32.6 g/dL      RDW 16.8 %      RDW-SD 50.1 fl      MPV 9.1 fL      Platelets 310 10*3/mm3      Neutrophil % 74.5 %      Lymphocyte % 17.8 %      Monocyte % 5.7 %       Eosinophil % 1.1 %      Basophil % 0.5 %      Immature Grans % 0.4 %      Neutrophils, Absolute 7.40 10*3/mm3      Lymphocytes, Absolute 1.77 10*3/mm3      Monocytes, Absolute 0.57 10*3/mm3      Eosinophils, Absolute 0.11 10*3/mm3      Basophils, Absolute 0.05 10*3/mm3      Immature Grans, Absolute 0.04 10*3/mm3      nRBC 0.0 /100 WBC     Comprehensive Metabolic Panel [[id number]]  (Abnormal) Collected: 07/24/23 1239    Specimen: Blood Updated: 07/24/23 1328     Glucose 435 mg/dL      BUN 34 mg/dL      Creatinine 1.75 mg/dL      Sodium 133 mmol/L      Potassium 3.6 mmol/L      Chloride 100 mmol/L      CO2 24.6 mmol/L      Calcium 8.4 mg/dL      Total Protein 6.2 g/dL      Albumin 2.8 g/dL      ALT (SGPT) 58 U/L      AST (SGOT) 43 U/L      Alkaline Phosphatase 142 U/L      Total Bilirubin 0.2 mg/dL      Globulin 3.4 gm/dL      A/G Ratio 0.8 g/dL      BUN/Creatinine Ratio 19.4     Anion Gap 8.4 mmol/L      eGFR 42.7 mL/min/1.73     Narrative:      GFR Normal >60  Chronic Kidney Disease <60  Kidney Failure <15      BNP [[id number]]  (Abnormal) Collected: 07/24/23 1239    Specimen: Blood Updated: 07/24/23 1319     proBNP 28,769.0 pg/mL     Narrative:      Among patients with dyspnea, NT-proBNP is highly sensitive for the detection of acute congestive heart failure. In addition NT-proBNP of <300 pg/ml effectively rules out acute congestive heart failure with 99% negative predictive value.      Single High Sensitivity Troponin T [[id number]]  (Abnormal) Collected: 07/24/23 1239    Specimen: Blood Updated: 07/24/23 1328     HS Troponin T 96 ng/L     Narrative:      High Sensitive Troponin T Reference Range:  <10.0 ng/L- Negative Female for AMI  <15.0 ng/L- Negative Male for AMI  >=10 - Abnormal Female indicating possible myocardial injury.  >=15 - Abnormal Male indicating possible myocardial injury.   Clinicians would have to utilize clinical acumen, EKG, Troponin, and serial changes to determine if it is an Acute Myocardial  Infarction or myocardial injury due to an underlying chronic condition.         POC Glucose Once [[id number]]  (Abnormal) Collected: 07/24/23 1429    Specimen: Blood Updated: 07/24/23 1431     Glucose 374 mg/dL      Comment: Serial Number: [id number]:  [id number]       High Sensitivity Troponin T 2Hr [[id number]]  (Abnormal) Collected: 07/24/23 1449    Specimen: Blood Updated: 07/24/23 1531     HS Troponin T 90 ng/L      Troponin T Delta -6 ng/L     Narrative:      High Sensitive Troponin T Reference Range:  <10.0 ng/L- Negative Female for AMI  <15.0 ng/L- Negative Male for AMI  >=10 - Abnormal Female indicating possible myocardial injury.  >=15 - Abnormal Male indicating possible myocardial injury.   Clinicians would have to utilize clinical acumen, EKG, Troponin, and serial changes to determine if it is an Acute Myocardial Infarction or myocardial injury due to an underlying chronic condition.         POC Glucose Once [[id number]]  (Abnormal) Collected: 07/24/23 1612    Specimen: Blood Updated: 07/24/23 1615     Glucose 411 mg/dL      Comment: Serial Number: [id number]:  [id number]                Imaging:    XR Chest 1 View    Result Date: 7/24/2023  PROCEDURE: XR CHEST 1 VW  COMPARISON: [facility], , XR CHEST 1 VW, 7/07/2023, 21:17.  INDICATIONS: SOA Triage Protocol  FINDINGS:  The heart remains slightly enlarged.  The pulmonary vascularity does not appear congested.  The lungs are well-expanded and free of infiltrates.  Bony structures appear intact.        Mild cardiomegaly, unchanged.  No active pulmonary disease is seen.       CLARA CORREA MD       Electronically Signed and Approved By: CLARA CORREA MD on 7/24/2023 at 12:05                Differential Diagnosis and Discussion:    Dyspnea: Differential diagnosis includes but is not limited to metabolic acidosis, neurological disorders, psychogenic, asthma, pneumothorax, upper airway obstruction, COPD, pneumonia, noncardiogenic  pulmonary edema, interstitial lung disease, anemia, congestive heart failure, and pulmonary embolism    All labs were reviewed and interpreted by me.  All X-rays impressions were independently interpreted by me.  EKG was interpreted by me.    MDM  Patient's work-up suggests acute on chronic congestive heart failure.  He was given additional Lasix IV in the emergency department and per his cardiologist we will double up his daily dose for the next several days and follow-up in the congestive heart failure clinic.        Patient Care Considerations:    CT CHEST: I considered ordering a CT scan of the chest, however patient does not clinically present as a possible PE      Consultants/Shared Management Plan:      Social Determinants of Health:    Patient is independent, reliable, and has access to care.       Disposition and Care Coordination:    Discharged: I considered escalation of care by admitting this patient for observation, however the patient has improved and is suitable and  stable for discharge.      Final diagnoses:   Acute on chronic congestive heart failure, unspecified heart failure type        ED Disposition       ED Disposition   Discharge    Condition   Stable    Comment   --               This medical record created using voice recognition software.             Tereso Cunha DO  07/24/23 0001

## 2023-07-27 ENCOUNTER — PATIENT OUTREACH (OUTPATIENT)
Dept: CASE MANAGEMENT | Facility: OTHER | Age: 68
End: 2023-07-27
Payer: MEDICARE

## 2023-07-27 NOTE — OUTREACH NOTE
AMBULATORY CASE MANAGEMENT NOTE    Name and Relationship of Patient/Support Person: Laura - Significant Other      Care Coordination    PCP reached out to ACM due to recent Telephone Call on 7/24/23. PCP has not seen patient since February 2022 and needs to see in person before renewing any prescriptions.     Patient has had 6 Hospital Admissions in the last 12 months and most recent was for CHF Exacerbation.         Patient Outreach    Spoke with patient's SO and scheduled hospital follow up appointment for Thursday August 3rd. SO agreed to being patient to appointment.             Silke ADRIAN  Ambulatory Case Management    7/27/2023, 13:34 EDT

## 2023-07-29 LAB — QT INTERVAL: 401 MS

## 2023-08-03 ENCOUNTER — OFFICE VISIT (OUTPATIENT)
Dept: INTERNAL MEDICINE | Facility: CLINIC | Age: 68
End: 2023-08-03
Payer: MEDICARE

## 2023-08-03 VITALS
OXYGEN SATURATION: 100 % | TEMPERATURE: 96.1 F | WEIGHT: 184 LBS | BODY MASS INDEX: 29.57 KG/M2 | HEART RATE: 87 BPM | RESPIRATION RATE: 18 BRPM | SYSTOLIC BLOOD PRESSURE: 170 MMHG | DIASTOLIC BLOOD PRESSURE: 88 MMHG | HEIGHT: 66 IN

## 2023-08-03 DIAGNOSIS — M10.9 GOUT OF HAND, UNSPECIFIED CAUSE, UNSPECIFIED CHRONICITY, UNSPECIFIED LATERALITY: ICD-10-CM

## 2023-08-03 DIAGNOSIS — I50.33 ACUTE ON CHRONIC HEART FAILURE WITH PRESERVED EJECTION FRACTION (HFPEF): ICD-10-CM

## 2023-08-03 DIAGNOSIS — N18.32 TYPE 2 DIABETES MELLITUS WITH STAGE 3B CHRONIC KIDNEY DISEASE, WITHOUT LONG-TERM CURRENT USE OF INSULIN: ICD-10-CM

## 2023-08-03 DIAGNOSIS — E11.22 TYPE 2 DIABETES MELLITUS WITH STAGE 3B CHRONIC KIDNEY DISEASE, WITHOUT LONG-TERM CURRENT USE OF INSULIN: ICD-10-CM

## 2023-08-03 DIAGNOSIS — N18.32 STAGE 3B CHRONIC KIDNEY DISEASE: Primary | ICD-10-CM

## 2023-08-03 RX ORDER — LANCETS 28 GAUGE
EACH MISCELLANEOUS
COMMUNITY
Start: 2023-07-18

## 2023-08-03 RX ORDER — SEMAGLUTIDE 1.34 MG/ML
0.5 INJECTION, SOLUTION SUBCUTANEOUS WEEKLY
Qty: 1.5 ML | Refills: 0 | Status: SHIPPED | OUTPATIENT
Start: 2023-08-03

## 2023-08-03 RX ORDER — LOSARTAN POTASSIUM 25 MG/1
25 TABLET ORAL DAILY
Qty: 30 TABLET | Refills: 0 | Status: SHIPPED | OUTPATIENT
Start: 2023-08-03

## 2023-08-04 ENCOUNTER — REFERRAL TRIAGE (OUTPATIENT)
Dept: CASE MANAGEMENT | Facility: OTHER | Age: 68
End: 2023-08-04
Payer: MEDICARE

## 2023-08-21 ENCOUNTER — APPOINTMENT (OUTPATIENT)
Dept: GENERAL RADIOLOGY | Facility: HOSPITAL | Age: 68
DRG: 280 | End: 2023-08-21
Payer: MEDICARE

## 2023-08-21 ENCOUNTER — HOSPITAL ENCOUNTER (INPATIENT)
Facility: HOSPITAL | Age: 68
LOS: 3 days | Discharge: HOME | DRG: 280 | End: 2023-08-24
Attending: EMERGENCY MEDICINE | Admitting: STUDENT IN AN ORGANIZED HEALTH CARE EDUCATION/TRAINING PROGRAM
Payer: MEDICARE

## 2023-08-21 DIAGNOSIS — J81.0 ACUTE PULMONARY EDEMA: Primary | ICD-10-CM

## 2023-08-21 DIAGNOSIS — I50.9 ACUTE DECOMPENSATED HEART FAILURE: ICD-10-CM

## 2023-08-21 LAB
ALBUMIN SERPL-MCNC: 2.6 G/DL (ref 3.5–5.2)
ALBUMIN/GLOB SERPL: 0.7 G/DL
ALP SERPL-CCNC: 152 U/L (ref 39–117)
ALT SERPL W P-5'-P-CCNC: 9 U/L (ref 1–41)
ANION GAP SERPL CALCULATED.3IONS-SCNC: 10.4 MMOL/L (ref 5–15)
AST SERPL-CCNC: 11 U/L (ref 1–40)
BASOPHILS # BLD AUTO: 0.03 10*3/MM3 (ref 0–0.2)
BASOPHILS NFR BLD AUTO: 0.4 % (ref 0–1.5)
BILIRUB SERPL-MCNC: 0.2 MG/DL (ref 0–1.2)
BUN SERPL-MCNC: 31 MG/DL (ref 8–23)
BUN/CREAT SERPL: 15.8 (ref 7–25)
CALCIUM SPEC-SCNC: 8.1 MG/DL (ref 8.6–10.5)
CHLORIDE SERPL-SCNC: 102 MMOL/L (ref 98–107)
CO2 SERPL-SCNC: 21.6 MMOL/L (ref 22–29)
CREAT SERPL-MCNC: 1.96 MG/DL (ref 0.76–1.27)
DEPRECATED RDW RBC AUTO: 54 FL (ref 37–54)
EGFRCR SERPLBLD CKD-EPI 2021: 37 ML/MIN/1.73
EOSINOPHIL # BLD AUTO: 0.15 10*3/MM3 (ref 0–0.4)
EOSINOPHIL NFR BLD AUTO: 1.9 % (ref 0.3–6.2)
ERYTHROCYTE [DISTWIDTH] IN BLOOD BY AUTOMATED COUNT: 17.7 % (ref 12.3–15.4)
GEN 5 2HR TROPONIN T REFLEX: 114 NG/L
GLOBULIN UR ELPH-MCNC: 3.7 GM/DL
GLUCOSE BLDC GLUCOMTR-MCNC: 186 MG/DL (ref 70–99)
GLUCOSE SERPL-MCNC: 261 MG/DL (ref 65–99)
HCT VFR BLD AUTO: 31.8 % (ref 37.5–51)
HGB BLD-MCNC: 10.3 G/DL (ref 13–17.7)
HOLD SPECIMEN: NORMAL
HOLD SPECIMEN: NORMAL
IMM GRANULOCYTES # BLD AUTO: 0.03 10*3/MM3 (ref 0–0.05)
IMM GRANULOCYTES NFR BLD AUTO: 0.4 % (ref 0–0.5)
LYMPHOCYTES # BLD AUTO: 1.68 10*3/MM3 (ref 0.7–3.1)
LYMPHOCYTES NFR BLD AUTO: 21.1 % (ref 19.6–45.3)
MAGNESIUM SERPL-MCNC: 2 MG/DL (ref 1.6–2.4)
MCH RBC QN AUTO: 27.2 PG (ref 26.6–33)
MCHC RBC AUTO-ENTMCNC: 32.4 G/DL (ref 31.5–35.7)
MCV RBC AUTO: 84.1 FL (ref 79–97)
MONOCYTES # BLD AUTO: 0.52 10*3/MM3 (ref 0.1–0.9)
MONOCYTES NFR BLD AUTO: 6.5 % (ref 5–12)
NEUTROPHILS NFR BLD AUTO: 5.56 10*3/MM3 (ref 1.7–7)
NEUTROPHILS NFR BLD AUTO: 69.7 % (ref 42.7–76)
NRBC BLD AUTO-RTO: 0 /100 WBC (ref 0–0.2)
NT-PROBNP SERPL-MCNC: ABNORMAL PG/ML (ref 0–900)
PLATELET # BLD AUTO: 294 10*3/MM3 (ref 140–450)
PMV BLD AUTO: 9 FL (ref 6–12)
POTASSIUM SERPL-SCNC: 3.8 MMOL/L (ref 3.5–5.2)
PROT SERPL-MCNC: 6.3 G/DL (ref 6–8.5)
RBC # BLD AUTO: 3.78 10*6/MM3 (ref 4.14–5.8)
SODIUM SERPL-SCNC: 134 MMOL/L (ref 136–145)
TROPONIN T DELTA: -13 NG/L
TROPONIN T SERPL HS-MCNC: 117 NG/L
TROPONIN T SERPL HS-MCNC: 127 NG/L
TSH SERPL DL<=0.05 MIU/L-ACNC: 1.76 UIU/ML (ref 0.27–4.2)
WBC NRBC COR # BLD: 7.97 10*3/MM3 (ref 3.4–10.8)
WHOLE BLOOD HOLD COAG: NORMAL
WHOLE BLOOD HOLD SPECIMEN: NORMAL

## 2023-08-21 PROCEDURE — 63710000001 INSULIN LISPRO (HUMAN) PER 5 UNITS: Performed by: STUDENT IN AN ORGANIZED HEALTH CARE EDUCATION/TRAINING PROGRAM

## 2023-08-21 PROCEDURE — 25010000002 FUROSEMIDE PER 20 MG: Performed by: EMERGENCY MEDICINE

## 2023-08-21 PROCEDURE — 63710000001 INSULIN DETEMIR PER 5 UNITS: Performed by: STUDENT IN AN ORGANIZED HEALTH CARE EDUCATION/TRAINING PROGRAM

## 2023-08-21 PROCEDURE — 94799 UNLISTED PULMONARY SVC/PX: CPT

## 2023-08-21 PROCEDURE — 83735 ASSAY OF MAGNESIUM: CPT | Performed by: STUDENT IN AN ORGANIZED HEALTH CARE EDUCATION/TRAINING PROGRAM

## 2023-08-21 PROCEDURE — 93010 ELECTROCARDIOGRAM REPORT: CPT | Performed by: INTERNAL MEDICINE

## 2023-08-21 PROCEDURE — 36415 COLL VENOUS BLD VENIPUNCTURE: CPT

## 2023-08-21 PROCEDURE — 99285 EMERGENCY DEPT VISIT HI MDM: CPT

## 2023-08-21 PROCEDURE — 84443 ASSAY THYROID STIM HORMONE: CPT | Performed by: STUDENT IN AN ORGANIZED HEALTH CARE EDUCATION/TRAINING PROGRAM

## 2023-08-21 PROCEDURE — 80053 COMPREHEN METABOLIC PANEL: CPT

## 2023-08-21 PROCEDURE — 84484 ASSAY OF TROPONIN QUANT: CPT | Performed by: STUDENT IN AN ORGANIZED HEALTH CARE EDUCATION/TRAINING PROGRAM

## 2023-08-21 PROCEDURE — 84484 ASSAY OF TROPONIN QUANT: CPT

## 2023-08-21 PROCEDURE — 93005 ELECTROCARDIOGRAM TRACING: CPT

## 2023-08-21 PROCEDURE — 73560 X-RAY EXAM OF KNEE 1 OR 2: CPT

## 2023-08-21 PROCEDURE — 83880 ASSAY OF NATRIURETIC PEPTIDE: CPT

## 2023-08-21 PROCEDURE — 71045 X-RAY EXAM CHEST 1 VIEW: CPT

## 2023-08-21 PROCEDURE — 94761 N-INVAS EAR/PLS OXIMETRY MLT: CPT

## 2023-08-21 PROCEDURE — 25010000002 FUROSEMIDE PER 20 MG: Performed by: PHYSICIAN ASSISTANT

## 2023-08-21 PROCEDURE — 85025 COMPLETE CBC W/AUTO DIFF WBC: CPT

## 2023-08-21 PROCEDURE — 84484 ASSAY OF TROPONIN QUANT: CPT | Performed by: EMERGENCY MEDICINE

## 2023-08-21 PROCEDURE — 82948 REAGENT STRIP/BLOOD GLUCOSE: CPT

## 2023-08-21 PROCEDURE — 99222 1ST HOSP IP/OBS MODERATE 55: CPT | Performed by: STUDENT IN AN ORGANIZED HEALTH CARE EDUCATION/TRAINING PROGRAM

## 2023-08-21 PROCEDURE — 93005 ELECTROCARDIOGRAM TRACING: CPT | Performed by: EMERGENCY MEDICINE

## 2023-08-21 RX ORDER — CARVEDILOL 12.5 MG/1
12.5 TABLET ORAL 2 TIMES DAILY WITH MEALS
Status: DISCONTINUED | OUTPATIENT
Start: 2023-08-21 | End: 2023-08-24 | Stop reason: HOSPADM

## 2023-08-21 RX ORDER — SODIUM CHLORIDE 0.9 % (FLUSH) 0.9 %
10 SYRINGE (ML) INJECTION EVERY 12 HOURS SCHEDULED
Status: DISCONTINUED | OUTPATIENT
Start: 2023-08-21 | End: 2023-08-24 | Stop reason: HOSPADM

## 2023-08-21 RX ORDER — BISACODYL 10 MG
10 SUPPOSITORY, RECTAL RECTAL DAILY PRN
Status: DISCONTINUED | OUTPATIENT
Start: 2023-08-21 | End: 2023-08-24 | Stop reason: HOSPADM

## 2023-08-21 RX ORDER — AMOXICILLIN 250 MG
2 CAPSULE ORAL 2 TIMES DAILY
Status: DISCONTINUED | OUTPATIENT
Start: 2023-08-21 | End: 2023-08-24 | Stop reason: HOSPADM

## 2023-08-21 RX ORDER — FUROSEMIDE 10 MG/ML
40 INJECTION INTRAMUSCULAR; INTRAVENOUS ONCE
Status: COMPLETED | OUTPATIENT
Start: 2023-08-21 | End: 2023-08-21

## 2023-08-21 RX ORDER — ALUMINA, MAGNESIA, AND SIMETHICONE 2400; 2400; 240 MG/30ML; MG/30ML; MG/30ML
15 SUSPENSION ORAL EVERY 6 HOURS PRN
Status: DISCONTINUED | OUTPATIENT
Start: 2023-08-21 | End: 2023-08-24 | Stop reason: HOSPADM

## 2023-08-21 RX ORDER — ASPIRIN 81 MG/1
81 TABLET ORAL DAILY
Status: DISCONTINUED | OUTPATIENT
Start: 2023-08-22 | End: 2023-08-24 | Stop reason: HOSPADM

## 2023-08-21 RX ORDER — CHOLECALCIFEROL (VITAMIN D3) 125 MCG
5 CAPSULE ORAL NIGHTLY PRN
Status: DISCONTINUED | OUTPATIENT
Start: 2023-08-21 | End: 2023-08-24 | Stop reason: HOSPADM

## 2023-08-21 RX ORDER — SODIUM CHLORIDE 9 MG/ML
40 INJECTION, SOLUTION INTRAVENOUS AS NEEDED
Status: DISCONTINUED | OUTPATIENT
Start: 2023-08-21 | End: 2023-08-24 | Stop reason: HOSPADM

## 2023-08-21 RX ORDER — BISACODYL 5 MG/1
5 TABLET, DELAYED RELEASE ORAL DAILY PRN
Status: DISCONTINUED | OUTPATIENT
Start: 2023-08-21 | End: 2023-08-24 | Stop reason: HOSPADM

## 2023-08-21 RX ORDER — SODIUM CHLORIDE 0.9 % (FLUSH) 0.9 %
10 SYRINGE (ML) INJECTION AS NEEDED
Status: DISCONTINUED | OUTPATIENT
Start: 2023-08-21 | End: 2023-08-24 | Stop reason: HOSPADM

## 2023-08-21 RX ORDER — INSULIN LISPRO 100 [IU]/ML
2-7 INJECTION, SOLUTION INTRAVENOUS; SUBCUTANEOUS
Status: DISCONTINUED | OUTPATIENT
Start: 2023-08-21 | End: 2023-08-24 | Stop reason: HOSPADM

## 2023-08-21 RX ORDER — ACETAMINOPHEN 325 MG/1
650 TABLET ORAL EVERY 6 HOURS PRN
Status: DISCONTINUED | OUTPATIENT
Start: 2023-08-21 | End: 2023-08-24 | Stop reason: HOSPADM

## 2023-08-21 RX ORDER — POLYETHYLENE GLYCOL 3350 17 G/17G
17 POWDER, FOR SOLUTION ORAL DAILY PRN
Status: DISCONTINUED | OUTPATIENT
Start: 2023-08-21 | End: 2023-08-24 | Stop reason: HOSPADM

## 2023-08-21 RX ORDER — NITROGLYCERIN 0.4 MG/1
0.4 TABLET SUBLINGUAL
Status: DISCONTINUED | OUTPATIENT
Start: 2023-08-21 | End: 2023-08-24 | Stop reason: HOSPADM

## 2023-08-21 RX ORDER — ALLOPURINOL 100 MG/1
100 TABLET ORAL DAILY
Status: DISCONTINUED | OUTPATIENT
Start: 2023-08-22 | End: 2023-08-24 | Stop reason: HOSPADM

## 2023-08-21 RX ORDER — FUROSEMIDE 10 MG/ML
80 INJECTION INTRAMUSCULAR; INTRAVENOUS
Status: DISCONTINUED | OUTPATIENT
Start: 2023-08-22 | End: 2023-08-22

## 2023-08-21 RX ORDER — NICOTINE POLACRILEX 4 MG
15 LOZENGE BUCCAL
Status: DISCONTINUED | OUTPATIENT
Start: 2023-08-21 | End: 2023-08-24 | Stop reason: HOSPADM

## 2023-08-21 RX ORDER — HYDRALAZINE HYDROCHLORIDE 25 MG/1
25 TABLET, FILM COATED ORAL 3 TIMES DAILY
COMMUNITY
Start: 2023-07-18

## 2023-08-21 RX ORDER — DEXTROSE MONOHYDRATE 25 G/50ML
25 INJECTION, SOLUTION INTRAVENOUS
Status: DISCONTINUED | OUTPATIENT
Start: 2023-08-21 | End: 2023-08-24 | Stop reason: HOSPADM

## 2023-08-21 RX ADMIN — Medication 10 ML: at 22:00

## 2023-08-21 RX ADMIN — FUROSEMIDE 40 MG: 10 INJECTION, SOLUTION INTRAMUSCULAR; INTRAVENOUS at 16:57

## 2023-08-21 RX ADMIN — CARVEDILOL 12.5 MG: 12.5 TABLET, FILM COATED ORAL at 23:55

## 2023-08-21 RX ADMIN — INSULIN LISPRO 2 UNITS: 100 INJECTION, SOLUTION INTRAVENOUS; SUBCUTANEOUS at 23:55

## 2023-08-21 RX ADMIN — FUROSEMIDE 40 MG: 10 INJECTION, SOLUTION INTRAMUSCULAR; INTRAVENOUS at 17:58

## 2023-08-21 RX ADMIN — INSULIN DETEMIR 10 UNITS: 100 INJECTION, SOLUTION SUBCUTANEOUS at 23:55

## 2023-08-21 NOTE — H&P
[facility]   HOSPITALIST HISTORY AND PHYSICAL  Date: 2023   Patient Name: Lyle Lozano  : 1955  MRN: [id number]  Primary Care Physician:  Heidi Villa, HUMA  Date of admission: 2023    Subjective   Subjective     Chief Complaint: Shortness of breath    HPI:    Lyle Lozano is a 68 y.o. male past medical history of CKD stage IIIb, gout, hypertension, depression, non-insulin-dependent diabetes mellitus, HFpEF, DVT on Eliquis, sleep apnea with nonadherence to CPAP who presents to the ER due to worsening shortness of breath and left knee pain.  Patient states that for the last 3 to 4 days he has had significant paroxysmal nocturnal dyspnea and orthopnea.  He has had exertional dyspnea that time without any associated chest pain or palpitations.  He states he also noted his left knee was more swollen and painful in that time as has limited his mobility.  He states he is adherent to all his medications but is overall relatively poor historian unable to provide a clear list of his meds.  He denies having any oxygen at home.  Due to his progressive exertional dyspnea he decided to come into the ER today for evaluation.  Upon arrival he was tachycardic but otherwise stable.  They attempted to ambulate him in the ER noted desaturation to the mid 80s.  Chest x-ray showed pulmonary vascular congestion.  EKG personally reviewed showed sinus rhythm with an incomplete left bundle branch block and poor progression anteriorly with IVCD related T wave inversions in lateral leads these changes are overall unchanged from an EKG available for review from 2023.  Patient was given 80 mg of IV Lasix for suspected decompensated heart failure and the hospitalist service then contacted for admission.      Personal History     Past Medical History:  Past Medical History:   Diagnosis Date    Abnormal kidney function     Arthritis     Bursitis     CHF (congestive heart failure)     Depression      Diabetes     Edema     Essential hypertension 09/23/2021    Forgetfulness     Gout     Head injury     Hernia cerebri     Hyperlipidemia     Hypertension     IFG (impaired fasting glucose)     Low back pain     Lumbago     `    Pain of left hip     Right shoulder pain     Sleep apnea     SOB (shortness of breath)          Past Surgical History:  Past Surgical History:   Procedure Laterality Date    CYST REMOVAL Right     leg         Family History:   Reviewed and noncontributory except as mentioned in HPI    Social History:   Social Determinants of Health     Tobacco Use: Medium Risk    Smoking Tobacco Use: Former    Smokeless Tobacco Use: Never    Passive Exposure: Not on file   Alcohol Use: Not At Risk    Frequency of Alcohol Consumption: Monthly or less    Average Number of Drinks: 1 or 2    Frequency of Binge Drinking: Never   Financial Resource Strain: Low Risk     Difficulty of Paying Living Expenses: Not hard at all   Food Insecurity: No Food Insecurity    Worried About Running Out of Food in the Last Year: Never true    Ran Out of Food in the Last Year: Never true   Transportation Needs: Unmet Transportation Needs    Lack of Transportation (Medical): Yes    Lack of Transportation (Non-Medical): Yes   Physical Activity: Not on file   Stress: Not on file   Social Connections: Not on file   Intimate Partner Violence: Not on file   Depression: Not on file   Housing Stability: Unknown    Unable to Pay for Housing in the Last Year: No    Number of Places Lived in the Last Year: Not on file    Unstable Housing in the Last Year: No         Home Medications:  Semaglutide(0.25 or 0.5MG/DOS), allopurinol, apixaban, aspirin, atorvastatin, carvedilol, cholecalciferol, empagliflozin, furosemide, hydrALAZINE, ipratropium-albuterol, and losartan    Allergies:  No Known Allergies    Review of Systems   All systems were reviewed and negative except for: Left knee pain, dyspnea, paroxysmal nocturnal dyspnea,  orthopnea    Objective   Objective     Vitals:   Temp:  [98.6 øF (37 øC)] 98.6 øF (37 øC)  Heart Rate:  [] 114  Resp:  [20] 20  BP: (161-171)/(112-121) 170/115    Physical Exam    Constitutional: Awake, alert, no acute distress   Eyes: Pupils equal, sclerae anicteric, no conjunctival injection   HENT: NCAT, mucous membranes moist   Neck: Supple, no thyromegaly, no lymphadenopathy, trachea midline   Respiratory: Crackles at the bases bilaterally, nonlabored respirations    Cardiovascular: RRR, no murmurs, rubs, or gallops, palpable pedal pulses bilaterally   Gastrointestinal: Positive bowel sounds, soft, nontender, nondistended   Musculoskeletal: Left knee warm and tender with palpable lateral effusion, no clubbing or cyanosis to extremities   Psychiatric: Appropriate affect, cooperative   Neurologic: Oriented x 3, strength symmetric in all extremities, Cranial Nerves grossly intact to confrontation, speech clear   Skin: No rashes     Result Review    Result Review:  I have personally reviewed the results from the time of this admission to 8/21/2023 18:25 EDT and agree with these findings:  [x]  Laboratory  [x]  Microbiology  [x]  Radiology  [x]  EKG/Telemetry   [x]  Cardiology/Vascular   []  Pathology  [x]  Old records  []  Other:      Assessment & Plan   Assessment / Plan     Assessment/Plan:   Acute on chronic Decompensated heart failure with preserved ejection fraction  Acute hypoxic respiratory failure secondary to above  Type 2 diabetes, poorly controlled  Elevated troponin likely secondary to type II MI due to demand ischemia from above  CKD stage IIIb  Left knee effusion, suspected gout flare  Medication nonadherence  Hypertension  History of DVT on Eliquis      Plan  - Admit to hospitalist service  - Continue aggressive diuresis with Lasix and monitor renal function and electrolytes closely  - Wean oxygen as tolerated  - Repeat troponin in a few hours to ensure there is no uptrend, already declining  and EKG is nonischemic on personal review  - Start basal bolus regimen for type 2 diabetes that is poorly controlled.  Medication adherence was advised to the patient  - Left knee was particularly tender to touch on exam with a palpable effusion.  We will get x-rays to rule out any occult fracture given his weightbearing difficulties and also get orthopedic consultation to assess benefit of arthrocentesis and/or intra-articular glucocorticoids given he has risk of gout flare from his medical history. No concern for septic arthritis by history or labs  - Continue home antihypertensives  - Continue Eliquis  - Continue to monitor daily labs      Discussed with ER Physician and Nurse    All labs/imaging studies were personally reviewed and findings are as noted above      DVT Prophylaxis: Eliquis    CODE STATUS:    Code Status (Patient has no pulse and is not breathing): CPR (Attempt to Resuscitate)  Medical Interventions (Patient has pulse or is breathing): Full Support      Admission Status:  I believe this patient meets inpatient status.    Electronically signed by Ferdinand Pisano MD, 08/21/23, 6:25 PM EDT.

## 2023-08-21 NOTE — ED PROVIDER NOTES
Patient : Adama Marshall Age: 33 year old Sex: female   MRN: [id number] Encounter Date: 5/17/2017  E18/18    History     Chief Complaint   Patient presents with   • Heart Problem     HPI  5/17/2017  9:31 PM Adama Marshall is a 33 year old female who presents to the ED c/o SOB. The patient experienced an episode of SOB last night which resolved. However, she experienced an additioonal episode tonight, prompting her visit to the ED. The patient also c/o \"chest warmth\" during her episodes of SOB. She denies any calf pain, fevers, chills, palpitations, or cough. The patient reports that her symptoms are somewhat similar to previous panic attacks or symptoms which accompany palpitations. She was doing well on medication for atrial fibrillation, and ablation has been deferred by Electrophysiology. The patient denies any history of clot. She was recently evaluated by Electrophysiology and prescribed Sotalol as well as Xanax for panic attacks. The patient verbalizes no further complaints or modifying factors at this time.    Electrophysiology: Dr. Gee Smith  PCP: Ruby Rosenberg MD    ALLERGIES:  No Known Allergies    Prior to Admission Medications    ALPRAZOLAM (XANAX) 0.25 MG TABLET    Take 1 tablet by mouth daily as needed for Sleep.    BUSPIRONE (BUSPAR) 15 MG TABLET    Take 1 tablet by mouth 2 times daily.    METRONIDAZOLE (METROGEL VAGINAL) 0.75 % VAGINAL GEL    One applicator nightly for 5 nights    NAPROXEN (ANAPROX) 550 MG TABLET    Take 1 tablet by mouth 2 times daily (with meals).    NON FORMULARY    Copper IUD placed Sept/October    SOTALOL (BETAPACE) 120 MG TABLET    Take 1 tablet by mouth every 12 hours.    VITAMIN - THERAPEUTIC MULTIVITAMINS W/MINERALS (CENTRUM SILVER,THERA-M) TAB    Take 1 tablet by mouth daily.       Past Medical History:   Diagnosis Date   • Anxiety 1/17/2017   • HPV in female    • HSV infection    • ITP (idiopathic thrombocytopenic purpura)    • Mild CAD 1/17/2017       History  Time: 4:35 PM EDT  Date of encounter:  8/21/2023  Independent Historian/Clinical History and Information was obtained by:   Patient    History is limited by: N/A    Chief Complaint   Patient presents with    Shortness of Breath     SOA, BLE edema         History of Present Illness:  Patient is a 68 y.o. year old male who presents to the emergency department for evaluation of shortness of breath and leg swelling.. Started few days ago. Worse with laying flat. Has missed a couple doses of lasix and he is reportedly supposed to be taking 40 mg daily.. Has had some chest discomfort occassionally and leg swelling. Endorses bilateral knee pain because of the swelling. Denies fevers, or URI symptoms.  The patient gets extremely short of breath when he gets up to ambulate.  He denies any fever chills and he has had a nonproductive cough.  (Riri Waterman PA-C provider in triage 4:36 PM EDT )     [facility]    Patient Care Team  Primary Care Provider: Heidi Villa APRN    Past Medical History:     No Known Allergies  Past Medical History:   Diagnosis Date    Abnormal kidney function     Arthritis     Bursitis     CHF (congestive heart failure)     Depression     Diabetes     Edema     Essential hypertension 09/23/2021    Forgetfulness     Gout     Head injury     Hernia cerebri     Hyperlipidemia     Hypertension     IFG (impaired fasting glucose)     Low back pain     Lumbago     `    Pain of left hip     Right shoulder pain     Sleep apnea     SOB (shortness of breath)      Past Surgical History:   Procedure Laterality Date    CYST REMOVAL Right     leg     Family History   Problem Relation Age of Onset    No Known Problems Father     Diabetes Sister     Stroke Sister        Home Medications:  Prior to Admission medications    Medication Sig Start Date End Date Taking? Authorizing Provider   allopurinol (ZYLOPRIM) 100 MG tablet Take 1 tablet by mouth Daily. 6/27/23   Anthony Herrmann MD   apixaban (ELIQUIS) 5 MG tablet tablet  "Take 1 tablet by mouth 2 (Two) Times a Day.    ProviderQuentin MD   aspirin 81 MG EC tablet Take 1 tablet by mouth Daily.    Quentin Mejia MD   atorvastatin (LIPITOR) 80 MG tablet Take 1 tablet by mouth Every Night. 23   Anthony Herrmann MD   carvedilol (COREG) 12.5 MG tablet Take 1 tablet by mouth 2 (Two) Times a Day With Meals.    ProviderQuentin MD   cholecalciferol (VITAMIN D3) 25 MCG (1000 UT) tablet Take 1 tablet by mouth Daily. 22   Nehal Daniels MD   empagliflozin (Jardiance) 25 MG tablet tablet Take 1 tablet by mouth Daily. 8/3/23   Heidi Villa APRN   furosemide (LASIX) 40 MG tablet Take 1 tablet by mouth Daily for 360 days. 23  Anthony Herrmann MD   ipratropium-albuterol (COMBIVENT RESPIMAT)  MCG/ACT inhaler Inhale 1 puff 4 (Four) Times a Day As Needed for Wheezing. 23   Jose Nolasco, DO   Lancets (freestyle) lancets  23   ProviderQuentin MD   losartan (Cozaar) 25 MG tablet Take 1 tablet by mouth Daily. 8/3/23   Heidi Villa APRN   Semaglutide,0.25 or 0.5MG/DOS, (Ozempic, 0.25 or 0.5 MG/DOSE,) 2 MG/1.5ML solution pen-injector Inject 0.5 mg under the skin into the appropriate area as directed 1 (One) Time Per Week. 8/3/23   Heidi Villa APRN        Social History:   Social History     Tobacco Use    Smoking status: Former     Packs/day: 0.25     Types: Cigarettes     Start date:      Quit date: 2012     Years since quittin.0    Smokeless tobacco: Never   Vaping Use    Vaping Use: Never used   Substance Use Topics    Alcohol use: Yes     Comment: rare    Drug use: Never         Review of Systems:  Review of Systems   Constitutional:  Negative for chills and fever.   HENT:  Negative for congestion, ear pain and sore throat.    Eyes:  Negative for pain.   Respiratory:  Positive for shortness of breath. Negative for cough and chest tightness.    Cardiovascular:  Positive for chest pain (\"discomfort occassionally\") " reviewed. No pertinent surgical history.    Family History   Problem Relation Age of Onset   • Clotting Disorder Sister      DVT       Social History   Substance Use Topics   • Smoking status: Never Smoker   • Smokeless tobacco: Never Used   • Alcohol use 0.0 oz/week     0 Standard drinks or equivalent per week      Comment: rare       Review of Systems   Constitutional: Negative for appetite change, chills, diaphoresis and fever.   HENT: Negative for ear pain and trouble swallowing.    Eyes: Negative for pain and redness.   Respiratory: Positive for shortness of breath. Negative for apnea, cough and stridor.    Cardiovascular: Negative for palpitations.        Positive for \"chest warmth.\"   Gastrointestinal: Negative for nausea and vomiting.   Genitourinary: Negative for difficulty urinating and dysuria.   Musculoskeletal: Negative for myalgias.        Negative for calf pain.   Skin: Negative for pallor.   Neurological: Negative for light-headedness.   Psychiatric/Behavioral: Negative for confusion.       Physical Exam       ED Triage Vitals   ED Triage Vitals Group      Temp 05/17/17 2132 98.3 °F (36.8 °C)      Pulse 05/17/17 2132 66      Resp 05/17/17 2132 18      BP 05/17/17 2132 142/91      SpO2 05/17/17 2132 97 %      EtCO2 mmHg --       Height 05/17/17 2132 5' 9\" (1.753 m)      Weight 05/17/17 2132 127 lb (57.6 kg)      Weight Scale Used --        Physical Exam   Constitutional: She is oriented to person, place, and time. No distress.   HENT:   Head: Normocephalic and atraumatic.   Eyes: Pupils are equal, round, and reactive to light. Right eye exhibits no discharge. Left eye exhibits no discharge.   Neck: No JVD present.   Cardiovascular: Normal rate, regular rhythm and normal heart sounds.    No murmur heard.  Pulmonary/Chest: Breath sounds normal.   Abdominal: Soft. Bowel sounds are normal.   Musculoskeletal: She exhibits no edema.        Right lower leg: She exhibits no tenderness.        Left lower leg:  "and leg swelling.   Gastrointestinal:  Negative for abdominal pain, diarrhea, nausea and vomiting.   Genitourinary:  Negative for flank pain and hematuria.   Musculoskeletal:  Negative for joint swelling.   Skin:  Negative for pallor.   Neurological:  Negative for seizures and headaches.   All other systems reviewed and are negative.     Physical Exam:  /60 (BP Location: Right arm, Patient Position: Lying)   Pulse 77   Temp 98.2 øF (36.8 øC) (Oral)   Resp 18   Ht 170.2 cm (67\")   Wt 80.8 kg (178 lb 2.1 oz)   SpO2 94%   BMI 27.90 kg/mý         Physical Exam  Vitals and nursing note reviewed.   Constitutional:       General: He is in acute distress.      Appearance: Normal appearance. He is not toxic-appearing.   HENT:      Head: Normocephalic and atraumatic.      Mouth/Throat:      Mouth: Mucous membranes are moist.   Eyes:      General: No scleral icterus.     Extraocular Movements: Extraocular movements intact.      Conjunctiva/sclera: Conjunctivae normal.   Cardiovascular:      Rate and Rhythm: Normal rate and regular rhythm.      Pulses: Normal pulses.      Heart sounds: Normal heart sounds.   Pulmonary:      Effort: Accessory muscle usage and respiratory distress present.      Breath sounds: Examination of the right-middle field reveals rhonchi and rales. Examination of the left-middle field reveals rhonchi and rales.   Abdominal:      General: Abdomen is flat. There is no distension.      Palpations: Abdomen is soft.      Tenderness: There is no abdominal tenderness.   Musculoskeletal:         General: Normal range of motion.      Cervical back: Normal range of motion and neck supple.      Right lower leg: Edema present.      Left lower leg: Edema present.   Skin:     General: Skin is warm and dry.      Capillary Refill: Capillary refill takes less than 2 seconds.      Coloration: Skin is not cyanotic.   Neurological:      General: No focal deficit present.      Mental Status: He is alert and " She exhibits no tenderness.   Neurological: She is alert and oriented to person, place, and time. GCS eye subscore is 4. GCS verbal subscore is 5. GCS motor subscore is 6.   Skin: Skin is warm and dry.   Psychiatric: She has a normal mood and affect.   Nursing note and vitals reviewed.        ED Course     Procedures    Lab Results     Results for orders placed or performed during the hospital encounter of 05/17/17   CBC & Auto Differential   Result Value Ref Range    WBC 4.0 (L) 4.2 - 11.0 K/mcL    RBC 4.27 4.00 - 5.20 mil/mcL    HGB 12.2 12.0 - 15.5 g/dL    HCT 36.8 36.0 - 46.5 %    MCV 86.2 78.0 - 100.0 fl    MCH 28.6 26.0 - 34.0 pg    MCHC 33.2 32.0 - 36.5 g/dL    RDW-CV 13.4 11.0 - 15.0 %     140 - 450 K/mcL    DIFF TYPE AUTOMATED DIFFERENTIAL     Neutrophil 29 %    LYMPH 58 %    MONO 10 %    EOSIN 3 %    BASO 0 %    Absolute Neutrophil 1.2 (L) 1.8 - 7.7 K/mcL    Absolute Lymph 2.3 1.0 - 4.8 K/mcL    Absolute Mono 0.4 0.3 - 0.9 K/mcL    Absolute Eos 0.1 0.1 - 0.5 K/mcL    Absolute Baso 0.0 0.0 - 0.3 K/mcL   Basic Metabolic Panel   Result Value Ref Range    Sodium 142 135 - 145 mmol/L    Potassium 3.6 3.4 - 5.1 mmol/L    Chloride 106 98 - 107 mmol/L    Carbon Dioxide 27 21 - 32 mmol/L    Anion Gap 13 10 - 20 mmol/L    Glucose 100 (H) 65 - 99 mg/dL    BUN 13 6 - 20 mg/dL    Creatinine 0.73 0.51 - 0.95 mg/dL    GFR Estimate,  >90     GFR Estimate, Non African American >90     BUN/Creatinine Ratio 18 7 - 25    CALCIUM 8.9 8.4 - 10.2 mg/dL   Troponin I - Point of Care   Result Value Ref Range    Troponin I POC <0.10 <0.10 ng/mL       EKG Results     2135  EKG Interpretation  Rate: 61  Rhythm: normal sinus rhythm   Abnormality: As compared to March 10, 2017, there are no significant changes    2159  EKG Interpretation  Rate: 61  Rhythm: normal sinus rhythm   Abnormality: As compared to 2135, there are no significant changes    EKG interpreted by ED physician    Radiology Results     Imaging  Results         XR CHEST AP OR PA - PORTABLE (Final result) Result time:  05/17/17 22:03:27    Final result    Impression:    IMPRESSION:  No radiographic evidence of acute cardiopulmonary disease. The  exam is unchanged      Narrative:    PORTABLE CHEST    HISTORY: Chest discomfort    COMPARISON: 3/10/2017.    FINDINGS: The lungs are clear of infiltrate. No pleural effusion. The  cardiac size is normal. The pulmonary vasculature is normal. EKG monitoring  leads obscure portions of the chest.                    ED Medication Orders     None          MDM  Ongoing Vitals  Vitals:    05/17/17 2132 05/17/17 2205 05/17/17 2206 05/17/17 2220   BP: (!) 142/91 132/85  127/82   Pulse: 66 58 58 58   Resp: 18 20 20 22   Temp: 98.3 °F (36.8 °C)      TempSrc: Oral      SpO2: 97% 100% 100% 100%   Weight: 57.6 kg      Height: 5' 9\" (1.753 m)            ED Course and Medical Decision Making    9:38 PM Initial Impression: The patient is a 33 year old female who presents to the ED complaining of SOB. I discussed the plan to evaluate for a cardiac process or any other anomalies with EKG, CXR, and labs. The patient agrees with the plan and will be re-assessed shortly.     10:25 PM - Patient Recheck: The patient is resting in bed. I updated the patient on reassuring EKG results and lab results. She states that her anti-anxiety medications were increased recently. The patient states that she was experiencing SOB while watching the news. She considered that her symptoms may have been due to anxiety over the coming weather problems. Her Xanax is taken as needed. However, Buspar is taken regularly. The patient drove herself to the ED. I informed the patient that we are unable to give her additional anti-anxiety medications she needs to drive home. I discussed the patient's previous CT angiogram results, my low suspicion for a cardiac process at this time, follow-up, and the plan of care. The patient understands and agrees with the plan. All  oriented to person, place, and time. Mental status is at baseline.   Psychiatric:         Attention and Perception: Attention and perception normal.         Mood and Affect: Mood normal.                    Procedures:  Procedures      Medical Decision Making:      Comorbidities that affect care:    Congestive Heart Failure    External Notes reviewed:    Previous Clinic Note: With Heidi Villa for chronic kidney disease.      The following orders were placed and all results were independently analyzed by me:  Orders Placed This Encounter   Procedures    XR Chest 1 View    XR Knee 1 or 2 View Left    Hope Draw    Comprehensive Metabolic Panel    BNP    Single High Sensitivity Troponin T    CBC Auto Differential    High Sensitivity Troponin T 2Hr    Basic Metabolic Panel    Magnesium    TSH    Magnesium    High Sensitivity Troponin T    CBC Auto Differential    Uric Acid    CBC Auto Differential    Diet: Cardiac Diets, Diabetic Diets, Fluid Restriction (240 mL/tray) Diets; Low Sodium (2g); Consistent Carbohydrate; 1500 mL/day; Texture: Regular Texture (IDDSI 7); Fluid Consistency: Thin (IDDSI 0)    Undress & Gown    Vital Signs    Vital Signs    Intake & Output    Weigh Patient    Oral Care    Telemetry - Maintain IV Access    Telemetry - Place Orders & Notify Provider of Results When Patient Experiences Acute Chest Pain, Dysrhythmia or Respiratory Distress    Pulse Oximetry, Continuous    Up With Assistance    Daily Weights    Strict Intake & Output    Code Status and Medical Interventions:    Hospitalist (on-call MD unless specified)    Inpatient Orthopedic Surgery Consult    Oxygen Therapy- Nasal Cannula; Titrate 1-6 LPM Per SpO2; 90 - 95%    Oxygen Therapy- Nasal Cannula; Titrate 1-6 LPM Per SpO2; 90 - 95%    POC Glucose 4x Daily Before Meals & at Bedtime    POC Glucose Once    POC Glucose Once    POC Glucose Once    POC Glucose Once    POC Glucose Once    ECG 12 Lead ED Triage Standing Order; SOA    Insert  Peripheral IV    Insert Peripheral IV    Inpatient Admission    Inpatient Admission    CBC & Differential    Green Top (Gel)    Lavender Top    Gold Top - SST    Light Blue Top    CBC & Differential       Medications Given in the Emergency Department:  Medications   sodium chloride 0.9 % flush 10 mL (has no administration in time range)   allopurinol (ZYLOPRIM) tablet 100 mg (100 mg Oral Given 8/22/23 0925)   apixaban (ELIQUIS) tablet 5 mg (5 mg Oral Given 8/22/23 2111)   aspirin EC tablet 81 mg (81 mg Oral Given 8/22/23 0924)   carvedilol (COREG) tablet 12.5 mg (12.5 mg Oral Given 8/22/23 1717)   empagliflozin (JARDIANCE) tablet 25 mg (25 mg Oral Given 8/22/23 0924)   sodium chloride 0.9 % flush 10 mL (10 mL Intravenous Given 8/22/23 2111)   sodium chloride 0.9 % flush 10 mL (has no administration in time range)   sodium chloride 0.9 % infusion 40 mL (has no administration in time range)   acetaminophen (TYLENOL) tablet 650 mg (650 mg Oral Given 8/22/23 2113)   aluminum-magnesium hydroxide-simethicone (MAALOX MAX) 400-400-40 MG/5ML suspension 15 mL (has no administration in time range)   sennosides-docusate (PERICOLACE) 8.6-50 MG per tablet 2 tablet (2 tablets Oral Given 8/22/23 2113)     And   polyethylene glycol (MIRALAX) packet 17 g (has no administration in time range)     And   bisacodyl (DULCOLAX) EC tablet 5 mg (has no administration in time range)     And   bisacodyl (DULCOLAX) suppository 10 mg (has no administration in time range)   melatonin tablet 5 mg (has no administration in time range)   nitroglycerin (NITROSTAT) SL tablet 0.4 mg (has no administration in time range)   dextrose (GLUTOSE) oral gel 15 g (has no administration in time range)   dextrose (D50W) (25 g/50 mL) IV injection 25 g (has no administration in time range)   glucagon (GLUCAGEN) injection 1 mg (has no administration in time range)   insulin detemir (LEVEMIR) injection 10 Units (10 Units Subcutaneous Given 8/22/23 2111)   Insulin  questions addressed at this time.    HEART Score for Major Cardiac Events  History: Slightly suspicious   EKG Normal   AGE Less than or equal to 47   RISK FACTOR No risk factors known   TROPONIN: Equal or less than normal limit   TOTAL SCORE 0       MDM:   DDX includes anxiety, chest wall pain, recurrent arrhythmia, pulmonary embolus. Patient's EKG was within normal limits without ST T wave abnormalities or QT prolongation. Patient was PERC negative. The most likely clinical explanation for pain was anxiety. The pain was atypical for ac cardiac etiology. She had no arryhthmia monitored on telemetry. She was reassured, discharged to home, and instructed to return if her symptoms worsen, change quality, or any other new problems or concerns.          Critical Care time spent on this patient outside of billable procedures:  None    Discharge  Clinical Impression     ED Diagnosis        Final diagnosis    Anxiety reaction           Follow Up:  MD Suzie Mello [address]  [address]  [facility] [id number]  [phone]    In 2 days  For follow up with PMD    [facility]  [address] [place] [address]  [phone]    If symptoms worsen, or any new problems or concerns       The patient was provided with a recommendation to follow up with a primary care provider and obtain reassessment of his/her blood pressure within three months if necessary.    Discharge Medication List as of 5/17/2017 10:30 PM          Pt is discharged to home/self care in stable condition.       I have reviewed the information recorded by the scribe for accuracy and agree with its contents.    Rosi Villa acting as scribe for Pedro Rouse DO    Physician: Dr. Pedro Rouse  Physician ID #: [id number]  Scribe: Angie Rouse DO  05/18/17 0322     Lispro (humaLOG) injection 2-7 Units (2 Units Subcutaneous Given 8/22/23 2110)   furosemide (LASIX) injection 40 mg (has no administration in time range)   furosemide (LASIX) injection 40 mg (40 mg Intravenous Given 8/21/23 1657)   furosemide (LASIX) injection 40 mg (40 mg Intravenous Given 8/21/23 1758)        ED Course:    The patient was initially evaluated in the triage area where orders were placed. The patient was later dispositioned by Niall Valdez DO.      The patient was advised to stay for completion of workup which includes but is not limited to communication of labs and radiological results, reassessment and plan. The patient was advised that leaving prior to disposition by a provider could result in critical findings that are not communicated to the patient.            EKG: Sinus rhythm with rate of 98 bpm  Left bundle branch block  Left ventricular hypertrophy  Normal axis  Long QT QTc interval  Nonspecific ST changes.  Labs:    Lab Results (last 24 hours)       Procedure Component Value Units Date/Time    Basic Metabolic Panel [[id number]]  (Abnormal) Collected: 08/22/23 0607    Specimen: Blood Updated: 08/22/23 0647     Glucose 121 mg/dL      BUN 26 mg/dL      Creatinine 1.76 mg/dL      Sodium 137 mmol/L      Potassium 3.6 mmol/L      Chloride 105 mmol/L      CO2 23.2 mmol/L      Calcium 8.4 mg/dL      BUN/Creatinine Ratio 14.8     Anion Gap 8.8 mmol/L      eGFR 42.1 mL/min/1.73     Narrative:      GFR Normal >60  Chronic Kidney Disease <60  Kidney Failure <15      CBC & Differential [[id number]]  (Abnormal) Collected: 08/22/23 0607    Specimen: Blood Updated: 08/22/23 0630    Narrative:      The following orders were created for panel order CBC & Differential.  Procedure                               Abnormality         Status                     ---------                               -----------         ------                     CBC Auto Differential[[id number]]        Abnormal             Final result                 Please view results for these tests on the individual orders.    Magnesium [[id number]]  (Normal) Collected: 08/22/23 0607    Specimen: Blood Updated: 08/22/23 0647     Magnesium 1.9 mg/dL     CBC Auto Differential [[id number]]  (Abnormal) Collected: 08/22/23 0607    Specimen: Blood Updated: 08/22/23 0630     WBC 7.15 10*3/mm3      RBC 3.86 10*6/mm3      Hemoglobin 10.5 g/dL      Hematocrit 32.5 %      MCV 84.2 fL      MCH 27.2 pg      MCHC 32.3 g/dL      RDW 17.9 %      RDW-SD 54.3 fl      MPV 9.2 fL      Platelets 291 10*3/mm3      Neutrophil % 63.5 %      Lymphocyte % 25.3 %      Monocyte % 7.6 %      Eosinophil % 2.9 %      Basophil % 0.4 %      Immature Grans % 0.3 %      Neutrophils, Absolute 4.54 10*3/mm3      Lymphocytes, Absolute 1.81 10*3/mm3      Monocytes, Absolute 0.54 10*3/mm3      Eosinophils, Absolute 0.21 10*3/mm3      Basophils, Absolute 0.03 10*3/mm3      Immature Grans, Absolute 0.02 10*3/mm3      nRBC 0.0 /100 WBC     POC Glucose Once [[id number]]  (Abnormal) Collected: 08/22/23 0755    Specimen: Blood Updated: 08/22/23 0757     Glucose 151 mg/dL      Comment: Serial Number: [id number]:  [id number]       POC Glucose Once [[id number]]  (Abnormal) Collected: 08/22/23 1210    Specimen: Blood Updated: 08/22/23 1211     Glucose 153 mg/dL      Comment: Serial Number: [id number]:  [id number]       POC Glucose Once [[id number]]  (Abnormal) Collected: 08/22/23 1716    Specimen: Blood Updated: 08/22/23 1717     Glucose 160 mg/dL      Comment: Serial Number: [id number]:  [id number]       POC Glucose Once [[id number]]  (Abnormal) Collected: 08/22/23 2051    Specimen: Blood Updated: 08/22/23 2053     Glucose 188 mg/dL      Comment: Serial Number: [id number]:  [id number]                Imaging:    No Radiology Exams Resulted Within Past 24 Hours      Differential Diagnosis and Discussion:      Dyspnea: Differential diagnosis includes but is not limited to  metabolic acidosis, neurological disorders, psychogenic, asthma, pneumothorax, upper airway obstruction, COPD, pneumonia, noncardiogenic pulmonary edema, interstitial lung disease, anemia, congestive heart failure, and pulmonary embolism    All labs were reviewed and interpreted by me.  EKG was interpreted by me.    MDM     Amount and/or Complexity of Data Reviewed  Clinical lab tests: reviewed  Tests in the radiology section of CPTr: reviewed  Tests in the medicine section of CPTr: reviewed             Patient Care Considerations:    CT CHEST: I considered ordering a CT scan of the chest, however the patient currently has signs of congestive heart failure and CT is not required for diagnosis and treatment.      Consultants/Shared Management Plan:    Hospitalist: I have discussed the case with hospitalist who agrees to accept the patient for admission.    Social Determinants of Health:    Patient is independent, reliable, and has access to care.       Disposition and Care Coordination:    Admit:   Through independent evaluation of the patient's history, physical, and imperical data, the patient meets criteria for observation/admission to the hospital.        Final diagnoses:   Acute pulmonary edema        ED Disposition       ED Disposition   Decision to Admit    Condition   --    Comment   Level of Care: Telemetry [5]   Diagnosis: Acute decompensated heart failure [[id number]]   Admitting Physician: SCOTT IYER [[id number]]   Attending Physician: SCOTT IYER [[id number]]   Certification: I Certify That Inpatient Hospital Services Are Medically Necessary For Greater Than 2 Midnights                 This medical record created using voice recognition software.             Niall Valdez,   08/23/23 0304

## 2023-08-22 LAB
ANION GAP SERPL CALCULATED.3IONS-SCNC: 8.8 MMOL/L (ref 5–15)
BASOPHILS # BLD AUTO: 0.03 10*3/MM3 (ref 0–0.2)
BASOPHILS NFR BLD AUTO: 0.4 % (ref 0–1.5)
BUN SERPL-MCNC: 26 MG/DL (ref 8–23)
BUN/CREAT SERPL: 14.8 (ref 7–25)
CALCIUM SPEC-SCNC: 8.4 MG/DL (ref 8.6–10.5)
CHLORIDE SERPL-SCNC: 105 MMOL/L (ref 98–107)
CO2 SERPL-SCNC: 23.2 MMOL/L (ref 22–29)
CREAT SERPL-MCNC: 1.76 MG/DL (ref 0.76–1.27)
DEPRECATED RDW RBC AUTO: 54.3 FL (ref 37–54)
EGFRCR SERPLBLD CKD-EPI 2021: 42.1 ML/MIN/1.73
EOSINOPHIL # BLD AUTO: 0.21 10*3/MM3 (ref 0–0.4)
EOSINOPHIL NFR BLD AUTO: 2.9 % (ref 0.3–6.2)
ERYTHROCYTE [DISTWIDTH] IN BLOOD BY AUTOMATED COUNT: 17.9 % (ref 12.3–15.4)
GLUCOSE BLDC GLUCOMTR-MCNC: 151 MG/DL (ref 70–99)
GLUCOSE BLDC GLUCOMTR-MCNC: 153 MG/DL (ref 70–99)
GLUCOSE BLDC GLUCOMTR-MCNC: 160 MG/DL (ref 70–99)
GLUCOSE BLDC GLUCOMTR-MCNC: 188 MG/DL (ref 70–99)
GLUCOSE SERPL-MCNC: 121 MG/DL (ref 65–99)
HCT VFR BLD AUTO: 32.5 % (ref 37.5–51)
HGB BLD-MCNC: 10.5 G/DL (ref 13–17.7)
IMM GRANULOCYTES # BLD AUTO: 0.02 10*3/MM3 (ref 0–0.05)
IMM GRANULOCYTES NFR BLD AUTO: 0.3 % (ref 0–0.5)
LYMPHOCYTES # BLD AUTO: 1.81 10*3/MM3 (ref 0.7–3.1)
LYMPHOCYTES NFR BLD AUTO: 25.3 % (ref 19.6–45.3)
MAGNESIUM SERPL-MCNC: 1.9 MG/DL (ref 1.6–2.4)
MCH RBC QN AUTO: 27.2 PG (ref 26.6–33)
MCHC RBC AUTO-ENTMCNC: 32.3 G/DL (ref 31.5–35.7)
MCV RBC AUTO: 84.2 FL (ref 79–97)
MONOCYTES # BLD AUTO: 0.54 10*3/MM3 (ref 0.1–0.9)
MONOCYTES NFR BLD AUTO: 7.6 % (ref 5–12)
NEUTROPHILS NFR BLD AUTO: 4.54 10*3/MM3 (ref 1.7–7)
NEUTROPHILS NFR BLD AUTO: 63.5 % (ref 42.7–76)
NRBC BLD AUTO-RTO: 0 /100 WBC (ref 0–0.2)
PLATELET # BLD AUTO: 291 10*3/MM3 (ref 140–450)
PMV BLD AUTO: 9.2 FL (ref 6–12)
POTASSIUM SERPL-SCNC: 3.6 MMOL/L (ref 3.5–5.2)
QT INTERVAL: 382 MS
RBC # BLD AUTO: 3.86 10*6/MM3 (ref 4.14–5.8)
SODIUM SERPL-SCNC: 137 MMOL/L (ref 136–145)
WBC NRBC COR # BLD: 7.15 10*3/MM3 (ref 3.4–10.8)

## 2023-08-22 PROCEDURE — 25010000002 FUROSEMIDE PER 20 MG: Performed by: STUDENT IN AN ORGANIZED HEALTH CARE EDUCATION/TRAINING PROGRAM

## 2023-08-22 PROCEDURE — 85025 COMPLETE CBC W/AUTO DIFF WBC: CPT | Performed by: STUDENT IN AN ORGANIZED HEALTH CARE EDUCATION/TRAINING PROGRAM

## 2023-08-22 PROCEDURE — 80048 BASIC METABOLIC PNL TOTAL CA: CPT | Performed by: STUDENT IN AN ORGANIZED HEALTH CARE EDUCATION/TRAINING PROGRAM

## 2023-08-22 PROCEDURE — 82948 REAGENT STRIP/BLOOD GLUCOSE: CPT

## 2023-08-22 PROCEDURE — 83735 ASSAY OF MAGNESIUM: CPT | Performed by: STUDENT IN AN ORGANIZED HEALTH CARE EDUCATION/TRAINING PROGRAM

## 2023-08-22 PROCEDURE — 63710000001 INSULIN DETEMIR PER 5 UNITS: Performed by: STUDENT IN AN ORGANIZED HEALTH CARE EDUCATION/TRAINING PROGRAM

## 2023-08-22 PROCEDURE — 63710000001 INSULIN LISPRO (HUMAN) PER 5 UNITS: Performed by: STUDENT IN AN ORGANIZED HEALTH CARE EDUCATION/TRAINING PROGRAM

## 2023-08-22 RX ORDER — FUROSEMIDE 10 MG/ML
40 INJECTION INTRAMUSCULAR; INTRAVENOUS
Status: DISCONTINUED | OUTPATIENT
Start: 2023-08-23 | End: 2023-08-24

## 2023-08-22 RX ADMIN — INSULIN DETEMIR 10 UNITS: 100 INJECTION, SOLUTION SUBCUTANEOUS at 21:11

## 2023-08-22 RX ADMIN — Medication 10 ML: at 21:11

## 2023-08-22 RX ADMIN — CARVEDILOL 12.5 MG: 12.5 TABLET, FILM COATED ORAL at 17:17

## 2023-08-22 RX ADMIN — INSULIN LISPRO 2 UNITS: 100 INJECTION, SOLUTION INTRAVENOUS; SUBCUTANEOUS at 21:10

## 2023-08-22 RX ADMIN — ACETAMINOPHEN 650 MG: 325 TABLET ORAL at 09:25

## 2023-08-22 RX ADMIN — APIXABAN 5 MG: 5 TABLET, FILM COATED ORAL at 21:11

## 2023-08-22 RX ADMIN — EMPAGLIFLOZIN 25 MG: 25 TABLET, FILM COATED ORAL at 09:24

## 2023-08-22 RX ADMIN — FUROSEMIDE 80 MG: 10 INJECTION, SOLUTION INTRAMUSCULAR; INTRAVENOUS at 17:17

## 2023-08-22 RX ADMIN — CARVEDILOL 12.5 MG: 12.5 TABLET, FILM COATED ORAL at 09:25

## 2023-08-22 RX ADMIN — Medication 10 ML: at 09:29

## 2023-08-22 RX ADMIN — SENNOSIDES AND DOCUSATE SODIUM 2 TABLET: 50; 8.6 TABLET ORAL at 09:24

## 2023-08-22 RX ADMIN — ALLOPURINOL 100 MG: 100 TABLET ORAL at 09:25

## 2023-08-22 RX ADMIN — INSULIN LISPRO 2 UNITS: 100 INJECTION, SOLUTION INTRAVENOUS; SUBCUTANEOUS at 17:27

## 2023-08-22 RX ADMIN — INSULIN LISPRO 2 UNITS: 100 INJECTION, SOLUTION INTRAVENOUS; SUBCUTANEOUS at 09:29

## 2023-08-22 RX ADMIN — ACETAMINOPHEN 650 MG: 325 TABLET ORAL at 21:13

## 2023-08-22 RX ADMIN — FUROSEMIDE 80 MG: 10 INJECTION, SOLUTION INTRAMUSCULAR; INTRAVENOUS at 09:28

## 2023-08-22 RX ADMIN — APIXABAN 5 MG: 5 TABLET, FILM COATED ORAL at 09:25

## 2023-08-22 RX ADMIN — SENNOSIDES AND DOCUSATE SODIUM 2 TABLET: 50; 8.6 TABLET ORAL at 21:13

## 2023-08-22 RX ADMIN — ACETAMINOPHEN 650 MG: 325 TABLET ORAL at 15:39

## 2023-08-22 RX ADMIN — ASPIRIN 81 MG: 81 TABLET, COATED ORAL at 09:24

## 2023-08-22 RX ADMIN — INSULIN LISPRO 2 UNITS: 100 INJECTION, SOLUTION INTRAVENOUS; SUBCUTANEOUS at 12:59

## 2023-08-22 NOTE — PLAN OF CARE
Goal Outcome Evaluation:              Outcome Evaluation: pt medicated with tylenol per prn orders. ortho consulted. pt up ad veronica.

## 2023-08-22 NOTE — PROGRESS NOTES
[facility]   Hospitalist Progress Note  Date: 2023  Patient Name: Lyle Lozano  : 1955  MRN: [id number]  Date of admission: 2023      Subjective   Subjective     Chief Complaint:   Shortness of breath, left knee pain    Summary:   Lyle Lozano is a 68 y.o. male with medical history of CKD stage IIIb, gout, hypertension, depression, non-insulin-dependent diabetes mellitus, HFpEF, DVT on Eliquis, sleep apnea with nonadherence to CPAP who presents to the ER due to worsening shortness of breath and left knee pain.  Patient states for the last 3 to 4 days he has had significant paroxysmal nocturnal dyspnea and orthopnea.  He has had exertional dyspnea that time without any associated chest pain or palpitations.  He states he also noted his left knee was more swollen and painful in that time as has limited his mobility.  He states he is adherent to all his medications but is overall relatively poor historian unable to provide a clear list of his meds.  He denies having any oxygen at home.  Due to his progressive exertional dyspnea he decided to come into the ER for evaluation.  Upon arrival he was tachycardic but otherwise stable.  They attempted to ambulate him in the ER noted desaturation to the mid 80s.  Chest x-ray showed pulmonary vascular congestion.  EKG personally reviewed showed sinus rhythm with an incomplete left bundle branch block and poor progression anteriorly with IVCD related T wave inversions in lateral leads these changes are overall unchanged from an EKG available for review from 2023.  Patient was given 80 mg of IV Lasix for suspected decompensated heart failure and the hospitalist service then contacted for admission.     Interval Followup:   Discussed with orthopedics, will be able to see tomorrow morning.  On rounds patient still complaining of knee pain, palpable effusion on left knee.  Still with range of motion, joint is not warm to the touch.  We  will continue with diuresis monitoring renal function and electrolytes closely    Review of Systems   All systems were reviewed and negative except for: Left knee pain    Objective   Objective     Vitals:   Temp:  [97.2 øF (36.2 øC)-98.6 øF (37 øC)] 97.2 øF (36.2 øC)  Heart Rate:  [] 72  Resp:  [18-20] 20  BP: (134-168)/() 145/79  Physical Exam    Constitutional: Awake, alert, no acute distress   Eyes: Pupils equal, sclerae anicteric, no conjunctival injection   HENT: NCAT, mucous membranes moist   Neck: Supple, no thyromegaly, no lymphadenopathy, trachea midline   Respiratory: Clear to auscultation bilaterally, nonlabored respirations    Cardiovascular: RRR, no murmurs, rubs, or gallops, palpable pedal pulses bilaterally   Gastrointestinal: Positive bowel sounds, soft, nontender, nondistended   Musculoskeletal: Effusion to left knee, still with range of motion   Psychiatric: Appropriate affect, cooperative   Neurologic: Oriented x 3, strength symmetric in all extremities, Cranial Nerves grossly intact to confrontation, speech clear   Skin: No rashes     Result Review    Result Review:  I have personally reviewed the results from 8/22/2023 and agree with these findings:  []  Laboratory  []  Microbiology  []  Radiology  []  EKG/Telemetry   []  Cardiology/Vascular   []  Pathology  []  Old records  []  Other:    Assessment & Plan   Assessment / Plan     Assessment/Plan:  Acute on chronic Decompensated heart failure with preserved ejection fraction  Acute hypoxic respiratory failure secondary to above  Type 2 diabetes, poorly controlled  Elevated troponin likely secondary to type II MI due to demand ischemia from above  CKD stage IIIb  Left knee effusion, suspected gout flare  Medication nonadherence  Hypertension  History of DVT on Eliquis    Plan:  Patient remains admitted to hospital for further care and management  Started on diuresis, will continue for now  We will attempt walk test tomorrow  Monitor  renal function closely given aggressive diuresis  Renal function has improved since arrival  Discussed with orthopedics, will be able to see tomorrow  Troponins have down trended  Continuing home antihypertensives  Continue Eliquis monitor CBC daily  Continue to monitor glucose, sliding scale insulin with Levemir added    Discussed plan with RN, orthopedic surgery    DVT prophylaxis:  Medical DVT prophylaxis orders are present.    CODE STATUS:   Code Status (Patient has no pulse and is not breathing): CPR (Attempt to Resuscitate)  Medical Interventions (Patient has pulse or is breathing): Full Support

## 2023-08-23 LAB
ANION GAP SERPL CALCULATED.3IONS-SCNC: 9.8 MMOL/L (ref 5–15)
BASOPHILS # BLD AUTO: 0.04 10*3/MM3 (ref 0–0.2)
BASOPHILS NFR BLD AUTO: 0.5 % (ref 0–1.5)
BUN SERPL-MCNC: 33 MG/DL (ref 8–23)
BUN/CREAT SERPL: 16.9 (ref 7–25)
CALCIUM SPEC-SCNC: 8.8 MG/DL (ref 8.6–10.5)
CHLORIDE SERPL-SCNC: 100 MMOL/L (ref 98–107)
CO2 SERPL-SCNC: 25.2 MMOL/L (ref 22–29)
CREAT SERPL-MCNC: 1.95 MG/DL (ref 0.76–1.27)
DEPRECATED RDW RBC AUTO: 54.2 FL (ref 37–54)
EGFRCR SERPLBLD CKD-EPI 2021: 37.2 ML/MIN/1.73
EOSINOPHIL # BLD AUTO: 0.13 10*3/MM3 (ref 0–0.4)
EOSINOPHIL NFR BLD AUTO: 1.6 % (ref 0.3–6.2)
ERYTHROCYTE [DISTWIDTH] IN BLOOD BY AUTOMATED COUNT: 17.8 % (ref 12.3–15.4)
GLUCOSE BLDC GLUCOMTR-MCNC: 146 MG/DL (ref 70–99)
GLUCOSE BLDC GLUCOMTR-MCNC: 187 MG/DL (ref 70–99)
GLUCOSE BLDC GLUCOMTR-MCNC: 284 MG/DL (ref 70–99)
GLUCOSE BLDC GLUCOMTR-MCNC: 304 MG/DL (ref 70–99)
GLUCOSE BLDC GLUCOMTR-MCNC: 407 MG/DL (ref 70–99)
GLUCOSE SERPL-MCNC: 149 MG/DL (ref 65–99)
HCT VFR BLD AUTO: 34.8 % (ref 37.5–51)
HGB BLD-MCNC: 11.4 G/DL (ref 13–17.7)
IMM GRANULOCYTES # BLD AUTO: 0.02 10*3/MM3 (ref 0–0.05)
IMM GRANULOCYTES NFR BLD AUTO: 0.2 % (ref 0–0.5)
LYMPHOCYTES # BLD AUTO: 1.97 10*3/MM3 (ref 0.7–3.1)
LYMPHOCYTES NFR BLD AUTO: 24.6 % (ref 19.6–45.3)
MAGNESIUM SERPL-MCNC: 2 MG/DL (ref 1.6–2.4)
MCH RBC QN AUTO: 27.3 PG (ref 26.6–33)
MCHC RBC AUTO-ENTMCNC: 32.8 G/DL (ref 31.5–35.7)
MCV RBC AUTO: 83.3 FL (ref 79–97)
MONOCYTES # BLD AUTO: 0.56 10*3/MM3 (ref 0.1–0.9)
MONOCYTES NFR BLD AUTO: 7 % (ref 5–12)
NEUTROPHILS NFR BLD AUTO: 5.29 10*3/MM3 (ref 1.7–7)
NEUTROPHILS NFR BLD AUTO: 66.1 % (ref 42.7–76)
NRBC BLD AUTO-RTO: 0 /100 WBC (ref 0–0.2)
PLATELET # BLD AUTO: 301 10*3/MM3 (ref 140–450)
PMV BLD AUTO: 8.9 FL (ref 6–12)
POTASSIUM SERPL-SCNC: 3.5 MMOL/L (ref 3.5–5.2)
RBC # BLD AUTO: 4.18 10*6/MM3 (ref 4.14–5.8)
SODIUM SERPL-SCNC: 135 MMOL/L (ref 136–145)
URATE SERPL-MCNC: 9.4 MG/DL (ref 3.4–7)
WBC NRBC COR # BLD: 8.01 10*3/MM3 (ref 3.4–10.8)

## 2023-08-23 PROCEDURE — 82948 REAGENT STRIP/BLOOD GLUCOSE: CPT

## 2023-08-23 PROCEDURE — 80048 BASIC METABOLIC PNL TOTAL CA: CPT | Performed by: STUDENT IN AN ORGANIZED HEALTH CARE EDUCATION/TRAINING PROGRAM

## 2023-08-23 PROCEDURE — 84550 ASSAY OF BLOOD/URIC ACID: CPT | Performed by: ORTHOPAEDIC SURGERY

## 2023-08-23 PROCEDURE — 25010000002 FUROSEMIDE PER 20 MG: Performed by: INTERNAL MEDICINE

## 2023-08-23 PROCEDURE — 63710000001 PREDNISONE PER 1 MG: Performed by: INTERNAL MEDICINE

## 2023-08-23 PROCEDURE — 63710000001 INSULIN DETEMIR PER 5 UNITS: Performed by: STUDENT IN AN ORGANIZED HEALTH CARE EDUCATION/TRAINING PROGRAM

## 2023-08-23 PROCEDURE — 85025 COMPLETE CBC W/AUTO DIFF WBC: CPT | Performed by: STUDENT IN AN ORGANIZED HEALTH CARE EDUCATION/TRAINING PROGRAM

## 2023-08-23 PROCEDURE — 63710000001 INSULIN LISPRO (HUMAN) PER 5 UNITS: Performed by: STUDENT IN AN ORGANIZED HEALTH CARE EDUCATION/TRAINING PROGRAM

## 2023-08-23 PROCEDURE — 83735 ASSAY OF MAGNESIUM: CPT | Performed by: STUDENT IN AN ORGANIZED HEALTH CARE EDUCATION/TRAINING PROGRAM

## 2023-08-23 RX ORDER — PREDNISONE 20 MG/1
40 TABLET ORAL
Status: DISCONTINUED | OUTPATIENT
Start: 2023-08-23 | End: 2023-08-24 | Stop reason: HOSPADM

## 2023-08-23 RX ORDER — POTASSIUM CHLORIDE 750 MG/1
40 CAPSULE, EXTENDED RELEASE ORAL ONCE
Status: COMPLETED | OUTPATIENT
Start: 2023-08-23 | End: 2023-08-23

## 2023-08-23 RX ADMIN — POTASSIUM CHLORIDE 40 MEQ: 10 CAPSULE, COATED, EXTENDED RELEASE ORAL at 09:05

## 2023-08-23 RX ADMIN — CARVEDILOL 12.5 MG: 12.5 TABLET, FILM COATED ORAL at 17:50

## 2023-08-23 RX ADMIN — FUROSEMIDE 40 MG: 10 INJECTION, SOLUTION INTRAMUSCULAR; INTRAVENOUS at 17:47

## 2023-08-23 RX ADMIN — ASPIRIN 81 MG: 81 TABLET, COATED ORAL at 09:06

## 2023-08-23 RX ADMIN — INSULIN LISPRO 7 UNITS: 100 INJECTION, SOLUTION INTRAVENOUS; SUBCUTANEOUS at 20:32

## 2023-08-23 RX ADMIN — EMPAGLIFLOZIN 25 MG: 25 TABLET, FILM COATED ORAL at 09:06

## 2023-08-23 RX ADMIN — ALLOPURINOL 100 MG: 100 TABLET ORAL at 09:06

## 2023-08-23 RX ADMIN — INSULIN LISPRO 2 UNITS: 100 INJECTION, SOLUTION INTRAVENOUS; SUBCUTANEOUS at 12:30

## 2023-08-23 RX ADMIN — ACETAMINOPHEN 650 MG: 325 TABLET ORAL at 20:32

## 2023-08-23 RX ADMIN — APIXABAN 5 MG: 5 TABLET, FILM COATED ORAL at 20:32

## 2023-08-23 RX ADMIN — PREDNISONE 40 MG: 20 TABLET ORAL at 09:05

## 2023-08-23 RX ADMIN — APIXABAN 5 MG: 5 TABLET, FILM COATED ORAL at 09:06

## 2023-08-23 RX ADMIN — Medication 10 ML: at 09:07

## 2023-08-23 RX ADMIN — Medication 10 ML: at 20:32

## 2023-08-23 RX ADMIN — INSULIN DETEMIR 10 UNITS: 100 INJECTION, SOLUTION SUBCUTANEOUS at 20:32

## 2023-08-23 RX ADMIN — CARVEDILOL 12.5 MG: 12.5 TABLET, FILM COATED ORAL at 09:06

## 2023-08-23 RX ADMIN — INSULIN LISPRO 5 UNITS: 100 INJECTION, SOLUTION INTRAVENOUS; SUBCUTANEOUS at 17:50

## 2023-08-23 RX ADMIN — ACETAMINOPHEN 650 MG: 325 TABLET ORAL at 09:05

## 2023-08-23 RX ADMIN — FUROSEMIDE 40 MG: 10 INJECTION, SOLUTION INTRAMUSCULAR; INTRAVENOUS at 09:05

## 2023-08-23 NOTE — PLAN OF CARE
Goal Outcome Evaluation:              Outcome Evaluation: pt medicated with tylenol per prn orders. up ad veronica. walk test complete. pt awaiting dc tomorrow.

## 2023-08-23 NOTE — NURSING NOTE
Exercise Oximetry    Patient Name:Lyle Lozano   MRN: [id number]   Date: 08/23/23             ROOM AIR BASELINE   SpO2% 96   Heart Rate 82   Blood Pressure      EXERCISE ON ROOM AIR SpO2% EXERCISE ON O2 @ 0 LPM SpO2%   1 MINUTE 96 1 MINUTE    2 MINUTES 95 2 MINUTES    3 MINUTES 95 3 MINUTES    4 MINUTES 94 4 MINUTES    5 MINUTES 93 5 MINUTES    6 MINUTES 91 6 MINUTES               Distance Walked  300 ft Distance Walked   Dyspnea (Andreina Scale)   Dyspnea (Andreina Scale)   Fatigue (Andreina Scale)   Fatigue (Andreina Scale)   SpO2% Post Exercise  96 SpO2% Post Exercise   HR Post Exercise  84 HR Post Exercise   Time to Recovery   Time to Recovery     Comments:

## 2023-08-23 NOTE — CONSULTS
[facility]   Consult Note    Patient Name: Lyle Lozano  : 1955  MRN: [id number]  Primary Care Physician:  Heidi Villa APRN  Referring Physician: No ref. provider found  Date of admission: 2023    Inpatient Orthopedic Surgery Consult  Consult performed by: Lisa Hernandez MD  Consult ordered by: Mariusz John MD      Subjective   Subjective     Reason for Consult/ Chief Complaint: Left knee pain    History of Present Illness  Lyle Lozano is a 68 y.o. male who was admitted for exacerbation of his heart failure.  He also complained of left knee pain.  Has a history of gout.  I was consulted for evaluation.  He states in the last 24 hours he has less pain and less swelling.  His uric acid today was elevated at 9.4.  He is currently on allopurinol, he is also on Eliquis    Review of Systems     Personal History     Past Medical History:   Diagnosis Date    Abnormal kidney function     Arthritis     Bursitis     CHF (congestive heart failure)     Depression     Diabetes     Edema     Essential hypertension 2021    Forgetfulness     Gout     Head injury     Hernia cerebri     Hyperlipidemia     Hypertension     IFG (impaired fasting glucose)     Low back pain     Lumbago     `    Pain of left hip     Right shoulder pain     Sleep apnea     SOB (shortness of breath)        Past Surgical History:   Procedure Laterality Date    CYST REMOVAL Right     leg       Family History: His family history includes Diabetes in his sister; No Known Problems in his father; Stroke in his sister.     Social History: He  reports that he quit smoking about 11 years ago. His smoking use included cigarettes. He started smoking about 41 years ago. He smoked an average of .25 packs per day. He has never used smokeless tobacco. He reports current alcohol use. He reports that he does not use drugs.    Home Medications:  Semaglutide(0.25 or 0.5MG/DOS), allopurinol, apixaban, aspirin, atorvastatin,  carvedilol, cholecalciferol, empagliflozin, furosemide, hydrALAZINE, ipratropium-albuterol, and losartan    Allergies:  He has No Known Allergies.    Objective    Objective     Vitals:    Temp:  [97.2 øF (36.2 øC)-98.8 øF (37.1 øC)] 98.8 øF (37.1 øC)  Heart Rate:  [72-85] 76  Resp:  [18-20] 18  BP: (118-165)/() 154/87    Physical Exam  Awake and alert.  Answers questions appropriately.  Mild effusion to his left knee, minimal tenderness which she states is better than yesterday.  He can perform knee range of motion without significant pain.  No signs or symptoms of infection    Result Review    Result Review:  I have personally reviewed the results from the time of this admission to 8/23/2023 07:04 EDT and agree with these findings:  [x]  Laboratory list / accordion  []  Microbiology  []  Radiology  []  EKG/Telemetry   []  Cardiology/Vascular   []  Pathology  []  Old records  []  Other:  Most notable findings include: Elevated uric acid, mild left knee effusion      Assessment & Plan   Assessment / Plan     Brief Patient Summary:  Lyle Lozano is a 68 y.o. male history of gout with elevated uric acid level and left knee pain    Active Hospital Problems:  Active Hospital Problems    Diagnosis     **Acute decompensated heart failure        Plan:   Patient is currently on allopurinol.  Patient states his symptoms are improving.  Depending on renal function and other medical problems, could consider one-time dose of Toradol versus a couple days worth of Indocin.  Otherwise he is seeing improvements and could continue allopurinol    Lisa Hernandez MD

## 2023-08-23 NOTE — PROGRESS NOTES
[facility]   Hospitalist Progress Note  Date: 2023  Patient Name: Lyle Lozano  : 1955  MRN: [id number]  Date of admission: 2023      Subjective   Subjective     Chief Complaint:   Shortness of breath, left knee pain    Summary:   Lyle Lozano is a 68 y.o. male with medical history of CKD stage IIIb, gout, hypertension, depression, non-insulin-dependent diabetes mellitus, HFpEF, DVT on Eliquis, sleep apnea with nonadherence to CPAP who presents to the ER due to worsening shortness of breath and left knee pain.  Patient states for the last 3 to 4 days he has had significant paroxysmal nocturnal dyspnea and orthopnea.  He has had exertional dyspnea that time without any associated chest pain or palpitations.  He states he also noted his left knee was more swollen and painful in that time as has limited his mobility.  He states he is adherent to all his medications but is overall relatively poor historian unable to provide a clear list of his meds.  He denies having any oxygen at home.  Due to his progressive exertional dyspnea he decided to come into the ER for evaluation.  Upon arrival he was tachycardic but otherwise stable.  They attempted to ambulate him in the ER noted desaturation to the mid 80s.  Chest x-ray showed pulmonary vascular congestion.  EKG personally reviewed showed sinus rhythm with an incomplete left bundle branch block and poor progression anteriorly with IVCD related T wave inversions in lateral leads these changes are overall unchanged from an EKG available for review from 2023.  Patient was given 80 mg of IV Lasix for suspected decompensated heart failure and the hospitalist service then contacted for admission.     Interval Followup:   Walk test performed, patient not requiring supplemental oxygen with ambulation.  Seen by orthopedic surgery today.  We will start patient with steroids, in addition to left knee also complaining of right hand pain  specifically index fingers MCP joint    Review of Systems   All systems were reviewed and negative except for: Left knee pain    Objective   Objective     Vitals:   Temp:  [97.2 øF (36.2 øC)-98.8 øF (37.1 øC)] 97.9 øF (36.6 øC)  Heart Rate:  [69-79] 69  Resp:  [18-20] 18  BP: (118-154)/(60-87) 131/79  Physical Exam    Constitutional: Awake, alert, no acute distress   Eyes: Pupils equal, sclerae anicteric, no conjunctival injection   HENT: NCAT, mucous membranes moist   Neck: Supple, no thyromegaly, no lymphadenopathy, trachea midline   Respiratory: Clear to auscultation bilaterally, nonlabored respirations    Cardiovascular: RRR, no murmurs, rubs, or gallops, palpable pedal pulses bilaterally   Gastrointestinal: Positive bowel sounds, soft, nontender, nondistended   Musculoskeletal: Effusion to left knee, pain to index finger MCP joint on right hand   Psychiatric: Appropriate affect, cooperative   Neurologic: Oriented x 3, strength symmetric in all extremities, Cranial Nerves grossly intact to confrontation, speech clear   Skin: No rashes     Result Review    Result Review:  I have personally reviewed the results from 8/23/2023 and agree with these findings:  []  Laboratory  []  Microbiology  []  Radiology  []  EKG/Telemetry   []  Cardiology/Vascular   []  Pathology  []  Old records  []  Other:    Assessment & Plan   Assessment / Plan     Assessment/Plan:  Acute on chronic Decompensated heart failure with preserved ejection fraction  Type 2 diabetes, poorly controlled  Elevated troponin likely secondary to type II MI due to demand ischemia from above  CKD stage IIIb  Left knee effusion, suspected gout flare  Medication nonadherence  Hypertension  History of DVT on Eliquis    Plan:  Patient remains admitted to hospital for further care and management  Started on diuresis, will continue for now  Patient has not passed his walk test  Monitor renal function closely given aggressive diuresis  Given renal function and  aggressive diuresis we will hold on any NSAID or nephrotoxic medication  Therefore we will start prednisone in the treatment of suspected gout, uric acid also noted to be elevated  Renal function remains in baseline, reducing dose of diuretic today  Orthopedic surgery was consulted, appreciate assistance  Continuing home antihypertensives  Continue Eliquis monitor CBC daily  Continue to monitor glucose, sliding scale insulin with Levemir added    Discussed plan with RN, orthopedic surgery    DVT prophylaxis:  Medical DVT prophylaxis orders are present.    CODE STATUS:   Code Status (Patient has no pulse and is not breathing): CPR (Attempt to Resuscitate)  Medical Interventions (Patient has pulse or is breathing): Full Support

## 2023-08-24 ENCOUNTER — READMISSION MANAGEMENT (OUTPATIENT)
Dept: CALL CENTER | Facility: HOSPITAL | Age: 68
End: 2023-08-24
Payer: MEDICARE

## 2023-08-24 VITALS
WEIGHT: 178.35 LBS | SYSTOLIC BLOOD PRESSURE: 146 MMHG | RESPIRATION RATE: 18 BRPM | TEMPERATURE: 97.7 F | HEART RATE: 67 BPM | HEIGHT: 67 IN | OXYGEN SATURATION: 100 % | DIASTOLIC BLOOD PRESSURE: 95 MMHG | BODY MASS INDEX: 27.99 KG/M2

## 2023-08-24 LAB
ANION GAP SERPL CALCULATED.3IONS-SCNC: 9.9 MMOL/L (ref 5–15)
BASOPHILS # BLD AUTO: 0.03 10*3/MM3 (ref 0–0.2)
BASOPHILS NFR BLD AUTO: 0.3 % (ref 0–1.5)
BUN SERPL-MCNC: 40 MG/DL (ref 8–23)
BUN/CREAT SERPL: 18 (ref 7–25)
CALCIUM SPEC-SCNC: 8.5 MG/DL (ref 8.6–10.5)
CHLORIDE SERPL-SCNC: 96 MMOL/L (ref 98–107)
CO2 SERPL-SCNC: 26.1 MMOL/L (ref 22–29)
CREAT SERPL-MCNC: 2.22 MG/DL (ref 0.76–1.27)
DEPRECATED RDW RBC AUTO: 52 FL (ref 37–54)
EGFRCR SERPLBLD CKD-EPI 2021: 31.9 ML/MIN/1.73
EOSINOPHIL # BLD AUTO: 0.05 10*3/MM3 (ref 0–0.4)
EOSINOPHIL NFR BLD AUTO: 0.6 % (ref 0.3–6.2)
ERYTHROCYTE [DISTWIDTH] IN BLOOD BY AUTOMATED COUNT: 17.3 % (ref 12.3–15.4)
GLUCOSE BLDC GLUCOMTR-MCNC: 107 MG/DL (ref 70–99)
GLUCOSE SERPL-MCNC: 112 MG/DL (ref 65–99)
HCT VFR BLD AUTO: 35 % (ref 37.5–51)
HGB BLD-MCNC: 11.5 G/DL (ref 13–17.7)
IMM GRANULOCYTES # BLD AUTO: 0.02 10*3/MM3 (ref 0–0.05)
IMM GRANULOCYTES NFR BLD AUTO: 0.2 % (ref 0–0.5)
LYMPHOCYTES # BLD AUTO: 2.24 10*3/MM3 (ref 0.7–3.1)
LYMPHOCYTES NFR BLD AUTO: 25.4 % (ref 19.6–45.3)
MAGNESIUM SERPL-MCNC: 2.2 MG/DL (ref 1.6–2.4)
MCH RBC QN AUTO: 27.1 PG (ref 26.6–33)
MCHC RBC AUTO-ENTMCNC: 32.9 G/DL (ref 31.5–35.7)
MCV RBC AUTO: 82.4 FL (ref 79–97)
MONOCYTES # BLD AUTO: 0.67 10*3/MM3 (ref 0.1–0.9)
MONOCYTES NFR BLD AUTO: 7.6 % (ref 5–12)
NEUTROPHILS NFR BLD AUTO: 5.8 10*3/MM3 (ref 1.7–7)
NEUTROPHILS NFR BLD AUTO: 65.9 % (ref 42.7–76)
NRBC BLD AUTO-RTO: 0 /100 WBC (ref 0–0.2)
PLATELET # BLD AUTO: 311 10*3/MM3 (ref 140–450)
PMV BLD AUTO: 8.6 FL (ref 6–12)
POTASSIUM SERPL-SCNC: 3.3 MMOL/L (ref 3.5–5.2)
RBC # BLD AUTO: 4.25 10*6/MM3 (ref 4.14–5.8)
SODIUM SERPL-SCNC: 132 MMOL/L (ref 136–145)
WBC NRBC COR # BLD: 8.81 10*3/MM3 (ref 3.4–10.8)

## 2023-08-24 PROCEDURE — 63710000001 PREDNISONE PER 1 MG: Performed by: INTERNAL MEDICINE

## 2023-08-24 PROCEDURE — 82948 REAGENT STRIP/BLOOD GLUCOSE: CPT

## 2023-08-24 PROCEDURE — 83735 ASSAY OF MAGNESIUM: CPT | Performed by: STUDENT IN AN ORGANIZED HEALTH CARE EDUCATION/TRAINING PROGRAM

## 2023-08-24 PROCEDURE — 80048 BASIC METABOLIC PNL TOTAL CA: CPT | Performed by: INTERNAL MEDICINE

## 2023-08-24 PROCEDURE — 85025 COMPLETE CBC W/AUTO DIFF WBC: CPT | Performed by: INTERNAL MEDICINE

## 2023-08-24 RX ORDER — POTASSIUM CHLORIDE 750 MG/1
40 CAPSULE, EXTENDED RELEASE ORAL ONCE
Status: COMPLETED | OUTPATIENT
Start: 2023-08-24 | End: 2023-08-24

## 2023-08-24 RX ORDER — FUROSEMIDE 40 MG/1
40 TABLET ORAL DAILY
Qty: 90 TABLET | Refills: 3 | Status: SHIPPED | OUTPATIENT
Start: 2023-08-26 | End: 2024-08-20

## 2023-08-24 RX ORDER — PREDNISONE 20 MG/1
40 TABLET ORAL
Qty: 6 TABLET | Refills: 0 | Status: SHIPPED | OUTPATIENT
Start: 2023-08-24 | End: 2023-08-27

## 2023-08-24 RX ADMIN — ASPIRIN 81 MG: 81 TABLET, COATED ORAL at 08:27

## 2023-08-24 RX ADMIN — ALLOPURINOL 100 MG: 100 TABLET ORAL at 08:28

## 2023-08-24 RX ADMIN — Medication 10 ML: at 08:28

## 2023-08-24 RX ADMIN — POTASSIUM CHLORIDE 40 MEQ: 10 CAPSULE, COATED, EXTENDED RELEASE ORAL at 08:27

## 2023-08-24 RX ADMIN — EMPAGLIFLOZIN 25 MG: 25 TABLET, FILM COATED ORAL at 08:28

## 2023-08-24 RX ADMIN — CARVEDILOL 12.5 MG: 12.5 TABLET, FILM COATED ORAL at 08:27

## 2023-08-24 RX ADMIN — PREDNISONE 40 MG: 20 TABLET ORAL at 08:27

## 2023-08-24 RX ADMIN — APIXABAN 5 MG: 5 TABLET, FILM COATED ORAL at 08:28

## 2023-08-24 NOTE — PLAN OF CARE
Goal Outcome Evaluation:      Patient is A&O x4. VSS. Medicated once for pain, see MAR. No significant change in patient's status on shift. Will continue plan of care.

## 2023-08-24 NOTE — DISCHARGE SUMMARY
[facility]         HOSPITALIST  DISCHARGE SUMMARY    Patient Name: Lyle Lozano  : 1955  MRN: [id number]    Date of Admission: 2023  Date of Discharge:  2023  Primary Care Physician: Heidi Villa APRN          Active and Resolved Hospital Problems:  Acute on chronic Decompensated heart failure with preserved ejection fraction  Type 2 diabetes, poorly controlled  Elevated troponin likely secondary to type II MI due to demand ischemia from above  CKD stage IIIb  Left knee effusion, suspected gout flare  Medication nonadherence  Hypertension  History of DVT on Eliquis       Hospital Course     Hospital Course:  Lyle Lozano is a 68 y.o. male with medical history of CKD stage IIIb, gout, hypertension, depression, non-insulin-dependent diabetes mellitus, HFpEF, DVT on Eliquis, sleep apnea with nonadherence to CPAP who presents to the ER due to worsening shortness of breath and left knee pain.  Patient states for the last 3 to 4 days he has had significant paroxysmal nocturnal dyspnea and orthopnea.  He has had exertional dyspnea that time without any associated chest pain or palpitations.  He states he also noted his left knee was more swollen and painful in that time as has limited his mobility.  He states he is adherent to all his medications but is overall relatively poor historian unable to provide a clear list of his meds.  He denies having any oxygen at home.  Due to his progressive exertional dyspnea he decided to come into the ER for evaluation.  Upon arrival he was tachycardic but otherwise stable.  They attempted to ambulate him in the ER noted desaturation to the mid 80s.  Chest x-ray showed pulmonary vascular congestion.  EKG personally reviewed showed sinus rhythm with an incomplete left bundle branch block and poor progression anteriorly with IVCD related T wave inversions in lateral leads these changes are overall unchanged from an EKG available for  review from 07/29/2023.  Patient was given 80 mg of IV Lasix for suspected decompensated heart failure and the hospitalist service then contacted for admission.  Patient was seen by orthopedic surgery for concern of joint effusion, suspected gout therefore started on steroids with significant improvement in symptoms.  Patient's creatinine bumped during his admission with aggressive diuresis.  However within the range of his baseline creatinines.  Patient was advised to hold Lasix until the 26th.  Patient has repeat labs ordered following discharge to continue monitoring renal function and electrolytes.  Patient seen on date of discharge, clinically and hemodynamically stable.  Patient provided concerning signs and symptoms prompting immediate medical attention, patient understanding and agreeable     DISCHARGE Follow Up Recommendations for labs and diagnostics:   Follow-up with PCP soon as possible  Follow-up with outpatient lab      Day of Discharge     Vital Signs:  Temp:  [97.6 øF (36.4 øC)-98.6 øF (37 øC)] 97.7 øF (36.5 øC)  Heart Rate:  [67-77] 67  Resp:  [16-18] 18  BP: (110-146)/(57-95) 146/95  Physical Exam:               Constitutional: Awake, alert, no acute distress              Eyes: Pupils equal, sclerae anicteric, no conjunctival injection              HENT: NCAT, mucous membranes moist              Neck: Supple, no thyromegaly, no lymphadenopathy, trachea midline              Respiratory: Clear to auscultation bilaterally, nonlabored respirations               Cardiovascular: RRR, no murmurs, rubs, or gallops, palpable pedal pulses bilaterally              Gastrointestinal: Positive bowel sounds, soft, nontender, nondistended              Musculoskeletal: Effusion to left knee, pain to index finger MCP joint on right hand              Psychiatric: Appropriate affect, cooperative              Neurologic: Oriented x 3, strength symmetric in all extremities, Cranial Nerves grossly intact to  confrontation, speech clear              Skin: No rashes        Discharge Details        Discharge Medications        New Medications        Instructions Start Date   predniSONE 20 MG tablet  Commonly known as: DELTASONE   40 mg, Oral, Daily With Breakfast             Changes to Medications        Instructions Start Date   furosemide 40 MG tablet  Commonly known as: LASIX  What changed: These instructions start on August 26, 2023. If you are unsure what to do until then, ask your doctor or other care provider.   40 mg, Oral, Daily   Start Date: August 26, 2023     Ozempic (0.25 or 0.5 MG/DOSE) 2 MG/1.5ML solution pen-injector  Generic drug: Semaglutide(0.25 or 0.5MG/DOS)  What changed: additional instructions   0.5 mg, Subcutaneous, Weekly             Continue These Medications        Instructions Start Date   allopurinol 100 MG tablet  Commonly known as: ZYLOPRIM   100 mg, Oral, Daily      apixaban 5 MG tablet tablet  Commonly known as: ELIQUIS   5 mg, Oral, 2 Times Daily      aspirin 81 MG EC tablet   81 mg, Oral, Daily      atorvastatin 80 MG tablet  Commonly known as: LIPITOR   80 mg, Oral, Nightly      carvedilol 12.5 MG tablet  Commonly known as: COREG   25 mg, Oral, 2 Times Daily With Meals      cholecalciferol 25 MCG (1000 UT) tablet  Commonly known as: VITAMIN D3   1,000 Units, Oral, Daily      empagliflozin 25 MG tablet tablet  Commonly known as: Jardiance   25 mg, Oral, Daily      hydrALAZINE 25 MG tablet  Commonly known as: APRESOLINE   25 mg, Oral, 3 Times Daily      ipratropium-albuterol  MCG/ACT inhaler  Commonly known as: COMBIVENT RESPIMAT   1 puff, Inhalation, 4 Times Daily PRN      losartan 25 MG tablet  Commonly known as: Cozaar   25 mg, Oral, Daily               No Known Allergies    Discharge Disposition:  Home or Self Care    Diet:  Hospital:  Diet Order   Procedures    Diet: Cardiac Diets, Diabetic Diets, Fluid Restriction (240 mL/tray) Diets; Low Sodium (2g); Consistent Carbohydrate;  1500 mL/day; Texture: Regular Texture (IDDSI 7); Fluid Consistency: Thin (IDDSI 0)       Discharge Activity:   Activity Instructions       Activity as Tolerated              CODE STATUS:  Code Status and Medical Interventions:   Ordered at: 08/21/23 1808     Code Status (Patient has no pulse and is not breathing):    CPR (Attempt to Resuscitate)     Medical Interventions (Patient has pulse or is breathing):    Full Support         Future Appointments   Date Time Provider Department Center   8/24/2023  3:15 PM Heidi Villa APRN [facility]       Additional Instructions for the Follow-ups that You Need to Schedule       Discharge Follow-up with PCP   As directed       Currently Documented PCP:    Heidi Villa APRN    PCP Phone Number:    [phone]     Follow Up Details: In less than one week        Basic Metabolic Panel    Aug 25, 2023 (Approximate)      Release to patient: Routine Release                Pertinent  and/or Most Recent Results     PROCEDURES:   NA    LAB RESULTS:      Lab 08/24/23  0531 08/23/23  0543 08/22/23  0607 08/21/23  1348   WBC 8.81 8.01 7.15 7.97   HEMOGLOBIN 11.5* 11.4* 10.5* 10.3*   HEMATOCRIT 35.0* 34.8* 32.5* 31.8*   PLATELETS 311 301 291 294   NEUTROS ABS 5.80 5.29 4.54 5.56   IMMATURE GRANS (ABS) 0.02 0.02 0.02 0.03   LYMPHS ABS 2.24 1.97 1.81 1.68   MONOS ABS 0.67 0.56 0.54 0.52   EOS ABS 0.05 0.13 0.21 0.15   MCV 82.4 83.3 84.2 84.1         Lab 08/24/23  0531 08/23/23  0543 08/22/23  0607 08/21/23  1657 08/21/23  1348   SODIUM 132* 135* 137  --  134*   POTASSIUM 3.3* 3.5 3.6  --  3.8   CHLORIDE 96* 100 105  --  102   CO2 26.1 25.2 23.2  --  21.6*   ANION GAP 9.9 9.8 8.8  --  10.4   BUN 40* 33* 26*  --  31*   CREATININE 2.22* 1.95* 1.76*  --  1.96*   EGFR 31.9* 37.2* 42.1*  --  37.0*   GLUCOSE 112* 149* 121*  --  261*   CALCIUM 8.5* 8.8 8.4*  --  8.1*   MAGNESIUM 2.2 2.0 1.9 2.0  --    TSH  --   --   --  1.760  --          Lab 08/21/23  1348   TOTAL PROTEIN 6.3   ALBUMIN  2.6*   GLOBULIN 3.7   ALT (SGPT) 9   AST (SGOT) 11   BILIRUBIN 0.2   ALK PHOS 152*         Lab 08/21/23  2153 08/21/23  1657 08/21/23  1348   PROBNP  --   --  14,086.0*   HSTROP T 117* 114* 127*                 Brief Urine Lab Results  (Last result in the past 365 days)        Color   Clarity   Blood   Leuk Est   Nitrite   Protein   CREAT   Urine HCG        07/08/23 0053 Yellow   Clear   Small (1+)   Negative   Negative   >=300 mg/dL (3+)                 Microbiology Results (last 10 days)       ** No results found for the last 240 hours. **            XR Knee 1 or 2 View Left    Result Date: 8/21/2023  Impression:   No acute fracture or acute malalignment is identified.  A large left knee joint effusion is suggested.  There is diffuse soft tissue swelling.  Similar findings were seen on the prior exam from 10/26/2022.  Please see above comments for further detail.     Please note that portions of this note were completed with a voice recognition program.  CHAU BERMUDEZ JR, MD       Electronically Signed and Approved By: CHAU BERMUDEZ JR, MD on 8/21/2023 at 21:23              XR Chest 1 View    Result Date: 8/21/2023  Impression:   1. Cardiomegaly with mild pulmonary vascular congestion       JUAN JOSE GILLETTE MD       Electronically Signed and Approved By: JUAN JOSE GILLETTE MD on 8/21/2023 at 14:36              Results for orders placed during the hospital encounter of 04/07/23    Duplex Venous Lower Extremity - Right CV-READ    Interpretation Summary    Acute right lower extremity deep vein thrombosis noted in the gastrocnemius.    All other right sided veins appeared normal.    This is a new finding in comparison to study dated 1/25/2023.      Results for orders placed during the hospital encounter of 04/07/23    Duplex Venous Lower Extremity - Right CV-READ    Interpretation Summary    Acute right lower extremity deep vein thrombosis noted in the gastrocnemius.    All other right sided veins appeared normal.     This is a new finding in comparison to study dated 1/25/2023.      Results for orders placed during the hospital encounter of 04/07/23    Adult Transthoracic Echo Complete W/ Cont if Necessary Per Protocol    Interpretation Summary  Technically limited study.  Borderline normal left ventricular systolic function ejection fraction around 50%.  Trace MR and trace TR      Labs Pending at Discharge:        Time spent on Discharge including face to face service:  35 minutes    Electronically signed by Mariusz John MD, 08/24/23, 8:10 AM EDT.

## 2023-08-24 NOTE — PLAN OF CARE
Goal Outcome Evaluation:                      Patient is alert and oriented x4.  Vital signs stable.  No complaints of pain.  Patient to discharge home with self-care.  Discharge instructions provided to patient.  Patient verbalized understanding of discharge instructions.  IV removed with tip intact.

## 2023-08-24 NOTE — OUTREACH NOTE
Prep Survey      Flowsheet Row Responses   [facility] facility patient discharged from? [facility]   Is LACE score < 7 ? No   Eligibility [facility]   Hospital [facility]   Date of Admission 08/21/23   Date of Discharge 08/24/23   Discharge Disposition Home or Self Care   Discharge diagnosis Heart failure   Does the patient have one of the following disease processes/diagnoses(primary or secondary)? Other   Does the patient have Home health ordered? No   Is there a DME ordered? No   Prep survey completed? Yes            ANUEL CORONADO - Registered Nurse

## 2023-08-25 ENCOUNTER — TRANSITIONAL CARE MANAGEMENT TELEPHONE ENCOUNTER (OUTPATIENT)
Dept: CALL CENTER | Facility: HOSPITAL | Age: 68
End: 2023-08-25
Payer: MEDICARE

## 2023-08-25 NOTE — OUTREACH NOTE
Call Center TCM Note      Flowsheet Row Responses   [facility] patient discharged from? [facility]   Does the patient have one of the following disease processes/diagnoses(primary or secondary)? Other   TCM attempt successful? No   Unsuccessful attempts Attempt 1            Tara Santana RN    8/25/2023, 09:14 EDT

## 2023-08-25 NOTE — OUTREACH NOTE
Call Center TCM Note      Flowsheet Row Responses   [facility] patient discharged from? [facility]   Does the patient have one of the following disease processes/diagnoses(primary or secondary)? Other   TCM attempt successful? No   Unsuccessful attempts Attempt 2            Tara Santana RN    8/25/2023, 14:42 EDT

## 2023-08-28 ENCOUNTER — TRANSITIONAL CARE MANAGEMENT TELEPHONE ENCOUNTER (OUTPATIENT)
Dept: CALL CENTER | Facility: HOSPITAL | Age: 68
End: 2023-08-28
Payer: MEDICARE

## 2023-08-28 NOTE — OUTREACH NOTE
Call Center TCM Note      Flowsheet Row Responses   [facility] patient discharged from? [facility]   Does the patient have one of the following disease processes/diagnoses(primary or secondary)? Other   TCM attempt successful? Yes  [No verbal release]   Call start time 0852   Call end time 0907   Discharge diagnosis Heart failure   Meds reviewed with patient/caregiver? Yes   Is the patient having any side effects they believe may be caused by any medication additions or changes? No   Does the patient have all medications ordered at discharge? No   What is keeping the patient from filling the prescriptions? --  [Does not have lasix and reports he was told he could not obtain until 9/10/23-]   Nursing Interventions Nurse called pharmacy, Nurse provided patient education  [Called pharmacy who reports that they filled a 90 day supply of lasix 40mg on 6/27/23,  discussed that pt reports he does not have or lost rx--pharmacy to contact insurance to determine if they can get approval for override to fill for lost prescription]   Is the patient taking all medications as directed (includes completed medication regime)? No   What is preventing the patient from taking all medications as directed? Other  [pt does not have lasix he was to resume on 8/26/23]   Nursing Interventions Nurse provided patient education, Nurse notified pharmacy for assistance, Notified provider   Comments PCP FOLLOW UP APPOINTMENT IS 8/31/23@1045(reinforced need for compliance)   Does the patient have an appointment with their PCP within 7-14 days of discharge? Yes   Has home health visited the patient within 72 hours of discharge? N/A   Did the patient receive a copy of their discharge instructions? Yes   Nursing interventions Reviewed instructions with patient   What is the patient's perception of their health status since discharge? Same  [Majority of complaints today r/t continued knee pain and some swelling--he denies known injury to knees.  He  denies edema to feet/legs and wt gain, reports wt yesterday 180#, no SOA.  Has not resumed diuretic as he does not have--see note.]   Is the patient/caregiver able to teach back signs and symptoms related to disease process for when to call PCP? Yes   Is the patient/caregiver able to teach back signs and symptoms related to disease process for when to call 911? Yes   TCM call completed? Yes   Call end time 0907            Mili Benitez RN    8/28/2023, 09:17 EDT

## 2023-09-05 ENCOUNTER — READMISSION MANAGEMENT (OUTPATIENT)
Dept: CALL CENTER | Facility: HOSPITAL | Age: 68
End: 2023-09-05
Payer: MEDICARE

## 2023-09-05 NOTE — OUTREACH NOTE
Medical Week 2 Survey      Flowsheet Row Responses   [facility] patient discharged from? [facility]   Does the patient have one of the following disease processes/diagnoses(primary or secondary)? Other   Week 2 attempt successful? No   Unsuccessful attempts Attempt 1            Rox Quarles Registered Nurse

## 2023-09-29 RX ORDER — EMPAGLIFLOZIN 25 MG/1
TABLET, FILM COATED ORAL
Qty: 30 TABLET | Refills: 0 | Status: SHIPPED | OUTPATIENT
Start: 2023-09-29

## 2023-09-29 RX ORDER — LOSARTAN POTASSIUM 25 MG/1
25 TABLET ORAL DAILY
Qty: 90 TABLET | Refills: 1 | Status: SHIPPED | OUTPATIENT
Start: 2023-09-29

## 2023-09-29 RX ORDER — SEMAGLUTIDE 0.68 MG/ML
INJECTION, SOLUTION SUBCUTANEOUS
Qty: 3 ML | Refills: 0 | Status: SHIPPED | OUTPATIENT
Start: 2023-09-29

## 2023-10-06 ENCOUNTER — APPOINTMENT (OUTPATIENT)
Dept: CARDIOLOGY | Facility: HOSPITAL | Age: 68
DRG: 280 | End: 2023-10-06
Payer: MEDICARE

## 2023-10-06 ENCOUNTER — APPOINTMENT (OUTPATIENT)
Dept: GENERAL RADIOLOGY | Facility: HOSPITAL | Age: 68
DRG: 280 | End: 2023-10-06
Payer: MEDICARE

## 2023-10-06 ENCOUNTER — HOSPITAL ENCOUNTER (INPATIENT)
Facility: HOSPITAL | Age: 68
LOS: 5 days | Discharge: HOME | DRG: 280 | End: 2023-10-11
Attending: EMERGENCY MEDICINE | Admitting: FAMILY MEDICINE
Payer: MEDICARE

## 2023-10-06 DIAGNOSIS — I50.23 ACUTE ON CHRONIC HFREF (HEART FAILURE WITH REDUCED EJECTION FRACTION): ICD-10-CM

## 2023-10-06 DIAGNOSIS — Z74.09 IMPAIRED MOBILITY AND ADLS: ICD-10-CM

## 2023-10-06 DIAGNOSIS — R26.2 DIFFICULTY IN WALKING: ICD-10-CM

## 2023-10-06 DIAGNOSIS — J81.0 ACUTE PULMONARY EDEMA: Primary | ICD-10-CM

## 2023-10-06 DIAGNOSIS — Z78.9 IMPAIRED MOBILITY AND ADLS: ICD-10-CM

## 2023-10-06 PROBLEM — I21.4 NSTEMI (NON-ST ELEVATED MYOCARDIAL INFARCTION): Status: ACTIVE | Noted: 2023-10-06

## 2023-10-06 LAB
ALBUMIN SERPL-MCNC: 2.8 G/DL (ref 3.5–5.2)
ALBUMIN/GLOB SERPL: 0.8 G/DL
ALP SERPL-CCNC: 110 U/L (ref 39–117)
ALT SERPL W P-5'-P-CCNC: 8 U/L (ref 1–41)
ANION GAP SERPL CALCULATED.3IONS-SCNC: 11 MMOL/L (ref 5–15)
AST SERPL-CCNC: 11 U/L (ref 1–40)
BASE EXCESS BLDA CALC-SCNC: -2.5 MMOL/L (ref -2–2)
BASOPHILS # BLD AUTO: 0.04 10*3/MM3 (ref 0–0.2)
BASOPHILS NFR BLD AUTO: 0.6 % (ref 0–1.5)
BDY SITE: ABNORMAL
BH CV ECHO MEAS - AO MEAN PG: 2.22 MMHG
BH CV ECHO MEAS - AO ROOT DIAM: 3.9 CM
BH CV ECHO MEAS - AO V2 VTI: 16.3 CM
BH CV ECHO MEAS - AVA(I,D): 2.16 CM2
BH CV ECHO MEAS - EDV(CUBED): 154.9 ML
BH CV ECHO MEAS - EDV(MOD-SP2): 250 ML
BH CV ECHO MEAS - EDV(MOD-SP4): 255 ML
BH CV ECHO MEAS - EF(MOD-BP): 26.1 %
BH CV ECHO MEAS - EF(MOD-SP2): 23.2 %
BH CV ECHO MEAS - EF(MOD-SP4): 27.5 %
BH CV ECHO MEAS - ESV(CUBED): 103.1 ML
BH CV ECHO MEAS - ESV(MOD-SP2): 192 ML
BH CV ECHO MEAS - ESV(MOD-SP4): 185 ML
BH CV ECHO MEAS - FS: 12.7 %
BH CV ECHO MEAS - IVS/LVPW: 1.05 CM
BH CV ECHO MEAS - IVSD: 1.34 CM
BH CV ECHO MEAS - LA DIMENSION: 4.4 CM
BH CV ECHO MEAS - LAT PEAK E' VEL: 4.1 CM/SEC
BH CV ECHO MEAS - LV DIASTOLIC VOL/BSA (35-75): 132.2 CM2
BH CV ECHO MEAS - LV MASS(C)D: 294.5 GRAMS
BH CV ECHO MEAS - LV MAX PG: 1.93 MMHG
BH CV ECHO MEAS - LV MEAN PG: 1.02 MMHG
BH CV ECHO MEAS - LV SYSTOLIC VOL/BSA (12-30): 95.9 CM2
BH CV ECHO MEAS - LV V1 MAX: 69.4 CM/SEC
BH CV ECHO MEAS - LV V1 VTI: 11 CM
BH CV ECHO MEAS - LVIDD: 5.4 CM
BH CV ECHO MEAS - LVIDS: 4.7 CM
BH CV ECHO MEAS - LVOT AREA: 3.2 CM2
BH CV ECHO MEAS - LVOT DIAM: 2.02 CM
BH CV ECHO MEAS - LVPWD: 1.27 CM
BH CV ECHO MEAS - MED PEAK E' VEL: 3.6 CM/SEC
BH CV ECHO MEAS - MV A MAX VEL: 51.8 CM/SEC
BH CV ECHO MEAS - MV DEC TIME: 0.15 SEC
BH CV ECHO MEAS - MV E MAX VEL: 97.7 CM/SEC
BH CV ECHO MEAS - MV E/A: 1.88
BH CV ECHO MEAS - RVDD: 3 CM
BH CV ECHO MEAS - SI(MOD-SP2): 30.1 ML/M2
BH CV ECHO MEAS - SI(MOD-SP4): 36.3 ML/M2
BH CV ECHO MEAS - SV(LVOT): 35.2 ML
BH CV ECHO MEAS - SV(MOD-SP2): 58 ML
BH CV ECHO MEAS - SV(MOD-SP4): 70 ML
BH CV ECHO MEAS - TAPSE (>1.6): 1.55 CM
BH CV ECHO MEASUREMENTS AVERAGE E/E' RATIO: 25.38
BILIRUB SERPL-MCNC: 0.3 MG/DL (ref 0–1.2)
BUN SERPL-MCNC: 26 MG/DL (ref 8–23)
BUN/CREAT SERPL: 14.3 (ref 7–25)
CALCIUM SPEC-SCNC: 8.9 MG/DL (ref 8.6–10.5)
CHLORIDE SERPL-SCNC: 107 MMOL/L (ref 98–107)
CO2 SERPL-SCNC: 21 MMOL/L (ref 22–29)
COHGB MFR BLD: 0.9 % (ref 0–1.5)
CREAT SERPL-MCNC: 1.82 MG/DL (ref 0.76–1.27)
DEPRECATED RDW RBC AUTO: 57.5 FL (ref 37–54)
EGFRCR SERPLBLD CKD-EPI 2021: 40.5 ML/MIN/1.73
EOSINOPHIL # BLD AUTO: 0.14 10*3/MM3 (ref 0–0.4)
EOSINOPHIL NFR BLD AUTO: 2.1 % (ref 0.3–6.2)
ERYTHROCYTE [DISTWIDTH] IN BLOOD BY AUTOMATED COUNT: 18.5 % (ref 12.3–15.4)
FHHB: 4.3 % (ref 0–5)
GEN 5 2HR TROPONIN T REFLEX: 138 NG/L
GLOBULIN UR ELPH-MCNC: 3.7 GM/DL
GLUCOSE BLDC GLUCOMTR-MCNC: 127 MG/DL (ref 70–99)
GLUCOSE BLDC GLUCOMTR-MCNC: 212 MG/DL (ref 70–99)
GLUCOSE SERPL-MCNC: 211 MG/DL (ref 65–99)
HCO3 BLDA-SCNC: 19.9 MMOL/L (ref 22–26)
HCT VFR BLD AUTO: 33.8 % (ref 37.5–51)
HGB BLD-MCNC: 10.5 G/DL (ref 13–17.7)
HGB BLDA-MCNC: 12.8 G/DL (ref 13.8–16.4)
HOLD SPECIMEN: NORMAL
HOLD SPECIMEN: NORMAL
IMM GRANULOCYTES # BLD AUTO: 0.02 10*3/MM3 (ref 0–0.05)
IMM GRANULOCYTES NFR BLD AUTO: 0.3 % (ref 0–0.5)
LEFT ATRIUM VOLUME INDEX: 32.6 ML/M2
LYMPHOCYTES # BLD AUTO: 1.75 10*3/MM3 (ref 0.7–3.1)
LYMPHOCYTES NFR BLD AUTO: 26.1 % (ref 19.6–45.3)
MCH RBC QN AUTO: 26.9 PG (ref 26.6–33)
MCHC RBC AUTO-ENTMCNC: 31.1 G/DL (ref 31.5–35.7)
MCV RBC AUTO: 86.4 FL (ref 79–97)
METHGB BLD QL: 0.1 % (ref 0–1.5)
MODALITY: ABNORMAL
MONOCYTES # BLD AUTO: 0.37 10*3/MM3 (ref 0.1–0.9)
MONOCYTES NFR BLD AUTO: 5.5 % (ref 5–12)
NEUTROPHILS NFR BLD AUTO: 4.38 10*3/MM3 (ref 1.7–7)
NEUTROPHILS NFR BLD AUTO: 65.4 % (ref 42.7–76)
NRBC BLD AUTO-RTO: 0 /100 WBC (ref 0–0.2)
NT-PROBNP SERPL-MCNC: ABNORMAL PG/ML (ref 0–900)
OXYHGB MFR BLDV: 94.7 % (ref 94–99)
PCO2 BLDA: 28 MM HG (ref 35–45)
PH BLDA: 7.47 PH UNITS (ref 7.35–7.45)
PLATELET # BLD AUTO: 240 10*3/MM3 (ref 140–450)
PMV BLD AUTO: 9.5 FL (ref 6–12)
PO2 BLDA: 81.8 MM HG (ref 80–100)
POTASSIUM SERPL-SCNC: 3.5 MMOL/L (ref 3.5–5.2)
PROT SERPL-MCNC: 6.5 G/DL (ref 6–8.5)
RBC # BLD AUTO: 3.91 10*6/MM3 (ref 4.14–5.8)
SAO2 % BLDCOA: 95.7 % (ref 95–99)
SODIUM SERPL-SCNC: 139 MMOL/L (ref 136–145)
TROPONIN T DELTA: -7 NG/L
TROPONIN T SERPL HS-MCNC: 145 NG/L
TROPONIN T SERPL HS-MCNC: 156 NG/L
WBC NRBC COR # BLD: 6.7 10*3/MM3 (ref 3.4–10.8)
WHOLE BLOOD HOLD COAG: NORMAL
WHOLE BLOOD HOLD SPECIMEN: NORMAL

## 2023-10-06 PROCEDURE — 83050 HGB METHEMOGLOBIN QUAN: CPT | Performed by: FAMILY MEDICINE

## 2023-10-06 PROCEDURE — 83880 ASSAY OF NATRIURETIC PEPTIDE: CPT | Performed by: EMERGENCY MEDICINE

## 2023-10-06 PROCEDURE — 80053 COMPREHEN METABOLIC PANEL: CPT | Performed by: EMERGENCY MEDICINE

## 2023-10-06 PROCEDURE — 63710000001 INSULIN LISPRO (HUMAN) PER 5 UNITS: Performed by: FAMILY MEDICINE

## 2023-10-06 PROCEDURE — 36415 COLL VENOUS BLD VENIPUNCTURE: CPT | Performed by: FAMILY MEDICINE

## 2023-10-06 PROCEDURE — 25010000002 ENOXAPARIN PER 10 MG: Performed by: FAMILY MEDICINE

## 2023-10-06 PROCEDURE — 93010 ELECTROCARDIOGRAM REPORT: CPT | Performed by: INTERNAL MEDICINE

## 2023-10-06 PROCEDURE — 82805 BLOOD GASES W/O2 SATURATION: CPT | Performed by: FAMILY MEDICINE

## 2023-10-06 PROCEDURE — 93306 TTE W/DOPPLER COMPLETE: CPT

## 2023-10-06 PROCEDURE — 25010000002 FUROSEMIDE PER 20 MG: Performed by: EMERGENCY MEDICINE

## 2023-10-06 PROCEDURE — 85025 COMPLETE CBC W/AUTO DIFF WBC: CPT | Performed by: EMERGENCY MEDICINE

## 2023-10-06 PROCEDURE — 99285 EMERGENCY DEPT VISIT HI MDM: CPT

## 2023-10-06 PROCEDURE — 36600 WITHDRAWAL OF ARTERIAL BLOOD: CPT | Performed by: FAMILY MEDICINE

## 2023-10-06 PROCEDURE — 99222 1ST HOSP IP/OBS MODERATE 55: CPT | Performed by: INTERNAL MEDICINE

## 2023-10-06 PROCEDURE — 82375 ASSAY CARBOXYHB QUANT: CPT | Performed by: FAMILY MEDICINE

## 2023-10-06 PROCEDURE — 25010000002 SULFUR HEXAFLUORIDE MICROSPH 60.7-25 MG RECONSTITUTED SUSPENSION: Performed by: FAMILY MEDICINE

## 2023-10-06 PROCEDURE — 71045 X-RAY EXAM CHEST 1 VIEW: CPT

## 2023-10-06 PROCEDURE — 93005 ELECTROCARDIOGRAM TRACING: CPT | Performed by: EMERGENCY MEDICINE

## 2023-10-06 PROCEDURE — 25010000002 FUROSEMIDE PER 20 MG: Performed by: FAMILY MEDICINE

## 2023-10-06 PROCEDURE — 25010000002 LABETALOL 5 MG/ML SOLUTION: Performed by: PHYSICIAN ASSISTANT

## 2023-10-06 PROCEDURE — 84484 ASSAY OF TROPONIN QUANT: CPT | Performed by: FAMILY MEDICINE

## 2023-10-06 PROCEDURE — 93306 TTE W/DOPPLER COMPLETE: CPT | Performed by: INTERNAL MEDICINE

## 2023-10-06 PROCEDURE — 82948 REAGENT STRIP/BLOOD GLUCOSE: CPT

## 2023-10-06 PROCEDURE — 84484 ASSAY OF TROPONIN QUANT: CPT | Performed by: EMERGENCY MEDICINE

## 2023-10-06 PROCEDURE — 93005 ELECTROCARDIOGRAM TRACING: CPT

## 2023-10-06 RX ORDER — ENOXAPARIN SODIUM 100 MG/ML
1 INJECTION SUBCUTANEOUS EVERY 12 HOURS
Status: DISCONTINUED | OUTPATIENT
Start: 2023-10-06 | End: 2023-10-06

## 2023-10-06 RX ORDER — POLYETHYLENE GLYCOL 3350 17 G/17G
17 POWDER, FOR SOLUTION ORAL DAILY PRN
Status: DISCONTINUED | OUTPATIENT
Start: 2023-10-06 | End: 2023-10-11 | Stop reason: HOSPADM

## 2023-10-06 RX ORDER — INSULIN LISPRO 100 [IU]/ML
2-9 INJECTION, SOLUTION INTRAVENOUS; SUBCUTANEOUS
Status: DISCONTINUED | OUTPATIENT
Start: 2023-10-06 | End: 2023-10-11 | Stop reason: HOSPADM

## 2023-10-06 RX ORDER — LABETALOL HYDROCHLORIDE 5 MG/ML
20 INJECTION, SOLUTION INTRAVENOUS EVERY 4 HOURS PRN
Status: DISCONTINUED | OUTPATIENT
Start: 2023-10-06 | End: 2023-10-11 | Stop reason: HOSPADM

## 2023-10-06 RX ORDER — AMOXICILLIN 250 MG
2 CAPSULE ORAL 2 TIMES DAILY
Status: DISCONTINUED | OUTPATIENT
Start: 2023-10-06 | End: 2023-10-11 | Stop reason: HOSPADM

## 2023-10-06 RX ORDER — SODIUM CHLORIDE 0.9 % (FLUSH) 0.9 %
10 SYRINGE (ML) INJECTION AS NEEDED
Status: DISCONTINUED | OUTPATIENT
Start: 2023-10-06 | End: 2023-10-11 | Stop reason: HOSPADM

## 2023-10-06 RX ORDER — FUROSEMIDE 10 MG/ML
80 INJECTION INTRAMUSCULAR; INTRAVENOUS ONCE
Status: COMPLETED | OUTPATIENT
Start: 2023-10-06 | End: 2023-10-06

## 2023-10-06 RX ORDER — BISACODYL 10 MG
10 SUPPOSITORY, RECTAL RECTAL DAILY PRN
Status: DISCONTINUED | OUTPATIENT
Start: 2023-10-06 | End: 2023-10-11 | Stop reason: HOSPADM

## 2023-10-06 RX ORDER — CARVEDILOL 25 MG/1
25 TABLET ORAL 2 TIMES DAILY WITH MEALS
Status: DISCONTINUED | OUTPATIENT
Start: 2023-10-06 | End: 2023-10-11 | Stop reason: HOSPADM

## 2023-10-06 RX ORDER — ATORVASTATIN CALCIUM 40 MG/1
80 TABLET, FILM COATED ORAL NIGHTLY
Status: DISCONTINUED | OUTPATIENT
Start: 2023-10-06 | End: 2023-10-11 | Stop reason: HOSPADM

## 2023-10-06 RX ORDER — ACETAMINOPHEN 325 MG/1
650 TABLET ORAL EVERY 4 HOURS PRN
Status: DISCONTINUED | OUTPATIENT
Start: 2023-10-06 | End: 2023-10-11 | Stop reason: HOSPADM

## 2023-10-06 RX ORDER — SODIUM CHLORIDE 9 MG/ML
40 INJECTION, SOLUTION INTRAVENOUS AS NEEDED
Status: DISCONTINUED | OUTPATIENT
Start: 2023-10-06 | End: 2023-10-11 | Stop reason: HOSPADM

## 2023-10-06 RX ORDER — FUROSEMIDE 10 MG/ML
80 INJECTION INTRAMUSCULAR; INTRAVENOUS EVERY 12 HOURS
Status: DISCONTINUED | OUTPATIENT
Start: 2023-10-06 | End: 2023-10-09

## 2023-10-06 RX ORDER — HYDRALAZINE HYDROCHLORIDE 25 MG/1
25 TABLET, FILM COATED ORAL 3 TIMES DAILY
Status: DISCONTINUED | OUTPATIENT
Start: 2023-10-06 | End: 2023-10-11 | Stop reason: HOSPADM

## 2023-10-06 RX ORDER — ALUMINA, MAGNESIA, AND SIMETHICONE 2400; 2400; 240 MG/30ML; MG/30ML; MG/30ML
15 SUSPENSION ORAL EVERY 6 HOURS PRN
Status: DISCONTINUED | OUTPATIENT
Start: 2023-10-06 | End: 2023-10-11 | Stop reason: HOSPADM

## 2023-10-06 RX ORDER — SODIUM CHLORIDE 0.9 % (FLUSH) 0.9 %
10 SYRINGE (ML) INJECTION EVERY 12 HOURS SCHEDULED
Status: DISCONTINUED | OUTPATIENT
Start: 2023-10-06 | End: 2023-10-11 | Stop reason: HOSPADM

## 2023-10-06 RX ORDER — BISACODYL 5 MG/1
5 TABLET, DELAYED RELEASE ORAL DAILY PRN
Status: DISCONTINUED | OUTPATIENT
Start: 2023-10-06 | End: 2023-10-11 | Stop reason: HOSPADM

## 2023-10-06 RX ORDER — LABETALOL HYDROCHLORIDE 5 MG/ML
10 INJECTION, SOLUTION INTRAVENOUS ONCE
Status: COMPLETED | OUTPATIENT
Start: 2023-10-06 | End: 2023-10-06

## 2023-10-06 RX ORDER — NITROGLYCERIN 0.4 MG/1
0.4 TABLET SUBLINGUAL
Status: DISCONTINUED | OUTPATIENT
Start: 2023-10-06 | End: 2023-10-11 | Stop reason: HOSPADM

## 2023-10-06 RX ORDER — ONDANSETRON 2 MG/ML
4 INJECTION INTRAMUSCULAR; INTRAVENOUS EVERY 6 HOURS PRN
Status: DISCONTINUED | OUTPATIENT
Start: 2023-10-06 | End: 2023-10-11 | Stop reason: HOSPADM

## 2023-10-06 RX ORDER — NICOTINE POLACRILEX 4 MG
15 LOZENGE BUCCAL
Status: DISCONTINUED | OUTPATIENT
Start: 2023-10-06 | End: 2023-10-11 | Stop reason: HOSPADM

## 2023-10-06 RX ORDER — DEXTROSE MONOHYDRATE 25 G/50ML
25 INJECTION, SOLUTION INTRAVENOUS
Status: DISCONTINUED | OUTPATIENT
Start: 2023-10-06 | End: 2023-10-11 | Stop reason: HOSPADM

## 2023-10-06 RX ORDER — LOSARTAN POTASSIUM 25 MG/1
25 TABLET ORAL DAILY
Status: DISCONTINUED | OUTPATIENT
Start: 2023-10-06 | End: 2023-10-07

## 2023-10-06 RX ADMIN — HYDRALAZINE HYDROCHLORIDE 25 MG: 25 TABLET, FILM COATED ORAL at 21:22

## 2023-10-06 RX ADMIN — ATORVASTATIN CALCIUM 80 MG: 40 TABLET, FILM COATED ORAL at 21:22

## 2023-10-06 RX ADMIN — LABETALOL HYDROCHLORIDE 20 MG: 5 INJECTION, SOLUTION INTRAVENOUS at 16:17

## 2023-10-06 RX ADMIN — FUROSEMIDE 80 MG: 10 INJECTION, SOLUTION INTRAMUSCULAR; INTRAVENOUS at 09:45

## 2023-10-06 RX ADMIN — ACETAMINOPHEN 650 MG: 325 TABLET ORAL at 16:17

## 2023-10-06 RX ADMIN — Medication 10 ML: at 21:23

## 2023-10-06 RX ADMIN — LOSARTAN POTASSIUM 25 MG: 25 TABLET, FILM COATED ORAL at 13:13

## 2023-10-06 RX ADMIN — ACETAMINOPHEN 650 MG: 325 TABLET ORAL at 21:34

## 2023-10-06 RX ADMIN — CARVEDILOL 25 MG: 25 TABLET, FILM COATED ORAL at 17:12

## 2023-10-06 RX ADMIN — HYDRALAZINE HYDROCHLORIDE 25 MG: 25 TABLET, FILM COATED ORAL at 16:17

## 2023-10-06 RX ADMIN — SENNOSIDES AND DOCUSATE SODIUM 2 TABLET: 50; 8.6 TABLET ORAL at 13:11

## 2023-10-06 RX ADMIN — LABETALOL HYDROCHLORIDE 10 MG: 5 INJECTION, SOLUTION INTRAVENOUS at 13:14

## 2023-10-06 RX ADMIN — Medication 10 ML: at 13:12

## 2023-10-06 RX ADMIN — INSULIN LISPRO 4 UNITS: 100 INJECTION, SOLUTION INTRAVENOUS; SUBCUTANEOUS at 17:12

## 2023-10-06 RX ADMIN — ENOXAPARIN SODIUM 80 MG: 100 INJECTION SUBCUTANEOUS at 13:11

## 2023-10-06 RX ADMIN — SULFUR HEXAFLUORIDE 2 ML: KIT at 12:48

## 2023-10-06 RX ADMIN — APIXABAN 5 MG: 5 TABLET, FILM COATED ORAL at 21:22

## 2023-10-06 RX ADMIN — NITROGLYCERIN 1 INCH: 20 OINTMENT TOPICAL at 09:45

## 2023-10-06 RX ADMIN — FUROSEMIDE 80 MG: 10 INJECTION, SOLUTION INTRAMUSCULAR; INTRAVENOUS at 13:13

## 2023-10-06 NOTE — PLAN OF CARE
Problem: Adult Inpatient Plan of Care  Goal: Plan of Care Review  Outcome: Ongoing, Progressing  Goal: Patient-Specific Goal (Individualized)  Outcome: Ongoing, Progressing  Goal: Absence of Hospital-Acquired Illness or Injury  Outcome: Ongoing, Progressing  Goal: Optimal Comfort and Wellbeing  Outcome: Ongoing, Progressing  Intervention: Provide Person-Centered Care  Recent Flowsheet Documentation  Taken 10/6/2023 1136 by Asif Navarro, RN  Trust Relationship/Rapport:   care explained   emotional support provided   choices provided   empathic listening provided   questions answered   questions encouraged  Goal: Readiness for Transition of Care  Outcome: Ongoing, Progressing  Intervention: Mutually Develop Transition Plan  Recent Flowsheet Documentation  Taken 10/6/2023 1139 by Asif Navarro, RN  Equipment Currently Used at Home: none  Transportation Anticipated: family or friend will provide  Patient/Family Anticipated Services at Transition: none  Patient/Family Anticipates Transition to: home     Problem: Diabetes Comorbidity  Goal: Blood Glucose Level Within Targeted Range  Outcome: Ongoing, Progressing     Problem: Skin Injury Risk Increased  Goal: Skin Health and Integrity  Outcome: Ongoing, Progressing     Problem: Fall Injury Risk  Goal: Absence of Fall and Fall-Related Injury  Outcome: Ongoing, Progressing   Goal Outcome Evaluation:

## 2023-10-06 NOTE — H&P
[facility]   HOSPITALIST HISTORY AND PHYSICAL  Date: 10/6/2023   Patient Name: Lyle Lozano  : 1955  MRN: [id number]  Primary Care Physician:  Heidi Villa APRN  Date of admission: 10/6/2023    Subjective   Subjective   Chief Complaint: Exertional dyspnea, worse than usual past week    HPI:    Lyle Lozano is a 68 y.o. male with a history of nonischemic cardiomyopathy who presents with worsening exertional dyspnea, orthopnea.  He has been feeling more short of air for the past week or so.  Presented to the emergency department.  Troponins elevated in the 100-150 range, which is chronically elevated.  BNP >19,000, which is a little more than his baseline typically.  His creatinine is 1.8, which is close to his baseline.  Chest x-ray showed 1. Stable cardiomegaly and mild pulmonary vascular congestion 2. Minimal dependent opacity at the left base favored to represent atelectasis . 3. Small right-sided pleural effusion.  White count normal.  No fever.  Intermittent sharp chest pain, atypical for cardiac etiology.  Resting EKG unchanged from 2023; sinus rhythm with left bundle branch block pattern with repolarization abnormality.  Hospitalist contacted via ED for admission/further observation.  Cardiologist has been consulted.  Started on IV diuretics.  Is hypertensive and home BP meds were resumed.  Also IV labetalol given.  Pertinent History   Past Medical History:    Active Ambulatory Problems     Diagnosis Date Noted    NICM (nonischemic cardiomyopathy) 2021    Essential hypertension 2021    Dyslipidemia 2021    Coronary artery disease involving native coronary artery of native heart without angina pectoris 10/11/2021    Type 2 diabetes mellitus with stage 3b chronic kidney disease, without long-term current use of insulin 10/11/2021    Hyperlipidemia 10/11/2021    Neuropathy 10/11/2021    Shortness of breath 2022    Acute gout of left foot 2022     Atypical pneumonia 02/24/2022    Depression 03/04/2022    Migraine 03/04/2022    Chronic idiopathic gout of foot and ankle region without tophus 03/05/2022    Hypertensive heart disease with heart failure 03/05/2022    Gout 03/05/2022    Stage 3 chronic kidney disease 03/05/2022    Hand arthritis 08/26/2022    Acute pain of left knee 09/27/2022    Elevated troponin level not due myocardial infarction 01/26/2023    Acute on chronic systolic heart failure 02/01/2023    Acute ischemic left MCA stroke 04/13/2023    Acute respiratory failure with hypoxia 05/15/2023    Acute on chronic heart failure with preserved ejection fraction (HFpEF) 05/19/2023    Sepsis 07/08/2023    Acute decompensated heart failure 08/21/2023     Resolved Ambulatory Problems     Diagnosis Date Noted    Pedal edema 02/07/2022    Cough 03/02/2022    Acute pulmonary edema 01/25/2023    Acute on chronic heart failure with preserved ejection fraction (HFpEF) 01/26/2023    Acute on chronic systolic heart failure 02/01/2023    Acute on chronic congestive heart failure, unspecified heart failure type 03/16/2023    Acute on chronic HFrEF (heart failure with reduced ejection fraction) 03/23/2023    Acute pulmonary edema 05/11/2023    Acute systolic heart failure 05/14/2023    Chronic systolic heart failure 05/14/2023    Acute HFrEF (heart failure with reduced ejection fraction) 05/14/2023     Past Medical History:   Diagnosis Date    Abnormal kidney function     Arthritis     Bursitis     CHF (congestive heart failure)     Diabetes     Edema     Forgetfulness     Head injury     Hernia cerebri     Hypertension     IFG (impaired fasting glucose)     Low back pain     Lumbago     Pain of left hip     Right shoulder pain     Sleep apnea     SOB (shortness of breath)        Past Surgical History:    Past Surgical History:   Procedure Laterality Date    CYST REMOVAL Right     leg       Social History:   Lives in [place].  Sister lives with him.  [place]    Social History     Socioeconomic History    Marital status: Single   Tobacco Use    Smoking status: Former     Packs/day: 0.25     Types: Cigarettes     Start date:      Quit date: 2012     Years since quittin.1    Smokeless tobacco: Never   Vaping Use    Vaping Use: Never used   Substance and Sexual Activity    Alcohol use: Yes     Comment: rare    Drug use: Never    Sexual activity: Defer       Family History:     Family History   Problem Relation Age of Onset    No Known Problems Father     Diabetes Sister     Stroke Sister        Home Medications (reported)  Current Outpatient Medications   Medication Instructions    allopurinol (ZYLOPRIM) 100 mg, Oral, Daily    apixaban (ELIQUIS) 5 mg, Oral, Every Morning, Pt is supposed to take this bid but admits to only taking it qam because it makes him cold. He did say he took it this morning 10/06/23    aspirin 81 mg, Oral, Daily    atorvastatin (LIPITOR) 80 mg, Oral, Nightly    carvedilol (COREG) 25 mg, Oral, 2 Times Daily With Meals    cholecalciferol (VITAMIN D3) 1,000 Units, Oral, Daily    furosemide (LASIX) 40 mg, Oral, Daily    hydrALAZINE (APRESOLINE) 25 mg, Oral, 3 Times Daily    ipratropium-albuterol (COMBIVENT RESPIMAT)  MCG/ACT inhaler 1 puff, Inhalation, 4 Times Daily PRN    Jardiance 25 MG tablet tablet TAKE 1 TABLET BY MOUTH DAILY    losartan (COZAAR) 25 mg, Oral, Daily    Semaglutide,0.25 or 0.5MG/DOS, (Ozempic, 0.25 or 0.5 MG/DOSE,) 2 MG/3ML solution pen-injector INJECT UNDER SKIN 0.5MG ONCE A WEEK       Allergies:  No Known Allergies    REVIEW OF SYSTEMS:   Shortness of air above baseline    Objective   Objective   Vitals:   Temp:  [98 øF (36.7 øC)] 98 øF (36.7 øC)  Heart Rate:  [85-91] 89  Resp:  [20] 20  BP: (158-188)/() 185/119  PHYSICAL EXAM   CON: WN. WD. NAD.  Alert and conversational.  NECK:  No thyromegaly. No stridor. Trachea midline.  RESP:  Diminished bases.  No wheezes.  Breathing comfortably.  CV:  Rhythm  regular. Rate WNL. No murmur noted.  Tr + PreTAB edema.  GI:  Soft and nontender. Nondistended.  EXT: Peripheral pulses intact.  No joint deformities or cyanosis.  PSYCH:  Alert. Oriented. Normal affect and mood.  NEURO:  No dysarthria or aphasia. No unilateral weakness or paresthesia.  SKIN: No chronic venous stasis changes or varicosities.  No cellulitis    Result Review    Result Review:  I have personally reviewed the results from the time of this admission to 10/6/2023 11:01 EDT and agree with these findings:  []  Laboratory  []  Microbiology  []  Radiology  []  EKG/Telemetry   []  Cardiology/Vascular   []  Pathology  []  Old records  []  Other:     1. Stable cardiomegaly and mild pulmonary vascular congestion 2. Minimal dependent opacity at the left base favored to represent atelectasis . 3. Small right-sided pleural effusion     Assessment & Plan   Assessment / Plan   Assessment:    Shortness of breath, edema, chest discomfort  Acute hypoxia  Elevated troponin, chronic (in setting of chronic CHF and CKD) (doubt ACS)  HTN  Hx CHF preserved ejection fraction (CHF clinic, Dr. Herrmann)  DM  CKD 3B  Hx of DVT (on Eliquis)  Hx of medication nonadherence  Dyslipidemia  SANDRINE (unable to tolerate CPAP)  Left bundle branch block pattern on ECG     Plan:    Admit to monitored bed  Serial troponins  Continue diuresis with IV Lasix  As needed labetalol IV.  Resume all home BP meds.  Cardiology consulted.  Echo planned  Monitor renal indices and volume status closely in light of CHF/CKD history  Dietitian consult for low-sodium CHF diet education  Additional recommendations pending clinical course  Discontinue Lovenox.  Resume home Eliquis.  Additional recommendations pending clinical course.    DVT prophylaxis:  Medical DVT prophylaxis orders are present.  CODE STATUS:         Electronically signed by JAYMIE Kat, 10/06/23, 11:01 AM EDT.       Attending documentation:  I reviewed the above documentation and  independently reviewed and rounded and evaluated the patient and discussed the care plan with AVI Driver PA-C, I agree with his findings and plan as documented, what I have added to the care plan and modified is as follows in my documentation and my medical decision making; I excepted this patient to my service, he is a 68-year-old male who presented to the emergency room with chief complaint of chest pain and shortness of breath.  History of presenting illness: 68-year-old male with known diastolic heart failure, with diabetes, hypertension, forgetfulness, dyslipidemia, sleep apnea, gout, who has medical noncompliance issues, presented to the emergency room on 10/6/2023 with chief complaint of shortness of breath.  He also has a chest pain which has been intermittent.  Chest pain not lasting, 2-4 out of 10, nonradiating.  He has difficulty laying flat.  He has cough.  In the emergency room he was found to be hypoxemic with exertion and elevated blood pressures with systolics in the 190s and diastolics in the 117 range, he was given a dose of Lasix as well as nitro ointment, blood pressures improved some, troponins were elevated in the 130-150 range, his proBNP is 19,000, creatinine 1.82, hemoglobin 10.5.  Hospitalist service was called to admit and further manage.  Cardiology contacted from the emergency room.  Past medical history reviewed, CKD stage IIIa, chronic diastolic CHF, depression, diabetes, hypertension, forgetfulness, hernia cerebri, dyslipidemia, diabetes mellitus, past surgical history includes cyst removal, social history includes living with a sister, [place] , ex-smoker, no drugs or alcohol.  Family history positive for strokes and diabetes in sister, all medication list reviewed.  Allergies reviewed, review of systems obtained, all systems reviewed negative except cough and shortness of breath and intermittent chest discomfort.  Vitals reviewed on physical exam ill-appearing male current 2 L  nasal cannula in hospital stretcher, with conversational dyspnea, tachypneic with respiratory rate in the 25-30 range, tachycardic rate, regular rhythm, diminished breath sounds with scattered rhonchi, trace pedal edema, soft nontender abdomen, alert and oriented x3.  Assessment as above, plan is to admit to my service, IV Lasix continued at 80 mg IV twice daily, restarted home medications including Coreg, Lipitor, Eliquis, hydralazine, Cozaar, will need insulin sliding scale coverage for blood sugars ranging in the 200 range, will check an A1c, strict I's and O's, daily weights, follow-up echo, will replace electrolytes accordingly with aggressive diuresis, telemetry monitoring, cardiology consulted Dr. Goff, recommendations appreciated, I suspect there is a systolic component to his CHF and he may need further ischemic work-up, will continue Eliquis at this time, a.m. labs, full code, DVT prophylaxis Eliquis, clinical course dictate further management, discussed with nurse at the bedside, discussed with the ER attending who requested hospitalization.  More than 75% of the time of this patient's encounter was performed by me, this included face-to-face time, planning and coordinating, medical decision making and critical thinking personally done by me.  -AVBMD    Electronically signed by Aylin Keita MD, 10/06/23, 5:59 PM EDT.    Portions of this documentation were transcribed electronically from a voice recognition software.  I confirm all data accurately represents the service(s) I performed at today's visit.

## 2023-10-06 NOTE — ED PROVIDER NOTES
Time: 7:17 AM EDT  Date of encounter:  10/6/2023  Independent Historian/Clinical History and Information was obtained by:   Patient    History is limited by: N/A    Chief Complaint: Shortness of breath, leg swelling, chest discomfort.      History of Present Illness:  Patient is a 68 y.o. year old male who presents to the emergency department for evaluation of shortness of breath, leg swelling, chest discomfort.  This patient has a history of congestive heart failure, diabetes and hypertension, chronic kidney disease and DVT on Eliquis.  The patient states that over the last several days he had progressive shortness of breath along with lower extremity swelling.  He also complains of chest discomfort which is coming going in feels like pressure or tightness across his chest.  The patient denies any fever, vomiting, or abdominal pain.  He was admitted to the hospital several weeks ago for acute on chronic decompensated heart failure with preserved ejection fraction and elevated troponin.  The patient has a resting pulse oximeter of 90 to 91% however he does desaturate below this when ambulatory or laying flat..    HPI    Patient Care Team  Primary Care Provider: Heidi Villa APRN    Past Medical History:     No Known Allergies  Past Medical History:   Diagnosis Date    Abnormal kidney function     Arthritis     Bursitis     CHF (congestive heart failure)     Depression     Diabetes     Edema     Essential hypertension 09/23/2021    Forgetfulness     Gout     Head injury     Hernia cerebri     Hyperlipidemia     Hypertension     IFG (impaired fasting glucose)     Low back pain     Lumbago     `    Pain of left hip     Right shoulder pain     Sleep apnea     SOB (shortness of breath)      Past Surgical History:   Procedure Laterality Date    CYST REMOVAL Right     leg     Family History   Problem Relation Age of Onset    No Known Problems Father     Diabetes Sister     Stroke Sister        Home Medications:  Prior  to Admission medications    Medication Sig Start Date End Date Taking? Authorizing Provider   allopurinol (ZYLOPRIM) 100 MG tablet Take 1 tablet by mouth Daily. 23   Anthony Herrmann MD   apixaban (ELIQUIS) 5 MG tablet tablet Take 1 tablet by mouth 2 (Two) Times a Day.    Quentin Mejia MD   aspirin 81 MG EC tablet Take 1 tablet by mouth Daily.    Quentin Mejia MD   atorvastatin (LIPITOR) 80 MG tablet Take 1 tablet by mouth Every Night. 23   Anthony Herrmann MD   carvedilol (COREG) 12.5 MG tablet Take 2 tablets by mouth 2 (Two) Times a Day With Meals.    Quentin Mejia MD   cholecalciferol (VITAMIN D3) 25 MCG (1000 UT) tablet Take 1 tablet by mouth Daily. 22   Nehal Daniels MD   furosemide (LASIX) 40 MG tablet Take 1 tablet by mouth Daily for 360 days. 23  Mariusz John MD   hydrALAZINE (APRESOLINE) 25 MG tablet Take 1 tablet by mouth 3 (Three) Times a Day. 23   Quentin Mejia MD   ipratropium-albuterol (COMBIVENT RESPIMAT)  MCG/ACT inhaler Inhale 1 puff 4 (Four) Times a Day As Needed for Wheezing. 23   Jose Nolasco DO   Jardiance 25 MG tablet tablet TAKE 1 TABLET BY MOUTH DAILY 23   Heidi Villa APRN   losartan (COZAAR) 25 MG tablet TAKE 1 TABLET BY MOUTH DAILY 23   Heidi Villa APRN   Semaglutide,0.25 or 0.5MG/DOS, (Ozempic, 0.25 or 0.5 MG/DOSE,) 2 MG/3ML solution pen-injector INJECT UNDER SKIN 0.5MG ONCE A WEEK 23   Heidi Villa APRN        Social History:   Social History     Tobacco Use    Smoking status: Former     Packs/day: .25     Types: Cigarettes     Start date:      Quit date: 2012     Years since quittin.1    Smokeless tobacco: Never   Vaping Use    Vaping Use: Never used   Substance Use Topics    Alcohol use: Yes     Comment: rare    Drug use: Never         Review of Systems:  Review of Systems   Constitutional:  Negative for chills and fever.   HENT:  Negative for congestion, ear  "pain and sore throat.    Eyes:  Negative for pain.   Respiratory:  Positive for cough, chest tightness and shortness of breath.    Cardiovascular:  Positive for leg swelling. Negative for chest pain.   Gastrointestinal:  Negative for abdominal pain, diarrhea, nausea and vomiting.   Genitourinary:  Negative for flank pain and hematuria.   Musculoskeletal:  Negative for joint swelling.   Skin:  Negative for pallor.   Neurological:  Negative for seizures and headaches.   All other systems reviewed and are negative.       Physical Exam:  /80 (BP Location: Left arm, Patient Position: Lying)   Pulse 65   Temp 97.5 øF (36.4 øC) (Oral)   Resp 18   Ht 170.2 cm (67\")   Wt 83.3 kg (183 lb 10.3 oz)   SpO2 98%   BMI 28.76 kg/mý     Physical Exam  Vitals and nursing note reviewed.   Constitutional:       General: He is not in acute distress.     Appearance: Normal appearance. He is not toxic-appearing.   HENT:      Head: Normocephalic and atraumatic.      Mouth/Throat:      Mouth: Mucous membranes are moist.   Eyes:      General: No scleral icterus.  Cardiovascular:      Rate and Rhythm: Normal rate and regular rhythm.      Pulses: Normal pulses.      Heart sounds: Normal heart sounds.   Pulmonary:      Effort: Pulmonary effort is normal. No respiratory distress.      Breath sounds: Examination of the right-middle field reveals rhonchi and rales. Examination of the left-middle field reveals rhonchi and rales. Rhonchi and rales present.   Abdominal:      General: Abdomen is flat.      Palpations: Abdomen is soft.      Tenderness: There is no abdominal tenderness.   Musculoskeletal:         General: Normal range of motion.      Cervical back: Normal range of motion and neck supple.      Right lower leg: Edema present.      Left lower leg: Edema present.   Skin:     General: Skin is warm and dry.   Neurological:      Mental Status: He is alert and oriented to person, place, and time. Mental status is at baseline.      "             Procedures:  Procedures      Medical Decision Making:      Comorbidities that affect care:    Chronic Kidney Disease, Congestive Heart Failure, Diabetes, Hypertension    External Notes reviewed:    Previous Admission Note: For pulmonary edema      The following orders were placed and all results were independently analyzed by me:  Orders Placed This Encounter   Procedures    XR Chest 1 View    [place] Draw    Comprehensive Metabolic Panel    BNP    Single High Sensitivity Troponin T    CBC Auto Differential    High Sensitivity Troponin T 2Hr    High Sensitivity Troponin T    Blood Gas, Arterial -With Co-Ox Panel: Yes    Basic Metabolic Panel    Magnesium    Phosphorus    Hepatic Function Panel    Hemoglobin A1c    CBC Auto Differential    Basic Metabolic Panel    Magnesium    Phosphorus    Hepatic Function Panel    Diet: Cardiac Diets, Diabetic Diets; Healthy Heart (2-3 Na+); Consistent Carbohydrate; Texture: Regular Texture (IDDSI 7); Fluid Consistency: Thin (IDDSI 0)    Undress & Gown    Continuous Pulse Oximetry    Vital Signs    Vital Signs    Intake & Output    Weigh Patient    Oral Care    Place Sequential Compression Device    Maintain Sequential Compression Device    Telemetry - Maintain IV Access    Telemetry - Place Orders & Notify Provider of Results When Patient Experiences Acute Chest Pain, Dysrhythmia or Respiratory Distress    Daily Weights    Strict Intake & Output    Code Status and Medical Interventions:    Hospitalist (on-call MD unless specified)    Cardiology (on-call MD unless specified)    Inpatient Cardiology Consult    Inpatient Nutrition Consult    OT Consult: Eval & Treat    PT Consult: Eval & Treat Discharge Placement Assessment    Oxygen Therapy- Nasal Cannula; Titrate 1-6 LPM Per SpO2; 90 - 95%    POC Glucose 4x Daily Before Meals & at Bedtime    POC Glucose Once    POC Glucose Once    POC Glucose Once    POC Glucose Once    ECG 12 Lead ED Triage Standing Order; SOA    Adult  Transthoracic Echo Complete W/ Cont if Necessary Per Protocol    Insert Peripheral IV    Insert Peripheral IV    Inpatient Admission    CBC & Differential    Green Top (Gel)    Lavender Top    Gold Top - SST    Light Blue Top    CBC & Differential    CBC & Differential       Medications Given in the Emergency Department:  Medications   sodium chloride 0.9 % flush 10 mL (has no administration in time range)   sodium chloride 0.9 % flush 10 mL (10 mL Intravenous Given 10/7/23 0846)   sodium chloride 0.9 % flush 10 mL (has no administration in time range)   sodium chloride 0.9 % infusion 40 mL (has no administration in time range)   sennosides-docusate (PERICOLACE) 8.6-50 MG per tablet 2 tablet (2 tablets Oral Given 10/7/23 0846)     And   polyethylene glycol (MIRALAX) packet 17 g (has no administration in time range)     And   bisacodyl (DULCOLAX) EC tablet 5 mg (has no administration in time range)     And   bisacodyl (DULCOLAX) suppository 10 mg (has no administration in time range)   nitroglycerin (NITROSTAT) SL tablet 0.4 mg (has no administration in time range)   furosemide (LASIX) injection 80 mg (80 mg Intravenous Given 10/7/23 0243)   acetaminophen (TYLENOL) tablet 650 mg (650 mg Oral Given 10/7/23 0244)   ondansetron (ZOFRAN) injection 4 mg (has no administration in time range)   aluminum-magnesium hydroxide-simethicone (MAALOX MAX) 400-400-40 MG/5ML suspension 15 mL (has no administration in time range)   atorvastatin (LIPITOR) tablet 80 mg (80 mg Oral Given 10/6/23 2122)   carvedilol (COREG) tablet 25 mg (25 mg Oral Given 10/7/23 0846)   hydrALAZINE (APRESOLINE) tablet 25 mg (25 mg Oral Given 10/7/23 0847)   apixaban (ELIQUIS) tablet 5 mg (5 mg Oral Given 10/7/23 0846)   dextrose (GLUTOSE) oral gel 15 g (has no administration in time range)   dextrose (D50W) (25 g/50 mL) IV injection 25 g (has no administration in time range)   glucagon (GLUCAGEN) injection 1 mg (has no administration in time range)    Insulin Lispro (humaLOG) injection 2-9 Units (2 Units Subcutaneous Given 10/7/23 0846)   labetalol (NORMODYNE,TRANDATE) injection 20 mg (20 mg Intravenous Given 10/6/23 1617)   potassium chloride (MICRO-K/KLOR-CON) CR capsule (has no administration in time range)   losartan (COZAAR) tablet 50 mg (has no administration in time range)   furosemide (LASIX) injection 80 mg (80 mg Intravenous Given 10/6/23 0945)   nitroglycerin (NITROSTAT) ointment 1 inch (1 inch Topical Given 10/6/23 0945)   labetalol (NORMODYNE,TRANDATE) injection 10 mg (10 mg Intravenous Given 10/6/23 1314)   Sulfur Hexafluoride Microsph (LUMASON) 60.7-25 MG IV reconstituted suspension reconstituted suspension 2 mL (2 mL Injection Given 10/6/23 1248)   potassium chloride (MICRO-K/KLOR-CON) CR capsule (40 mEq Oral Given 10/7/23 0845)        ED Course:           EKG: Sinus rhythm rate of 94 beats per  Normal P wave and MS interval  Left bundle branch block  Normal axis  Nonspecific ST changes  QT QTc interval.      Labs:    Lab Results (last 24 hours)       Procedure Component Value Units Date/Time    POC Glucose Once [[id number]]  (Abnormal) Collected: 10/06/23 1648    Specimen: Blood Updated: 10/06/23 1650     Glucose 212 mg/dL      Comment: Serial Number: [id number]:  [id number]       POC Glucose Once [[id number]]  (Abnormal) Collected: 10/06/23 2124    Specimen: Blood Updated: 10/06/23 2126     Glucose 127 mg/dL      Comment: Serial Number: [id number]:  [id number]       Basic Metabolic Panel [[id number]]  (Abnormal) Collected: 10/07/23 0652    Specimen: Blood from Arm, Left Updated: 10/07/23 0736     Glucose 173 mg/dL      BUN 28 mg/dL      Creatinine 2.01 mg/dL      Sodium 138 mmol/L      Potassium 3.2 mmol/L      Chloride 104 mmol/L      CO2 24.6 mmol/L      Calcium 8.8 mg/dL      BUN/Creatinine Ratio 13.9     Anion Gap 9.4 mmol/L      eGFR 35.9 mL/min/1.73     Narrative:      GFR Normal >60  Chronic Kidney Disease <60  Kidney Failure  <15      CBC & Differential [[id number]]  (Abnormal) Collected: 10/07/23 0652    Specimen: Blood from Arm, Left Updated: 10/07/23 0705    Narrative:      The following orders were created for panel order CBC & Differential.  Procedure                               Abnormality         Status                     ---------                               -----------         ------                     CBC Auto Differential[[id number]]        Abnormal            Final result                 Please view results for these tests on the individual orders.    Magnesium [[id number]]  (Normal) Collected: 10/07/23 0652    Specimen: Blood from Arm, Left Updated: 10/07/23 0952     Magnesium 1.8 mg/dL     Phosphorus [[id number]]  (Normal) Collected: 10/07/23 0652    Specimen: Blood from Arm, Left Updated: 10/07/23 0736     Phosphorus 4.1 mg/dL     Hepatic Function Panel [[id number]]  (Abnormal) Collected: 10/07/23 0652    Specimen: Blood from Arm, Left Updated: 10/07/23 0736     Total Protein 6.2 g/dL      Albumin 2.7 g/dL      ALT (SGPT) 5 U/L      AST (SGOT) 9 U/L      Alkaline Phosphatase 107 U/L      Total Bilirubin 0.4 mg/dL      Bilirubin, Direct <0.2 mg/dL      Bilirubin, Indirect --     Comment: Unable to calculate       Hemoglobin A1c [[id number]] Collected: 10/07/23 0652    Specimen: Blood from Arm, Left Updated: 10/07/23 0700    CBC Auto Differential [[id number]]  (Abnormal) Collected: 10/07/23 0652    Specimen: Blood from Arm, Left Updated: 10/07/23 0705     WBC 6.06 10*3/mm3      RBC 3.99 10*6/mm3      Hemoglobin 10.7 g/dL      Hematocrit 34.2 %      MCV 85.7 fL      MCH 26.8 pg      MCHC 31.3 g/dL      RDW 18.3 %      RDW-SD 57.3 fl      MPV 9.3 fL      Platelets 216 10*3/mm3      Neutrophil % 60.6 %      Lymphocyte % 29.5 %      Monocyte % 6.4 %      Eosinophil % 2.6 %      Basophil % 0.7 %      Immature Grans % 0.2 %      Neutrophils, Absolute 3.67 10*3/mm3      Lymphocytes, Absolute 1.79 10*3/mm3      Monocytes, Absolute  0.39 10*3/mm3      Eosinophils, Absolute 0.16 10*3/mm3      Basophils, Absolute 0.04 10*3/mm3      Immature Grans, Absolute 0.01 10*3/mm3      nRBC 0.0 /100 WBC     POC Glucose Once [[id number]]  (Abnormal) Collected: 10/07/23 0747    Specimen: Blood Updated: 10/07/23 0751     Glucose 178 mg/dL      Comment: Serial Number: [id number]:  [id number]       POC Glucose Once [[id number]]  (Abnormal) Collected: 10/07/23 1126    Specimen: Blood Updated: 10/07/23 1129     Glucose 198 mg/dL      Comment: Serial Number: [id number]:  [id number]                Imaging:    Adult Transthoracic Echo Complete W/ Cont if Necessary Per Protocol    Result Date: 10/6/2023  Narrative:   Left ventricular ejection fraction appears to be 26 - 30%.  Appears to have global hypokinesis worse in the apex and anterior walls.   The left ventricular cavity is mildly dilated.   Left ventricular wall thickness is consistent with borderline concentric hypertrophy.   Left ventricular diastolic function was indeterminate.   Mildly reduced right ventricular systolic function noted.   The left atrial cavity is mildly dilated.   Patient has sclerotic aortic valve.  It does appear to open somewhat.  He could have some degree of low gradient AS.  Does not appear likely severe.     XR Chest 1 View    Result Date: 10/6/2023  Narrative: PROCEDURE: XR CHEST 1 VW  COMPARISON: [facility], CR, XR CHEST 1 VW, 8/21/2023, 13:59.  INDICATIONS: SOA Triage Protocol/SHORTNESS OF BREATH AND DIFFICULTY BREATHING  FINDINGS:  Study is limited by overlying support and monitoring apparatus.  Stable cardiomegaly noted accentuated by portable technique and lung volumes.  Pulmonary vascularity is grossly unremarkable.  There is a small degree of blunting at the right costophrenic angle suggesting a small pleural effusion.  Minimal increased hazy and interstitial opacity noted at left base with a similar appearance to the comparison study.  Osseous  structures demonstrate no acute abnormality      Impression:   1. Stable cardiomegaly and mild pulmonary vascular congestion 2. Minimal dependent opacity at the left base favored to represent atelectasis .  3. Small right-sided pleural effusion       JUAN JOSE GILLETTE MD       Electronically Signed and Approved By: JUAN JOSE GILLETTE MD on 10/06/2023 at 7:21                Adult Transthoracic Echo Complete W/ Cont if Necessary Per Protocol    Result Date: 10/6/2023    Left ventricular ejection fraction appears to be 26 - 30%.  Appears to have global hypokinesis worse in the apex and anterior walls.   The left ventricular cavity is mildly dilated.   Left ventricular wall thickness is consistent with borderline concentric hypertrophy.   Left ventricular diastolic function was indeterminate.   Mildly reduced right ventricular systolic function noted.   The left atrial cavity is mildly dilated.   Patient has sclerotic aortic valve.  It does appear to open somewhat.  He could have some degree of low gradient AS.  Does not appear likely severe.        Differential Diagnosis and Discussion:    Chest Pain:  Based on the patient's signs and symptoms, I considered aortic dissection, myocardial infaction, pulmonary embolism, cardiac tamponade, pericarditis, pneumothorax, musculoskeletal chest pain and other differential diagnosis as an etiology of the patient's chest pain.   Dyspnea: Differential diagnosis includes but is not limited to metabolic acidosis, neurological disorders, psychogenic, asthma, pneumothorax, upper airway obstruction, COPD, pneumonia, noncardiogenic pulmonary edema, interstitial lung disease, anemia, congestive heart failure, and pulmonary embolism    All labs were reviewed and interpreted by me.  EKG was interpreted by me.    MDM     Amount and/or Complexity of Data Reviewed  Clinical lab tests: reviewed  Tests in the radiology section of [facility]: reviewed  Tests in the medicine section of [facility]:  reviewed  Decide to obtain previous medical records or to obtain history from someone other than the patient: yes             Patient Care Considerations:    CT CHEST: I considered ordering a CT scan of the chest, however the patient appears to have congestive heart failure and no signs of pulmonary embolism.      Consultants/Shared Management Plan:    Hospitalist: I have discussed the case with hospitalist who agrees to accept the patient for admission.    Social Determinants of Health:    Patient is unable to carry out activities of daily life. Escalation of care is necessary.       Disposition and Care Coordination:    Admit:   Through independent evaluation of the patient's history, physical, and imperical data, the patient meets criteria for observation/admission to the hospital.        Final diagnoses:   Acute pulmonary edema        ED Disposition       ED Disposition   Decision to Admit    Condition   --    Comment   Level of Care: Telemetry [5]   Diagnosis: NSTEMI (non-ST elevated myocardial infarction) [081296]   Admitting Physician: LILY ROBISON [[id number]]   Attending Physician: LILY ROBISON [[id number]]   Isolate for COVID?: No [0]   Certification: I Certify That Inpatient Hospital Services Are Medically Necessary For Greater Than 2 Midnights                 This medical record created using voice recognition software.             Niall Valdez, DO  10/07/23 1201

## 2023-10-07 LAB
ALBUMIN SERPL-MCNC: 2.7 G/DL (ref 3.5–5.2)
ALP SERPL-CCNC: 107 U/L (ref 39–117)
ALT SERPL W P-5'-P-CCNC: 5 U/L (ref 1–41)
ANION GAP SERPL CALCULATED.3IONS-SCNC: 9.4 MMOL/L (ref 5–15)
AST SERPL-CCNC: 9 U/L (ref 1–40)
BASOPHILS # BLD AUTO: 0.04 10*3/MM3 (ref 0–0.2)
BASOPHILS NFR BLD AUTO: 0.7 % (ref 0–1.5)
BILIRUB CONJ SERPL-MCNC: <0.2 MG/DL (ref 0–0.3)
BILIRUB INDIRECT SERPL-MCNC: ABNORMAL MG/DL
BILIRUB SERPL-MCNC: 0.4 MG/DL (ref 0–1.2)
BUN SERPL-MCNC: 28 MG/DL (ref 8–23)
BUN/CREAT SERPL: 13.9 (ref 7–25)
CALCIUM SPEC-SCNC: 8.8 MG/DL (ref 8.6–10.5)
CHLORIDE SERPL-SCNC: 104 MMOL/L (ref 98–107)
CO2 SERPL-SCNC: 24.6 MMOL/L (ref 22–29)
CREAT SERPL-MCNC: 2.01 MG/DL (ref 0.76–1.27)
DEPRECATED RDW RBC AUTO: 57.3 FL (ref 37–54)
EGFRCR SERPLBLD CKD-EPI 2021: 35.9 ML/MIN/1.73
EOSINOPHIL # BLD AUTO: 0.16 10*3/MM3 (ref 0–0.4)
EOSINOPHIL NFR BLD AUTO: 2.6 % (ref 0.3–6.2)
ERYTHROCYTE [DISTWIDTH] IN BLOOD BY AUTOMATED COUNT: 18.3 % (ref 12.3–15.4)
GLUCOSE BLDC GLUCOMTR-MCNC: 101 MG/DL (ref 70–99)
GLUCOSE BLDC GLUCOMTR-MCNC: 163 MG/DL (ref 70–99)
GLUCOSE BLDC GLUCOMTR-MCNC: 178 MG/DL (ref 70–99)
GLUCOSE BLDC GLUCOMTR-MCNC: 198 MG/DL (ref 70–99)
GLUCOSE SERPL-MCNC: 173 MG/DL (ref 65–99)
HBA1C MFR BLD: 10 % (ref 4.8–5.6)
HCT VFR BLD AUTO: 34.2 % (ref 37.5–51)
HGB BLD-MCNC: 10.7 G/DL (ref 13–17.7)
IMM GRANULOCYTES # BLD AUTO: 0.01 10*3/MM3 (ref 0–0.05)
IMM GRANULOCYTES NFR BLD AUTO: 0.2 % (ref 0–0.5)
LYMPHOCYTES # BLD AUTO: 1.79 10*3/MM3 (ref 0.7–3.1)
LYMPHOCYTES NFR BLD AUTO: 29.5 % (ref 19.6–45.3)
MAGNESIUM SERPL-MCNC: 1.8 MG/DL (ref 1.6–2.4)
MCH RBC QN AUTO: 26.8 PG (ref 26.6–33)
MCHC RBC AUTO-ENTMCNC: 31.3 G/DL (ref 31.5–35.7)
MCV RBC AUTO: 85.7 FL (ref 79–97)
MONOCYTES # BLD AUTO: 0.39 10*3/MM3 (ref 0.1–0.9)
MONOCYTES NFR BLD AUTO: 6.4 % (ref 5–12)
NEUTROPHILS NFR BLD AUTO: 3.67 10*3/MM3 (ref 1.7–7)
NEUTROPHILS NFR BLD AUTO: 60.6 % (ref 42.7–76)
NRBC BLD AUTO-RTO: 0 /100 WBC (ref 0–0.2)
PHOSPHATE SERPL-MCNC: 4.1 MG/DL (ref 2.5–4.5)
PLATELET # BLD AUTO: 216 10*3/MM3 (ref 140–450)
PMV BLD AUTO: 9.3 FL (ref 6–12)
POTASSIUM SERPL-SCNC: 3.2 MMOL/L (ref 3.5–5.2)
PROT SERPL-MCNC: 6.2 G/DL (ref 6–8.5)
RBC # BLD AUTO: 3.99 10*6/MM3 (ref 4.14–5.8)
SODIUM SERPL-SCNC: 138 MMOL/L (ref 136–145)
WBC NRBC COR # BLD: 6.06 10*3/MM3 (ref 3.4–10.8)

## 2023-10-07 PROCEDURE — 80076 HEPATIC FUNCTION PANEL: CPT | Performed by: FAMILY MEDICINE

## 2023-10-07 PROCEDURE — 63710000001 INSULIN LISPRO (HUMAN) PER 5 UNITS: Performed by: FAMILY MEDICINE

## 2023-10-07 PROCEDURE — 80048 BASIC METABOLIC PNL TOTAL CA: CPT | Performed by: FAMILY MEDICINE

## 2023-10-07 PROCEDURE — 83036 HEMOGLOBIN GLYCOSYLATED A1C: CPT | Performed by: FAMILY MEDICINE

## 2023-10-07 PROCEDURE — 99233 SBSQ HOSP IP/OBS HIGH 50: CPT | Performed by: INTERNAL MEDICINE

## 2023-10-07 PROCEDURE — 25010000002 FUROSEMIDE PER 20 MG: Performed by: FAMILY MEDICINE

## 2023-10-07 PROCEDURE — 84100 ASSAY OF PHOSPHORUS: CPT | Performed by: FAMILY MEDICINE

## 2023-10-07 PROCEDURE — 83735 ASSAY OF MAGNESIUM: CPT | Performed by: FAMILY MEDICINE

## 2023-10-07 PROCEDURE — 82948 REAGENT STRIP/BLOOD GLUCOSE: CPT

## 2023-10-07 PROCEDURE — 85025 COMPLETE CBC W/AUTO DIFF WBC: CPT | Performed by: FAMILY MEDICINE

## 2023-10-07 RX ORDER — POTASSIUM CHLORIDE 750 MG/1
10 CAPSULE, EXTENDED RELEASE ORAL 2 TIMES DAILY WITH MEALS
Status: DISCONTINUED | OUTPATIENT
Start: 2023-10-07 | End: 2023-10-09

## 2023-10-07 RX ORDER — POTASSIUM CHLORIDE 750 MG/1
40 CAPSULE, EXTENDED RELEASE ORAL ONCE
Status: COMPLETED | OUTPATIENT
Start: 2023-10-07 | End: 2023-10-07

## 2023-10-07 RX ORDER — LOSARTAN POTASSIUM 50 MG/1
50 TABLET ORAL DAILY
Status: DISCONTINUED | OUTPATIENT
Start: 2023-10-08 | End: 2023-10-11 | Stop reason: HOSPADM

## 2023-10-07 RX ADMIN — INSULIN LISPRO 2 UNITS: 100 INJECTION, SOLUTION INTRAVENOUS; SUBCUTANEOUS at 08:46

## 2023-10-07 RX ADMIN — ACETAMINOPHEN 650 MG: 325 TABLET ORAL at 02:44

## 2023-10-07 RX ADMIN — FUROSEMIDE 80 MG: 10 INJECTION, SOLUTION INTRAMUSCULAR; INTRAVENOUS at 13:26

## 2023-10-07 RX ADMIN — FUROSEMIDE 80 MG: 10 INJECTION, SOLUTION INTRAMUSCULAR; INTRAVENOUS at 02:43

## 2023-10-07 RX ADMIN — INSULIN LISPRO 2 UNITS: 100 INJECTION, SOLUTION INTRAVENOUS; SUBCUTANEOUS at 12:55

## 2023-10-07 RX ADMIN — ATORVASTATIN CALCIUM 80 MG: 40 TABLET, FILM COATED ORAL at 21:26

## 2023-10-07 RX ADMIN — POTASSIUM CHLORIDE 40 MEQ: 10 CAPSULE, COATED, EXTENDED RELEASE ORAL at 08:45

## 2023-10-07 RX ADMIN — HYDRALAZINE HYDROCHLORIDE 25 MG: 25 TABLET, FILM COATED ORAL at 16:48

## 2023-10-07 RX ADMIN — CARVEDILOL 25 MG: 25 TABLET, FILM COATED ORAL at 08:46

## 2023-10-07 RX ADMIN — HYDRALAZINE HYDROCHLORIDE 25 MG: 25 TABLET, FILM COATED ORAL at 08:47

## 2023-10-07 RX ADMIN — HYDRALAZINE HYDROCHLORIDE 25 MG: 25 TABLET, FILM COATED ORAL at 21:26

## 2023-10-07 RX ADMIN — Medication 10 ML: at 21:26

## 2023-10-07 RX ADMIN — APIXABAN 5 MG: 5 TABLET, FILM COATED ORAL at 08:46

## 2023-10-07 RX ADMIN — APIXABAN 5 MG: 5 TABLET, FILM COATED ORAL at 21:26

## 2023-10-07 RX ADMIN — SENNOSIDES AND DOCUSATE SODIUM 2 TABLET: 50; 8.6 TABLET ORAL at 08:46

## 2023-10-07 RX ADMIN — Medication 10 ML: at 08:46

## 2023-10-07 RX ADMIN — POTASSIUM CHLORIDE 10 MEQ: 10 CAPSULE, COATED, EXTENDED RELEASE ORAL at 17:40

## 2023-10-07 RX ADMIN — CARVEDILOL 25 MG: 25 TABLET, FILM COATED ORAL at 17:41

## 2023-10-07 RX ADMIN — LOSARTAN POTASSIUM 25 MG: 25 TABLET, FILM COATED ORAL at 08:46

## 2023-10-07 NOTE — PROGRESS NOTES
[facility]     Cardiology Progress Note    Patient Name: Lyle Lozano  : 1955  MRN: [id number]  Primary Care Physician:  Heidi Villa, HUMA  Date of admission: 10/6/2023    Subjective   Subjective   Chief complaint  Shortness of breath    HPI:  Patient Reports that his shortness of breath is somewhat improved.  He notes no chest pain.    Review of Systems   All systems were reviewed and negative except for: Shortness of breath    Objective   Objective     Vitals:   Temp:  [97.3 øF (36.3 øC)-98.3 øF (36.8 øC)] 97.3 øF (36.3 øC)  Heart Rate:  [62-87] 69  Resp:  [16-20] 16  BP: (110-190)/() 169/108  Physical Exam   Neck: no JVD, no bruit  Lungs: clear to ausculation bilaterally.  No crackles or wheezes  CV: regular rate and rhythm, no murmur  Ext: no cyanosis, clubbing or edema.  Normal bilateral LE pulses.      Scheduled Meds:apixaban, 5 mg, Oral, Q12H  atorvastatin, 80 mg, Oral, Nightly  carvedilol, 25 mg, Oral, BID With Meals  furosemide, 80 mg, Intravenous, Q12H  hydrALAZINE, 25 mg, Oral, TID  insulin lispro, 2-9 Units, Subcutaneous, 4x Daily AC & at Bedtime  losartan, 25 mg, Oral, Daily  potassium chloride, 10 mEq, Oral, BID With Meals  senna-docusate sodium, 2 tablet, Oral, BID  sodium chloride, 10 mL, Intravenous, Q12H      Continuous Infusions:        Result Review    Result Review:  I have personally reviewed the results from the time of this admission to 10/7/2023 11:21 EDT and agree with these findings:  [x]  Laboratory  []  Microbiology  [x]  Radiology  [x]  EKG/Telemetry   [x]  Cardiology/Vascular   []  Pathology  []  Old records  []  Other:  Most notable findings include:     CBC          2023    05:31 10/6/2023    07:04 10/7/2023    06:52   CBC   WBC 8.81  6.70  6.06    RBC 4.25  3.91  3.99    Hemoglobin 11.5  10.5  10.7    Hematocrit 35.0  33.8  34.2    MCV 82.4  86.4  85.7    MCH 27.1  26.9  26.8    MCHC 32.9  31.1  31.3    RDW 17.3  18.5  18.3    Platelets 311  240   216      CMP          8/24/2023    05:31 10/6/2023    07:04 10/7/2023    06:52   CMP   Glucose 112  211  173    BUN 40  26  28    Creatinine 2.22  1.82  2.01    EGFR 31.9  40.5  35.9    Sodium 132  139  138    Potassium 3.3  3.5  3.2    Chloride 96  107  104    Calcium 8.5  8.9  8.8    Total Protein  6.5  6.2    Albumin  2.8  2.7    Globulin  3.7     Total Bilirubin  0.3  0.4    Alkaline Phosphatase  110  107    AST (SGOT)  11  9    ALT (SGPT)  8  5    Albumin/Globulin Ratio  0.8     BUN/Creatinine Ratio 18.0  14.3  13.9    Anion Gap 9.9  11.0  9.4       CARDIAC LABS:      Lab 10/06/23  1128 10/06/23  0919 10/06/23  0704   PROBNP  --   --  19,541.0*   HSTROP T 156* 138* 145*        Assessment & Plan   Assessment / Plan     Brief Patient Summary:  Lyle Lozano is a 68 y.o. male who has a history of multiple admissions for apparent acute on chronic diastolic heart failure with ejection fraction 4/7/2023 of approximately 50%.  He came in with atypical chest pain and shortness of breath.  His beta natruretic peptide was 19,000.  He has chronic kidney disease and his serum creatinine was 1.82.  He had troponins drawn that were elevated although no significant delta.  Looking back he has chronically elevated troponins.     Active Hospital Problems:  Active Hospital Problems    Diagnosis     **NSTEMI (non-ST elevated myocardial infarction)        Assessment:  1.  Atypical chest pain  2.  Elevated troponins consistent with acute CHF/chronic kidney disease (type II non-STEMI)  3.  Hyperlipidemia  4.  Acute on chronic systolic heart failure, unknown etiology.  Patient has declined left heart caths in the past.     Plan:   1.  Patient's chest pain is extremely atypical and I do not think it represents a cardiac source.  When looking through his troponins they are chronically elevated.    2.  Looking through patient's hospital admission from [facility] back in July 2023 his ejection fraction was  estimated at 38% with wall motion abnormalities including apical.  He had apparently declined left heart cath in the past and has had congestive heart failure for several years.  He again at that time declined left heart cath.  I certainly do not think that there is a rush for a left heart cath at this point but he certainly needs evaluated with some type of ischemic study and then set up for possible AICD.  Patient has followed with Dr. Mcdonnell.  [facility]  3.  Patient's chest x-ray shows mild pulmonary vascular congestion.  4.  Continue the Eliquis for the DVT.  5.  Continue the Lasix 80 IV twice daily.  Patient serum creatinine is 2.0.  He has had a baseline around 2.0-2.2.  Would watch it closely.    6.  We will go up on his losartan to 50 mg p.o. daily.  Continue to titrate this as his blood pressure tolerates.  7.  We will follow from a distance.  Patient needs to be seen by Dr. Mcdonnell or his nurse practitioner as an outpatient.  At that time they can consider ischemic study and possible AICD placement.    CODE STATUS:   Level Of Support Discussed With: Patient  Code Status (Patient has no pulse and is not breathing): CPR (Attempt to Resuscitate)  Medical Interventions (Patient has pulse or is breathing): Full Support      Electronically signed by Obed Goff MD, 10/07/23, 11:21 AM EDT.

## 2023-10-07 NOTE — PLAN OF CARE
Problem: Adult Inpatient Plan of Care  Goal: Plan of Care Review  Outcome: Ongoing, Progressing  Goal: Patient-Specific Goal (Individualized)  Outcome: Ongoing, Progressing  Goal: Absence of Hospital-Acquired Illness or Injury  Outcome: Ongoing, Progressing  Intervention: Prevent and Manage VTE (Venous Thromboembolism) Risk  Recent Flowsheet Documentation  Taken 10/6/2023 1930 by Crystal Natarajan RN  Activity Management: up ad veronica  Range of Motion: ROM (range of motion) performed  Intervention: Prevent Infection  Recent Flowsheet Documentation  Taken 10/6/2023 1930 by Crystal Natarajan RN  Infection Prevention: rest/sleep promoted  Goal: Optimal Comfort and Wellbeing  Outcome: Ongoing, Progressing  Intervention: Provide Person-Centered Care  Recent Flowsheet Documentation  Taken 10/6/2023 1930 by Crystal Natarajan RN  Trust Relationship/Rapport:   care explained   questions encouraged  Goal: Readiness for Transition of Care  Outcome: Ongoing, Progressing     Problem: Diabetes Comorbidity  Goal: Blood Glucose Level Within Targeted Range  Outcome: Ongoing, Progressing     Problem: Skin Injury Risk Increased  Goal: Skin Health and Integrity  Outcome: Ongoing, Progressing     Problem: Fall Injury Risk  Goal: Absence of Fall and Fall-Related Injury  Outcome: Ongoing, Progressing   Goal Outcome Evaluation:

## 2023-10-07 NOTE — PROGRESS NOTES
[facility]   Hospitalist Progress Note       Patient Name: Lyle Lozano  : 1955  MRN: [id number]  Primary Care Physician: Heidi Villa, HUMA  Date of admission: 10/6/2023  Today's Date: 10/7/2023  Room / Bed:   205/2  Subjective   Chief Complaint: Exertional dyspnea, worse than usual past week     HPI:     Lyle Lozano is a 68 y.o. male with a history of nonischemic cardiomyopathy who presents with worsening exertional dyspnea, orthopnea.  He has been feeling more short of air for the past week or so.  Presented to the emergency department.  Troponins elevated in the 100-150 range, which is chronically elevated.  BNP >19,000, which is a little more than his baseline typically.  His creatinine is 1.8, which is close to his baseline.  Chest x-ray showed 1. Stable cardiomegaly and mild pulmonary vascular congestion 2. Minimal dependent opacity at the left base favored to represent atelectasis . 3. Small right-sided pleural effusion.  White count normal.  No fever.  Intermittent sharp chest pain, atypical for cardiac etiology.  Resting EKG unchanged from 2023; sinus rhythm with left bundle branch block pattern with repolarization abnormality.  Hospitalist contacted via ED for admission/further observation.  Cardiologist has been consulted.  Started on IV diuretics.  Is hypertensive and home BP meds were resumed.  Also IV labetalol given.    Interval Followup: 10/7/2023    Potassium a little low at 3.2 today.  Patient reports feeling a little bit better with a little less shortness of air  No edema of the lower extremities  He is on room air  Blood pressure labile: 129/71 - 169/108      REVIEW OF SYSTEMS:   SOA.  Right lower extremity claudication, chronic  Objective   Temp:  [97.3 øF (36.3 øC)-98.3 øF (36.8 øC)] 97.5 øF (36.4 øC)  Heart Rate:  [62-84] 65  Resp:  [16-18] 18  BP: (110-190)/() 141/80  PHYSICAL EXAM   CON: WN. WD. NAD.   NECK:  No thyromegaly. No stridor.    RESP:  CTA. No wheezes. No crackles.  No work of breathing or tachypnea.   CV:  Rhythm regular. Rate WNL. No murmur noted.  No edema.  GI:  Soft and nontender. Nondistended.    EXT: Peripheral pulses intact.  No cyanosis.  PSYCH:  Alert. Oriented. Normal affect and mood.  NEURO:  No dysarthria or aphasia. No unilateral weakness or paresthesia.  SKIN: No chronic venous stasis changes or varicosities.  No cellulitis  Results from last 7 days   Lab Units 10/07/23  0652 10/06/23  0704   WBC 10*3/mm3 6.06 6.70   HEMOGLOBIN g/dL 10.7* 10.5*   HEMATOCRIT % 34.2* 33.8*   PLATELETS 10*3/mm3 216 240     Results from last 7 days   Lab Units 10/07/23  0652 10/06/23  0704   SODIUM mmol/L 138 139   POTASSIUM mmol/L 3.2* 3.5   CO2 mmol/L 24.6 21.0*   CHLORIDE mmol/L 104 107   ANION GAP mmol/L 9.4 11.0   BUN mg/dL 28* 26*   CREATININE mg/dL 2.01* 1.82*   GLUCOSE mg/dL 173* 211*         COMPLEXITY OF DATA / DECISION MAKING     []  Moderate: One acute illness or mild exacerbation of chronic or 2 stable chronic or tx side effects   []  High:  Severe acute illness or severe exacerbation of chronic - potential for major debility / life threatening         I have personally reviewed the results from the time of this admission to 10/7/2023 13:05 EDT:  []  Laboratory:  []  Microbiology: []  Radiology:  []  Telemetry:   []  Cardiology/Vascular:  []  Pathology:  []  Prior external records:  []  Independent historian provided additional details:      []  Discussed case with specialists:    []  Independent interpretation of ECG/Imaging etc:             []  Moderate: Rx management, low risk surgery, suboptimal social situation   []  High:  Rx with close monitoring for toxicity, mod-high risk surgery, DNR decision, Comfort initiated, IV pain meds    Assessment / Plan   Assessment:    Shortness of breath, edema, chest discomfort  Acute hypoxia  Elevated troponin, chronic (in setting of chronic CHF and CKD) (doubt ACS)  HTN  Hx CHF reduced  ejection fraction (CHF clinic, Dr. Herrmann)  DM  CKD 3B  Hx of DVT (on Eliquis)  Hx of medication nonadherence  Dyslipidemia  SANDRINE (unable to tolerate CPAP)  Left bundle branch block pattern on ECG  RLE intermittent claudication  Hx gout elbow wrist  Hypertensive urgency  DM, poorly controlled  Hx anemia, iron deficient       Plan:     Replace potassium.  Continue diuresis.  Appreciate cardiology input.  Serial troponins  Continue diuresis with IV Lasix  As needed labetalol IV.  Resume all home BP meds.  Cardiology consulted.  Echo ...  Left ventricular ejection fraction appears to be 26 - 30%.  Appears to have global hypokinesis worse in the apex and anterior walls.    The left ventricular cavity is mildly dilated.    Left ventricular wall thickness is consistent with borderline concentric hypertrophy.    Left ventricular diastolic function was indeterminate.    Mildly reduced right ventricular systolic function noted.    The left atrial cavity is mildly dilated.    Patient has sclerotic aortic valve.  It does appear to open somewhat.  He could have some degree of low gradient AS.  Does not appear likely severe.  Monitor renal indices and volume status closely in light of CHF/CKD history  Dietitian consult for low-sodium CHF diet education  Additional recommendations pending clinical course  Discontinue Lovenox.  Resume home Eliquis.  Additional recommendations pending clinical course.  DVT prophylaxis:  Medical and mechanical DVT prophylaxis orders are present.  CODE STATUS:      Level Of Support Discussed With: Patient  Code Status (Patient has no pulse and is not breathing): CPR (Attempt to Resuscitate)  Medical Interventions (Patient has pulse or is breathing): Full Support       Electronically signed by JAYMIE Kat, 10/07/23, 1:05 PM EDT.    Attending documentation:  I reviewed the above documentation and independently reviewed and rounded and evaluated the patient and discussed the care plan with SGema  Villa FERNANDEZ, I agree with his findings and plan as documented, what I have added to the care plan and modified is as follows in my documentation and my medical decision making; 68-year-old male hospitalized on 10/6/2023 with chief complaint of chest pain, found to be in decompensated heart failure state with a new ejection fraction reduced from previous, he had wall motion abnormality on previous admission and declined cardiac cath.  Placed on IV diuretics cardiology consulted.  Interval follow-up: Seen and examined this morning, no acute distress, no acute major night events, patient states he still short of air with intermittent chest discomfort.  He is satting well on room air and has variable heart rate and blood pressure issues, blood pressures reaching diastolics in the 100s, intermittently tachypneic with exertion.  His potassium is 3.2, his BUN is 28, creatinine at 2.01, echo shows EF 26 to 30% with wall motion abnormalities.  His EF was further higher than previous echo, cardiology following.  Telemetry reviewed, no acute major rhythmic events.  Vitals reviewed, labs reviewed, potassium replacement as ordered.  Urine output over the past 24 hours 2.75 L.  On physical exam well-appearing male, no acute distress, regular rate rhythm, diminished breath sounds throughout, soft nontender abdomen, no lower extremity edema, alert and oriented x3.  A1c 10.0, his diabetes is poorly controlled.  Assessment as above, continue diuresis with Lasix 80 mg twice a day, potassium replacement 40 mEq orally with additional 10 mEq x 2 rounds, strict I's and O's, daily weights, continue Eliquis 5 mg twice daily, continue Coreg 25 mg twice a day, continue Lipitor 80 mg daily, continue Cozaar 50 mg daily, may need to transition to Entresto if he can tolerate, continue telemetry monitoring, a.m. labs, insulin sign scale coverage, full code, DVT prophylaxis with Eliquis, clinical course dictate further management, discussed with  nurse at the bedside.  More than 70% of the time of this patient's encounter was performed by me, this included face-to-face time, planning and coordinating, medical decision making and critical thinking personally done by me.  -AVBMD    Electronically signed by Aylin Keita MD, 10/07/23, 5:51 PM EDT.    Portions of this documentation were transcribed electronically from a voice recognition software.  I confirm all data accurately represents the service(s) I performed at today's visit.

## 2023-10-07 NOTE — PLAN OF CARE
Goal Outcome Evaluation:     No acute events during shift. VSS. Blood pressure in better control. Resting comfortably at end of shift.

## 2023-10-08 PROBLEM — I50.9 HEART FAILURE, UNSPECIFIED: Status: ACTIVE | Noted: 2023-10-08

## 2023-10-08 PROBLEM — I50.23 ACUTE ON CHRONIC HFREF (HEART FAILURE WITH REDUCED EJECTION FRACTION): Status: ACTIVE | Noted: 2023-10-08

## 2023-10-08 PROBLEM — I50.22 CHRONIC SYSTOLIC HEART FAILURE: Status: ACTIVE | Noted: 2023-10-08

## 2023-10-08 PROBLEM — I21.A1 TYPE 2 MYOCARDIAL INFARCTION: Status: ACTIVE | Noted: 2023-10-08

## 2023-10-08 PROBLEM — I50.21 ACUTE SYSTOLIC HEART FAILURE: Status: ACTIVE | Noted: 2023-10-08

## 2023-10-08 LAB
ALBUMIN SERPL-MCNC: 2.7 G/DL (ref 3.5–5.2)
ALP SERPL-CCNC: 108 U/L (ref 39–117)
ALT SERPL W P-5'-P-CCNC: 6 U/L (ref 1–41)
ANION GAP SERPL CALCULATED.3IONS-SCNC: 9.5 MMOL/L (ref 5–15)
AST SERPL-CCNC: 9 U/L (ref 1–40)
BASOPHILS # BLD AUTO: 0.03 10*3/MM3 (ref 0–0.2)
BASOPHILS NFR BLD AUTO: 0.4 % (ref 0–1.5)
BILIRUB CONJ SERPL-MCNC: <0.2 MG/DL (ref 0–0.3)
BILIRUB INDIRECT SERPL-MCNC: ABNORMAL MG/DL
BILIRUB SERPL-MCNC: 0.3 MG/DL (ref 0–1.2)
BUN SERPL-MCNC: 30 MG/DL (ref 8–23)
BUN/CREAT SERPL: 14 (ref 7–25)
CALCIUM SPEC-SCNC: 8.9 MG/DL (ref 8.6–10.5)
CHLORIDE SERPL-SCNC: 103 MMOL/L (ref 98–107)
CO2 SERPL-SCNC: 24.5 MMOL/L (ref 22–29)
CREAT SERPL-MCNC: 2.15 MG/DL (ref 0.76–1.27)
DEPRECATED RDW RBC AUTO: 59.2 FL (ref 37–54)
EGFRCR SERPLBLD CKD-EPI 2021: 33.1 ML/MIN/1.73
EOSINOPHIL # BLD AUTO: 0.17 10*3/MM3 (ref 0–0.4)
EOSINOPHIL NFR BLD AUTO: 2.3 % (ref 0.3–6.2)
ERYTHROCYTE [DISTWIDTH] IN BLOOD BY AUTOMATED COUNT: 18.8 % (ref 12.3–15.4)
GLUCOSE BLDC GLUCOMTR-MCNC: 123 MG/DL (ref 70–99)
GLUCOSE BLDC GLUCOMTR-MCNC: 156 MG/DL (ref 70–99)
GLUCOSE BLDC GLUCOMTR-MCNC: 191 MG/DL (ref 70–99)
GLUCOSE BLDC GLUCOMTR-MCNC: 201 MG/DL (ref 70–99)
GLUCOSE SERPL-MCNC: 170 MG/DL (ref 65–99)
HCT VFR BLD AUTO: 34.9 % (ref 37.5–51)
HGB BLD-MCNC: 10.8 G/DL (ref 13–17.7)
IMM GRANULOCYTES # BLD AUTO: 0.02 10*3/MM3 (ref 0–0.05)
IMM GRANULOCYTES NFR BLD AUTO: 0.3 % (ref 0–0.5)
LYMPHOCYTES # BLD AUTO: 1.84 10*3/MM3 (ref 0.7–3.1)
LYMPHOCYTES NFR BLD AUTO: 25.2 % (ref 19.6–45.3)
MAGNESIUM SERPL-MCNC: 1.9 MG/DL (ref 1.6–2.4)
MCH RBC QN AUTO: 26.9 PG (ref 26.6–33)
MCHC RBC AUTO-ENTMCNC: 30.9 G/DL (ref 31.5–35.7)
MCV RBC AUTO: 86.8 FL (ref 79–97)
MONOCYTES # BLD AUTO: 0.54 10*3/MM3 (ref 0.1–0.9)
MONOCYTES NFR BLD AUTO: 7.4 % (ref 5–12)
NEUTROPHILS NFR BLD AUTO: 4.69 10*3/MM3 (ref 1.7–7)
NEUTROPHILS NFR BLD AUTO: 64.4 % (ref 42.7–76)
NRBC BLD AUTO-RTO: 0 /100 WBC (ref 0–0.2)
PHOSPHATE SERPL-MCNC: 3.6 MG/DL (ref 2.5–4.5)
PLATELET # BLD AUTO: 229 10*3/MM3 (ref 140–450)
PMV BLD AUTO: 9.7 FL (ref 6–12)
POTASSIUM SERPL-SCNC: 3.8 MMOL/L (ref 3.5–5.2)
PROT SERPL-MCNC: 6.3 G/DL (ref 6–8.5)
RBC # BLD AUTO: 4.02 10*6/MM3 (ref 4.14–5.8)
SODIUM SERPL-SCNC: 137 MMOL/L (ref 136–145)
WBC NRBC COR # BLD: 7.29 10*3/MM3 (ref 3.4–10.8)

## 2023-10-08 PROCEDURE — 85025 COMPLETE CBC W/AUTO DIFF WBC: CPT | Performed by: FAMILY MEDICINE

## 2023-10-08 PROCEDURE — 63710000001 INSULIN LISPRO (HUMAN) PER 5 UNITS: Performed by: FAMILY MEDICINE

## 2023-10-08 PROCEDURE — 83735 ASSAY OF MAGNESIUM: CPT | Performed by: FAMILY MEDICINE

## 2023-10-08 PROCEDURE — 25010000002 FUROSEMIDE PER 20 MG: Performed by: FAMILY MEDICINE

## 2023-10-08 PROCEDURE — 97165 OT EVAL LOW COMPLEX 30 MIN: CPT

## 2023-10-08 PROCEDURE — 80048 BASIC METABOLIC PNL TOTAL CA: CPT | Performed by: FAMILY MEDICINE

## 2023-10-08 PROCEDURE — 80076 HEPATIC FUNCTION PANEL: CPT | Performed by: FAMILY MEDICINE

## 2023-10-08 PROCEDURE — 82948 REAGENT STRIP/BLOOD GLUCOSE: CPT

## 2023-10-08 PROCEDURE — 84100 ASSAY OF PHOSPHORUS: CPT | Performed by: FAMILY MEDICINE

## 2023-10-08 PROCEDURE — 97161 PT EVAL LOW COMPLEX 20 MIN: CPT

## 2023-10-08 RX ADMIN — Medication 10 ML: at 20:49

## 2023-10-08 RX ADMIN — CARVEDILOL 25 MG: 25 TABLET, FILM COATED ORAL at 17:49

## 2023-10-08 RX ADMIN — FUROSEMIDE 80 MG: 10 INJECTION, SOLUTION INTRAMUSCULAR; INTRAVENOUS at 01:56

## 2023-10-08 RX ADMIN — APIXABAN 5 MG: 5 TABLET, FILM COATED ORAL at 08:39

## 2023-10-08 RX ADMIN — FUROSEMIDE 80 MG: 10 INJECTION, SOLUTION INTRAMUSCULAR; INTRAVENOUS at 12:16

## 2023-10-08 RX ADMIN — POTASSIUM CHLORIDE 10 MEQ: 10 CAPSULE, COATED, EXTENDED RELEASE ORAL at 08:39

## 2023-10-08 RX ADMIN — LOSARTAN POTASSIUM 50 MG: 50 TABLET, FILM COATED ORAL at 08:39

## 2023-10-08 RX ADMIN — HYDRALAZINE HYDROCHLORIDE 25 MG: 25 TABLET, FILM COATED ORAL at 08:39

## 2023-10-08 RX ADMIN — CARVEDILOL 25 MG: 25 TABLET, FILM COATED ORAL at 08:39

## 2023-10-08 RX ADMIN — HYDRALAZINE HYDROCHLORIDE 25 MG: 25 TABLET, FILM COATED ORAL at 17:49

## 2023-10-08 RX ADMIN — APIXABAN 5 MG: 5 TABLET, FILM COATED ORAL at 20:49

## 2023-10-08 RX ADMIN — INSULIN LISPRO 4 UNITS: 100 INJECTION, SOLUTION INTRAVENOUS; SUBCUTANEOUS at 17:49

## 2023-10-08 RX ADMIN — POTASSIUM CHLORIDE 10 MEQ: 10 CAPSULE, COATED, EXTENDED RELEASE ORAL at 17:49

## 2023-10-08 RX ADMIN — Medication 10 ML: at 08:39

## 2023-10-08 RX ADMIN — HYDRALAZINE HYDROCHLORIDE 25 MG: 25 TABLET, FILM COATED ORAL at 20:49

## 2023-10-08 RX ADMIN — INSULIN LISPRO 2 UNITS: 100 INJECTION, SOLUTION INTRAVENOUS; SUBCUTANEOUS at 12:16

## 2023-10-08 RX ADMIN — ATORVASTATIN CALCIUM 80 MG: 40 TABLET, FILM COATED ORAL at 20:49

## 2023-10-08 RX ADMIN — INSULIN LISPRO 2 UNITS: 100 INJECTION, SOLUTION INTRAVENOUS; SUBCUTANEOUS at 08:39

## 2023-10-08 NOTE — PLAN OF CARE
Goal Outcome Evaluation:  Plan of Care Reviewed With: patient        Progress: no change  Outcome Evaluation: Patient not appropriate for skilled OT services at this time as patient has not had a decline in ADLs or ADL transfers/fx'l mobility. patient safe to d/c to previous setting when medically appropriate. d/c occupational therapy services.      Anticipated Discharge Disposition (OT): home

## 2023-10-08 NOTE — PLAN OF CARE
Problem: Adult Inpatient Plan of Care  Goal: Absence of Hospital-Acquired Illness or Injury  Intervention: Identify and Manage Fall Risk  Recent Flowsheet Documentation  Taken 10/7/2023 1915 by Crystal Natarajan RN  Safety Promotion/Fall Prevention: safety round/check completed  Intervention: Prevent Infection  Recent Flowsheet Documentation  Taken 10/7/2023 1915 by Crystal Natarajan RN  Infection Prevention: rest/sleep promoted  Goal: Optimal Comfort and Wellbeing  Intervention: Provide Person-Centered Care  Recent Flowsheet Documentation  Taken 10/7/2023 1915 by Crystal Natarajan RN  Trust Relationship/Rapport: questions encouraged     Problem: Diabetes Comorbidity  Goal: Blood Glucose Level Within Targeted Range  Intervention: Monitor and Manage Glycemia  Recent Flowsheet Documentation  Taken 10/7/2023 1915 by Crystal Natarajan RN  Glycemic Management: blood glucose monitored     Problem: Fall Injury Risk  Goal: Absence of Fall and Fall-Related Injury  Intervention: Promote Injury-Free Environment  Recent Flowsheet Documentation  Taken 10/7/2023 1915 by Crystal Natarajan RN  Safety Promotion/Fall Prevention: safety round/check completed   Goal Outcome Evaluation:

## 2023-10-08 NOTE — PROGRESS NOTES
[facility]   Hospitalist Progress Note       Patient Name: Lyle Lozano  : 1955  MRN: [id number]  Primary Care Physician: Heidi Villa, HUMA  Date of admission: 10/6/2023  Today's Date: 10/8/2023  Room / Bed:   205/2  Subjective   Chief Complaint: Exertional dyspnea, worse than usual past week     HPI:     Lyle Lozano is a 68 y.o. male with a history of nonischemic cardiomyopathy who presents with worsening exertional dyspnea, orthopnea.  He has been feeling more short of air for the past week or so.  Presented to the emergency department.  Troponins elevated in the 100-150 range, which is chronically elevated.  BNP >19,000, which is a little more than his baseline typically.  His creatinine is 1.8, which is close to his baseline.  Chest x-ray showed 1. Stable cardiomegaly and mild pulmonary vascular congestion 2. Minimal dependent opacity at the left base favored to represent atelectasis . 3. Small right-sided pleural effusion.  White count normal.  No fever.  Intermittent sharp chest pain, atypical for cardiac etiology.  Resting EKG unchanged from 2023; sinus rhythm with left bundle branch block pattern with repolarization abnormality.  Hospitalist contacted via ED for admission/further observation.  Cardiologist has been consulted.  Started on IV diuretics.  Is hypertensive and home BP meds were resumed.  Also IV labetalol given.    Interval Followup: 10/8/2023    Potassium better  Dyspnea better  Up in the room and ambulatory  Echo with EF 30% range  Normal sinus rhythm on telemetry  Blood pressure improvin/84  - 160/88 range      REVIEW OF SYSTEMS:   SOA.  Right lower extremity claudication, chronic  Objective   Temp:  [97.5 øF (36.4 øC)-98.2 øF (36.8 øC)] 97.9 øF (36.6 øC)  Heart Rate:  [70-72] 70  Resp:  [16-19] 16  BP: (133-160)/(69-88) 156/86  PHYSICAL EXAM   CON: WN. WD. NAD.   NECK:  No thyromegaly. No stridor.   RESP:  CTA. No wheezes. No crackles.  No work  of breathing or tachypnea.   CV:  Rhythm regular. Rate WNL. No murmur noted.  No edema.  GI:  Soft and nontender. Nondistended.    EXT: Peripheral pulses intact.  No cyanosis.  PSYCH:  Alert. Oriented. Normal affect and mood.  NEURO:  No dysarthria or aphasia. No unilateral weakness or paresthesia.  SKIN: No chronic venous stasis changes or varicosities.  No cellulitis  Results from last 7 days   Lab Units 10/08/23  0426 10/07/23  0652 10/06/23  0704   WBC 10*3/mm3 7.29 6.06 6.70   HEMOGLOBIN g/dL 10.8* 10.7* 10.5*   HEMATOCRIT % 34.9* 34.2* 33.8*   PLATELETS 10*3/mm3 229 216 240     Results from last 7 days   Lab Units 10/08/23  0426 10/07/23  0652 10/06/23  0704   SODIUM mmol/L 137 138 139   POTASSIUM mmol/L 3.8 3.2* 3.5   CO2 mmol/L 24.5 24.6 21.0*   CHLORIDE mmol/L 103 104 107   ANION GAP mmol/L 9.5 9.4 11.0   BUN mg/dL 30* 28* 26*   CREATININE mg/dL 2.15* 2.01* 1.82*   GLUCOSE mg/dL 170* 173* 211*         COMPLEXITY OF DATA / DECISION MAKING     []  Moderate: One acute illness or mild exacerbation of chronic or 2 stable chronic or tx side effects   []  High:  Severe acute illness or severe exacerbation of chronic - potential for major debility / life threatening         I have personally reviewed the results from the time of this admission to 10/8/2023 12:08 EDT:  []  Laboratory:  []  Microbiology: []  Radiology:  []  Telemetry:   []  Cardiology/Vascular:  []  Pathology:  []  Prior external records:  []  Independent historian provided additional details:      []  Discussed case with specialists:    []  Independent interpretation of ECG/Imaging etc:             []  Moderate: Rx management, low risk surgery, suboptimal social situation   []  High:  Rx with close monitoring for toxicity, mod-high risk surgery, DNR decision, Comfort initiated, IV pain meds    Assessment / Plan   Assessment:    Shortness of breath, edema, chest discomfort  Acute hypoxia  Elevated troponin, chronic (in setting of chronic CHF and CKD)  "(doubt ACS)  HTN  Hx CHF reduced ejection fraction (CHF clinic, Dr. Herrmann)  DM  CKD 3B  Hx of DVT (on Eliquis)  Hx of medication nonadherence  Dyslipidemia  SANDRINE (unable to tolerate CPAP)  Left bundle branch block pattern on ECG  RLE intermittent claudication  Hx gout elbow wrist  Hypertensive urgency  DM, poorly controlled  Hx anemia, iron deficient       Plan:     Continue diuresis.  Appreciate cardiology input.  Serial troponins  Continue diuresis with IV Lasix  As needed labetalol IV.  Resume all home BP meds.  Cardiology consulted, recommendations appreciated. \"Patient needs to be seen by Dr. Mcdonnell or his nurse practitioner as an outpatient.  At that time they can consider ischemic study and possible AICD placement\"   Echo ...  Left ventricular ejection fraction appears to be 26 - 30%.  Appears to have global hypokinesis worse in the apex and anterior walls.    The left ventricular cavity is mildly dilated.    Left ventricular wall thickness is consistent with borderline concentric hypertrophy.    Left ventricular diastolic function was indeterminate.    Mildly reduced right ventricular systolic function noted.    The left atrial cavity is mildly dilated.    Patient has sclerotic aortic valve.  It does appear to open somewhat.  He could have some degree of low gradient AS.  Does not appear likely severe.  Monitor renal indices and volume status closely in light of CHF/CKD history  Dietitian consult for low-sodium CHF diet education  Additional recommendations pending clinical course  Discontinue Lovenox.  Resume home Eliquis.  Additional recommendations pending clinical course.  DVT prophylaxis:  Medical and mechanical DVT prophylaxis orders are present.  CODE STATUS:      Level Of Support Discussed With: Patient  Code Status (Patient has no pulse and is not breathing): CPR (Attempt to Resuscitate)  Medical Interventions (Patient has pulse or is breathing): Full Support         Attending documentation:  I " reviewed the above documentation and independently reviewed and rounded and evaluated the patient and discussed the care plan with AVI Driver PA-C, I agree with his findings and plan as documented, what I have added to the care plan and modified is as follows in my documentation and my medical decision making; 68-year-old male hospitalized on 10/6/2023 with chief complaint of chest pain, found to be in decompensated heart failure state with a new ejection fraction reduced from previous, he had wall motion abnormality on previous admission and declined cardiac cath.  Placed on IV diuretics cardiology consulted.  Interval follow-up: Seen and examined this morning, no acute distress, no acute major night events, still short of air and coughing, though not interested in pursuing cardiac cath, thinks he does not need it, will discuss with family.  Creatinine at 2.15, BUN 30, blood sugars in the 100 range, blood cell count 7000, hemoglobin 10.8. Telemetry reviewed, no acute major rhythmic events.  Vitals reviewed, labs reviewed, potassium replacement as ordered.  Urine output over the past 24 hours 2.75 L, net -2.3 L.  On physical exam well-appearing male, no acute distress, regular rate rhythm, diminished breath sounds throughout, soft nontender abdomen, no lower extremity edema, alert and oriented x3.  Assessment as above, continue diuresis with Lasix 80 mg twice a day IV for an additional day, will switch to p.o. tomorrow depending on renal function, strict I's and O's, daily weights, continue Eliquis 5 mg twice daily, continue potassium replacement 10 mEq x 2 doses today orally, continue Coreg 25 mg twice a day, continue Lipitor 80 mg daily, continue Cozaar 50 mg daily, may need to transition to Entresto if he can tolerate, continue telemetry monitoring, a.m. labs, insulin sign scale coverage, full code, DVT prophylaxis with Eliquis, clinical course dictate further management, discussed with nurse at the bedside.  More  than 65 % of the time of this patient's encounter was performed by me, this included face-to-face time, planning and coordinating, medical decision making and critical thinking personally done by me.  -AVBMD    Electronically signed by Aylin Keita MD, 10/08/23, 4:27 PM EDT.    Portions of this documentation were transcribed electronically from a voice recognition software.  I confirm all data accurately represents the service(s) I performed at today's visit.

## 2023-10-08 NOTE — PLAN OF CARE
Goal Outcome Evaluation:   No acute changes this shift. No complaints at this time. Call light in reach.

## 2023-10-08 NOTE — THERAPY EVALUATION
Acute Care - Physical Therapy Initial Evaluation  [facility]     Patient Name: Lyle Lozano  : 1955  MRN: [id number]  Today's Date: 10/8/2023      Visit Dx:     ICD-10-CM ICD-9-CM   1. Acute pulmonary edema  J81.0 518.4   2. Impaired mobility and ADLs  Z74.09 V49.89    Z78.9    3. Difficulty in walking  R26.2 719.7     Patient Active Problem List   Diagnosis    NICM (nonischemic cardiomyopathy)    Essential hypertension    Dyslipidemia    Coronary artery disease involving native coronary artery of native heart without angina pectoris    Type 2 diabetes mellitus with stage 3b chronic kidney disease, without long-term current use of insulin    Hyperlipidemia    Neuropathy    Shortness of breath    Acute gout of left foot    Atypical pneumonia    Depression    Migraine    Chronic idiopathic gout of foot and ankle region without tophus    Hypertensive heart disease with heart failure    Gout    Stage 3 chronic kidney disease    Hand arthritis    Acute pain of left knee    Elevated troponin level not due myocardial infarction    Acute on chronic systolic heart failure    Acute ischemic left MCA stroke    Acute respiratory failure with hypoxia    Acute on chronic heart failure with preserved ejection fraction (HFpEF)    Sepsis    Acute decompensated heart failure    NSTEMI (non-ST elevated myocardial infarction)    Acute systolic heart failure    Chronic systolic heart failure    Heart failure, unspecified    Type 2 myocardial infarction    Acute on chronic HFrEF (heart failure with reduced ejection fraction)     Past Medical History:   Diagnosis Date    Abnormal kidney function     Arthritis     Bursitis     CHF (congestive heart failure)     Depression     Diabetes     Edema     Essential hypertension 2021    Forgetfulness     Gout     Head injury     Hernia cerebri     Hyperlipidemia     Hypertension     IFG (impaired fasting glucose)     Low back pain     Lumbago     `    Pain of left hip      Right shoulder pain     Sleep apnea     SOB (shortness of breath)      Past Surgical History:   Procedure Laterality Date    CYST REMOVAL Right     leg     PT Assessment (last 12 hours)       PT Evaluation and Treatment       Row Name 10/08/23 1000          Physical Therapy Time and Intention    Subjective Information no complaints  -MARIELA     Document Type evaluation  -MARIELA     Mode of Treatment individual therapy;physical therapy  -MARIELA     Patient Effort good  -MARIELA       Row Name 10/08/23 1000          General Information    Patient Observations alert;cooperative;agree to therapy  -MARIELA     Prior Level of Function independent:;all household mobility;community mobility  -MARIELA     Existing Precautions/Restrictions no known precautions/restrictions  -MARIELA     Barriers to Rehab none identified  -MARIELA       Row Name 10/08/23 1000          Living Environment    Current Living Arrangements home  -MARIELA       Row Name 10/08/23 1000          Range of Motion (ROM)    Range of Motion ROM is WFL  -MARIELA       Row Name 10/08/23 1000          Strength (Manual Muscle Testing)    Strength (Manual Muscle Testing) strength is [facility]  -       Row Name 10/08/23 1000          Bed Mobility    Bed Mobility bed mobility (all) activities;supine-sit  -MARIELA     All Activities, Quay (Bed Mobility) independent  -MARIELA     Supine-Sit Quay (Bed Mobility) independent  -MARIELA       Row Name 10/08/23 1000          Transfers    Transfers sit-stand transfer  -MARIELA       Row Name 10/08/23 1000          Sit-Stand Transfer    Sit-Stand Quay (Transfers) independent  -MARIELA       Row Name 10/08/23 1000          Gait/Stairs (Locomotion)    Gait/Stairs Locomotion gait/ambulation independence  -MARIELA     Quay Level (Gait) independent  -MARIELA     Distance in Feet (Gait) 50  -MARIELA       Row Name 10/08/23 1000          Balance    Balance Assessment standing dynamic balance  -MARIELA     Dynamic Standing Balance independent  -MARIELA       Row Name 10/08/23 1000          Plan of  Care Review    Plan of Care Reviewed With patient  -MARIELA     Outcome Evaluation Patient did not demonstrate any safety or mobility deficits during initial evaluation.  He is safe to continue ambulating within his room independently until his discharge from the hospital.  He will be discharged from physical therapy services at this time  -MARIELA       Row Name 10/08/23 1000          Therapy Assessment/Plan (PT)    Criteria for Skilled Interventions Met (PT) no problems identified which require skilled intervention  -MARIELA     Therapy Frequency (PT) evaluation only  -MARIELA       Row Name 10/08/23 1000          PT Evaluation Complexity    History, PT Evaluation Complexity no personal factors and/or comorbidities  -MARIELA     Examination of Body Systems (PT Eval Complexity) total of 4 or more elements  -MARIELA     Clinical Presentation (PT Evaluation Complexity) stable  -MARIELA     Clinical Decision Making (PT Evaluation Complexity) low complexity  -MARIELA     Overall Complexity (PT Evaluation Complexity) low complexity  -MARIELA       Row Name 10/08/23 1000          Therapy Plan Review/Discharge Plan (PT)    Therapy Plan Review (PT) evaluation/treatment results reviewed;participants voiced agreement with care plan;participants included;patient  -MARIELA               User Key  (r) = Recorded By, (t) = Taken By, (c) = Cosigned By      Initials Name Provider Type    Hu Bernal, PT Physical Therapist                      PT Recommendation and Plan  Anticipated Discharge Disposition (PT): home  Therapy Frequency (PT): evaluation only  Plan of Care Reviewed With: patient  Outcome Evaluation: Patient did not demonstrate any safety or mobility deficits during initial evaluation.  He is safe to continue ambulating within his room independently until his discharge from the hospital.  He will be discharged from physical therapy services at this time   Outcome Measures       Row Name 10/08/23 1000             How much help from another person do you  currently need...    Turning from your back to your side while in flat bed without using bedrails? 4  -MARIELA      Moving from lying on back to sitting on the side of a flat bed without bedrails? 4  -MARIELA      Moving to and from a bed to a chair (including a wheelchair)? 4  -MARIELA      Standing up from a chair using your arms (e.g., wheelchair, bedside chair)? 4  -MARIELA      Climbing 3-5 steps with a railing? 4  -MARIELA      To walk in hospital room? 4  -MARIELA      AM-PAC 6 Clicks Score (PT) 24  -MARIELA      Highest level of mobility 8 --> Walked 250 feet or more  -MARIELA         Functional Assessment    Outcome Measure Options AM-PAC 6 Clicks Basic Mobility (PT)  -MARIELA                User Key  (r) = Recorded By, (t) = Taken By, (c) = Cosigned By      Initials Name Provider Type    Hu Bernal, PT Physical Therapist                     Time Calculation:    PT Charges       Row Name 10/08/23 1054             Time Calculation    PT Received On 10/08/23  -MARIELA         Untimed Charges    PT Eval/Re-eval Minutes 20  -MARIELA         Total Minutes    Untimed Charges Total Minutes 20  -MARIELA       Total Minutes 20  -MARIELA                User Key  (r) = Recorded By, (t) = Taken By, (c) = Cosigned By      Initials Name Provider Type    Hu Bernal, PT Physical Therapist                  Therapy Charges for Today       Code Description Service Date Service Provider Modifiers Qty    76235988898 HC PT EVAL LOW COMPLEXITY 2 10/8/2023 Hu Avila, PT GP 1            PT G-Codes  Outcome Measure Options: AM-PAC 6 Clicks Basic Mobility (PT)  AM-PAC 6 Clicks Score (PT): 24  AM-PAC 6 Clicks Score (OT): 24    Hu Avila PT  10/8/2023

## 2023-10-08 NOTE — THERAPY EVALUATION
Patient Name: Lyle Lozano  : 1955    MRN: [id number]                              Today's Date: 10/8/2023       Admit Date: 10/6/2023    Visit Dx:     ICD-10-CM ICD-9-CM   1. Acute pulmonary edema  J81.0 518.4   2. Impaired mobility and ADLs  Z74.09 V49.89    Z78.9      Patient Active Problem List   Diagnosis    NICM (nonischemic cardiomyopathy)    Essential hypertension    Dyslipidemia    Coronary artery disease involving native coronary artery of native heart without angina pectoris    Type 2 diabetes mellitus with stage 3b chronic kidney disease, without long-term current use of insulin    Hyperlipidemia    Neuropathy    Shortness of breath    Acute gout of left foot    Atypical pneumonia    Depression    Migraine    Chronic idiopathic gout of foot and ankle region without tophus    Hypertensive heart disease with heart failure    Gout    Stage 3 chronic kidney disease    Hand arthritis    Acute pain of left knee    Elevated troponin level not due myocardial infarction    Acute on chronic systolic heart failure    Acute ischemic left MCA stroke    Acute respiratory failure with hypoxia    Acute on chronic heart failure with preserved ejection fraction (HFpEF)    Sepsis    Acute decompensated heart failure    NSTEMI (non-ST elevated myocardial infarction)    Acute systolic heart failure    Chronic systolic heart failure    Heart failure, unspecified    Type 2 myocardial infarction    Acute on chronic HFrEF (heart failure with reduced ejection fraction)     Past Medical History:   Diagnosis Date    Abnormal kidney function     Arthritis     Bursitis     CHF (congestive heart failure)     Depression     Diabetes     Edema     Essential hypertension 2021    Forgetfulness     Gout     Head injury     Hernia cerebri     Hyperlipidemia     Hypertension     IFG (impaired fasting glucose)     Low back pain     Lumbago     `    Pain of left hip     Right shoulder pain     Sleep apnea     SOB  (shortness of breath)      Past Surgical History:   Procedure Laterality Date    CYST REMOVAL Right     leg      General Information       Row Name 10/08/23 0916          OT Time and Intention    Document Type evaluation  -AC     Mode of Treatment individual therapy;occupational therapy  -       Row Name 10/08/23 0916          General Information    Patient Profile Reviewed yes  -     Prior Level of Function --  Patient reports he uses a cane for functional mobility occasionally was independent with all ADLs and IADLs.  Patient reports he drives.  -     Existing Precautions/Restrictions no known precautions/restrictions  -     Barriers to Rehab none identified  -       Row Name 10/08/23 0916          Occupational Profile    Reason for Services/Referral (Occupational Profile) Pt. is a 68year old male admitted for the above diagnosis. Pt. referred to OT services to assess independence with ADLs and adl transfers/fx'l mobility. No previous OT services for current condition.  -       Row Name 10/08/23 0916          Living Environment    People in Home sibling(s)  -       Row Name 10/08/23 0916          Cognition    Orientation Status (Cognition) oriented x 3  -       Row Name 10/08/23 0916          Safety Issues, Functional Mobility    Comment, Safety Issues/Impairments (Mobility) none identified  -               User Key  (r) = Recorded By, (t) = Taken By, (c) = Cosigned By      Initials Name Provider Type    AC Sherlyn Silva OT Occupational Therapist                     Mobility/ADL's       Row Name 10/08/23 0918          Bed Mobility    Bed Mobility bed mobility (all) activities  -     All Activities, [place] (Bed Mobility) independent  -       Row Name 10/08/23 0918          Transfers    Transfers sit-stand transfer  -       Row Name 10/08/23 0918          Sit-Stand Transfer    Sit-Stand [place] (Transfers) independent  -       Row Name 10/08/23 0918          Functional Mobility     Functional Mobility- Ind. Level independent  -AC     Functional Mobility- Device cane, straight  -AC     Functional Mobility- Comment Patient was independent with with cane for functional mobility from edge of bed to recliner.  Patient states ad veronica in room.  -       Row Name 10/08/23 0918          Activities of Daily Living    BADL Assessment/Intervention --  Patient is independent with bathing/dressing, independent with grooming, independent with self-feeding, independent with toileting.  -               User Key  (r) = Recorded By, (t) = Taken By, (c) = Cosigned By      Initials Name Provider Type    Sherlyn Sandra OT Occupational Therapist                   Obj/Interventions       Row Name 10/08/23 0919          Sensory Assessment (Somatosensory)    Sensory Assessment (Somatosensory) sensation intact  -       Row Name 10/08/23 0919          Vision Assessment/Intervention    Visual Impairment/Limitations WFL  -[facility] Name 10/08/23 0919          Range of Motion Comprehensive    General Range of Motion bilateral upper extremity ROM WNL  -[facility] Name 10/08/23 0919          Strength Comprehensive (MMT)    General Manual Muscle Testing (MMT) Assessment no strength deficits identified  -       Row Name 10/08/23 0919          Motor Skills    Motor Skills coordination;functional endurance  -     Coordination WNL  -     Functional Endurance fair plus  -       Row Name 10/08/23 0919          Balance    Balance Assessment standing dynamic balance  -     Dynamic Standing Balance modified [place]  -     Position/Device Used, Standing Balance supported;cane, straight  -AC               User Key  (r) = Recorded By, (t) = Taken By, (c) = Cosigned By      Initials Name Provider Type    Sherlyn Sandra OT Occupational Therapist                   Goals/Plan    No documentation.                  Clinical Impression       Row Name 10/08/23 0919          Pain Assessment    Pretreatment Pain  Rating 0/10 - no pain  -AC     Posttreatment Pain Rating 0/10 - no pain  -AC       Row Name 10/08/23 0919          Plan of Care Review    Plan of Care Reviewed With patient  -AC     Progress no change  -AC     Outcome Evaluation Patient not appropriate for skilled OT services at this time as patient has not had a decline in ADLs or ADL transfers/fx'l mobility. patient safe to d/c to previous setting when medically appropriate. d/c occupational therapy services.  -AC       Row Name 10/08/23 0919          Therapy Assessment/Plan (OT)    Patient/Family Therapy Goal Statement (OT) NA  -AC     Rehab Potential (OT) --  NA  -     Criteria for Skilled Therapeutic Interventions Met (OT) no;no problems identified which require skilled intervention  -AC     Therapy Frequency (OT) evaluation only  -AC       Row Name 10/08/23 0919          Therapy Plan Review/Discharge Plan (OT)    Anticipated Discharge Disposition (OT) home  -AC       Row Name 10/08/23 0919          Positioning and Restraints    Post Treatment Position chair  -AC     In Chair sitting;call light within reach;encouraged to call for assist  -AC               User Key  (r) = Recorded By, (t) = Taken By, (c) = Cosigned By      Initials Name Provider Type    AC Sherlyn Silva, OT Occupational Therapist                   Outcome Measures       Row Name 10/08/23 0919          How much help from another is currently needed...    Putting on and taking off regular lower body clothing? 4  -AC     Bathing (including washing, rinsing, and drying) 4  -AC     Toileting (which includes using toilet bed pan or urinal) 4  -AC     Putting on and taking off regular upper body clothing 4  -AC     Taking care of personal grooming (such as brushing teeth) 4  -AC     Eating meals 4  -AC     AM-PAC 6 Clicks Score (OT) 24  -AC       Row Name 10/08/23 0100          How much help from another person do you currently need...    Turning from your back to your side while in flat bed without  using bedrails? 4  -MW     Moving from lying on back to sitting on the side of a flat bed without bedrails? 4  -MW     Moving to and from a bed to a chair (including a wheelchair)? 4  -MW     Standing up from a chair using your arms (e.g., wheelchair, bedside chair)? 4  -MW     Climbing 3-5 steps with a railing? 3  -MW     To walk in hospital room? 4  -MW     AM-PAC 6 Clicks Score (PT) 23  -MW     Highest level of mobility 7 --> Walked 25 feet or more  -MW       Row Name 10/08/23 0919          Functional Assessment    Outcome Measure Options AM-PAC 6 Clicks Daily Activity (OT);Optimal Instrument  -AC       Row Name 10/08/23 0919          Optimal Instrument    Optimal Instrument Optimal - 3  -AC     Bending/Stooping 1  -AC     Standing 1  -AC     Reaching 1  -AC     From the list, choose the 3 activities you would most like to be able to do without any difficulty Bending/stooping;Standing;Reaching  -AC     Total Score Optimal - 3 3  -AC               User Key  (r) = Recorded By, (t) = Taken By, (c) = Cosigned By      Initials Name Provider Type    Crystal Nam RN Registered Nurse    Sherlyn Sandra OT Occupational Therapist                    Occupational Therapy Education       Title: PT OT SLP Therapies (Done)       Topic: Occupational Therapy (Done)       Point: ADL training (Done)       Description:   Instruct learner(s) on proper safety adaptation and remediation techniques during self care or transfers.   Instruct in proper use of assistive devices.                  Learning Progress Summary             Patient Acceptance, E, VU by  at 10/8/2023 0920                         Point: Home exercise program (Done)       Description:   Instruct learner(s) on appropriate technique for monitoring, assisting and/or progressing therapeutic exercises/activities.                  Learning Progress Summary             Patient Acceptance, E, VU by  at 10/8/2023 0920                         Point: Precautions  (Done)       Description:   Instruct learner(s) on prescribed precautions during self-care and functional transfers.                  Learning Progress Summary             Patient Acceptance, E, VU by  at 10/8/2023 0920                         Point: Body mechanics (Done)       Description:   Instruct learner(s) on proper positioning and spine alignment during self-care, functional mobility activities and/or exercises.                  Learning Progress Summary             Patient Acceptance, E, VU by  at 10/8/2023 0920                                         User Key       Initials Effective Dates Name Provider Type [facility] 06/16/21 -  Sherlyn Silva OT Occupational Therapist OT                  OT Recommendation and Plan  Therapy Frequency (OT): evaluation only  Plan of Care Review  Plan of Care Reviewed With: patient  Progress: no change  Outcome Evaluation: Patient not appropriate for skilled OT services at this time as patient has not had a decline in ADLs or ADL transfers/fx'l mobility. patient safe to d/c to previous setting when medically appropriate. d/c occupational therapy services.     Time Calculation:   Evaluation Complexity (OT)  Review Occupational Profile/Medical/Therapy History Complexity: brief/low complexity  Assessment, Occupational Performance/Identification of Deficit Complexity: 1-3 performance deficits  Clinical Decision Making Complexity (OT): problem focused assessment/low complexity  Overall Complexity of Evaluation (OT): low complexity     Time Calculation- OT       Row Name 10/08/23 0921             Time Calculation- OT    OT Received On 10/08/23  -AC         Untimed Charges    OT Eval/Re-eval Minutes 27  -AC         Total Minutes    Untimed Charges Total Minutes 27  -AC       Total Minutes 27  -AC                User Key  (r) = Recorded By, (t) = Taken By, (c) = Cosigned By      Initials Name Provider Type     Sherlyn Silva OT Occupational Therapist                   Therapy Charges for Today       Code Description Service Date Service Provider Modifiers Qty    12040758084 HC OT EVAL LOW COMPLEXITY 2 10/8/2023 Sherlyn Silva, KENROY GO 1                 Sherlyn Silva OT  10/8/2023

## 2023-10-09 LAB
ALBUMIN SERPL-MCNC: 2.5 G/DL (ref 3.5–5.2)
ALP SERPL-CCNC: 98 U/L (ref 39–117)
ALT SERPL W P-5'-P-CCNC: 5 U/L (ref 1–41)
ANION GAP SERPL CALCULATED.3IONS-SCNC: 12.2 MMOL/L (ref 5–15)
AST SERPL-CCNC: 8 U/L (ref 1–40)
BASOPHILS # BLD AUTO: 0.03 10*3/MM3 (ref 0–0.2)
BASOPHILS NFR BLD AUTO: 0.4 % (ref 0–1.5)
BILIRUB CONJ SERPL-MCNC: 0.2 MG/DL (ref 0–0.3)
BILIRUB INDIRECT SERPL-MCNC: 0.4 MG/DL
BILIRUB SERPL-MCNC: 0.6 MG/DL (ref 0–1.2)
BUN SERPL-MCNC: 32 MG/DL (ref 8–23)
BUN/CREAT SERPL: 13.6 (ref 7–25)
CALCIUM SPEC-SCNC: 8.6 MG/DL (ref 8.6–10.5)
CHLORIDE SERPL-SCNC: 102 MMOL/L (ref 98–107)
CO2 SERPL-SCNC: 22.8 MMOL/L (ref 22–29)
CREAT SERPL-MCNC: 2.36 MG/DL (ref 0.76–1.27)
DEPRECATED RDW RBC AUTO: 57.8 FL (ref 37–54)
EGFRCR SERPLBLD CKD-EPI 2021: 29.6 ML/MIN/1.73
EOSINOPHIL # BLD AUTO: 0.09 10*3/MM3 (ref 0–0.4)
EOSINOPHIL NFR BLD AUTO: 1.3 % (ref 0.3–6.2)
ERYTHROCYTE [DISTWIDTH] IN BLOOD BY AUTOMATED COUNT: 18.6 % (ref 12.3–15.4)
GLUCOSE BLDC GLUCOMTR-MCNC: 158 MG/DL (ref 70–99)
GLUCOSE BLDC GLUCOMTR-MCNC: 171 MG/DL (ref 70–99)
GLUCOSE BLDC GLUCOMTR-MCNC: 173 MG/DL (ref 70–99)
GLUCOSE BLDC GLUCOMTR-MCNC: 265 MG/DL (ref 70–99)
GLUCOSE SERPL-MCNC: 153 MG/DL (ref 65–99)
HCT VFR BLD AUTO: 33.2 % (ref 37.5–51)
HGB BLD-MCNC: 10.4 G/DL (ref 13–17.7)
IMM GRANULOCYTES # BLD AUTO: 0.02 10*3/MM3 (ref 0–0.05)
IMM GRANULOCYTES NFR BLD AUTO: 0.3 % (ref 0–0.5)
LYMPHOCYTES # BLD AUTO: 2.06 10*3/MM3 (ref 0.7–3.1)
LYMPHOCYTES NFR BLD AUTO: 28.7 % (ref 19.6–45.3)
MAGNESIUM SERPL-MCNC: 1.8 MG/DL (ref 1.6–2.4)
MCH RBC QN AUTO: 26.8 PG (ref 26.6–33)
MCHC RBC AUTO-ENTMCNC: 31.3 G/DL (ref 31.5–35.7)
MCV RBC AUTO: 85.6 FL (ref 79–97)
MONOCYTES # BLD AUTO: 0.59 10*3/MM3 (ref 0.1–0.9)
MONOCYTES NFR BLD AUTO: 8.2 % (ref 5–12)
NEUTROPHILS NFR BLD AUTO: 4.39 10*3/MM3 (ref 1.7–7)
NEUTROPHILS NFR BLD AUTO: 61.1 % (ref 42.7–76)
NRBC BLD AUTO-RTO: 0 /100 WBC (ref 0–0.2)
NT-PROBNP SERPL-MCNC: 8586 PG/ML (ref 0–900)
PHOSPHATE SERPL-MCNC: 3.3 MG/DL (ref 2.5–4.5)
PLATELET # BLD AUTO: 215 10*3/MM3 (ref 140–450)
PMV BLD AUTO: 9.6 FL (ref 6–12)
POTASSIUM SERPL-SCNC: 3.9 MMOL/L (ref 3.5–5.2)
PROT SERPL-MCNC: 5.9 G/DL (ref 6–8.5)
RBC # BLD AUTO: 3.88 10*6/MM3 (ref 4.14–5.8)
SODIUM SERPL-SCNC: 137 MMOL/L (ref 136–145)
WBC NRBC COR # BLD: 7.18 10*3/MM3 (ref 3.4–10.8)

## 2023-10-09 PROCEDURE — 85025 COMPLETE CBC W/AUTO DIFF WBC: CPT | Performed by: FAMILY MEDICINE

## 2023-10-09 PROCEDURE — 80048 BASIC METABOLIC PNL TOTAL CA: CPT | Performed by: FAMILY MEDICINE

## 2023-10-09 PROCEDURE — 83735 ASSAY OF MAGNESIUM: CPT | Performed by: FAMILY MEDICINE

## 2023-10-09 PROCEDURE — 80076 HEPATIC FUNCTION PANEL: CPT | Performed by: FAMILY MEDICINE

## 2023-10-09 PROCEDURE — 63710000001 INSULIN LISPRO (HUMAN) PER 5 UNITS: Performed by: FAMILY MEDICINE

## 2023-10-09 PROCEDURE — 82948 REAGENT STRIP/BLOOD GLUCOSE: CPT

## 2023-10-09 PROCEDURE — 83880 ASSAY OF NATRIURETIC PEPTIDE: CPT | Performed by: FAMILY MEDICINE

## 2023-10-09 PROCEDURE — 25010000002 FUROSEMIDE PER 20 MG: Performed by: FAMILY MEDICINE

## 2023-10-09 PROCEDURE — 84100 ASSAY OF PHOSPHORUS: CPT | Performed by: FAMILY MEDICINE

## 2023-10-09 PROCEDURE — 63710000001 PREDNISONE PER 1 MG: Performed by: PHYSICIAN ASSISTANT

## 2023-10-09 RX ORDER — FUROSEMIDE 40 MG/1
40 TABLET ORAL
Status: DISCONTINUED | OUTPATIENT
Start: 2023-10-09 | End: 2023-10-10

## 2023-10-09 RX ORDER — FAMOTIDINE 20 MG/1
20 TABLET, FILM COATED ORAL
Status: DISCONTINUED | OUTPATIENT
Start: 2023-10-09 | End: 2023-10-11 | Stop reason: HOSPADM

## 2023-10-09 RX ORDER — PREDNISONE 20 MG/1
40 TABLET ORAL ONCE
Status: COMPLETED | OUTPATIENT
Start: 2023-10-09 | End: 2023-10-09

## 2023-10-09 RX ORDER — HYDROCODONE BITARTRATE AND ACETAMINOPHEN 5; 325 MG/1; MG/1
1 TABLET ORAL EVERY 6 HOURS PRN
Status: DISCONTINUED | OUTPATIENT
Start: 2023-10-09 | End: 2023-10-11 | Stop reason: HOSPADM

## 2023-10-09 RX ORDER — POTASSIUM CHLORIDE 750 MG/1
10 CAPSULE, EXTENDED RELEASE ORAL DAILY
Status: DISCONTINUED | OUTPATIENT
Start: 2023-10-09 | End: 2023-10-11 | Stop reason: HOSPADM

## 2023-10-09 RX ORDER — SPIRONOLACTONE 25 MG/1
25 TABLET ORAL DAILY
Status: DISCONTINUED | OUTPATIENT
Start: 2023-10-09 | End: 2023-10-11 | Stop reason: HOSPADM

## 2023-10-09 RX ADMIN — INSULIN LISPRO 2 UNITS: 100 INJECTION, SOLUTION INTRAVENOUS; SUBCUTANEOUS at 17:14

## 2023-10-09 RX ADMIN — HYDRALAZINE HYDROCHLORIDE 25 MG: 25 TABLET, FILM COATED ORAL at 17:14

## 2023-10-09 RX ADMIN — INSULIN LISPRO 2 UNITS: 100 INJECTION, SOLUTION INTRAVENOUS; SUBCUTANEOUS at 12:08

## 2023-10-09 RX ADMIN — ACETAMINOPHEN 650 MG: 325 TABLET ORAL at 03:05

## 2023-10-09 RX ADMIN — APIXABAN 5 MG: 5 TABLET, FILM COATED ORAL at 08:30

## 2023-10-09 RX ADMIN — FUROSEMIDE 40 MG: 40 TABLET ORAL at 17:14

## 2023-10-09 RX ADMIN — HYDROCODONE BITARTRATE AND ACETAMINOPHEN 1 TABLET: 5; 325 TABLET ORAL at 20:29

## 2023-10-09 RX ADMIN — FUROSEMIDE 80 MG: 10 INJECTION, SOLUTION INTRAMUSCULAR; INTRAVENOUS at 01:25

## 2023-10-09 RX ADMIN — Medication 10 ML: at 20:29

## 2023-10-09 RX ADMIN — CARVEDILOL 25 MG: 25 TABLET, FILM COATED ORAL at 08:30

## 2023-10-09 RX ADMIN — APIXABAN 5 MG: 5 TABLET, FILM COATED ORAL at 20:24

## 2023-10-09 RX ADMIN — ATORVASTATIN CALCIUM 80 MG: 40 TABLET, FILM COATED ORAL at 20:24

## 2023-10-09 RX ADMIN — EMPAGLIFLOZIN 25 MG: 25 TABLET, FILM COATED ORAL at 08:30

## 2023-10-09 RX ADMIN — INSULIN LISPRO 2 UNITS: 100 INJECTION, SOLUTION INTRAVENOUS; SUBCUTANEOUS at 08:30

## 2023-10-09 RX ADMIN — PREDNISONE 40 MG: 20 TABLET ORAL at 12:08

## 2023-10-09 RX ADMIN — FAMOTIDINE 20 MG: 20 TABLET, FILM COATED ORAL at 17:14

## 2023-10-09 RX ADMIN — INSULIN LISPRO 6 UNITS: 100 INJECTION, SOLUTION INTRAVENOUS; SUBCUTANEOUS at 20:24

## 2023-10-09 RX ADMIN — LOSARTAN POTASSIUM 50 MG: 50 TABLET, FILM COATED ORAL at 08:30

## 2023-10-09 RX ADMIN — SPIRONOLACTONE 25 MG: 25 TABLET ORAL at 08:30

## 2023-10-09 RX ADMIN — CARVEDILOL 25 MG: 25 TABLET, FILM COATED ORAL at 17:14

## 2023-10-09 RX ADMIN — HYDROCODONE BITARTRATE AND ACETAMINOPHEN 1 TABLET: 5; 325 TABLET ORAL at 12:08

## 2023-10-09 RX ADMIN — Medication 400 MG: at 08:30

## 2023-10-09 RX ADMIN — POTASSIUM CHLORIDE 10 MEQ: 10 CAPSULE, COATED, EXTENDED RELEASE ORAL at 08:30

## 2023-10-09 RX ADMIN — FUROSEMIDE 40 MG: 40 TABLET ORAL at 08:30

## 2023-10-09 RX ADMIN — ACETAMINOPHEN 650 MG: 325 TABLET ORAL at 17:14

## 2023-10-09 RX ADMIN — Medication 10 ML: at 08:31

## 2023-10-09 RX ADMIN — HYDRALAZINE HYDROCHLORIDE 25 MG: 25 TABLET, FILM COATED ORAL at 20:24

## 2023-10-09 RX ADMIN — HYDRALAZINE HYDROCHLORIDE 25 MG: 25 TABLET, FILM COATED ORAL at 08:30

## 2023-10-09 NOTE — PROGRESS NOTES
I was called by the hospitalist with question regarding possible LifeVest for Mr. Lozano who has a severely reduced ejection fraction which is chronic in nature.  He had a couple runs of nonsustained VT, less than 6 beats and remained hemodynamically stable.  I recommended against starting the patient on a LifeVest at this time given this is a chronic cardiomyopathy with minimal arrhythmia and no symptoms.  I agreed with recommendations to optimize guideline directed medical therapy per previous consult note and have the patient have close follow-up with cardiology.

## 2023-10-09 NOTE — PLAN OF CARE
Goal Outcome Evaluation:   Reviewed plan of care with patient. No complaints at this time. Call light in reach. Patient had complaints of hand pain, medicated per protocol. See Mar.

## 2023-10-09 NOTE — CONSULTS
"Nutrition Services    Patient Name: Lyle Lozano  YOB: 1955  MRN: [id number]  Admission date: 10/6/2023      CLINICAL NUTRITION ASSESSMENT      Reason for Assessment  Physician consult, Need for education     H&P:    Past Medical History:   Diagnosis Date    Abnormal kidney function     Arthritis     Bursitis     CHF (congestive heart failure)     Depression     Diabetes     Edema     Essential hypertension 09/23/2021    Forgetfulness     Gout     Head injury     Hernia cerebri     Hyperlipidemia     Hypertension     IFG (impaired fasting glucose)     Low back pain     Lumbago     `    Pain of left hip     Right shoulder pain     Sleep apnea     SOB (shortness of breath)         Current Problems:   Active Hospital Problems    Diagnosis     **NSTEMI (non-ST elevated myocardial infarction)     Acute systolic heart failure     Chronic systolic heart failure     Heart failure, unspecified     Type 2 myocardial infarction     Acute on chronic HFrEF (heart failure with reduced ejection fraction)         Nutrition/Diet History         Narrative     Nutrition consult received for diet education related to CHF diagnosis.  Patient states he has received nutrition education in the past and is familiar with low-sodium recommendations.  Declines need for further nutrition education at this time.  Accepted written teaching materials.  Provided RD number for follow up/questions as needed.       Anthropometrics        Current Height, Weight Height: 170.2 cm (67\")  Weight: 74.6 kg (164 lb 7.4 oz)   Current BMI Body mass index is 25.76 kg/mý.       Weight Hx  Wt Readings from Last 30 Encounters:   10/09/23 0522 74.6 kg (164 lb 7.4 oz)   10/08/23 0620 77.5 kg (170 lb 13.7 oz)   10/07/23 0500 83.3 kg (183 lb 10.3 oz)   10/06/23 0636 81.2 kg (179 lb 0.2 oz)   08/23/23 0500 80.9 kg (178 lb 5.6 oz)   08/22/23 0900 80.8 kg (178 lb 2.1 oz)   08/21/23 2300 83.6 kg (184 lb 4.9 oz)   08/21/23 1326 83.6 kg (184 lb 4.9 " oz)   08/03/23 0909 83.5 kg (184 lb)   07/24/23 1047 79.4 kg (175 lb)   07/10/23 0449 80.7 kg (177 lb 14.6 oz)   07/09/23 0503 79.3 kg (174 lb 13.2 oz)   07/08/23 0204 80.5 kg (177 lb 7.5 oz)   07/07/23 2100 81.6 kg (179 lb 14.3 oz)   06/30/23 1211 81.2 kg (179 lb)   06/27/23 1014 80.7 kg (178 lb)   06/21/23 1006 83.5 kg (184 lb 1.4 oz)   06/20/23 1124 84.7 kg (186 lb 12.8 oz)   05/19/23 0528 78.5 kg (173 lb 1.6 oz)   05/18/23 1908 78.9 kg (173 lb 15.1 oz)   05/16/23 0700 86 kg (189 lb 9.5 oz)   05/15/23 1350 84.8 kg (186 lb 15.2 oz)   05/14/23 0315 90.2 kg (198 lb 13.7 oz)   05/13/23 0300 88.1 kg (194 lb 3.6 oz)   05/12/23 1100 85.1 kg (187 lb 9.8 oz)   05/11/23 2058 80.2 kg (176 lb 12.9 oz)   05/11/23 1915 81.6 kg (179 lb 14.3 oz)   05/01/23 1754 81.6 kg (180 lb)   04/17/23 0500 73.8 kg (162 lb 11.2 oz)   04/16/23 1707 74.5 kg (164 lb 3.9 oz)   04/16/23 0430 79.6 kg (175 lb 7.8 oz)   04/15/23 2349 79.4 kg (175 lb 0.7 oz)   04/15/23 0500 79.4 kg (175 lb 0.7 oz)   04/14/23 0330 80.4 kg (177 lb 4 oz)   04/13/23 0500 84.6 kg (186 lb 8.2 oz)   04/12/23 0528 82.4 kg (181 lb 10.5 oz)   04/11/23 0607 83.8 kg (184 lb 11.9 oz)   04/10/23 0258 81.7 kg (180 lb 1.9 oz)   04/09/23 0511 83.5 kg (184 lb 1.4 oz)   04/09/23 0320 83.5 kg (184 lb 1.4 oz)   04/08/23 0346 84.5 kg (186 lb 4.6 oz)   03/22/23 0332 84.7 kg (186 lb 11.7 oz)   03/20/23 0507 84.5 kg (186 lb 4.6 oz)   03/19/23 0539 91.4 kg (201 lb 8 oz)   03/18/23 0600 95.8 kg (211 lb 3.2 oz)   03/16/23 2050 96.9 kg (213 lb 10 oz)   03/09/23 1636 99.8 kg (220 lb)   02/01/23 0551 97.3 kg (214 lb 8.1 oz)   01/29/23 0624 97.1 kg (214 lb 1.1 oz)   01/27/23 0500 96.5 kg (212 lb 11.9 oz)   01/26/23 0000 96.5 kg (212 lb 11.9 oz)   01/25/23 1602 96.5 kg (212 lb 11.9 oz)   01/25/23 1309 97.1 kg (214 lb)   01/06/23 1100 93 kg (205 lb)   12/20/22 1046 93.4 kg (205 lb 14.6 oz)   10/26/22 1022 87.1 kg (192 lb)   09/27/22 1050 87.3 kg (192 lb 6.4 oz)   08/26/22 0833 88.4 kg (194 lb 12.8 oz)    08/21/22 0839 86.8 kg (191 lb 5.8 oz)   03/18/22 0938 92.5 kg (204 lb)   03/04/22 0952 92.5 kg (204 lb)   03/02/22 1413 91 kg (200 lb 9.6 oz)   02/11/22 0907 93.4 kg (206 lb)   02/08/22 0903 92.5 kg (204 lb)   02/03/22 0806 90.2 kg (198 lb 12.8 oz)   01/11/22 1501 93.9 kg (207 lb)            Wt Change Observation Stable x 6 months with fluctuations likely related to fluid retention/CHF     Estimated/Assessed Needs       Energy Requirements 30 kcal/kg    EST Needs (kcal/day) 2238 kcal        Protein Requirements 1.0 g/kg   EST Daily Needs (g/day) 75 g       Fluid Requirements 25 ml/kg    Estimated Needs (mL/day) 1865 ml      Labs/Medications         Pertinent Labs Reviewed.   Results from last 7 days   Lab Units 10/09/23  0416 10/08/23  0426 10/07/23  0652   SODIUM mmol/L 137 137 138   POTASSIUM mmol/L 3.9 3.8 3.2*   CHLORIDE mmol/L 102 103 104   CO2 mmol/L 22.8 24.5 24.6   BUN mg/dL 32* 30* 28*   CREATININE mg/dL 2.36* 2.15* 2.01*   CALCIUM mg/dL 8.6 8.9 8.8   BILIRUBIN mg/dL 0.6 0.3 0.4   ALK PHOS U/L 98 108 107   ALT (SGPT) U/L 5 6 5   AST (SGOT) U/L 8 9 9   GLUCOSE mg/dL 153* 170* 173*     Results from last 7 days   Lab Units 10/09/23  0416 10/08/23  0426 10/07/23  0652   MAGNESIUM mg/dL 1.8 1.9 1.8   PHOSPHORUS mg/dL 3.3 3.6 4.1   HEMOGLOBIN g/dL 10.4* 10.8* 10.7*   HEMATOCRIT % 33.2* 34.9* 34.2*     COVID19   Date Value Ref Range Status   07/07/2023 Not Detected Not Detected - Ref. Range Final     Lab Results   Component Value Date    HGBA1C 10.00 (H) 10/07/2023         Pertinent Medications Reviewed.     Current Nutrition Orders & Evaluation of Intake       Oral Nutrition     Current PO Diet Diet: Cardiac Diets, Diabetic Diets; Healthy Heart (2-3 Na+); Consistent Carbohydrate; Texture: Regular Texture (IDDSI 7); Fluid Consistency: Thin (IDDSI 0)   Supplement No active supplement orders       Malnutrition Severity Assessment                Nutrition Diagnosis         Nutrition Dx Problem 1 Nutrition  appropriate for condition at this time        Nutrition Intervention         Written teaching materials for medical nutrition therapy related to CHF diagnosis provided.       Medical Nutrition Therapy/Nutrition Education          Learner     Readiness Patient  Acceptance and Declines     Method     Response Explanation and Written Material  Verbalizes understanding     Monitor/Evaluation        Monitor Per protocol, PO intake, Pertinent labs, Weight       Nutrition Discharge Plan         Patient to continue low Na+ diet on discharge.        Electronically signed by:  Liv Butts RD  10/09/23 14:55 EDT

## 2023-10-09 NOTE — PROGRESS NOTES
[facility]   Hospitalist Progress Note       Patient Name: Lyle Lozano  : 1955  MRN: [id number]  Primary Care Physician: Heidi Villa, HUMA  Date of admission: 10/6/2023  Today's Date: 10/9/2023  Room / Bed:   205/  Subjective   Chief Complaint: Exertional dyspnea, worse than usual past week     HPI:     Lyle Lozano is a 68 y.o. male with a history of nonischemic cardiomyopathy who presents with worsening exertional dyspnea, orthopnea.  He has been feeling more short of air for the past week or so.  Presented to the emergency department.  Troponins elevated in the 100-150 range, which is chronically elevated.  BNP >19,000, which is a little more than his baseline typically.  His creatinine is 1.8, which is close to his baseline.  Chest x-ray showed 1. Stable cardiomegaly and mild pulmonary vascular congestion 2. Minimal dependent opacity at the left base favored to represent atelectasis . 3. Small right-sided pleural effusion.  White count normal.  No fever.  Intermittent sharp chest pain, atypical for cardiac etiology.  Resting EKG unchanged from 2023; sinus rhythm with left bundle branch block pattern with repolarization abnormality.  Hospitalist contacted via ED for admission/further observation.  Cardiologist has been consulted.  Started on IV diuretics.  Is hypertensive and home BP meds were resumed.  Also IV labetalol given.    Interval Followup: 10/9/2023    Feeling better but not quite back to baseline.  His dyspnea better  He is up in the room and ambulatory short distances.  Recent echo with EF 30% range  Normal sinus rhythm on telemetry with brief NSVT 4-8 beats  Potassium 3.9.  Magnesium 1.9.  On a beta-blocker.      REVIEW OF SYSTEMS:   SOA.  Right lower extremity claudication, chronic  Objective   Temp:  [97.7 øF (36.5 øC)-99 øF (37.2 øC)] 97.7 øF (36.5 øC)  Heart Rate:  [64-79] 64  Resp:  [16-18] 16  BP: (126-144)/(68-83) 137/83  PHYSICAL EXAM   CON: WN. YASMEEN.  NAD.   NECK:  No thyromegaly. No stridor.   RESP:  CTA. No wheezes. No crackles.  No work of breathing or tachypnea.   CV:  Rhythm regular. Rate WNL. No murmur noted.  No edema.  GI:  Soft and nontender. Nondistended.    EXT: Peripheral pulses intact.  No cyanosis.  PSYCH:  Alert. Oriented. Normal affect and mood.  NEURO:  No dysarthria or aphasia. No unilateral weakness or paresthesia.  SKIN: No chronic venous stasis changes or varicosities.  No cellulitis  Results from last 7 days   Lab Units 10/09/23  0416 10/08/23  0426 10/07/23  0652 10/06/23  0704   WBC 10*3/mm3 7.18 7.29 6.06 6.70   HEMOGLOBIN g/dL 10.4* 10.8* 10.7* 10.5*   HEMATOCRIT % 33.2* 34.9* 34.2* 33.8*   PLATELETS 10*3/mm3 215 229 216 240     Results from last 7 days   Lab Units 10/09/23  0416 10/08/23  0426 10/07/23  0652 10/06/23  0704   SODIUM mmol/L 137 137 138 139   POTASSIUM mmol/L 3.9 3.8 3.2* 3.5   CO2 mmol/L 22.8 24.5 24.6 21.0*   CHLORIDE mmol/L 102 103 104 107   ANION GAP mmol/L 12.2 9.5 9.4 11.0   BUN mg/dL 32* 30* 28* 26*   CREATININE mg/dL 2.36* 2.15* 2.01* 1.82*   GLUCOSE mg/dL 153* 170* 173* 211*         COMPLEXITY OF DATA / DECISION MAKING     []  Moderate: One acute illness or mild exacerbation of chronic or 2 stable chronic or tx side effects   []  High:  Severe acute illness or severe exacerbation of chronic - potential for major debility / life threatening         I have personally reviewed the results from the time of this admission to 10/9/2023 09:50 EDT:  []  Laboratory:  []  Microbiology: []  Radiology:  []  Telemetry:   []  Cardiology/Vascular:  []  Pathology:  []  Prior external records:  []  Independent historian provided additional details:      []  Discussed case with specialists:    []  Independent interpretation of ECG/Imaging etc:             []  Moderate: Rx management, low risk surgery, suboptimal social situation   []  High:  Rx with close monitoring for toxicity, mod-high risk surgery, DNR decision, Comfort  "initiated, IV pain meds    Assessment / Plan   Assessment:    Shortness of breath, edema, chest discomfort  Acute hypoxia  Elevated troponin, chronic (in setting of chronic CHF and CKD) (doubt ACS)  HTN  Hx CHF reduced ejection fraction (CHF clinic, Dr. Herrmann)  DM  CKD 3B  Hx of DVT (on Eliquis)  Hx of medication nonadherence  Dyslipidemia  SANDRINE (unable to tolerate CPAP)  Left bundle branch block pattern on ECG  RLE intermittent claudication  Hx gout elbow wrist  Hypertensive urgency  DM, poorly controlled  Hx anemia, iron deficient       Plan:     Continue diuresis.  Appreciate cardiology input.  Serial troponins  Low EF with NSVT.  Consider LifeVest.  Continue diuresis with IV Lasix  As needed labetalol IV.  Resume all home BP meds.  Cardiology consulted, recommendations appreciated. \"Patient needs to be seen by Dr. Mcdonnell or his nurse practitioner as an outpatient.  At that time they can consider ischemic study and possible AICD placement\"   Echo ...  Left ventricular ejection fraction appears to be 26 - 30%.  Appears to have global hypokinesis worse in the apex and anterior walls.    The left ventricular cavity is mildly dilated.    Left ventricular wall thickness is consistent with borderline concentric hypertrophy.    Left ventricular diastolic function was indeterminate.    Mildly reduced right ventricular systolic function noted.    The left atrial cavity is mildly dilated.    Patient has sclerotic aortic valve.  It does appear to open somewhat.  He could have some degree of low gradient AS.  Does not appear likely severe.  Monitor renal indices and volume status closely in light of CHF/CKD history  Dietitian consult for low-sodium CHF diet education  Additional recommendations pending clinical course  Discontinue Lovenox.  Resume home Eliquis.  Additional recommendations pending clinical course.  DVT prophylaxis:  Medical and mechanical DVT prophylaxis orders are present.  CODE STATUS:      Level Of Support " Discussed With: Patient  Code Status (Patient has no pulse and is not breathing): CPR (Attempt to Resuscitate)  Medical Interventions (Patient has pulse or is breathing): Full Support           Attending documentation:  I reviewed the above documentation and independently reviewed and rounded and evaluated the patient and discussed the care plan with AVI Driver PA-C, I agree with his findings and plan as documented, what I have added to the care plan and modified is as follows in my documentation and my medical decision making; 68-year-old male hospitalized on 10/6/2023 with chief complaint of chest pain, found to be in decompensated heart failure state with a new ejection fraction reduced from previous, he had wall motion abnormality on previous admission and declined cardiac cath.  Placed on IV diuretics cardiology consulted.  Runs of NSVT, hemodynamic stability, cardiology recommending against LifeVest therapy at this time.  Interval follow-up: Seen and examined this morning, no acute distress, no acute major night events, improved short of air and less coughing, Telemetry reviewed, runs of NSVT, relatively asymptomatic during these processes.  There was question whether or not this patient should have a LifeVest, cardiology waiting in recommended against LifeVest therapy.  Vitals reviewed, labs reviewed, potassium replacement as ordered.  Urine output 1 L over the past 24 hours, creatinine up to 2.36, BUN 32, potassium 3.9.  On physical exam well-appearing male, no acute distress, regular rate rhythm, diminished breath sounds throughout, soft nontender abdomen, no lower extremity edema, alert and oriented x3.  Assessment as above, continue diuresis with Lasix changed to 40 mg p.o. twice daily, strict I's and O's, daily weights, continue Eliquis 5 mg twice daily, continue Coreg 25 mg twice a day, continue Lipitor 80 mg daily, continue Cozaar 50 mg daily, may need to transition to Entresto if he can tolerate,  continue telemetry monitoring, a.m. labs, insulin sign scale coverage, full code, DVT prophylaxis with Eliquis, clinical course dictate further management, discussed with nurse at the bedside.  More than 70 % of the time of this patient's encounter was performed by me, this included face-to-face time, planning and coordinating, medical decision making and critical thinking personally done by me.  -AVBMD      Electronically signed by Aylin Keita MD, 10/09/23, 6:01 PM EDT.    Portions of this documentation were transcribed electronically from a voice recognition software.  I confirm all data accurately represents the service(s) I performed at today's visit.

## 2023-10-09 NOTE — PLAN OF CARE
Goal Outcome Evaluation:              Outcome Evaluation: VSS, A&Ox4, patient with c/o HA and treated with prn Tylenol. patient refused stool softner d/t frequent BM today.

## 2023-10-10 LAB
ALBUMIN SERPL-MCNC: 2.6 G/DL (ref 3.5–5.2)
ALP SERPL-CCNC: 99 U/L (ref 39–117)
ALT SERPL W P-5'-P-CCNC: 5 U/L (ref 1–41)
ANION GAP SERPL CALCULATED.3IONS-SCNC: 13.8 MMOL/L (ref 5–15)
AST SERPL-CCNC: 8 U/L (ref 1–40)
BASOPHILS # BLD AUTO: 0.02 10*3/MM3 (ref 0–0.2)
BASOPHILS NFR BLD AUTO: 0.3 % (ref 0–1.5)
BILIRUB CONJ SERPL-MCNC: <0.2 MG/DL (ref 0–0.3)
BILIRUB INDIRECT SERPL-MCNC: ABNORMAL MG/DL
BILIRUB SERPL-MCNC: 0.3 MG/DL (ref 0–1.2)
BUN SERPL-MCNC: 42 MG/DL (ref 8–23)
BUN/CREAT SERPL: 13.7 (ref 7–25)
CALCIUM SPEC-SCNC: 8.5 MG/DL (ref 8.6–10.5)
CHLORIDE SERPL-SCNC: 99 MMOL/L (ref 98–107)
CO2 SERPL-SCNC: 22.2 MMOL/L (ref 22–29)
CREAT SERPL-MCNC: 3.06 MG/DL (ref 0.76–1.27)
DEPRECATED RDW RBC AUTO: 56.8 FL (ref 37–54)
EGFRCR SERPLBLD CKD-EPI 2021: 21.7 ML/MIN/1.73
EOSINOPHIL # BLD AUTO: 0.01 10*3/MM3 (ref 0–0.4)
EOSINOPHIL NFR BLD AUTO: 0.2 % (ref 0.3–6.2)
ERYTHROCYTE [DISTWIDTH] IN BLOOD BY AUTOMATED COUNT: 18.3 % (ref 12.3–15.4)
GLUCOSE BLDC GLUCOMTR-MCNC: 142 MG/DL (ref 70–99)
GLUCOSE BLDC GLUCOMTR-MCNC: 196 MG/DL (ref 70–99)
GLUCOSE BLDC GLUCOMTR-MCNC: 221 MG/DL (ref 70–99)
GLUCOSE BLDC GLUCOMTR-MCNC: 247 MG/DL (ref 70–99)
GLUCOSE SERPL-MCNC: 231 MG/DL (ref 65–99)
HCT VFR BLD AUTO: 35.4 % (ref 37.5–51)
HGB BLD-MCNC: 11.2 G/DL (ref 13–17.7)
IMM GRANULOCYTES # BLD AUTO: 0.01 10*3/MM3 (ref 0–0.05)
IMM GRANULOCYTES NFR BLD AUTO: 0.2 % (ref 0–0.5)
LYMPHOCYTES # BLD AUTO: 1.56 10*3/MM3 (ref 0.7–3.1)
LYMPHOCYTES NFR BLD AUTO: 24.4 % (ref 19.6–45.3)
MAGNESIUM SERPL-MCNC: 2.2 MG/DL (ref 1.6–2.4)
MCH RBC QN AUTO: 26.8 PG (ref 26.6–33)
MCHC RBC AUTO-ENTMCNC: 31.6 G/DL (ref 31.5–35.7)
MCV RBC AUTO: 84.7 FL (ref 79–97)
MONOCYTES # BLD AUTO: 0.41 10*3/MM3 (ref 0.1–0.9)
MONOCYTES NFR BLD AUTO: 6.4 % (ref 5–12)
NEUTROPHILS NFR BLD AUTO: 4.39 10*3/MM3 (ref 1.7–7)
NEUTROPHILS NFR BLD AUTO: 68.5 % (ref 42.7–76)
NRBC BLD AUTO-RTO: 0 /100 WBC (ref 0–0.2)
PHOSPHATE SERPL-MCNC: 4.3 MG/DL (ref 2.5–4.5)
PLATELET # BLD AUTO: 260 10*3/MM3 (ref 140–450)
PMV BLD AUTO: 9.7 FL (ref 6–12)
POTASSIUM SERPL-SCNC: 4.4 MMOL/L (ref 3.5–5.2)
PROT SERPL-MCNC: 6.3 G/DL (ref 6–8.5)
RBC # BLD AUTO: 4.18 10*6/MM3 (ref 4.14–5.8)
SODIUM SERPL-SCNC: 135 MMOL/L (ref 136–145)
WBC NRBC COR # BLD: 6.4 10*3/MM3 (ref 3.4–10.8)

## 2023-10-10 PROCEDURE — 85025 COMPLETE CBC W/AUTO DIFF WBC: CPT | Performed by: FAMILY MEDICINE

## 2023-10-10 PROCEDURE — 63710000001 INSULIN LISPRO (HUMAN) PER 5 UNITS: Performed by: FAMILY MEDICINE

## 2023-10-10 PROCEDURE — 83735 ASSAY OF MAGNESIUM: CPT | Performed by: FAMILY MEDICINE

## 2023-10-10 PROCEDURE — 80076 HEPATIC FUNCTION PANEL: CPT | Performed by: FAMILY MEDICINE

## 2023-10-10 PROCEDURE — 84100 ASSAY OF PHOSPHORUS: CPT | Performed by: FAMILY MEDICINE

## 2023-10-10 PROCEDURE — 82948 REAGENT STRIP/BLOOD GLUCOSE: CPT

## 2023-10-10 PROCEDURE — 63710000001 DIPHENHYDRAMINE PER 50 MG: Performed by: PHYSICIAN ASSISTANT

## 2023-10-10 PROCEDURE — 80048 BASIC METABOLIC PNL TOTAL CA: CPT | Performed by: FAMILY MEDICINE

## 2023-10-10 RX ORDER — DIPHENHYDRAMINE HCL 25 MG
25 CAPSULE ORAL ONCE
Status: COMPLETED | OUTPATIENT
Start: 2023-10-10 | End: 2023-10-10

## 2023-10-10 RX ORDER — FUROSEMIDE 40 MG/1
40 TABLET ORAL DAILY
Status: DISCONTINUED | OUTPATIENT
Start: 2023-10-11 | End: 2023-10-11 | Stop reason: HOSPADM

## 2023-10-10 RX ADMIN — Medication 10 ML: at 08:14

## 2023-10-10 RX ADMIN — Medication 10 ML: at 20:29

## 2023-10-10 RX ADMIN — HYDRALAZINE HYDROCHLORIDE 25 MG: 25 TABLET, FILM COATED ORAL at 20:29

## 2023-10-10 RX ADMIN — POTASSIUM CHLORIDE 10 MEQ: 10 CAPSULE, COATED, EXTENDED RELEASE ORAL at 08:13

## 2023-10-10 RX ADMIN — LOSARTAN POTASSIUM 50 MG: 50 TABLET, FILM COATED ORAL at 08:13

## 2023-10-10 RX ADMIN — HYDRALAZINE HYDROCHLORIDE 25 MG: 25 TABLET, FILM COATED ORAL at 17:10

## 2023-10-10 RX ADMIN — INSULIN LISPRO 4 UNITS: 100 INJECTION, SOLUTION INTRAVENOUS; SUBCUTANEOUS at 20:26

## 2023-10-10 RX ADMIN — HYDROCODONE BITARTRATE AND ACETAMINOPHEN 1 TABLET: 5; 325 TABLET ORAL at 11:59

## 2023-10-10 RX ADMIN — CARVEDILOL 25 MG: 25 TABLET, FILM COATED ORAL at 17:11

## 2023-10-10 RX ADMIN — APIXABAN 5 MG: 5 TABLET, FILM COATED ORAL at 08:13

## 2023-10-10 RX ADMIN — CARVEDILOL 25 MG: 25 TABLET, FILM COATED ORAL at 08:13

## 2023-10-10 RX ADMIN — Medication 400 MG: at 08:13

## 2023-10-10 RX ADMIN — HYDRALAZINE HYDROCHLORIDE 25 MG: 25 TABLET, FILM COATED ORAL at 08:13

## 2023-10-10 RX ADMIN — FAMOTIDINE 20 MG: 20 TABLET, FILM COATED ORAL at 08:13

## 2023-10-10 RX ADMIN — ATORVASTATIN CALCIUM 80 MG: 40 TABLET, FILM COATED ORAL at 20:27

## 2023-10-10 RX ADMIN — INSULIN LISPRO 4 UNITS: 100 INJECTION, SOLUTION INTRAVENOUS; SUBCUTANEOUS at 11:59

## 2023-10-10 RX ADMIN — INSULIN LISPRO 2 UNITS: 100 INJECTION, SOLUTION INTRAVENOUS; SUBCUTANEOUS at 08:14

## 2023-10-10 RX ADMIN — APIXABAN 5 MG: 5 TABLET, FILM COATED ORAL at 20:26

## 2023-10-10 RX ADMIN — EMPAGLIFLOZIN 25 MG: 25 TABLET, FILM COATED ORAL at 08:14

## 2023-10-10 RX ADMIN — DIPHENHYDRAMINE HYDROCHLORIDE 25 MG: 25 CAPSULE ORAL at 22:22

## 2023-10-10 RX ADMIN — FAMOTIDINE 20 MG: 20 TABLET, FILM COATED ORAL at 17:10

## 2023-10-10 RX ADMIN — SPIRONOLACTONE 25 MG: 25 TABLET ORAL at 08:13

## 2023-10-10 NOTE — PROGRESS NOTES
[facility]   Hospitalist Progress Note       Patient Name: Lyle Lozano  : 1955  MRN: [id number]  Primary Care Physician: Heidi Villa, HUMA  Date of admission: 10/6/2023  Today's Date: 10/10/2023  Room / Bed:   [facility]  Subjective   Chief Complaint: Exertional dyspnea, worse than usual past week     HPI:     Lyle Lozano is a 68 y.o. male with a history of nonischemic cardiomyopathy who presents with worsening exertional dyspnea, orthopnea.  He has been feeling more short of air for the past week or so.  Presented to the emergency department.  Troponins elevated in the 100-150 range, which is chronically elevated.  BNP >19,000, which is a little more than his baseline typically.  His creatinine is 1.8, which is close to his baseline.  Chest x-ray showed 1. Stable cardiomegaly and mild pulmonary vascular congestion 2. Minimal dependent opacity at the left base favored to represent atelectasis . 3. Small right-sided pleural effusion.  White count normal.  No fever.  Intermittent sharp chest pain, atypical for cardiac etiology.  Resting EKG unchanged from 2023; sinus rhythm with left bundle branch block pattern with repolarization abnormality.  Hospitalist contacted via ED for admission/further observation.  Cardiologist has been consulted.  Started on IV diuretics.  Is hypertensive and home BP meds were resumed.  Also IV labetalol given.    Interval Followup: 10/10/2023    Feeling better but not quite back to baseline.    On room air  Single dose prednisone yesterday helped with right hand gout attack.  No episodes of brief NSVT past 24 hours  Recent echo with EF 30% range  Potassium 4.4  Magnesium 2.2  Tolerating carvedilol  Appreciate input from cardiology.  Creatinine risin.8 ---> 2.3 ---> 3.0    REVIEW OF SYSTEMS:   SOA.  Right lower extremity claudication, chronic  Objective   Temp:  [97.4 øF (36.3 øC)-98.2 øF (36.8 øC)] 97.7 øF (36.5 øC)  Heart Rate:  [62-79] 66  Resp:   [16] 16  BP: ()/(45-79) 147/78  PHYSICAL EXAM   CON: WN. WD. NAD.  Alert and conversational.  On room air currently.  NECK:  No thyromegaly. No stridor.   RESP:  CTA. No wheezes. No crackles.  No work of breathing or tachypnea.   CV:  Rhythm regular. Rate WNL. No murmur noted.  No edema.  GI:  Soft and nontender. Nondistended.    EXT: Peripheral pulses intact.  No cyanosis.  PSYCH:  Alert. Oriented. Normal affect and mood.  NEURO:  No dysarthria or aphasia. No unilateral weakness or paresthesia.  SKIN: No chronic venous stasis changes or varicosities.  No cellulitis  Results from last 7 days   Lab Units 10/10/23  0601 10/09/23  0416 10/08/23  0426 10/07/23  0652 10/06/23  0704   WBC 10*3/mm3 6.40 7.18 7.29 6.06 6.70   HEMOGLOBIN g/dL 11.2* 10.4* 10.8* 10.7* 10.5*   HEMATOCRIT % 35.4* 33.2* 34.9* 34.2* 33.8*   PLATELETS 10*3/mm3 260 215 229 216 240     Results from last 7 days   Lab Units 10/10/23  0601 10/09/23  0416 10/08/23  0426 10/07/23  0652 10/06/23  0704   SODIUM mmol/L 135* 137 137 138 139   POTASSIUM mmol/L 4.4 3.9 3.8 3.2* 3.5   CO2 mmol/L 22.2 22.8 24.5 24.6 21.0*   CHLORIDE mmol/L 99 102 103 104 107   ANION GAP mmol/L 13.8 12.2 9.5 9.4 11.0   BUN mg/dL 42* 32* 30* 28* 26*   CREATININE mg/dL 3.06* 2.36* 2.15* 2.01* 1.82*   GLUCOSE mg/dL 231* 153* 170* 173* 211*         COMPLEXITY OF DATA / DECISION MAKING     []  Moderate: One acute illness or mild exacerbation of chronic or 2 stable chronic or tx side effects   []  High:  Severe acute illness or severe exacerbation of chronic - potential for major debility / life threatening         I have personally reviewed the results from the time of this admission to 10/10/2023 09:10 EDT:  []  Laboratory:  []  Microbiology: []  Radiology:  []  Telemetry:   []  Cardiology/Vascular:  []  Pathology:  []  Prior external records:  []  Independent historian provided additional details:      []  Discussed case with specialists:    []  Independent interpretation of  "ECG/Imaging etc:             []  Moderate: Rx management, low risk surgery, suboptimal social situation   []  High:  Rx with close monitoring for toxicity, mod-high risk surgery, DNR decision, Comfort initiated, IV pain meds    Assessment / Plan   Assessment:    Shortness of breath, edema, chest discomfort  Acute hypoxia  Elevated troponin, chronic (in setting of chronic CHF and CKD) (doubt ACS)  HTN  Hx CHF reduced ejection fraction (CHF clinic, Dr. Herrmann)  DM  [place] on CKD 3B  Hx of DVT (on Eliquis)  Hx of medication nonadherence  Dyslipidemia  SANDRINE (unable to tolerate CPAP)  Left bundle branch block pattern on ECG  RLE intermittent claudication  Hx gout elbow wrist  Hypertensive urgency  DM, poorly controlled  Hx anemia, iron deficient       Plan:     Avoid hypotension.  Renally dose meds.  Hold IV diuretics today.  Follow-up BMP in the morning.  Appreciate cardiology input.  Pred 40 x 1 for right hand gout.  Improved.  Serial troponins  Low EF with NSVT.  Per cardiology, LifeVest not recommended currently.  As needed labetalol IV.  Resume all home BP meds.  Cardiology consulted, recommendations appreciated. \"Patient needs to be seen by Dr. Mcdonnell or his nurse practitioner as an outpatient.  At that time they can consider ischemic study and possible AICD placement\"   Echo ...  Left ventricular ejection fraction appears to be 26 - 30%.  Appears to have global hypokinesis worse in the apex and anterior walls.    The left ventricular cavity is mildly dilated.    Left ventricular wall thickness is consistent with borderline concentric hypertrophy.    Left ventricular diastolic function was indeterminate.    Mildly reduced right ventricular systolic function noted.    The left atrial cavity is mildly dilated.    Patient has sclerotic aortic valve.  It does appear to open somewhat.  He could have some degree of low gradient AS.  Does not appear likely severe.  Monitor renal indices and volume status closely in light of " CHF/CKD history  Dietitian consult for low-sodium CHF diet education  Additional recommendations pending clinical course  Discontinue Lovenox.  Resume home Eliquis.  Additional recommendations pending clinical course.  DVT prophylaxis:  Medical and mechanical DVT prophylaxis orders are present.  CODE STATUS:      Level Of Support Discussed With: Patient  Code Status (Patient has no pulse and is not breathing): CPR (Attempt to Resuscitate)  Medical Interventions (Patient has pulse or is breathing): Full Support           Attending documentation:  I reviewed the above documentation and independently reviewed and rounded and evaluated the patient and discussed the care plan with AVI Driver PA-C, I agree with his findings and plan as documented, what I have added to the care plan and modified is as follows in my documentation and my medical decision making; 68-year-old male hospitalized on 10/6/2023 with chief complaint of chest pain, found to be in decompensated heart failure state with a new ejection fraction reduced from previous, he had wall motion abnormality on previous admission and declined cardiac cath.  Placed on IV diuretics cardiology consulted.  Runs of NSVT, hemodynamic stability, cardiology recommending against LifeVest therapy at this time.  Interval follow-up: Seen and examined this morning, no acute distress, no acute major night events, creatinine is worse, up to 3.0 range, having to reduce diuretic dose, and follow, blood pressure notable signs are stable, telemetry reviewed, no acute major rhythmic events.  No issues with runs of VT.  Review of systems obtained, all systems reviewed negative except generalized fatigue and weakness.  Shortness of breath with exertion.  Vitals reviewed, labs reviewed, potassium 4.4, BUN 42, sodium 135, white blood cell count 6000.  On physical exam well-appearing male, no acute distress, regular rate rhythm, diminished breath sounds throughout, soft nontender abdomen,  no lower extremity edema, alert and oriented x3.  Assessment as above, continue diuresis with Lasix changed to 40 mg p.o. reduced to daily, following creatinine closely, if creatinine does not improve in the next 24 hours, will need to consult nephrology, strict I's and O's, daily weights, continue Eliquis 5 mg twice daily, continue Coreg 25 mg twice a day, continue Lipitor 80 mg daily, continue Cozaar 50 mg daily, may need to transition to Entresto if he can tolerate, continue telemetry monitoring, a.m. labs, insulin sign scale coverage, full code, DVT prophylaxis with Eliquis, clinical course dictate further management, discussed with nurse at the bedside.  More than 70 % of the time of this patient's encounter was performed by me, this included face-to-face time, planning and coordinating, medical decision making and critical thinking personally done by me.  -AVBMD    Electronically signed by Aylin Keita MD, 10/10/23, 5:23 PM EDT.  Portions of this documentation were transcribed electronically from a voice recognition software.  I confirm all data accurately represents the service(s) I performed at today's visit.

## 2023-10-10 NOTE — PLAN OF CARE
Problem: Adult Inpatient Plan of Care  Goal: Absence of Hospital-Acquired Illness or Injury  Outcome: Ongoing, Progressing  Intervention: Identify and Manage Fall Risk  Intervention: Prevent Skin Injury  Intervention: Prevent and Manage VTE (Venous Thromboembolism) Risk  Intervention: Prevent Infection  Goal: Optimal Comfort and Wellbeing  Outcome: Ongoing, Progressing  Intervention: Monitor Pain and Promote Comfort  Intervention: Provide Person-Centered Care   Goal Outcome Evaluation:

## 2023-10-11 ENCOUNTER — TELEPHONE (OUTPATIENT)
Dept: CARDIOLOGY | Facility: CLINIC | Age: 68
End: 2023-10-11
Payer: MEDICARE

## 2023-10-11 ENCOUNTER — READMISSION MANAGEMENT (OUTPATIENT)
Dept: CALL CENTER | Facility: HOSPITAL | Age: 68
End: 2023-10-11
Payer: MEDICARE

## 2023-10-11 VITALS
HEART RATE: 65 BPM | WEIGHT: 167.99 LBS | BODY MASS INDEX: 26.37 KG/M2 | DIASTOLIC BLOOD PRESSURE: 89 MMHG | HEIGHT: 67 IN | TEMPERATURE: 97.7 F | OXYGEN SATURATION: 92 % | RESPIRATION RATE: 16 BRPM | SYSTOLIC BLOOD PRESSURE: 146 MMHG

## 2023-10-11 LAB
ALBUMIN SERPL-MCNC: 2.6 G/DL (ref 3.5–5.2)
ALP SERPL-CCNC: 89 U/L (ref 39–117)
ALT SERPL W P-5'-P-CCNC: 8 U/L (ref 1–41)
ANION GAP SERPL CALCULATED.3IONS-SCNC: 11.1 MMOL/L (ref 5–15)
AST SERPL-CCNC: 13 U/L (ref 1–40)
BASOPHILS # BLD AUTO: 0.05 10*3/MM3 (ref 0–0.2)
BASOPHILS NFR BLD AUTO: 0.8 % (ref 0–1.5)
BILIRUB CONJ SERPL-MCNC: <0.2 MG/DL (ref 0–0.3)
BILIRUB INDIRECT SERPL-MCNC: ABNORMAL MG/DL
BILIRUB SERPL-MCNC: 0.3 MG/DL (ref 0–1.2)
BUN SERPL-MCNC: 43 MG/DL (ref 8–23)
BUN/CREAT SERPL: 16.1 (ref 7–25)
CALCIUM SPEC-SCNC: 8.5 MG/DL (ref 8.6–10.5)
CHLORIDE SERPL-SCNC: 103 MMOL/L (ref 98–107)
CO2 SERPL-SCNC: 23.9 MMOL/L (ref 22–29)
CREAT SERPL-MCNC: 2.67 MG/DL (ref 0.76–1.27)
DEPRECATED RDW RBC AUTO: 58.6 FL (ref 37–54)
EGFRCR SERPLBLD CKD-EPI 2021: 25.5 ML/MIN/1.73
EOSINOPHIL # BLD AUTO: 0.2 10*3/MM3 (ref 0–0.4)
EOSINOPHIL NFR BLD AUTO: 3 % (ref 0.3–6.2)
ERYTHROCYTE [DISTWIDTH] IN BLOOD BY AUTOMATED COUNT: 18.2 % (ref 12.3–15.4)
GLUCOSE BLDC GLUCOMTR-MCNC: 117 MG/DL (ref 70–99)
GLUCOSE SERPL-MCNC: 136 MG/DL (ref 65–99)
HCT VFR BLD AUTO: 34.7 % (ref 37.5–51)
HGB BLD-MCNC: 10.8 G/DL (ref 13–17.7)
IMM GRANULOCYTES # BLD AUTO: 0.02 10*3/MM3 (ref 0–0.05)
IMM GRANULOCYTES NFR BLD AUTO: 0.3 % (ref 0–0.5)
LYMPHOCYTES # BLD AUTO: 1.9 10*3/MM3 (ref 0.7–3.1)
LYMPHOCYTES NFR BLD AUTO: 29 % (ref 19.6–45.3)
MAGNESIUM SERPL-MCNC: 2.5 MG/DL (ref 1.6–2.4)
MCH RBC QN AUTO: 27.1 PG (ref 26.6–33)
MCHC RBC AUTO-ENTMCNC: 31.1 G/DL (ref 31.5–35.7)
MCV RBC AUTO: 87 FL (ref 79–97)
MONOCYTES # BLD AUTO: 0.41 10*3/MM3 (ref 0.1–0.9)
MONOCYTES NFR BLD AUTO: 6.3 % (ref 5–12)
NEUTROPHILS NFR BLD AUTO: 3.98 10*3/MM3 (ref 1.7–7)
NEUTROPHILS NFR BLD AUTO: 60.6 % (ref 42.7–76)
NRBC BLD AUTO-RTO: 0 /100 WBC (ref 0–0.2)
PHOSPHATE SERPL-MCNC: 4 MG/DL (ref 2.5–4.5)
PLATELET # BLD AUTO: 241 10*3/MM3 (ref 140–450)
PMV BLD AUTO: 9.6 FL (ref 6–12)
POTASSIUM SERPL-SCNC: 4 MMOL/L (ref 3.5–5.2)
PROT SERPL-MCNC: 6.1 G/DL (ref 6–8.5)
RBC # BLD AUTO: 3.99 10*6/MM3 (ref 4.14–5.8)
SODIUM SERPL-SCNC: 138 MMOL/L (ref 136–145)
WBC NRBC COR # BLD: 6.56 10*3/MM3 (ref 3.4–10.8)

## 2023-10-11 PROCEDURE — 83735 ASSAY OF MAGNESIUM: CPT | Performed by: FAMILY MEDICINE

## 2023-10-11 PROCEDURE — 85025 COMPLETE CBC W/AUTO DIFF WBC: CPT | Performed by: FAMILY MEDICINE

## 2023-10-11 PROCEDURE — 80048 BASIC METABOLIC PNL TOTAL CA: CPT | Performed by: FAMILY MEDICINE

## 2023-10-11 PROCEDURE — 80076 HEPATIC FUNCTION PANEL: CPT | Performed by: FAMILY MEDICINE

## 2023-10-11 PROCEDURE — 84100 ASSAY OF PHOSPHORUS: CPT | Performed by: FAMILY MEDICINE

## 2023-10-11 PROCEDURE — 82948 REAGENT STRIP/BLOOD GLUCOSE: CPT

## 2023-10-11 RX ORDER — FUROSEMIDE 40 MG/1
40 TABLET ORAL DAILY
Qty: 30 TABLET | Refills: 0 | Status: SHIPPED | OUTPATIENT
Start: 2023-10-11 | End: 2023-10-22

## 2023-10-11 RX ORDER — CARVEDILOL 25 MG/1
25 TABLET ORAL 2 TIMES DAILY WITH MEALS
Qty: 60 TABLET | Refills: 0 | Status: ON HOLD | OUTPATIENT
Start: 2023-10-11 | End: 2023-11-10

## 2023-10-11 RX ORDER — SPIRONOLACTONE 25 MG/1
25 TABLET ORAL DAILY
Qty: 30 TABLET | Refills: 0 | Status: ON HOLD | OUTPATIENT
Start: 2023-10-11 | End: 2023-11-10

## 2023-10-11 RX ADMIN — LOSARTAN POTASSIUM 50 MG: 50 TABLET, FILM COATED ORAL at 09:21

## 2023-10-11 RX ADMIN — CARVEDILOL 25 MG: 25 TABLET, FILM COATED ORAL at 09:22

## 2023-10-11 RX ADMIN — POTASSIUM CHLORIDE 10 MEQ: 10 CAPSULE, COATED, EXTENDED RELEASE ORAL at 09:21

## 2023-10-11 RX ADMIN — SPIRONOLACTONE 25 MG: 25 TABLET ORAL at 09:23

## 2023-10-11 RX ADMIN — FUROSEMIDE 40 MG: 40 TABLET ORAL at 09:22

## 2023-10-11 RX ADMIN — APIXABAN 5 MG: 5 TABLET, FILM COATED ORAL at 09:21

## 2023-10-11 RX ADMIN — FAMOTIDINE 20 MG: 20 TABLET, FILM COATED ORAL at 08:14

## 2023-10-11 RX ADMIN — HYDRALAZINE HYDROCHLORIDE 25 MG: 25 TABLET, FILM COATED ORAL at 09:21

## 2023-10-11 RX ADMIN — Medication 400 MG: at 09:22

## 2023-10-11 RX ADMIN — EMPAGLIFLOZIN 25 MG: 25 TABLET, FILM COATED ORAL at 09:21

## 2023-10-11 NOTE — PLAN OF CARE
Goal Outcome Evaluation:         Pt reports that he is ready to go home. Feels that his edema has Improved and his pain is reduced. Ambulates indep. In the room and is cont of bowel and bladder. Reports no pain this shift. On room air and voiced understanding of all education given .

## 2023-10-11 NOTE — DISCHARGE INSTR - APPOINTMENTS
Patient needs to follow up with (Cardiology) office will call with follow up. [phone]  Patient needs to follow up with Heidi Villa(PCP) 10/26/23 @9:00am [phone]

## 2023-10-11 NOTE — OUTREACH NOTE
Prep Survey      Flowsheet Row Responses   [facility] patient discharged from? [facility]   Is LACE score < 7 ? No   Eligibility [facility]   Date of Admission 10/06/23   Date of Discharge 10/11/23   Discharge diagnosis NSTEMI (non-ST elevated myocardial infarction)   Does the patient have one of the following disease processes/diagnoses(primary or secondary)? Acute MI (STEMI,NSTEMI)   Does the patient have Home health ordered? No   Is there a DME ordered? No   Prep survey completed? Yes            Juany CRUZ - Registered Nurse

## 2023-10-11 NOTE — TELEPHONE ENCOUNTER
Caller: RUBI BERNABE    Relationship to patient:     Best call back number: [phone] PATIENTS NUMBER    Chief complaint: NON-STEMI, HEART FAILURE WITH ELEVATED TROPONIN    Type of visit: HOSPITAL FOLLOW UP    Requested date: 3 WEEKS     If rescheduling, when is the original appointment:      Additional notes: PATIENT SEEN DR. MENDEZ IN HOSPITAL. NEEDS 3 WEEK HOSPITAL. [facility]'S NEXT AVAILABLE IS 12.12.23

## 2023-10-11 NOTE — PLAN OF CARE
Goal Outcome Evaluation:  Plan of Care Reviewed With: patient                   Pt discharging home. IV and telemetry dc'ed.

## 2023-10-11 NOTE — DISCHARGE SUMMARY
[facility]         HOSPITALIST  DISCHARGE SUMMARY    Patient Name: Lyle Lozano  : 1955  MRN: [id number]    Date of Admission: 10/6/2023  Date of Discharge:  10/11/2023    Primary Care Physician: Heidi Villa APRN    Consults       Date and Time Order Name Status Description    10/6/2023 11:36 AM Inpatient Cardiology Consult      10/6/2023  9:56 AM Cardiology (on-call MD unless specified)      10/6/2023  9:39 AM Hospitalist (on-call MD unless specified)              Active and Resolved Hospital Problems:  Active Hospital Problems    Diagnosis POA    **NSTEMI (non-ST elevated myocardial infarction) [I21.4] Yes    Acute systolic heart failure [I50.21] Unknown    Chronic systolic heart failure [I50.22] Unknown    Heart failure, unspecified [I50.9] Yes    Type 2 myocardial infarction [I21.A1] Yes    Acute on chronic HFrEF (heart failure with reduced ejection fraction) [I50.23] Yes      Resolved Hospital Problems   No resolved problems to display.       Hospital Course     Hospital Course:   68-year-old male hospitalized on 10/6/2023 with chief complaint of chest pain, found to be in decompensated heart failure state with a new ejection fraction reduced from previous, he had wall motion abnormality on previous admission and declined cardiac cath.  Placed on IV diuretics cardiology consulted.  Runs of NSVT, hemodynamic stability, cardiology recommending against LifeVest therapy at this time.  Coreg dose was adjusted.  Started on spironolactone.  Patient declined cardiac cath.  Did have a bump in his creatinine, not far from baseline, plateaued at 2.67, continued on Lasix, discussed compliance with Lasix.  Discharged in hemodynamically stable addition on 10/11/2023 to follow-up with the CHF clinic.  High risk for readmission due to noncompliance with diuretics also with progression of underlying cardiac disease.    Day of Discharge     Vital Signs:  Temp:  [97.3 øF (36.3 øC)-97.8 øF  (36.6 øC)] 97.7 øF (36.5 øC)  Heart Rate:  [61-68] 65  Resp:  [14-16] 16  BP: (128-148)/(66-89) 146/89  Review of systems:  All systems reviewed and negative except generalized fatigue, shortness of breath with exertion    PHYSICAL EXAM        ú CON:   WN. WD. NAD.  Alert and conversational.  On room air currently.  ú NECK:             No thyromegaly. No stridor.   ú RESP:             CTA. No wheezes. No crackles.  No work of breathing or tachypnea.   ú CV:      Rhythm regular. Rate WNL. No murmur noted.  No edema.  ú GI:                   Soft and nontender. Nondistended.    ú EXT:    Peripheral pulses intact.  No cyanosis.  ú PSYCH:           Alert. Oriented. Normal affect and mood.  ú NEURO:          No dysarthria or aphasia. No unilateral weakness or paresthesia.  ú SKIN:   No chronic venous stasis changes or varicosities.  No cellulitis        Discharge Details        Discharge Medications        New Medications        Instructions Start Date   spironolactone 25 MG tablet  Commonly known as: ALDACTONE   25 mg, Oral, Daily             Changes to Medications        Instructions Start Date   carvedilol 25 MG tablet  Commonly known as: COREG  What changed: medication strength   25 mg, Oral, 2 Times Daily With Meals      Ozempic (0.25 or 0.5 MG/DOSE) 2 MG/3ML solution pen-injector  Generic drug: Semaglutide(0.25 or 0.5MG/DOS)  What changed: See the new instructions.   INJECT UNDER SKIN 0.5MG ONCE A WEEK             Continue These Medications        Instructions Start Date   allopurinol 100 MG tablet  Commonly known as: ZYLOPRIM   100 mg, Oral, Daily      apixaban 5 MG tablet tablet  Commonly known as: ELIQUIS   5 mg, Oral, Every Morning, Pt is supposed to take this bid but admits to only taking it qam because it makes him cold. He did say he took it this morning 10/06/23      aspirin 81 MG EC tablet   81 mg, Oral, Daily      atorvastatin 80 MG tablet  Commonly known as: LIPITOR   80 mg, Oral, Nightly       cholecalciferol 25 MCG (1000 UT) tablet  Commonly known as: VITAMIN D3   1,000 Units, Oral, Daily      furosemide 40 MG tablet  Commonly known as: LASIX   40 mg, Oral, Daily      hydrALAZINE 25 MG tablet  Commonly known as: APRESOLINE   25 mg, Oral, 3 Times Daily      ipratropium-albuterol  MCG/ACT inhaler  Commonly known as: COMBIVENT RESPIMAT   1 puff, Inhalation, 4 Times Daily PRN      Jardiance 25 MG tablet tablet  Generic drug: empagliflozin   TAKE 1 TABLET BY MOUTH DAILY      losartan 25 MG tablet  Commonly known as: COZAAR   25 mg, Oral, Daily               No Known Allergies    Discharge Disposition:  Home or Self Care    Diet:  Hospital:  Diet Order   Procedures    Diet: Cardiac Diets, Diabetic Diets; Healthy Heart (2-3 Na+); Consistent Carbohydrate; Texture: Regular Texture (IDDSI 7); Fluid Consistency: Thin (IDDSI 0)       Discharge Activity:       CODE STATUS:  Code Status and Medical Interventions:   Ordered at: 10/06/23 1136     Level Of Support Discussed With:    Patient     Code Status (Patient has no pulse and is not breathing):    CPR (Attempt to Resuscitate)     Medical Interventions (Patient has pulse or is breathing):    Full Support         Future Appointments   Date Time Provider Department Center   10/26/2023  9:00 AM Heidi Villa APRN [facility]           Pertinent  and/or Most Recent Results     PROCEDURES:   Adult Transthoracic Echo Complete W/ Cont if Necessary Per Protocol    Result Date: 10/6/2023    Left ventricular ejection fraction appears to be 26 - 30%.  Appears to have global hypokinesis worse in the apex and anterior walls.   The left ventricular cavity is mildly dilated.   Left ventricular wall thickness is consistent with borderline concentric hypertrophy.   Left ventricular diastolic function was indeterminate.   Mildly reduced right ventricular systolic function noted.   The left atrial cavity is mildly dilated.   Patient has sclerotic aortic valve.  It does  appear to open somewhat.  He could have some degree of low gradient AS.  Does not appear likely severe.     XR Chest 1 View    Result Date: 10/6/2023  PROCEDURE: XR CHEST 1 VW  COMPARISON: [facility], CR, XR CHEST 1 VW, 8/21/2023, 13:59.  INDICATIONS: SOA Triage Protocol/SHORTNESS OF BREATH AND DIFFICULTY BREATHING  FINDINGS:  Study is limited by overlying support and monitoring apparatus.  Stable cardiomegaly noted accentuated by portable technique and lung volumes.  Pulmonary vascularity is grossly unremarkable.  There is a small degree of blunting at the right costophrenic angle suggesting a small pleural effusion.  Minimal increased hazy and interstitial opacity noted at left base with a similar appearance to the comparison study.  Osseous structures demonstrate no acute abnormality        1. Stable cardiomegaly and mild pulmonary vascular congestion 2. Minimal dependent opacity at the left base favored to represent atelectasis .  3. Small right-sided pleural effusion       JUAN JOSE GILLETTE MD       Electronically Signed and Approved By: JUAN JOSE GILLETTE MD on 10/06/2023 at 7:21                LAB RESULTS:      Lab 10/11/23  0420 10/10/23  0601 10/09/23  0416 10/08/23  0426 10/07/23  0652   WBC 6.56 6.40 7.18 7.29 6.06   HEMOGLOBIN 10.8* 11.2* 10.4* 10.8* 10.7*   HEMATOCRIT 34.7* 35.4* 33.2* 34.9* 34.2*   PLATELETS 241 260 215 229 216   NEUTROS ABS 3.98 4.39 4.39 4.69 3.67   IMMATURE GRANS (ABS) 0.02 0.01 0.02 0.02 0.01   LYMPHS ABS 1.90 1.56 2.06 1.84 1.79   MONOS ABS 0.41 0.41 0.59 0.54 0.39   EOS ABS 0.20 0.01 0.09 0.17 0.16   MCV 87.0 84.7 85.6 86.8 85.7         Lab 10/11/23  0420 10/10/23  0601 10/09/23  0416 10/08/23  0426 10/07/23  0652   SODIUM 138 135* 137 137 138   POTASSIUM 4.0 4.4 3.9 3.8 3.2*   CHLORIDE 103 99 102 103 104   CO2 23.9 22.2 22.8 24.5 24.6   ANION GAP 11.1 13.8 12.2 9.5 9.4   BUN 43* 42* 32* 30* 28*   CREATININE 2.67* 3.06* 2.36* 2.15* 2.01*   EGFR 25.5* 21.7* 29.6* 33.1*  35.9*   GLUCOSE 136* 231* 153* 170* 173*   CALCIUM 8.5* 8.5* 8.6 8.9 8.8   MAGNESIUM 2.5* 2.2 1.8 1.9 1.8   PHOSPHORUS 4.0 4.3 3.3 3.6 4.1   HEMOGLOBIN A1C  --   --   --   --  10.00*         Lab 10/11/23  0420 10/10/23  0601 10/09/23  0416 10/08/23  0426 10/07/23  0652 10/06/23  0704   TOTAL PROTEIN 6.1 6.3 5.9* 6.3 6.2 6.5   ALBUMIN 2.6* 2.6* 2.5* 2.7* 2.7* 2.8*   GLOBULIN  --   --   --   --   --  3.7   ALT (SGPT) 8 5 5 6 5 8   AST (SGOT) 13 8 8 9 9 11   BILIRUBIN 0.3 0.3 0.6 0.3 0.4 0.3   INDIRECT BILIRUBIN  --   --  0.4  --   --   --    BILIRUBIN DIRECT <0.2 <0.2 0.2 <0.2 <0.2  --    ALK PHOS 89 99 98 108 107 110         Lab 10/09/23  0416 10/06/23  1128 10/06/23  0919 10/06/23  0704   PROBNP 8,586.0*  --   --  19,541.0*   HSTROP T  --  156* 138* 145*                 Lab 10/06/23  1029   PH, ARTERIAL 7.470*   PCO2, ARTERIAL 28.0*   PO2 ART 81.8   O2 SATURATION ART 95.7   HCO3 ART 19.9*   BASE EXCESS ART -2.5*   CARBOXYHEMOGLOBIN 0.9     Brief Urine Lab Results  (Last result in the past 365 days)        Color   Clarity   Blood   Leuk Est   Nitrite   Protein   CREAT   Urine HCG        07/08/23 0053 Yellow   Clear   Small (1+)   Negative   Negative   >=300 mg/dL (3+)                 Microbiology Results (last 10 days)       ** No results found for the last 240 hours. **            XR Chest 1 View    Result Date: 10/6/2023  Impression:   1. Stable cardiomegaly and mild pulmonary vascular congestion 2. Minimal dependent opacity at the left base favored to represent atelectasis .  3. Small right-sided pleural effusion       JUAN JOSE GILLETTE MD       Electronically Signed and Approved By: JUAN JOSE GILLETTE MD on 10/06/2023 at 7:21              Results for orders placed during the hospital encounter of 04/07/23    Duplex Venous Lower Extremity - Right CV-READ    Interpretation Summary    Acute right lower extremity deep vein thrombosis noted in the gastrocnemius.    All other right sided veins appeared normal.    This is  a new finding in comparison to study dated 1/25/2023.      Results for orders placed during the hospital encounter of 04/07/23    Duplex Venous Lower Extremity - Right CV-READ    Interpretation Summary    Acute right lower extremity deep vein thrombosis noted in the gastrocnemius.    All other right sided veins appeared normal.    This is a new finding in comparison to study dated 1/25/2023.      Results for orders placed during the hospital encounter of 10/06/23    Adult Transthoracic Echo Complete W/ Cont if Necessary Per Protocol    Interpretation Summary    Left ventricular ejection fraction appears to be 26 - 30%.  Appears to have global hypokinesis worse in the apex and anterior walls.    The left ventricular cavity is mildly dilated.    Left ventricular wall thickness is consistent with borderline concentric hypertrophy.    Left ventricular diastolic function was indeterminate.    Mildly reduced right ventricular systolic function noted.    The left atrial cavity is mildly dilated.    Patient has sclerotic aortic valve.  It does appear to open somewhat.  He could have some degree of low gradient AS.  Does not appear likely severe.      Labs Pending at Discharge: None    Time spent on Discharge including face to face service:  33 minutes    Electronically signed by Aylin Keita MD, 10/11/23, 11:03 AM EDT.    Portions of this documentation were transcribed electronically from a voice recognition software.  I confirm all data accurately represents the service(s) I performed at today's visit.         PAST MEDICAL HISTORY:  Heart disease     Hypertension     Skin cancer

## 2023-10-12 ENCOUNTER — TRANSITIONAL CARE MANAGEMENT TELEPHONE ENCOUNTER (OUTPATIENT)
Dept: CALL CENTER | Facility: HOSPITAL | Age: 68
End: 2023-10-12
Payer: MEDICARE

## 2023-10-12 LAB
QT INTERVAL: 367 MS
QTC INTERVAL: 459 MS

## 2023-10-12 NOTE — OUTREACH NOTE
Call Center TCM Note      Flowsheet Row Responses   [facility] facility patient discharged from? [place] [facility]   Does the patient have one of the following disease processes/diagnoses(primary or secondary)? Acute MI (STEMI,NSTEMI)   TCM attempt successful? No  [No VR]   Unsuccessful attempts Attempt 1            Mica Andrew RN    10/12/2023, 08:33 EDT

## 2023-10-12 NOTE — OUTREACH NOTE
Call Center TCM Note      Flowsheet Row Responses   [facility] facility patient discharged from? [facility]   Does the patient have one of the following disease processes/diagnoses(primary or secondary)? Acute MI (STEMI,NSTEMI)   TCM attempt successful? No   Unsuccessful attempts Attempt 2            Mica Andrew RN    10/12/2023, 15:24 EDT

## 2023-10-13 ENCOUNTER — TRANSITIONAL CARE MANAGEMENT TELEPHONE ENCOUNTER (OUTPATIENT)
Dept: CALL CENTER | Facility: HOSPITAL | Age: 68
End: 2023-10-13
Payer: MEDICARE

## 2023-10-13 NOTE — OUTREACH NOTE
"Call Center TCM Note      Flowsheet Row Responses   [facility] patient discharged from? [facility]   Does the patient have one of the following disease processes/diagnoses(primary or secondary)? Acute MI (STEMI,NSTEMI)   TCM attempt successful? No   Unsuccessful attempts Attempt 3  [message \"cannot accept calls at this time\"]            Mili Benitez RN    10/13/2023, 09:06 EDT        "

## 2023-10-21 ENCOUNTER — HOSPITAL ENCOUNTER (EMERGENCY)
Facility: HOSPITAL | Age: 68
Discharge: HOME | DRG: 280 | End: 2023-10-21
Attending: EMERGENCY MEDICINE
Payer: MEDICARE

## 2023-10-21 ENCOUNTER — APPOINTMENT (OUTPATIENT)
Dept: GENERAL RADIOLOGY | Facility: HOSPITAL | Age: 68
DRG: 280 | End: 2023-10-21
Payer: MEDICARE

## 2023-10-21 VITALS
HEART RATE: 102 BPM | BODY MASS INDEX: 26.31 KG/M2 | SYSTOLIC BLOOD PRESSURE: 167 MMHG | RESPIRATION RATE: 22 BRPM | TEMPERATURE: 97.7 F | HEIGHT: 67 IN | OXYGEN SATURATION: 99 % | DIASTOLIC BLOOD PRESSURE: 92 MMHG

## 2023-10-21 DIAGNOSIS — M25.511 ACUTE PAIN OF RIGHT SHOULDER: Primary | ICD-10-CM

## 2023-10-21 PROCEDURE — 99282 EMERGENCY DEPT VISIT SF MDM: CPT

## 2023-10-21 PROCEDURE — 73030 X-RAY EXAM OF SHOULDER: CPT

## 2023-10-21 PROCEDURE — 96372 THER/PROPH/DIAG INJ SC/IM: CPT

## 2023-10-21 PROCEDURE — 25010000002 DEXAMETHASONE SODIUM PHOSPHATE 10 MG/ML SOLUTION: Performed by: NURSE PRACTITIONER

## 2023-10-21 RX ORDER — DEXAMETHASONE SODIUM PHOSPHATE 10 MG/ML
10 INJECTION, SOLUTION INTRAMUSCULAR; INTRAVENOUS ONCE
Status: COMPLETED | OUTPATIENT
Start: 2023-10-21 | End: 2023-10-21

## 2023-10-21 RX ORDER — HYDROCODONE BITARTRATE AND ACETAMINOPHEN 10; 325 MG/1; MG/1
1 TABLET ORAL EVERY 6 HOURS PRN
Status: DISCONTINUED | OUTPATIENT
Start: 2023-10-21 | End: 2023-10-21

## 2023-10-21 RX ORDER — ONDANSETRON 4 MG/1
4 TABLET, ORALLY DISINTEGRATING ORAL ONCE
Status: DISCONTINUED | OUTPATIENT
Start: 2023-10-21 | End: 2023-10-21

## 2023-10-21 RX ADMIN — DEXAMETHASONE SODIUM PHOSPHATE 10 MG: 10 INJECTION INTRAMUSCULAR; INTRAVENOUS at 13:35

## 2023-10-21 NOTE — Clinical Note
[facility] EMERGENCY ROOM  [address]  [place] [address]  Phone: [phone]    Lyle Lozano was seen and treated in our emergency department on 10/21/2023.  He may return to work on 10/23/2023.         Thank you for choosing [facility].    Merissa Geronimo, APRN

## 2023-10-21 NOTE — DISCHARGE INSTRUCTIONS
Apply ice for 20mins on and 20mins off for the next few days. Then if you are still having pain, alternate ice and heat  Apply the voltaren cream as prescribed for pain in your shoulder  You received a steroid injection in the ER today. Keep an eye on your blood sugars as they may be elevated because of this.  Follow up with your doctor in 1 week if no better  Return to the ER for any worsening or new symptoms of concern

## 2023-10-21 NOTE — ED PROVIDER NOTES
Time: 12:03 PM EDT  Date of encounter:  10/21/2023  Independent Historian/Clinical History and Information was obtained by:   Patient    History is limited by: N/A    Chief Complaint: right shoulder pain      History of Present Illness:  Patient is a 68 y.o. year old male who presents to the emergency department for evaluation of right shoulder pain for 2 days. 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 He denies any known injury and has never had pain in his shoulder before now. He has not taken anything for the pain    HPI    Patient Care Team  Primary Care Provider: Heidi Villa APRN    Past Medical History:     No Known Allergies  Past Medical History:   Diagnosis Date    Abnormal kidney function     Arthritis     Bursitis     CHF (congestive heart failure)     Depression     Diabetes     Edema     Essential hypertension 09/23/2021    Forgetfulness     Gout     Head injury     Hernia cerebri     Hyperlipidemia     Hypertension     IFG (impaired fasting glucose)     Low back pain     Lumbago     `    Pain of left hip     Right shoulder pain     Sleep apnea     SOB (shortness of breath)      Past Surgical History:   Procedure Laterality Date    CYST REMOVAL Right     leg     Family History   Problem Relation Age of Onset    No Known Problems Father     Diabetes Sister     Stroke Sister        Home Medications:  Prior to Admission medications    Medication Sig Start Date End Date Taking? Authorizing Provider   allopurinol (ZYLOPRIM) 100 MG tablet Take 1 tablet by mouth Daily. 6/27/23   Anthony Herrmann MD   apixaban (ELIQUIS) 5 MG tablet tablet Take 1 tablet by mouth Every Morning. Pt is supposed to take this bid but admits to only taking it qam because it makes him cold. He did say he took it this morning 10/06/23    Provider, MD Quentin   aspirin 81 MG EC tablet Take 1 tablet by mouth Daily.    ProviderQuentin MD   atorvastatin (LIPITOR) 80 MG  tablet Take 1 tablet by mouth Every Night. 23   Anthony Herrmann MD   carvedilol (COREG) 25 MG tablet Take 1 tablet by mouth 2 (Two) Times a Day With Meals for 30 days. 10/11/23 11/10/23  Aylin Keita MD   cholecalciferol (VITAMIN D3) 25 MCG (1000 UT) tablet Take 1 tablet by mouth Daily. 22   Nehal Daniels MD   furosemide (LASIX) 40 MG tablet Take 1 tablet by mouth Daily for 30 days. 10/11/23 11/10/23  Aylin Keita MD   hydrALAZINE (APRESOLINE) 25 MG tablet Take 1 tablet by mouth 3 (Three) Times a Day. 23   Quentin Mejia MD   ipratropium-albuterol (COMBIVENT RESPIMAT)  MCG/ACT inhaler Inhale 1 puff 4 (Four) Times a Day As Needed for Wheezing. 23   Jose Nolasco DO   Jardiance 25 MG tablet tablet TAKE 1 TABLET BY MOUTH DAILY 23   Heidi Villa APRN   losartan (COZAAR) 25 MG tablet TAKE 1 TABLET BY MOUTH DAILY 23   Heidi Villa APRN   Semaglutide,0.25 or 0.5MG/DOS, (Ozempic, 0.25 or 0.5 MG/DOSE,) 2 MG/3ML solution pen-injector INJECT UNDER SKIN 0.5MG ONCE A WEEK  Patient taking differently: Inject 0.5 mg under the skin into the appropriate area as directed 1 (One) Time Per Week. On    Heidi Villa APRN   spironolactone (ALDACTONE) 25 MG tablet Take 1 tablet by mouth Daily for 30 days. 10/11/23 11/10/23  Aylin Keita MD        Social History:   Social History     Tobacco Use    Smoking status: Former     Packs/day: .25     Types: Cigarettes     Start date:      Quit date: 2012     Years since quittin.1    Smokeless tobacco: Never   Vaping Use    Vaping Use: Never used   Substance Use Topics    Alcohol use: Yes     Comment: rare    Drug use: Never         Review of Systems:  Review of Systems   Constitutional: Negative.    HENT: Negative.     Eyes: Negative.    Respiratory: Negative.     Cardiovascular: Negative.    Gastrointestinal: Negative.    Endocrine: Negative.    Genitourinary: Negative.   "  Musculoskeletal:  Positive for arthralgias.   Skin: Negative.    Allergic/Immunologic: Negative.    Neurological: Negative.    Hematological: Negative.    Psychiatric/Behavioral: Negative.          Physical Exam:  /92 (Patient Position: Sitting)   Pulse 102   Temp 97.7 °F (36.5 °C) (Oral)   Resp 22   Ht 170.2 cm (67\")   SpO2 99%   BMI 26.31 kg/m²     Physical Exam  Constitutional:       Appearance: Normal appearance.   HENT:      Head: Normocephalic and atraumatic.      Nose: Nose normal.      Mouth/Throat:      Mouth: Mucous membranes are moist.   Eyes:      Pupils: Pupils are equal, round, and reactive to light.   Cardiovascular:      Rate and Rhythm: Normal rate and regular rhythm.      Pulses: Normal pulses.   Pulmonary:      Effort: Pulmonary effort is normal.      Breath sounds: Normal breath sounds.   Abdominal:      General: Abdomen is flat. Bowel sounds are normal.      Palpations: Abdomen is soft.   Musculoskeletal:      Right shoulder: Tenderness present. Decreased range of motion.      Left shoulder: Normal.      Cervical back: Normal range of motion.   Skin:     General: Skin is warm and dry.      Capillary Refill: Capillary refill takes less than 2 seconds.   Neurological:      General: No focal deficit present.      Mental Status: He is alert and oriented to person, place, and time. Mental status is at baseline.   Psychiatric:         Mood and Affect: Mood normal.                  Procedures:  Procedures      Medical Decision Making:      Comorbidities that affect care:    Diabetes    External Notes reviewed:    None      The following orders were placed and all results were independently analyzed by me:  Orders Placed This Encounter   Procedures    XR Shoulder 2+ View Right       Medications Given in the Emergency Department:  Medications   dexAMETHasone sodium phosphate injection 10 mg (10 mg Intramuscular Given 10/21/23 2538)        ED Course:         Labs:    Lab Results (last 24 " hours)       ** No results found for the last 24 hours. **             Imaging:    XR Shoulder 2+ View Right    Result Date: 10/21/2023  PROCEDURE: XR SHOULDER 2+ VW RIGHT  COMPARISON: [facility], CR, XR SHOULDER 2+ VW RIGHT, 4/12/2023, 14:10.  INDICATIONS: generalized right shoulder pain x 4 days, NKI  FINDINGS:  There are degenerative changes involving the AC joint.  There is no acute fracture or dislocation.  There are some degenerative changes around the area of the greater tuberosity.        1. Evidence for degenerative change involving the right shoulder.      FABIEN BOONE MD       Electronically Signed and Approved By: FABIEN BOONE MD on 10/21/2023 at 13:33                Differential Diagnosis and Discussion:    Joint Pain: Differential diagnosis includes but is not limited to polyarticular arthritis, gout, tendinitis, hemarthrosis, septic arthritis, rheumatoid arthritis, bursitis, degenerative joint disease, joint effusion, autoimmune disorder, trauma, and occult neoplasm.    All X-rays impressions were independently interpreted by me.    MDM     Amount and/or Complexity of Data Reviewed  Tests in the radiology section of CPT®: reviewed             Patient Care Considerations:           Consultants/Shared Management Plan:    None    Social Determinants of Health:    Patient is independent, reliable, and has access to care.       Disposition and Care Coordination:    Discharged: The patient is suitable and stable for discharge with no need for consideration of observation or admission.    I have explained the patient´s condition, diagnoses and treatment plan based on the information available to me at this time. I have answered questions and addressed any concerns. The patient has a good  understanding of the patient´s diagnosis, condition, and treatment plan as can be expected at this point. The vital signs have been stable. The patient´s condition is stable and appropriate for discharge from  the emergency department.      The patient will pursue further outpatient evaluation with the primary care physician or other designated or consulting physician as outlined in the discharge instructions. They are agreeable to this plan of care and follow-up instructions have been explained in detail. The patient has received these instructions in written format and have expressed an understanding of the discharge instructions. The patient is aware that any significant change in condition or worsening of symptoms should prompt an immediate return to this or the closest emergency department or call to 911.  I have explained discharge medications and the need for follow up with the patient/caretakers. This was also printed in the discharge instructions. Patient was discharged with the following medications and follow up:      Medication List        New Prescriptions      Diclofenac Sodium 1 % gel gel  Commonly known as: Voltaren Arthritis Pain  Apply 4 g topically to the appropriate area as directed 4 (Four) Times a Day As Needed (Right shoulder pain).            Changed      Ozempic (0.25 or 0.5 MG/DOSE) 2 MG/3ML solution pen-injector  Generic drug: Semaglutide(0.25 or 0.5MG/DOS)  INJECT UNDER SKIN 0.5MG ONCE A WEEK  What changed: See the new instructions.               Where to Get Your Medications        These medications were sent to [id number] - [place], [place] - [address] AT [facility] - [phone] [facility] [phone] FX  [address] [place] [address]      Phone: [phone]   Diclofenac Sodium 1 % gel gel      Heidi Villa, APRN  [address] [address] [phone]    In 1 week  If no better       Final diagnoses:   Acute pain of right shoulder        ED Disposition       ED Disposition   Discharge    Condition   Stable    Comment   --               This medical record created using voice recognition software.             Grazyna  Merissa CORONADO, APRN  10/21/23 1801

## 2023-10-22 ENCOUNTER — HOSPITAL ENCOUNTER (INPATIENT)
Facility: HOSPITAL | Age: 68
LOS: 3 days | Discharge: HOME | DRG: 280 | End: 2023-10-27
Attending: EMERGENCY MEDICINE | Admitting: STUDENT IN AN ORGANIZED HEALTH CARE EDUCATION/TRAINING PROGRAM
Payer: MEDICARE

## 2023-10-22 ENCOUNTER — APPOINTMENT (OUTPATIENT)
Dept: CT IMAGING | Facility: HOSPITAL | Age: 68
DRG: 280 | End: 2023-10-22
Payer: MEDICARE

## 2023-10-22 ENCOUNTER — APPOINTMENT (OUTPATIENT)
Dept: GENERAL RADIOLOGY | Facility: HOSPITAL | Age: 68
DRG: 280 | End: 2023-10-22
Payer: MEDICARE

## 2023-10-22 DIAGNOSIS — I10 SEVERE UNCONTROLLED HYPERTENSION: ICD-10-CM

## 2023-10-22 DIAGNOSIS — I50.9 ACUTE ON CHRONIC CONGESTIVE HEART FAILURE, UNSPECIFIED HEART FAILURE TYPE: ICD-10-CM

## 2023-10-22 DIAGNOSIS — R07.9 CHEST PAIN, UNSPECIFIED TYPE: Primary | ICD-10-CM

## 2023-10-22 LAB
ALBUMIN SERPL-MCNC: 3.5 G/DL (ref 3.5–5.2)
ALBUMIN/GLOB SERPL: 0.9 G/DL
ALP SERPL-CCNC: 116 U/L (ref 39–117)
ALT SERPL W P-5'-P-CCNC: 27 U/L (ref 1–41)
ANION GAP SERPL CALCULATED.3IONS-SCNC: 11.6 MMOL/L (ref 5–15)
AST SERPL-CCNC: 25 U/L (ref 1–40)
BASOPHILS # BLD AUTO: 0.01 10*3/MM3 (ref 0–0.2)
BASOPHILS NFR BLD AUTO: 0.1 % (ref 0–1.5)
BILIRUB SERPL-MCNC: 0.4 MG/DL (ref 0–1.2)
BUN SERPL-MCNC: 32 MG/DL (ref 8–23)
BUN/CREAT SERPL: 16.8 (ref 7–25)
CALCIUM SPEC-SCNC: 9.2 MG/DL (ref 8.6–10.5)
CHLORIDE SERPL-SCNC: 103 MMOL/L (ref 98–107)
CO2 SERPL-SCNC: 21.4 MMOL/L (ref 22–29)
CREAT SERPL-MCNC: 1.9 MG/DL (ref 0.76–1.27)
DEPRECATED RDW RBC AUTO: 51.7 FL (ref 37–54)
EGFRCR SERPLBLD CKD-EPI 2021: 38.4 ML/MIN/1.73
EOSINOPHIL # BLD AUTO: 0 10*3/MM3 (ref 0–0.4)
EOSINOPHIL NFR BLD AUTO: 0 % (ref 0.3–6.2)
ERYTHROCYTE [DISTWIDTH] IN BLOOD BY AUTOMATED COUNT: 16.9 % (ref 12.3–15.4)
GEN 5 2HR TROPONIN T REFLEX: 136 NG/L
GLOBULIN UR ELPH-MCNC: 3.8 GM/DL
GLUCOSE BLDC GLUCOMTR-MCNC: 199 MG/DL (ref 70–99)
GLUCOSE BLDC GLUCOMTR-MCNC: 301 MG/DL (ref 70–99)
GLUCOSE SERPL-MCNC: 265 MG/DL (ref 65–99)
HCT VFR BLD AUTO: 35.9 % (ref 37.5–51)
HGB BLD-MCNC: 11.8 G/DL (ref 13–17.7)
HOLD SPECIMEN: NORMAL
HOLD SPECIMEN: NORMAL
IMM GRANULOCYTES # BLD AUTO: 0.02 10*3/MM3 (ref 0–0.05)
IMM GRANULOCYTES NFR BLD AUTO: 0.2 % (ref 0–0.5)
LIPASE SERPL-CCNC: 15 U/L (ref 13–60)
LYMPHOCYTES # BLD AUTO: 1.57 10*3/MM3 (ref 0.7–3.1)
LYMPHOCYTES NFR BLD AUTO: 18.6 % (ref 19.6–45.3)
MAGNESIUM SERPL-MCNC: 2.3 MG/DL (ref 1.6–2.4)
MCH RBC QN AUTO: 27.4 PG (ref 26.6–33)
MCHC RBC AUTO-ENTMCNC: 32.9 G/DL (ref 31.5–35.7)
MCV RBC AUTO: 83.3 FL (ref 79–97)
MONOCYTES # BLD AUTO: 0.36 10*3/MM3 (ref 0.1–0.9)
MONOCYTES NFR BLD AUTO: 4.3 % (ref 5–12)
NEUTROPHILS NFR BLD AUTO: 6.47 10*3/MM3 (ref 1.7–7)
NEUTROPHILS NFR BLD AUTO: 76.8 % (ref 42.7–76)
NRBC BLD AUTO-RTO: 0 /100 WBC (ref 0–0.2)
NT-PROBNP SERPL-MCNC: ABNORMAL PG/ML (ref 0–900)
PLATELET # BLD AUTO: 320 10*3/MM3 (ref 140–450)
PMV BLD AUTO: 9.3 FL (ref 6–12)
POTASSIUM SERPL-SCNC: 4 MMOL/L (ref 3.5–5.2)
PROT SERPL-MCNC: 7.3 G/DL (ref 6–8.5)
QT INTERVAL: 378 MS
QT INTERVAL: 414 MS
QTC INTERVAL: 479 MS
QTC INTERVAL: 502 MS
RBC # BLD AUTO: 4.31 10*6/MM3 (ref 4.14–5.8)
SODIUM SERPL-SCNC: 136 MMOL/L (ref 136–145)
TROPONIN T DELTA: -23 NG/L
TROPONIN T SERPL HS-MCNC: 145 NG/L
TROPONIN T SERPL HS-MCNC: 159 NG/L
WBC NRBC COR # BLD: 8.43 10*3/MM3 (ref 3.4–10.8)
WHOLE BLOOD HOLD COAG: NORMAL
WHOLE BLOOD HOLD SPECIMEN: NORMAL

## 2023-10-22 PROCEDURE — 93010 ELECTROCARDIOGRAM REPORT: CPT | Performed by: INTERNAL MEDICINE

## 2023-10-22 PROCEDURE — 80053 COMPREHEN METABOLIC PANEL: CPT

## 2023-10-22 PROCEDURE — 82948 REAGENT STRIP/BLOOD GLUCOSE: CPT

## 2023-10-22 PROCEDURE — G0378 HOSPITAL OBSERVATION PER HR: HCPCS

## 2023-10-22 PROCEDURE — 83735 ASSAY OF MAGNESIUM: CPT

## 2023-10-22 PROCEDURE — 25010000002 FUROSEMIDE PER 20 MG: Performed by: STUDENT IN AN ORGANIZED HEALTH CARE EDUCATION/TRAINING PROGRAM

## 2023-10-22 PROCEDURE — 25010000002 LABETALOL 5 MG/ML SOLUTION: Performed by: EMERGENCY MEDICINE

## 2023-10-22 PROCEDURE — 83690 ASSAY OF LIPASE: CPT

## 2023-10-22 PROCEDURE — 36415 COLL VENOUS BLD VENIPUNCTURE: CPT | Performed by: STUDENT IN AN ORGANIZED HEALTH CARE EDUCATION/TRAINING PROGRAM

## 2023-10-22 PROCEDURE — 93005 ELECTROCARDIOGRAM TRACING: CPT

## 2023-10-22 PROCEDURE — 85025 COMPLETE CBC W/AUTO DIFF WBC: CPT

## 2023-10-22 PROCEDURE — 84484 ASSAY OF TROPONIN QUANT: CPT | Performed by: STUDENT IN AN ORGANIZED HEALTH CARE EDUCATION/TRAINING PROGRAM

## 2023-10-22 PROCEDURE — 93005 ELECTROCARDIOGRAM TRACING: CPT | Performed by: EMERGENCY MEDICINE

## 2023-10-22 PROCEDURE — 93005 ELECTROCARDIOGRAM TRACING: CPT | Performed by: STUDENT IN AN ORGANIZED HEALTH CARE EDUCATION/TRAINING PROGRAM

## 2023-10-22 PROCEDURE — 84484 ASSAY OF TROPONIN QUANT: CPT | Performed by: EMERGENCY MEDICINE

## 2023-10-22 PROCEDURE — 63710000001 INSULIN LISPRO (HUMAN) PER 5 UNITS: Performed by: STUDENT IN AN ORGANIZED HEALTH CARE EDUCATION/TRAINING PROGRAM

## 2023-10-22 PROCEDURE — 25010000002 FUROSEMIDE PER 20 MG: Performed by: EMERGENCY MEDICINE

## 2023-10-22 PROCEDURE — 83880 ASSAY OF NATRIURETIC PEPTIDE: CPT

## 2023-10-22 PROCEDURE — 71250 CT THORAX DX C-: CPT

## 2023-10-22 PROCEDURE — 99223 1ST HOSP IP/OBS HIGH 75: CPT | Performed by: STUDENT IN AN ORGANIZED HEALTH CARE EDUCATION/TRAINING PROGRAM

## 2023-10-22 PROCEDURE — 84484 ASSAY OF TROPONIN QUANT: CPT

## 2023-10-22 PROCEDURE — 99285 EMERGENCY DEPT VISIT HI MDM: CPT

## 2023-10-22 PROCEDURE — 71045 X-RAY EXAM CHEST 1 VIEW: CPT

## 2023-10-22 RX ORDER — ATORVASTATIN CALCIUM 40 MG/1
80 TABLET, FILM COATED ORAL NIGHTLY
Status: DISCONTINUED | OUTPATIENT
Start: 2023-10-22 | End: 2023-10-27 | Stop reason: HOSPADM

## 2023-10-22 RX ORDER — SPIRONOLACTONE 25 MG/1
25 TABLET ORAL DAILY
Status: DISCONTINUED | OUTPATIENT
Start: 2023-10-23 | End: 2023-10-25

## 2023-10-22 RX ORDER — ACETAMINOPHEN 160 MG/5ML
650 SOLUTION ORAL EVERY 4 HOURS PRN
Status: DISCONTINUED | OUTPATIENT
Start: 2023-10-22 | End: 2023-10-23

## 2023-10-22 RX ORDER — NITROGLYCERIN 0.4 MG/1
0.4 TABLET SUBLINGUAL
Status: DISCONTINUED | OUTPATIENT
Start: 2023-10-22 | End: 2023-10-22

## 2023-10-22 RX ORDER — BISACODYL 10 MG
10 SUPPOSITORY, RECTAL RECTAL DAILY PRN
Status: DISCONTINUED | OUTPATIENT
Start: 2023-10-22 | End: 2023-10-23

## 2023-10-22 RX ORDER — POLYETHYLENE GLYCOL 3350 17 G/17G
17 POWDER, FOR SOLUTION ORAL DAILY PRN
Status: DISCONTINUED | OUTPATIENT
Start: 2023-10-22 | End: 2023-10-23

## 2023-10-22 RX ORDER — CARVEDILOL 12.5 MG/1
25 TABLET ORAL 2 TIMES DAILY WITH MEALS
Status: DISCONTINUED | OUTPATIENT
Start: 2023-10-22 | End: 2023-10-27 | Stop reason: HOSPADM

## 2023-10-22 RX ORDER — NICOTINE POLACRILEX 4 MG
15 LOZENGE BUCCAL
Status: DISCONTINUED | OUTPATIENT
Start: 2023-10-22 | End: 2023-10-27 | Stop reason: HOSPADM

## 2023-10-22 RX ORDER — ASPIRIN 81 MG/1
81 TABLET ORAL DAILY
Status: DISCONTINUED | OUTPATIENT
Start: 2023-10-23 | End: 2023-10-27 | Stop reason: HOSPADM

## 2023-10-22 RX ORDER — ASPIRIN 81 MG/1
324 TABLET, CHEWABLE ORAL ONCE
Status: COMPLETED | OUTPATIENT
Start: 2023-10-22 | End: 2023-10-22

## 2023-10-22 RX ORDER — ACETAMINOPHEN 650 MG/1
650 SUPPOSITORY RECTAL EVERY 4 HOURS PRN
Status: DISCONTINUED | OUTPATIENT
Start: 2023-10-22 | End: 2023-10-23

## 2023-10-22 RX ORDER — SODIUM CHLORIDE 0.9 % (FLUSH) 0.9 %
10 SYRINGE (ML) INJECTION AS NEEDED
Status: DISCONTINUED | OUTPATIENT
Start: 2023-10-22 | End: 2023-10-27 | Stop reason: HOSPADM

## 2023-10-22 RX ORDER — INSULIN LISPRO 100 [IU]/ML
2-7 INJECTION, SOLUTION INTRAVENOUS; SUBCUTANEOUS
Status: DISCONTINUED | OUTPATIENT
Start: 2023-10-22 | End: 2023-10-23 | Stop reason: ALTCHOICE

## 2023-10-22 RX ORDER — IBUPROFEN 600 MG/1
1 TABLET ORAL
Status: DISCONTINUED | OUTPATIENT
Start: 2023-10-22 | End: 2023-10-27 | Stop reason: HOSPADM

## 2023-10-22 RX ORDER — ACETAMINOPHEN 325 MG/1
650 TABLET ORAL EVERY 4 HOURS PRN
Status: DISCONTINUED | OUTPATIENT
Start: 2023-10-22 | End: 2023-10-23

## 2023-10-22 RX ORDER — FUROSEMIDE 10 MG/ML
80 INJECTION INTRAMUSCULAR; INTRAVENOUS ONCE
Status: COMPLETED | OUTPATIENT
Start: 2023-10-22 | End: 2023-10-22

## 2023-10-22 RX ORDER — SODIUM CHLORIDE 9 MG/ML
40 INJECTION, SOLUTION INTRAVENOUS AS NEEDED
Status: DISCONTINUED | OUTPATIENT
Start: 2023-10-22 | End: 2023-10-27 | Stop reason: HOSPADM

## 2023-10-22 RX ORDER — IPRATROPIUM BROMIDE AND ALBUTEROL SULFATE 2.5; .5 MG/3ML; MG/3ML
3 SOLUTION RESPIRATORY (INHALATION) EVERY 4 HOURS PRN
Status: DISCONTINUED | OUTPATIENT
Start: 2023-10-22 | End: 2023-10-27 | Stop reason: HOSPADM

## 2023-10-22 RX ORDER — HYDRALAZINE HYDROCHLORIDE 25 MG/1
25 TABLET, FILM COATED ORAL 3 TIMES DAILY
Status: DISCONTINUED | OUTPATIENT
Start: 2023-10-22 | End: 2023-10-23

## 2023-10-22 RX ORDER — AMOXICILLIN 250 MG
2 CAPSULE ORAL 2 TIMES DAILY
Status: DISCONTINUED | OUTPATIENT
Start: 2023-10-22 | End: 2023-10-23

## 2023-10-22 RX ORDER — LABETALOL HYDROCHLORIDE 5 MG/ML
10 INJECTION, SOLUTION INTRAVENOUS ONCE
Status: COMPLETED | OUTPATIENT
Start: 2023-10-22 | End: 2023-10-22

## 2023-10-22 RX ORDER — BISACODYL 5 MG/1
5 TABLET, DELAYED RELEASE ORAL DAILY PRN
Status: DISCONTINUED | OUTPATIENT
Start: 2023-10-22 | End: 2023-10-23

## 2023-10-22 RX ORDER — LABETALOL HYDROCHLORIDE 5 MG/ML
20 INJECTION, SOLUTION INTRAVENOUS ONCE
Status: COMPLETED | OUTPATIENT
Start: 2023-10-22 | End: 2023-10-22

## 2023-10-22 RX ORDER — DEXTROSE MONOHYDRATE 25 G/50ML
25 INJECTION, SOLUTION INTRAVENOUS
Status: DISCONTINUED | OUTPATIENT
Start: 2023-10-22 | End: 2023-10-27 | Stop reason: HOSPADM

## 2023-10-22 RX ORDER — MELATONIN
1000 DAILY
Status: DISCONTINUED | OUTPATIENT
Start: 2023-10-22 | End: 2023-10-27 | Stop reason: HOSPADM

## 2023-10-22 RX ORDER — SODIUM CHLORIDE 0.9 % (FLUSH) 0.9 %
10 SYRINGE (ML) INJECTION EVERY 12 HOURS SCHEDULED
Status: DISCONTINUED | OUTPATIENT
Start: 2023-10-22 | End: 2023-10-27 | Stop reason: HOSPADM

## 2023-10-22 RX ADMIN — HYDRALAZINE HYDROCHLORIDE 25 MG: 25 TABLET, FILM COATED ORAL at 20:05

## 2023-10-22 RX ADMIN — LABETALOL HYDROCHLORIDE 20 MG: 5 INJECTION, SOLUTION INTRAVENOUS at 15:06

## 2023-10-22 RX ADMIN — APIXABAN 5 MG: 5 TABLET, FILM COATED ORAL at 21:43

## 2023-10-22 RX ADMIN — LABETALOL HYDROCHLORIDE 10 MG: 5 INJECTION, SOLUTION INTRAVENOUS at 13:34

## 2023-10-22 RX ADMIN — ATORVASTATIN CALCIUM 80 MG: 40 TABLET, FILM COATED ORAL at 20:05

## 2023-10-22 RX ADMIN — INSULIN LISPRO 5 UNITS: 100 INJECTION, SOLUTION INTRAVENOUS; SUBCUTANEOUS at 21:43

## 2023-10-22 RX ADMIN — Medication 1000 UNITS: at 18:00

## 2023-10-22 RX ADMIN — FUROSEMIDE 80 MG: 10 INJECTION, SOLUTION INTRAMUSCULAR; INTRAVENOUS at 13:14

## 2023-10-22 RX ADMIN — ACETAMINOPHEN 650 MG: 325 TABLET ORAL at 20:05

## 2023-10-22 RX ADMIN — HYDRALAZINE HYDROCHLORIDE 25 MG: 25 TABLET, FILM COATED ORAL at 18:00

## 2023-10-22 RX ADMIN — CARVEDILOL 25 MG: 12.5 TABLET, FILM COATED ORAL at 18:00

## 2023-10-22 RX ADMIN — ASPIRIN 81 MG CHEWABLE TABLET 324 MG: 81 TABLET CHEWABLE at 13:13

## 2023-10-22 RX ADMIN — FUROSEMIDE 80 MG: 10 INJECTION, SOLUTION INTRAMUSCULAR; INTRAVENOUS at 19:56

## 2023-10-22 RX ADMIN — Medication 10 ML: at 23:17

## 2023-10-22 RX ADMIN — INSULIN LISPRO 2 UNITS: 100 INJECTION, SOLUTION INTRAVENOUS; SUBCUTANEOUS at 18:05

## 2023-10-22 NOTE — ED PROVIDER NOTES
Time: 1:05 PM EDT  Date of encounter:  10/22/2023  Independent Historian/Clinical History and Information was obtained by:   Patient    History is limited by: N/A    Chief Complaint: Chest pain, shortness of breath        History of Present Illness:  Patient is a 68 y.o. year old male with history of congestive heart failure with EF of 26 to 30% who presents to the emergency department for evaluation of right shoulder pain that is now spreading across anterior chest associated with shortness of breath.    He states just with walking 10-15 steps he gets very short of breath and has to sit down.    He states it feels similar to discomfort he has felt before during acute CHF exacerbation.    He reports worsening foot and leg swelling bilaterally and states he was given prescription for Lasix when he left the hospital last month but it ran out and the pharmacy would not fill it anymore.    He has a mild dry cough but no sputum production and no fevers or chills or signs of illness or sick contacts.    He is noted to be hypertensive at 174/103 on arrival.    HPI    Patient Care Team  Primary Care Provider: Heidi Villa APRN    Past Medical History:     No Known Allergies  Past Medical History:   Diagnosis Date    Abnormal kidney function     Arthritis     Bursitis     CHF (congestive heart failure)     Depression     Diabetes     Edema     Essential hypertension 09/23/2021    Forgetfulness     Gout     Head injury     Hernia cerebri     Hyperlipidemia     Hypertension     IFG (impaired fasting glucose)     Low back pain     Lumbago     `    Pain of left hip     Right shoulder pain     Sleep apnea     SOB (shortness of breath)      Past Surgical History:   Procedure Laterality Date    CYST REMOVAL Right     leg     Family History   Problem Relation Age of Onset    No Known Problems Father     Diabetes Sister     Stroke Sister        Home Medications:  Prior to Admission medications    Medication Sig Start Date End Date  Taking? Authorizing Provider   allopurinol (ZYLOPRIM) 100 MG tablet Take 1 tablet by mouth Daily. 6/27/23   Anthony Herrmann MD   apixaban (ELIQUIS) 5 MG tablet tablet Take 1 tablet by mouth Every Morning. Pt is supposed to take this bid but admits to only taking it qam because it makes him cold. He did say he took it this morning 10/06/23    Quentin Mejia MD   aspirin 81 MG EC tablet Take 1 tablet by mouth Daily.    Quentin Mejia MD   atorvastatin (LIPITOR) 80 MG tablet Take 1 tablet by mouth Every Night. 6/27/23   Anthony Herrmann MD   carvedilol (COREG) 25 MG tablet Take 1 tablet by mouth 2 (Two) Times a Day With Meals for 30 days. 10/11/23 11/10/23  Aylin Keita MD   cholecalciferol (VITAMIN D3) 25 MCG (1000 UT) tablet Take 1 tablet by mouth Daily. 8/26/22   Nehla Daniels MD   Diclofenac Sodium (Voltaren Arthritis Pain) 1 % gel gel Apply 4 g topically to the appropriate area as directed 4 (Four) Times a Day As Needed (Right shoulder pain). 10/21/23   Merissa Geronimo APRN   furosemide (LASIX) 40 MG tablet Take 1 tablet by mouth Daily for 30 days. 10/11/23 11/10/23  Aylin Keita MD   hydrALAZINE (APRESOLINE) 25 MG tablet Take 1 tablet by mouth 3 (Three) Times a Day. 7/18/23   Quetnin Mejia MD   ipratropium-albuterol (COMBIVENT RESPIMAT)  MCG/ACT inhaler Inhale 1 puff 4 (Four) Times a Day As Needed for Wheezing. 6/21/23   Jose Nolasco,    Jardiance 25 MG tablet tablet TAKE 1 TABLET BY MOUTH DAILY 9/29/23   Heidi Villa APRN   losartan (COZAAR) 25 MG tablet TAKE 1 TABLET BY MOUTH DAILY 9/29/23   Heidi Villa APRN   Semaglutide,0.25 or 0.5MG/DOS, (Ozempic, 0.25 or 0.5 MG/DOSE,) 2 MG/3ML solution pen-injector INJECT UNDER SKIN 0.5MG ONCE A WEEK  Patient taking differently: Inject 0.5 mg under the skin into the appropriate area as directed 1 (One) Time Per Week. On Tuesdays 9/29/23   Heidi Villa APRN   spironolactone (ALDACTONE) 25 MG tablet Take 1  "tablet by mouth Daily for 30 days. 10/11/23 11/10/23  Aylin Keita MD        Social History:   Social History     Tobacco Use    Smoking status: Former     Packs/day: .25     Types: Cigarettes     Start date:      Quit date: 2012     Years since quittin.1    Smokeless tobacco: Never   Vaping Use    Vaping Use: Never used   Substance Use Topics    Alcohol use: Yes     Comment: rare    Drug use: Never         Review of Systems:  Review of Systems   I performed a 10 point review of systems which was all negative, except for the positives found in the HPI above.  Physical Exam:  /88 (BP Location: Left arm, Patient Position: Sitting)   Pulse 84   Temp 98.6 °F (37 °C) (Oral)   Resp 18   Ht 170.2 cm (67\")   Wt 78.6 kg (173 lb 4.5 oz)   SpO2 100%   BMI 27.14 kg/m²     Physical Exam   General: Awake alert and in mild respiratory distress    HEENT: Head normocephalic atraumatic, eyes PERRLA EOMI, nose normal, oropharynx normal.    Neck: Supple full range of motion, no meningismus, no lymphadenopathy; bilateral JVD noted on exam    Heart: Regular rate and rhythm, no murmurs or rubs, 2+ radial pulses bilaterally    Lungs: Mild respiratory distress and tachypnea, crackles noted at left lung base posteriorly, no wheezes      Abdomen: Soft, nontender, nondistended, no rebound or guarding    Skin: Warm, dry, no rash    Musculoskeletal: Normal range of motion, bilateral pitting 1+ lower extremity edema but no calf tenderness    Neurologic: Oriented x3, no motor deficits no sensory deficits    Psychiatric: Mood appears stable, no psychosis          Procedures:  Procedures      Medical Decision Making:      Comorbidities that affect care:    Congestive Heart Failure, Hypertension    External Notes reviewed:    Hospital Discharge Summary: I reviewed his most recent hospital discharge summary from this past month where he was admitted for congestive heart failure and had echo done with low EF of 26 to " 30%.      The following orders were placed and all results were independently analyzed by me:  Orders Placed This Encounter   Procedures    XR Chest 1 View    CT Chest Without Contrast Diagnostic    [place] Draw    High Sensitivity Troponin T    Comprehensive Metabolic Panel    Lipase    BNP    Magnesium    CBC Auto Differential    High Sensitivity Troponin T 2Hr    Basic Metabolic Panel    Magnesium    Phosphorus    High Sensitivity Troponin T    Diet: Cardiac Diets, Fluid Restriction (240 mL/tray) Diets; Healthy Heart (2-3 Na+); 2000 mL/day; Texture: Regular Texture (IDDSI 7); Fluid Consistency: Thin (IDDSI 0)    Undress & Gown    Continuous Pulse Oximetry    Vital Signs    Intake & Output    Weigh Patient    Oral Care    Saline Lock & Maintain IV Access    Activity - Ad Yary    Notify Provider (With Default Parameters)    Daily Weights    Strict Intake & Output    Code Status and Medical Interventions:    Inpatient Cardiology Consult    Oxygen Therapy- Nasal Cannula; Titrate 1-6 LPM Per SpO2; 90 - 95%    Pulse Oximetry,  Spot    Incentive Spirometry    Oxygen Therapy- Nasal Cannula; Titrate 1-6 LPM Per SpO2; 90 - 95%    POC Glucose 4x Daily Before Meals & at Bedtime    POC Glucose 4x Daily Before Meals & at Bedtime    POC Glucose Once    ECG 12 Lead ED Triage Standing Order; Chest Pain    ECG 12 Lead ED Triage Standing Order; Chest Pain    ECG 12 Lead Chest Pain    Insert Peripheral IV    Insert Peripheral IV    Initiate Observation Status    CBC & Differential    Green Top (Gel)    Lavender Top    Gold Top - SST    Light Blue Top    CBC & Differential       Medications Given in the Emergency Department:  Medications   sodium chloride 0.9 % flush 10 mL (has no administration in time range)   apixaban (ELIQUIS) tablet 5 mg (has no administration in time range)   aspirin EC tablet 81 mg (has no administration in time range)   atorvastatin (LIPITOR) tablet 80 mg (has no administration in time range)   carvedilol  (COREG) tablet 25 mg (25 mg Oral Given 10/22/23 1800)   cholecalciferol (VITAMIN D3) tablet 1,000 Units (1,000 Units Oral Given 10/22/23 1800)   hydrALAZINE (APRESOLINE) tablet 25 mg (25 mg Oral Given 10/22/23 1800)   ipratropium-albuterol (DUO-NEB) nebulizer solution 3 mL (has no administration in time range)   spironolactone (ALDACTONE) tablet 25 mg (has no administration in time range)   sodium chloride 0.9 % flush 10 mL (has no administration in time range)   sodium chloride 0.9 % flush 10 mL (has no administration in time range)   sodium chloride 0.9 % infusion 40 mL (has no administration in time range)   sennosides-docusate (PERICOLACE) 8.6-50 MG per tablet 2 tablet (has no administration in time range)     And   polyethylene glycol (MIRALAX) packet 17 g (has no administration in time range)     And   bisacodyl (DULCOLAX) EC tablet 5 mg (has no administration in time range)     And   bisacodyl (DULCOLAX) suppository 10 mg (has no administration in time range)   acetaminophen (TYLENOL) tablet 650 mg (has no administration in time range)     Or   acetaminophen (TYLENOL) 160 MG/5ML oral solution 650 mg (has no administration in time range)     Or   acetaminophen (TYLENOL) suppository 650 mg (has no administration in time range)   dextrose (GLUTOSE) oral gel 15 g (has no administration in time range)   dextrose (D50W) (25 g/50 mL) IV injection 25 g (has no administration in time range)   glucagon (GLUCAGEN) injection 1 mg (has no administration in time range)   Insulin Lispro (humaLOG) injection 2-7 Units (2 Units Subcutaneous Given 10/22/23 1805)   furosemide (LASIX) injection 80 mg (has no administration in time range)   Influenza Vac High-Dose Quad (FLUZONE HIGH DOSE) injection 0.7 mL (has no administration in time range)   aspirin chewable tablet 324 mg (324 mg Oral Given 10/22/23 1313)   furosemide (LASIX) injection 80 mg (80 mg Intravenous Given 10/22/23 1314)   labetalol (NORMODYNE,TRANDATE) injection 10  mg (10 mg Intravenous Given 10/22/23 1334)   labetalol (NORMODYNE,TRANDATE) injection 20 mg (20 mg Intravenous Given 10/22/23 1506)        ED Course:    ED Course as of 10/22/23 1852   Sun Oct 22, 2023   1305 EKG: I interpreted his twelve-lead EKG is normal sinus rhythm at 96 bpm, normal P waves, QRS showing LVH with secondary repull abnormality, no ST elevations but there are some ST segment depressions and T wave abnormalities that could be due to LVH with strain pattern versus ischemia.  Borderline prolonged QTc interval 479 ms.    No acute or interval change from old EKG 2 weeks ago. [VS]      ED Course User Index  [VS] Pete Cruz MD       Labs:    Lab Results (last 24 hours)       Procedure Component Value Units Date/Time    High Sensitivity Troponin T [[id number]]  (Abnormal) Collected: 10/22/23 1210    Specimen: Blood Updated: 10/22/23 1247     HS Troponin T 159 ng/L     Narrative:      High Sensitive Troponin T Reference Range:  <10.0 ng/L- Negative Female for AMI  <15.0 ng/L- Negative Male for AMI  >=10 - Abnormal Female indicating possible myocardial injury.  >=15 - Abnormal Male indicating possible myocardial injury.   Clinicians would have to utilize clinical acumen, EKG, Troponin, and serial changes to determine if it is an Acute Myocardial Infarction or myocardial injury due to an underlying chronic condition.         CBC & Differential [[id number]]  (Abnormal) Collected: 10/22/23 1210    Specimen: Blood Updated: 10/22/23 1218    Narrative:      The following orders were created for panel order CBC & Differential.  Procedure                               Abnormality         Status                     ---------                               -----------         ------                     CBC Auto Differential[[id number]]        Abnormal            Final result                 Please view results for these tests on the individual orders.    Comprehensive Metabolic Panel [[id number]]  (Abnormal)  Collected: 10/22/23 1210    Specimen: Blood Updated: 10/22/23 1236     Glucose 265 mg/dL      BUN 32 mg/dL      Creatinine 1.90 mg/dL      Sodium 136 mmol/L      Potassium 4.0 mmol/L      Chloride 103 mmol/L      CO2 21.4 mmol/L      Calcium 9.2 mg/dL      Total Protein 7.3 g/dL      Albumin 3.5 g/dL      ALT (SGPT) 27 U/L      AST (SGOT) 25 U/L      Alkaline Phosphatase 116 U/L      Total Bilirubin 0.4 mg/dL      Globulin 3.8 gm/dL      A/G Ratio 0.9 g/dL      BUN/Creatinine Ratio 16.8     Anion Gap 11.6 mmol/L      eGFR 38.4 mL/min/1.73     Narrative:      GFR Normal >60  Chronic Kidney Disease <60  Kidney Failure <15      Lipase [[id number]]  (Normal) Collected: 10/22/23 1210    Specimen: Blood Updated: 10/22/23 1236     Lipase 15 U/L     BNP [[id number]]  (Abnormal) Collected: 10/22/23 1210    Specimen: Blood Updated: 10/22/23 1234     proBNP 27,133.0 pg/mL     Narrative:      This assay is used as an aid in the diagnosis of individuals suspected of having heart failure. It can be used as an aid in the diagnosis of acute decompensated heart failure (ADHF) in patients presenting with signs and symptoms of ADHF to the emergency department (ED). In addition, NT-proBNP of <300 pg/mL indicates ADHF is not likely.    Age Range Result Interpretation  NT-proBNP Concentration (pg/mL:      <50             Positive            >450                   Gray                 300-450                    Negative             <300    50-75           Positive            >900                  Gray                300-900                  Negative            <300      >75             Positive            >1800                  Gray                300-1800                  Negative            <300    Magnesium [[id number]]  (Normal) Collected: 10/22/23 1210    Specimen: Blood Updated: 10/22/23 1236     Magnesium 2.3 mg/dL     CBC Auto Differential [[id number]]  (Abnormal) Collected: 10/22/23 1210    Specimen: Blood Updated: 10/22/23 1218      WBC 8.43 10*3/mm3      RBC 4.31 10*6/mm3      Hemoglobin 11.8 g/dL      Hematocrit 35.9 %      MCV 83.3 fL      MCH 27.4 pg      MCHC 32.9 g/dL      RDW 16.9 %      RDW-SD 51.7 fl      MPV 9.3 fL      Platelets 320 10*3/mm3      Neutrophil % 76.8 %      Lymphocyte % 18.6 %      Monocyte % 4.3 %      Eosinophil % 0.0 %      Basophil % 0.1 %      Immature Grans % 0.2 %      Neutrophils, Absolute 6.47 10*3/mm3      Lymphocytes, Absolute 1.57 10*3/mm3      Monocytes, Absolute 0.36 10*3/mm3      Eosinophils, Absolute 0.00 10*3/mm3      Basophils, Absolute 0.01 10*3/mm3      Immature Grans, Absolute 0.02 10*3/mm3      nRBC 0.0 /100 WBC     High Sensitivity Troponin T 2Hr [[id number]]  (Abnormal) Collected: 10/22/23 1407    Specimen: Blood Updated: 10/22/23 1441     HS Troponin T 136 ng/L      Troponin T Delta -23 ng/L     Narrative:      High Sensitive Troponin T Reference Range:  <10.0 ng/L- Negative Female for AMI  <15.0 ng/L- Negative Male for AMI  >=10 - Abnormal Female indicating possible myocardial injury.  >=15 - Abnormal Male indicating possible myocardial injury.   Clinicians would have to utilize clinical acumen, EKG, Troponin, and serial changes to determine if it is an Acute Myocardial Infarction or myocardial injury due to an underlying chronic condition.         POC Glucose Once [[id number]]  (Abnormal) Collected: 10/22/23 1802    Specimen: Blood Updated: 10/22/23 1804     Glucose 199 mg/dL      Comment: Serial Number: [id number]:  [id number]                Imaging:    CT Chest Without Contrast Diagnostic    Result Date: 10/22/2023  PROCEDURE: CT CHEST WO CONTRAST DIAGNOSTIC  COMPARISON: [facility], CR, XR CHEST 1 VW, 10/22/2023, 12:18.  [facility], CT, CT CHEST W CONTRAST DIAGNOSTIC, 5/11/2023, 17:01.  INDICATIONS: Evaluate left lung base crackles and signs of acute CHF question pulmonary edema  TECHNIQUE: CT images were created without the administration of contrast  material.   PROTOCOL:   Standard imaging protocol performed    RADIATION:   DLP: 587mGy*cm   Automated exposure control was utilized to minimize radiation dose.  FINDINGS:  Pleural effusions have resolved.  There is minimal residual ground-glass opacity in the left lower lobe near the diaphragm lungs otherwise are clear.  Bronchial wall thickening has improved.  No pneumothorax or pleural effusion.  There is stable cardiomegaly.  Vascular calcifications in the thoracic aorta and coronary arteries are present.  The thyroid gland is normal in size.  No endobronchial lesions are identified.  Limited images of the upper abdomen demonstrate no acute or significant abnormality on exam.  No acute bony abnormalities or aggressive appearing focal osseous lesions..       1. Minimal ground-glass opacity in the lung base near the diaphragm may represent atelectasis or residual or recurrent infectious or inflammatory process, the overall CT appearance of the chest has improved since 5/11/2023.  No airspace consolidations or suspicious pulmonary nodules. 2. Stable cardiomegaly with coronary artery calcifications.      JABARI RABAGO DO       Electronically Signed and Approved By: JABARI RABAGO DO on 10/22/2023 at 15:06             XR Chest 1 View    Result Date: 10/22/2023  PROCEDURE: XR CHEST 1 VW  COMPARISON: [facility], CR, XR CHEST 1 VW, 7/24/2023, 11:59.  [facility], CR, XR CHEST 1 VW, 10/06/2023, 6:56.  INDICATIONS: INTERMITTENT CHEST PAIN, SHORTNESS OF BREATH  FINDINGS:  The lungs are clear. Cardiac, hilar, and mediastinal silhouettes are stable radiographically.  No pneumothorax or pleural effusion. Bony structures are intact.  The trachea is midline.           No active cardiopulmonary disease.       JABARI RABAGO DO       Electronically Signed and Approved By: JABARI RABAGO DO on 10/22/2023 at 12:57                Differential Diagnosis and Discussion:    Chest Pain:  Based on the  patient's signs and symptoms, I considered aortic dissection, myocardial infaction, pulmonary embolism, cardiac tamponade, pericarditis, pneumothorax, musculoskeletal chest pain and other differential diagnosis as an etiology of the patient's chest pain.   Dyspnea: Differential diagnosis includes but is not limited to metabolic acidosis, neurological disorders, psychogenic, asthma, pneumothorax, upper airway obstruction, COPD, pneumonia, noncardiogenic pulmonary edema, interstitial lung disease, anemia, congestive heart failure, and pulmonary embolism      All labs were reviewed and interpreted by me.  All X-rays impressions were independently interpreted by me.  EKG was interpreted by me.      MDM     Amount and/or Complexity of Data Reviewed  Clinical lab tests: reviewed  Tests in the radiology section of CPT®: reviewed  Tests in the medicine section of CPT®: reviewed  Decide to obtain previous medical records or to obtain history from someone other than the patient: yes           This patient is a 68-year-old male with heart failure with reduced EF of 26 to 30% now on blood pressure meds and previously on Lasix but ran out of his diuretics presenting with chest pain and dyspnea with minimal exertion.    He does have some bilateral lower extremity edema and significant JVD on exam and is hypertensive on arrival.    I gave him some aspirin because he was complaining of chest pain and his EKG does show some ST segment depressions and T wave abnormalities that could be ischemic versus LVH with strain pattern.    **I was going to give him nitroglycerin for his high blood pressure and chest pain in the setting of acute CHF exacerbation, but he states it gives him horrible headache and he declined it.    So I will give him some IV labetalol for his blood pressure and Lasix to diurese him in the ED.    Low blood pressure improvement was seen after the first dose of IV labetalol so I gave him a larger repeat dose in the  ED for his hypertensive urgency.        He looks like he will probably need to be admitted for IV diuresis as he is stating he is too dyspneic with minimal exertion to go home and also continued to have chest pain.        Critical Care Note: Total Critical Care time of 35 minutes. Total critical care time documented does not include time spent on separately billed procedures for services of nurses or physician assistants. I personally saw and examined the patient. I have reviewed all diagnostic interpretations and treatment plans as written. I was present for the key portions of any procedures performed and the inclusive time noted in any critical care statement. Critical care time includes patient management by me, time spent at the patients bedside,  time to review lab and imaging results, discussing patient care, documentation in the medical record, and time spent with family or caregiver.    Patient Care Considerations:          Consultants/Shared Management Plan:    Hospitalist: I have discussed the case with the admitting hospitalist, who agrees to accept the patient for admission.    Social Determinants of Health:    Patient is independent, reliable, and has access to care.       Disposition and Care Coordination:    Admit:   Through independent evaluation of the patient's history, physical, and imperical data, the patient meets criteria for observation/admission to the hospital.        Final diagnoses:   Chest pain, unspecified type   Acute on chronic congestive heart failure, unspecified heart failure type   Severe uncontrolled hypertension        ED Disposition       ED Disposition   Decision to Admit    Condition   --    Comment   Level of Care: Telemetry [5]   Diagnosis: Acute decompensated heart failure [[id number]]   Admitting Physician: CISCO ARMENTA [[id number]]                 This medical record created using voice recognition software.             Pete Cruz MD  10/22/23 2675

## 2023-10-22 NOTE — H&P
[facility]  Date: 10/22/2023   Patient Name: Lyle Loznao  : 1955  MRN: [id number]  Primary Care Physician:  Heidi Villa, HUMA  Date of admission: 10/22/2023    Subjective   Subjective     Chief Complaint:   Chief Complaint   Patient presents with    Chest Pain    Shortness of Breath         HPI:    Lyle Lozano is a 68 y.o. male  with history of congestive heart failure, etiology unknown, hypertension, CKD presented to ER with complaints of shortness of breath and chest pain. He was seen in the ER yesterday, complained of/right shoulder pain x-ray shoulder was performed which showed degenerative changes right shoulder.  Presented again today with chest pain and shortness of breath. Reports the pain started on the right shoulder and spreads to his left chest.  Is chronically present, not related to exertion.  Reports he also gets short of breath with minimal exertion.  Endorses 3 pillow orthopnea with sporadic episodes of PND. Reports this is what he feels when he gets volume overloaded.  He reports he ran out of Lasix couple of days ago, and pharmacy apparently refused to fill it.  He has dry cough but denies any fevers, no chills. He does report some leg swelling.    When he does check his blood pressures at home, they are usually elevated in the 200s.    On arrival he is afebrile at 98.6, pulse between 89-1 01, blood pressure initially 174/103.   Received aspirin 324 once, Lasix 80 mg once, labetalol 10 mg, and 20 mg x 1.  Patient refused PO/IV nitroglycerin because it reports it gives him significant headache.  He was most recently hospitalized 2023 for volume overload & htn urgency, was treated for volume overload, and had refused cardiac cath or any further workup.  He underwent echocardiogram on 10/6/2023 which showed an EF of 26 to 30%, global hypokinesis worse in apex and anterior wall, ? Questionable aortic sclerosis.  EKG today  shows normal sinus rhythm LVH with  repolarization, PVC's (unchanged from prior)              Personal History     Past Medical History:  Past Medical History:   Diagnosis Date    Abnormal kidney function     Arthritis     Bursitis     CHF (congestive heart failure)     Depression     Diabetes     Edema     Essential hypertension 2021    Forgetfulness     Gout     Head injury     Hernia cerebri     Hyperlipidemia     Hypertension     IFG (impaired fasting glucose)     Low back pain     Lumbago     `    Pain of left hip     Right shoulder pain     Sleep apnea     SOB (shortness of breath)        Past Surgical History:  Past Surgical History:   Procedure Laterality Date    CYST REMOVAL Right     leg       Family History:   Family History   Problem Relation Age of Onset    No Known Problems Father     Diabetes Sister     Stroke Sister        Social History:   Social History     Socioeconomic History    Marital status: Single   Tobacco Use    Smoking status: Former     Packs/day: .25     Types: Cigarettes     Start date:      Quit date: 2012     Years since quittin.1    Smokeless tobacco: Never   Vaping Use    Vaping Use: Never used   Substance and Sexual Activity    Alcohol use: Yes     Comment: rare    Drug use: Never    Sexual activity: Defer       Home Medications:  Diclofenac Sodium, apixaban, aspirin, atorvastatin, carvedilol, cholecalciferol, hydrALAZINE, ipratropium-albuterol, and spironolactone    Allergies:  No Known Allergies    Review of Systems   All systems were reviewed and negative except for: shortness of breath, cough    Objective   Objective     Vitals:   Temp:  [98.6 °F (37 °C)] 98.6 °F (37 °C)  Heart Rate:  [] 93  Resp:  [19] 19  BP: (174-181)/(103-121) 181/121    Physical Exam    Constitutional: Awake, alert, no acute distress able to speak in full sentence, without use of accessory muscles.    Eyes: Pupils equal, sclerae anicteric   HENT: NCAT, mucous membranes  moist   Neck: Supple, no thyromegaly, no lymphadenopathy, trachea midline   Respiratory: Clear to auscultation anteriorly, bibasilar crackles, nonlabored respirations    Cardiovascular: RRR, no murmurs, rubs, or gallops, JVP +, positive hepatojugular reflex.    Gastrointestinal: Positive bowel sounds, soft, nontender, nondistended   Musculoskeletal: 1+ ankle edema, no clubbing or cyanosis to extremities   Psychiatric: Appropriate affect, cooperative   Neurologic: Oriented x 3, speech clear, ambulating.       Result Review    Result Review:  I have personally reviewed the results from the time of this admission to 10/22/2023 15:46 EDT and agree with these findings:  [x]  Laboratory  []  Microbiology  [x]  Radiology  [x]  EKG/Telemetry   []  Cardiology/Vascular   []  Pathology  [x]  Old records  []  Other:      Assessment & Plan   Assessment / Plan     Assessment/Plan:   Acute on chronic systolic congestive heart failure  HTN Uncontrolled.   NSTEMI type II vs type 1   CKD   DM   HLD  Hx of DVT  Non adherence with medication     Plan  - Admit to telemetry   - Consulted cardiology  - Tele monitoring   - Will give another dose of lasix 80 gm IV x 1, reassess volume status in AM  - Will repeat another troponin/EKG, already has downtrend and reports chest pain has abated.   - Daily labs    - Will resume all oral anti hypertensive including coreg, hydralazine,spironolactone . Refusing oral or IV nitroglycerin due to headache's. Avoid ccb due to hfref.    - POC glucose checks, SSI   - Cardiac diet, daily standing weights, strict I/os  - Resume eliquis given hx of DVT     DVT prophylaxis:  Resume eliquis     CODE STATUS:    Code Status (Patient has no pulse and is not breathing): CPR (Attempt to Resuscitate)  Medical Interventions (Patient has pulse or is breathing): Full Support      Admission Status:  I believe this patient meets Observation status.    Electronically signed by Houston Rodriguez MD, 10/22/23, 3:16 PM  EDT.

## 2023-10-23 PROBLEM — I50.23 ACUTE ON CHRONIC HFREF (HEART FAILURE WITH REDUCED EJECTION FRACTION): Status: ACTIVE | Noted: 2021-09-23

## 2023-10-23 LAB
ANION GAP SERPL CALCULATED.3IONS-SCNC: 12.3 MMOL/L (ref 5–15)
BASOPHILS # BLD AUTO: 0.04 10*3/MM3 (ref 0–0.2)
BASOPHILS NFR BLD AUTO: 0.5 % (ref 0–1.5)
BUN SERPL-MCNC: 34 MG/DL (ref 8–23)
BUN/CREAT SERPL: 16.1 (ref 7–25)
CALCIUM SPEC-SCNC: 8.6 MG/DL (ref 8.6–10.5)
CHLORIDE SERPL-SCNC: 102 MMOL/L (ref 98–107)
CO2 SERPL-SCNC: 21.7 MMOL/L (ref 22–29)
CREAT SERPL-MCNC: 2.11 MG/DL (ref 0.76–1.27)
DEPRECATED RDW RBC AUTO: 52.6 FL (ref 37–54)
EGFRCR SERPLBLD CKD-EPI 2021: 33.9 ML/MIN/1.73
EOSINOPHIL # BLD AUTO: 0.09 10*3/MM3 (ref 0–0.4)
EOSINOPHIL NFR BLD AUTO: 1.1 % (ref 0.3–6.2)
ERYTHROCYTE [DISTWIDTH] IN BLOOD BY AUTOMATED COUNT: 17.2 % (ref 12.3–15.4)
GLUCOSE BLDC GLUCOMTR-MCNC: 173 MG/DL (ref 70–99)
GLUCOSE BLDC GLUCOMTR-MCNC: 174 MG/DL (ref 70–99)
GLUCOSE BLDC GLUCOMTR-MCNC: 175 MG/DL (ref 70–99)
GLUCOSE BLDC GLUCOMTR-MCNC: 223 MG/DL (ref 70–99)
GLUCOSE SERPL-MCNC: 248 MG/DL (ref 65–99)
HCT VFR BLD AUTO: 30.8 % (ref 37.5–51)
HGB BLD-MCNC: 10 G/DL (ref 13–17.7)
IMM GRANULOCYTES # BLD AUTO: 0.03 10*3/MM3 (ref 0–0.05)
IMM GRANULOCYTES NFR BLD AUTO: 0.4 % (ref 0–0.5)
LYMPHOCYTES # BLD AUTO: 1.9 10*3/MM3 (ref 0.7–3.1)
LYMPHOCYTES NFR BLD AUTO: 22.6 % (ref 19.6–45.3)
MAGNESIUM SERPL-MCNC: 2 MG/DL (ref 1.6–2.4)
MCH RBC QN AUTO: 27.2 PG (ref 26.6–33)
MCHC RBC AUTO-ENTMCNC: 32.5 G/DL (ref 31.5–35.7)
MCV RBC AUTO: 83.7 FL (ref 79–97)
MONOCYTES # BLD AUTO: 0.44 10*3/MM3 (ref 0.1–0.9)
MONOCYTES NFR BLD AUTO: 5.2 % (ref 5–12)
NEUTROPHILS NFR BLD AUTO: 5.9 10*3/MM3 (ref 1.7–7)
NEUTROPHILS NFR BLD AUTO: 70.2 % (ref 42.7–76)
NRBC BLD AUTO-RTO: 0 /100 WBC (ref 0–0.2)
PHOSPHATE SERPL-MCNC: 3.5 MG/DL (ref 2.5–4.5)
PLATELET # BLD AUTO: 276 10*3/MM3 (ref 140–450)
PMV BLD AUTO: 9.9 FL (ref 6–12)
POTASSIUM SERPL-SCNC: 3.7 MMOL/L (ref 3.5–5.2)
QT INTERVAL: 391 MS
QTC INTERVAL: 470 MS
RBC # BLD AUTO: 3.68 10*6/MM3 (ref 4.14–5.8)
SODIUM SERPL-SCNC: 136 MMOL/L (ref 136–145)
WBC NRBC COR # BLD: 8.4 10*3/MM3 (ref 3.4–10.8)

## 2023-10-23 PROCEDURE — 80048 BASIC METABOLIC PNL TOTAL CA: CPT | Performed by: STUDENT IN AN ORGANIZED HEALTH CARE EDUCATION/TRAINING PROGRAM

## 2023-10-23 PROCEDURE — 99233 SBSQ HOSP IP/OBS HIGH 50: CPT | Performed by: INTERNAL MEDICINE

## 2023-10-23 PROCEDURE — 83735 ASSAY OF MAGNESIUM: CPT | Performed by: STUDENT IN AN ORGANIZED HEALTH CARE EDUCATION/TRAINING PROGRAM

## 2023-10-23 PROCEDURE — 99222 1ST HOSP IP/OBS MODERATE 55: CPT | Performed by: INTERNAL MEDICINE

## 2023-10-23 PROCEDURE — 63710000001 INSULIN LISPRO (HUMAN) PER 5 UNITS: Performed by: STUDENT IN AN ORGANIZED HEALTH CARE EDUCATION/TRAINING PROGRAM

## 2023-10-23 PROCEDURE — 63710000001 INSULIN LISPRO (HUMAN) PER 5 UNITS: Performed by: INTERNAL MEDICINE

## 2023-10-23 PROCEDURE — 63710000001 INSULIN DETEMIR PER 5 UNITS: Performed by: INTERNAL MEDICINE

## 2023-10-23 PROCEDURE — 25010000002 FUROSEMIDE PER 20 MG: Performed by: INTERNAL MEDICINE

## 2023-10-23 PROCEDURE — 84100 ASSAY OF PHOSPHORUS: CPT | Performed by: STUDENT IN AN ORGANIZED HEALTH CARE EDUCATION/TRAINING PROGRAM

## 2023-10-23 PROCEDURE — 85025 COMPLETE CBC W/AUTO DIFF WBC: CPT | Performed by: STUDENT IN AN ORGANIZED HEALTH CARE EDUCATION/TRAINING PROGRAM

## 2023-10-23 PROCEDURE — G0378 HOSPITAL OBSERVATION PER HR: HCPCS

## 2023-10-23 PROCEDURE — 82948 REAGENT STRIP/BLOOD GLUCOSE: CPT

## 2023-10-23 RX ORDER — ISOSORBIDE DINITRATE 20 MG/1
20 TABLET ORAL
Status: DISCONTINUED | OUTPATIENT
Start: 2023-10-23 | End: 2023-10-24

## 2023-10-23 RX ORDER — BUTALBITAL, ACETAMINOPHEN AND CAFFEINE 300; 40; 50 MG/1; MG/1; MG/1
1 CAPSULE ORAL ONCE AS NEEDED
Status: COMPLETED | OUTPATIENT
Start: 2023-10-23 | End: 2023-10-23

## 2023-10-23 RX ORDER — AMOXICILLIN 250 MG
1 CAPSULE ORAL NIGHTLY PRN
Status: DISCONTINUED | OUTPATIENT
Start: 2023-10-23 | End: 2023-10-27 | Stop reason: HOSPADM

## 2023-10-23 RX ORDER — INSULIN LISPRO 100 [IU]/ML
2-9 INJECTION, SOLUTION INTRAVENOUS; SUBCUTANEOUS
Status: DISCONTINUED | OUTPATIENT
Start: 2023-10-23 | End: 2023-10-27 | Stop reason: HOSPADM

## 2023-10-23 RX ORDER — BISACODYL 10 MG
10 SUPPOSITORY, RECTAL RECTAL DAILY PRN
Status: DISCONTINUED | OUTPATIENT
Start: 2023-10-23 | End: 2023-10-27 | Stop reason: HOSPADM

## 2023-10-23 RX ORDER — BISACODYL 5 MG/1
5 TABLET, DELAYED RELEASE ORAL DAILY PRN
Status: DISCONTINUED | OUTPATIENT
Start: 2023-10-23 | End: 2023-10-27 | Stop reason: HOSPADM

## 2023-10-23 RX ORDER — POLYETHYLENE GLYCOL 3350 17 G/17G
17 POWDER, FOR SOLUTION ORAL DAILY PRN
Status: DISCONTINUED | OUTPATIENT
Start: 2023-10-23 | End: 2023-10-27 | Stop reason: HOSPADM

## 2023-10-23 RX ORDER — FUROSEMIDE 10 MG/ML
40 INJECTION INTRAMUSCULAR; INTRAVENOUS
Status: DISCONTINUED | OUTPATIENT
Start: 2023-10-23 | End: 2023-10-25

## 2023-10-23 RX ORDER — ACETAMINOPHEN 500 MG
1000 TABLET ORAL EVERY 8 HOURS PRN
Status: DISCONTINUED | OUTPATIENT
Start: 2023-10-23 | End: 2023-10-27 | Stop reason: HOSPADM

## 2023-10-23 RX ORDER — HYDRALAZINE HYDROCHLORIDE 50 MG/1
50 TABLET, FILM COATED ORAL 3 TIMES DAILY
Status: DISCONTINUED | OUTPATIENT
Start: 2023-10-23 | End: 2023-10-24

## 2023-10-23 RX ADMIN — ASPIRIN 81 MG: 81 TABLET, COATED ORAL at 09:06

## 2023-10-23 RX ADMIN — DICLOFENAC SODIUM 2 G: 10 GEL TOPICAL at 18:41

## 2023-10-23 RX ADMIN — ATORVASTATIN CALCIUM 80 MG: 40 TABLET, FILM COATED ORAL at 21:44

## 2023-10-23 RX ADMIN — FUROSEMIDE 40 MG: 10 INJECTION, SOLUTION INTRAMUSCULAR; INTRAVENOUS at 18:39

## 2023-10-23 RX ADMIN — INSULIN DETEMIR 10 UNITS: 100 INJECTION, SOLUTION SUBCUTANEOUS at 09:39

## 2023-10-23 RX ADMIN — APIXABAN 5 MG: 5 TABLET, FILM COATED ORAL at 21:44

## 2023-10-23 RX ADMIN — CARVEDILOL 25 MG: 12.5 TABLET, FILM COATED ORAL at 09:06

## 2023-10-23 RX ADMIN — ISOSORBIDE DINITRATE 20 MG: 20 TABLET ORAL at 14:04

## 2023-10-23 RX ADMIN — INSULIN LISPRO 2 UNITS: 100 INJECTION, SOLUTION INTRAVENOUS; SUBCUTANEOUS at 12:16

## 2023-10-23 RX ADMIN — APIXABAN 5 MG: 5 TABLET, FILM COATED ORAL at 09:06

## 2023-10-23 RX ADMIN — DICLOFENAC SODIUM 2 G: 10 GEL TOPICAL at 12:18

## 2023-10-23 RX ADMIN — ACETAMINOPHEN 1000 MG: 500 TABLET ORAL at 18:39

## 2023-10-23 RX ADMIN — BUTALBITA,ACETAMINOPHEN AND CAFFEINE 1 CAPSULE: 50; 300; 40 CAPSULE ORAL at 21:44

## 2023-10-23 RX ADMIN — EMPAGLIFLOZIN 10 MG: 10 TABLET, FILM COATED ORAL at 12:16

## 2023-10-23 RX ADMIN — INSULIN LISPRO 3 UNITS: 100 INJECTION, SOLUTION INTRAVENOUS; SUBCUTANEOUS at 07:50

## 2023-10-23 RX ADMIN — Medication 10 ML: at 21:46

## 2023-10-23 RX ADMIN — HYDRALAZINE HYDROCHLORIDE 50 MG: 50 TABLET, FILM COATED ORAL at 18:39

## 2023-10-23 RX ADMIN — HYDRALAZINE HYDROCHLORIDE 25 MG: 25 TABLET, FILM COATED ORAL at 09:06

## 2023-10-23 RX ADMIN — Medication 1000 UNITS: at 09:06

## 2023-10-23 RX ADMIN — FUROSEMIDE 40 MG: 10 INJECTION, SOLUTION INTRAMUSCULAR; INTRAVENOUS at 09:39

## 2023-10-23 RX ADMIN — INSULIN LISPRO 2 UNITS: 100 INJECTION, SOLUTION INTRAVENOUS; SUBCUTANEOUS at 21:44

## 2023-10-23 RX ADMIN — ACETAMINOPHEN 1000 MG: 500 TABLET ORAL at 09:39

## 2023-10-23 RX ADMIN — CARVEDILOL 25 MG: 12.5 TABLET, FILM COATED ORAL at 18:39

## 2023-10-23 RX ADMIN — SPIRONOLACTONE 25 MG: 25 TABLET ORAL at 09:06

## 2023-10-23 RX ADMIN — HYDRALAZINE HYDROCHLORIDE 50 MG: 50 TABLET, FILM COATED ORAL at 21:44

## 2023-10-23 RX ADMIN — Medication 10 ML: at 09:08

## 2023-10-23 RX ADMIN — INSULIN LISPRO 2 UNITS: 100 INJECTION, SOLUTION INTRAVENOUS; SUBCUTANEOUS at 18:42

## 2023-10-23 NOTE — CONSULTS
Cardiology Consult Note  [facility] 4TH FLOOR MEDICAL TELEMETRY UNIT          Patient Identification:  Lyle Lozano      [id number]  68 y.o.        male  1955           Reason for Consultation: CHF    PCP: Heidi Villa APRN    History of Present Illness:     Patient is a 68-year-old with a previous history of nonischemic cardiomyopathy with normalization of ejection fraction, CAD , Essential hypertension, dyslipidemia, and type 2 diabetes with multiple medicines for CHF.  He presented with shortness of breath symptoms and atypical right-sided shooting chest discomfort.  The shortness of breath has been increasing over the last few weeks to months he has had several admissions for CHF and also has been having difficulty with compliance and seen a doctor.  He was recently started on some new medications for his CHF but due to difficulty getting it filled at the pharmacy this had not been taking all of these.  He reports some issues with lower extremity edema intermittently as well as PND.  Denies any syncope or presyncopal episodes.  No fever chills or cough    Past History:  Past Medical History:   Diagnosis Date    Abnormal kidney function     Arthritis     Bursitis     CHF (congestive heart failure)     Depression     Diabetes     Edema     Essential hypertension 2021    Forgetfulness     Gout     Head injury     Hernia cerebri     Hyperlipidemia     Hypertension     IFG (impaired fasting glucose)     Low back pain     Lumbago     `    Pain of left hip     Right shoulder pain     Sleep apnea     SOB (shortness of breath)      Past Surgical History:   Procedure Laterality Date    CYST REMOVAL Right     leg     No Known Allergies  Social History     Socioeconomic History    Marital status: Single   Tobacco Use    Smoking status: Former     Packs/day: .25     Types: Cigarettes     Start date:      Quit date: 2012     Years since quittin.1    Smokeless tobacco: Never    Vaping Use    Vaping Use: Never used   Substance and Sexual Activity    Alcohol use: Yes     Comment: rare    Drug use: Never    Sexual activity: Defer     Family History   Problem Relation Age of Onset    No Known Problems Father     Diabetes Sister     Stroke Sister        Medications:  Prior to Admission medications    Medication Sig Start Date End Date Taking? Authorizing Provider   apixaban (ELIQUIS) 5 MG tablet tablet Take 1 tablet by mouth Every Morning. Pt is supposed to take this bid but admits to only taking it qam because it makes him cold. He did say he took it this morning 10/06/23   Yes ProviderQuentin MD   aspirin 81 MG EC tablet Take 1 tablet by mouth Daily.   Yes ProviderQuentin MD   atorvastatin (LIPITOR) 80 MG tablet Take 1 tablet by mouth Every Night. 6/27/23  Yes Anthony Herrmann MD   carvedilol (COREG) 25 MG tablet Take 1 tablet by mouth 2 (Two) Times a Day With Meals for 30 days. 10/11/23 11/10/23 Yes Aylin Keita MD   cholecalciferol (VITAMIN D3) 25 MCG (1000 UT) tablet Take 1 tablet by mouth Daily. 8/26/22  Yes Nehal Daniels MD   Diclofenac Sodium (Voltaren Arthritis Pain) 1 % gel gel Apply 4 g topically to the appropriate area as directed 4 (Four) Times a Day As Needed (Right shoulder pain). 10/21/23  Yes Merissa Geronimo APRN   hydrALAZINE (APRESOLINE) 25 MG tablet Take 1 tablet by mouth 3 (Three) Times a Day. 7/18/23  Yes ProviderQuentin MD   ipratropium-albuterol (COMBIVENT RESPIMAT)  MCG/ACT inhaler Inhale 1 puff 4 (Four) Times a Day As Needed for Wheezing. 6/21/23  Yes Jose Nolasco DO   spironolactone (ALDACTONE) 25 MG tablet Take 1 tablet by mouth Daily for 30 days. 10/11/23 11/10/23 Yes Aylin Keita MD      Current medications:  apixaban, 5 mg, Oral, Q12H  aspirin, 81 mg, Oral, Daily  atorvastatin, 80 mg, Oral, Nightly  carvedilol, 25 mg, Oral, BID With Meals  cholecalciferol, 1,000 Units, Oral, Daily  Diclofenac Sodium, 2 g,  "Topical, 4x Daily  furosemide, 40 mg, Intravenous, BID  hydrALAZINE, 25 mg, Oral, TID  insulin detemir, 10 Units, Subcutaneous, Daily  insulin lispro, 2-9 Units, Subcutaneous, 4x Daily AC & at Bedtime  sodium chloride, 10 mL, Intravenous, Q12H  spironolactone, 25 mg, Oral, Daily      Current IV drips:       Review of Systems   Constitutional: Negative for chills, fever and weight loss.   HENT:  Negative for congestion and nosebleeds.    Cardiovascular:  Positive for chest pain. Negative for orthopnea and paroxysmal nocturnal dyspnea.   Respiratory:  Positive for shortness of breath. Negative for cough.    Endocrine: Negative for cold intolerance and heat intolerance.   Skin:  Negative for rash.   Musculoskeletal:  Negative for back pain and muscle weakness.   Gastrointestinal:  Negative for abdominal pain, nausea and vomiting.   Genitourinary:  Negative for dysuria and nocturia.   Neurological:  Negative for dizziness and light-headedness.   Psychiatric/Behavioral:  Negative for altered mental status and hallucinations.          Physical Exam    BP (!) 163/104 (BP Location: Left arm, Patient Position: Lying)   Pulse 75   Temp 97.9 °F (36.6 °C) (Oral)   Resp 18   Ht 170.2 cm (67\")   Wt 81.2 kg (179 lb 0.2 oz)   SpO2 100%   BMI 28.04 kg/m²  Body mass index is 28.04 kg/m².   Oxygen saturation   @FLOWAN(10::1)@ SpO2  Min: 96 %  Max: 100 %    General Appearance:   no acute distress  Alert and oriented x3  HENT:   lips not cyanotic  Atraumatic  Neck:  thyroid not enlarged  supple  Respiratory:  no respiratory distress  normal breath sounds  no rales  Cardiovascular:  no jugular venous distention  regular rhythm  apical impulse normal  S1 normal, S2 normal  no S3, no S4   no murmur  no rub, no thrill  no carotid bruit  pedal pulses normal  lower extremity edema: none    Gastrointestinal:   bowel sounds normal  non-tender  no hepatomegaly, no splenomegaly  Musculoskeletal:  no clubbing of fingers.   normocephalic, " head atraumatic  Skin:   warm, dry  No rashes  Neuro/Psychiatric:  normal mood and affect  judgement and insight appropriate      Cardiographics:     ECG  (personally reviewed)              Telemetry:  (personally reviewed) normal sinus rhythm and sinus tachycardia   Results for orders placed during the hospital encounter of 10/06/23    Adult Transthoracic Echo Complete W/ Cont if Necessary Per Protocol    Interpretation Summary    Left ventricular ejection fraction appears to be 26 - 30%.  Appears to have global hypokinesis worse in the apex and anterior walls.    The left ventricular cavity is mildly dilated.    Left ventricular wall thickness is consistent with borderline concentric hypertrophy.    Left ventricular diastolic function was indeterminate.    Mildly reduced right ventricular systolic function noted.    The left atrial cavity is mildly dilated.    Patient has sclerotic aortic valve.  It does appear to open somewhat.  He could have some degree of low gradient AS.  Does not appear likely severe.         No results found for this or any previous visit.      Cardiolite (Tc-99m Sestamibi) stress test   Lab Review:       CBC          10/11/2023    04:20 10/22/2023    12:10 10/23/2023    03:30   CBC   WBC 6.56  8.43  8.40    RBC 3.99  4.31  3.68    Hemoglobin 10.8  11.8  10.0    Hematocrit 34.7  35.9  30.8    MCV 87.0  83.3  83.7    MCH 27.1  27.4  27.2    MCHC 31.1  32.9  32.5    RDW 18.2  16.9  17.2    Platelets 241  320  276        CMP          10/11/2023    04:20 10/22/2023    12:10 10/23/2023    03:30   CMP   Glucose 136  265  248    BUN 43  32  34    Creatinine 2.67  1.90  2.11    EGFR 25.5  38.4  33.9    Sodium 138  136  136    Potassium 4.0  4.0  3.7    Chloride 103  103  102    Calcium 8.5  9.2  8.6    Total Protein 6.1  7.3     Albumin 2.6  3.5     Globulin  3.8     Total Bilirubin 0.3  0.4     Alkaline Phosphatase 89  116     AST (SGOT) 13  25     ALT (SGPT) 8  27     Albumin/Globulin Ratio  0.9    "  BUN/Creatinine Ratio 16.1  16.8  16.1    Anion Gap 11.1  11.6  12.3         CARDIAC LABS:      Lab 10/22/23  1943 10/22/23  1407 10/22/23  1210   PROBNP  --   --  27,133.0*   HSTROP T 145* 136* 159*      No results found for: \"DIGOXIN\"   Lab Results   Component Value Date    TSH 1.760 08/21/2023           Invalid input(s): \"LDLCALC\"  No results found for: \"POCTROP\"  No components found for: \"DDIMERQUAN\"  Lab Results   Component Value Date    MG 2.0 10/23/2023             CARDIAC LABS:      Lab 10/22/23  1943 10/22/23  1407 10/22/23  1210   PROBNP  --   --  27,133.0*   HSTROP T 145* 136* 159*        Imaging:  CXR  Impression: No active cardiopulmonary disease.         Assessment:    Acute decompensated heart failure    Acute on chronic HFrEF (heart failure with reduced ejection fraction)      HFrEF acute on chronic patient with worsening clinical status etiologies for his CHF may be a combination of hypertension related as well as possible ischemic based on previous stress testing.  He has not been compliant with a lot of his medications and sounds like stressed to him the importance of this and managing his disease as it does appear that he is clinically as well as echocardiographically showing deterioration.  Given his current renal function we will hold off on the addition of ACE inhibitor initially we will work on maximizing afterload reduction with hydralazine and nitro.  In addition start him on SGLT inhibitor ideally Farxiga however this is not on formulary in the hospital so we will have to start on Jardiance at 5 mg and plan on transitioning as outpatient as well  work on outpatient assistance to make sure this can get filled.  In addition discussed with him ischemic work-up given patient preferences as well as his current renal function recommended proceeding initially with stress testing to see if any ischemia warranting more invasive heart catheterization.  Agree with continue with Lasix 40 IV twice " daily with strict I's and O's    Hypertension poorly controlled we will work on optimization with hydralazine and nitro in addition to continue with his      Plan:  1.  Increase hydralazine to 50 3 times daily  2.  Isosorbide dinitrate 20 3 times daily  3.  Lasix 40 IV twice daily  4.  Noninvasive nuclear stress test  5.  Strict I's and O's      Thank you for allowing us to share in [facility].            Brigido Mcdonnell MD   10/23/2023    10:38 EDT

## 2023-10-23 NOTE — PROGRESS NOTES
[facility]   Hospitalist Progress Note  Date: 10/23/2023  Patient Name: Lyle Lozano  : 1955  MRN: [id number]  Date of admission: 10/22/2023      Subjective   Subjective     Chief Complaint: Follow up for shortness of breath    Summary: 67 y/o M with HTN, CKD IIIb, HFrEF, DVT on Eliquis, T2DM who presented with SOB and CP.  Very hypertensive. HS trop 159 -> 136. proBNP >27k.  Admitted for HF, BP management.    Interval Followup:   Feeling better. Less SOB. No chest pain. No wheezing or cough.   /90s  On room air    Review of Systems  Cardiovascular:  No Chest Pain, No Edema, No palpitations  Respiratory:  No Cough, +Dyspnea  Gastrointestinal:  No Nausea, No Vomiting  MSK: +right shoulder pain, sharp, intermittent      Objective   Objective     Vitals:   Temp:  [98 °F (36.7 °C)-98.6 °F (37 °C)] 98.5 °F (36.9 °C)  Heart Rate:  [] 75  Resp:  [18-19] 18  BP: (134-181)/() 163/90  Physical Exam    Constitutional: WNWD, conversant, NAD   Eyes: Pupils equal and reactive, no conjunctival injections   HENT: NCAT, nares patent, MMM   Respiratory: Clear to auscultation bilaterally, nonlabored respirations    Cardiovascular: RRR, no murmurs, no edema   Gastrointestinal: Positive bowel sounds, soft, nontender, nondistended   Neurologic: Alert, Cranial Nerves grossly intact, speech clear   MSK: R shoulder no swelling, pain with abduction above 90 degrees   Skin: Extremities warm, no rashes or wounds    Result Review    Result Review:  I have personally reviewed the following over the last 24 hours (07:00 to 07:00) and agree with the following findings  [x]  Laboratory  CBC          10/11/2023    04:20 10/22/2023    12:10 10/23/2023    03:30   CBC   WBC 6.56  8.43  8.40    RBC 3.99  4.31  3.68    Hemoglobin 10.8  11.8  10.0    Hematocrit 34.7  35.9  30.8    MCV 87.0  83.3  83.7    MCH 27.1  27.4  27.2    MCHC 31.1  32.9  32.5    RDW 18.2  16.9  17.2    Platelets 241  320  276      BMP           10/11/2023    04:20 10/22/2023    12:10 10/23/2023    03:30   BMP   BUN 43  32  34    Creatinine 2.67  1.90  2.11    Sodium 138  136  136    Potassium 4.0  4.0  3.7    Chloride 103  103  102    CO2 23.9  21.4  21.7    Calcium 8.5  9.2  8.6      []  Microbiology  [x]  Radiology   [x]  EKG/Telemetry monitor personally reviewed: NSR  []  Cardiology/Vascular   []  Pathology  []  Old records  [x]  Other:    Intake/Output Summary (Last 24 hours) at 10/23/2023 0923  Last data filed at 10/23/2023 0300  Gross per 24 hour   Intake 720 ml   Output 900 ml   Net -180 ml         Assessment & Plan   Assessment / Plan     Assessment/Plan:  Acute on chronic HFrEF  NSTEMI type 2  Uncontrolled HTN  Uncontrolled T2DM  CKD IIIb  HLD  DVT on Eliquis  R shoulder pain; degenerative changes involving the AC joint         Consult cardiology, appreciate assistance  Cr near baseline. + fluid balance over last 24 hours. Add IV lasix 40 mg q12 hours.Continue spironolactone 25 mg daily Monitor I/Os. BP trend improving; continue Coreg 25 mg twice daily and hydralazine 25 mg TID  Recent HbA1c 10. Add Levemir 10 units daily with moderate dose SSI. Consult DM educator for insulin teaching  Continue Eliquis 5 mg q12 hours  Continue baby aspirin and statin  Increase tylenol to 1000mg q 8 hours prn. Add Voltaren gel  Low sodium, carb consistent diet  Daily BMP to trend renal fxn and electrolytes    Discussed plan with Mcdonnell and RN.    DVT prophylaxis:  Medical DVT prophylaxis orders are present.    CODE STATUS:   Code Status (Patient has no pulse and is not breathing): CPR (Attempt to Resuscitate)  Medical Interventions (Patient has pulse or is breathing): Full Support        Electronically signed by Mat Jaimes DO, 10/23/23, 7:57 AM EDT.

## 2023-10-23 NOTE — PLAN OF CARE
Goal Outcome Evaluation:  Plan of Care Reviewed With: patient        VSS during shift. BP has been elevated. Patient has not has any complaints of chest pain. Patient did have complaints of right shoulder pain that is constant and patient states is not a new pain, treated per MAR. Patient ambulates well in room. No other complaints at this time. Continue plan of care.

## 2023-10-23 NOTE — CONSULTS
SW discussed advanced care planning with patient. Pt states he does not wish to complete a living will at this time. No other needs identified.

## 2023-10-24 ENCOUNTER — APPOINTMENT (OUTPATIENT)
Dept: NUCLEAR MEDICINE | Facility: HOSPITAL | Age: 68
DRG: 280 | End: 2023-10-24
Payer: MEDICARE

## 2023-10-24 LAB
ANION GAP SERPL CALCULATED.3IONS-SCNC: 8.6 MMOL/L (ref 5–15)
BH CV IMMEDIATE POST RECOVERY TECH DATA SYMPTOMS: NORMAL
BH CV IMMEDIATE POST TECH DATA BLOOD PRESSURE: NORMAL MMHG
BH CV IMMEDIATE POST TECH DATA HEART RATE: 75 BPM
BH CV IMMEDIATE POST TECH DATA OXYGEN SATS: 98 %
BH CV REST NUCLEAR ISOTOPE DOSE: 9.8 MCI
BH CV SIX MINUTE RECOVERY TECH DATA BLOOD PRESSURE: NORMAL
BH CV SIX MINUTE RECOVERY TECH DATA HEART RATE: 72 BPM
BH CV SIX MINUTE RECOVERY TECH DATA OXYGEN SATURATION: 93 %
BH CV SIX MINUTE RECOVERY TECH DATA SYMPTOMS: NORMAL
BH CV STRESS BP STAGE 1: NORMAL
BH CV STRESS COMMENTS STAGE 1: NORMAL
BH CV STRESS DOSE REGADENOSON STAGE 1: 0.4
BH CV STRESS DURATION MIN STAGE 1: 0
BH CV STRESS DURATION SEC STAGE 1: 10
BH CV STRESS HR STAGE 1: 74
BH CV STRESS NUCLEAR ISOTOPE DOSE: 36.6 MCI
BH CV STRESS O2 STAGE 1: 95
BH CV STRESS PROTOCOL 1: NORMAL
BH CV STRESS RECOVERY BP: NORMAL MMHG
BH CV STRESS RECOVERY HR: 70 BPM
BH CV STRESS RECOVERY O2: 93 %
BH CV STRESS STAGE 1: 1
BH CV THREE MINUTE POST TECH DATA BLOOD PRESSURE: NORMAL MMHG
BH CV THREE MINUTE POST TECH DATA HEART RATE: 75 BPM
BH CV THREE MINUTE POST TECH DATA OXYGEN SATURATION: 96 %
BUN SERPL-MCNC: 32 MG/DL (ref 8–23)
BUN/CREAT SERPL: 15.5 (ref 7–25)
CALCIUM SPEC-SCNC: 8.9 MG/DL (ref 8.6–10.5)
CHLORIDE SERPL-SCNC: 100 MMOL/L (ref 98–107)
CO2 SERPL-SCNC: 26.4 MMOL/L (ref 22–29)
CREAT SERPL-MCNC: 2.07 MG/DL (ref 0.76–1.27)
EGFRCR SERPLBLD CKD-EPI 2021: 34.7 ML/MIN/1.73
GLUCOSE BLDC GLUCOMTR-MCNC: 108 MG/DL (ref 70–99)
GLUCOSE BLDC GLUCOMTR-MCNC: 150 MG/DL (ref 70–99)
GLUCOSE BLDC GLUCOMTR-MCNC: 207 MG/DL (ref 70–99)
GLUCOSE BLDC GLUCOMTR-MCNC: 261 MG/DL (ref 70–99)
GLUCOSE SERPL-MCNC: 176 MG/DL (ref 65–99)
LV EF NUC BP: 21 %
MAXIMAL PREDICTED HEART RATE: 154 BPM
PERCENT MAX PREDICTED HR: 48.7 %
POTASSIUM SERPL-SCNC: 3.6 MMOL/L (ref 3.5–5.2)
SODIUM SERPL-SCNC: 135 MMOL/L (ref 136–145)
STRESS BASELINE BP: NORMAL MMHG
STRESS BASELINE HR: 65 BPM
STRESS O2 SAT REST: 96 %
STRESS PERCENT HR: 57 %
STRESS POST O2 SAT PEAK: 98 %
STRESS POST PEAK BP: NORMAL MMHG
STRESS POST PEAK HR: 75 BPM
STRESS TARGET HR: 131 BPM

## 2023-10-24 PROCEDURE — 78452 HT MUSCLE IMAGE SPECT MULT: CPT

## 2023-10-24 PROCEDURE — 0 TECHNETIUM TETROFOSMIN KIT: Performed by: INTERNAL MEDICINE

## 2023-10-24 PROCEDURE — 93016 CV STRESS TEST SUPVJ ONLY: CPT | Performed by: NURSE PRACTITIONER

## 2023-10-24 PROCEDURE — 80048 BASIC METABOLIC PNL TOTAL CA: CPT | Performed by: STUDENT IN AN ORGANIZED HEALTH CARE EDUCATION/TRAINING PROGRAM

## 2023-10-24 PROCEDURE — 63710000001 INSULIN DETEMIR PER 5 UNITS: Performed by: INTERNAL MEDICINE

## 2023-10-24 PROCEDURE — 25010000002 FUROSEMIDE PER 20 MG: Performed by: INTERNAL MEDICINE

## 2023-10-24 PROCEDURE — 78452 HT MUSCLE IMAGE SPECT MULT: CPT | Performed by: INTERNAL MEDICINE

## 2023-10-24 PROCEDURE — 25010000002 REGADENOSON 0.4 MG/5ML SOLUTION: Performed by: INTERNAL MEDICINE

## 2023-10-24 PROCEDURE — 63710000001 INSULIN LISPRO (HUMAN) PER 5 UNITS: Performed by: INTERNAL MEDICINE

## 2023-10-24 PROCEDURE — A9502 TC99M TETROFOSMIN: HCPCS | Performed by: INTERNAL MEDICINE

## 2023-10-24 PROCEDURE — 99233 SBSQ HOSP IP/OBS HIGH 50: CPT | Performed by: INTERNAL MEDICINE

## 2023-10-24 PROCEDURE — 99232 SBSQ HOSP IP/OBS MODERATE 35: CPT | Performed by: INTERNAL MEDICINE

## 2023-10-24 PROCEDURE — 93018 CV STRESS TEST I&R ONLY: CPT | Performed by: INTERNAL MEDICINE

## 2023-10-24 PROCEDURE — 93017 CV STRESS TEST TRACING ONLY: CPT

## 2023-10-24 PROCEDURE — 82948 REAGENT STRIP/BLOOD GLUCOSE: CPT

## 2023-10-24 RX ORDER — VALSARTAN 40 MG/1
20 TABLET ORAL
Status: DISCONTINUED | OUTPATIENT
Start: 2023-10-24 | End: 2023-10-27 | Stop reason: HOSPADM

## 2023-10-24 RX ORDER — ISOSORBIDE DINITRATE 10 MG/1
10 TABLET ORAL
Status: DISCONTINUED | OUTPATIENT
Start: 2023-10-25 | End: 2023-10-27

## 2023-10-24 RX ORDER — HYDRALAZINE HYDROCHLORIDE 50 MG/1
100 TABLET, FILM COATED ORAL 3 TIMES DAILY
Status: DISCONTINUED | OUTPATIENT
Start: 2023-10-24 | End: 2023-10-25

## 2023-10-24 RX ORDER — POTASSIUM CHLORIDE 1.5 G/1.58G
20 POWDER, FOR SOLUTION ORAL 2 TIMES DAILY
Status: DISCONTINUED | OUTPATIENT
Start: 2023-10-24 | End: 2023-10-27

## 2023-10-24 RX ORDER — REGADENOSON 0.08 MG/ML
0.4 INJECTION, SOLUTION INTRAVENOUS
Status: COMPLETED | OUTPATIENT
Start: 2023-10-24 | End: 2023-10-24

## 2023-10-24 RX ADMIN — APIXABAN 5 MG: 5 TABLET, FILM COATED ORAL at 21:35

## 2023-10-24 RX ADMIN — HYDRALAZINE HYDROCHLORIDE 100 MG: 50 TABLET, FILM COATED ORAL at 21:35

## 2023-10-24 RX ADMIN — ASPIRIN 81 MG: 81 TABLET, COATED ORAL at 10:44

## 2023-10-24 RX ADMIN — Medication 1000 UNITS: at 10:43

## 2023-10-24 RX ADMIN — FUROSEMIDE 40 MG: 10 INJECTION, SOLUTION INTRAMUSCULAR; INTRAVENOUS at 10:42

## 2023-10-24 RX ADMIN — Medication 10 ML: at 21:36

## 2023-10-24 RX ADMIN — INSULIN LISPRO 4 UNITS: 100 INJECTION, SOLUTION INTRAVENOUS; SUBCUTANEOUS at 17:39

## 2023-10-24 RX ADMIN — VALSARTAN 20 MG: 40 TABLET, FILM COATED ORAL at 14:26

## 2023-10-24 RX ADMIN — POTASSIUM CHLORIDE 20 MEQ: 1.5 POWDER, FOR SOLUTION ORAL at 21:35

## 2023-10-24 RX ADMIN — TETROFOSMIN 1 DOSE: 1.38 INJECTION, POWDER, LYOPHILIZED, FOR SOLUTION INTRAVENOUS at 08:36

## 2023-10-24 RX ADMIN — DICLOFENAC SODIUM 2 G: 10 GEL TOPICAL at 10:49

## 2023-10-24 RX ADMIN — INSULIN DETEMIR 10 UNITS: 100 INJECTION, SOLUTION SUBCUTANEOUS at 10:43

## 2023-10-24 RX ADMIN — FUROSEMIDE 40 MG: 10 INJECTION, SOLUTION INTRAMUSCULAR; INTRAVENOUS at 17:39

## 2023-10-24 RX ADMIN — POTASSIUM CHLORIDE 20 MEQ: 1.5 POWDER, FOR SOLUTION ORAL at 10:42

## 2023-10-24 RX ADMIN — SPIRONOLACTONE 25 MG: 25 TABLET ORAL at 10:43

## 2023-10-24 RX ADMIN — CARVEDILOL 25 MG: 12.5 TABLET, FILM COATED ORAL at 17:39

## 2023-10-24 RX ADMIN — DICLOFENAC SODIUM 2 G: 10 GEL TOPICAL at 12:11

## 2023-10-24 RX ADMIN — INSULIN LISPRO 6 UNITS: 100 INJECTION, SOLUTION INTRAVENOUS; SUBCUTANEOUS at 12:10

## 2023-10-24 RX ADMIN — DICLOFENAC SODIUM 2 G: 10 GEL TOPICAL at 21:35

## 2023-10-24 RX ADMIN — TETROFOSMIN 1 DOSE: 1.38 INJECTION, POWDER, LYOPHILIZED, FOR SOLUTION INTRAVENOUS at 07:35

## 2023-10-24 RX ADMIN — HYDRALAZINE HYDROCHLORIDE 100 MG: 50 TABLET, FILM COATED ORAL at 16:11

## 2023-10-24 RX ADMIN — Medication 10 ML: at 10:46

## 2023-10-24 RX ADMIN — EMPAGLIFLOZIN 10 MG: 10 TABLET, FILM COATED ORAL at 10:45

## 2023-10-24 RX ADMIN — APIXABAN 5 MG: 5 TABLET, FILM COATED ORAL at 10:44

## 2023-10-24 RX ADMIN — CARVEDILOL 25 MG: 12.5 TABLET, FILM COATED ORAL at 10:45

## 2023-10-24 RX ADMIN — ATORVASTATIN CALCIUM 80 MG: 40 TABLET, FILM COATED ORAL at 21:34

## 2023-10-24 RX ADMIN — ACETAMINOPHEN 1000 MG: 500 TABLET ORAL at 10:45

## 2023-10-24 RX ADMIN — REGADENOSON 0.4 MG: 0.08 INJECTION, SOLUTION INTRAVENOUS at 08:36

## 2023-10-24 NOTE — PROGRESS NOTES
CARDIOLOGY  INPATIENT PROGRESS NOTE                [facility] 4TH FLOOR MEDICAL TELEMETRY UNIT    10/24/2023      PATIENT IDENTIFICATION:   Name:  Lyle Lozano      MRN:  [id number]     68 y.o.  male             SUBJECTIVE:   Patient with headache.  Yesterday is now better but still with a mild residual problem.  Feels that her shortness of breath is improved  OBJECTIVE:  Vitals:    10/24/23 0025 10/24/23 0505 10/24/23 0643 10/24/23 1140   BP: 151/93 148/88  156/91   BP Location: Left arm Left arm  Left arm   Patient Position: Lying Lying  Sitting   Pulse: 62 66  80   Resp: 20 20  18   Temp: 97.3 °F (36.3 °C) 97.7 °F (36.5 °C)  98.2 °F (36.8 °C)   TempSrc: Oral Oral  Oral   SpO2: 100% 100%  100%   Weight:   78.8 kg (173 lb 11.6 oz)    Height:               Body mass index is 27.21 kg/m².    Intake/Output Summary (Last 24 hours) at 10/24/2023 1226  Last data filed at 10/24/2023 0505  Gross per 24 hour   Intake --   Output 2250 ml   Net -2250 ml       Telemetry: Normal sinus rhythm intermittent PVCs    Physical Exam  General Appearance:   no acute distress  Alert and oriented x3  HENT:   lips not cyanotic  Atraumatic  Neck:  No jvd   supple  Respiratory:  no respiratory distress  normal breath sounds  no rales  Cardiovascular:    regular rhythm  no S3, no S4   no murmur  no rub  lower extremity edema: none    Skin:   warm, dry  No rashes      No Known Allergies  Scheduled meds:  apixaban, 5 mg, Oral, Q12H  aspirin, 81 mg, Oral, Daily  atorvastatin, 80 mg, Oral, Nightly  carvedilol, 25 mg, Oral, BID With Meals  cholecalciferol, 1,000 Units, Oral, Daily  Diclofenac Sodium, 2 g, Topical, 4x Daily  empagliflozin, 10 mg, Oral, Daily  furosemide, 40 mg, Intravenous, BID  hydrALAZINE, 100 mg, Oral, TID  insulin detemir, 10 Units, Subcutaneous, Daily  insulin lispro, 2-9 Units, Subcutaneous, 4x Daily AC & at Bedtime  [START ON 10/25/2023] isosorbide dinitrate, 10 mg, Oral, BID - Nitrates  potassium  "chloride, 20 mEq, Oral, BID  sodium chloride, 10 mL, Intravenous, Q12H  spironolactone, 25 mg, Oral, Daily      IV meds:                         Data Review:  CBC          10/11/2023    04:20 10/22/2023    12:10 10/23/2023    03:30   CBC   WBC 6.56  8.43  8.40    RBC 3.99  4.31  3.68    Hemoglobin 10.8  11.8  10.0    Hematocrit 34.7  35.9  30.8    MCV 87.0  83.3  83.7    MCH 27.1  27.4  27.2    MCHC 31.1  32.9  32.5    RDW 18.2  16.9  17.2    Platelets 241  320  276      CMP          10/22/2023    12:10 10/23/2023    03:30 10/24/2023    05:17   CMP   Glucose 265  248  176    BUN 32  34  32    Creatinine 1.90  2.11  2.07    EGFR 38.4  33.9  34.7    Sodium 136  136  135    Potassium 4.0  3.7  3.6    Chloride 103  102  100    Calcium 9.2  8.6  8.9    Total Protein 7.3      Albumin 3.5      Globulin 3.8      Total Bilirubin 0.4      Alkaline Phosphatase 116      AST (SGOT) 25      ALT (SGPT) 27      Albumin/Globulin Ratio 0.9      BUN/Creatinine Ratio 16.8  16.1  15.5    Anion Gap 11.6  12.3  8.6       CARDIAC LABS:      Lab 10/22/23  1943 10/22/23  1407 10/22/23  1210   PROBNP  --   --  27,133.0*   HSTROP T 145* 136* 159*        No results found for: \"DIGOXIN\"   Lab Results   Component Value Date    TSH 1.760 08/21/2023           Invalid input(s): \"LDLCALC\"  No results found for: \"POCTROP\"  Lab Results   Component Value Date    TROPONINT 145 (C) 10/22/2023   (  Lab Results   Component Value Date    MG 2.0 10/23/2023     Results for orders placed during the hospital encounter of 10/06/23    Adult Transthoracic Echo Complete W/ Cont if Necessary Per Protocol    Interpretation Summary    Left ventricular ejection fraction appears to be 26 - 30%.  Appears to have global hypokinesis worse in the apex and anterior walls.    The left ventricular cavity is mildly dilated.    Left ventricular wall thickness is consistent with borderline concentric hypertrophy.    Left ventricular diastolic function was indeterminate.    Mildly " reduced right ventricular systolic function noted.    The left atrial cavity is mildly dilated.    Patient has sclerotic aortic valve.  It does appear to open somewhat.  He could have some degree of low gradient AS.  Does not appear likely severe.           ASSESSMENT:    Acute decompensated heart failure    Acute on chronic HFrEF (heart failure with reduced ejection fraction)        PLAN:  1.  Increase hydralazine to 100 3 times daily and will attempt to cut back on his isosorbide dinitrate to 10 twice daily as he did have a issue with a headache yesterday  2.  Start on valsartan 20 mg once a day with the hope of eventually transitioning to Entresto if his renal function osei stable  3.  Continue Lasix at 40 IV twice daily  4.  Repeat BMP in a.m.          Brigido Mcdonnell MD  10/24/2023    12:26 EDT

## 2023-10-24 NOTE — PROGRESS NOTES
[facility]   Hospitalist Progress Note  Date: 10/24/2023  Patient Name: Lyle Lozano  : 1955  MRN: [id number]  Date of admission: 10/22/2023      Subjective   Subjective     Chief Complaint: Follow up for shortness of breath    Summary: 67 y/o M with HTN, CKD IIIb, HFrEF, DVT on Eliquis, T2DM who presented with SOB and CP.  Very hypertensive. HS trop 159 -> 136. proBNP >27k.  Admitted for HF, BP management. Cards consulted; s/p stress test; EF 21%, WMA consistent with previous infarct. Optimizing GDMT for HF.     Interval Followup:   Seen following stress test; results discussed  Feeling better  Less SOB at rest and with exertion  No cp   No new complaints      Review of Systems  Cardiovascular:  No Chest Pain, No Edema, No palpitations  Respiratory:  No Cough, +Dyspnea (improved)  Gastrointestinal:  No Nausea, No Vomiting    Objective   Objective     Vitals:   Temp:  [97.3 °F (36.3 °C)-97.7 °F (36.5 °C)] 97.7 °F (36.5 °C)  Heart Rate:  [62-73] 66  Resp:  [18-20] 20  BP: (148-158)/(88-98) 148/88  Physical Exam    Constitutional: WNWD, conversant, NAD   Eyes: Pupils equal and reactive, no conjunctival injections   HENT: NCAT, nares patent, MMM   Respiratory: Clear to auscultation bilaterally, nonlabored respirations    Cardiovascular: RRR, no murmurs, no edema   Gastrointestinal: Positive bowel sounds, soft, nontender, nondistended   Neurologic: Alert, Cranial Nerves grossly intact, speech clear   MSK: R shoulder no swelling, pain with abduction above 90 degrees   Skin: Extremities warm, no rashes or wounds    Result Review    Result Review:  I have personally reviewed the following over the last 24 hours (07:00 to 07:00) and agree with the following findings  [x]  Laboratory  CBC          10/11/2023    04:20 10/22/2023    12:10 10/23/2023    03:30   CBC   WBC 6.56  8.43  8.40    RBC 3.99  4.31  3.68    Hemoglobin 10.8  11.8  10.0    Hematocrit 34.7  35.9  30.8    MCV 87.0  83.3  83.7    MCH  27.1  27.4  27.2    MCHC 31.1  32.9  32.5    RDW 18.2  16.9  17.2    Platelets 241  320  276      BMP          10/22/2023    12:10 10/23/2023    03:30 10/24/2023    05:17   BMP   BUN 32  34  32    Creatinine 1.90  2.11  2.07    Sodium 136  136  135    Potassium 4.0  3.7  3.6    Chloride 103  102  100    CO2 21.4  21.7  26.4    Calcium 9.2  8.6  8.9      []  Microbiology  []  Radiology   [x]  EKG/Telemetry monitor personally reviewed: NSR  [x]  Cardiology/Vascular   SPECT  Interpretation Summary         Left ventricular ejection fraction is severely reduced (Calculated EF = 21%).    Findings consistent with a normal ECG stress test.    Mild to moderate size apical, inferior apical, and mid inferior wall defect fixed with minimal ischemia consistent with prior infarct     []  Pathology  []  Old records  [x]  Other:    Intake/Output Summary (Last 24 hours) at 10/24/2023 1046  Last data filed at 10/24/2023 0505  Gross per 24 hour   Intake --   Output 2250 ml   Net -2250 ml         Assessment & Plan   Assessment / Plan     Assessment/Plan:  Acute on chronic HFrEF  NSTEMI type 2  Uncontrolled HTN  Uncontrolled T2DM  CKD IIIb  HLD  DVT on Eliquis  R shoulder pain; degenerative changes involving the AC joint       Cardiology on board; appreciate assistance  Hydralazine increased to 100 mg TID.   Isordil 20 mg three times daily add  Jardiance 10 mg daily added  Continue Coreg 25 mg twice daily  Cr near baseline. Continue IV lasix 40 mg q12 hours.Continue spironolactone 25 mg daily Monitor I/Os.   S/p SPECT -> F 21%, WMA consistent with previous infarct.  Continue baby aspirin and statin  Continue Eliquis 5 mg q12 hours  Recent HbA1c 10. GRANT added yesterday, blood sugar trend improving, received 9 units SSI. Continue Levemir 10 units daily with moderate dose SSI. Goal glucose 140-180.   DM educator consulted for insulin teaching  Continue Voltaren gel and as needed extra strength Tylenol  Low sodium, carb consistent  diet  Daily BMP to trend renal fxn and electrolytes    Discussed plan with Dr Mcdonnell and RN.    DVT prophylaxis:  Medical DVT prophylaxis orders are present.    CODE STATUS:   Code Status (Patient has no pulse and is not breathing): CPR (Attempt to Resuscitate)  Medical Interventions (Patient has pulse or is breathing): Full Support      Electronically signed by Mat Jaimes DO, 10/24/23, 10:47 AM EDT.

## 2023-10-24 NOTE — CONSULTS
10/24/23 1435   Coping/Psychosocial   Observed Emotional State pleasant   Verbalized Emotional State hopefulness   Additional Documentation Spiritual Care (Group)   Spiritual Care   Spiritual Care Source  initiative   Spiritual Care Follow-Up follow-up, none required as presently assessed   Response to Spiritual Care receptive of support;thanks expressed   Spiritual Care Interventions supportive conversation provided   Spiritual Care Visit Type initial   Spiritual Care Request coping/stress of illness support;spiritual/moral support   Receptivity to Spiritual Care visit welcomed

## 2023-10-24 NOTE — CONSULTS
Provided education and demonstration of self-administration of insulin via insulin pen. Patient able to return demonstration with minimal queuing. Provided written material, Clinical References, from within Epic including written directions on self-administration of insulin and a QR code with a link to video demonstration of self-administration of insulin. Verified that patients phone is compatible with QR codes and patient will have access to the video to refer to once home. Patient denies further needs at this time but states he will have his nurse contact DM CM for further questions.

## 2023-10-24 NOTE — PLAN OF CARE
Goal Outcome Evaluation:         Patient c/o severe headache last night, Fioricet given and pain decreased

## 2023-10-24 NOTE — PLAN OF CARE
Goal Outcome Evaluation:  Plan of Care Reviewed With: patient      VSS during shift. Stress test completed, see notes. Patient had complaints of shoulder pain and headache, treated per MAR. Patient ambulates well in room, is A/O x 4. Glucose treated per MAR. No other complaints. Continue plan of care.

## 2023-10-25 LAB
ANION GAP SERPL CALCULATED.3IONS-SCNC: 10.6 MMOL/L (ref 5–15)
BUN SERPL-MCNC: 38 MG/DL (ref 8–23)
BUN/CREAT SERPL: 15.7 (ref 7–25)
CALCIUM SPEC-SCNC: 8.8 MG/DL (ref 8.6–10.5)
CHLORIDE SERPL-SCNC: 98 MMOL/L (ref 98–107)
CO2 SERPL-SCNC: 25.4 MMOL/L (ref 22–29)
CREAT SERPL-MCNC: 2.42 MG/DL (ref 0.76–1.27)
EGFRCR SERPLBLD CKD-EPI 2021: 28.7 ML/MIN/1.73
GLUCOSE BLDC GLUCOMTR-MCNC: 121 MG/DL (ref 70–99)
GLUCOSE BLDC GLUCOMTR-MCNC: 161 MG/DL (ref 70–99)
GLUCOSE BLDC GLUCOMTR-MCNC: 167 MG/DL (ref 70–99)
GLUCOSE BLDC GLUCOMTR-MCNC: 265 MG/DL (ref 70–99)
GLUCOSE SERPL-MCNC: 192 MG/DL (ref 65–99)
POTASSIUM SERPL-SCNC: 4.2 MMOL/L (ref 3.5–5.2)
SODIUM SERPL-SCNC: 134 MMOL/L (ref 136–145)

## 2023-10-25 PROCEDURE — 99232 SBSQ HOSP IP/OBS MODERATE 35: CPT | Performed by: STUDENT IN AN ORGANIZED HEALTH CARE EDUCATION/TRAINING PROGRAM

## 2023-10-25 PROCEDURE — 63710000001 INSULIN LISPRO (HUMAN) PER 5 UNITS: Performed by: INTERNAL MEDICINE

## 2023-10-25 PROCEDURE — 82948 REAGENT STRIP/BLOOD GLUCOSE: CPT

## 2023-10-25 PROCEDURE — 99232 SBSQ HOSP IP/OBS MODERATE 35: CPT | Performed by: INTERNAL MEDICINE

## 2023-10-25 PROCEDURE — 25010000002 FUROSEMIDE PER 20 MG: Performed by: INTERNAL MEDICINE

## 2023-10-25 PROCEDURE — 63710000001 INSULIN DETEMIR PER 5 UNITS: Performed by: INTERNAL MEDICINE

## 2023-10-25 PROCEDURE — 80048 BASIC METABOLIC PNL TOTAL CA: CPT | Performed by: STUDENT IN AN ORGANIZED HEALTH CARE EDUCATION/TRAINING PROGRAM

## 2023-10-25 RX ORDER — HYDRALAZINE HYDROCHLORIDE 50 MG/1
50 TABLET, FILM COATED ORAL EVERY 12 HOURS SCHEDULED
Status: DISCONTINUED | OUTPATIENT
Start: 2023-10-26 | End: 2023-10-27

## 2023-10-25 RX ADMIN — EMPAGLIFLOZIN 10 MG: 10 TABLET, FILM COATED ORAL at 09:45

## 2023-10-25 RX ADMIN — DICLOFENAC SODIUM 2 G: 10 GEL TOPICAL at 17:10

## 2023-10-25 RX ADMIN — Medication 10 ML: at 21:06

## 2023-10-25 RX ADMIN — ATORVASTATIN CALCIUM 80 MG: 40 TABLET, FILM COATED ORAL at 21:05

## 2023-10-25 RX ADMIN — POTASSIUM CHLORIDE 20 MEQ: 1.5 POWDER, FOR SOLUTION ORAL at 09:42

## 2023-10-25 RX ADMIN — DICLOFENAC SODIUM 2 G: 10 GEL TOPICAL at 12:20

## 2023-10-25 RX ADMIN — INSULIN LISPRO 2 UNITS: 100 INJECTION, SOLUTION INTRAVENOUS; SUBCUTANEOUS at 07:52

## 2023-10-25 RX ADMIN — APIXABAN 5 MG: 5 TABLET, FILM COATED ORAL at 09:42

## 2023-10-25 RX ADMIN — DICLOFENAC SODIUM 2 G: 10 GEL TOPICAL at 07:54

## 2023-10-25 RX ADMIN — ASPIRIN 81 MG: 81 TABLET, COATED ORAL at 09:42

## 2023-10-25 RX ADMIN — INSULIN DETEMIR 10 UNITS: 100 INJECTION, SOLUTION SUBCUTANEOUS at 09:42

## 2023-10-25 RX ADMIN — Medication 10 ML: at 09:44

## 2023-10-25 RX ADMIN — FUROSEMIDE 40 MG: 10 INJECTION, SOLUTION INTRAMUSCULAR; INTRAVENOUS at 09:42

## 2023-10-25 RX ADMIN — ISOSORBIDE DINITRATE 10 MG: 10 TABLET ORAL at 17:07

## 2023-10-25 RX ADMIN — INSULIN LISPRO 2 UNITS: 100 INJECTION, SOLUTION INTRAVENOUS; SUBCUTANEOUS at 12:20

## 2023-10-25 RX ADMIN — CARVEDILOL 25 MG: 12.5 TABLET, FILM COATED ORAL at 17:12

## 2023-10-25 RX ADMIN — INSULIN LISPRO 6 UNITS: 100 INJECTION, SOLUTION INTRAVENOUS; SUBCUTANEOUS at 17:07

## 2023-10-25 RX ADMIN — VALSARTAN 20 MG: 40 TABLET, FILM COATED ORAL at 09:42

## 2023-10-25 RX ADMIN — SPIRONOLACTONE 25 MG: 25 TABLET ORAL at 09:42

## 2023-10-25 RX ADMIN — APIXABAN 5 MG: 5 TABLET, FILM COATED ORAL at 21:06

## 2023-10-25 RX ADMIN — DICLOFENAC SODIUM 2 G: 10 GEL TOPICAL at 21:09

## 2023-10-25 RX ADMIN — CARVEDILOL 25 MG: 12.5 TABLET, FILM COATED ORAL at 07:53

## 2023-10-25 RX ADMIN — Medication 1000 UNITS: at 09:42

## 2023-10-25 RX ADMIN — ISOSORBIDE DINITRATE 10 MG: 10 TABLET ORAL at 09:42

## 2023-10-25 RX ADMIN — HYDRALAZINE HYDROCHLORIDE 100 MG: 50 TABLET, FILM COATED ORAL at 09:42

## 2023-10-25 RX ADMIN — ACETAMINOPHEN 1000 MG: 500 TABLET ORAL at 21:07

## 2023-10-25 NOTE — PROGRESS NOTES
CARDIOLOGY  INPATIENT PROGRESS NOTE                [facility] 4TH FLOOR MEDICAL TELEMETRY UNIT    10/25/2023      PATIENT IDENTIFICATION:   Name:  Lyle Lozano      MRN:  [id number]     68 y.o.  male                 SUBJECTIVE:   Patient with persistent shortness of breath but overall feeling better since his admission blood pressure today has been on the low normal side.  OBJECTIVE:  Vitals:    10/25/23 0100 10/25/23 0245 10/25/23 0710 10/25/23 1024   BP:  114/62 124/78 100/70   BP Location:  Right arm Right arm Right arm   Patient Position:  Lying Lying Lying   Pulse:  63 68 70   Resp:  18 18 18   Temp:  97.9 °F (36.6 °C) 97.3 °F (36.3 °C) 97.8 °F (36.6 °C)   TempSrc:  Oral Oral Oral   SpO2:  100% 98% 100%   Weight: 78.9 kg (173 lb 15.1 oz)      Height:               Body mass index is 27.24 kg/m².  No intake or output data in the 24 hours ending 10/25/23 1543    Telemetry: Normal sinus rhythm    Physical Exam  General Appearance:   no acute distress  Alert and oriented x3  HENT:   lips not cyanotic  Atraumatic  Neck:  No jvd   supple  Respiratory:  no respiratory distress  normal breath sounds  no rales  Cardiovascular:    regular rhythm  no S3, no S4   no murmur  no rub  lower extremity edema: Trace skin:   warm, dry  No rashes      No Known Allergies  Scheduled meds:  apixaban, 5 mg, Oral, Q12H  aspirin, 81 mg, Oral, Daily  atorvastatin, 80 mg, Oral, Nightly  carvedilol, 25 mg, Oral, BID With Meals  cholecalciferol, 1,000 Units, Oral, Daily  Diclofenac Sodium, 2 g, Topical, 4x Daily  empagliflozin, 10 mg, Oral, Daily  [START ON 10/26/2023] hydrALAZINE, 50 mg, Oral, Q12H  insulin detemir, 10 Units, Subcutaneous, Daily  insulin lispro, 2-9 Units, Subcutaneous, 4x Daily AC & at Bedtime  isosorbide dinitrate, 10 mg, Oral, BID - Nitrates  potassium chloride, 20 mEq, Oral, BID  sodium chloride, 10 mL, Intravenous, Q12H  spironolactone, 25 mg, Oral, Daily  valsartan, 20 mg, Oral, Q24H      IV  "meds:                         Data Review:  CBC          10/11/2023    04:20 10/22/2023    12:10 10/23/2023    03:30   CBC   WBC 6.56  8.43  8.40    RBC 3.99  4.31  3.68    Hemoglobin 10.8  11.8  10.0    Hematocrit 34.7  35.9  30.8    MCV 87.0  83.3  83.7    MCH 27.1  27.4  27.2    MCHC 31.1  32.9  32.5    RDW 18.2  16.9  17.2    Platelets 241  320  276      CMP          10/23/2023    03:30 10/24/2023    05:17 10/25/2023    05:00   CMP   Glucose 248  176  192    BUN 34  32  38    Creatinine 2.11  2.07  2.42    EGFR 33.9  34.7  28.7    Sodium 136  135  134    Potassium 3.7  3.6  4.2    Chloride 102  100  98    Calcium 8.6  8.9  8.8    BUN/Creatinine Ratio 16.1  15.5  15.7    Anion Gap 12.3  8.6  10.6       CARDIAC LABS:      Lab 10/22/23  1943 10/22/23  1407 10/22/23  1210   PROBNP  --   --  27,133.0*   HSTROP T 145* 136* 159*        No results found for: \"DIGOXIN\"   Lab Results   Component Value Date    TSH 1.760 08/21/2023           Invalid input(s): \"LDLCALC\"  No results found for: \"POCTROP\"  Lab Results   Component Value Date    TROPONINT 145 (C) 10/22/2023   (  Lab Results   Component Value Date    MG 2.0 10/23/2023     Results for orders placed during the hospital encounter of 10/06/23    Adult Transthoracic Echo Complete W/ Cont if Necessary Per Protocol    Interpretation Summary    Left ventricular ejection fraction appears to be 26 - 30%.  Appears to have global hypokinesis worse in the apex and anterior walls.    The left ventricular cavity is mildly dilated.    Left ventricular wall thickness is consistent with borderline concentric hypertrophy.    Left ventricular diastolic function was indeterminate.    Mildly reduced right ventricular systolic function noted.    The left atrial cavity is mildly dilated.    Patient has sclerotic aortic valve.  It does appear to open somewhat.  He could have some degree of low gradient AS.  Does not appear likely severe.           ASSESSMENT:    Acute decompensated heart " failure    Acute on chronic HFrEF (heart failure with reduced ejection fraction)        PLAN:  1.  Given his low normal blood pressure today recommend holding hydralazine and decreasing dose to 50 twice daily dosing this also be easier for him to take at home.  2.  We will hold further Lasix today reassess renal function tomorrow to decide about diuretics  3.  Continue with his other medications for heart failure including Coreg and Jardiance with changing over to Farxiga as outpatient  4.  Hold Aldactone for now as well  5.  Repeat BMP in a.m.          Brigido Mcdonnell MD  10/25/2023    15:43 EDT

## 2023-10-25 NOTE — PLAN OF CARE
Goal Outcome Evaluation:  Plan of Care Reviewed With: patient         VSS during shift. Patient had complaints of shoulder pain, treated per MAR. Patient ambulates in room independently. No other complaints at this time. Continue plan of care.

## 2023-10-25 NOTE — PROGRESS NOTES
[facility]   Hospitalist Progress Note  Date: 10/25/2023  Patient Name: Lyle Lozano  : 1955  MRN: [id number]  Date of admission: 10/22/2023  Room/Bed: 410/1      Subjective   Subjective     Chief Complaint: Follow-up of shortness of breath    Summary:Lyle Lozano is a 68 y.o. male with past medical history of hypertension, CKD stage IIIb, HFrEF, DVT on Eliquis and type 2 diabetes mellitus who presented with complaints of shortness of breath and chest pain.  Patient was hypertensive on presentation with elevated troponin.  proBNP was 27,000.  Patient was admitted for heart failure and hypertension management.  Cardiology was consulted, patient underwent stress test which showed EF of 21% and wall motion abnormality consistent with previous infarct.  Currently on optimizing GDMT for HFrEF.  Cardiology following the patient.    Interval Followup: No acute events overnight.  He was lying comfortably in his bed.  He denied any fever, chills, rigors, chest pain or difficulty breathing.  Output of 2.2 L in last 24 hours.  Blood pressure stable overnight with latest blood pressure 124/78.  Blood glucose level between 108-261; 167 this morning.    Review of Systems    All systems reviewed and negative except for what is outlined above.      Objective   Objective     Vitals:   Temp:  [97.3 °F (36.3 °C)-98.2 °F (36.8 °C)] 97.3 °F (36.3 °C)  Heart Rate:  [63-80] 68  Resp:  [18-20] 18  BP: (114-156)/(62-91) 124/78    Physical Exam   General: Awake, alert, NAD  Cardiovascular: RRR, no murmurs; no pedal edema.  Pulmonary: CTA bilaterally; no wheezes; no conversational dyspnea  Gastrointestinal: S/ND/NT, +BS  Musculoskeletal: No gross deformities  Skin: No jaundice, no rash on exposed skin appreciated  Neuro: CN II through XII grossly intact; speech clear; no tremor  Psych: Mood and affect appropriate  : No [place] catheter; no suprapubic tenderness    Result Review    Result Review:  I have  personally reviewed these results:  [x]  Laboratory      Lab 10/23/23  0330 10/22/23  1210   WBC 8.40 8.43   HEMOGLOBIN 10.0* 11.8*   HEMATOCRIT 30.8* 35.9*   PLATELETS 276 320   NEUTROS ABS 5.90 6.47   IMMATURE GRANS (ABS) 0.03 0.02   LYMPHS ABS 1.90 1.57   MONOS ABS 0.44 0.36   EOS ABS 0.09 0.00   MCV 83.7 83.3         Lab 10/25/23  0500 10/24/23  0517 10/23/23  0330 10/22/23  1210   SODIUM 134* 135* 136 136   POTASSIUM 4.2 3.6 3.7 4.0   CHLORIDE 98 100 102 103   CO2 25.4 26.4 21.7* 21.4*   ANION GAP 10.6 8.6 12.3 11.6   BUN 38* 32* 34* 32*   CREATININE 2.42* 2.07* 2.11* 1.90*   EGFR 28.7* 34.7* 33.9* 38.4*   GLUCOSE 192* 176* 248* 265*   CALCIUM 8.8 8.9 8.6 9.2   MAGNESIUM  --   --  2.0 2.3   PHOSPHORUS  --   --  3.5  --          Lab 10/22/23  1210   TOTAL PROTEIN 7.3   ALBUMIN 3.5   GLOBULIN 3.8   ALT (SGPT) 27   AST (SGOT) 25   BILIRUBIN 0.4   ALK PHOS 116   LIPASE 15         Lab 10/22/23  1943 10/22/23  1407 10/22/23  1210   PROBNP  --   --  27,133.0*   HSTROP T 145* 136* 159*                 Brief Urine Lab Results  (Last result in the past 365 days)        Color   Clarity   Blood   Leuk Est   Nitrite   Protein   CREAT   Urine HCG        07/08/23 0053 Yellow   Clear   Small (1+)   Negative   Negative   >=300 mg/dL (3+)                 [x]  Microbiology   Microbiology Results (last 10 days)       ** No results found for the last 240 hours. **          [x]  Radiology  CT Chest Without Contrast Diagnostic    Result Date: 10/22/2023   1. Minimal ground-glass opacity in the lung base near the diaphragm may represent atelectasis or residual or recurrent infectious or inflammatory process, the overall CT appearance of the chest has improved since 5/11/2023.  No airspace consolidations or suspicious pulmonary nodules. 2. Stable cardiomegaly with coronary artery calcifications.      JABARI RABAGO DO       Electronically Signed and Approved By: JABARI RABAGO DO on 10/22/2023 at 15:06             XR Chest 1  View    Result Date: 10/22/2023   No active cardiopulmonary disease.       JABARI RABAGO DO       Electronically Signed and Approved By: JABARI RABAGO DO on 10/22/2023 at 12:57             XR Shoulder 2+ View Right    Result Date: 10/21/2023    1. Evidence for degenerative change involving the right shoulder.      FABIEN BOONE MD       Electronically Signed and Approved By: FABIEN BOONE MD on 10/21/2023 at 13:33            []  EKG/Telemetry   []  Cardiology/Vascular   []  Pathology  []  Old records  []  Other:    Assessment & Plan   Assessment / Plan     Assessment:  Acute on chronic HFrEF  Type II NSTEMI  Uncontrolled hypertension  Uncontrolled type 2 diabetes mellitus  CKD stage IIIb  Hyperlipidemia  DVT on Eliquis  Right shoulder pain, degenerative changes involving the AC joint.    Plan:  Patient currently saturating well on room air.  Cardiology following the patient for acute on chronic HFrEF and uncontrolled hypertension.  Holding hydralazine today given his low normal blood pressure, will restart by decreasing the dose to 50 mg twice daily from tomorrow if blood pressure remains stable.  Also holding Lasix today in the setting of worsening JERSEY with creatinine of 2.42.  Will decide about diuretics depending on the renal function tomorrow.  Continue Coreg and Jardiance for GDMT for HFrEF.  Plan to change Jardiance to Farxiga as outpatient by cardiology.  Continue to hold Aldactone.  Continue aspirin and statin.  Continue Eliquis 5 mg twice daily.  Blood glucose 108-167.  Continue current insulin detemir 10 units daily with insulin sliding scale.  Goal glucose level of 140-180.  DM educator was consulted for insulin teaching.  Currently on low-sodium, consistent carbohydrate diet.  Continue.  Cardiology following the patient, appreciate further recommendation.  Continue rest of the current management.         DVT prophylaxis:  Medical DVT prophylaxis orders are present.    CODE STATUS:   Code Status (Patient  has no pulse and is not breathing): CPR (Attempt to Resuscitate)  Medical Interventions (Patient has pulse or is breathing): Full Support      Electronically signed by Boston Richards MD, 10/25/23, 8:17 AM EDT.

## 2023-10-25 NOTE — PLAN OF CARE
Problem: Adult Inpatient Plan of Care  Goal: Plan of Care Review  10/25/2023 0559 by Cyn Lawson RN  Outcome: Ongoing, Progressing  Flowsheets (Taken 10/25/2023 0559)  Progress: improving  Plan of Care Reviewed With: patient  Outcome Evaluation: Patient alert and oriented x 4. Patient remains on room air, tolerating well. Shoulder pain treated per MAR, intervention effective per patient. Continuing with plan of care.  10/25/2023 0549 by Cyn Lawson RN  Outcome: Ongoing, Progressing  Flowsheets (Taken 10/25/2023 0547)  Plan of Care Reviewed With: patient   Goal Outcome Evaluation:  Plan of Care Reviewed With: patient        Progress: improving  Outcome Evaluation: Patient alert and oriented x 4. Patient remains on room air, tolerating well. Shoulder pain treated per MAR, intervention effective per patient. Continuing with plan of care.

## 2023-10-26 ENCOUNTER — APPOINTMENT (OUTPATIENT)
Dept: GENERAL RADIOLOGY | Facility: HOSPITAL | Age: 68
DRG: 280 | End: 2023-10-26
Payer: MEDICARE

## 2023-10-26 LAB
ANION GAP SERPL CALCULATED.3IONS-SCNC: 9.3 MMOL/L (ref 5–15)
BASOPHILS # BLD AUTO: 0.05 10*3/MM3 (ref 0–0.2)
BASOPHILS NFR BLD AUTO: 0.7 % (ref 0–1.5)
BUN SERPL-MCNC: 50 MG/DL (ref 8–23)
BUN/CREAT SERPL: 18.9 (ref 7–25)
CALCIUM SPEC-SCNC: 8.3 MG/DL (ref 8.6–10.5)
CHLORIDE SERPL-SCNC: 100 MMOL/L (ref 98–107)
CO2 SERPL-SCNC: 23.7 MMOL/L (ref 22–29)
CREAT SERPL-MCNC: 2.65 MG/DL (ref 0.76–1.27)
DEPRECATED RDW RBC AUTO: 52.9 FL (ref 37–54)
EGFRCR SERPLBLD CKD-EPI 2021: 25.8 ML/MIN/1.73
EOSINOPHIL # BLD AUTO: 0.28 10*3/MM3 (ref 0–0.4)
EOSINOPHIL NFR BLD AUTO: 3.7 % (ref 0.3–6.2)
ERYTHROCYTE [DISTWIDTH] IN BLOOD BY AUTOMATED COUNT: 17.3 % (ref 12.3–15.4)
GLUCOSE BLDC GLUCOMTR-MCNC: 144 MG/DL (ref 70–99)
GLUCOSE BLDC GLUCOMTR-MCNC: 189 MG/DL (ref 70–99)
GLUCOSE BLDC GLUCOMTR-MCNC: 210 MG/DL (ref 70–99)
GLUCOSE BLDC GLUCOMTR-MCNC: 211 MG/DL (ref 70–99)
GLUCOSE SERPL-MCNC: 181 MG/DL (ref 65–99)
HCT VFR BLD AUTO: 34.5 % (ref 37.5–51)
HGB BLD-MCNC: 11.2 G/DL (ref 13–17.7)
IMM GRANULOCYTES # BLD AUTO: 0.02 10*3/MM3 (ref 0–0.05)
IMM GRANULOCYTES NFR BLD AUTO: 0.3 % (ref 0–0.5)
LYMPHOCYTES # BLD AUTO: 2.7 10*3/MM3 (ref 0.7–3.1)
LYMPHOCYTES NFR BLD AUTO: 35.6 % (ref 19.6–45.3)
MAGNESIUM SERPL-MCNC: 2 MG/DL (ref 1.6–2.4)
MCH RBC QN AUTO: 27 PG (ref 26.6–33)
MCHC RBC AUTO-ENTMCNC: 32.5 G/DL (ref 31.5–35.7)
MCV RBC AUTO: 83.1 FL (ref 79–97)
MONOCYTES # BLD AUTO: 0.58 10*3/MM3 (ref 0.1–0.9)
MONOCYTES NFR BLD AUTO: 7.6 % (ref 5–12)
NEUTROPHILS NFR BLD AUTO: 3.96 10*3/MM3 (ref 1.7–7)
NEUTROPHILS NFR BLD AUTO: 52.1 % (ref 42.7–76)
NRBC BLD AUTO-RTO: 0 /100 WBC (ref 0–0.2)
NT-PROBNP SERPL-MCNC: 3200 PG/ML (ref 0–900)
PHOSPHATE SERPL-MCNC: 4.2 MG/DL (ref 2.5–4.5)
PLATELET # BLD AUTO: 251 10*3/MM3 (ref 140–450)
PMV BLD AUTO: 9.6 FL (ref 6–12)
POTASSIUM SERPL-SCNC: 4.3 MMOL/L (ref 3.5–5.2)
QT INTERVAL: 447 MS
QTC INTERVAL: 465 MS
RBC # BLD AUTO: 4.15 10*6/MM3 (ref 4.14–5.8)
SODIUM SERPL-SCNC: 133 MMOL/L (ref 136–145)
WBC NRBC COR # BLD: 7.59 10*3/MM3 (ref 3.4–10.8)

## 2023-10-26 PROCEDURE — 25010000002 DIPHENHYDRAMINE PER 50 MG

## 2023-10-26 PROCEDURE — 85025 COMPLETE CBC W/AUTO DIFF WBC: CPT | Performed by: STUDENT IN AN ORGANIZED HEALTH CARE EDUCATION/TRAINING PROGRAM

## 2023-10-26 PROCEDURE — 84100 ASSAY OF PHOSPHORUS: CPT | Performed by: STUDENT IN AN ORGANIZED HEALTH CARE EDUCATION/TRAINING PROGRAM

## 2023-10-26 PROCEDURE — 99232 SBSQ HOSP IP/OBS MODERATE 35: CPT | Performed by: STUDENT IN AN ORGANIZED HEALTH CARE EDUCATION/TRAINING PROGRAM

## 2023-10-26 PROCEDURE — 83735 ASSAY OF MAGNESIUM: CPT | Performed by: STUDENT IN AN ORGANIZED HEALTH CARE EDUCATION/TRAINING PROGRAM

## 2023-10-26 PROCEDURE — 99232 SBSQ HOSP IP/OBS MODERATE 35: CPT | Performed by: INTERNAL MEDICINE

## 2023-10-26 PROCEDURE — 25010000002 PROCHLORPERAZINE 10 MG/2ML SOLUTION

## 2023-10-26 PROCEDURE — 71046 X-RAY EXAM CHEST 2 VIEWS: CPT

## 2023-10-26 PROCEDURE — 83880 ASSAY OF NATRIURETIC PEPTIDE: CPT | Performed by: INTERNAL MEDICINE

## 2023-10-26 PROCEDURE — 93005 ELECTROCARDIOGRAM TRACING: CPT | Performed by: INTERNAL MEDICINE

## 2023-10-26 PROCEDURE — 82948 REAGENT STRIP/BLOOD GLUCOSE: CPT

## 2023-10-26 PROCEDURE — 80048 BASIC METABOLIC PNL TOTAL CA: CPT | Performed by: INTERNAL MEDICINE

## 2023-10-26 PROCEDURE — 63710000001 INSULIN LISPRO (HUMAN) PER 5 UNITS: Performed by: INTERNAL MEDICINE

## 2023-10-26 PROCEDURE — 63710000001 INSULIN DETEMIR PER 5 UNITS: Performed by: INTERNAL MEDICINE

## 2023-10-26 PROCEDURE — 93010 ELECTROCARDIOGRAM REPORT: CPT | Performed by: INTERNAL MEDICINE

## 2023-10-26 RX ORDER — PROCHLORPERAZINE EDISYLATE 5 MG/ML
5 INJECTION INTRAMUSCULAR; INTRAVENOUS ONCE
Status: COMPLETED | OUTPATIENT
Start: 2023-10-26 | End: 2023-10-26

## 2023-10-26 RX ORDER — DIPHENHYDRAMINE HYDROCHLORIDE 50 MG/ML
10 INJECTION INTRAMUSCULAR; INTRAVENOUS ONCE
Status: COMPLETED | OUTPATIENT
Start: 2023-10-26 | End: 2023-10-26

## 2023-10-26 RX ADMIN — Medication 10 ML: at 10:07

## 2023-10-26 RX ADMIN — EMPAGLIFLOZIN 10 MG: 10 TABLET, FILM COATED ORAL at 10:06

## 2023-10-26 RX ADMIN — CARVEDILOL 25 MG: 12.5 TABLET, FILM COATED ORAL at 10:06

## 2023-10-26 RX ADMIN — POTASSIUM CHLORIDE 20 MEQ: 1.5 POWDER, FOR SOLUTION ORAL at 10:05

## 2023-10-26 RX ADMIN — INSULIN DETEMIR 10 UNITS: 100 INJECTION, SOLUTION SUBCUTANEOUS at 10:05

## 2023-10-26 RX ADMIN — INSULIN LISPRO 4 UNITS: 100 INJECTION, SOLUTION INTRAVENOUS; SUBCUTANEOUS at 12:18

## 2023-10-26 RX ADMIN — HYDRALAZINE HYDROCHLORIDE 50 MG: 50 TABLET, FILM COATED ORAL at 10:08

## 2023-10-26 RX ADMIN — POTASSIUM CHLORIDE 20 MEQ: 1.5 POWDER, FOR SOLUTION ORAL at 20:56

## 2023-10-26 RX ADMIN — PROCHLORPERAZINE EDISYLATE 5 MG: 5 INJECTION INTRAMUSCULAR; INTRAVENOUS at 02:45

## 2023-10-26 RX ADMIN — DICLOFENAC SODIUM 2 G: 10 GEL TOPICAL at 10:07

## 2023-10-26 RX ADMIN — Medication 1200 MG: at 17:11

## 2023-10-26 RX ADMIN — ISOSORBIDE DINITRATE 10 MG: 10 TABLET ORAL at 17:10

## 2023-10-26 RX ADMIN — DICLOFENAC SODIUM 2 G: 10 GEL TOPICAL at 20:57

## 2023-10-26 RX ADMIN — APIXABAN 5 MG: 5 TABLET, FILM COATED ORAL at 21:01

## 2023-10-26 RX ADMIN — DICLOFENAC SODIUM 2 G: 10 GEL TOPICAL at 17:11

## 2023-10-26 RX ADMIN — INSULIN LISPRO 4 UNITS: 100 INJECTION, SOLUTION INTRAVENOUS; SUBCUTANEOUS at 20:56

## 2023-10-26 RX ADMIN — VALSARTAN 20 MG: 40 TABLET, FILM COATED ORAL at 10:05

## 2023-10-26 RX ADMIN — DIPHENHYDRAMINE HYDROCHLORIDE 10 MG: 50 INJECTION, SOLUTION INTRAMUSCULAR; INTRAVENOUS at 02:45

## 2023-10-26 RX ADMIN — Medication 1000 UNITS: at 10:06

## 2023-10-26 RX ADMIN — INSULIN LISPRO 4 UNITS: 100 INJECTION, SOLUTION INTRAVENOUS; SUBCUTANEOUS at 17:10

## 2023-10-26 RX ADMIN — ATORVASTATIN CALCIUM 80 MG: 40 TABLET, FILM COATED ORAL at 20:56

## 2023-10-26 RX ADMIN — Medication 10 ML: at 20:57

## 2023-10-26 RX ADMIN — ISOSORBIDE DINITRATE 10 MG: 10 TABLET ORAL at 10:05

## 2023-10-26 RX ADMIN — ASPIRIN 81 MG: 81 TABLET, COATED ORAL at 10:05

## 2023-10-26 RX ADMIN — HYDRALAZINE HYDROCHLORIDE 50 MG: 50 TABLET, FILM COATED ORAL at 20:56

## 2023-10-26 RX ADMIN — CARVEDILOL 25 MG: 12.5 TABLET, FILM COATED ORAL at 17:11

## 2023-10-26 RX ADMIN — ACETAMINOPHEN 1000 MG: 500 TABLET ORAL at 20:56

## 2023-10-26 RX ADMIN — APIXABAN 5 MG: 5 TABLET, FILM COATED ORAL at 10:06

## 2023-10-26 RX ADMIN — DICLOFENAC SODIUM 2 G: 10 GEL TOPICAL at 12:20

## 2023-10-26 NOTE — PROGRESS NOTES
CARDIOLOGY  INPATIENT PROGRESS NOTE                [facility] 4TH FLOOR MEDICAL TELEMETRY UNIT    10/26/2023      PATIENT IDENTIFICATION:   Name:  Lyle Lozano      MRN:  [id number]     68 y.o.  male                 SUBJECTIVE:   Patient more short of breath overnight is feeling a little better this morning denies any chest pain has had some right shoulder pain.  OBJECTIVE:  Vitals:    10/25/23 1751 10/25/23 2046 10/26/23 0005 10/26/23 0735   BP: 99/68 105/61 132/72 147/84   BP Location:  Right arm Right arm Right arm   Patient Position: Standing Lying Lying Lying   Pulse:  70 65 68   Resp:  18 18 18   Temp:  97.7 °F (36.5 °C) 98.1 °F (36.7 °C) 97.3 °F (36.3 °C)   TempSrc:  Oral Oral Oral   SpO2:  99% 100% 99%   Weight:       Height:               Body mass index is 27.24 kg/m².    Intake/Output Summary (Last 24 hours) at 10/26/2023 0907  Last data filed at 10/25/2023 1800  Gross per 24 hour   Intake 480 ml   Output --   Net 480 ml       Telemetry: Normal sinus rhythm    Physical Exam  General Appearance:   no acute distress  Alert and oriented x3  HENT:   lips not cyanotic  Atraumatic  Neck:  No jvd   supple  Respiratory:  no respiratory distress  normal breath sounds  no rales  Cardiovascular:    regular rhythm  no S3, no S4   no murmur  no rub  lower extremity edema: none    Skin:   warm, dry  No rashes      No Known Allergies  Scheduled meds:  apixaban, 5 mg, Oral, Q12H  aspirin, 81 mg, Oral, Daily  atorvastatin, 80 mg, Oral, Nightly  carvedilol, 25 mg, Oral, BID With Meals  cholecalciferol, 1,000 Units, Oral, Daily  Diclofenac Sodium, 2 g, Topical, 4x Daily  empagliflozin, 10 mg, Oral, Daily  hydrALAZINE, 50 mg, Oral, Q12H  insulin detemir, 10 Units, Subcutaneous, Daily  insulin lispro, 2-9 Units, Subcutaneous, 4x Daily AC & at Bedtime  isosorbide dinitrate, 10 mg, Oral, BID - Nitrates  potassium chloride, 20 mEq, Oral, BID  sodium chloride, 10 mL, Intravenous, Q12H  valsartan, 20 mg, Oral,  "Q24H      IV meds:                         Data Review:  CBC          10/22/2023    12:10 10/23/2023    03:30 10/26/2023    04:57   CBC   WBC 8.43  8.40  7.59    RBC 4.31  3.68  4.15    Hemoglobin 11.8  10.0  11.2    Hematocrit 35.9  30.8  34.5    MCV 83.3  83.7  83.1    MCH 27.4  27.2  27.0    MCHC 32.9  32.5  32.5    RDW 16.9  17.2  17.3    Platelets 320  276  251      CMP          10/24/2023    05:17 10/25/2023    05:00 10/26/2023    04:57   CMP   Glucose 176  192  181    BUN 32  38  50    Creatinine 2.07  2.42  2.65    EGFR 34.7  28.7  25.8    Sodium 135  134  133    Potassium 3.6  4.2  4.3    Chloride 100  98  100    Calcium 8.9  8.8  8.3    BUN/Creatinine Ratio 15.5  15.7  18.9    Anion Gap 8.6  10.6  9.3       CARDIAC LABS:      Lab 10/22/23  1943 10/22/23  1407 10/22/23  1210   PROBNP  --   --  27,133.0*   HSTROP T 145* 136* 159*        No results found for: \"DIGOXIN\"   Lab Results   Component Value Date    TSH 1.760 08/21/2023           Invalid input(s): \"LDLCALC\"  No results found for: \"POCTROP\"  Lab Results   Component Value Date    TROPONINT 145 (C) 10/22/2023   (  Lab Results   Component Value Date    MG 2.0 10/26/2023     Results for orders placed during the hospital encounter of 10/06/23    Adult Transthoracic Echo Complete W/ Cont if Necessary Per Protocol    Interpretation Summary    Left ventricular ejection fraction appears to be 26 - 30%.  Appears to have global hypokinesis worse in the apex and anterior walls.    The left ventricular cavity is mildly dilated.    Left ventricular wall thickness is consistent with borderline concentric hypertrophy.    Left ventricular diastolic function was indeterminate.    Mildly reduced right ventricular systolic function noted.    The left atrial cavity is mildly dilated.    Patient has sclerotic aortic valve.  It does appear to open somewhat.  He could have some degree of low gradient AS.  Does not appear likely severe.           ASSESSMENT:    Acute " decompensated heart failure    Acute on chronic HFrEF (heart failure with reduced ejection fraction)    Acute on chronic HFrEF patient with worsening shortness of breath overnight had clinically been doing better does not appear to be distressed this a.m. or tachypneic but does feel different clinically do not have a clear clear explanation for the change.  Given increasing BUN/creatinine ratio we will hold off on further aggressive diuretics today patient also does not appear to be clinically volume overloaded on exam.  We will repeat a BNP level and chest x-ray      PLAN:  1.  Holding Lasix today given increased BUN/creatinine  2.  Continue with Coreg 25 twice daily, Jardiance 10 daily, hydralazine 50 twice daily/isosorbide 10 twice daily  3.  Check chest x-ray and proBNP level          Brigido Mcdonnell MD  10/26/2023    09:07 EDT

## 2023-10-26 NOTE — PLAN OF CARE
Problem: Adult Inpatient Plan of Care  Goal: Plan of Care Review  10/26/2023 0458 by Cyn Lawson RN  Outcome: Ongoing, Progressing  Flowsheets (Taken 10/26/2023 0458)  Progress: improving  Plan of Care Reviewed With: patient  Outcome Evaluation: Patient alert and orient x 4 throughout shift. Patient remains on room air, tolerating well. Headache treated per MAR. Continue with plan of care.   Goal Outcome Evaluation:  Plan of Care Reviewed With: patient        Progress: improving  Outcome Evaluation: Patient alert and orient x 4 throughout shift. Patient remains on room air, tolerating well. Headache treated per MAR. Continue with plan of care.

## 2023-10-27 ENCOUNTER — READMISSION MANAGEMENT (OUTPATIENT)
Dept: CALL CENTER | Facility: HOSPITAL | Age: 68
End: 2023-10-27
Payer: MEDICARE

## 2023-10-27 VITALS
HEART RATE: 66 BPM | BODY MASS INDEX: 25.67 KG/M2 | OXYGEN SATURATION: 100 % | TEMPERATURE: 97.9 F | DIASTOLIC BLOOD PRESSURE: 81 MMHG | RESPIRATION RATE: 18 BRPM | SYSTOLIC BLOOD PRESSURE: 133 MMHG | HEIGHT: 67 IN | WEIGHT: 163.58 LBS

## 2023-10-27 LAB
ANION GAP SERPL CALCULATED.3IONS-SCNC: 10 MMOL/L (ref 5–15)
BASOPHILS # BLD AUTO: 0.04 10*3/MM3 (ref 0–0.2)
BASOPHILS NFR BLD AUTO: 0.5 % (ref 0–1.5)
BUN SERPL-MCNC: 49 MG/DL (ref 8–23)
BUN/CREAT SERPL: 19.6 (ref 7–25)
CALCIUM SPEC-SCNC: 8.8 MG/DL (ref 8.6–10.5)
CHLORIDE SERPL-SCNC: 101 MMOL/L (ref 98–107)
CO2 SERPL-SCNC: 24 MMOL/L (ref 22–29)
CREAT SERPL-MCNC: 2.5 MG/DL (ref 0.76–1.27)
DEPRECATED RDW RBC AUTO: 54.7 FL (ref 37–54)
EGFRCR SERPLBLD CKD-EPI 2021: 27.6 ML/MIN/1.73
EOSINOPHIL # BLD AUTO: 0.37 10*3/MM3 (ref 0–0.4)
EOSINOPHIL NFR BLD AUTO: 5 % (ref 0.3–6.2)
ERYTHROCYTE [DISTWIDTH] IN BLOOD BY AUTOMATED COUNT: 17.4 % (ref 12.3–15.4)
GLUCOSE BLDC GLUCOMTR-MCNC: 132 MG/DL (ref 70–99)
GLUCOSE BLDC GLUCOMTR-MCNC: 170 MG/DL (ref 70–99)
GLUCOSE SERPL-MCNC: 149 MG/DL (ref 65–99)
HBA1C MFR BLD: 9.2 % (ref 4.8–5.6)
HCT VFR BLD AUTO: 36.7 % (ref 37.5–51)
HGB BLD-MCNC: 11.6 G/DL (ref 13–17.7)
IMM GRANULOCYTES # BLD AUTO: 0.02 10*3/MM3 (ref 0–0.05)
IMM GRANULOCYTES NFR BLD AUTO: 0.3 % (ref 0–0.5)
LYMPHOCYTES # BLD AUTO: 2.84 10*3/MM3 (ref 0.7–3.1)
LYMPHOCYTES NFR BLD AUTO: 38.3 % (ref 19.6–45.3)
MAGNESIUM SERPL-MCNC: 2.2 MG/DL (ref 1.6–2.4)
MCH RBC QN AUTO: 27 PG (ref 26.6–33)
MCHC RBC AUTO-ENTMCNC: 31.6 G/DL (ref 31.5–35.7)
MCV RBC AUTO: 85.5 FL (ref 79–97)
MONOCYTES # BLD AUTO: 0.61 10*3/MM3 (ref 0.1–0.9)
MONOCYTES NFR BLD AUTO: 8.2 % (ref 5–12)
NEUTROPHILS NFR BLD AUTO: 3.54 10*3/MM3 (ref 1.7–7)
NEUTROPHILS NFR BLD AUTO: 47.7 % (ref 42.7–76)
NRBC BLD AUTO-RTO: 0 /100 WBC (ref 0–0.2)
PHOSPHATE SERPL-MCNC: 4 MG/DL (ref 2.5–4.5)
PLATELET # BLD AUTO: 255 10*3/MM3 (ref 140–450)
PMV BLD AUTO: 9.9 FL (ref 6–12)
POTASSIUM SERPL-SCNC: 4.4 MMOL/L (ref 3.5–5.2)
RBC # BLD AUTO: 4.29 10*6/MM3 (ref 4.14–5.8)
SODIUM SERPL-SCNC: 135 MMOL/L (ref 136–145)
WBC NRBC COR # BLD: 7.42 10*3/MM3 (ref 3.4–10.8)

## 2023-10-27 PROCEDURE — 80048 BASIC METABOLIC PNL TOTAL CA: CPT | Performed by: STUDENT IN AN ORGANIZED HEALTH CARE EDUCATION/TRAINING PROGRAM

## 2023-10-27 PROCEDURE — 63710000001 INSULIN DETEMIR PER 5 UNITS: Performed by: INTERNAL MEDICINE

## 2023-10-27 PROCEDURE — 99232 SBSQ HOSP IP/OBS MODERATE 35: CPT | Performed by: INTERNAL MEDICINE

## 2023-10-27 PROCEDURE — 85025 COMPLETE CBC W/AUTO DIFF WBC: CPT | Performed by: STUDENT IN AN ORGANIZED HEALTH CARE EDUCATION/TRAINING PROGRAM

## 2023-10-27 PROCEDURE — 82948 REAGENT STRIP/BLOOD GLUCOSE: CPT

## 2023-10-27 PROCEDURE — 99239 HOSP IP/OBS DSCHRG MGMT >30: CPT | Performed by: STUDENT IN AN ORGANIZED HEALTH CARE EDUCATION/TRAINING PROGRAM

## 2023-10-27 PROCEDURE — 63710000001 INSULIN LISPRO (HUMAN) PER 5 UNITS: Performed by: INTERNAL MEDICINE

## 2023-10-27 PROCEDURE — 84100 ASSAY OF PHOSPHORUS: CPT | Performed by: STUDENT IN AN ORGANIZED HEALTH CARE EDUCATION/TRAINING PROGRAM

## 2023-10-27 PROCEDURE — 83735 ASSAY OF MAGNESIUM: CPT | Performed by: STUDENT IN AN ORGANIZED HEALTH CARE EDUCATION/TRAINING PROGRAM

## 2023-10-27 PROCEDURE — 83036 HEMOGLOBIN GLYCOSYLATED A1C: CPT | Performed by: STUDENT IN AN ORGANIZED HEALTH CARE EDUCATION/TRAINING PROGRAM

## 2023-10-27 RX ORDER — HYDRALAZINE HYDROCHLORIDE 50 MG/1
50 TABLET, FILM COATED ORAL EVERY 8 HOURS SCHEDULED
Status: DISCONTINUED | OUTPATIENT
Start: 2023-10-27 | End: 2023-10-27 | Stop reason: HOSPADM

## 2023-10-27 RX ORDER — POTASSIUM CHLORIDE 750 MG/1
10 CAPSULE, EXTENDED RELEASE ORAL DAILY
Qty: 30 CAPSULE | Refills: 0 | Status: CANCELLED | OUTPATIENT
Start: 2023-10-28 | End: 2023-11-27

## 2023-10-27 RX ORDER — ISOSORBIDE DINITRATE 10 MG/1
10 TABLET ORAL
Qty: 90 TABLET | Refills: 0 | Status: CANCELLED | OUTPATIENT
Start: 2023-10-27 | End: 2023-11-26

## 2023-10-27 RX ORDER — GLIPIZIDE 5 MG/1
5 TABLET ORAL
Qty: 60 TABLET | Refills: 0 | Status: SHIPPED | OUTPATIENT
Start: 2023-10-27 | End: 2023-11-26

## 2023-10-27 RX ORDER — VALSARTAN 40 MG/1
20 TABLET ORAL
Qty: 15 TABLET | Refills: 0 | Status: CANCELLED | OUTPATIENT
Start: 2023-10-28 | End: 2023-11-27

## 2023-10-27 RX ORDER — VALSARTAN 40 MG/1
20 TABLET ORAL
Qty: 15 TABLET | Refills: 0 | Status: SHIPPED | OUTPATIENT
Start: 2023-10-28 | End: 2023-11-27

## 2023-10-27 RX ORDER — POTASSIUM CHLORIDE 750 MG/1
10 CAPSULE, EXTENDED RELEASE ORAL DAILY
Qty: 30 CAPSULE | Refills: 0 | Status: SHIPPED | OUTPATIENT
Start: 2023-10-28 | End: 2023-11-27

## 2023-10-27 RX ORDER — HYDRALAZINE HYDROCHLORIDE 50 MG/1
50 TABLET, FILM COATED ORAL EVERY 8 HOURS SCHEDULED
Qty: 90 TABLET | Refills: 0 | Status: CANCELLED | OUTPATIENT
Start: 2023-10-27 | End: 2023-11-26

## 2023-10-27 RX ORDER — ISOSORBIDE DINITRATE 10 MG/1
10 TABLET ORAL
Status: DISCONTINUED | OUTPATIENT
Start: 2023-10-27 | End: 2023-10-27 | Stop reason: HOSPADM

## 2023-10-27 RX ORDER — POTASSIUM CHLORIDE 750 MG/1
10 CAPSULE, EXTENDED RELEASE ORAL DAILY
Status: DISCONTINUED | OUTPATIENT
Start: 2023-10-27 | End: 2023-10-27 | Stop reason: HOSPADM

## 2023-10-27 RX ORDER — FUROSEMIDE 40 MG/1
40 TABLET ORAL DAILY
Qty: 14 TABLET | Refills: 0 | Status: SHIPPED | OUTPATIENT
Start: 2023-10-27 | End: 2023-11-10

## 2023-10-27 RX ORDER — FUROSEMIDE 40 MG/1
40 TABLET ORAL 2 TIMES DAILY
Qty: 60 TABLET | Refills: 0 | Status: SHIPPED | OUTPATIENT
Start: 2023-10-27 | End: 2023-11-26

## 2023-10-27 RX ORDER — HYDRALAZINE HYDROCHLORIDE 50 MG/1
50 TABLET, FILM COATED ORAL EVERY 8 HOURS SCHEDULED
Qty: 90 TABLET | Refills: 0 | Status: SHIPPED | OUTPATIENT
Start: 2023-10-27 | End: 2023-11-26

## 2023-10-27 RX ORDER — ISOSORBIDE DINITRATE 10 MG/1
10 TABLET ORAL
Qty: 90 TABLET | Refills: 0 | Status: SHIPPED | OUTPATIENT
Start: 2023-10-27 | End: 2023-11-26

## 2023-10-27 RX ADMIN — INSULIN LISPRO 2 UNITS: 100 INJECTION, SOLUTION INTRAVENOUS; SUBCUTANEOUS at 09:35

## 2023-10-27 RX ADMIN — DICLOFENAC SODIUM 2 G: 10 GEL TOPICAL at 12:14

## 2023-10-27 RX ADMIN — Medication 10 ML: at 09:36

## 2023-10-27 RX ADMIN — CARVEDILOL 25 MG: 12.5 TABLET, FILM COATED ORAL at 09:34

## 2023-10-27 RX ADMIN — INSULIN DETEMIR 10 UNITS: 100 INJECTION, SOLUTION SUBCUTANEOUS at 09:35

## 2023-10-27 RX ADMIN — APIXABAN 5 MG: 5 TABLET, FILM COATED ORAL at 09:34

## 2023-10-27 RX ADMIN — VALSARTAN 20 MG: 40 TABLET, FILM COATED ORAL at 09:33

## 2023-10-27 RX ADMIN — ASPIRIN 81 MG: 81 TABLET, COATED ORAL at 09:34

## 2023-10-27 RX ADMIN — POTASSIUM CHLORIDE 20 MEQ: 1.5 POWDER, FOR SOLUTION ORAL at 09:33

## 2023-10-27 RX ADMIN — Medication 1000 UNITS: at 09:33

## 2023-10-27 RX ADMIN — DICLOFENAC SODIUM 2 G: 10 GEL TOPICAL at 09:36

## 2023-10-27 RX ADMIN — EMPAGLIFLOZIN 10 MG: 10 TABLET, FILM COATED ORAL at 09:33

## 2023-10-27 NOTE — DISCHARGE INSTR - LAB
You will be sent home on oral glipizide for diabetes. Your hgb A1C level will result tonight. MD will call you tomorrow with results of this. We may have to start insulin on Monday with PCP.

## 2023-10-27 NOTE — PROGRESS NOTES
CARDIOLOGY  INPATIENT PROGRESS NOTE                [facility] 4TH FLOOR MEDICAL TELEMETRY UNIT    10/27/2023      PATIENT IDENTIFICATION:   Name:  Lyle Lozano      MRN:  [id number]     68 y.o.  male                 SUBJECTIVE:   Patient feels better this morning anxious to get out of here blood pressure was good overnight but spiked this a.m.  OBJECTIVE:  Vitals:    10/26/23 1120 10/26/23 1605 10/26/23 1948 10/27/23 0725   BP: 117/67 141/83 136/72 (!) 175/105   BP Location: Right arm Right arm Right arm Right arm   Patient Position: Lying Lying Lying Lying   Pulse: 66 70 74 75   Resp: 18 18 18 18   Temp: 97.3 °F (36.3 °C) 97.5 °F (36.4 °C) 97.5 °F (36.4 °C) 97.2 °F (36.2 °C)   TempSrc: Oral Oral Oral Oral   SpO2: 100% 100% 100% 100%   Weight:    74.2 kg (163 lb 9.3 oz)   Height:               Body mass index is 25.62 kg/m².    Intake/Output Summary (Last 24 hours) at 10/27/2023 0984  Last data filed at 10/27/2023 0843  Gross per 24 hour   Intake 960 ml   Output --   Net 960 ml       Telemetry: Normal sinus rhythm this is more of enasidenib there is a patient echo on the floor and the he can probably go home today and was discharged make sure that you the rash chills on the floor they said doing well again adamant that he is not    Physical Exam  General Appearance:   no acute distress  Alert and oriented x3  HENT:   lips not cyanotic  Atraumatic  Neck:  No jvd   supple  Respiratory:  no respiratory distress  normal breath sounds  no rales  Cardiovascular:    regular rhythm  no S3, no S4   no murmur  no rub  lower extremity edema: none    Skin:   warm, dry  No rashes      No Known Allergies  Scheduled meds:  apixaban, 5 mg, Oral, Q12H  aspirin, 81 mg, Oral, Daily  atorvastatin, 80 mg, Oral, Nightly  carvedilol, 25 mg, Oral, BID With Meals  cholecalciferol, 1,000 Units, Oral, Daily  Diclofenac Sodium, 2 g, Topical, 4x Daily  empagliflozin, 10 mg, Oral, Daily  hydrALAZINE, 50 mg, Oral,  "Q12H  insulin detemir, 10 Units, Subcutaneous, Daily  insulin lispro, 2-9 Units, Subcutaneous, 4x Daily AC & at Bedtime  isosorbide dinitrate, 10 mg, Oral, BID - Nitrates  potassium chloride, 20 mEq, Oral, BID  sodium chloride, 10 mL, Intravenous, Q12H  valsartan, 20 mg, Oral, Q24H      IV meds:                         Data Review:  CBC          10/23/2023    03:30 10/26/2023    04:57 10/27/2023    05:21   CBC   WBC 8.40  7.59  7.42    RBC 3.68  4.15  4.29    Hemoglobin 10.0  11.2  11.6    Hematocrit 30.8  34.5  36.7    MCV 83.7  83.1  85.5    MCH 27.2  27.0  27.0    MCHC 32.5  32.5  31.6    RDW 17.2  17.3  17.4    Platelets 276  251  255      CMP          10/25/2023    05:00 10/26/2023    04:57 10/27/2023    05:21   CMP   Glucose 192  181  149    BUN 38  50  49    Creatinine 2.42  2.65  2.50    EGFR 28.7  25.8  27.6    Sodium 134  133  135    Potassium 4.2  4.3  4.4    Chloride 98  100  101    Calcium 8.8  8.3  8.8    BUN/Creatinine Ratio 15.7  18.9  19.6    Anion Gap 10.6  9.3  10.0       CARDIAC LABS:      Lab 10/26/23  0457 10/22/23  1943 10/22/23  1407 10/22/23  1210   PROBNP 3,200.0*  --   --  27,133.0*   HSTROP T  --  145* 136* 159*        No results found for: \"DIGOXIN\"   Lab Results   Component Value Date    TSH 1.760 08/21/2023           Invalid input(s): \"LDLCALC\"  No results found for: \"POCTROP\"  Lab Results   Component Value Date    TROPONINT 145 (C) 10/22/2023   (  Lab Results   Component Value Date    MG 2.2 10/27/2023     Results for orders placed during the hospital encounter of 10/06/23    Adult Transthoracic Echo Complete W/ Cont if Necessary Per Protocol    Interpretation Summary    Left ventricular ejection fraction appears to be 26 - 30%.  Appears to have global hypokinesis worse in the apex and anterior walls.    The left ventricular cavity is mildly dilated.    Left ventricular wall thickness is consistent with borderline concentric hypertrophy.    Left ventricular diastolic function was " indeterminate.    Mildly reduced right ventricular systolic function noted.    The left atrial cavity is mildly dilated.    Patient has sclerotic aortic valve.  It does appear to open somewhat.  He could have some degree of low gradient AS.  Does not appear likely severe.           ASSESSMENT:    Acute decompensated heart failure    Acute on chronic HFrEF (heart failure with reduced ejection fraction)        PLAN:  1.  Recommend increasing his hydralazine to 50 3 times daily and isosorbide to 10 3 times daily  2.  Resume diuretic with Lasix 40 daily  3.  Continue with the rest of his GDMT therapy with the hope of possibly advancing to Entresto if kidney function remained stable  4.  Decrease potassium to 10 mg once a day  5.  Feel the patient is stable for discharge today but would have close follow-up in the heart failure clinic next week early with repeat renal function test as well          Brigido Mcdonnell MD  10/27/2023    09:27 EDT

## 2023-10-27 NOTE — PLAN OF CARE
Problem: Adult Inpatient Plan of Care  Goal: Plan of Care Review  Outcome: Ongoing, Progressing  Flowsheets (Taken 10/27/2023 0609)  Progress: improving  Plan of Care Reviewed With: patient  Outcome Evaluation: Patient alert and orientd x 4 throughout shift. Patient remains on room air, tolerating well. Headache treated per MAR, intervention effective per patient. Hyperglycemia treated per MAR. Continue with plan of care.   Goal Outcome Evaluation:  Plan of Care Reviewed With: patient        Progress: improving  Outcome Evaluation: Patient alert and orientd x 4 throughout shift. Patient remains on room air, tolerating well. Headache treated per MAR, intervention effective per patient. Hyperglycemia treated per MAR. Continue with plan of care.

## 2023-10-27 NOTE — PLAN OF CARE
Goal Outcome Evaluation:      VS WNL. To discharge home. No complaints this shift.      Progress: improving

## 2023-10-27 NOTE — OUTREACH NOTE
Prep Survey      Flowsheet Row Responses   Congregational facility patient discharged from? [facility]   Is LACE score < 7 ? No   Eligibility [facility]   Hospital [facility]   Date of Admission 10/22/23   Date of Discharge 10/27/23   Discharge Disposition Home or Self Care   Discharge diagnosis Acute decompensated heart failure   Does the patient have one of the following disease processes/diagnoses(primary or secondary)? CHF   Does the patient have Home health ordered? No   Is there a DME ordered? No   Prep survey completed? Yes            ANUEL A - Registered Nurse

## 2023-10-28 NOTE — DISCHARGE SUMMARY
[facility]         HOSPITALIST  DISCHARGE SUMMARY    Patient Name: Lyle Lozano  : 1955  MRN: [id number]    Date of Admission: 10/22/2023  Date of Discharge:  10/27/2023  Primary Care Physician: Heidi Villa APRN    Consults       No orders found from 2023 to 10/23/2023.            Active and Resolved Hospital Problems:  Active Hospital Problems    Diagnosis POA    **Acute decompensated heart failure [I50.9] Yes    Acute on chronic HFrEF (heart failure with reduced ejection fraction) [I50.23] Yes      Resolved Hospital Problems   No resolved problems to display.       Hospital Course     Hospital Course:  Lyle Lozano is a 68 y.o. male with past medical history of hypertension, CKD stage IIIb, HFrEF, DVT on Eliquis and type 2 diabetes mellitus who presented with complaints of shortness of breath and chest pain.  Patient was hypertensive on presentation with elevated troponin.  proBNP was 27,000.  Patient was admitted for heart failure and hypertension management.  Cardiology was consulted, patient underwent stress test which showed EF of 21% and wall motion abnormality consistent with previous infarct.  Currently on optimizing GDMT for HFrEF.  Cardiology followed the patient throughout the hospitalization.  Patient was managed for acute on chronic HFrEF and uncontrolled hypertension.  Patient had worsening [place] for which Lasix was held during the hospitalization.  Later, patient is being discharged on Coreg, Jardiance and losartan at the time of discharge.  Holding spironolactone.  Also started on Lasix as per cardiology.  Patient's hydralazine dose was increased to 50 mg 3 times a day.Also discharged on isosorbide 10 mg 3 times daily.  Patient has shown dose decreased to 10 mg once a day.  Patient seems euvolemic at the time of discharge.  Patient is hemodynamically and clinically stable.  Patient is being discharged home and will follow-up with PCP in 3-7 days.   Needs to trend CBC and chemistries on Monday at the time of discharge.  Patient was found to have hypoglycemia with HbA1c 9.2.  Recent HbA1c was 10.  Patient currently on Jardiance.  Patient was advised to follow-up with PCP on Monday for repeating chemistry including renal function test and further management of diabetes as patient might need insulin therapy for high HbA1c.  He was advised to stay 1 more day and have insulin teaching but patient refused.  He preferred going to PCP on Monday.  Thus was discharged on Jardiance.  Risk was discussed with the patient which she understands.  Patient will also follow-up with cardiologist in heart failure clinic next week as recommended.  Advised to be compliant with medication and diet.  Rest of the management and recommendation as per cardiologist.      DISCHARGE Follow Up Recommendations for labs and diagnostics:   As mentioned above    Day of Discharge     Vital Signs:  Temp:  [97.2 °F (36.2 °C)-97.9 °F (36.6 °C)] 97.9 °F (36.6 °C)  Heart Rate:  [66-75] 66  Resp:  [18] 18  BP: (133-175)/() 133/81  Physical Exam:   General: Awake, alert, NAD  Cardiovascular: RRR, no murmurs; no pedal edema.  Pulmonary: CTA bilaterally; no wheezes; no conversational dyspnea  Gastrointestinal: S/ND/NT, +BS  Musculoskeletal: No gross deformities  Skin: No jaundice, no rash on exposed skin appreciated  Neuro: CN II through XII grossly intact; speech clear; no tremor  Psych: Mood and affect appropriate  : No [place] catheter; no suprapubic tenderness     Discharge Details        Discharge Medications        New Medications        Instructions Start Date   empagliflozin 10 MG tablet tablet  Commonly known as: JARDIANCE   10 mg, Oral, Daily   Start Date: October 28, 2023     furosemide 40 MG tablet  Commonly known as: Lasix   40 mg, Oral, Daily      furosemide 40 MG tablet  Commonly known as: Lasix   40 mg, Oral, 2 Times Daily      glipizide 5 MG tablet  Commonly known as: Glucotrol   5  mg, Oral, 2 Times Daily Before Meals      isosorbide dinitrate 10 MG tablet  Commonly known as: ISORDIL   10 mg, Oral, 3 Times Daily (Nitrates)      potassium chloride 10 MEQ CR capsule  Commonly known as: MICRO-K   10 mEq, Oral, Daily   Start Date: October 28, 2023     valsartan 40 MG tablet  Commonly known as: DIOVAN   20 mg, Oral, Every 24 Hours Scheduled   Start Date: October 28, 2023            Changes to Medications        Instructions Start Date   hydrALAZINE 50 MG tablet  Commonly known as: APRESOLINE  What changed:   medication strength  how much to take  when to take this   50 mg, Oral, Every 8 Hours Scheduled             Continue These Medications        Instructions Start Date   apixaban 5 MG tablet tablet  Commonly known as: ELIQUIS   5 mg, Oral, Every Morning, Pt is supposed to take this bid but admits to only taking it qam because it makes him cold. He did say he took it this morning 10/06/23      aspirin 81 MG EC tablet   81 mg, Oral, Daily      atorvastatin 80 MG tablet  Commonly known as: LIPITOR   80 mg, Oral, Nightly      carvedilol 25 MG tablet  Commonly known as: COREG   25 mg, Oral, 2 Times Daily With Meals      cholecalciferol 25 MCG (1000 UT) tablet  Commonly known as: VITAMIN D3   1,000 Units, Oral, Daily      Diclofenac Sodium 1 % gel gel  Commonly known as: Voltaren Arthritis Pain   4 g, Topical, 4 Times Daily PRN      ipratropium-albuterol  MCG/ACT inhaler  Commonly known as: COMBIVENT RESPIMAT   1 puff, Inhalation, 4 Times Daily PRN             Stop These Medications      spironolactone 25 MG tablet  Commonly known as: ALDACTONE              No Known Allergies    Discharge Disposition:  Home or Self Care    Diet:  Hospital:No active diet order      Discharge Activity:   Activity Instructions    As Tolerated           CODE STATUS:  Code Status and Medical Interventions:   Ordered at: 10/22/23 7494     Code Status (Patient has no pulse and is not breathing):    CPR (Attempt to  Resuscitate)     Medical Interventions (Patient has pulse or is breathing):    Full Support       Future Appointments   Date Time Provider Department Center   11/16/2023  9:00 AM Heidi Villa, APRN [facility]       Additional Instructions for the Follow-ups that You Need to Schedule    You will be sent home on oral glipizide for diabetes. Your hgb A1C level will result tonight. MD will call you tomorrow with results of this. We may have to start insulin on Monday with PCP.            Pertinent  and/or Most Recent Results     PROCEDURES:   N/A    LAB RESULTS:      Lab 10/27/23  0521 10/26/23  0457 10/23/23  0330 10/22/23  1210   WBC 7.42 7.59 8.40 8.43   HEMOGLOBIN 11.6* 11.2* 10.0* 11.8*   HEMATOCRIT 36.7* 34.5* 30.8* 35.9*   PLATELETS 255 251 276 320   NEUTROS ABS 3.54 3.96 5.90 6.47   IMMATURE GRANS (ABS) 0.02 0.02 0.03 0.02   LYMPHS ABS 2.84 2.70 1.90 1.57   MONOS ABS 0.61 0.58 0.44 0.36   EOS ABS 0.37 0.28 0.09 0.00   MCV 85.5 83.1 83.7 83.3         Lab 10/27/23  0521 10/26/23  0457 10/25/23  0500 10/24/23  0517 10/23/23  0330 10/22/23  1210   SODIUM 135* 133* 134* 135* 136 136   POTASSIUM 4.4 4.3 4.2 3.6 3.7 4.0   CHLORIDE 101 100 98 100 102 103   CO2 24.0 23.7 25.4 26.4 21.7* 21.4*   ANION GAP 10.0 9.3 10.6 8.6 12.3 11.6   BUN 49* 50* 38* 32* 34* 32*   CREATININE 2.50* 2.65* 2.42* 2.07* 2.11* 1.90*   EGFR 27.6* 25.8* 28.7* 34.7* 33.9* 38.4*   GLUCOSE 149* 181* 192* 176* 248* 265*   CALCIUM 8.8 8.3* 8.8 8.9 8.6 9.2   MAGNESIUM 2.2 2.0  --   --  2.0 2.3   PHOSPHORUS 4.0 4.2  --   --  3.5  --    HEMOGLOBIN A1C 9.20*  --   --   --   --   --          Lab 10/22/23  1210   TOTAL PROTEIN 7.3   ALBUMIN 3.5   GLOBULIN 3.8   ALT (SGPT) 27   AST (SGOT) 25   BILIRUBIN 0.4   ALK PHOS 116   LIPASE 15         Lab 10/26/23  0457 10/22/23  1943 10/22/23  1407 10/22/23  1210   PROBNP 3,200.0*  --   --  27,133.0*   HSTROP T  --  145* 136* 159*                 Brief Urine Lab Results  (Last result in the past 365 days)         Color   Clarity   Blood   Leuk Est   Nitrite   Protein   CREAT   Urine HCG        07/08/23 0053 Yellow   Clear   Small (1+)   Negative   Negative   >=300 mg/dL (3+)                 Microbiology Results (last 10 days)       ** No results found for the last 240 hours. **            XR Chest PA & Lateral    Result Date: 10/26/2023   No acute cardiopulmonary findings.  Stable cardiomegaly.     SCOTT CHAVEZ MD       Electronically Signed and Approved By: SCOTT CHAVEZ MD on 10/26/2023 at 11:16             CT Chest Without Contrast Diagnostic    Result Date: 10/22/2023   1. Minimal ground-glass opacity in the lung base near the diaphragm may represent atelectasis or residual or recurrent infectious or inflammatory process, the overall CT appearance of the chest has improved since 5/11/2023.  No airspace consolidations or suspicious pulmonary nodules. 2. Stable cardiomegaly with coronary artery calcifications.      JABARI RABAGO DO       Electronically Signed and Approved By: JABARI RABAGO DO on 10/22/2023 at 15:06             XR Chest 1 View    Result Date: 10/22/2023   No active cardiopulmonary disease.       JABARI RABAGO DO       Electronically Signed and Approved By: JABARI RABAGO DO on 10/22/2023 at 12:57             XR Shoulder 2+ View Right    Result Date: 10/21/2023    1. Evidence for degenerative change involving the right shoulder.      FABIEN BOONE MD       Electronically Signed and Approved By: FABIEN BOONE MD on 10/21/2023 at 13:33               Results for orders placed during the hospital encounter of 04/07/23    Duplex Venous Lower Extremity - Right CV-READ    Interpretation Summary    Acute right lower extremity deep vein thrombosis noted in the gastrocnemius.    All other right sided veins appeared normal.    This is a new finding in comparison to study dated 1/25/2023.      Results for orders placed during the hospital encounter of 04/07/23    Duplex Venous Lower Extremity - Right  CV-READ    Interpretation Summary    Acute right lower extremity deep vein thrombosis noted in the gastrocnemius.    All other right sided veins appeared normal.    This is a new finding in comparison to study dated 1/25/2023.      Results for orders placed during the hospital encounter of 10/06/23    Adult Transthoracic Echo Complete W/ Cont if Necessary Per Protocol    Interpretation Summary    Left ventricular ejection fraction appears to be 26 - 30%.  Appears to have global hypokinesis worse in the apex and anterior walls.    The left ventricular cavity is mildly dilated.    Left ventricular wall thickness is consistent with borderline concentric hypertrophy.    Left ventricular diastolic function was indeterminate.    Mildly reduced right ventricular systolic function noted.    The left atrial cavity is mildly dilated.    Patient has sclerotic aortic valve.  It does appear to open somewhat.  He could have some degree of low gradient AS.  Does not appear likely severe.      Labs Pending at Discharge:        Time spent on Discharge including face to face service:  35 minutes    Electronically signed by Boston Richards MD, 10/27/23, 10:33 AM EDT.

## 2023-10-30 ENCOUNTER — TRANSITIONAL CARE MANAGEMENT TELEPHONE ENCOUNTER (OUTPATIENT)
Dept: CALL CENTER | Facility: HOSPITAL | Age: 68
End: 2023-10-30
Payer: MEDICARE

## 2023-10-30 NOTE — OUTREACH NOTE
Call Center TCM Note      Flowsheet Row Responses   [facility] patient discharged from? [facility]   Does the patient have one of the following disease processes/diagnoses(primary or secondary)? CHF   TCM attempt successful? No   Unsuccessful attempts Attempt 2             Mili Benitez RN    10/30/2023, 12:15 EDT

## 2023-10-30 NOTE — OUTREACH NOTE
Call Center TCM Note      Flowsheet Row Responses   [facility] patient discharged from? [facility]   Does the patient have one of the following disease processes/diagnoses(primary or secondary)? CHF   TCM attempt successful? No  [no verbal release on file]   Unsuccessful attempts Attempt 1  [phone not in working order at first attempt]             Mili Benitez RN    10/30/2023, 08:48 EDT

## 2023-10-31 ENCOUNTER — TRANSITIONAL CARE MANAGEMENT TELEPHONE ENCOUNTER (OUTPATIENT)
Dept: CALL CENTER | Facility: HOSPITAL | Age: 68
End: 2023-10-31
Payer: MEDICARE

## 2023-10-31 NOTE — OUTREACH NOTE
Call Center TCM Note      Flowsheet Row Responses   [facility] patient discharged from? [place]   Does the patient have one of the following disease processes/diagnoses(primary or secondary)? CHF   TCM attempt successful? No   Unsuccessful attempts Attempt 3             María Braswell RN    10/31/2023, 16:01 EDT

## 2024-01-01 ENCOUNTER — RESEARCH ENCOUNTER (OUTPATIENT)
Dept: OTHER | Facility: OTHER | Age: 69
End: 2024-01-01
Payer: MEDICARE

## 2024-05-14 NOTE — PROGRESS NOTES
[facility]   Hospitalist Progress Note  Date: 10/26/2023  Patient Name: Lyle Lozano  : 1955  MRN: [id number]  Date of admission: 10/22/2023  Room/Bed: 410/1      Subjective   Subjective     Chief Complaint: Follow-up of shortness of breath    Summary:Lyle Lozano is a 68 y.o. male with past medical history of hypertension, CKD stage IIIb, HFrEF, DVT on Eliquis and type 2 diabetes mellitus who presented with complaints of shortness of breath and chest pain.  Patient was hypertensive on presentation with elevated troponin.  proBNP was 27,000.  Patient was admitted for heart failure and hypertension management.  Cardiology was consulted, patient underwent stress test which showed EF of 21% and wall motion abnormality consistent with previous infarct.  Currently on optimizing GDMT for HFrEF.  Cardiology following the patient.    Interval Followup: No acute events overnight.  He was lying comfortably in his bed.  He denied any fever, chills, rigors, chest pain or difficulty breathing. Blood glucose level between 121-265; 144 this morning.    Review of Systems    All systems reviewed and negative except for what is outlined above.      Objective   Objective     Vitals:   Temp:  [97.3 °F (36.3 °C)-98.1 °F (36.7 °C)] 97.3 °F (36.3 °C)  Heart Rate:  [65-73] 66  Resp:  [18] 18  BP: ()/(54-84) 117/67    Physical Exam   General: Awake, alert, NAD  Cardiovascular: RRR, no murmurs; no pedal edema.  Pulmonary: CTA bilaterally; no wheezes; no conversational dyspnea  Gastrointestinal: S/ND/NT, +BS  Musculoskeletal: No gross deformities  Skin: No jaundice, no rash on exposed skin appreciated  Neuro: CN II through XII grossly intact; speech clear; no tremor  Psych: Mood and affect appropriate  : No [place] catheter; no suprapubic tenderness    Result Review    Result Review:  I have personally reviewed these results:  [x]  Laboratory      Lab 10/26/23  0457 10/23/23  0330 10/22/23  1210   WBC  What is the reason for referral.  I need a diagnosis code for referral.   7.59 8.40 8.43   HEMOGLOBIN 11.2* 10.0* 11.8*   HEMATOCRIT 34.5* 30.8* 35.9*   PLATELETS 251 276 320   NEUTROS ABS 3.96 5.90 6.47   IMMATURE GRANS (ABS) 0.02 0.03 0.02   LYMPHS ABS 2.70 1.90 1.57   MONOS ABS 0.58 0.44 0.36   EOS ABS 0.28 0.09 0.00   MCV 83.1 83.7 83.3         Lab 10/26/23  0457 10/25/23  0500 10/24/23  0517 10/23/23  0330 10/22/23  1210   SODIUM 133* 134* 135* 136 136   POTASSIUM 4.3 4.2 3.6 3.7 4.0   CHLORIDE 100 98 100 102 103   CO2 23.7 25.4 26.4 21.7* 21.4*   ANION GAP 9.3 10.6 8.6 12.3 11.6   BUN 50* 38* 32* 34* 32*   CREATININE 2.65* 2.42* 2.07* 2.11* 1.90*   EGFR 25.8* 28.7* 34.7* 33.9* 38.4*   GLUCOSE 181* 192* 176* 248* 265*   CALCIUM 8.3* 8.8 8.9 8.6 9.2   MAGNESIUM 2.0  --   --  2.0 2.3   PHOSPHORUS 4.2  --   --  3.5  --          Lab 10/22/23  1210   TOTAL PROTEIN 7.3   ALBUMIN 3.5   GLOBULIN 3.8   ALT (SGPT) 27   AST (SGOT) 25   BILIRUBIN 0.4   ALK PHOS 116   LIPASE 15         Lab 10/26/23  0457 10/22/23  1943 10/22/23  1407 10/22/23  1210   PROBNP 3,200.0*  --   --  27,133.0*   HSTROP T  --  145* 136* 159*                 Brief Urine Lab Results  (Last result in the past 365 days)        Color   Clarity   Blood   Leuk Est   Nitrite   Protein   CREAT   Urine HCG        07/08/23 0053 Yellow   Clear   Small (1+)   Negative   Negative   >=300 mg/dL (3+)                 [x]  Microbiology   Microbiology Results (last 10 days)       ** No results found for the last 240 hours. **          [x]  Radiology  XR Chest PA & Lateral    Result Date: 10/26/2023   No acute cardiopulmonary findings.  Stable cardiomegaly.     SCOTT CHAVEZ MD       Electronically Signed and Approved By: SCOTT CHAVEZ MD on 10/26/2023 at 11:16             CT Chest Without Contrast Diagnostic    Result Date: 10/22/2023   1. Minimal ground-glass opacity in the lung base near the diaphragm may represent atelectasis or residual or recurrent infectious or inflammatory process, the overall CT appearance of the chest has improved  since 5/11/2023.  No airspace consolidations or suspicious pulmonary nodules. 2. Stable cardiomegaly with coronary artery calcifications.      JABARI RABAGO DO       Electronically Signed and Approved By: JABARI RABAGO DO on 10/22/2023 at 15:06             XR Chest 1 View    Result Date: 10/22/2023   No active cardiopulmonary disease.       JABARI RABAGO DO       Electronically Signed and Approved By: JABARI RABAGO DO on 10/22/2023 at 12:57             XR Shoulder 2+ View Right    Result Date: 10/21/2023    1. Evidence for degenerative change involving the right shoulder.      FABIEN BOONE MD       Electronically Signed and Approved By: FABIEN BOONE MD on 10/21/2023 at 13:33            []  EKG/Telemetry   []  Cardiology/Vascular   []  Pathology  []  Old records  []  Other:    Assessment & Plan   Assessment / Plan     Assessment:  Acute on chronic HFrEF  Type II NSTEMI  Uncontrolled hypertension  Uncontrolled type 2 diabetes mellitus  CKD stage IIIb  Hyperlipidemia  DVT on Eliquis  Right shoulder pain, degenerative changes involving the AC joint.    Plan:  Patient currently saturating well on room air.  Cardiology following the patient for acute on chronic HFrEF and uncontrolled hypertension.  Restarted on hydralazine 50 mg twice daily.    Holding Lasix today in the setting of worsening [place] with creatinine of 2.65 and BUN 50.  Will decide about diuretics depending on the renal function tomorrow.  Continue Coreg and Jardiance for GDMT for HFrEF.  Plan to change Jardiance to Farxiga as outpatient by cardiology.  Continue to hold Aldactone.  Continue aspirin and statin.  Continue Eliquis 5 mg twice daily.  Blood glucose 121-265.  Blood glucose level this morning 144.  Continue current insulin detemir 10 units daily with insulin sliding scale.  Goal glucose level of 140-180.  DM educator was consulted for insulin teaching.  Currently on low-sodium, consistent carbohydrate diet.  Continue.  Cardiology following the  patient, appreciate further recommendation.  Continue rest of the current management.  Likely discharge in next 24-48 hours.         DVT prophylaxis:  Medical DVT prophylaxis orders are present.    CODE STATUS:   Code Status (Patient has no pulse and is not breathing): CPR (Attempt to Resuscitate)  Medical Interventions (Patient has pulse or is breathing): Full Support      Electronically signed by Boston Richards MD, 10/26/23, 8:17 AM EDT.